# Patient Record
Sex: FEMALE | Race: WHITE | NOT HISPANIC OR LATINO | Employment: OTHER | URBAN - METROPOLITAN AREA
[De-identification: names, ages, dates, MRNs, and addresses within clinical notes are randomized per-mention and may not be internally consistent; named-entity substitution may affect disease eponyms.]

---

## 2017-01-09 ENCOUNTER — OFFICE VISIT (OUTPATIENT)
Dept: CARDIOLOGY | Facility: CLINIC | Age: 72
End: 2017-01-09
Payer: MEDICARE

## 2017-01-09 ENCOUNTER — OFFICE VISIT (OUTPATIENT)
Dept: SURGERY | Facility: CLINIC | Age: 72
End: 2017-01-09
Payer: MEDICARE

## 2017-01-09 VITALS
SYSTOLIC BLOOD PRESSURE: 138 MMHG | HEIGHT: 62 IN | DIASTOLIC BLOOD PRESSURE: 74 MMHG | WEIGHT: 190.25 LBS | HEART RATE: 76 BPM | BODY MASS INDEX: 35.01 KG/M2

## 2017-01-09 VITALS
TEMPERATURE: 98 F | HEART RATE: 80 BPM | WEIGHT: 189.63 LBS | DIASTOLIC BLOOD PRESSURE: 69 MMHG | BODY MASS INDEX: 35.8 KG/M2 | SYSTOLIC BLOOD PRESSURE: 155 MMHG | HEIGHT: 61 IN

## 2017-01-09 DIAGNOSIS — Z95.1 S/P CABG (CORONARY ARTERY BYPASS GRAFT): ICD-10-CM

## 2017-01-09 DIAGNOSIS — Z87.09 HISTORY OF ARDS: ICD-10-CM

## 2017-01-09 DIAGNOSIS — Z87.891 FORMER SMOKER: ICD-10-CM

## 2017-01-09 DIAGNOSIS — N18.4 CKD (CHRONIC KIDNEY DISEASE) STAGE 4, GFR 15-29 ML/MIN: ICD-10-CM

## 2017-01-09 DIAGNOSIS — R10.11 RIGHT UPPER QUADRANT ABDOMINAL PAIN: Primary | ICD-10-CM

## 2017-01-09 DIAGNOSIS — I25.10 CORONARY ARTERY DISEASE INVOLVING NATIVE CORONARY ARTERY OF NATIVE HEART WITHOUT ANGINA PECTORIS: Primary | ICD-10-CM

## 2017-01-09 PROCEDURE — 99204 OFFICE O/P NEW MOD 45 MIN: CPT | Mod: S$GLB,,, | Performed by: SURGERY

## 2017-01-09 PROCEDURE — 1159F MED LIST DOCD IN RCRD: CPT | Mod: S$GLB,,, | Performed by: SURGERY

## 2017-01-09 PROCEDURE — 1160F RVW MEDS BY RX/DR IN RCRD: CPT | Mod: S$GLB,,, | Performed by: INTERNAL MEDICINE

## 2017-01-09 PROCEDURE — 99214 OFFICE O/P EST MOD 30 MIN: CPT | Mod: S$GLB,,, | Performed by: INTERNAL MEDICINE

## 2017-01-09 PROCEDURE — 3078F DIAST BP <80 MM HG: CPT | Mod: S$GLB,,, | Performed by: SURGERY

## 2017-01-09 PROCEDURE — 1159F MED LIST DOCD IN RCRD: CPT | Mod: S$GLB,,, | Performed by: INTERNAL MEDICINE

## 2017-01-09 PROCEDURE — 1157F ADVNC CARE PLAN IN RCRD: CPT | Mod: S$GLB,,, | Performed by: SURGERY

## 2017-01-09 PROCEDURE — 1157F ADVNC CARE PLAN IN RCRD: CPT | Mod: S$GLB,,, | Performed by: INTERNAL MEDICINE

## 2017-01-09 PROCEDURE — 3077F SYST BP >= 140 MM HG: CPT | Mod: S$GLB,,, | Performed by: SURGERY

## 2017-01-09 PROCEDURE — 99999 PR PBB SHADOW E&M-EST. PATIENT-LVL III: CPT | Mod: PBBFAC,,, | Performed by: SURGERY

## 2017-01-09 PROCEDURE — 1160F RVW MEDS BY RX/DR IN RCRD: CPT | Mod: S$GLB,,, | Performed by: SURGERY

## 2017-01-09 PROCEDURE — 3078F DIAST BP <80 MM HG: CPT | Mod: S$GLB,,, | Performed by: INTERNAL MEDICINE

## 2017-01-09 PROCEDURE — 3075F SYST BP GE 130 - 139MM HG: CPT | Mod: S$GLB,,, | Performed by: INTERNAL MEDICINE

## 2017-01-09 PROCEDURE — 1126F AMNT PAIN NOTED NONE PRSNT: CPT | Mod: S$GLB,,, | Performed by: INTERNAL MEDICINE

## 2017-01-09 PROCEDURE — 99499 UNLISTED E&M SERVICE: CPT | Mod: S$PBB,,, | Performed by: INTERNAL MEDICINE

## 2017-01-09 PROCEDURE — 99999 PR PBB SHADOW E&M-EST. PATIENT-LVL III: CPT | Mod: PBBFAC,,, | Performed by: INTERNAL MEDICINE

## 2017-01-09 PROCEDURE — 1126F AMNT PAIN NOTED NONE PRSNT: CPT | Mod: S$GLB,,, | Performed by: SURGERY

## 2017-01-09 NOTE — LETTER
January 9, 2017      Va Meneses MD  [address]  [facility] [address]           [facility] [facility] General [facility]  [address]  [place] [address]  Phone: [phone]          Patient: Adele Hall   MR Number: [id number]   YOB: 1945   Date of Visit: 1/9/2017       Dear Dr. Va Meneses:    Thank you for referring Adele Hall to me for evaluation. Attached you will find relevant portions of my assessment and plan of care.    If you have questions, please do not hesitate to call me. I look forward to following Adele Hall along with you.    Sincerely,    Alex Mcdonough MD    Enclosure  CC:  No Recipients    If you would like to receive this communication electronically, please contact [email or web address] or [phone] to request more information on [facility] [employer] access.    For providers and/or their staff who would like to refer a patient to [facility], please contact us through our one-stop-shop provider referral line, [facility], at [phone].    If you feel you have received this communication in error or would no longer like to receive these types of communications, please e-mail [email or web address]

## 2017-01-09 NOTE — PROGRESS NOTES
Subjective:       Patient ID: Adele Hall is a 71 y.o. female.    Chief Complaint: Consult (sludge in gallbladder, burping a lot, worse at night, occasion stomach pains)    HPI Comments: Referred by Dr. Cazares with Gallbladder sludge.    Patient presents for evaluation of possible gallbladder problems. Problems were first noted 3 months ago. Current symptoms include bloating, belching and burping.  Pancreatic symptoms include none. Patient denies pruritis, RUQ Pain, nausea, vomiting, jaundice, fever, chills, diarrhea, epigastric pain.  Symptoms have been intermittent, but now are almost daily.  She states many years ago she was told her stomach did not empty well (she is diabetic).  Also told she had a hiatal hernia (although none shows on CT of chest) and is on Protonix.  Had an US recently which showed increased amount of sludge in gall bladder.  Liver functions normal.       Review of Systems   Constitutional: Negative for appetite change, chills, diaphoresis, fatigue, fever and unexpected weight change.   HENT: Negative for hearing loss, sore throat, trouble swallowing and voice change.    Eyes: Negative for visual disturbance.   Respiratory: Negative for cough, shortness of breath and wheezing.    Cardiovascular: Negative for chest pain, palpitations and leg swelling.   Gastrointestinal: Negative for abdominal distention, abdominal pain, anal bleeding, blood in stool, constipation, diarrhea, nausea, rectal pain and vomiting.   Genitourinary: Negative for difficulty urinating, dysuria, flank pain, frequency, hematuria, menstrual problem and urgency.   Musculoskeletal: Negative for arthralgias, back pain, joint swelling, myalgias and neck pain.   Skin: Negative for pallor and rash.   Neurological: Positive for numbness (in feet). Negative for dizziness, syncope, weakness and headaches.   Hematological: Negative for adenopathy. Does not bruise/bleed easily.   Psychiatric/Behavioral: Negative for  suicidal ideas. The patient is not nervous/anxious.        Objective:      Physical Exam   Constitutional: She is oriented to person, place, and time. She appears well-developed and well-nourished. No distress.   HENT:   Head: Normocephalic.   Right Ear: Hearing and external ear normal. No decreased hearing is noted.   Left Ear: Hearing and external ear normal. No decreased hearing is noted.   Nose: Nose normal.   Mouth/Throat: Mucous membranes are not dry. Normal dentition.   Eyes: Conjunctivae are normal. Right eye exhibits no discharge. Left eye exhibits no discharge. Right conjunctiva is not injected. Right conjunctiva has no hemorrhage. Left conjunctiva is not injected. Left conjunctiva has no hemorrhage. Right eye exhibits normal extraocular motion and no nystagmus. Left eye exhibits normal extraocular motion and no nystagmus. Right pupil is round and reactive. Left pupil is round and reactive. Pupils are equal.   Neck: No JVD present. Normal range of motion present. No thyroid mass and no thyromegaly present.   Cardiovascular: Normal rate, regular rhythm, normal heart sounds and intact distal pulses.  Exam reveals no gallop and no friction rub.    No murmur heard.  Pulmonary/Chest: Effort normal and breath sounds normal. No respiratory distress. She has no wheezes. She has no rales.   Abdominal: Soft. Bowel sounds are normal. She exhibits no distension and no mass. There is no hepatosplenomegaly. There is no tenderness. There is no rebound, no guarding and no CVA tenderness. No hernia.   Musculoskeletal: Normal range of motion. She exhibits no edema.   Lymphadenopathy:     She has no cervical adenopathy.     She has no axillary adenopathy.   Neurological: She is alert and oriented to person, place, and time. She has normal strength. No cranial nerve deficit or sensory deficit.   Skin: Skin is warm and dry. No rash noted. No cyanosis. Nails show no clubbing.   Psychiatric: She has a normal mood and affect. Her  mood appears not anxious. She does not exhibit a depressed mood.       Assessment:       1. Gall bladder sludge        Plan:       Patient  Is currently on Plavix after bypass surgery (over one year ago).  She is overdue with appt to see Dr. Burks.  Will get hida scan and have her see cardiology to see if she can stop Plavix.  If hida abnormal will recommend lap GB.  If normal, will leave it to patient's discretion as to whether she would like to proceed with surgery.

## 2017-01-09 NOTE — MR AVS SNAPSHOT
[facility]  [address] [facility] [address]  [facility] [place] [address]  Phone: [phone]                  Adele Hall   2017 2:40 PM   Office Visit    Description:  Female : 1945   Provider:  Cristopher Burks MD   Department:  [place] - Cardiology           Reason for Visit     Coronary Artery Disease     Hypertension     Chronic Kidney Disease     Hyperlipidemia           Diagnoses this Visit        Comments    Coronary artery disease involving native coronary artery of native heart without angina pectoris    -  Primary     History of ARDS         S/P CABG (coronary artery bypass graft)         Former smoker         CKD (chronic kidney disease) stage 4, GFR 15-29 ml/min                To Do List           Future Appointments        Provider Department Dept Phone    2017 12:00 PM [facility] [phone]    2017 10:30 AM LAB, [facility] - Lab [phone]    2017 10:45 AM LAB, [facility] - Lab [phone]    3/3/2017 11:00 AM Henrique Montero MD [facility] [phone]    3/14/2017 10:15 AM LAB, [facility] - Lab [phone]      Goals (5 Years of Data)     None      Follow-Up and Disposition     Return in about 6 months (around 2017).      [facility]     [facility] Nurse Care Line -  Assistance  Registered nurses in the [facility] provide clinical advisement, health education, appointment booking, and other advisory services.  Call for this free service at [phone].             Medications           Message regarding Medications     Verify the changes and/or additions to your medication regime listed below are the same as discussed with your clinician today.  If any of these changes or additions are incorrect, please notify your healthcare provider.             Verify that the below list of medications is an accurate representation of the  medications you are currently taking.  If none reported, the list may be blank. If incorrect, please contact your healthcare provider. Carry this list with you in case of emergency.           Current Medications     amlodipine (NORVASC) 2.5 MG tablet Take 1 tablet (2.5 mg total) by mouth once daily.    aspirin (ECOTRIN) 81 MG EC tablet Take 81 mg by mouth once daily.    clopidogrel (PLAVIX) 75 mg tablet Take 75 mg by mouth once daily.    fenofibrate micronized (LOFIBRA) 134 MG Cap TAKE ONE CAPSULE BY MOUTH EVERYDAY WITH BREAKFAST    gabapentin (NEURONTIN) 300 MG capsule Take 1 at bedtime for 1 week, then 2 a day for 1 week, then 3 a day    hydrOXYzine HCl (ATARAX) 25 MG tablet Take 1 tablet (25 mg total) by mouth 3 (three) times daily as needed for Anxiety.    insulin aspart (NOVOLOG) 100 unit/mL InPn pen Inject 32 Units into the skin 3 (three) times daily with meals.    insulin detemir (LEVEMIR) 100 unit/mL injection Inject 42 Units into the skin 2 (two) times daily.    levothyroxine (SYNTHROID) 112 MCG tablet Take 1 tablet (112 mcg total) by mouth once daily.    liraglutide 0.6 mg/0.1 mL, 18 mg/3 mL, subq PNIJ (VICTOZA 3-MICH) 0.6 mg/0.1 mL (18 mg/3 mL) PnIj Inject 1.8 mg into the skin once daily.    lisinopril 10 MG tablet Take 1 tablet (10 mg total) by mouth once daily.    metoprolol tartrate (LOPRESSOR) 25 MG tablet Take 0.5 tablets (12.5 mg total) by mouth 2 (two) times daily.    pantoprazole (PROTONIX) 40 MG tablet Take 40 mg by mouth once daily.    pramipexole (MIRAPEX) 0.125 MG tablet Take 0.25 mg by mouth nightly as needed (MAY TAKE UP TO 0.25 MG).     rosuvastatin (CRESTOR) 40 MG Tab Take 1 tablet (40 mg total) by mouth every evening.    VITAMIN D2 50,000 unit capsule TAKE ONE CAPSULE BY MOUTH EVERY SEVEN DAYS    BD INSULIN SYRINGE ULTRA-FINE 1 mL 31 gauge x 5/16 Syrg Uses 5 syringes per day with Levemir and Novolog    blood sugar diagnostic Strp Check blood glucose 4 times a day - before meals and at  "bedtime. Please dispense testing supplies for true metrix. Dx code e11.65    blood-glucose meter kit Use as instructed. Please dispense testing supplies for true metrix. Dx code e11.65    lancets Misc Check blood glucose 4x/day. Please dispense testing supplies for true metrix. Dx code e11.65    pen needle, diabetic (NOVOFINE PLUS) 32 gauge x 1/6" Ndle 1 Device by Misc.(Non-Drug; Combo Route) route 6 (six) times daily.           Clinical Reference Information           Vital Signs - Last Recorded  Most recent update: 1/9/2017  2:54 PM by Taylor Gutierrez LPN    BP Pulse Ht Wt BMI    138/74 76 5' 1.5" (1.562 m) 86.3 kg (190 lb 4.1 oz) 35.37 kg/m2      Blood Pressure          Most Recent Value    BP  138/74      Allergies as of 1/9/2017     Reglan [Metoclopramide Hcl]      Immunizations Administered on Date of Encounter - 1/9/2017     None      "

## 2017-01-09 NOTE — PROGRESS NOTES
Subjective:    Patient ID:  Adele Hall is a 71 y.o. female who presents for follow-up of Coronary Artery Disease (follow up); Hypertension; Chronic Kidney Disease; and Hyperlipidemia      HPI Comments: Pt a bit overdue for a visit is here for f/u. She has been doing well and has no cardiac complaints. She is exercising regularly with a  w/o problem. She may need cholecystectomy and needs clearance.       Review of Systems   Constitution: Negative for weight gain and weight loss.   HENT: Negative.    Eyes: Negative.    Cardiovascular: Negative for chest pain, claudication, cyanosis, dyspnea on exertion, irregular heartbeat, leg swelling, near-syncope, orthopnea (no PND), palpitations and syncope.   Respiratory: Negative for cough, hemoptysis, shortness of breath and snoring.    Endocrine: Negative.    Skin: Negative.    Musculoskeletal: Negative for joint pain, muscle cramps, muscle weakness and myalgias.   Gastrointestinal: Negative for diarrhea, hematemesis, nausea and vomiting.   Genitourinary: Negative.    Neurological: Negative for dizziness, focal weakness, light-headedness, loss of balance, numbness, paresthesias and seizures.   Psychiatric/Behavioral: Negative.         Objective:    Physical Exam   Constitutional: She is oriented to person, place, and time. She appears well-developed and well-nourished.   Eyes: Pupils are equal, round, and reactive to light.   Neck: Normal range of motion. No thyromegaly present.   Cardiovascular: Normal rate, regular rhythm, S1 normal, S2 normal, normal heart sounds, intact distal pulses and normal pulses.   No extrasystoles are present. PMI is not displaced.  Exam reveals no friction rub.    No murmur heard.  Pulmonary/Chest: Effort normal and breath sounds normal. She has no wheezes. She has no rales. She exhibits no tenderness.   Abdominal: Soft. Bowel sounds are normal. She exhibits no distension and no mass. There is no tenderness.   Musculoskeletal:  Normal range of motion. She exhibits no edema.   Neurological: She is alert and oriented to person, place, and time.   Skin: Skin is warm and dry.   Vitals reviewed.      Test(s) Reviewed  I have reviewed the following in detail:  [] Stress test   [] Angiography   [] Echocardiogram   [x] Labs   [] Other:         Assessment:       1. Coronary artery disease involving native coronary artery of native heart without angina pectoris    2. History of ARDS, post CABG, 1/2016    3. S/P CABG (coronary artery bypass graft) - x4 01/19/2016; LIMA to LAD; SVG's to diag, [facility], PDA    4. Former smoker    5. CKD (chronic kidney disease) stage 4, GFR 15-29 ml/min         Plan:       Pt doing well  Cardiac status stable  May stop the plavix  No contraindication to planned cholecystectomy.   F/u 6 months

## 2017-01-12 ENCOUNTER — HOSPITAL ENCOUNTER (OUTPATIENT)
Dept: RADIOLOGY | Facility: HOSPITAL | Age: 72
Discharge: HOME | End: 2017-01-12
Attending: SURGERY
Payer: MEDICARE

## 2017-01-12 DIAGNOSIS — R10.11 RIGHT UPPER QUADRANT ABDOMINAL PAIN: ICD-10-CM

## 2017-01-12 DIAGNOSIS — K21.9 GASTROESOPHAGEAL REFLUX DISEASE, ESOPHAGITIS PRESENCE NOT SPECIFIED: Primary | ICD-10-CM

## 2017-01-12 PROCEDURE — A9537 TC99M MEBROFENIN: HCPCS

## 2017-01-12 PROCEDURE — 78227 HEPATOBIL SYST IMAGE W/DRUG: CPT | Mod: TC

## 2017-01-12 PROCEDURE — 78227 HEPATOBIL SYST IMAGE W/DRUG: CPT | Mod: 26,,, | Performed by: RADIOLOGY

## 2017-01-12 PROCEDURE — 63600175 PHARM REV CODE 636 W HCPCS

## 2017-01-12 RX ORDER — SINCALIDE 5 UG/5ML
INJECTION, POWDER, LYOPHILIZED, FOR SOLUTION INTRAVENOUS
Status: COMPLETED
Start: 2017-01-12 | End: 2017-01-12

## 2017-01-12 RX ADMIN — SINCALIDE 1.73 MCG: 5 INJECTION, POWDER, LYOPHILIZED, FOR SOLUTION INTRAVENOUS at 08:01

## 2017-01-17 RX ORDER — SODIUM CHLORIDE 9 MG/ML
INJECTION, SOLUTION INTRAVENOUS CONTINUOUS
Status: CANCELLED | OUTPATIENT
Start: 2017-01-26

## 2017-01-19 ENCOUNTER — HOSPITAL ENCOUNTER (OUTPATIENT)
Dept: PREADMISSION TESTING | Facility: HOSPITAL | Age: 72
Discharge: HOME | End: 2017-01-19
Attending: SURGERY
Payer: MEDICARE

## 2017-01-19 DIAGNOSIS — R10.11 RIGHT UPPER QUADRANT ABDOMINAL PAIN: ICD-10-CM

## 2017-01-19 LAB
ALBUMIN SERPL BCP-MCNC: 3.8 G/DL
ALP SERPL-CCNC: 85 U/L
ALT SERPL W/O P-5'-P-CCNC: 24 U/L
ANION GAP SERPL CALC-SCNC: 10 MMOL/L
AST SERPL-CCNC: 31 U/L
BASOPHILS # BLD AUTO: 0.1 K/UL
BASOPHILS NFR BLD: 0.9 %
BILIRUB SERPL-MCNC: 0.2 MG/DL
BUN SERPL-MCNC: 25 MG/DL
CALCIUM SERPL-MCNC: 9.7 MG/DL
CHLORIDE SERPL-SCNC: 102 MMOL/L
CO2 SERPL-SCNC: 26 MMOL/L
CREAT SERPL-MCNC: 1.5 MG/DL
DIFFERENTIAL METHOD: ABNORMAL
EOSINOPHIL # BLD AUTO: 0.2 K/UL
EOSINOPHIL NFR BLD: 3 %
ERYTHROCYTE [DISTWIDTH] IN BLOOD BY AUTOMATED COUNT: 14.2 %
EST. GFR  (AFRICAN AMERICAN): 41 ML/MIN/1.73 M^2
EST. GFR  (NON AFRICAN AMERICAN): 35 ML/MIN/1.73 M^2
GLUCOSE SERPL-MCNC: 184 MG/DL
HCT VFR BLD AUTO: 34.2 %
HGB BLD-MCNC: 11.3 G/DL
LYMPHOCYTES # BLD AUTO: 2.8 K/UL
LYMPHOCYTES NFR BLD: 42.1 %
MCH RBC QN AUTO: 29 PG
MCHC RBC AUTO-ENTMCNC: 33 %
MCV RBC AUTO: 88 FL
MONOCYTES # BLD AUTO: 0.5 K/UL
MONOCYTES NFR BLD: 7.5 %
NEUTROPHILS # BLD AUTO: 3.1 K/UL
NEUTROPHILS NFR BLD: 46.5 %
PLATELET # BLD AUTO: 240 K/UL
PMV BLD AUTO: 9.5 FL
POTASSIUM SERPL-SCNC: 4.5 MMOL/L
PROT SERPL-MCNC: 7.2 G/DL
RBC # BLD AUTO: 3.89 M/UL
SODIUM SERPL-SCNC: 138 MMOL/L
WBC # BLD AUTO: 6.6 K/UL

## 2017-01-19 PROCEDURE — 80053 COMPREHEN METABOLIC PANEL: CPT

## 2017-01-19 PROCEDURE — 85025 COMPLETE CBC W/AUTO DIFF WBC: CPT

## 2017-01-19 PROCEDURE — 36415 COLL VENOUS BLD VENIPUNCTURE: CPT

## 2017-01-25 ENCOUNTER — ANESTHESIA EVENT (OUTPATIENT)
Dept: SURGERY | Facility: HOSPITAL | Age: 72
End: 2017-01-25
Payer: MEDICARE

## 2017-01-25 ENCOUNTER — TELEPHONE (OUTPATIENT)
Dept: FAMILY MEDICINE | Facility: CLINIC | Age: 72
End: 2017-01-25

## 2017-01-25 NOTE — TELEPHONE ENCOUNTER
Contacted pt regarding if she has recently had an eye exam done. Pt stated that she has not but she needs to make an apt. She would like to stay within [facility], so she will be returning call to clinic to get one scheduled after she puts groceries away. Pt understanding verified.

## 2017-01-26 ENCOUNTER — ANESTHESIA (OUTPATIENT)
Dept: SURGERY | Facility: HOSPITAL | Age: 72
End: 2017-01-26
Payer: MEDICARE

## 2017-01-26 ENCOUNTER — SURGERY (OUTPATIENT)
Age: 72
End: 2017-01-26

## 2017-01-26 ENCOUNTER — HOSPITAL ENCOUNTER (OUTPATIENT)
Facility: HOSPITAL | Age: 72
Discharge: HOME | End: 2017-01-26
Attending: SURGERY | Admitting: SURGERY
Payer: MEDICARE

## 2017-01-26 DIAGNOSIS — R10.11 RIGHT UPPER QUADRANT ABDOMINAL PAIN: ICD-10-CM

## 2017-01-26 LAB
POCT GLUCOSE: 154 MG/DL (ref 70–110)
POCT GLUCOSE: 189 MG/DL (ref 70–110)

## 2017-01-26 PROCEDURE — 71000016 HC POSTOP RECOV ADDL HR: Performed by: SURGERY

## 2017-01-26 PROCEDURE — 71000033 HC RECOVERY, INTIAL HOUR: Performed by: SURGERY

## 2017-01-26 PROCEDURE — 36000708 HC OR TIME LEV III 1ST 15 MIN: Performed by: SURGERY

## 2017-01-26 PROCEDURE — 25000003 PHARM REV CODE 250: Performed by: ANESTHESIOLOGY

## 2017-01-26 PROCEDURE — 25000003 PHARM REV CODE 250: Performed by: SURGERY

## 2017-01-26 PROCEDURE — 99900104 DSU ONLY-NO CHARGE-EA ADD'L HR (STAT): Performed by: SURGERY

## 2017-01-26 PROCEDURE — D9220A PRA ANESTHESIA: Mod: CRNA,,, | Performed by: NURSE ANESTHETIST, CERTIFIED REGISTERED

## 2017-01-26 PROCEDURE — 88304 TISSUE EXAM BY PATHOLOGIST: CPT | Performed by: PATHOLOGY

## 2017-01-26 PROCEDURE — 99900103 DSU ONLY-NO CHARGE-INITIAL HR (STAT): Performed by: SURGERY

## 2017-01-26 PROCEDURE — 71000039 HC RECOVERY, EACH ADD'L HOUR: Performed by: SURGERY

## 2017-01-26 PROCEDURE — 36000709 HC OR TIME LEV III EA ADD 15 MIN: Performed by: SURGERY

## 2017-01-26 PROCEDURE — 63600175 PHARM REV CODE 636 W HCPCS: Performed by: ANESTHESIOLOGY

## 2017-01-26 PROCEDURE — 25000003 PHARM REV CODE 250: Performed by: NURSE ANESTHETIST, CERTIFIED REGISTERED

## 2017-01-26 PROCEDURE — 37000009 HC ANESTHESIA EA ADD 15 MINS: Performed by: SURGERY

## 2017-01-26 PROCEDURE — 27201423 OPTIME MED/SURG SUP & DEVICES STERILE SUPPLY: Performed by: SURGERY

## 2017-01-26 PROCEDURE — 47562 LAPAROSCOPIC CHOLECYSTECTOMY: CPT | Mod: ,,, | Performed by: SURGERY

## 2017-01-26 PROCEDURE — 71000015 HC POSTOP RECOV 1ST HR: Performed by: SURGERY

## 2017-01-26 PROCEDURE — 88304 TISSUE EXAM BY PATHOLOGIST: CPT | Mod: 26,,, | Performed by: PATHOLOGY

## 2017-01-26 PROCEDURE — 37000008 HC ANESTHESIA 1ST 15 MINUTES: Performed by: SURGERY

## 2017-01-26 PROCEDURE — D9220A PRA ANESTHESIA: Mod: ANES,,, | Performed by: ANESTHESIOLOGY

## 2017-01-26 PROCEDURE — 63600175 PHARM REV CODE 636 W HCPCS: Performed by: NURSE ANESTHETIST, CERTIFIED REGISTERED

## 2017-01-26 RX ORDER — HYDROCODONE BITARTRATE AND ACETAMINOPHEN 5; 325 MG/1; MG/1
1 TABLET ORAL EVERY 4 HOURS PRN
Qty: 30 TABLET | Refills: 0 | Status: SHIPPED | OUTPATIENT
Start: 2017-01-26 | End: 2017-03-03

## 2017-01-26 RX ORDER — MIDAZOLAM HYDROCHLORIDE 1 MG/ML
INJECTION INTRAMUSCULAR; INTRAVENOUS
Status: DISCONTINUED | OUTPATIENT
Start: 2017-01-26 | End: 2017-01-26

## 2017-01-26 RX ORDER — SODIUM CHLORIDE, SODIUM LACTATE, POTASSIUM CHLORIDE, CALCIUM CHLORIDE 600; 310; 30; 20 MG/100ML; MG/100ML; MG/100ML; MG/100ML
INJECTION, SOLUTION INTRAVENOUS CONTINUOUS
Status: DISCONTINUED | OUTPATIENT
Start: 2017-01-26 | End: 2017-01-26

## 2017-01-26 RX ORDER — LIDOCAINE HYDROCHLORIDE 10 MG/ML
1 INJECTION, SOLUTION EPIDURAL; INFILTRATION; INTRACAUDAL; PERINEURAL ONCE
Status: COMPLETED | OUTPATIENT
Start: 2017-01-26 | End: 2017-01-26

## 2017-01-26 RX ORDER — ONDANSETRON HYDROCHLORIDE 2 MG/ML
INJECTION, SOLUTION INTRAMUSCULAR; INTRAVENOUS
Status: DISCONTINUED | OUTPATIENT
Start: 2017-01-26 | End: 2017-01-26

## 2017-01-26 RX ORDER — SUCCINYLCHOLINE CHLORIDE 20 MG/ML
INJECTION INTRAMUSCULAR; INTRAVENOUS
Status: DISCONTINUED | OUTPATIENT
Start: 2017-01-26 | End: 2017-01-26

## 2017-01-26 RX ORDER — SODIUM CHLORIDE, SODIUM LACTATE, POTASSIUM CHLORIDE, CALCIUM CHLORIDE 600; 310; 30; 20 MG/100ML; MG/100ML; MG/100ML; MG/100ML
INJECTION, SOLUTION INTRAVENOUS CONTINUOUS
Status: DISCONTINUED | OUTPATIENT
Start: 2017-01-26 | End: 2017-01-26 | Stop reason: HOSPADM

## 2017-01-26 RX ORDER — LIDOCAINE HCL/PF 100 MG/5ML
SYRINGE (ML) INTRAVENOUS
Status: DISCONTINUED | OUTPATIENT
Start: 2017-01-26 | End: 2017-01-26

## 2017-01-26 RX ORDER — ACETAMINOPHEN 10 MG/ML
1000 INJECTION, SOLUTION INTRAVENOUS ONCE
Status: COMPLETED | OUTPATIENT
Start: 2017-01-26 | End: 2017-01-26

## 2017-01-26 RX ORDER — FENTANYL CITRATE 50 UG/ML
25 INJECTION, SOLUTION INTRAMUSCULAR; INTRAVENOUS EVERY 5 MIN PRN
Status: DISCONTINUED | OUTPATIENT
Start: 2017-01-26 | End: 2017-01-26 | Stop reason: HOSPADM

## 2017-01-26 RX ORDER — NEOSTIGMINE METHYLSULFATE 1 MG/ML
INJECTION, SOLUTION INTRAVENOUS
Status: DISCONTINUED | OUTPATIENT
Start: 2017-01-26 | End: 2017-01-26

## 2017-01-26 RX ORDER — GLYCOPYRROLATE 0.2 MG/ML
INJECTION INTRAMUSCULAR; INTRAVENOUS
Status: DISCONTINUED | OUTPATIENT
Start: 2017-01-26 | End: 2017-01-26

## 2017-01-26 RX ORDER — HYDROMORPHONE HYDROCHLORIDE 2 MG/ML
1 INJECTION, SOLUTION INTRAMUSCULAR; INTRAVENOUS; SUBCUTANEOUS EVERY 4 HOURS PRN
Status: DISCONTINUED | OUTPATIENT
Start: 2017-01-26 | End: 2017-01-26 | Stop reason: HOSPADM

## 2017-01-26 RX ORDER — SODIUM CHLORIDE 9 MG/ML
INJECTION, SOLUTION INTRAVENOUS CONTINUOUS
Status: DISCONTINUED | OUTPATIENT
Start: 2017-01-26 | End: 2017-01-26

## 2017-01-26 RX ORDER — ROCURONIUM BROMIDE 10 MG/ML
INJECTION, SOLUTION INTRAVENOUS
Status: DISCONTINUED | OUTPATIENT
Start: 2017-01-26 | End: 2017-01-26

## 2017-01-26 RX ORDER — FENTANYL CITRATE 50 UG/ML
INJECTION, SOLUTION INTRAMUSCULAR; INTRAVENOUS
Status: DISCONTINUED | OUTPATIENT
Start: 2017-01-26 | End: 2017-01-26

## 2017-01-26 RX ORDER — OXYCODONE HYDROCHLORIDE 5 MG/1
5 TABLET ORAL EVERY 30 MIN PRN
Status: DISCONTINUED | OUTPATIENT
Start: 2017-01-26 | End: 2017-01-26 | Stop reason: HOSPADM

## 2017-01-26 RX ORDER — ONDANSETRON 2 MG/ML
4 INJECTION INTRAMUSCULAR; INTRAVENOUS DAILY PRN
Status: DISCONTINUED | OUTPATIENT
Start: 2017-01-26 | End: 2017-01-26 | Stop reason: HOSPADM

## 2017-01-26 RX ORDER — PROPOFOL 10 MG/ML
VIAL (ML) INTRAVENOUS
Status: DISCONTINUED | OUTPATIENT
Start: 2017-01-26 | End: 2017-01-26

## 2017-01-26 RX ORDER — DEXAMETHASONE SODIUM PHOSPHATE 4 MG/ML
INJECTION, SOLUTION INTRA-ARTICULAR; INTRALESIONAL; INTRAMUSCULAR; INTRAVENOUS; SOFT TISSUE
Status: DISCONTINUED | OUTPATIENT
Start: 2017-01-26 | End: 2017-01-26

## 2017-01-26 RX ORDER — BUPIVACAINE HYDROCHLORIDE AND EPINEPHRINE 5; 5 MG/ML; UG/ML
INJECTION, SOLUTION EPIDURAL; INTRACAUDAL; PERINEURAL
Status: DISCONTINUED | OUTPATIENT
Start: 2017-01-26 | End: 2017-01-26 | Stop reason: HOSPADM

## 2017-01-26 RX ADMIN — FENTANYL CITRATE 25 MCG: 50 INJECTION, SOLUTION INTRAMUSCULAR; INTRAVENOUS at 11:01

## 2017-01-26 RX ADMIN — OXYCODONE HYDROCHLORIDE 5 MG: 5 TABLET ORAL at 11:01

## 2017-01-26 RX ADMIN — Medication 3 G: at 10:01

## 2017-01-26 RX ADMIN — DEXAMETHASONE SODIUM PHOSPHATE 4 MG: 4 INJECTION, SOLUTION INTRAMUSCULAR; INTRAVENOUS at 10:01

## 2017-01-26 RX ADMIN — ESMOLOL HYDROCHLORIDE 10 MG: 20 INJECTION INTRAVENOUS at 10:01

## 2017-01-26 RX ADMIN — FENTANYL CITRATE 100 MCG: 50 INJECTION, SOLUTION INTRAMUSCULAR; INTRAVENOUS at 10:01

## 2017-01-26 RX ADMIN — ROCURONIUM BROMIDE 25 MG: 10 INJECTION, SOLUTION INTRAVENOUS at 10:01

## 2017-01-26 RX ADMIN — LIDOCAINE HYDROCHLORIDE 10 MG: 10 INJECTION, SOLUTION EPIDURAL; INFILTRATION; INTRACAUDAL; PERINEURAL at 08:01

## 2017-01-26 RX ADMIN — ROCURONIUM BROMIDE 5 MG: 10 INJECTION, SOLUTION INTRAVENOUS at 10:01

## 2017-01-26 RX ADMIN — ONDANSETRON 4 MG: 2 INJECTION, SOLUTION INTRAMUSCULAR; INTRAVENOUS at 09:01

## 2017-01-26 RX ADMIN — PROPOFOL 150 MG: 10 INJECTION, EMULSION INTRAVENOUS at 10:01

## 2017-01-26 RX ADMIN — SUCCINYLCHOLINE CHLORIDE 100 MG: 20 INJECTION, SOLUTION INTRAMUSCULAR; INTRAVENOUS at 10:01

## 2017-01-26 RX ADMIN — ACETAMINOPHEN 1000 MG: 10 INJECTION, SOLUTION INTRAVENOUS at 11:01

## 2017-01-26 RX ADMIN — LIDOCAINE HYDROCHLORIDE 100 MG: 20 INJECTION, SOLUTION INTRAVENOUS at 10:01

## 2017-01-26 RX ADMIN — GLYCOPYRROLATE 0.4 MG: 0.2 INJECTION, SOLUTION INTRAMUSCULAR; INTRAVENOUS at 10:01

## 2017-01-26 RX ADMIN — SODIUM CHLORIDE, SODIUM LACTATE, POTASSIUM CHLORIDE, AND CALCIUM CHLORIDE: .6; .31; .03; .02 INJECTION, SOLUTION INTRAVENOUS at 08:01

## 2017-01-26 RX ADMIN — MIDAZOLAM HYDROCHLORIDE 1 MG: 1 INJECTION, SOLUTION INTRAMUSCULAR; INTRAVENOUS at 09:01

## 2017-01-26 RX ADMIN — NEOSTIGMINE METHYLSULFATE 3 MG: 1 INJECTION INTRAVENOUS at 10:01

## 2017-01-26 RX ADMIN — BUPIVACAINE HYDROCHLORIDE AND EPINEPHRINE BITARTRATE 30 ML: 5; .0091 INJECTION, SOLUTION EPIDURAL; INTRACAUDAL; PERINEURAL at 07:01

## 2017-01-26 RX ADMIN — EPHEDRINE SULFATE 10 MG: 50 INJECTION, SOLUTION INTRAMUSCULAR; INTRAVENOUS; SUBCUTANEOUS at 10:01

## 2017-01-26 NOTE — TRANSFER OF CARE
"Anesthesia Transfer of Care Note    Patient: Adele Hall    Procedure(s) Performed: Procedure(s) (LRB):  CHOLECYSTECTOMY-LAPAROSCOPIC (N/A)    Patient location: PACU    Anesthesia Type: general    Transport from OR: Transported from OR on 2-3 L/min O2 by NC with adequate spontaneous ventilation    Post pain: adequate analgesia    Post assessment: no apparent anesthetic complications and tolerated procedure well    Post vital signs: stable    Level of consciousness: awake    Nausea/Vomiting: no nausea/vomiting    Complications: none          Last vitals:   Visit Vitals    BP (!) 152/68 (BP Location: Left arm, Patient Position: Lying, BP Method: Automatic)    Pulse 86    Temp 37 °C (98.6 °F) (Oral)    Resp 18    Ht 5' 1.5" (1.562 m)    Wt 86.2 kg (190 lb)    SpO2 97%    Breastfeeding No    BMI 35.32 kg/m2     "

## 2017-01-26 NOTE — DISCHARGE SUMMARY
Discharge Summary  General Surgery      Admit Date: 1/26/2017    Discharge Date :1/26/2017    Attending Physician: Alex Mcdonough     Discharge Physician: Alex Mcdonough    Discharged Condition: good    Discharge Diagnosis: Right upper quadrant abdominal pain [R10.11]    Treatments/Procedures: Procedure(s) (LRB):  CHOLECYSTECTOMY-LAPAROSCOPIC (N/A)    Hospital Course: Uneventful; Discharged home from [facility]    Significant Diagnostic Studies: none    Disposition: Home or Self Care    Diet: Regular    Follow up: Office 10-14 days    Activity: No heavy lifting till seen in office.    Patient Instructions:   Current Discharge Medication List      START taking these medications    Details   hydrocodone-acetaminophen 5-325mg (NORCO) 5-325 mg per tablet Take 1 tablet by mouth every 4 (four) hours as needed for Pain.  Qty: 30 tablet, Refills: 0         CONTINUE these medications which have NOT CHANGED    Details   amlodipine (NORVASC) 2.5 MG tablet Take 1 tablet (2.5 mg total) by mouth once daily.  Qty: 30 tablet, Refills: 11    Associated Diagnoses: Good hypertension control      BD INSULIN SYRINGE ULTRA-FINE 1 mL 31 gauge x 5/16 Syrg Uses 5 syringes per day with Levemir and Novolog  Qty: 200 each, Refills: 11      blood sugar diagnostic Strp Check blood glucose 4 times a day - before meals and at bedtime. Please dispense testing supplies for true metrix. Dx code e11.65  Qty: 400 strip, Refills: 3      blood-glucose meter kit Use as instructed. Please dispense testing supplies for true metrix. Dx code e11.65  Qty: 1 each, Refills: 0      fenofibrate micronized (LOFIBRA) 134 MG Cap TAKE ONE CAPSULE BY MOUTH EVERYDAY WITH BREAKFAST  Qty: 30 capsule, Refills: 2      gabapentin (NEURONTIN) 300 MG capsule Take 1 at bedtime for 1 week, then 2 a day for 1 week, then 3 a day  Qty: 90 capsule, Refills: 11    Associated Diagnoses: Neuropathy      hydrOXYzine HCl (ATARAX) 25 MG tablet Take 1 tablet (25 mg total) by mouth 3 (three)  "times daily as needed for Anxiety.  Qty: 90 tablet, Refills: 11    Associated Diagnoses: Psychophysiological insomnia      insulin aspart (NOVOLOG) 100 unit/mL InPn pen Inject 32 Units into the skin 3 (three) times daily with meals.  Qty: 4 Box, Refills: 3      insulin detemir (LEVEMIR) 100 unit/mL injection Inject 42 Units into the skin 2 (two) times daily.      lancets Misc Check blood glucose 4x/day. Please dispense testing supplies for true metrix. Dx code e11.65  Qty: 400 each, Refills: 3      levothyroxine (SYNTHROID) 112 MCG tablet Take 1 tablet (112 mcg total) by mouth once daily.  Qty: 30 tablet, Refills: 2    Associated Diagnoses: Thyroid disease      liraglutide 0.6 mg/0.1 mL, 18 mg/3 mL, subq PNIJ (VICTOZA 3-MICH) 0.6 mg/0.1 mL (18 mg/3 mL) PnIj Inject 1.8 mg into the skin once daily.  Qty: 9 mL, Refills: 11      lisinopril 10 MG tablet Take 1 tablet (10 mg total) by mouth once daily.  Qty: 30 tablet, Refills: 11    Associated Diagnoses: Good hypertension control      metoprolol tartrate (LOPRESSOR) 25 MG tablet Take 0.5 tablets (12.5 mg total) by mouth 2 (two) times daily.  Qty: 60 tablet, Refills: 11      pantoprazole (PROTONIX) 40 MG tablet Take 40 mg by mouth once daily.      pen needle, diabetic (NOVOFINE PLUS) 32 gauge x 1/6" Ndle 1 Device by Misc.(Non-Drug; Combo Route) route 6 (six) times daily.  Qty: 600 each, Refills: 3      pramipexole (MIRAPEX) 0.125 MG tablet Take 0.25 mg by mouth nightly as needed (MAY TAKE UP TO 0.25 MG).       rosuvastatin (CRESTOR) 40 MG Tab Take 1 tablet (40 mg total) by mouth every evening.  Qty: 30 tablet, Refills: 11    Associated Diagnoses: Mixed hyperlipidemia      aspirin (ECOTRIN) 81 MG EC tablet Take 81 mg by mouth once daily.      VITAMIN D2 50,000 unit capsule TAKE ONE CAPSULE BY MOUTH EVERY SEVEN DAYS  Qty: 4 capsule, Refills: 11    Associated Diagnoses: Vitamin D deficiency               Discharge Procedure Orders  Diet general     Activity as tolerated "     Shower on day dressing removed (No bath)     Lifting restrictions     Call MD for:  temperature >100.4     Call MD for:  persistent nausea and vomiting     Call MD for:  severe uncontrolled pain     Call MD for:  difficulty breathing, headache or visual disturbances     Call MD for:  redness, tenderness, or signs of infection (pain, swelling, redness, odor or green/yellow discharge around incision site)     Call MD for:  hives     Call MD for:  persistent dizziness or light-headedness     Call MD for:  extreme fatigue

## 2017-01-26 NOTE — INTERVAL H&P NOTE
The patient has been examined and the H&P has been reviewed:    I concur with the findings and no changes have occurred since H&P was written.    Anesthesia/Surgery risks, benefits and alternative options discussed and understood by patient/family.          Active Hospital Problems    Diagnosis  POA    Right upper quadrant abdominal pain [R10.11]  Yes      Resolved Hospital Problems    Diagnosis Date Resolved POA   No resolved problems to display.

## 2017-01-26 NOTE — ANESTHESIA POSTPROCEDURE EVALUATION
"Anesthesia Post Evaluation    Patient: Adlee Hall    Procedure(s) Performed: Procedure(s) (LRB):  CHOLECYSTECTOMY-LAPAROSCOPIC (N/A)    Final Anesthesia Type: general  Patient location during evaluation: PACU  Patient participation: Yes- Able to Participate  Level of consciousness: awake and alert and oriented  Post-procedure vital signs: reviewed and stable  Pain management: adequate  Airway patency: patent  PONV status at discharge: No PONV  Anesthetic complications: no      Cardiovascular status: blood pressure returned to baseline  Respiratory status: unassisted, spontaneous ventilation and room air  Hydration status: euvolemic  Follow-up not needed.        Visit Vitals    BP (!) 140/65    Pulse 76    Temp 36.8 °C (98.2 °F) (Oral)    Resp (!) 24    Ht 5' 1.5" (1.562 m)    Wt 86.2 kg (190 lb)    SpO2 97%    Breastfeeding No    BMI 35.32 kg/m2       Pain/Antonio Score: Pain Assessment Performed: Yes (1/26/2017 11:10 AM)  Presence of Pain: complains of pain/discomfort (1/26/2017 11:45 AM)  Pain Rating Prior to Med Admin: 7 (1/26/2017 11:52 AM)  Antonio Score: 8 (1/26/2017 11:45 AM)      "

## 2017-01-26 NOTE — ANESTHESIA PREPROCEDURE EVALUATION
01/26/2017  Adele Hall is a 71 y.o., female.    [facility] Anesthesia Evaluation    I have reviewed the Patient Summary Reports.    I have reviewed the Nursing Notes.   I have reviewed the Medications.     Review of Systems  Anesthesia Hx:  No problems with previous Anesthesia Denies Hx of Anesthetic complications    Social:  Former Smoker    Cardiovascular:   Hypertension Past MI CAD  CABG/stent (CABG x4 vessels 1/19/2016)   Denies Angina.     hyperlipidemia    Pulmonary:   COPD, mild Denies Asthma.  Denies Shortness of breath.  Denies Recent URI.    Renal/:   Chronic Renal Disease, CRI    Hepatic/GI:   GERD, well controlled Denies Liver Disease.    Neurological:   Denies TIA. Denies CVA. Neuromuscular Disease, (Diabetic peripheral neuropathy)  Denies Seizures.    Endocrine:   Diabetes, type 2, using insulin Denies Hypothyroidism.        Physical Exam  General:  Well nourished, Obesity    Airway/Jaw/Neck:  Airway Findings: Mouth Opening: Normal Tongue: Normal  General Airway Assessment: Adult, Good  Mallampati: II  Improves to II with phonation.  TM Distance: 4-6 cm      Dental:  Dental Findings: In tact   Chest/Lungs:  Chest/Lungs Findings: Clear to auscultation, Normal Respiratory Rate     Heart/Vascular:  Heart Findings: Rate: Normal  Rhythm: Regular Rhythm  Sounds: Normal  Heart murmur: negative       Mental Status:  Mental Status Findings:  Cooperative, Alert and Oriented         Anesthesia Plan  Type of Anesthesia, risks & benefits discussed:  Anesthesia Type:  general  Patient's Preference:   Intra-op Monitoring Plan:   Intra-op Monitoring Plan Comments:   Post Op Pain Control Plan:   Post Op Pain Control Plan Comments:   Induction:   IV  Beta Blocker:  Patient is not currently on a Beta-Blocker (No further documentation required).       Informed Consent: Patient understands risks and agrees  with Anesthesia plan.  Questions answered. Anesthesia consent signed with patient.  ASA Score: 3     Day of Surgery Review of History & Physical:    H&P update referred to the surgeon.         Ready For Surgery From Anesthesia Perspective.

## 2017-01-26 NOTE — OP NOTE
Laparoscopic Cholecystectomy  Procedure Note    Indications: This patient presents with symptomatic gallbladder disease and will undergo laparoscopic cholecystectomy.    Date: 1/26/2017    Pre-operative Diagnosis: Acute cholecystitis    Post-operative Diagnosis: Same    Surgeon: VICKY LOPEZ     Assistants: none    Anesthesia: General endotracheal anesthesia        Procedure Details   The patient was seen again in the Holding Room.  The patient and/or family concurred with the proposed plan, giving informed consent. The patient was taken to Operating Room 1, identified as Adele Hall and the procedure verified as Laparoscopic Cholecystectomy. A Time Out was held and the above information confirmed.    Prior to the induction of general anesthesia, antibiotic prophylaxis was administered. General endotracheal anesthesia was then administered and tolerated well. After the induction, the abdomen was prepped in the usual sterile fashion.     Local anesthetic agent was injected into the skin below the umbilicus and a transverse incision made. The Veress needle was inserted into the abdomen in the standard manner and  pneumoperitoneum was then created with CO2 to 12 mmHg pressure and tolerated well without any adverse changes in the patient's vital signs. A 10mm trocar was then placed in the umbilical incision.  The laparoscope was introduced and additional trocars were introduced under direct vision in the subxiphoid and right subcostal positions. All skin incisions were infiltrated with a local anesthetic agent before making the incision and placing the trocars.     The gallbladder was identified, and was not distended and was edematous.  Prior to dissection, the gallbladder was not aspirated with an aspiration needle in order to decompress it.  The fundus was grasped and retracted cephalad. Adhesions were lysed bluntly and with the electrocautery and hydrodissection where necessary, taking care not to injure  any adjacent organs or viscus. The infundibulum was grasped and retracted laterally, exposing the peritoneum overlying the triangle of Calot. This was then divided and exposed in a blunt fashion. The cystic duct was clearly identified and bluntly dissected circumferentially. The junctions of the gallbladder, cystic duct and common bile duct were identified prior to the division of any structure.     The cystic duct was then doubly ligated with surgical clips on the patient side and singly clipped on the gallbladder side and divided. The cystic artery was identified, dissected free, ligated with clips and divided as well.     The gallbladder was dissected from the liver bed in retrograde fashion with the electrocautery. . The liver bed was irrigated and inspected. Hemostasis was achieved with the electrocautery. The gallbladder was placed in a pouch and removed through the umbilical port which did not require enlargement.  Copious irrigation was utilized and was repeatedly aspirated until clear.    Pneumoperitoneum was completely reduced after viewing removal of the trocars and grasping the fascia under direct vision. The wound was thoroughly irrigated and the fascia was then closed with a figure of eight 0-vicryl suture; the skin was then closed with 4-0 monocryl subcuticular sutures and steri strips. Sterile dressings were applied.    Instrument, sponge, and needle counts were correct at closure and at the conclusion of the case.     Findings:  Cholecystitis with Cholelithiasis    Estimated Blood Loss: 20 cc           Drains: none           Specimens: Gallbladder             Complications: None; patient tolerated the procedure well.           Disposition: PACU - hemodynamically stable.           Condition: stable

## 2017-01-26 NOTE — PLAN OF CARE
Pt tolerated procedure well. Pt states no complaints. Pt and spouse given discharge instructions with verbalized understanding. Pt escorted via w/c to car.

## 2017-01-26 NOTE — DISCHARGE INSTRUCTIONS
General Information:    1.  Do not drink alcoholic beverages including beer for 24 hours or as long as you are on pain medication..  2.  Do not drive a motor vehicle, operate machinery or power tools, or signs legal papers for 24 hours or as long as you are on pain medication.   3.  You may experience light-headedness, dizziness, and sleepiness following surgery. Please do not stay alone. A responsible adult should be with you for this 24 hour period.  4.  Go home and rest.    5. Progress slowly to a normal diet unless instructed.  Otherwise, begin with liquids such as soft drinks, then soup and crackers working up to solid foods. Drink plenty of nonalcoholic fluids.  6.  Certain anesthetics and pain medications produce nausea and vomiting in certain       individuals. If nausea becomes a problem at home, call you doctor.    7. A nurse will be calling you sometime after surgery. Do not be alarmed. This is our way of finding out how you are doing.    8. Several times every hour while you are awake, take 2-3 deep breaths and cough. If you had stomach surgery hold a pillow or rolled towel firmly against your stomach before you cough. This will help with any pain the cough might cause.  9. Several times every hour while you are awake, pump and flex your feet 5-6 times and do foot circles. This will help prevent blood clots.    10.Call your doctor for severe pain, bleeding, fever, or signs or symptoms of infection (pain, swelling, redness, foul odor, drainage).    Discharge Instructions: After Your Surgery/Procedure  Youve just had surgery. During surgery you were given medicine called anesthesia to keep you relaxed and free of pain. After surgery you may have some pain or nausea. This is common. Here are some tips for feeling better and getting well after surgery.     Stay on schedule with your medication.   Going home  Your doctor or nurse will show you how to take care of yourself when you go home. He or she will  "also answer your questions. Have an adult family member or friend drive you home.      For your safety we recommend these precaution for the first 24 hours after your procedure:  · Do not drive or use heavy equipment.  · Do not make important decisions or sign legal papers.  · Do not drink alcohol.  · Have someone stay with you, if needed. He or she can watch for problems and help keep you safe.  · Your concentration, balance, coordination, and judgement may be impaired for many hours after anesthesia.  Use caution when ambulating or standing up.     · You may feel weak and "washed out" after anesthesia and surgery.      Subtle residual effects of general anesthesia or sedation with regional / local anesthesia can last more than 24 hours.  Rest for the remainder of the day or longer if your Doctor/Surgeon has advised you to do so.  Although you may feel normal within the first 24 hours, your reflexes and mental ability may be impaired without you realizing it.  You may feel dizzy, lightheaded or sleepy for 24 hours or longer.      Be sure to go to all follow-up visits with your doctor. And rest after your surgery for as long as your doctor tells you to.  Coping with pain  If you have pain after surgery, pain medicine will help you feel better. Take it as told, before pain becomes severe. Also, ask your doctor or pharmacist about other ways to control pain. This might be with heat, ice, or relaxation. And follow any other instructions your surgeon or nurse gives you.  Tips for taking pain medicine  To get the best relief possible, remember these points:  · Pain medicines can upset your stomach. Taking them with a little food may help.  · Most pain relievers taken by mouth need at least 20 to 30 minutes to start to work.  · Taking medicine on a schedule can help you remember to take it. Try to time your medicine so that you can take it before starting an activity. This might be before you get dressed, go for a walk, " or sit down for dinner.  · Constipation is a common side effect of pain medicines. Call your doctor before taking any medicines such as laxatives or stool softeners to help ease constipation. Also ask if you should skip any foods. Drinking lots of fluids and eating foods such as fruits and vegetables that are high in fiber can also help. Remember, do not take laxatives unless your surgeon has prescribed them.  · Drinking alcohol and taking pain medicine can cause dizziness and slow your breathing. It can even be deadly. Do not drink alcohol while taking pain medicine.  · Pain medicine can make you react more slowly to things. Do not drive or run machinery while taking pain medicine.  Your health care provider may tell you to take acetaminophen to help ease your pain. Ask him or her how much you are supposed to take each day. Acetaminophen or other pain relievers may interact with your prescription medicines or other over-the-counter (OTC) drugs. Some prescription medicines have acetaminophen and other ingredients. Using both prescription and OTC acetaminophen for pain can cause you to overdose. Read the labels on your OTC medicines with care. This will help you to clearly know the list of ingredients, how much to take, and any warnings. It may also help you not take too much acetaminophen. If you have questions or do not understand the information, ask your pharmacist or health care provider to explain it to you before you take the OTC medicine.  Managing nausea  Some people have an upset stomach after surgery. This is often because of anesthesia, pain, or pain medicine, or the stress of surgery. These tips will help you handle nausea and eat healthy foods as you get better. If you were on a special food plan before surgery, ask your doctor if you should follow it while you get better. These tips may help:  · Do not push yourself to eat. Your body will tell you when to eat and how much.  · Start off with clear  liquids and soup. They are easier to digest.  · Next try semi-solid foods, such as mashed potatoes, applesauce, and gelatin, as you feel ready.  · Slowly move to solid foods. Dont eat fatty, rich, or spicy foods at first.  · Do not force yourself to have 3 large meals a day. Instead eat smaller amounts more often.  · Take pain medicines with a small amount of solid food, such as crackers or toast, to avoid nausea.     Call your surgeon if  · You still have pain an hour after taking medicine. The medicine may not be strong enough.  · You feel too sleepy, dizzy, or groggy. The medicine may be too strong.  · You have side effects like nausea, vomiting, or skin changes, such as rash, itching, or hives.       If you have obstructive sleep apnea  You were given anesthesia medicine during surgery to keep you comfortable and free of pain. After surgery, you may have more apnea spells because of this medicine and other medicines you were given. The spells may last longer than usual.   At home:  · Keep using the continuous positive airway pressure (CPAP) device when you sleep. Unless your health care provider tells you not to, use it when you sleep, day or night. CPAP is a common device used to treat obstructive sleep apnea.  · Talk with your provider before taking any pain medicine, muscle relaxants, or sedatives. Your provider will tell you about the possible dangers of taking these medicines.  © 2509-0163 [employer]. [address] [place] [address], [place] [address]. All rights reserved. This information is not intended as a substitute for professional medical care. Always follow your healthcare professional's instructions.    Discharge Instructions for Laparoscopic Cholecystectomy  You have had a procedure known as a laparoscopic cholecystectomy. A laparoscopic cholecystectomy is a procedure to remove your gallbladder. People who have this procedure usually recover more quickly and have less pain than with open  gallbladder surgery (called open cholecystectomy). Many surgeons recommend a low-fat diet, avoiding fried food in particular, for the first month after surgery.   You can live a full and healthy life without your gallbladder. This includes eating the foods and doing the things you enjoyed before your gallbladder problems started.  Home Care  · Ask someone to drive you to your appointments for the next 3 days. Dont drive until you are no longer taking pain medication and are able to step on the brake pedal without hesitation.   · Wash the skin around your incision daily with mild soap and water. It's OK to shower the day after your surgery.  · Eat your regular diet. It is wise to stay away from rich, greasy, or spicy food for a few days.  · Remember, it takes at least 1 week for you to get most of your strength and energy back.  · Make an office visit to talk to your doctor if the following symptoms dont go away within a week after your surgery:  ¨ Fatigue  ¨ Pain around the incision  ¨ Diarrhea or constipation  ¨ Loss of appetite     When to Call Your Doctor  Call your doctor immediately if you have any of the following:  · Yellowing of your eyes or skin (jaundice)  · Chills  · Fever above 101.5°F or 38.5°C    · Redness, swelling, increasing pain, pus, or a foul smell at the incision site  · Dark or rust-colored urine  · Stool that is kimmie-colored or light in color instead of brown  · Increasing abdominal pain   © 4444-0816 [employer]. [address] [place] [address], [place] [address]. All rights reserved. This information is not intended as a substitute for professional medical care. Always follow your healthcare professional's instructions.      Discharge Instructions for Laparoscopic Cholecystectomy  You have had a procedure known as a laparoscopic cholecystectomy. A laparoscopic cholecystectomy is a procedure to remove your gallbladder. People who have this procedure usually recover more quickly and  have less pain than with open gallbladder surgery (called open cholecystectomy). Many surgeons recommend a low-fat diet, avoiding fried food in particular, for the first month after surgery.   You can live a full and healthy life without your gallbladder. This includes eating the foods and doing the things you enjoyed before your gallbladder problems started.  Home Care  · Ask someone to drive you to your appointments for the next 3 days. Dont drive until you are no longer taking pain medication and are able to step on the brake pedal without hesitation.   · Wash the skin around your incision daily with mild soap and water. It's OK to shower the day after your surgery.  · Eat your regular diet. It is wise to stay away from rich, greasy, or spicy food for a few days.  · Remember, it takes at least 1 week for you to get most of your strength and energy back.  · Make an office visit to talk to your doctor if the following symptoms dont go away within a week after your surgery:  ¨ Fatigue  ¨ Pain around the incision  ¨ Diarrhea or constipation  ¨ Loss of appetite     When to Call Your Doctor  Call your doctor immediately if you have any of the following:  · Yellowing of your eyes or skin (jaundice)  · Chills  · Fever above 101.5°F or 38.5°C    · Redness, swelling, increasing pain, pus, or a foul smell at the incision site  · Dark or rust-colored urine  · Stool that is kimmie-colored or light in color instead of brown  · Increasing abdominal pain   © 3381-6953 [employer]. [address] [place] [address], [place] [address]. All rights reserved. This information is not intended as a substitute for professional medical care. Always follow your healthcare professional's instructions.

## 2017-01-30 VITALS
RESPIRATION RATE: 18 BRPM | WEIGHT: 190 LBS | OXYGEN SATURATION: 95 % | TEMPERATURE: 98 F | HEIGHT: 62 IN | HEART RATE: 75 BPM | BODY MASS INDEX: 34.96 KG/M2 | SYSTOLIC BLOOD PRESSURE: 132 MMHG | DIASTOLIC BLOOD PRESSURE: 65 MMHG

## 2017-02-10 RX ORDER — METOPROLOL TARTRATE 25 MG/1
12.5 TABLET, FILM COATED ORAL 2 TIMES DAILY
Qty: 30 TABLET | Refills: 11 | Status: SHIPPED | OUTPATIENT
Start: 2017-02-10 | End: 2018-02-12 | Stop reason: SDUPTHER

## 2017-02-13 ENCOUNTER — OFFICE VISIT (OUTPATIENT)
Dept: SURGERY | Facility: CLINIC | Age: 72
End: 2017-02-13
Payer: MEDICARE

## 2017-02-13 VITALS — TEMPERATURE: 98 F

## 2017-02-13 DIAGNOSIS — Z98.890 POST-OPERATIVE STATE: ICD-10-CM

## 2017-02-13 DIAGNOSIS — K21.9 GASTROESOPHAGEAL REFLUX DISEASE, ESOPHAGITIS PRESENCE NOT SPECIFIED: Primary | ICD-10-CM

## 2017-02-13 PROCEDURE — 99999 PR PBB SHADOW E&M-EST. PATIENT-LVL II: CPT | Mod: PBBFAC,,, | Performed by: SURGERY

## 2017-02-13 PROCEDURE — 99024 POSTOP FOLLOW-UP VISIT: CPT | Mod: S$GLB,,, | Performed by: SURGERY

## 2017-02-13 NOTE — PROGRESS NOTES
S/P lap GB.  Doing well surgically, but still complaining of some reflux at night (long history - seen in past by Dr. Burt).  Incisions clean.    Will get UGI to evaluate anatomy, and probable GI referral if necessary.

## 2017-02-15 RX ORDER — PANTOPRAZOLE SODIUM 40 MG/1
40 TABLET, DELAYED RELEASE ORAL DAILY
Qty: 30 TABLET | Refills: 11 | Status: SHIPPED | OUTPATIENT
Start: 2017-02-15 | End: 2018-02-28 | Stop reason: SDUPTHER

## 2017-02-16 ENCOUNTER — HOSPITAL ENCOUNTER (OUTPATIENT)
Dept: RADIOLOGY | Facility: HOSPITAL | Age: 72
Discharge: HOME | End: 2017-02-16
Attending: SURGERY
Payer: MEDICARE

## 2017-02-16 DIAGNOSIS — K21.9 GASTROESOPHAGEAL REFLUX DISEASE, ESOPHAGITIS PRESENCE NOT SPECIFIED: ICD-10-CM

## 2017-02-16 PROCEDURE — 74241 FL UPPER GI W KUB: CPT | Mod: 26,,, | Performed by: RADIOLOGY

## 2017-02-16 PROCEDURE — 74241 FL UPPER GI W KUB: CPT | Mod: TC

## 2017-02-20 ENCOUNTER — TELEPHONE (OUTPATIENT)
Dept: SURGERY | Facility: CLINIC | Age: 72
End: 2017-02-20

## 2017-02-20 RX ORDER — FLUCONAZOLE 150 MG/1
150 TABLET ORAL DAILY
Qty: 1 TABLET | Refills: 0 | Status: SHIPPED | OUTPATIENT
Start: 2017-02-20 | End: 2017-02-21

## 2017-02-20 NOTE — TELEPHONE ENCOUNTER
Advised patient that her results are in but Dr Mcdonough is out of the office until Wednesday.  He did say that he wanted her to see GI so I advised to go ahead and schedule her appointment and I will discuss the result with Dr Mcdonough when he returns.

## 2017-02-20 NOTE — TELEPHONE ENCOUNTER
----- Message from Guerline Ramos sent at 2/20/2017 10:57 AM CST -----  Patient requesting medication for yeast infection be ordered/uses [facility] on [address]/please order and call patient back at [phone] to confirm.

## 2017-02-20 NOTE — TELEPHONE ENCOUNTER
----- Message from Guerline Ramos sent at 2/20/2017 10:57 AM CST -----  Patient is calling for Upper GI results.Please call patient back at [phone] to advise.  Thanks!

## 2017-02-23 ENCOUNTER — TELEPHONE (OUTPATIENT)
Dept: SURGERY | Facility: CLINIC | Age: 72
End: 2017-02-23

## 2017-02-23 NOTE — TELEPHONE ENCOUNTER
----- Message from Alida Schultz sent at 2/23/2017  3:02 PM CST -----  Contact: self  Patient called regarding missed call from this morning. Please contact [phone] (xqpm)

## 2017-02-23 NOTE — TELEPHONE ENCOUNTER
Advised that it is recommended she see a GI doctor and needs an EGD.  She can see Dr Freitas or Javed that she has seen in the past or Dr Yoder.  I did schedule her to see Dr Yoder on March 15 at 230 PM

## 2017-02-24 ENCOUNTER — LAB VISIT (OUTPATIENT)
Dept: LAB | Facility: HOSPITAL | Age: 72
End: 2017-02-24
Attending: INTERNAL MEDICINE
Payer: MEDICARE

## 2017-02-24 DIAGNOSIS — N18.4 CKD (CHRONIC KIDNEY DISEASE) STAGE 4, GFR 15-29 ML/MIN: ICD-10-CM

## 2017-02-24 LAB
ALBUMIN SERPL BCP-MCNC: 3.7 G/DL
ANION GAP SERPL CALC-SCNC: 9 MMOL/L
BUN SERPL-MCNC: 22 MG/DL
CALCIUM SERPL-MCNC: 9.5 MG/DL
CHLORIDE SERPL-SCNC: 105 MMOL/L
CO2 SERPL-SCNC: 27 MMOL/L
CREAT SERPL-MCNC: 1.6 MG/DL
EST. GFR  (AFRICAN AMERICAN): 37.6 ML/MIN/1.73 M^2
EST. GFR  (NON AFRICAN AMERICAN): 32.6 ML/MIN/1.73 M^2
GLUCOSE SERPL-MCNC: 181 MG/DL
PHOSPHATE SERPL-MCNC: 3.7 MG/DL
POTASSIUM SERPL-SCNC: 4.2 MMOL/L
PTH-INTACT SERPL-MCNC: 73 PG/ML
SODIUM SERPL-SCNC: 141 MMOL/L

## 2017-02-24 PROCEDURE — 80069 RENAL FUNCTION PANEL: CPT

## 2017-02-24 PROCEDURE — 83970 ASSAY OF PARATHORMONE: CPT

## 2017-02-24 PROCEDURE — 36415 COLL VENOUS BLD VENIPUNCTURE: CPT | Mod: PO

## 2017-03-03 ENCOUNTER — OFFICE VISIT (OUTPATIENT)
Dept: NEPHROLOGY | Facility: CLINIC | Age: 72
End: 2017-03-03
Payer: MEDICARE

## 2017-03-03 VITALS
DIASTOLIC BLOOD PRESSURE: 70 MMHG | TEMPERATURE: 98 F | BODY MASS INDEX: 35.16 KG/M2 | SYSTOLIC BLOOD PRESSURE: 148 MMHG | WEIGHT: 189.13 LBS

## 2017-03-03 DIAGNOSIS — I10 ESSENTIAL HYPERTENSION: ICD-10-CM

## 2017-03-03 DIAGNOSIS — E11.21 TYPE 2 DIABETES MELLITUS WITH DIABETIC NEPHROPATHY, WITH LONG-TERM CURRENT USE OF INSULIN: ICD-10-CM

## 2017-03-03 DIAGNOSIS — Z87.891 FORMER SMOKER: ICD-10-CM

## 2017-03-03 DIAGNOSIS — E66.9 NON MORBID OBESITY, UNSPECIFIED OBESITY TYPE: ICD-10-CM

## 2017-03-03 DIAGNOSIS — N18.4 CKD (CHRONIC KIDNEY DISEASE) STAGE 4, GFR 15-29 ML/MIN: Primary | ICD-10-CM

## 2017-03-03 DIAGNOSIS — Z79.4 TYPE 2 DIABETES MELLITUS WITH DIABETIC NEPHROPATHY, WITH LONG-TERM CURRENT USE OF INSULIN: ICD-10-CM

## 2017-03-03 DIAGNOSIS — R80.1 PERSISTENT PROTEINURIA: ICD-10-CM

## 2017-03-03 DIAGNOSIS — Z95.1 S/P CABG (CORONARY ARTERY BYPASS GRAFT): ICD-10-CM

## 2017-03-03 PROCEDURE — 3066F NEPHROPATHY DOC TX: CPT | Mod: S$GLB,,, | Performed by: INTERNAL MEDICINE

## 2017-03-03 PROCEDURE — 99214 OFFICE O/P EST MOD 30 MIN: CPT | Mod: S$GLB,,, | Performed by: INTERNAL MEDICINE

## 2017-03-03 PROCEDURE — 1159F MED LIST DOCD IN RCRD: CPT | Mod: S$GLB,,, | Performed by: INTERNAL MEDICINE

## 2017-03-03 PROCEDURE — 99999 PR PBB SHADOW E&M-EST. PATIENT-LVL II: CPT | Mod: PBBFAC,,, | Performed by: INTERNAL MEDICINE

## 2017-03-03 PROCEDURE — 3044F HG A1C LEVEL LT 7.0%: CPT | Mod: S$GLB,,, | Performed by: INTERNAL MEDICINE

## 2017-03-03 PROCEDURE — 1157F ADVNC CARE PLAN IN RCRD: CPT | Mod: S$GLB,,, | Performed by: INTERNAL MEDICINE

## 2017-03-03 PROCEDURE — 2022F DILAT RTA XM EVC RTNOPTHY: CPT | Mod: S$GLB,,, | Performed by: INTERNAL MEDICINE

## 2017-03-03 PROCEDURE — 1126F AMNT PAIN NOTED NONE PRSNT: CPT | Mod: S$GLB,,, | Performed by: INTERNAL MEDICINE

## 2017-03-03 PROCEDURE — 3078F DIAST BP <80 MM HG: CPT | Mod: S$GLB,,, | Performed by: INTERNAL MEDICINE

## 2017-03-03 PROCEDURE — 99499 UNLISTED E&M SERVICE: CPT | Mod: S$PBB,,, | Performed by: INTERNAL MEDICINE

## 2017-03-03 PROCEDURE — 3077F SYST BP >= 140 MM HG: CPT | Mod: S$GLB,,, | Performed by: INTERNAL MEDICINE

## 2017-03-03 PROCEDURE — 1160F RVW MEDS BY RX/DR IN RCRD: CPT | Mod: S$GLB,,, | Performed by: INTERNAL MEDICINE

## 2017-03-13 ENCOUNTER — TELEPHONE (OUTPATIENT)
Dept: ENDOCRINOLOGY | Facility: CLINIC | Age: 72
End: 2017-03-13

## 2017-03-13 NOTE — PROGRESS NOTES
Subjective:       Patient ID: Adele Hall is a 71 y.o. White female who presents for follow-up evaluation of Chronic Kidney Disease    HPI     She reports she is doing well. Last Hba1c was 6.7. No edema nor SOB. Appetite is fine and weight is stable. She fournier snot routinely check her BP at home.     Review of Systems   Constitutional: Negative for activity change, appetite change, fatigue and unexpected weight change.   HENT: Negative for facial swelling.    Respiratory: Negative for shortness of breath.    Cardiovascular: Negative for chest pain and leg swelling.   Genitourinary: Negative for difficulty urinating and dysuria.   Musculoskeletal: Negative for arthralgias.   Neurological: Negative for weakness.       Objective:      Physical Exam   Constitutional: She is oriented to person, place, and time. She appears well-nourished.   HENT:   Mouth/Throat: Oropharynx is clear and moist.   Neck: No JVD present.   Cardiovascular: S1 normal and S2 normal.  Exam reveals no friction rub.    Pulmonary/Chest: Breath sounds normal. She has no wheezes. She has no rales.   Abdominal: Soft.   Musculoskeletal: She exhibits no edema.   Neurological: She is alert and oriented to person, place, and time.   Skin: Skin is warm and dry.   Psychiatric: She has a normal mood and affect.   Vitals reviewed.      Assessment:       1. CKD (chronic kidney disease) stage 4, GFR 15-29 ml/min    2. Essential hypertension    3. Type 2 diabetes mellitus with diabetic nephropathy, with long-term current use of insulin    4. Persistent proteinuria    5. Non morbid obesity, unspecified obesity type    6. S/P CABG (coronary artery bypass graft) - x4 01/19/2016; LIMA to LAD; SVG's to diag, OMB, PDA    7. Former smoker        Plan:             CKD with stable kidney function. Continue RAAS blockade for renal preservation    HTN--BP log in one week    MBD--no change in therapy    DM is well controlled    CAD--continue cardiology follow  up      RTC 3 months with labs prior

## 2017-03-13 NOTE — TELEPHONE ENCOUNTER
----- Message from Alida Ramos sent at 3/13/2017 11:04 AM CDT -----  Patient states that she is having medications mailed to office & to please hold it for her, [phone] (home)

## 2017-03-14 ENCOUNTER — LAB VISIT (OUTPATIENT)
Dept: LAB | Facility: HOSPITAL | Age: 72
End: 2017-03-14
Attending: NURSE PRACTITIONER
Payer: MEDICARE

## 2017-03-14 DIAGNOSIS — Z79.4 CONTROLLED TYPE 2 DIABETES MELLITUS WITH COMPLICATION, WITH LONG-TERM CURRENT USE OF INSULIN: ICD-10-CM

## 2017-03-14 DIAGNOSIS — E11.8 CONTROLLED TYPE 2 DIABETES MELLITUS WITH COMPLICATION, WITH LONG-TERM CURRENT USE OF INSULIN: ICD-10-CM

## 2017-03-14 DIAGNOSIS — E07.9 THYROID DISEASE: ICD-10-CM

## 2017-03-14 LAB
T4 FREE SERPL-MCNC: 1.31 NG/DL
TSH SERPL DL<=0.005 MIU/L-ACNC: 0.29 UIU/ML

## 2017-03-14 PROCEDURE — 36415 COLL VENOUS BLD VENIPUNCTURE: CPT | Mod: PO

## 2017-03-14 PROCEDURE — 84443 ASSAY THYROID STIM HORMONE: CPT

## 2017-03-14 PROCEDURE — 83036 HEMOGLOBIN GLYCOSYLATED A1C: CPT

## 2017-03-14 PROCEDURE — 84439 ASSAY OF FREE THYROXINE: CPT

## 2017-03-15 ENCOUNTER — OFFICE VISIT (OUTPATIENT)
Dept: GASTROENTEROLOGY | Facility: CLINIC | Age: 72
End: 2017-03-15
Payer: MEDICARE

## 2017-03-15 VITALS
HEIGHT: 62 IN | BODY MASS INDEX: 34.8 KG/M2 | HEART RATE: 76 BPM | DIASTOLIC BLOOD PRESSURE: 72 MMHG | WEIGHT: 189.13 LBS | SYSTOLIC BLOOD PRESSURE: 128 MMHG

## 2017-03-15 DIAGNOSIS — K21.9 GASTROESOPHAGEAL REFLUX DISEASE, ESOPHAGITIS PRESENCE NOT SPECIFIED: Primary | ICD-10-CM

## 2017-03-15 DIAGNOSIS — R12 HEARTBURN: ICD-10-CM

## 2017-03-15 DIAGNOSIS — Z95.1 S/P CABG (CORONARY ARTERY BYPASS GRAFT): ICD-10-CM

## 2017-03-15 DIAGNOSIS — G24.01 TARDIVE DYSKINESIA: ICD-10-CM

## 2017-03-15 PROCEDURE — 3074F SYST BP LT 130 MM HG: CPT | Mod: S$GLB,,, | Performed by: INTERNAL MEDICINE

## 2017-03-15 PROCEDURE — 1160F RVW MEDS BY RX/DR IN RCRD: CPT | Mod: S$GLB,,, | Performed by: INTERNAL MEDICINE

## 2017-03-15 PROCEDURE — 1159F MED LIST DOCD IN RCRD: CPT | Mod: S$GLB,,, | Performed by: INTERNAL MEDICINE

## 2017-03-15 PROCEDURE — 99999 PR PBB SHADOW E&M-EST. PATIENT-LVL II: CPT | Mod: PBBFAC,,, | Performed by: INTERNAL MEDICINE

## 2017-03-15 PROCEDURE — 1157F ADVNC CARE PLAN IN RCRD: CPT | Mod: S$GLB,,, | Performed by: INTERNAL MEDICINE

## 2017-03-15 PROCEDURE — 3078F DIAST BP <80 MM HG: CPT | Mod: S$GLB,,, | Performed by: INTERNAL MEDICINE

## 2017-03-15 PROCEDURE — 99499 UNLISTED E&M SERVICE: CPT | Mod: S$PBB,,, | Performed by: INTERNAL MEDICINE

## 2017-03-15 PROCEDURE — 99204 OFFICE O/P NEW MOD 45 MIN: CPT | Mod: S$GLB,,, | Performed by: INTERNAL MEDICINE

## 2017-03-15 NOTE — PROGRESS NOTES
Subjective:       Patient ID: Adele Hall is a 71 y.o. female.    This patient is new to me.  Referring MD:  Dr. Cazares for GERD.      Chief Complaint: Gastroesophageal Reflux    Gastroesophageal Reflux   She complains of belching, heartburn and water brash. She reports no abdominal pain, no chest pain, no coughing, no dysphagia, no nausea, no sore throat or no wheezing. This is a new problem. The current episode started more than 1 month ago (3-4 months ago). The problem occurs frequently. The problem has been gradually worsening. The heartburn duration is several minutes. The heartburn is of moderate intensity. The heartburn wakes her from sleep. The heartburn does not limit her activity. The heartburn changes with position. The symptoms are aggravated by certain foods and lying down. Pertinent negatives include no fatigue. Risk factors include hiatal hernia. She has tried a PPI for the symptoms. The treatment provided mild relief. Past procedures include an EGD (last one was many years ago at which time she had bleeding ulcers).   Old records from Dr. Mcdonough independently reviewed and significant for recent management for cholelithiasis.  She is status post cholecystectomy and does note loose stool since that time.    Review of Systems   Constitutional: Negative for chills, fatigue and fever.   HENT: Negative for sore throat and trouble swallowing.    Respiratory: Negative for cough, shortness of breath and wheezing.    Cardiovascular: Negative for chest pain and palpitations.   Gastrointestinal: Positive for diarrhea (since cholecystectomy) and heartburn. Negative for abdominal pain, blood in stool, dysphagia, nausea and vomiting.   Genitourinary: Negative for dysuria and hematuria.   Musculoskeletal: Negative for arthralgias and myalgias.   Skin: Negative for color change and rash.   Neurological: Negative for dizziness and headaches.   Hematological: Negative for adenopathy.   Psychiatric/Behavioral:  "Negative for confusion. The patient is not nervous/anxious.    All other systems reviewed and are negative.      Objective:         Vitals:    03/15/17 1422   BP: 128/72   Pulse: 76   Weight: 85.8 kg (189 lb 2.5 oz)   Height: 5' 1.5" (1.562 m)       Physical Exam   Constitutional: She is oriented to person, place, and time. She appears well-developed and well-nourished.   HENT:   Head: Normocephalic and atraumatic.   Eyes: Pupils are equal, round, and reactive to light. No scleral icterus.   Neck: Normal range of motion.   Cardiovascular: Normal rate and regular rhythm.    No murmur heard.  Pulmonary/Chest: Effort normal and breath sounds normal. She has no wheezes.   Abdominal: Soft. Bowel sounds are normal. She exhibits no distension. There is no tenderness. There is no rebound and no guarding.   Obese   Musculoskeletal: She exhibits no edema or tenderness.   Lymphadenopathy:     She has no cervical adenopathy.   Neurological: She is alert and oriented to person, place, and time.   Skin: Skin is warm and dry. No rash noted.   Vitals reviewed.        Lab Results   Component Value Date    WBC 6.60 01/19/2017    HGB 11.3 (L) 01/19/2017    HCT 34.2 (L) 01/19/2017    MCV 88 01/19/2017     01/19/2017         Chemistry        Component Value Date/Time     02/24/2017 1004    K 4.2 02/24/2017 1004     02/24/2017 1004    CO2 27 02/24/2017 1004    BUN 22 02/24/2017 1004    CREATININE 1.6 (H) 02/24/2017 1004     (H) 02/24/2017 1004        Component Value Date/Time    CALCIUM 9.5 02/24/2017 1004    ALKPHOS 85 01/19/2017 1009    AST 31 01/19/2017 1009    AST 45 (H) 01/30/2016 0431    ALT 24 01/19/2017 1009    BILITOT 0.2 01/19/2017 1009          UGI series was independently visualized and reviewed by me and showed mild dysmotility and hiatal hernia.      Old records from Dr. Mcdonough reviewed and are as summarized above in the HPI.    Assessment:       1. Gastroesophageal reflux disease, esophagitis " presence not specified    2. S/P CABG (coronary artery bypass graft) - x4 01/19/2016; LIMA to LAD; SVG's to diag, OMB, PDA    3. Tardive dyskinesia    4. Heartburn        Plan:       1.  Schedule EGD for GERD/dyspepsia  2.  Continue PPI for now  3.  Antireflux precautions including avoidance of late night eating and lying down soon after eating.    4.  Obtain and review old scope records from Dr. Burt  5.  Avoid NSAIDs  6.  Further recommendations to follow after above.  7.  Communication will be sent to the referring MD, Dr. Cazares regarding my assessment and plan on this patient via EPIC.

## 2017-03-15 NOTE — LETTER
March 15, 2017      Alex Mcdonough MD  [address]  [address]  [facility] [address]           [facility] - Gastroenterology  [address]  [facility] [address]  Phone: [phone]          Patient: Adele Hall   MR Number: [id number]   YOB: 1945   Date of Visit: 3/15/2017       Dear Dr. Alex Mcdonough:    Thank you for referring Adele Hall to me for evaluation. Attached you will find relevant portions of my assessment and plan of care.    If you have questions, please do not hesitate to call me. I look forward to following Adele Hall along with you.    Sincerely,    Seth Yoder MD    Enclosure  CC:  No Recipients    If you would like to receive this communication electronically, please contact [email or web address] or [phone] to request more information on [employer] access.    For providers and/or their staff who would like to refer a patient to [facility], please contact us through our one-stop-shop provider referral line, [facility], at [phone].    If you feel you have received this communication in error or would no longer like to receive these types of communications, please e-mail [email or web address]

## 2017-03-16 LAB
ESTIMATED AVG GLUCOSE: 148 MG/DL
HBA1C MFR BLD HPLC: 6.8 %

## 2017-03-20 RX ORDER — FENOFIBRATE 134 MG/1
CAPSULE ORAL
Qty: 30 CAPSULE | Refills: 10 | Status: SHIPPED | OUTPATIENT
Start: 2017-03-20 | End: 2018-04-03 | Stop reason: SDUPTHER

## 2017-03-21 ENCOUNTER — OFFICE VISIT (OUTPATIENT)
Dept: ENDOCRINOLOGY | Facility: CLINIC | Age: 72
End: 2017-03-21
Payer: MEDICARE

## 2017-03-21 VITALS
HEIGHT: 61 IN | WEIGHT: 191 LBS | BODY MASS INDEX: 36.06 KG/M2 | DIASTOLIC BLOOD PRESSURE: 62 MMHG | SYSTOLIC BLOOD PRESSURE: 130 MMHG | HEART RATE: 82 BPM

## 2017-03-21 DIAGNOSIS — E07.9 THYROID DISEASE: ICD-10-CM

## 2017-03-21 DIAGNOSIS — N18.30 TYPE 2 DIABETES MELLITUS WITH STAGE 3 CHRONIC KIDNEY DISEASE, WITH LONG-TERM CURRENT USE OF INSULIN: Primary | ICD-10-CM

## 2017-03-21 DIAGNOSIS — I10 ESSENTIAL HYPERTENSION: ICD-10-CM

## 2017-03-21 DIAGNOSIS — Z79.4 TYPE 2 DIABETES MELLITUS WITH DIABETIC POLYNEUROPATHY, WITH LONG-TERM CURRENT USE OF INSULIN: ICD-10-CM

## 2017-03-21 DIAGNOSIS — E78.5 HYPERLIPIDEMIA WITH TARGET LDL LESS THAN 70: ICD-10-CM

## 2017-03-21 DIAGNOSIS — Z79.4 TYPE 2 DIABETES MELLITUS WITH STAGE 3 CHRONIC KIDNEY DISEASE, WITH LONG-TERM CURRENT USE OF INSULIN: Primary | ICD-10-CM

## 2017-03-21 DIAGNOSIS — I25.10 CORONARY ARTERY DISEASE INVOLVING NATIVE CORONARY ARTERY OF NATIVE HEART WITHOUT ANGINA PECTORIS: ICD-10-CM

## 2017-03-21 DIAGNOSIS — E11.42 TYPE 2 DIABETES MELLITUS WITH DIABETIC POLYNEUROPATHY, WITH LONG-TERM CURRENT USE OF INSULIN: ICD-10-CM

## 2017-03-21 DIAGNOSIS — E11.22 TYPE 2 DIABETES MELLITUS WITH STAGE 3 CHRONIC KIDNEY DISEASE, WITH LONG-TERM CURRENT USE OF INSULIN: Primary | ICD-10-CM

## 2017-03-21 PROCEDURE — 1160F RVW MEDS BY RX/DR IN RCRD: CPT | Mod: S$GLB,,, | Performed by: NURSE PRACTITIONER

## 2017-03-21 PROCEDURE — 1157F ADVNC CARE PLAN IN RCRD: CPT | Mod: S$GLB,,, | Performed by: NURSE PRACTITIONER

## 2017-03-21 PROCEDURE — 1126F AMNT PAIN NOTED NONE PRSNT: CPT | Mod: S$GLB,,, | Performed by: NURSE PRACTITIONER

## 2017-03-21 PROCEDURE — 99999 PR PBB SHADOW E&M-EST. PATIENT-LVL IV: CPT | Mod: PBBFAC,,, | Performed by: NURSE PRACTITIONER

## 2017-03-21 PROCEDURE — 3078F DIAST BP <80 MM HG: CPT | Mod: S$GLB,,, | Performed by: NURSE PRACTITIONER

## 2017-03-21 PROCEDURE — 99499 UNLISTED E&M SERVICE: CPT | Mod: S$PBB,,, | Performed by: NURSE PRACTITIONER

## 2017-03-21 PROCEDURE — 3044F HG A1C LEVEL LT 7.0%: CPT | Mod: S$GLB,,, | Performed by: NURSE PRACTITIONER

## 2017-03-21 PROCEDURE — 1159F MED LIST DOCD IN RCRD: CPT | Mod: S$GLB,,, | Performed by: NURSE PRACTITIONER

## 2017-03-21 PROCEDURE — 99214 OFFICE O/P EST MOD 30 MIN: CPT | Mod: S$GLB,,, | Performed by: NURSE PRACTITIONER

## 2017-03-21 PROCEDURE — 3075F SYST BP GE 130 - 139MM HG: CPT | Mod: S$GLB,,, | Performed by: NURSE PRACTITIONER

## 2017-03-21 PROCEDURE — 3066F NEPHROPATHY DOC TX: CPT | Mod: S$GLB,,, | Performed by: NURSE PRACTITIONER

## 2017-03-21 PROCEDURE — 2022F DILAT RTA XM EVC RTNOPTHY: CPT | Mod: S$GLB,,, | Performed by: NURSE PRACTITIONER

## 2017-03-21 RX ORDER — LEVOTHYROXINE SODIUM 100 UG/1
100 TABLET ORAL DAILY
Qty: 30 TABLET | Refills: 6 | Status: SHIPPED | OUTPATIENT
Start: 2017-03-21 | End: 2017-11-27 | Stop reason: SDUPTHER

## 2017-03-21 NOTE — PATIENT INSTRUCTIONS
Take Novolog 6 units with bedtime snack if this contains carbs.     Call Tiffany with [facility] Patient Assistance if you are in donut hole.       Hypoglycemia (Low Blood Sugar)     Fast-acting sugar includes a cup of nonfat milk.     Too little sugar (glucose) in your blood is called hypoglycemia or low blood sugar. Low blood sugar usually means anything lower than 70 mg/dL. Talk with your healthcare provider about your target range and what level is too low for you. Diabetes itself doesnt cause low blood sugar. But some of the treatments for diabetes, such as pills or insulin, may raise your risk for it. Low blood sugar may cause you to pass out or have a seizure. So always treat low blood sugar right away, but don't overeat.  Special note: Always carry a source of fast-acting sugar and a snack in case of hypoglycemia.   What you may notice  If you have low blood sugar, you may have one or more of these symptoms:  · Shakiness or dizziness  · Cold, clammy skin or sweating  · Feelings of hunger  · Headache  · Nervousness  · A hard, fast heartbeat  · Weakness  · Confusion or irritability  · Blurred vision  · Having nightmares or waking up confused or sweating  · Numbness or tingling in the lips or tongue  What you should do  Here are tips to follow if you have hypoglycemia:   · First check your blood sugar. If it is too low (out of your target range), eat or drink 15 to 20 grams of fast-acting sugar. This may be 3 to 4 glucose tablets, 4 ounces (half a cup) of fruit juice or regular (nondiet) soda, 8 ounces (1 cup) of fat-free milk, or 1 tablespoon of honey. Dont take more than this, or your blood sugar may go too high.  · Wait 15 minutes. Then recheck your blood sugar if you can.  · If your blood sugar is still too low, repeat the steps above and check your blood sugar again. If your blood sugar still has not returned to your target range, contact your healthcare provider or seek emergency care.  · Once your blood  sugar returns to target range, eat a snack or meal.  Preventing low blood sugar  Things you can do include the following:   · If your condition needs a strict treatment plan, eat your meals and snacks at the same times each day. Dont skip meals!  · If your treatment plan lets you change when you eat and what you eat, learn how to change the time and dose of your rapid-acting insulin to match this.   · Ask your healthcare provider if it is safe for you to drink alcohol. Never drink on an empty stomach.  · Take your medicine at the prescribed times.  · Always carry a source of fast-acting sugar and a snack when youre away from home.  Other things to do  Additional tips include the following:  · Carry a medical ID card, a compact USB drive, or wear a medical alert bracelet or necklace. It should say that you have diabetes. It should also say what to do if you pass out or have a seizure.  · Make sure your family, friends, and coworkers know the signs of low blood sugar. Tell them what to do if your blood sugar falls very low and you cant treat yourself.  · Keep a glucagon emergency kit handy. Be sure your family, friends, and coworkers know how and when to use it. Check it regularly and replace the glucagon before it expires.  · Talk with your health care team about other things you can do to prevent low blood sugar.     If you have unexplained hypoglycemia or hypoglycemia several times, call your healthcare provider.   Date Last Reviewed: 5/1/2016 © 2000-2016 [employer]. [address] [place] [address], [place] [address]. All rights reserved. This information is not intended as a substitute for professional medical care. Always follow your healthcare professional's instructions.

## 2017-03-21 NOTE — MR AVS SNAPSHOT
[place] - Endocrinology  [address]  [place] [address]  Phone: [phone]  Fax: [phone]                  Adele Hall   3/21/2017 1:30 PM   Office Visit    Description:  Female : 1945   Provider:  ISABEL Guerrero,HENRIQUEP-C   Department:  [place] - Endocrinology           Reason for Visit     Diabetes           Diagnoses this Visit        Comments    Type 2 diabetes mellitus with stage 3 chronic kidney disease, with long-term current use of insulin    -  Primary     Type 2 diabetes mellitus with diabetic polyneuropathy, with long-term current use of insulin         Coronary artery disease involving native coronary artery of native heart without angina pectoris         Essential hypertension         Hyperlipidemia with target LDL less than 70         Thyroid disease                To Do List           Future Appointments        Provider Department Dept Phone    2017 9:00 AM Cintia Rodriguez MD [facility] - Ophthalmology [phone]    2017 10:00 [facility] [phone]    2017 10[facility]15 [facility] [phone]    2017 10:20 AM Henriqeu Montero MD [place] - Nephrology [phone]    2017 10:30 AM LAB, [facility] - Lab [phone]      Your Future Surgeries/Procedures     2017   Surgery with Seth Yoder MD   [facility] [facility] [address] [place] [id number] [phone]              Goals (5 Years of Data)     None      Follow-Up and Disposition     Return in about 3 months (around 2017).       These Medications        Disp Refills Start End    levothyroxine (SYNTHROID) 100 MCG tablet 30 tablet 6 3/21/2017 3/21/2018    Take 1 tablet (100 mcg total) by mouth once daily. - Oral    Pharmacy: [id number] - [place] - [address] [facility] Ph #: [phone]          [facility] On Call     [facility] On Call Nurse Care Line - 24/7 Assistance  Registered nurses in the [facility] Center provide clinical advisement, health education, appointment booking, and other advisory services.  Call for this free service at [phone].             Medications           Message regarding Medications     Verify the changes and/or additions to your medication regime listed below are the same as discussed with your clinician today.  If any of these changes or additions are incorrect, please notify your healthcare provider.        CHANGE how you are taking these medications     Start Taking Instead of    levothyroxine (SYNTHROID) 100 MCG tablet levothyroxine (SYNTHROID) 112 MCG tablet    Dosage:  Take 1 tablet (100 mcg total) by mouth once daily. Dosage:  Take 1 tablet (112 mcg total) by mouth once daily.    Reason for Change:  Reorder            Verify that the below list of medications is an accurate representation of the medications you are currently taking.  If none reported, the list may be blank. If incorrect, please contact your healthcare provider. Carry this list with you in case of emergency.           Current Medications     amlodipine (NORVASC) 2.5 MG tablet Take 1 tablet (2.5 mg total) by mouth once daily.    aspirin (ECOTRIN) 81 MG EC tablet Take 81 mg by mouth once daily.    BD INSULIN SYRINGE ULTRA-FINE 1 mL 31 gauge x 5/16 Syrg Uses 5 syringes per day with Levemir and Novolog    blood sugar diagnostic Strp Check blood glucose 4 times a day - before meals and at bedtime. Please dispense testing supplies for true metrix. Dx code e11.65    blood-glucose meter kit Use as instructed. Please dispense testing supplies for true metrix. Dx code e11.65    fenofibrate micronized (LOFIBRA) 134 MG Cap TAKE ONE CAPSULE BY MOUTH EVERYDAY WITH BREAKFAST    gabapentin (NEURONTIN) 300 MG capsule Take 1 at bedtime for 1 week, then 2 a day for 1 week, then 3 a day    insulin aspart (NOVOLOG) 100  "unit/mL InPn pen Inject 32 Units into the skin 3 (three) times daily with meals.    insulin detemir (LEVEMIR) 100 unit/mL injection Inject 42 Units into the skin 2 (two) times daily.    lancets Misc Check blood glucose 4x/day. Please dispense testing supplies for true metrix. Dx code e11.65    levothyroxine (SYNTHROID) 100 MCG tablet Take 1 tablet (100 mcg total) by mouth once daily.    liraglutide 0.6 mg/0.1 mL, 18 mg/3 mL, subq PNIJ (VICTOZA 3-MICH) 0.6 mg/0.1 mL (18 mg/3 mL) PnIj Inject 1.8 mg into the skin once daily.    lisinopril 10 MG tablet Take 1 tablet (10 mg total) by mouth once daily.    metoprolol tartrate (LOPRESSOR) 25 MG tablet Take 0.5 tablets (12.5 mg total) by mouth 2 (two) times daily.    pantoprazole (PROTONIX) 40 MG tablet Take 1 tablet (40 mg total) by mouth once daily.    pen needle, diabetic (NOVOFINE PLUS) 32 gauge x 1/6" Ndle 1 Device by Misc.(Non-Drug; Combo Route) route 6 (six) times daily.    pramipexole (MIRAPEX) 0.125 MG tablet Take 0.125 mg by mouth nightly as needed (MAY TAKE UP TO 0.25 MG--patient taking 2 tablets nightly).     rosuvastatin (CRESTOR) 40 MG Tab Take 1 tablet (40 mg total) by mouth every evening.    VITAMIN D2 50,000 unit capsule TAKE ONE CAPSULE BY MOUTH EVERY SEVEN DAYS           Clinical Reference Information           Your Vitals Were     BP Pulse Height Weight BMI    130/62 (BP Location: Right arm, Patient Position: Sitting, BP Method: Manual) 82 5' 1" (1.549 m) 86.7 kg (191 lb 0.5 oz) 36.09 kg/m2      Blood Pressure          Most Recent Value    BP  130/62      Allergies as of 3/21/2017     Reglan [Metoclopramide Hcl]      Immunizations Administered on Date of Encounter - 3/21/2017     None      Orders Placed During Today's Visit      Normal Orders This Visit    Ambulatory referral to Ophthalmology     Future Labs/Procedures Expected by Expires    Comprehensive metabolic panel  3/21/2017 5/20/2018    Hemoglobin A1c  3/21/2017 5/20/2018    TSH  3/21/2017 " 5/20/2018      Instructions    Take Novolog 6 units with bedtime snack if this contains carbs.     Call Tiffany with [facility] Patient Assistance if you are in donut hole.       Hypoglycemia (Low Blood Sugar)     Fast-acting sugar includes a cup of nonfat milk.     Too little sugar (glucose) in your blood is called hypoglycemia or low blood sugar. Low blood sugar usually means anything lower than 70 mg/dL. Talk with your healthcare provider about your target range and what level is too low for you. Diabetes itself doesnt cause low blood sugar. But some of the treatments for diabetes, such as pills or insulin, may raise your risk for it. Low blood sugar may cause you to pass out or have a seizure. So always treat low blood sugar right away, but don't overeat.  Special note: Always carry a source of fast-acting sugar and a snack in case of hypoglycemia.   What you may notice  If you have low blood sugar, you may have one or more of these symptoms:  · Shakiness or dizziness  · Cold, clammy skin or sweating  · Feelings of hunger  · Headache  · Nervousness  · A hard, fast heartbeat  · Weakness  · Confusion or irritability  · Blurred vision  · Having nightmares or waking up confused or sweating  · Numbness or tingling in the lips or tongue  What you should do  Here are tips to follow if you have hypoglycemia:   · First check your blood sugar. If it is too low (out of your target range), eat or drink 15 to 20 grams of fast-acting sugar. This may be 3 to 4 glucose tablets, 4 ounces (half a cup) of fruit juice or regular (nondiet) soda, 8 ounces (1 cup) of fat-free milk, or 1 tablespoon of honey. Dont take more than this, or your blood sugar may go too high.  · Wait 15 minutes. Then recheck your blood sugar if you can.  · If your blood sugar is still too low, repeat the steps above and check your blood sugar again. If your blood sugar still has not returned to your target range, contact your healthcare provider or seek  emergency care.  · Once your blood sugar returns to target range, eat a snack or meal.  Preventing low blood sugar  Things you can do include the following:   · If your condition needs a strict treatment plan, eat your meals and snacks at the same times each day. Dont skip meals!  · If your treatment plan lets you change when you eat and what you eat, learn how to change the time and dose of your rapid-acting insulin to match this.   · Ask your healthcare provider if it is safe for you to drink alcohol. Never drink on an empty stomach.  · Take your medicine at the prescribed times.  · Always carry a source of fast-acting sugar and a snack when youre away from home.  Other things to do  Additional tips include the following:  · Carry a medical ID card, a compact USB drive, or wear a medical alert bracelet or necklace. It should say that you have diabetes. It should also say what to do if you pass out or have a seizure.  · Make sure your family, friends, and coworkers know the signs of low blood sugar. Tell them what to do if your blood sugar falls very low and you cant treat yourself.  · Keep a glucagon emergency kit handy. Be sure your family, friends, and coworkers know how and when to use it. Check it regularly and replace the glucagon before it expires.  · Talk with your health care team about other things you can do to prevent low blood sugar.     If you have unexplained hypoglycemia or hypoglycemia several times, call your healthcare provider.   Date Last Reviewed: 5/1/2016  © 2470-0518 [employer]. [address] [place] [address], [place] [address]. All rights reserved. This information is not intended as a substitute for professional medical care. Always follow your healthcare professional's instructions.             Language Assistance Services     ATTENTION: Language assistance services are available, free of charge. Please call [phone].      ATENCIÓN: Si noemy blank sánchez disposición  servicios gratuitos de asistencia lingüística. Jade jose [phone].     [facility] Ý: N?u b?n nói Ti?ng Vi?t, có các d?ch v? h? tr? ngôn ng? mi?n phí dành cho b?n. G?i s? [phone].         [facility] complies with applicable Federal civil rights laws and does not discriminate on the basis of race, color, national origin, age, disability, or sex.

## 2017-03-21 NOTE — PROGRESS NOTES
CC: Ms. Adele Hall arrives today for management of Type 2 DM and review of chronic medical conditions, as listed in the visit diagnosis section of this encounter.     HPI: Ms. Adele Hall was diagnosed with Type 2 DM in ~ 2002. Metformin started at the time of diagnosis but was stopped d/t renal impairment, per PCP in [place]. Insulin was started ~ 2012.  Denies FH of DM. She did have 1 ER admission after having BG of >700 in 2012, prior to starting insulin.     Last seen in endocrine by YENI Black NP in 12/2016.   She is new to me today. She is accompanied by her .    BG readings are checked 3x/day. Brings logs. BG range 107-170 with some fasting hyperglycemia.     Hypoglycemia: No  Hypoglycemic Symptoms: nervous  Hypoglycemia Treatment: coke or PB crackers    Missing Insulin/PO medication doses: No  Timing prandial insulin 5-15 minutes before meals: yes    Dietary Habits: Eats 2 meals/day because she sleeps in. Snacks on popcorn or peanuts. Drinks sweet tea 3 days/week. .    She has CAD and had CABG in 1/2016. Follows with Dr. Burks    She ran out of her Victoza over 1 week ago. She reports having diarrhea with this but is interested in resuming.     Currently using Patient Assistance for insulin and Victoza. They have concerns regarding cost when in the donut hole.     Levothyroxine dose was decreased to 112 mcg at last visit.     She sees Dr. Montero for CKD.    CURRENT DIABETIC MEDS: Levemir 42 units BID, Novolog 26 units AC, Victoza 1.8 mg daily.   Vial or pen: vials  Glucometer type: True Metrix    Previous DM treatments:  metformin    Last Eye Exam: 2014  Last Podiatry Exam: n/a    REVIEW OF SYSTEMS  Constitutional: no c/o fatigue, weakness, or weight loss.   Eyes: denies visual disturbances.  Cardiac: no palpitations or chest pain.  Respiratory: no cough or dyspnea.  GI: no c/o abdominal pain or nausea  Skin: no lesions or rashes.  Neuro: no numbness, tingling, or  "parasthesias.  Endocrine: denies polyphagia, polydipsia, polyuria      Personally reviewed Past Medical, Surgical, Social History.    Vital Signs  /62 (BP Location: Right arm, Patient Position: Sitting, BP Method: Manual)  Pulse 82  Ht 5' 1" (1.549 m)  Wt 86.7 kg (191 lb 0.5 oz)  BMI 36.09 kg/m2    Personally reviewed the below labs:    Hemoglobin A1C   Date Value Ref Range Status   03/14/2017 6.8 (H) 4.5 - 6.2 % Final     Comment:     According to ADA guidelines, hemoglobin A1C <7.0% represents  optimal control in non-pregnant diabetic patients.  Different  metrics may apply to specific populations.   Standards of Medical Care in Diabetes - 2016.  For the purpose of screening for the presence of diabetes:  <5.7%     Consistent with the absence of diabetes  5.7-6.4%  Consistent with increasing risk for diabetes   (prediabetes)  >or=6.5%  Consistent with diabetes  Currently no consensus exists for use of hemoglobin A1C  for diagnosis of diabetes for children.     12/14/2016 6.7 (H) 4.5 - 6.2 % Final     Comment:     According to ADA guidelines, hemoglobin A1C <7.0% represents  optimal control in non-pregnant diabetic patients.  Different  metrics may apply to specific populations.   Standards of Medical Care in Diabetes - 2016.  For the purpose of screening for the presence of diabetes:  <5.7%     Consistent with the absence of diabetes  5.7-6.4%  Consistent with increasing risk for diabetes   (prediabetes)  >or=6.5%  Consistent with diabetes  Currently no consensus exists for use of hemoglobin A1C  for diagnosis of diabetes for children.     11/28/2016 7.0 (H) 4.5 - 6.2 % Final     Comment:     According to ADA guidelines, hemoglobin A1C <7.0% represents  optimal control in non-pregnant diabetic patients.  Different  metrics may apply to specific populations.   Standards of Medical Care in Diabetes - 2016.  For the purpose of screening for the presence of diabetes:  <5.7%     Consistent with the absence of " diabetes  5.7-6.4%  Consistent with increasing risk for diabetes   (prediabetes)  >or=6.5%  Consistent with diabetes  Currently no consensus exists for use of hemoglobin A1C  for diagnosis of diabetes for children.         Chemistry        Component Value Date/Time     02/24/2017 1004    K 4.2 02/24/2017 1004     02/24/2017 1004    CO2 27 02/24/2017 1004    BUN 22 02/24/2017 1004    CREATININE 1.6 (H) 02/24/2017 1004     (H) 02/24/2017 1004        Component Value Date/Time    CALCIUM 9.5 02/24/2017 1004    ALKPHOS 85 01/19/2017 1009    AST 31 01/19/2017 1009    AST 45 (H) 01/30/2016 0431    ALT 24 01/19/2017 1009    BILITOT 0.2 01/19/2017 1009          Lab Results   Component Value Date    CHOL 155 11/28/2016    CHOL 136 01/16/2016    CHOL 178 12/28/2015     Lab Results   Component Value Date    HDL 23 (L) 11/28/2016    HDL 23 (L) 01/16/2016    HDL 33 (L) 12/28/2015     Lab Results   Component Value Date    LDLCALC Invalid, Trig>400.0 11/28/2016    LDLCALC 51.0 (L) 01/16/2016    LDLCALC 78.4 12/28/2015     Lab Results   Component Value Date    TRIG 423 (H) 11/28/2016    TRIG 310 (H) 01/16/2016    TRIG 333 (H) 12/28/2015     Lab Results   Component Value Date    CHOLHDL 14.8 (L) 11/28/2016    CHOLHDL 16.9 (L) 01/16/2016    CHOLHDL 18.5 (L) 12/28/2015       Lab Results   Component Value Date    MICALBCREAT 1111.1 (H) 04/04/2016     Lab Results   Component Value Date    TSH 0.295 (L) 03/14/2017       CrCl cannot be calculated (Patient has no serum creatinine result on file.).    No results found for: [id number]      PHYSICAL EXAMINAAppears well, no distressON  Constitutional: Appears well, no distress  Neck: Supple, trachea midline; no thyromegaly or nodules.   Respiratory: CTA, even and unlabored.  Cardiovascular: RRR, no murmurs, no carotid bruits. DP pulses  2+ bilaterally; no edema.    Lymph: no cervical or supraclavicular lymphadenopathy  Skin: warm and dry; no lipohypertrophy, or acanthosis  nigracans observed.  Neuro: DTR 2+ BUE/2+BLE.  Feet: appropriate footwear.        A1c goal 7.5% or less    Assessment/Plan  1. Type 2 diabetes mellitus with stage 3 chronic kidney disease, with long-term current use of insulin  -- A1c at goal. BG elevated since stopping Victoza while awaiting it from Patient Assistance  -- continue current doses.   -- add Novolog 6 units with HS snack  -- when in [facility], I advised her to call Tiffany with Patient Assistance. We also discussed Relion insulins. Her sleeping/meal pattern would have to change.   Ambulatory referral to Ophthalmology  -- check BG 4x/day    -- Discussed diagnosis of DM, A1c goals, progression of disease, long term complications and tx options.  Advised patient to check BG before activities, such as driving or exercise.  -- Reviewed hypoglycemia management: treat with 1/2 glass of juice, 1/2 can regular coke, or 4 glucose tablets. Monitor and repeat treatment every 15 minutes until BG is >70 Then have a snack, which includes a complex carbohydrate and protein.   2. Type 2 diabetes mellitus with diabetic polyneuropathy, with long-term current use of insulin  -- discussed the importance of daily inspection of bottoms of both feet, interspace areas. Advised patient against walking barefoot.     3. Coronary artery disease involving native coronary artery of native heart without angina pectoris  -- avoid hypoglycemia   4. Essential hypertension  -- continue lisinopril   5. Hyperlipidemia with target LDL less than 70  -- continue statin  -- elevated triglycerides. Continue fenofibrate   6. Thyroid disease  -- needs decrease in LT4 dose.   Lab Results   Component Value Date    TSH 0.295 (L) 03/14/2017     levothyroxine (SYNTHROID) 100 MCG tablet  -- will check level with return       FOLLOW UP  Return in about 3 months (around 6/21/2017).   Patient instructed to bring BG logs to each follow up   Patient encouraged to call for any BG/medication issues, concerns,  or questions.    Orders Placed This Encounter   Procedures    Hemoglobin A1c    Comprehensive metabolic panel    TSH    Ambulatory referral to Ophthalmology

## 2017-04-03 ENCOUNTER — TELEPHONE (OUTPATIENT)
Dept: ENDOCRINOLOGY | Facility: CLINIC | Age: 72
End: 2017-04-03

## 2017-04-04 ENCOUNTER — ANESTHESIA (OUTPATIENT)
Dept: ENDOSCOPY | Facility: HOSPITAL | Age: 72
End: 2017-04-04
Payer: MEDICARE

## 2017-04-04 ENCOUNTER — HOSPITAL ENCOUNTER (OUTPATIENT)
Facility: HOSPITAL | Age: 72
Discharge: HOME | End: 2017-04-04
Attending: INTERNAL MEDICINE | Admitting: INTERNAL MEDICINE
Payer: MEDICARE

## 2017-04-04 ENCOUNTER — SURGERY (OUTPATIENT)
Age: 72
End: 2017-04-04

## 2017-04-04 ENCOUNTER — ANESTHESIA EVENT (OUTPATIENT)
Dept: ENDOSCOPY | Facility: HOSPITAL | Age: 72
End: 2017-04-04
Payer: MEDICARE

## 2017-04-04 VITALS — RESPIRATION RATE: 16 BRPM

## 2017-04-04 DIAGNOSIS — K21.9 GASTROESOPHAGEAL REFLUX DISEASE, ESOPHAGITIS PRESENCE NOT SPECIFIED: Primary | ICD-10-CM

## 2017-04-04 DIAGNOSIS — R12 HEARTBURN: ICD-10-CM

## 2017-04-04 DIAGNOSIS — K44.9 HIATAL HERNIA: Primary | ICD-10-CM

## 2017-04-04 LAB — POCT GLUCOSE: 210 MG/DL (ref 70–110)

## 2017-04-04 PROCEDURE — 82962 GLUCOSE BLOOD TEST: CPT | Performed by: INTERNAL MEDICINE

## 2017-04-04 PROCEDURE — 37000008 HC ANESTHESIA 1ST 15 MINUTES: Performed by: INTERNAL MEDICINE

## 2017-04-04 PROCEDURE — 25000003 PHARM REV CODE 250

## 2017-04-04 PROCEDURE — 43235 EGD DIAGNOSTIC BRUSH WASH: CPT | Mod: ,,, | Performed by: INTERNAL MEDICINE

## 2017-04-04 PROCEDURE — D9220A PRA ANESTHESIA: Mod: CRNA,,, | Performed by: NURSE ANESTHETIST, CERTIFIED REGISTERED

## 2017-04-04 PROCEDURE — 43235 EGD DIAGNOSTIC BRUSH WASH: CPT | Performed by: INTERNAL MEDICINE

## 2017-04-04 PROCEDURE — 63600175 PHARM REV CODE 636 W HCPCS

## 2017-04-04 PROCEDURE — 25000003 PHARM REV CODE 250: Performed by: INTERNAL MEDICINE

## 2017-04-04 PROCEDURE — D9220A PRA ANESTHESIA: Mod: ANES,,, | Performed by: ANESTHESIOLOGY

## 2017-04-04 RX ORDER — LIDOCAINE HYDROCHLORIDE 20 MG/ML
INJECTION, SOLUTION EPIDURAL; INFILTRATION; INTRACAUDAL; PERINEURAL
Status: COMPLETED
Start: 2017-04-04 | End: 2017-04-04

## 2017-04-04 RX ORDER — SODIUM CHLORIDE 9 MG/ML
INJECTION, SOLUTION INTRAVENOUS CONTINUOUS
Status: DISCONTINUED | OUTPATIENT
Start: 2017-04-04 | End: 2017-04-04 | Stop reason: HOSPADM

## 2017-04-04 RX ORDER — PROPOFOL 10 MG/ML
INJECTION, EMULSION INTRAVENOUS
Status: COMPLETED
Start: 2017-04-04 | End: 2017-04-04

## 2017-04-04 RX ADMIN — PROPOFOL 100 MG: 10 INJECTION, EMULSION INTRAVENOUS at 10:04

## 2017-04-04 RX ADMIN — SODIUM CHLORIDE 1000 ML: 0.9 INJECTION, SOLUTION INTRAVENOUS at 08:04

## 2017-04-04 RX ADMIN — LIDOCAINE HYDROCHLORIDE 100 MG: 20 INJECTION, SOLUTION EPIDURAL; INFILTRATION; INTRACAUDAL; PERINEURAL at 10:04

## 2017-04-04 NOTE — IP AVS SNAPSHOT
[address] [place] [address]  Phone: [phone]           Patient Discharge Instructions   Our goal is to set you up for success. This packet includes information on your condition, medications, and your home care.  It will help you care for yourself to prevent having to return to the hospital.     Please ask your nurse if you have any questions.      There are many details to remember when preparing to leave the hospital. Here is what you will need to do:    1. Take your medicine. If you are prescribed medications, review your Medication List on the following pages. You may have new medications to  at the pharmacy and others that you'll need to stop taking. Review the instructions for how and when to take your medications. Talk with your doctor or nurses if you are unsure of what to do.     2. Go to your follow-up appointments. Specific follow-up information is listed in the following pages. Your may be contacted by a nurse or clinical provider about future appointments. Be sure we have all of the phone numbers to reach you. Please contact your provider's office if you are unable to make an appointment.     3. Watch for warning signs. Your doctor or nurse will give you detailed warning signs to watch for and when to call for assistance. These instructions may also include educational information about your condition. If you experience any of warning signs to your health, call your doctor.           [facility]  Unless otherwise directed by your provider, please   contact [facility], our nurse care line   that is available for 24/7 assistance.     [phone] (toll-free)     Registered nurses in the [facility]   provide: appointment scheduling, clinical advisement, health education, and other advisory services.                  ** Verify the list of medication(s) below is accurate and up to date. Carry this with you in case of  emergency. If your medications have changed, please notify your healthcare provider.             Medication List      CHANGE how you take these medications        Additional Info                      gabapentin 300 MG capsule   Commonly known as:  NEURONTIN   Quantity:  90 capsule   Refills:  11   What changed:    - how much to take  - when to take this  - additional instructions    Instructions:  Take 1 at bedtime for 1 week, then 2 a day for 1 week, then 3 a day     Begin Date    AM    Noon    PM    Bedtime       insulin aspart 100 unit/mL Inpn pen   Commonly known as:  NovoLOG   Quantity:  4 Box   Refills:  3   Dose:  32 Units   What changed:  how much to take    Instructions:  Inject 32 Units into the skin 3 (three) times daily with meals.     Begin Date    AM    Noon    PM    Bedtime         CONTINUE taking these medications        Additional Info                      amlodipine 2.5 MG tablet   Commonly known as:  NORVASC   Quantity:  30 tablet   Refills:  11   Dose:  2.5 mg    Instructions:  Take 1 tablet (2.5 mg total) by mouth once daily.     Begin Date    AM    Noon    PM    Bedtime       aspirin 81 MG EC tablet   Commonly known as:  ECOTRIN   Refills:  0   Dose:  81 mg    Instructions:  Take 81 mg by mouth once daily.     Begin Date    AM    Noon    PM    Bedtime       BD INSULIN SYRINGE ULTRA-FINE 1 mL 31 gauge x 5/16 Syrg   Quantity:  200 each   Refills:  11   Generic drug:  insulin syringe-needle U-100    Instructions:  Uses 5 syringes per day with Levemir and Novolog     Begin Date    AM    Noon    PM    Bedtime       blood sugar diagnostic Strp   Quantity:  400 strip   Refills:  3    Instructions:  Check blood glucose 4 times a day - before meals and at bedtime. Please dispense testing supplies for true metrix. Dx code e11.65     Begin Date    AM    Noon    PM    Bedtime       blood-glucose meter kit   Quantity:  1 each   Refills:  0    Instructions:  Use as instructed. Please dispense testing  supplies for true metrix. Dx code e11.65     Begin Date    AM    Noon    PM    Bedtime       fenofibrate micronized 134 MG Cap   Commonly known as:  LOFIBRA   Quantity:  30 capsule   Refills:  10    Instructions:  TAKE ONE CAPSULE BY MOUTH EVERYDAY WITH BREAKFAST     Begin Date    AM    Noon    PM    Bedtime       insulin detemir 100 unit/mL injection   Commonly known as:  LEVEMIR   Refills:  0   Dose:  42 Units    Instructions:  Inject 42 Units into the skin 2 (two) times daily.     Begin Date    AM    Noon    PM    Bedtime       lancets Misc   Quantity:  400 each   Refills:  3    Instructions:  Check blood glucose 4x/day. Please dispense testing supplies for true metrix. Dx code e11.65     Begin Date    AM    Noon    PM    Bedtime       levothyroxine 100 MCG tablet   Commonly known as:  SYNTHROID   Quantity:  30 tablet   Refills:  6   Dose:  100 mcg    Instructions:  Take 1 tablet (100 mcg total) by mouth once daily.     Begin Date    AM    Noon    PM    Bedtime       liraglutide 0.6 mg/0.1 mL (18 mg/3 mL) subq PNIJ 0.6 mg/0.1 mL (18 mg/3 mL) Pnij   Commonly known as:  VICTOZA 3-MICH   Quantity:  9 mL   Refills:  11   Dose:  1.8 mg    Instructions:  Inject 1.8 mg into the skin once daily.     Begin Date    AM    Noon    PM    Bedtime       lisinopril 10 MG tablet   Quantity:  30 tablet   Refills:  11   Dose:  10 mg    Instructions:  Take 1 tablet (10 mg total) by mouth once daily.     Begin Date    AM    Noon    PM    Bedtime       metoprolol tartrate 25 MG tablet   Commonly known as:  LOPRESSOR   Quantity:  30 tablet   Refills:  11   Dose:  12.5 mg    Instructions:  Take 0.5 tablets (12.5 mg total) by mouth 2 (two) times daily.     Begin Date    AM    Noon    PM    Bedtime       pantoprazole 40 MG tablet   Commonly known as:  PROTONIX   Quantity:  30 tablet   Refills:  11   Dose:  40 mg    Instructions:  Take 1 tablet (40 mg total) by mouth once daily.     Begin Date    AM    Noon    PM    Bedtime       pen  "needle, diabetic 32 gauge x 1/6" Ndle   Commonly known as:  NOVOFINE PLUS   Quantity:  600 each   Refills:  3   Dose:  1 Device    Instructions:  1 Device by Misc.(Non-Drug; Combo Route) route 6 (six) times daily.     Begin Date    AM    Noon    PM    Bedtime       pramipexole 0.125 MG tablet   Commonly known as:  MIRAPEX   Refills:  0   Dose:  0.125 mg    Instructions:  Take 0.125 mg by mouth nightly as needed (MAY TAKE UP TO 0.25 MG--patient taking 2 tablets nightly).     Begin Date    AM    Noon    PM    Bedtime       rosuvastatin 40 MG Tab   Commonly known as:  CRESTOR   Quantity:  30 tablet   Refills:  11   Dose:  40 mg    Instructions:  Take 1 tablet (40 mg total) by mouth every evening.     Begin Date    AM    Noon    PM    Bedtime       VITAMIN D2 50,000 unit Cap   Quantity:  4 capsule   Refills:  11   Generic drug:  ergocalciferol    Instructions:  TAKE ONE CAPSULE BY MOUTH EVERY SEVEN DAYS     Begin Date    AM    Noon    PM    Bedtime                  Please bring to all follow up appointments:    1. A copy of your discharge instructions.  2. All medicines you are currently taking in their original bottles.  3. Identification and insurance card.    Please arrive 15 minutes ahead of scheduled appointment time.    Please call 24 hours in advance if you must reschedule your appointment and/or time.        Your Scheduled Appointments     May 01, 2017  9:00 AM CDT   New Patient with MD [place] Rosario [facility] Ophthalmology ([facility])    [address] [place] [address] [address]  [place] [address]   [phone]            Jun 02, 2017 10:00 AM CDT   Non-Fasting Lab [facility] ([facility] [place])    [address]  [place] [address]   [phone]            Jun 02, 2017 10:15 AM CDT   Urine with [facility] ([facility])    [address]  [place] [address]   [phone]            Jun 09, " 2017 10:20 AM CDT   Established Patient Visit with Henrique Montero MD   [facility] - Nephrology ([facility])    [address] [facility]  [place] [address]   [phone]            Jun 21, 2017 10:30 AM CDT   Non-Fasting [facility]    [address]  [place] [address] [phone]              Follow-up Information     Follow up with Seth Yoder MD.    Specialty:  Gastroenterology    Why:  Reschedule EGD    Contact information:    [address] [facility]  [address]  [place] [address]  [phone]          Discharge Instructions     Future Orders    Activity as tolerated     Diet general     Questions:    Total calories:      Fat restriction, if any:      Protein restriction, if any:      Na restriction, if any:      Fluid restriction:      Additional restrictions:          Discharge Instructions         What Is a Hiatal Hernia?    Hiatal hernia is when the area where the stomach and esophagus meet bulges up through the diaphragm into the chest cavity. In some cases, part of the stomach may bulge above the diaphragm. Stomach acid may move up into the esophagus and cause symptoms. The symptoms are often blamed on gastroesophageal reflux disease (GERD). You may only know about the hernia when it shows up on an X-ray taken for other reasons.   What you may feel  The hiatus is a normal hole in the diaphragm. The esophagus passes through this hole and leads to the stomach. In some cases, part of the stomach may bulge above the diaphragm. This bulge is called a hernia. Stomach acid may move up into the esophagus and cause symptoms.  When you eat, the muscle at the hiatus relaxes to allow food to pass into the stomach. It tightens again to keep food and digestive acids in the stomach.  Many people with hiatal hernias have mild symptoms. You may notice the following GERD symptoms:  · Heartburn or other chest discomfort  · A feeling of chest fullness  after a meal  · Frequent burping  · Acid taste in the mouth  · Trouble swallowing  Treating symptoms  If you have been diagnosed with hiatal hernia, these suggestions may help improve symptoms:  · Lose excess weight. Extra weight puts pressure on the stomach and esophagus.  · Dont lie down after eating. Sit up for at least an hour after eating. Lying down after eating can increase symptoms.  · Avoid certain foods and drinks. These include fatty foods, chocolate, coffee, mint, and other foods that cause symptoms for you.  · Dont smoke or drink alcohol. These can worsen symptoms.  · Look at your medicines. Discuss your medicines with your healthcare provider. Many medicines can cause symptoms.  · Consider an antacid medicine. Ask your healthcare provider about over-the-counter and prescription medicines that may help.  · Ask about surgery, if needed. Surgery is a treatment choice for some people. Your healthcare provider can determine if surgery is an option for you.    Date Last Reviewed: 10/1/2016  © 7071-7706 [employer]. [address] [place] [address]. All rights reserved. This information is not intended as a substitute for professional medical care. Always follow your healthcare professional's instructions.        Upper GI Endoscopy     During endoscopy, a long, flexible tube is used to view the inside of your upper GI tract.      Upper GI endoscopy allows your healthcare provider to look directly into the beginning of your gastrointestinal (GI) tract. The esophagus, stomach, and duodenum (the first part of the small intestine) make up the upper GI tract.   Before the exam  Follow these and any other instructions you are given before your endoscopy. If you dont follow the healthcare providers instructions carefully, the test may need to be canceled or done over:  · Don't eat or drink anything after midnight the night before your exam. If your exam is in the afternoon, drink only  clear liquids in the morning. Don't eat or drink anything for 8 hours before the exam. In some cases, you may be able to take medicines with sips of water until 2 hours before the procedure. Speak with your healthcare provider about this.   · Bring your X-rays and any other test results you have.  · Because you will be sedated, arrange for an adult to drive you home after the exam.  · Tell your healthcare provider before the exam if you are taking any medicines or have any medical problems.  The procedure  Here is what to expect:  · You will lie on the endoscopy table. Usually patients lie on the left side.  · You will be monitored and given oxygen.  · Your throat may be numbed with a spray or gargle. You are given medicine through an intravenous (IV) line that will help you relax and remain comfortable. You may be awake or asleep during the procedure.  · The healthcare provider will put the endoscope in your mouth and down your esophagus. It is thinner than most pieces of food that you swallow. It will not affect your breathing. The medicine helps keep you from gagging.  · Air is put into your GI tract to expand it. It can make you burp.  · During the procedure, the healthcare provider can take biopsies (tissue samples), remove abnormalities, such as polyps, or treat abnormalities through a variety of devices placed through the endoscope. You will not feel this.   · The endoscope carries images of your upper GI tract to a video screen. If you are awake, you may be able to look at the images.  · After the procedure is done, you will rest for a time. An adult must drive you home.  When to call your healthcare provider  Contact your healthcare provider if you have:  · Black or tarry stools, or blood in your stool  · Fever  · Pain in your belly that does not go away  · Nausea and vomiting, or vomiting blood   Date Last Reviewed: 7/1/2016  © 9306-8818 [employer]. [address] [place] [address], [place] [address].  "All rights reserved. This information is not intended as a substitute for professional medical care. Always follow your healthcare professional's instructions.            Admission Information     Date & Time Provider Department CSN    4/4/2017  8:04 AM Seth Yoder MD [facility] [id number]      Care Providers     Provider Role Specialty Primary office phone    Seth Yoder MD Attending Provider Gastroenterology [phone]    Seth Yoder MD Surgeon  Gastroenterology [phone]      Your Vitals Were     BP Pulse Temp Resp Height Weight    140/67 (BP Location: Left arm, Patient Position: Lying, BP Method: Automatic) 77 97.6 °F (36.4 °C) 18 5' 1.5" (1.562 m) 85.7 kg (189 lb)    SpO2 BMI             97% 35.13 kg/m2         Recent Lab Values        12/28/2015 4/4/2016 6/24/2016 7/30/2016 8/2/2016 11/28/2016 12/14/2016 3/14/2017      8:29 AM  8:18 AM 12:00 PM  5:55 PM  5:50 AM 10:22 AM  9:40 AM 11:17 AM    A1C 7.2 (H) 6.3 (H) 8.5 (H) 6.7 (H) 6.9 (H) 7.0 (H) 6.7 (H) 6.8 (H)    Comment for A1C at  5:55 PM on 7/30/2016:  Reference Interval:  5.0 - 5.6 Normal   5.7 - 6.4 High Risk   > 6.5 Diabetic     Hgb A1c results are standardized based on the (NGSP) National   Glycohemoglobin Standardization Program.    Hemoglobin A1C levels are related to mean serum/plasma glucose   during the preceding 2-3 months.          Comment for A1C at  5:50 AM on 8/2/2016:  Reference Interval:  5.0 - 5.6 Normal   5.7 - 6.4 High Risk   > 6.5 Diabetic     Hgb A1c results are standardized based on the (NGSP) National   Glycohemoglobin Standardization Program.    Hemoglobin A1C levels are related to mean serum/plasma glucose   during the preceding 2-3 months.          Comment for A1C at 10:22 AM on 11/28/2016:  According to ADA guidelines, hemoglobin A1C <7.0% represents  optimal control in non-pregnant diabetic patients.  Different  metrics may apply to specific populations.   Standards of Medical Care in " Diabetes - 2016.  For the purpose of screening for the presence of diabetes:  <5.7%     Consistent with the absence of diabetes  5.7-6.4%  Consistent with increasing risk for diabetes   (prediabetes)  >or=6.5%  Consistent with diabetes  Currently no consensus exists for use of hemoglobin A1C  for diagnosis of diabetes for children.      Comment for A1C at  9:40 AM on 12/14/2016:  According to ADA guidelines, hemoglobin A1C <7.0% represents  optimal control in non-pregnant diabetic patients.  Different  metrics may apply to specific populations.   Standards of Medical Care in Diabetes - 2016.  For the purpose of screening for the presence of diabetes:  <5.7%     Consistent with the absence of diabetes  5.7-6.4%  Consistent with increasing risk for diabetes   (prediabetes)  >or=6.5%  Consistent with diabetes  Currently no consensus exists for use of hemoglobin A1C  for diagnosis of diabetes for children.      Comment for A1C at 11:17 AM on 3/14/2017:  According to ADA guidelines, hemoglobin A1C <7.0% represents  optimal control in non-pregnant diabetic patients.  Different  metrics may apply to specific populations.   Standards of Medical Care in Diabetes - 2016.  For the purpose of screening for the presence of diabetes:  <5.7%     Consistent with the absence of diabetes  5.7-6.4%  Consistent with increasing risk for diabetes   (prediabetes)  >or=6.5%  Consistent with diabetes  Currently no consensus exists for use of hemoglobin A1C  for diagnosis of diabetes for children.        Allergies as of 4/4/2017        Reactions    Reglan [Metoclopramide Hcl]     Depression and weight loss.       Advance Directives     An advance directive is a document which, in the event you are no longer able to make decisions for yourself, tells your healthcare team what kind of treatment you do or do not want to receive, or who you would like to make those decisions for you.  If you do not currently have an advance directive, [facility]  encourages you to create one.  For more information call:  [phone]), [phone] ([phone]),  or log on to [email or web address].        Smoking Cessation     If you would like to quit smoking:   You may be eligible for free services if you are a [place] resident and started smoking cigarettes before September 1, 1988.  Call the Smoking Cessation Trust (SCT) toll free at (794) 397-9330 or (829) 168-7617.   Call 6-800-QUIT-NOW if you do not meet the above criteria.   Contact us via email: [email or web address]   View our website for more information: [email or web address]        Language Assistance Services     ATTENTION: Language assistance services are available, free of charge. Please call [phone].      ATENCIÓN: Si habla español, tiene a sánchez disposición servicios gratuitos de asistencia lingüística. Llame al [phone].     CHÚ Ý: N?u b?n nói Ti?ng Vi?t, có các d?ch v? h? tr? ngôn ng? mi?n phí dành cho b?n. G?i s? [phone].        Chronic Kindey Disease Education             Diabetes Discharge Instructions                                    [facility] complies with applicable Federal civil rights laws and does not discriminate on the basis of race, color, national origin, age, disability, or sex.

## 2017-04-04 NOTE — DISCHARGE INSTRUCTIONS
What Is a Hiatal Hernia?    Hiatal hernia is when the area where the stomach and esophagus meet bulges up through the diaphragm into the chest cavity. In some cases, part of the stomach may bulge above the diaphragm. Stomach acid may move up into the esophagus and cause symptoms. The symptoms are often blamed on gastroesophageal reflux disease (GERD). You may only know about the hernia when it shows up on an X-ray taken for other reasons.   What you may feel  The hiatus is a normal hole in the diaphragm. The esophagus passes through this hole and leads to the stomach. In some cases, part of the stomach may bulge above the diaphragm. This bulge is called a hernia. Stomach acid may move up into the esophagus and cause symptoms.  When you eat, the muscle at the hiatus relaxes to allow food to pass into the stomach. It tightens again to keep food and digestive acids in the stomach.  Many people with hiatal hernias have mild symptoms. You may notice the following GERD symptoms:  · Heartburn or other chest discomfort  · A feeling of chest fullness after a meal  · Frequent burping  · Acid taste in the mouth  · Trouble swallowing  Treating symptoms  If you have been diagnosed with hiatal hernia, these suggestions may help improve symptoms:  · Lose excess weight. Extra weight puts pressure on the stomach and esophagus.  · Dont lie down after eating. Sit up for at least an hour after eating. Lying down after eating can increase symptoms.  · Avoid certain foods and drinks. These include fatty foods, chocolate, coffee, mint, and other foods that cause symptoms for you.  · Dont smoke or drink alcohol. These can worsen symptoms.  · Look at your medicines. Discuss your medicines with your healthcare provider. Many medicines can cause symptoms.  · Consider an antacid medicine. Ask your healthcare provider about over-the-counter and prescription medicines that may help.  · Ask about surgery, if needed. Surgery is a treatment  choice for some people. Your healthcare provider can determine if surgery is an option for you.    Date Last Reviewed: 10/1/2016  © 0990-9984 [employer]. [address] [place] [address], [place] [address]. All rights reserved. This information is not intended as a substitute for professional medical care. Always follow your healthcare professional's instructions.        Upper GI Endoscopy     During endoscopy, a long, flexible tube is used to view the inside of your upper GI tract.      Upper GI endoscopy allows your healthcare provider to look directly into the beginning of your gastrointestinal (GI) tract. The esophagus, stomach, and duodenum (the first part of the small intestine) make up the upper GI tract.   Before the exam  Follow these and any other instructions you are given before your endoscopy. If you dont follow the healthcare providers instructions carefully, the test may need to be canceled or done over:  · Don't eat or drink anything after midnight the night before your exam. If your exam is in the afternoon, drink only clear liquids in the morning. Don't eat or drink anything for 8 hours before the exam. In some cases, you may be able to take medicines with sips of water until 2 hours before the procedure. Speak with your healthcare provider about this.   · Bring your X-rays and any other test results you have.  · Because you will be sedated, arrange for an adult to drive you home after the exam.  · Tell your healthcare provider before the exam if you are taking any medicines or have any medical problems.  The procedure  Here is what to expect:  · You will lie on the endoscopy table. Usually patients lie on the left side.  · You will be monitored and given oxygen.  · Your throat may be numbed with a spray or gargle. You are given medicine through an intravenous (IV) line that will help you relax and remain comfortable. You may be awake or asleep during the procedure.  · The healthcare  provider will put the endoscope in your mouth and down your esophagus. It is thinner than most pieces of food that you swallow. It will not affect your breathing. The medicine helps keep you from gagging.  · Air is put into your GI tract to expand it. It can make you burp.  · During the procedure, the healthcare provider can take biopsies (tissue samples), remove abnormalities, such as polyps, or treat abnormalities through a variety of devices placed through the endoscope. You will not feel this.   · The endoscope carries images of your upper GI tract to a video screen. If you are awake, you may be able to look at the images.  · After the procedure is done, you will rest for a time. An adult must drive you home.  When to call your healthcare provider  Contact your healthcare provider if you have:  · Black or tarry stools, or blood in your stool  · Fever  · Pain in your belly that does not go away  · Nausea and vomiting, or vomiting blood   Date Last Reviewed: 7/1/2016  © 6559-2420 [employer], "[employer] [address] [place] [address], [place] [address]. All rights reserved. This information is not intended as a substitute for professional medical care. Always follow your healthcare professional's instructions.

## 2017-04-04 NOTE — TRANSFER OF CARE
"Anesthesia Transfer of Care Note    Patient: Adele Hall    Procedure(s) Performed: Procedure(s) (LRB):  ESOPHAGOGASTRODUODENOSCOPY (EGD) (N/A)    Patient location: GI    Anesthesia Type: general    Transport from OR: Transported from OR on room air with adequate spontaneous ventilation    Post pain: adequate analgesia    Post assessment: no apparent anesthetic complications    Post vital signs: stable    Level of consciousness: awake    Nausea/Vomiting: no nausea/vomiting    Complications: none          Last vitals:   Visit Vitals    BP (!) 113/53    Pulse 74    Temp 36.7 °C (98 °F) (Oral)    Resp 16    Ht 5' 1.5" (1.562 m)    Wt 85.7 kg (189 lb)    SpO2 98%    Breastfeeding No    BMI 35.13 kg/m2     "

## 2017-04-04 NOTE — ANESTHESIA PREPROCEDURE EVALUATION
04/04/2017  Adele Hall is a 71 y.o., female.    [facility] Anesthesia Evaluation    I have reviewed the Patient Summary Reports.    I have reviewed the Nursing Notes.   I have reviewed the Medications.     Review of Systems  Anesthesia Hx:  No problems with previous Anesthesia    Social:  Former Smoker    Cardiovascular:   Hypertension, well controlled CAD  CABG/stent  CHF    Pulmonary:   COPD, mild Shortness of breath    Hepatic/GI:   GERD, poorly controlled    Endocrine:   Diabetes, poorly controlled, type 2        Physical Exam  General:  Morbid Obesity    Airway/Jaw/Neck:  Airway Findings: Mouth Opening: Normal Tongue: Normal  General Airway Assessment: Adult, Good  Mallampati: II  TM Distance: 4 - 6 cm      Dental:  Dental Findings: In tact   Chest/Lungs:  Chest/Lungs Findings: Clear to auscultation, Normal Respiratory Rate     Heart/Vascular:  Heart Findings: Rate: Normal  Rhythm: Regular Rhythm  Sounds: Normal        Mental Status:  Mental Status Findings:  Alert and Oriented, Cooperative         Anesthesia Plan  Type of Anesthesia, risks & benefits discussed:  Anesthesia Type:  general  Patient's Preference:   Intra-op Monitoring Plan:   Intra-op Monitoring Plan Comments:   Post Op Pain Control Plan:   Post Op Pain Control Plan Comments:   Induction:   IV  Beta Blocker:  Patient is on a Beta-Blocker and has received one dose within the past 24 hours (No further documentation required).       Informed Consent: Patient understands risks and agrees with Anesthesia plan.  Questions answered. Anesthesia consent signed with patient.  ASA Score: 3     Day of Surgery Review of History & Physical: I have interviewed and examined the patient. I have reviewed the patient's H&P dated:  There are no significant changes.          Ready For Surgery From Anesthesia Perspective.

## 2017-04-04 NOTE — INTERVAL H&P NOTE
The patient has been examined and the H&P has been reviewed:    I concur with the findings and no changes have occurred since H&P was written.    Anesthesia/Surgery risks, benefits and alternative options discussed and understood by patient/family.          Active Hospital Problems    Diagnosis  POA    Heartburn [R12]  Yes      Resolved Hospital Problems    Diagnosis Date Resolved POA   No resolved problems to display.

## 2017-04-04 NOTE — ANESTHESIA POSTPROCEDURE EVALUATION
"Anesthesia Post Evaluation    Patient: Adele Hall    Procedure(s) Performed: Procedure(s) (LRB):  ESOPHAGOGASTRODUODENOSCOPY (EGD) (N/A)    Final Anesthesia Type: general  Patient location during evaluation: PACU  Patient participation: Yes- Able to Participate  Level of consciousness: awake and alert and oriented  Post-procedure vital signs: reviewed and stable  Pain management: adequate  Airway patency: patent  PONV status at discharge: No PONV  Anesthetic complications: no      Cardiovascular status: blood pressure returned to baseline  Respiratory status: unassisted, spontaneous ventilation and room air  Hydration status: euvolemic  Follow-up not needed.        Visit Vitals    BP (!) 140/67 (BP Location: Left arm, Patient Position: Lying, BP Method: Automatic)    Pulse 77    Temp 36.4 °C (97.6 °F)    Resp 18    Ht 5' 1.5" (1.562 m)    Wt 85.7 kg (189 lb)    SpO2 97%    Breastfeeding No    BMI 35.13 kg/m2       Pain/Antonio Score: Pain Assessment Performed: Yes (4/4/2017 10:19 AM)  Presence of Pain: denies (4/4/2017 10:19 AM)  Pain Rating Prior to Med Admin: 1 (4/4/2017 10:19 AM)  Pain Rating Post Med Admin: 1 (4/4/2017 10:19 AM)  Antonio Score: 10 (4/4/2017 10:18 AM)      "

## 2017-04-05 ENCOUNTER — TELEPHONE (OUTPATIENT)
Dept: ENDOCRINOLOGY | Facility: CLINIC | Age: 72
End: 2017-04-05

## 2017-04-05 VITALS
RESPIRATION RATE: 18 BRPM | TEMPERATURE: 98 F | OXYGEN SATURATION: 97 % | DIASTOLIC BLOOD PRESSURE: 67 MMHG | BODY MASS INDEX: 34.78 KG/M2 | SYSTOLIC BLOOD PRESSURE: 140 MMHG | WEIGHT: 189 LBS | HEART RATE: 77 BPM | HEIGHT: 62 IN

## 2017-04-05 NOTE — TELEPHONE ENCOUNTER
Spoke with pt, states she has a Upper GI scheduled for 4/11 at 0830, was instructed to do clear liquids after breakfast Monday, please advise on dose of insulin prior to procedure.

## 2017-04-05 NOTE — TELEPHONE ENCOUNTER
----- Message from Silvia Handy sent at 4/5/2017 10:13 AM CDT -----  Contact: self  Patient wants to speak with a nurse regarding having a procedure next weeks and she needs to stop diabetic medication. States she has a few questions. Please call back at [phone]

## 2017-04-05 NOTE — TELEPHONE ENCOUNTER
LVM to decrease Levemir to 30 units twice daily the day before the procedure. Since her procedure is early on Tuesday, hold Levemir until afterward. May resume usual dose once she starts eating.     Do not give Novolog 26 units on Monday with the clear liquids. Instead, she may use sliding scale with her Novolog. May use this every 4-6 hours as needed. May have some liquids that contain carbs but also have some sugar free liquids.     Please provide sliding scale to patient:  150-200  + 2 units  201-250  +4 units  251-300  +6 units  301-350  +8 units  >351       +10 units    Should watch for signs of hypoglycemia on Monday and Tuesday AM.

## 2017-04-06 ENCOUNTER — TELEPHONE (OUTPATIENT)
Dept: PHARMACY | Facility: CLINIC | Age: 72
End: 2017-04-06

## 2017-04-06 ENCOUNTER — TELEPHONE (OUTPATIENT)
Dept: ENDOCRINOLOGY | Facility: CLINIC | Age: 72
End: 2017-04-06

## 2017-04-06 NOTE — TELEPHONE ENCOUNTER
----- Message from Barbie Cazares sent at 4/5/2017  4:41 PM CDT -----  Contact: Adele Johnson is returning call. Please call [phone]. Thanks!

## 2017-04-06 NOTE — TELEPHONE ENCOUNTER
----- Message from Angelina Cordova sent at 4/6/2017  8:13 AM CDT -----  Patient is returning office call. Please call back with details at [phone].

## 2017-04-06 NOTE — TELEPHONE ENCOUNTER
----- Message from Marci Wayne sent at 4/6/2017 11:37 AM CDT -----  Contact: pt   Patient called for the Victozia she states she is waiting for a call back to see if you have received the medication . Please call back at [phone]

## 2017-04-06 NOTE — TELEPHONE ENCOUNTER
Spoke with pt, educated on insulin dose prior an post procedure, voiced understanding, will call the office if any further instructions needed.

## 2017-04-07 ENCOUNTER — TELEPHONE (OUTPATIENT)
Dept: ENDOCRINOLOGY | Facility: CLINIC | Age: 72
End: 2017-04-07

## 2017-04-10 ENCOUNTER — TELEPHONE (OUTPATIENT)
Dept: ENDOCRINOLOGY | Facility: CLINIC | Age: 72
End: 2017-04-10

## 2017-04-10 NOTE — TELEPHONE ENCOUNTER
Spoke with pt, instructed on Novolin n dosage for procedure, voiced understanding  Will cont with Levemir once order comes in from the mail.

## 2017-04-10 NOTE — TELEPHONE ENCOUNTER
Left VM. Please try again to reach patient. If she is going to take Novolin N rather than Levemir, she will need to decrease this dose. Advise her to take 22 units twice daily. Advise her to keep clear liquid with her in case she has hypoglycemia - such as Sprite. May hold AM dose until after procedure when she is eating again.    If she will remain on Novolin N going forward, her doses should be rearranged to 38 units twice daily

## 2017-04-10 NOTE — TELEPHONE ENCOUNTER
Pt ran out of Levemir, picked up OTC Novolin to replace it until the mail order comes in. Pt wants to know what dose to take of the Novolin. Please advise. Pt also taking Novolog based off correction scale.

## 2017-04-11 ENCOUNTER — ANESTHESIA EVENT (OUTPATIENT)
Dept: ENDOSCOPY | Facility: HOSPITAL | Age: 72
End: 2017-04-11
Payer: MEDICARE

## 2017-04-11 ENCOUNTER — SURGERY (OUTPATIENT)
Age: 72
End: 2017-04-11

## 2017-04-11 ENCOUNTER — HOSPITAL ENCOUNTER (OUTPATIENT)
Facility: HOSPITAL | Age: 72
Discharge: HOME | End: 2017-04-11
Attending: INTERNAL MEDICINE | Admitting: INTERNAL MEDICINE
Payer: MEDICARE

## 2017-04-11 ENCOUNTER — ANESTHESIA (OUTPATIENT)
Dept: ENDOSCOPY | Facility: HOSPITAL | Age: 72
End: 2017-04-11
Payer: MEDICARE

## 2017-04-11 VITALS — RESPIRATION RATE: 27 BRPM

## 2017-04-11 DIAGNOSIS — K21.9 GERD (GASTROESOPHAGEAL REFLUX DISEASE): ICD-10-CM

## 2017-04-11 DIAGNOSIS — K29.70 GASTRITIS, PRESENCE OF BLEEDING UNSPECIFIED, UNSPECIFIED CHRONICITY, UNSPECIFIED GASTRITIS TYPE: ICD-10-CM

## 2017-04-11 DIAGNOSIS — K44.9 HIATAL HERNIA: Primary | ICD-10-CM

## 2017-04-11 LAB
POCT GLUCOSE: 162 MG/DL (ref 70–110)
POCT GLUCOSE: 184 MG/DL (ref 70–110)

## 2017-04-11 PROCEDURE — 63600175 PHARM REV CODE 636 W HCPCS: Performed by: NURSE ANESTHETIST, CERTIFIED REGISTERED

## 2017-04-11 PROCEDURE — 43239 EGD BIOPSY SINGLE/MULTIPLE: CPT | Mod: ,,, | Performed by: INTERNAL MEDICINE

## 2017-04-11 PROCEDURE — 37000008 HC ANESTHESIA 1ST 15 MINUTES: Performed by: INTERNAL MEDICINE

## 2017-04-11 PROCEDURE — 25000003 PHARM REV CODE 250: Performed by: NURSE ANESTHETIST, CERTIFIED REGISTERED

## 2017-04-11 PROCEDURE — 27201012 HC FORCEPS, HOT/COLD, DISP: Performed by: INTERNAL MEDICINE

## 2017-04-11 PROCEDURE — D9220A PRA ANESTHESIA: Mod: CRNA,,, | Performed by: NURSE ANESTHETIST, CERTIFIED REGISTERED

## 2017-04-11 PROCEDURE — D9220A PRA ANESTHESIA: Mod: ANES,,, | Performed by: ANESTHESIOLOGY

## 2017-04-11 PROCEDURE — 37000009 HC ANESTHESIA EA ADD 15 MINS: Performed by: INTERNAL MEDICINE

## 2017-04-11 PROCEDURE — 82962 GLUCOSE BLOOD TEST: CPT | Performed by: INTERNAL MEDICINE

## 2017-04-11 PROCEDURE — 88305 TISSUE EXAM BY PATHOLOGIST: CPT | Performed by: PATHOLOGY

## 2017-04-11 PROCEDURE — 25000003 PHARM REV CODE 250: Performed by: INTERNAL MEDICINE

## 2017-04-11 PROCEDURE — 43239 EGD BIOPSY SINGLE/MULTIPLE: CPT | Performed by: INTERNAL MEDICINE

## 2017-04-11 RX ORDER — LIDOCAINE HYDROCHLORIDE 20 MG/ML
INJECTION, SOLUTION EPIDURAL; INFILTRATION; INTRACAUDAL; PERINEURAL
Status: DISCONTINUED
Start: 2017-04-11 | End: 2017-04-11 | Stop reason: HOSPADM

## 2017-04-11 RX ORDER — SODIUM CHLORIDE 9 MG/ML
INJECTION, SOLUTION INTRAVENOUS CONTINUOUS
Status: DISCONTINUED | OUTPATIENT
Start: 2017-04-11 | End: 2017-04-11 | Stop reason: HOSPADM

## 2017-04-11 RX ORDER — PROPOFOL 10 MG/ML
INJECTION, EMULSION INTRAVENOUS
Status: DISCONTINUED
Start: 2017-04-11 | End: 2017-04-11 | Stop reason: HOSPADM

## 2017-04-11 RX ORDER — LIDOCAINE HCL/PF 100 MG/5ML
SYRINGE (ML) INTRAVENOUS
Status: DISCONTINUED | OUTPATIENT
Start: 2017-04-11 | End: 2017-04-11

## 2017-04-11 RX ORDER — PROPOFOL 10 MG/ML
VIAL (ML) INTRAVENOUS
Status: DISCONTINUED | OUTPATIENT
Start: 2017-04-11 | End: 2017-04-11

## 2017-04-11 RX ADMIN — SODIUM CHLORIDE: 0.9 INJECTION, SOLUTION INTRAVENOUS at 08:04

## 2017-04-11 RX ADMIN — PROPOFOL 30 MG: 10 INJECTION, EMULSION INTRAVENOUS at 08:04

## 2017-04-11 RX ADMIN — PROPOFOL 130 MG: 10 INJECTION, EMULSION INTRAVENOUS at 08:04

## 2017-04-11 RX ADMIN — LIDOCAINE HYDROCHLORIDE 100 MG: 20 INJECTION, SOLUTION INTRAVENOUS at 08:04

## 2017-04-11 NOTE — ANESTHESIA PREPROCEDURE EVALUATION
04/11/2017  Adele Hall is a 71 y.o., female.    [facility] Anesthesia Evaluation    I have reviewed the Patient Summary Reports.    I have reviewed the Nursing Notes.      Review of Systems  Anesthesia Hx:  No problems with previous Anesthesia    Social:  Former Smoker    Hematology/Oncology:  Hematology Normal   Oncology Normal     EENT/Dental:EENT/Dental Normal   Cardiovascular:   Hypertension, well controlled CAD  CABG/stent  Angina CHF    Pulmonary:   COPD Shortness of breath    Renal/:   Chronic Renal Disease    Hepatic/GI:   GERD, well controlled    Musculoskeletal:   Arthritis     Neurological:   Neuromuscular Disease,    Endocrine:   Diabetes Hypothyroidism    Dermatological:  Skin Normal    Psych:  Psychiatric Normal           Physical Exam  General:  Obesity    Airway/Jaw/Neck:  AIRWAY FINDINGS: Normal      Eyes/Ears/Nose:  EYES/EARS/NOSE FINDINGS: Normal   Dental:  DENTAL FINDINGS: Normal   Chest/Lungs:  Chest/Lungs Findings: Clear to auscultation, Normal Respiratory Rate     Heart/Vascular:  Heart Findings: Rate: Normal  Rhythm: Regular Rhythm  Sounds: Normal  Heart murmur: negative    Abdomen:  Abdomen Findings: Normal    Musculoskeletal:  Musculoskeletal Findings: Normal   Skin:  Skin Findings: Normal    Mental Status:  Mental Status Findings: Normal        Anesthesia Plan  Type of Anesthesia, risks & benefits discussed:  Anesthesia Type:  general  Patient's Preference:   Intra-op Monitoring Plan:   Intra-op Monitoring Plan Comments:   Post Op Pain Control Plan:   Post Op Pain Control Plan Comments:   Induction:   IV  Beta Blocker:  Patient is on a Beta-Blocker and has received one dose within the past 24 hours (No further documentation required).       Informed Consent: Patient understands risks and agrees with Anesthesia plan.  Questions answered. Anesthesia consent signed with  patient.  ASA Score: 3     Day of Surgery Review of History & Physical:    H&P update referred to the provider.         Ready For Surgery From Anesthesia Perspective.

## 2017-04-11 NOTE — ANESTHESIA POSTPROCEDURE EVALUATION
"Anesthesia Post Evaluation    Patient: Adele Hall    Procedure(s) Performed: Procedure(s) (LRB):  ESOPHAGOGASTRODUODENOSCOPY (EGD) (N/A)    Final Anesthesia Type: general  Patient location during evaluation: PACU  Patient participation: Yes- Able to Participate  Level of consciousness: awake and alert  Post-procedure vital signs: reviewed and stable  Pain management: adequate  Airway patency: patent  PONV status at discharge: No PONV  Anesthetic complications: no      Cardiovascular status: hemodynamically stable  Respiratory status: unassisted and room air  Hydration status: euvolemic  Follow-up not needed.        Visit Vitals    BP (!) 174/65    Pulse 81    Temp 36.9 °C (98.4 °F) (Oral)    Resp 18    Ht 5' 1.5" (1.562 m)    Wt 84.8 kg (187 lb)    SpO2 100%    Breastfeeding No    BMI 34.76 kg/m2       Pain/Antonio Score: Pain Assessment Performed: Yes (4/11/2017  9:25 AM)  Presence of Pain: complains of pain/discomfort (4/11/2017  9:25 AM)  Antonio Score: 10 (4/11/2017  9:10 AM)      "

## 2017-04-11 NOTE — TRANSFER OF CARE
"Anesthesia Transfer of Care Note    Patient: Adele Hall    Procedure(s) Performed: Procedure(s) (LRB):  ESOPHAGOGASTRODUODENOSCOPY (EGD) (N/A)    Patient location: PACU    Anesthesia Type: general    Transport from OR: Transported from OR on room air with adequate spontaneous ventilation    Post pain: adequate analgesia    Post assessment: no apparent anesthetic complications and tolerated procedure well    Post vital signs: stable    Level of consciousness: awake    Nausea/Vomiting: no nausea/vomiting    Complications: none          Last vitals:   Visit Vitals    BP (!) 170/79    Pulse 75    Temp 36.9 °C (98.4 °F) (Oral)    Resp (!) 22    Ht 5' 1.5" (1.562 m)    Wt 84.8 kg (187 lb)    SpO2 100%    Breastfeeding No    BMI 34.76 kg/m2     "

## 2017-04-11 NOTE — IP AVS SNAPSHOT
[address] [place] [address]  Phone: [phone]           Patient Discharge Instructions   Our goal is to set you up for success. This packet includes information on your condition, medications, and your home care.  It will help you care for yourself to prevent having to return to the hospital.     Please ask your nurse if you have any questions.      There are many details to remember when preparing to leave the hospital. Here is what you will need to do:    1. Take your medicine. If you are prescribed medications, review your Medication List on the following pages. You may have new medications to  at the pharmacy and others that you'll need to stop taking. Review the instructions for how and when to take your medications. Talk with your doctor or nurses if you are unsure of what to do.     2. Go to your follow-up appointments. Specific follow-up information is listed in the following pages. Your may be contacted by a nurse or clinical provider about future appointments. Be sure we have all of the phone numbers to reach you. Please contact your provider's office if you are unable to make an appointment.     3. Watch for warning signs. Your doctor or nurse will give you detailed warning signs to watch for and when to call for assistance. These instructions may also include educational information about your condition. If you experience any of warning signs to your health, call your doctor.           [facility]  Unless otherwise directed by your provider, please   contact [facility], our nurse care line   that is available for 24/7 assistance.     [phone] (toll-free)     Registered nurses in the [facility]   provide: appointment scheduling, clinical advisement, health education, and other advisory services.                  ** Verify the list of medication(s) below is accurate and up to date. Carry this with you in case of  emergency. If your medications have changed, please notify your healthcare provider.             Medication List      CHANGE how you take these medications        Additional Info                      insulin aspart 100 unit/mL Inpn pen   Commonly known as:  NovoLOG   Quantity:  4 Box   Refills:  3   Dose:  32 Units   What changed:  how much to take    Instructions:  Inject 32 Units into the skin 3 (three) times daily with meals.     Begin Date    AM    Noon    PM    Bedtime         CONTINUE taking these medications        Additional Info                      amlodipine 2.5 MG tablet   Commonly known as:  NORVASC   Quantity:  30 tablet   Refills:  11   Dose:  2.5 mg    Instructions:  Take 1 tablet (2.5 mg total) by mouth once daily.     Begin Date    AM    Noon    PM    Bedtime       aspirin 81 MG EC tablet   Commonly known as:  ECOTRIN   Refills:  0   Dose:  81 mg    Instructions:  Take 81 mg by mouth once daily.     Begin Date    AM    Noon    PM    Bedtime       BD INSULIN SYRINGE ULTRA-FINE 1 mL 31 gauge x 5/16 Syrg   Quantity:  200 each   Refills:  11   Generic drug:  insulin syringe-needle U-100    Instructions:  Uses 5 syringes per day with Levemir and Novolog     Begin Date    AM    Noon    PM    Bedtime       blood sugar diagnostic Strp   Quantity:  400 strip   Refills:  3    Instructions:  Check blood glucose 4 times a day - before meals and at bedtime. Please dispense testing supplies for true metrix. Dx code e11.65     Begin Date    AM    Noon    PM    Bedtime       blood-glucose meter kit   Quantity:  1 each   Refills:  0    Instructions:  Use as instructed. Please dispense testing supplies for true metrix. Dx code e11.65     Begin Date    AM    Noon    PM    Bedtime       fenofibrate micronized 134 MG Cap   Commonly known as:  LOFIBRA   Quantity:  30 capsule   Refills:  10    Instructions:  TAKE ONE CAPSULE BY MOUTH EVERYDAY WITH BREAKFAST     Begin Date    AM    Noon    PM    Bedtime       insulin  "detemir 100 unit/mL injection   Commonly known as:  LEVEMIR   Refills:  0   Dose:  42 Units    Instructions:  Inject 42 Units into the skin 2 (two) times daily.     Begin Date    AM    Noon    PM    Bedtime       lancets Misc   Quantity:  400 each   Refills:  3    Instructions:  Check blood glucose 4x/day. Please dispense testing supplies for true metrix. Dx code e11.65     Begin Date    AM    Noon    PM    Bedtime       levothyroxine 100 MCG tablet   Commonly known as:  SYNTHROID   Quantity:  30 tablet   Refills:  6   Dose:  100 mcg    Instructions:  Take 1 tablet (100 mcg total) by mouth once daily.     Begin Date    AM    Noon    PM    Bedtime       liraglutide 0.6 mg/0.1 mL (18 mg/3 mL) subq PNIJ 0.6 mg/0.1 mL (18 mg/3 mL) Pnij   Commonly known as:  VICTOZA 3-MICH   Quantity:  9 mL   Refills:  11   Dose:  1.8 mg    Instructions:  Inject 1.8 mg into the skin once daily.     Begin Date    AM    Noon    PM    Bedtime       lisinopril 10 MG tablet   Quantity:  30 tablet   Refills:  11   Dose:  10 mg    Instructions:  Take 1 tablet (10 mg total) by mouth once daily.     Begin Date    AM    Noon    PM    Bedtime       metoprolol tartrate 25 MG tablet   Commonly known as:  LOPRESSOR   Quantity:  30 tablet   Refills:  11   Dose:  12.5 mg    Instructions:  Take 0.5 tablets (12.5 mg total) by mouth 2 (two) times daily.     Begin Date    AM    Noon    PM    Bedtime       pantoprazole 40 MG tablet   Commonly known as:  PROTONIX   Quantity:  30 tablet   Refills:  11   Dose:  40 mg    Instructions:  Take 1 tablet (40 mg total) by mouth once daily.     Begin Date    AM    Noon    PM    Bedtime       pen needle, diabetic 32 gauge x 1/6" Ndle   Commonly known as:  NOVOFINE PLUS   Quantity:  600 each   Refills:  3   Dose:  1 Device    Instructions:  1 Device by Misc.(Non-Drug; Combo Route) route 6 (six) times daily.     Begin Date    AM    Noon    PM    Bedtime       pramipexole 0.125 MG tablet   Commonly known as:  MIRAPEX "   Refills:  0   Dose:  0.125 mg    Instructions:  Take 0.125 mg by mouth nightly as needed (MAY TAKE UP TO 0.25 MG--patient taking 2 tablets nightly).     Begin Date    AM    Noon    PM    Bedtime       rosuvastatin 40 MG Tab   Commonly known as:  CRESTOR   Quantity:  30 tablet   Refills:  11   Dose:  40 mg    Instructions:  Take 1 tablet (40 mg total) by mouth every evening.     Begin Date    AM    Noon    PM    Bedtime       VITAMIN D2 50,000 unit Cap   Quantity:  4 capsule   Refills:  11   Generic drug:  ergocalciferol    Instructions:  TAKE ONE CAPSULE BY MOUTH EVERY SEVEN DAYS     Begin Date    AM    Noon    PM    Bedtime         ASK your doctor about these medications        Additional Info                      gabapentin 300 MG capsule   Commonly known as:  NEURONTIN   Quantity:  90 capsule   Refills:  11    Instructions:  Take 1 at bedtime for 1 week, then 2 a day for 1 week, then 3 a day     Begin Date    AM    Noon    PM    Bedtime                  Please bring to all follow up appointments:    1. A copy of your discharge instructions.  2. All medicines you are currently taking in their original bottles.  3. Identification and insurance card.    Please arrive 15 minutes ahead of scheduled appointment time.    Please call 24 hours in advance if you must reschedule your appointment and/or time.        Your Scheduled Appointments     May 01, 2017  9:00 AM CDT   New Patient with MD Nghia Rosarioll [facility] - Ophthalmology ([facility] )    [address] [place] [address] Drive [address]  [place] [address]   [phone]            Jun 02, 2017 10:00 AM CDT   Non-Fasting Lab [facility] ([facility] [place])    [address]  [place] [address]   [phone]            Jun 02, 2017 10:15 AM CDT   Urine [facility] ([facility] [place])    [address] [place] [address]   [phone]            Jun 09,  "2017 10:20 AM CDT   Established Patient Visit with Henrique Montero MD   [place] - Nephrology ([facility] [place])    [address] [facility] [address] [place] [address] [phone]            Jun 21, 2017 10:30 AM CDT   Non-Fasting Lab with [facility] ([facility] [place])    [address]  [place] [address] [phone]              Follow-up Information     Follow up with Seth Yoder MD.    Specialty:  Gastroenterology    Why:  Follow up to be determined after biopsies resulted    Contact information:    [address] [facility]  [address]  [place] [address]  [phone]          Discharge Instructions     Future Orders    Activity as tolerated     Diet general     Questions:    Total calories:      Fat restriction, if any:      Protein restriction, if any:      Na restriction, if any:      Fluid restriction:      Additional restrictions:          Admission Information     Date & Time Provider Department CSN    4/11/2017  6:58 AM Seth Yoder MD [facility] [id number]      Care Providers     Provider Role Specialty Primary office phone    Seth Yoder MD Attending Provider Gastroenterology [phone]    Seth Yoder MD Surgeon  Gastroenterology [phone]      Your Vitals Were     BP Pulse Temp Resp Height Weight    148/77 81 98.4 °F (36.9 °C) (Oral) 18 5' 1.5" (1.562 m) 84.8 kg (187 lb)    SpO2 BMI             100% 34.76 kg/m2         Recent Lab Values        12/28/2015 4/4/2016 6/24/2016 7/30/2016 8/2/2016 11/28/2016 12/14/2016 3/14/2017      8:29 AM  8:18 AM 12:00 PM  5:55 PM  5:50 AM 10:22 AM  9:40 AM 11:17 AM    A1C 7.2 (H) 6.3 (H) 8.5 (H) 6.7 (H) 6.9 (H) 7.0 (H) 6.7 (H) 6.8 (H)    Comment for A1C at  5:55 PM on 7/30/2016:  Reference Interval:  5.0 - 5.6 Normal   5.7 - 6.4 High Risk   > 6.5 Diabetic     Hgb A1c results are standardized based on the (NGSP) National   Glycohemoglobin Standardization Program.    Hemoglobin A1C levels are related " to mean serum/plasma glucose   during the preceding 2-3 months.          Comment for A1C at  5:50 AM on 8/2/2016:  Reference Interval:  5.0 - 5.6 Normal   5.7 - 6.4 High Risk   > 6.5 Diabetic     Hgb A1c results are standardized based on the (NGSP) National   Glycohemoglobin Standardization Program.    Hemoglobin A1C levels are related to mean serum/plasma glucose   during the preceding 2-3 months.          Comment for A1C at 10:22 AM on 11/28/2016:  According to ADA guidelines, hemoglobin A1C <7.0% represents  optimal control in non-pregnant diabetic patients.  Different  metrics may apply to specific populations.   Standards of Medical Care in Diabetes - 2016.  For the purpose of screening for the presence of diabetes:  <5.7%     Consistent with the absence of diabetes  5.7-6.4%  Consistent with increasing risk for diabetes   (prediabetes)  >or=6.5%  Consistent with diabetes  Currently no consensus exists for use of hemoglobin A1C  for diagnosis of diabetes for children.      Comment for A1C at  9:40 AM on 12/14/2016:  According to ADA guidelines, hemoglobin A1C <7.0% represents  optimal control in non-pregnant diabetic patients.  Different  metrics may apply to specific populations.   Standards of Medical Care in Diabetes - 2016.  For the purpose of screening for the presence of diabetes:  <5.7%     Consistent with the absence of diabetes  5.7-6.4%  Consistent with increasing risk for diabetes   (prediabetes)  >or=6.5%  Consistent with diabetes  Currently no consensus exists for use of hemoglobin A1C  for diagnosis of diabetes for children.      Comment for A1C at 11:17 AM on 3/14/2017:  According to ADA guidelines, hemoglobin A1C <7.0% represents  optimal control in non-pregnant diabetic patients.  Different  metrics may apply to specific populations.   Standards of Medical Care in Diabetes - 2016.  For the purpose of screening for the presence of diabetes:  <5.7%     Consistent with the absence of  diabetes  5.7-6.4%  Consistent with increasing risk for diabetes   (prediabetes)  >or=6.5%  Consistent with diabetes  Currently no consensus exists for use of hemoglobin A1C  for diagnosis of diabetes for children.        Allergies as of 4/11/2017        Reactions    Reglan [Metoclopramide Hcl]     Depression and weight loss.       Advance Directives     An advance directive is a document which, in the event you are no longer able to make decisions for yourself, tells your healthcare team what kind of treatment you do or do not want to receive, or who you would like to make those decisions for you.  If you do not currently have an advance directive, [facility] encourages you to create one.  For more information call:  [phone]), [phone] ([phone]),  or log on to [email or web address].        Smoking Cessation     If you would like to quit smoking:   You may be eligible for free services if you are a [place] resident and started smoking cigarettes before September 1, 1988.  Call the Smoking Cessation Trust (SCT) toll free at (111) 232-7393 or (227) 536-6869.   Call 9-127-QUIT-NOW if you do not meet the above criteria.   Contact us via email: [email or web address]   View our website for more information: [email or web address]        Language Assistance Services     ATTENTION: Language assistance services are available, free of charge. Please call [phone].      ATENCIÓN: Si habla español, tiene a sánchez disposición servicios gratuitos de asistencia lingüística. Llame al [phone].     CHÚ Ý: N?u b?n nói Ti?ng Vi?t, có các d?ch v? h? tr? ngôn ng? mi?n phí dành cho b?n. G?i s? [phone].        Chronic Kindey Disease Education             Diabetes Discharge Instructions                                    [facility] complies with applicable Federal civil rights laws and does not discriminate on the basis of race, color, national origin, age,  disability, or sex.

## 2017-04-12 ENCOUNTER — TELEPHONE (OUTPATIENT)
Dept: ENDOCRINOLOGY | Facility: CLINIC | Age: 72
End: 2017-04-12

## 2017-04-12 VITALS
TEMPERATURE: 98 F | OXYGEN SATURATION: 100 % | HEIGHT: 62 IN | SYSTOLIC BLOOD PRESSURE: 174 MMHG | RESPIRATION RATE: 18 BRPM | HEART RATE: 81 BPM | WEIGHT: 187 LBS | DIASTOLIC BLOOD PRESSURE: 65 MMHG | BODY MASS INDEX: 34.41 KG/M2

## 2017-04-18 ENCOUNTER — TELEPHONE (OUTPATIENT)
Dept: GASTROENTEROLOGY | Facility: CLINIC | Age: 72
End: 2017-04-18

## 2017-04-18 NOTE — TELEPHONE ENCOUNTER
Patient notified and understands will call with results when resulted and reviewed by Dr. Yoder. Patient states she is still having stomach problems instructed Dr. Yoder out of the office this week offered NP in [facility] states she willl check with pcp.

## 2017-04-18 NOTE — TELEPHONE ENCOUNTER
----- Message from Guerline Mckenzie sent at 4/18/2017 11:28 AM CDT -----  Patient is calling for results of her procedure . Please call patient back at  [phone].                        Thank you.

## 2017-04-24 ENCOUNTER — TELEPHONE (OUTPATIENT)
Dept: GASTROENTEROLOGY | Facility: CLINIC | Age: 72
End: 2017-04-24

## 2017-05-01 ENCOUNTER — OFFICE VISIT (OUTPATIENT)
Dept: OPHTHALMOLOGY | Facility: CLINIC | Age: 72
End: 2017-05-01
Payer: MEDICARE

## 2017-05-01 DIAGNOSIS — E11.9 DIABETES MELLITUS TYPE 2 WITHOUT RETINOPATHY: Primary | ICD-10-CM

## 2017-05-01 DIAGNOSIS — H35.363 RETINAL DRUSEN, BILATERAL: ICD-10-CM

## 2017-05-01 DIAGNOSIS — Q14.1 CONGENITAL HYPERTROPHY OF RETINAL PIGMENT EPITHELIUM: ICD-10-CM

## 2017-05-01 DIAGNOSIS — H52.7 REFRACTIVE ERROR: ICD-10-CM

## 2017-05-01 DIAGNOSIS — H25.13 NUCLEAR SCLEROSIS, BILATERAL: ICD-10-CM

## 2017-05-01 DIAGNOSIS — H10.13 ALLERGIC CONJUNCTIVITIS, BILATERAL: ICD-10-CM

## 2017-05-01 PROCEDURE — 92015 DETERMINE REFRACTIVE STATE: CPT | Mod: S$GLB,,, | Performed by: OPHTHALMOLOGY

## 2017-05-01 PROCEDURE — 99999 PR PBB SHADOW E&M-EST. PATIENT-LVL I: CPT | Mod: PBBFAC,,, | Performed by: OPHTHALMOLOGY

## 2017-05-01 PROCEDURE — 92004 COMPRE OPH EXAM NEW PT 1/>: CPT | Mod: S$GLB,,, | Performed by: OPHTHALMOLOGY

## 2017-05-01 NOTE — PROGRESS NOTES
HPI     Here for Diabetic exam, DM 12 years IDDM 4 years, BG controlled per pt.   c/o ou itch and murtaza across them 4-5 months all the time, no discharge eyes   tears alot. Denies eye pain or previous eye surgery does not use any eye   gtts currently.  No known family h/o eye disease.    Hemoglobin A1C       Date                     Value               Ref Range             Status                03/14/2017               6.8 (H)             4.5 - 6.2 %           Final                 12/14/2016               6.7 (H)             4.5 - 6.2 %           Final                 11/28/2016               7.0 (H)             4.5 - 6.2 %           Final                  Agree with above. Not aware of having allergies. Film over eyes she's   always trying to get off.        Last edited by Cintia Rodriguez MD on 5/1/2017 11:14 AM.     ROS     Positive for: Neurological (neuropathy), Genitourinary (nephropathy),   Endocrine (DM diagnosed around 2005, insulin since 2014; hypothyroid),   Cardiovascular (HTN - controlled with meds per pt; A-fib), Heme/Lymph   (ASA)    Negative for: Eyes (denies surgery/laser/trauma), Respiratory (denies   asthma/SOB)    Last edited by Cintia Rodriguez MD on 5/1/2017 11:22 AM.   (History)        Assessment /Plan     For exam results, see Encounter Report.    Diabetes mellitus type 2 without retinopathy    Allergic conjunctivitis, bilateral    Nuclear sclerosis, bilateral    Retinal drusen, bilateral    Congenital hypertrophy of retinal pigment epithelium    Refractive error            Diabetes mellitus type 2 without retinopathy - recommend annual DFE    Allergic conjunctivitis, bilateral - Allergies - Zaditor is an over-the-counter allergy drop that used to be prescription only. Use 2x per day, if allergy symptoms do not improve, call or return to clinic for trial of prescription allergy drops. Also AT's prn    Nuclear sclerosis, bilateral - Not visually significant.   Observe.    Retinal drusen, bilateral - observe    CHRPE OS - about 1 DD in size. Observe.    Refractive error - MRx given

## 2017-05-01 NOTE — LETTER
May 1, 2017      ISABEL Guerrero,FNP-C  [address]  [facility] [id number] [place] [address] Drive [address]  [facility] [address]  Phone: [phone]          Patient: Adele Hall   MR Number: [id number]   YOB: 1945   Date of Visit: 5/1/2017       Dear Babs Araya:    Thank you for referring Adele Hall to me for evaluation. Attached you will find relevant portions of my assessment and plan of care.    If you have questions, please do not hesitate to call me. I look forward to following Adele Hall along with you.    Sincerely,    Cintia Rodriguez MD    Enclosure  CC:  No Recipients    If you would like to receive this communication electronically, please contact [email or web address] or [phone] to request more information on [facility] access.    For providers and/or their staff who would like to refer a patient to [facility], please contact us through our one-stop-shop provider referral line, [facility], at [phone].    If you feel you have received this communication in error or would no longer like to receive these types of communications, please e-mail [email or web address]

## 2017-05-01 NOTE — MR AVS SNAPSHOT
[place] [facility] - Ophthalmology  [address] [place] [address] Drive [address]  [place] [address]  Phone: [phone]                  A[place] Hall   2017 9:00 AM   Office Visit    Description:  Female : 1945   Provider:  Cintia Rodriguez MD   Department:  [place]  - Ophthalmology           Reason for Visit     Diabetic Eye Exam           Diagnoses this Visit        Comments    Diabetes mellitus type 2 without retinopathy    -  Primary     Allergic conjunctivitis, bilateral         Nuclear sclerosis, bilateral         Retinal drusen, bilateral         Congenital hypertrophy of retinal pigment epithelium         Refractive error                To Do List           Future Appointments        Provider Department Dept Phone    2017 10:00 AM LAB, [facility] [phone]    2017 10:15 AM LAB, [facility] [phone]    2017 10:20 AM Henrique Montero MD [place] - Nephrology [phone]    2017 [facility] [phone]    2017 9:30 AM ISABEL Guerrero,FNP-C [place] - Endocrinology [phone]      Goals (5 Years of Data)     None      Follow-Up and Disposition     Return in about 1 year (around 2018) for diabetic eye exam - optometry ok.      [facility]     [facility] - 24/7 Assistance  Unless otherwise directed by your provider, please contact [facility], our nurse care line that is available for 24/7 assistance.     Registered nurses in the [facility] provide: appointment scheduling, clinical advisement, health education, and other advisory services.  Call: [phone] (toll free)               Medications           Message regarding Medications     Verify the changes and/or additions to your medication regime listed below are the same as discussed with your clinician today.  If any of these changes or additions are  incorrect, please notify your healthcare provider.             Verify that the below list of medications is an accurate representation of the medications you are currently taking.  If none reported, the list may be blank. If incorrect, please contact your healthcare provider. Carry this list with you in case of emergency.           Current Medications     amlodipine (NORVASC) 2.5 MG tablet Take 1 tablet (2.5 mg total) by mouth once daily.    aspirin (ECOTRIN) 81 MG EC tablet Take 81 mg by mouth once daily.    BD INSULIN SYRINGE ULTRA-FINE 1 mL 31 gauge x 5/16 Syrg Uses 5 syringes per day with Levemir and Novolog    blood sugar diagnostic Strp Check blood glucose 4 times a day - before meals and at bedtime. Please dispense testing supplies for true metrix. Dx code e11.65    blood-glucose meter kit Use as instructed. Please dispense testing supplies for true metrix. Dx code e11.65    fenofibrate micronized (LOFIBRA) 134 MG Cap TAKE ONE CAPSULE BY MOUTH EVERYDAY WITH BREAKFAST    gabapentin (NEURONTIN) 300 MG capsule Take 1 at bedtime for 1 week, then 2 a day for 1 week, then 3 a day    insulin aspart (NOVOLOG) 100 unit/mL InPn pen Inject 32 Units into the skin 3 (three) times daily with meals.    insulin detemir (LEVEMIR) 100 unit/mL injection Inject 42 Units into the skin 2 (two) times daily.    lancets Misc Check blood glucose 4x/day. Please dispense testing supplies for true metrix. Dx code e11.65    levothyroxine (SYNTHROID) 100 MCG tablet Take 1 tablet (100 mcg total) by mouth once daily.    liraglutide 0.6 mg/0.1 mL, 18 mg/3 mL, subq PNIJ (VICTOZA 3-MICH) 0.6 mg/0.1 mL (18 mg/3 mL) PnIj Inject 1.8 mg into the skin once daily.    lisinopril 10 MG tablet Take 1 tablet (10 mg total) by mouth once daily.    metoprolol tartrate (LOPRESSOR) 25 MG tablet Take 0.5 tablets (12.5 mg total) by mouth 2 (two) times daily.    pantoprazole (PROTONIX) 40 MG tablet Take 1 tablet (40 mg total) by mouth once daily.    pen needle,  "diabetic (NOVOFINE PLUS) 32 gauge x 1/6" Ndle 1 Device by Misc.(Non-Drug; Combo Route) route 6 (six) times daily.    pramipexole (MIRAPEX) 0.125 MG tablet Take 0.125 mg by mouth nightly as needed (MAY TAKE UP TO 0.25 MG--patient taking 2 tablets nightly).     rosuvastatin (CRESTOR) 40 MG Tab Take 1 tablet (40 mg total) by mouth every evening.    VITAMIN D2 50,000 unit capsule TAKE ONE CAPSULE BY MOUTH EVERY SEVEN DAYS           Clinical Reference Information           Allergies as of 5/1/2017     Reglan [Metoclopramide Hcl]      Immunizations Administered on Date of Encounter - 5/1/2017     None      Language Assistance Services     ATTENTION: Language assistance services are available, free of charge. Please call [phone].      ATENCIÓN: Si milan lynn, tiene a sánchez disposición servicios gratuitos de asistencia lingüística. Llame al [phone].     ZAN Ý: N?u b?n nói Ti?ng Vi?t, có các d?ch v? h? tr? ngôn ng? mi?n phí dành cho b?n. G?i s? [phone].         [place] [facility] - Ophthalmology complies with applicable Federal civil rights laws and does not discriminate on the basis of race, color, national origin, age, disability, or sex.        "

## 2017-05-10 ENCOUNTER — TELEPHONE (OUTPATIENT)
Dept: OPHTHALMOLOGY | Facility: CLINIC | Age: 72
End: 2017-05-10

## 2017-05-10 NOTE — TELEPHONE ENCOUNTER
----- Message from Vivian Carter sent at 5/9/2017  4:03 PM CDT -----  Contact: self  Misplaced her lens prescription,would like to know what it is.  Please call back at [phone] (home)

## 2017-05-15 ENCOUNTER — TELEPHONE (OUTPATIENT)
Dept: FAMILY MEDICINE | Facility: CLINIC | Age: 72
End: 2017-05-15

## 2017-05-15 RX ORDER — MECLIZINE HYDROCHLORIDE 25 MG/1
25 TABLET ORAL 3 TIMES DAILY PRN
Qty: 20 TABLET | Refills: 0 | Status: SHIPPED | OUTPATIENT
Start: 2017-05-15

## 2017-05-15 NOTE — TELEPHONE ENCOUNTER
Patient says she has history of vertigo- has not been seen for it here- was at other physician in [place]-asking for Meclizine 25mg- has nausea without vomiting and dizziness when she moves head- cannot lay flat on her bed looking straight up-has to turn head to side.

## 2017-05-15 NOTE — TELEPHONE ENCOUNTER
----- Message from Alida Ramos sent at 5/15/2017 10:02 AM CDT -----  Patient states that she is experiencing vertigo & is requesting a Rx please call [phone]

## 2017-05-22 ENCOUNTER — OFFICE VISIT (OUTPATIENT)
Dept: FAMILY MEDICINE | Facility: CLINIC | Age: 72
End: 2017-05-22
Payer: MEDICARE

## 2017-05-22 VITALS
DIASTOLIC BLOOD PRESSURE: 61 MMHG | WEIGHT: 187.63 LBS | BODY MASS INDEX: 34.53 KG/M2 | HEIGHT: 62 IN | RESPIRATION RATE: 28 BRPM | HEART RATE: 65 BPM | OXYGEN SATURATION: 97 % | TEMPERATURE: 99 F | SYSTOLIC BLOOD PRESSURE: 128 MMHG

## 2017-05-22 DIAGNOSIS — I20.9 ANGINA, CLASS III: ICD-10-CM

## 2017-05-22 DIAGNOSIS — J20.9 ACUTE BRONCHITIS, UNSPECIFIED ORGANISM: Primary | ICD-10-CM

## 2017-05-22 DIAGNOSIS — I48.0 PAF (PAROXYSMAL ATRIAL FIBRILLATION): ICD-10-CM

## 2017-05-22 PROCEDURE — 1160F RVW MEDS BY RX/DR IN RCRD: CPT | Mod: S$GLB,,, | Performed by: FAMILY MEDICINE

## 2017-05-22 PROCEDURE — 3078F DIAST BP <80 MM HG: CPT | Mod: S$GLB,,, | Performed by: FAMILY MEDICINE

## 2017-05-22 PROCEDURE — 99499 UNLISTED E&M SERVICE: CPT | Mod: S$PBB,,, | Performed by: FAMILY MEDICINE

## 2017-05-22 PROCEDURE — 1157F ADVNC CARE PLAN IN RCRD: CPT | Mod: S$GLB,,, | Performed by: FAMILY MEDICINE

## 2017-05-22 PROCEDURE — 1125F AMNT PAIN NOTED PAIN PRSNT: CPT | Mod: S$GLB,,, | Performed by: FAMILY MEDICINE

## 2017-05-22 PROCEDURE — 99214 OFFICE O/P EST MOD 30 MIN: CPT | Mod: S$GLB,,, | Performed by: FAMILY MEDICINE

## 2017-05-22 PROCEDURE — 99999 PR PBB SHADOW E&M-EST. PATIENT-LVL III: CPT | Mod: PBBFAC,,, | Performed by: FAMILY MEDICINE

## 2017-05-22 PROCEDURE — 1159F MED LIST DOCD IN RCRD: CPT | Mod: S$GLB,,, | Performed by: FAMILY MEDICINE

## 2017-05-22 PROCEDURE — 3074F SYST BP LT 130 MM HG: CPT | Mod: S$GLB,,, | Performed by: FAMILY MEDICINE

## 2017-05-22 RX ORDER — PROMETHAZINE HYDROCHLORIDE AND CODEINE PHOSPHATE 6.25; 1 MG/5ML; MG/5ML
5 SOLUTION ORAL EVERY 4 HOURS PRN
Qty: 180 ML | Refills: 0 | Status: SHIPPED | OUTPATIENT
Start: 2017-05-22 | End: 2017-05-30

## 2017-05-22 RX ORDER — CETIRIZINE HYDROCHLORIDE 5 MG/1
5 TABLET ORAL DAILY
Qty: 30 TABLET | Refills: 3 | Status: SHIPPED | OUTPATIENT
Start: 2017-05-22 | End: 2017-05-30

## 2017-05-22 RX ORDER — ONDANSETRON HYDROCHLORIDE 8 MG/1
8 TABLET, FILM COATED ORAL EVERY 8 HOURS PRN
COMMUNITY
End: 2017-10-17 | Stop reason: SDUPTHER

## 2017-05-22 NOTE — PROGRESS NOTES
Subjective:       Patient ID: Adele Hall is a 71 y.o. female.    Chief Complaint: Cough (cough and congestion for 6 days, ribs hurt when coughs-started yesterday)    HPI     Cough  Pt has had symptoms for approx 6 days.   Productive of clear sputum.  She does not have chronic lung disease.   Associated pain to right back.   No fever/chills.   Associated sneezing, and runny nose.  Initially pt had sore throat which has improved.   She has not taken any medications for this.   Son has similar symptoms.     Review of Systems   Constitutional: Negative for chills and fever.   HENT: Positive for congestion and sore throat.    Eyes: Negative for visual disturbance.   Respiratory: Positive for cough.    Cardiovascular: Negative for chest pain and leg swelling.   Gastrointestinal: Positive for nausea. Negative for abdominal pain, blood in stool, constipation and diarrhea.   Genitourinary: Negative for difficulty urinating, dysuria and hematuria.   Musculoskeletal: Positive for arthralgias and back pain.   Neurological: Negative for dizziness and weakness.       Objective:      Physical Exam   Constitutional: She appears well-developed and well-nourished. No distress.   HENT:   Head: Normocephalic and atraumatic.   Mouth/Throat: Oropharynx is clear and moist. No oropharyngeal exudate.   Eyes: EOM are normal. Pupils are equal, round, and reactive to light.   Neck: Normal range of motion. Neck supple. No thyromegaly present.   Cardiovascular: Normal rate, regular rhythm, normal heart sounds and intact distal pulses.    Pulmonary/Chest: Effort normal and breath sounds normal. No respiratory distress. She has no wheezes.   Abdominal: Soft. Bowel sounds are normal. She exhibits no distension and no mass. There is no tenderness.   Musculoskeletal: She exhibits no edema.        Right foot: There is normal range of motion and no deformity.        Left foot: There is normal range of motion and no deformity.   Feet:    Right Foot:   Protective Sensation: 6 sites tested. 6 sites sensed.   Skin Integrity: Positive for warmth. Negative for ulcer or skin breakdown.   Left Foot:   Protective Sensation: 6 sites tested. 6 sites sensed.   Skin Integrity: Positive for warmth. Negative for ulcer or skin breakdown.   Lymphadenopathy:     She has no cervical adenopathy.   Neurological: She is alert.   Skin: Skin is warm. No rash noted. No erythema.   Psychiatric: She has a normal mood and affect. Her behavior is normal.   Vitals reviewed.      Assessment:       1. Acute bronchitis, unspecified organism    2. PAF (paroxysmal atrial fibrillation), post CABG    3. Angina, class III        Plan:       1. Acute bronchitis, unspecified organism  - promethazine-codeine 6.25-10 mg/5 ml (PHENERGAN WITH CODEINE) 6.25-10 mg/5 mL syrup; Take 5 mLs by mouth every 4 (four) hours as needed for Cough.  Dispense: 180 mL; Refill: 0  - cetirizine (ZYRTEC) 5 MG tablet; Take 1 tablet (5 mg total) by mouth once daily.  Dispense: 30 tablet; Refill: 3    2. PAF (paroxysmal atrial fibrillation), post CABG  Continue to f/u with Cardiology.     3. Angina, class III  Continue to f/u with Cardiology.     Portions of this note were created using Dragon voice recognition software. There may be voice recognition errors found in the text, and attempts were made to correct these errors prior to signature    Konrad Mckeon MD    Family Medicine  5/22/2017

## 2017-05-25 ENCOUNTER — TELEPHONE (OUTPATIENT)
Dept: FAMILY MEDICINE | Facility: CLINIC | Age: 72
End: 2017-05-25

## 2017-05-25 NOTE — TELEPHONE ENCOUNTER
----- Message from Chantelle East sent at 5/25/2017 10:18 AM CDT -----  Contact: self  Patient was seen on 5/22/17  She was told if not any better to come and an antibiotic will be sent out to her pharmacy   If any questions please call     Thanks

## 2017-05-26 NOTE — TELEPHONE ENCOUNTER
Patient was seen 5/22/17. Was told if she did not improve she was  to call and an antibiotic would be sent in for her. Patient is requesting antibiotic now.  Uses [facility] in [place].

## 2017-05-26 NOTE — TELEPHONE ENCOUNTER
According to Dr. Mckeon's notes she had no fever, clear sputum and a normal chest exam.  Viral bronchitis does not need antibiotics.

## 2017-05-29 ENCOUNTER — DOCUMENTATION ONLY (OUTPATIENT)
Dept: FAMILY MEDICINE | Facility: CLINIC | Age: 72
End: 2017-05-29

## 2017-05-29 DIAGNOSIS — J20.9 ACUTE BRONCHITIS, UNSPECIFIED ORGANISM: Primary | ICD-10-CM

## 2017-05-29 DIAGNOSIS — I10 GOOD HYPERTENSION CONTROL: ICD-10-CM

## 2017-05-29 NOTE — TELEPHONE ENCOUNTER
Coughing frequently, congested and short winded, drip is bad in her throat, mucus is clear, afebrile- patient says Dr. Mckeon told her if she was not any better to call for antibiotic.

## 2017-05-29 NOTE — TELEPHONE ENCOUNTER
Message   Received: Today   Message Contents   MD Kathya Rivers LPN   Caller: Unspecified (Today, 11:49 AM)             I agree with Dr. Cazares's assessment/plan with this patient. Maybe she didn't understand that I said to call back if symptoms worsen or if fever present. If she has had changes in symptoms, then she will require additional eval in order for abx to be prescribed.  I will place an order for CXR to evaluate for PNA.     Portions of this note were created using Dragon voice recognition software. There may be voice recognition errors found in the text, and attempts were made to correct these errors prior to signature     Konrad Mckeon MD     Family Medicine   5/29/2017

## 2017-05-29 NOTE — TELEPHONE ENCOUNTER
If she thinks she needs an antibiotic she will need to be rechecked.  (did she see reports linking precancerous colon polyps to excessive use of antibiotics?)

## 2017-05-29 NOTE — TELEPHONE ENCOUNTER
----- Message from Marzena Cazares sent at 5/29/2017 11:55 AM CDT -----  Patient returning a missed call to Kathya.     Call placed to pod. No answer     IM to Kathya, she will call patient back at [phone].     Thank you.

## 2017-05-30 ENCOUNTER — HOSPITAL ENCOUNTER (OUTPATIENT)
Dept: RADIOLOGY | Facility: CLINIC | Age: 72
Discharge: HOME | End: 2017-05-30
Attending: FAMILY MEDICINE
Payer: MEDICARE

## 2017-05-30 ENCOUNTER — OFFICE VISIT (OUTPATIENT)
Dept: FAMILY MEDICINE | Facility: CLINIC | Age: 72
End: 2017-05-30
Payer: MEDICARE

## 2017-05-30 VITALS
HEART RATE: 77 BPM | DIASTOLIC BLOOD PRESSURE: 72 MMHG | TEMPERATURE: 98 F | OXYGEN SATURATION: 96 % | RESPIRATION RATE: 24 BRPM | HEIGHT: 62 IN | BODY MASS INDEX: 34.16 KG/M2 | WEIGHT: 185.63 LBS | SYSTOLIC BLOOD PRESSURE: 121 MMHG

## 2017-05-30 DIAGNOSIS — R06.02 SHORTNESS OF BREATH: ICD-10-CM

## 2017-05-30 DIAGNOSIS — Z79.4 TYPE 2 DIABETES MELLITUS WITH DIABETIC POLYNEUROPATHY, WITH LONG-TERM CURRENT USE OF INSULIN: ICD-10-CM

## 2017-05-30 DIAGNOSIS — J06.9 UPPER RESPIRATORY TRACT INFECTION, UNSPECIFIED TYPE: ICD-10-CM

## 2017-05-30 DIAGNOSIS — I25.10 CORONARY ARTERY DISEASE INVOLVING NATIVE CORONARY ARTERY OF NATIVE HEART WITHOUT ANGINA PECTORIS: ICD-10-CM

## 2017-05-30 DIAGNOSIS — Z87.09 HISTORY OF ARDS: ICD-10-CM

## 2017-05-30 DIAGNOSIS — E03.9 HYPOTHYROIDISM, UNSPECIFIED TYPE: ICD-10-CM

## 2017-05-30 DIAGNOSIS — R06.02 SHORTNESS OF BREATH: Primary | ICD-10-CM

## 2017-05-30 DIAGNOSIS — N18.4 CKD (CHRONIC KIDNEY DISEASE) STAGE 4, GFR 15-29 ML/MIN: ICD-10-CM

## 2017-05-30 DIAGNOSIS — E11.42 TYPE 2 DIABETES MELLITUS WITH DIABETIC POLYNEUROPATHY, WITH LONG-TERM CURRENT USE OF INSULIN: ICD-10-CM

## 2017-05-30 PROCEDURE — 1159F MED LIST DOCD IN RCRD: CPT | Mod: S$GLB,,, | Performed by: FAMILY MEDICINE

## 2017-05-30 PROCEDURE — 1126F AMNT PAIN NOTED NONE PRSNT: CPT | Mod: S$GLB,,, | Performed by: FAMILY MEDICINE

## 2017-05-30 PROCEDURE — 71020 XR CHEST PA AND LATERAL: CPT | Mod: TC,PO

## 2017-05-30 PROCEDURE — 3044F HG A1C LEVEL LT 7.0%: CPT | Mod: S$GLB,,, | Performed by: FAMILY MEDICINE

## 2017-05-30 PROCEDURE — 3066F NEPHROPATHY DOC TX: CPT | Mod: S$GLB,,, | Performed by: FAMILY MEDICINE

## 2017-05-30 PROCEDURE — 71020 XR CHEST PA AND LATERAL: CPT | Mod: 26,,, | Performed by: RADIOLOGY

## 2017-05-30 PROCEDURE — 99499 UNLISTED E&M SERVICE: CPT | Mod: S$PBB,,, | Performed by: FAMILY MEDICINE

## 2017-05-30 PROCEDURE — 99999 PR PBB SHADOW E&M-EST. PATIENT-LVL III: CPT | Mod: PBBFAC,,, | Performed by: FAMILY MEDICINE

## 2017-05-30 PROCEDURE — 99214 OFFICE O/P EST MOD 30 MIN: CPT | Mod: S$GLB,,, | Performed by: FAMILY MEDICINE

## 2017-05-30 RX ORDER — CETIRIZINE HYDROCHLORIDE 5 MG/1
5 TABLET ORAL DAILY
COMMUNITY
End: 2017-06-28

## 2017-05-30 RX ORDER — METHYLPREDNISOLONE 4 MG/1
TABLET ORAL
Qty: 1 PACKAGE | Refills: 0 | Status: SHIPPED | OUTPATIENT
Start: 2017-05-30 | End: 2017-06-20

## 2017-05-30 NOTE — PATIENT INSTRUCTIONS

## 2017-05-30 NOTE — PROGRESS NOTES
"Subjective:       Patient ID: Adele Hall is a 71 y.o. female.    Chief Complaint: Bronchitis    71 year old female with 11 day history of head congestion "a head cold" now with dry, nonproductive cough.  She has a history of coronary artery disease with ARDS after bypass surgery and is chronically dyspneic.  She denies fever or chills and has no orthopnea.  She was noted to be hyperthyroid in march and had a reduction of her levothroid dose from 112 to 100mcg.  She has lab on Friday planned with her next diabetic lab June 21.    Past Medical History:  No date: Anesthesia complication      Comment: took patient 6 days to come out of anethesia                after CABG  No date: Anticoagulant long-term use  No date: Arthritis  No date: Chronic kidney disease  2/7/2016: COPD (chronic obstructive pulmonary disease)  No date: COPD (chronic obstructive pulmonary disease)  No date: Coronary artery disease  No date: Diabetes mellitus  No date: Diabetes mellitus, type 2  No date: GERD (gastroesophageal reflux disease)  No date: Hyperlipidemia  No date: Hypertension  7/30/2016: possible new onset CHF  No date: Tardive dyskinesia  No date: Thyroid disease  No date: Ulcer  No date: Vertigo    Past Surgical History:  No date: CARDIAC SURGERY  01/26/2017: CHOLECYSTECTOMY  No date: COLONOSCOPY  01/19/2016: CORONARY ARTERY BYPASS GRAFT      Comment: 4 vessel  No date: HYSTERECTOMY  No date: TONSILLECTOMY  No date: TOTAL THYROIDECTOMY  No date: TUBAL LIGATION      Current Outpatient Prescriptions:     amlodipine (NORVASC) 2.5 MG tablet, Take 1 tablet (2.5 mg total) by mouth once daily., Disp: 30 tablet, Rfl: 11    aspirin (ECOTRIN) 81 MG EC tablet, Take 81 mg by mouth once daily., Disp: , Rfl:     BD INSULIN SYRINGE ULTRA-FINE 1 mL 31 gauge x 5/16 Syrg, Uses 5 syringes per day with Levemir and Novolog, Disp: 200 each, Rfl: 11    blood sugar diagnostic Strp, Check blood glucose 4 times a day - before meals and at " "bedtime. Please dispense testing supplies for true metrix. Dx code e11.65, Disp: 400 strip, Rfl: 3    blood-glucose meter kit, Use as instructed. Please dispense testing supplies for true metrix. Dx code e11.65, Disp: 1 each, Rfl: 0    cetirizine (ZYRTEC) 5 MG tablet, Take 5 mg by mouth once daily., Disp: , Rfl:     fenofibrate micronized (LOFIBRA) 134 MG Cap, TAKE ONE CAPSULE BY MOUTH EVERYDAY WITH BREAKFAST, Disp: 30 capsule, Rfl: 10    insulin aspart (NOVOLOG) 100 unit/mL InPn pen, Inject 32 Units into the skin 3 (three) times daily with meals. (Patient taking differently: Inject 26 Units into the skin 3 (three) times daily with meals. ), Disp: 4 Box, Rfl: 3    insulin detemir (LEVEMIR) 100 unit/mL injection, Inject 42 Units into the skin 2 (two) times daily., Disp: , Rfl:     lancets Misc, Check blood glucose 4x/day. Please dispense testing supplies for true metrix. Dx code e11.65, Disp: 400 each, Rfl: 3    levothyroxine (SYNTHROID) 100 MCG tablet, Take 1 tablet (100 mcg total) by mouth once daily., Disp: 30 tablet, Rfl: 6    liraglutide 0.6 mg/0.1 mL, 18 mg/3 mL, subq PNIJ (VICTOZA 3-MICH) 0.6 mg/0.1 mL (18 mg/3 mL) PnIj, Inject 1.8 mg into the skin once daily., Disp: 9 mL, Rfl: 11    lisinopril 10 MG tablet, Take 1 tablet (10 mg total) by mouth once daily., Disp: 30 tablet, Rfl: 11    meclizine (ANTIVERT) 25 mg tablet, Take 1 tablet (25 mg total) by mouth 3 (three) times daily as needed., Disp: 20 tablet, Rfl: 0    metoprolol tartrate (LOPRESSOR) 25 MG tablet, Take 0.5 tablets (12.5 mg total) by mouth 2 (two) times daily., Disp: 30 tablet, Rfl: 11    ondansetron (ZOFRAN) 8 MG tablet, Take 8 mg by mouth every 8 (eight) hours as needed for Nausea., Disp: , Rfl:     pantoprazole (PROTONIX) 40 MG tablet, Take 1 tablet (40 mg total) by mouth once daily., Disp: 30 tablet, Rfl: 11    pen needle, diabetic (NOVOFINE PLUS) 32 gauge x 1/6" Ndle, 1 Device by Misc.(Non-Drug; Combo Route) route 6 (six) times " daily., Disp: 600 each, Rfl: 3    pramipexole (MIRAPEX) 0.125 MG tablet, Take 0.125 mg by mouth nightly as needed (MAY TAKE UP TO 0.25 MG--patient taking 2 tablets nightly). , Disp: , Rfl:     VITAMIN D2 50,000 unit capsule, TAKE ONE CAPSULE BY MOUTH EVERY SEVEN DAYS, Disp: 4 capsule, Rfl: 11    rosuvastatin (CRESTOR) 40 MG Tab, Take 1 tablet (40 mg total) by mouth every evening., Disp: 30 tablet, Rfl: 11        Review of Systems   Constitutional: Negative for activity change, appetite change, chills and fever.   HENT: Positive for congestion, postnasal drip, rhinorrhea and sore throat.    Respiratory: Positive for shortness of breath. Negative for chest tightness.    Cardiovascular: Negative for chest pain and palpitations.   Gastrointestinal: Positive for constipation (has ibs with alternating diarrhea and constipation), diarrhea and nausea. Negative for vomiting.       Objective:      Physical Exam   Constitutional: She appears well-developed. No distress.   HENT:   Head: Normocephalic.   Right Ear: External ear normal. Tympanic membrane is not bulging. Tympanic membrane mobility is normal.   Left Ear: External ear normal. Tympanic membrane is not bulging. Tympanic membrane mobility is normal.   Nose: Mucosal edema and rhinorrhea present.   Mouth/Throat: Oropharynx is clear and moist.   Eyes: EOM are normal. Pupils are equal, round, and reactive to light. No scleral icterus.   Neck: Normal range of motion. Neck supple. No tracheal deviation present. No thyromegaly present.   Cardiovascular: Normal rate, regular rhythm and normal heart sounds.  Exam reveals no gallop and no friction rub.    No murmur heard.  Normal sinus rhythm, no atrial fib   Pulmonary/Chest: Breath sounds normal. Accessory muscle usage present. Tachypnea noted. She is in respiratory distress. She has no decreased breath sounds. She has no wheezes. She has no rhonchi. She has no rales. Chest wall is not dull to percussion. She exhibits no  tenderness, no crepitus and no retraction.   Lymphadenopathy:     She has no cervical adenopathy.   Skin: She is not diaphoretic.       Assessment:       1. Shortness of breath    2. Hypothyroidism, unspecified type    3. History of ARDS, post CABG, 1/2016    4. Coronary artery disease involving native coronary artery of native heart without angina pectoris    5. CKD (chronic kidney disease) stage 4, GFR 15-29 ml/min    6. Type 2 diabetes mellitus with diabetic polyneuropathy, with long-term current use of insulin    7. Upper respiratory tract infection, unspecified type        Plan:       1. Shortness of breath  cxr report pending, appears unchanged back to February 2016 to my view.    - X-Ray Chest PA And Lateral; Future  - Brain natriuretic peptide; Future  - methylPREDNISolone (MEDROL DOSEPACK) 4 mg tablet; use as directed  Dispense: 1 Package; Refill: 0    2. Hypothyroidism, unspecified type  Will add to Friday labs 6/2/17  - TSH; Future    3. History of ARDS, post CABG, 1/2016    4. Coronary artery disease involving native coronary artery of native heart without angina pectoris    5. CKD (chronic kidney disease) stage 4, GFR 15-29 ml/min    6. Type 2 diabetes mellitus with diabetic polyneuropathy, with long-term current use of insulin    7. Upper respiratory tract infection, unspecified type  - methylPREDNISolone (MEDROL DOSEPACK) 4 mg tablet; use as directed  Dispense: 1 Package; Refill: 0    warned to push fluids, stay on diet and expect bump in glucose levels

## 2017-06-02 ENCOUNTER — LAB VISIT (OUTPATIENT)
Dept: LAB | Facility: HOSPITAL | Age: 72
End: 2017-06-02
Attending: INTERNAL MEDICINE
Payer: MEDICARE

## 2017-06-02 DIAGNOSIS — E03.9 HYPOTHYROIDISM, UNSPECIFIED TYPE: ICD-10-CM

## 2017-06-02 DIAGNOSIS — N18.4 CKD (CHRONIC KIDNEY DISEASE) STAGE 4, GFR 15-29 ML/MIN: ICD-10-CM

## 2017-06-02 DIAGNOSIS — R06.02 SHORTNESS OF BREATH: ICD-10-CM

## 2017-06-02 LAB
ALBUMIN SERPL BCP-MCNC: 3.7 G/DL
ANION GAP SERPL CALC-SCNC: 9 MMOL/L
BNP SERPL-MCNC: 93 PG/ML
BUN SERPL-MCNC: 33 MG/DL
CALCIUM SERPL-MCNC: 9.4 MG/DL
CHLORIDE SERPL-SCNC: 106 MMOL/L
CO2 SERPL-SCNC: 25 MMOL/L
CREAT SERPL-MCNC: 1.4 MG/DL
EST. GFR  (AFRICAN AMERICAN): 43.9 ML/MIN/1.73 M^2
EST. GFR  (NON AFRICAN AMERICAN): 38.1 ML/MIN/1.73 M^2
GLUCOSE SERPL-MCNC: 175 MG/DL
PHOSPHATE SERPL-MCNC: 4.2 MG/DL
POTASSIUM SERPL-SCNC: 4.4 MMOL/L
PTH-INTACT SERPL-MCNC: 54 PG/ML
SODIUM SERPL-SCNC: 140 MMOL/L
TSH SERPL DL<=0.005 MIU/L-ACNC: 1.05 UIU/ML

## 2017-06-02 PROCEDURE — 84443 ASSAY THYROID STIM HORMONE: CPT

## 2017-06-02 PROCEDURE — 36415 COLL VENOUS BLD VENIPUNCTURE: CPT | Mod: PO

## 2017-06-02 PROCEDURE — 80069 RENAL FUNCTION PANEL: CPT

## 2017-06-02 PROCEDURE — 83880 ASSAY OF NATRIURETIC PEPTIDE: CPT

## 2017-06-02 PROCEDURE — 83970 ASSAY OF PARATHORMONE: CPT

## 2017-06-12 ENCOUNTER — OFFICE VISIT (OUTPATIENT)
Dept: NEPHROLOGY | Facility: CLINIC | Age: 72
End: 2017-06-12
Payer: MEDICARE

## 2017-06-12 VITALS — HEIGHT: 62 IN | BODY MASS INDEX: 34.37 KG/M2 | WEIGHT: 186.75 LBS

## 2017-06-12 DIAGNOSIS — I15.2 HYPERTENSION ASSOCIATED WITH DIABETES: ICD-10-CM

## 2017-06-12 DIAGNOSIS — I10 ESSENTIAL HYPERTENSION: ICD-10-CM

## 2017-06-12 DIAGNOSIS — E11.59 HYPERTENSION ASSOCIATED WITH DIABETES: ICD-10-CM

## 2017-06-12 DIAGNOSIS — N18.4 CKD (CHRONIC KIDNEY DISEASE) STAGE 4, GFR 15-29 ML/MIN: Primary | ICD-10-CM

## 2017-06-12 DIAGNOSIS — E66.9 NON MORBID OBESITY, UNSPECIFIED OBESITY TYPE: ICD-10-CM

## 2017-06-12 DIAGNOSIS — Z79.4 TYPE 2 DIABETES MELLITUS WITH STAGE 3 CHRONIC KIDNEY DISEASE, WITH LONG-TERM CURRENT USE OF INSULIN: ICD-10-CM

## 2017-06-12 DIAGNOSIS — E11.22 TYPE 2 DIABETES MELLITUS WITH STAGE 3 CHRONIC KIDNEY DISEASE, WITH LONG-TERM CURRENT USE OF INSULIN: ICD-10-CM

## 2017-06-12 DIAGNOSIS — Z87.09 HISTORY OF ARDS: ICD-10-CM

## 2017-06-12 DIAGNOSIS — N18.30 TYPE 2 DIABETES MELLITUS WITH STAGE 3 CHRONIC KIDNEY DISEASE, WITH LONG-TERM CURRENT USE OF INSULIN: ICD-10-CM

## 2017-06-12 DIAGNOSIS — R80.1 PERSISTENT PROTEINURIA: ICD-10-CM

## 2017-06-12 DIAGNOSIS — D64.9 ANEMIA, UNSPECIFIED TYPE: ICD-10-CM

## 2017-06-12 PROCEDURE — 99999 PR PBB SHADOW E&M-EST. PATIENT-LVL II: CPT | Mod: PBBFAC,,, | Performed by: INTERNAL MEDICINE

## 2017-06-12 PROCEDURE — 1159F MED LIST DOCD IN RCRD: CPT | Mod: S$GLB,,, | Performed by: INTERNAL MEDICINE

## 2017-06-12 PROCEDURE — 99499 UNLISTED E&M SERVICE: CPT | Mod: S$PBB,,, | Performed by: INTERNAL MEDICINE

## 2017-06-12 PROCEDURE — 99214 OFFICE O/P EST MOD 30 MIN: CPT | Mod: S$GLB,,, | Performed by: INTERNAL MEDICINE

## 2017-06-12 PROCEDURE — 1126F AMNT PAIN NOTED NONE PRSNT: CPT | Mod: S$GLB,,, | Performed by: INTERNAL MEDICINE

## 2017-06-12 PROCEDURE — 3044F HG A1C LEVEL LT 7.0%: CPT | Mod: S$GLB,,, | Performed by: INTERNAL MEDICINE

## 2017-06-12 PROCEDURE — 3066F NEPHROPATHY DOC TX: CPT | Mod: S$GLB,,, | Performed by: INTERNAL MEDICINE

## 2017-06-21 ENCOUNTER — LAB VISIT (OUTPATIENT)
Dept: LAB | Facility: HOSPITAL | Age: 72
End: 2017-06-21
Attending: NURSE PRACTITIONER
Payer: MEDICARE

## 2017-06-21 DIAGNOSIS — N18.30 TYPE 2 DIABETES MELLITUS WITH STAGE 3 CHRONIC KIDNEY DISEASE, WITH LONG-TERM CURRENT USE OF INSULIN: ICD-10-CM

## 2017-06-21 DIAGNOSIS — Z79.4 TYPE 2 DIABETES MELLITUS WITH STAGE 3 CHRONIC KIDNEY DISEASE, WITH LONG-TERM CURRENT USE OF INSULIN: ICD-10-CM

## 2017-06-21 DIAGNOSIS — E11.22 TYPE 2 DIABETES MELLITUS WITH STAGE 3 CHRONIC KIDNEY DISEASE, WITH LONG-TERM CURRENT USE OF INSULIN: ICD-10-CM

## 2017-06-21 LAB
ALBUMIN SERPL BCP-MCNC: 3.5 G/DL
ALP SERPL-CCNC: 82 U/L
ALT SERPL W/O P-5'-P-CCNC: 27 U/L
ANION GAP SERPL CALC-SCNC: 9 MMOL/L
AST SERPL-CCNC: 34 U/L
BILIRUB SERPL-MCNC: 0.3 MG/DL
BUN SERPL-MCNC: 20 MG/DL
CALCIUM SERPL-MCNC: 8.8 MG/DL
CHLORIDE SERPL-SCNC: 105 MMOL/L
CO2 SERPL-SCNC: 26 MMOL/L
CREAT SERPL-MCNC: 1.3 MG/DL
EST. GFR  (AFRICAN AMERICAN): 48 ML/MIN/1.73 M^2
EST. GFR  (NON AFRICAN AMERICAN): 41.7 ML/MIN/1.73 M^2
ESTIMATED AVG GLUCOSE: 151 MG/DL
GLUCOSE SERPL-MCNC: 170 MG/DL
HBA1C MFR BLD HPLC: 6.9 %
POTASSIUM SERPL-SCNC: 4.1 MMOL/L
PROT SERPL-MCNC: 6.9 G/DL
SODIUM SERPL-SCNC: 140 MMOL/L
TSH SERPL DL<=0.005 MIU/L-ACNC: 0.67 UIU/ML

## 2017-06-21 PROCEDURE — 36415 COLL VENOUS BLD VENIPUNCTURE: CPT | Mod: PO

## 2017-06-21 PROCEDURE — 80053 COMPREHEN METABOLIC PANEL: CPT

## 2017-06-21 PROCEDURE — 84443 ASSAY THYROID STIM HORMONE: CPT

## 2017-06-21 PROCEDURE — 83036 HEMOGLOBIN GLYCOSYLATED A1C: CPT

## 2017-06-22 NOTE — PROGRESS NOTES
Subjective:       Patient ID: Adele Hall is a 72 y.o. White female who presents for follow-up evaluation of Chronic Kidney Disease    HPI     She reports she is doing well. Her DM has improved with last Hba1c of 6.9. She feels well. No edema but has chronic JONES> No new medications since last visit. She avoids NSAIDs and no herbal medications    Review of Systems   Constitutional: Negative for activity change, appetite change, fatigue and unexpected weight change.   HENT: Negative for facial swelling.    Respiratory: Positive for shortness of breath. Negative for cough and wheezing.    Cardiovascular: Negative for chest pain and leg swelling.   Gastrointestinal: Negative for abdominal distention.   Genitourinary: Negative for difficulty urinating and dysuria.   Musculoskeletal: Negative for arthralgias.   Neurological: Negative for weakness.       Objective:      Physical Exam   Constitutional: She is oriented to person, place, and time. She appears well-nourished.   HENT:   Mouth/Throat: Oropharynx is clear and moist.   Neck: No JVD present.   Cardiovascular: S1 normal and S2 normal.  Exam reveals no friction rub.    Pulmonary/Chest: Breath sounds normal. She has no wheezes. She has no rales.   Abdominal: Soft.   Musculoskeletal: She exhibits no edema.   Neurological: She is alert and oriented to person, place, and time.   Skin: Skin is warm and dry.   Psychiatric: She has a normal mood and affect.   Vitals reviewed.      Assessment:       1. CKD (chronic kidney disease) stage 4, GFR 15-29 ml/min    2. Persistent proteinuria    3. Type 2 diabetes mellitus with stage 3 chronic kidney disease, with long-term current use of insulin    4. Non morbid obesity, unspecified obesity type    5. Anemia, unspecified type    6. History of ARDS, post CABG, 1/2016    7. Essential hypertension    8. Hypertension associated with diabetes        Plan:             CKD with stable kidney function and proteinuria. Continue  RAAS blockade for renal preservation    HTN is controlled at home with -130's    Mineral and Bone Disease--PTH at goal. Continue D2    DM is controlled    Anemia with improvement in Hb from previous      RTC 5 months with labs prior

## 2017-06-23 RX ORDER — PRAMIPEXOLE DIHYDROCHLORIDE 0.12 MG/1
TABLET ORAL
Qty: 60 TABLET | Refills: 10 | Status: SHIPPED | OUTPATIENT
Start: 2017-06-23 | End: 2019-01-02

## 2017-06-28 ENCOUNTER — OFFICE VISIT (OUTPATIENT)
Dept: ENDOCRINOLOGY | Facility: CLINIC | Age: 72
End: 2017-06-28
Payer: MEDICARE

## 2017-06-28 VITALS
DIASTOLIC BLOOD PRESSURE: 68 MMHG | BODY MASS INDEX: 34.46 KG/M2 | SYSTOLIC BLOOD PRESSURE: 160 MMHG | WEIGHT: 187.25 LBS | HEIGHT: 62 IN | HEART RATE: 65 BPM

## 2017-06-28 DIAGNOSIS — I10 ESSENTIAL HYPERTENSION: ICD-10-CM

## 2017-06-28 DIAGNOSIS — I25.10 CORONARY ARTERY DISEASE INVOLVING NATIVE CORONARY ARTERY OF NATIVE HEART WITHOUT ANGINA PECTORIS: ICD-10-CM

## 2017-06-28 DIAGNOSIS — E11.42 TYPE 2 DIABETES MELLITUS WITH DIABETIC POLYNEUROPATHY, WITH LONG-TERM CURRENT USE OF INSULIN: ICD-10-CM

## 2017-06-28 DIAGNOSIS — E07.9 THYROID DISEASE: ICD-10-CM

## 2017-06-28 DIAGNOSIS — E78.5 HYPERLIPIDEMIA WITH TARGET LDL LESS THAN 70: ICD-10-CM

## 2017-06-28 DIAGNOSIS — E11.22 TYPE 2 DIABETES MELLITUS WITH STAGE 3 CHRONIC KIDNEY DISEASE, WITH LONG-TERM CURRENT USE OF INSULIN: Primary | ICD-10-CM

## 2017-06-28 DIAGNOSIS — Z79.4 TYPE 2 DIABETES MELLITUS WITH STAGE 3 CHRONIC KIDNEY DISEASE, WITH LONG-TERM CURRENT USE OF INSULIN: Primary | ICD-10-CM

## 2017-06-28 DIAGNOSIS — Z79.4 TYPE 2 DIABETES MELLITUS WITH DIABETIC POLYNEUROPATHY, WITH LONG-TERM CURRENT USE OF INSULIN: ICD-10-CM

## 2017-06-28 DIAGNOSIS — N18.30 TYPE 2 DIABETES MELLITUS WITH STAGE 3 CHRONIC KIDNEY DISEASE, WITH LONG-TERM CURRENT USE OF INSULIN: Primary | ICD-10-CM

## 2017-06-28 PROCEDURE — 3044F HG A1C LEVEL LT 7.0%: CPT | Mod: S$GLB,,, | Performed by: NURSE PRACTITIONER

## 2017-06-28 PROCEDURE — 99499 UNLISTED E&M SERVICE: CPT | Mod: S$PBB,,, | Performed by: NURSE PRACTITIONER

## 2017-06-28 PROCEDURE — 99999 PR PBB SHADOW E&M-EST. PATIENT-LVL IV: CPT | Mod: PBBFAC,,, | Performed by: NURSE PRACTITIONER

## 2017-06-28 PROCEDURE — 1159F MED LIST DOCD IN RCRD: CPT | Mod: S$GLB,,, | Performed by: NURSE PRACTITIONER

## 2017-06-28 PROCEDURE — 3066F NEPHROPATHY DOC TX: CPT | Mod: S$GLB,,, | Performed by: NURSE PRACTITIONER

## 2017-06-28 PROCEDURE — 99214 OFFICE O/P EST MOD 30 MIN: CPT | Mod: S$GLB,,, | Performed by: NURSE PRACTITIONER

## 2017-06-28 RX ORDER — PEN NEEDLE, DIABETIC 29 G X1/2"
NEEDLE, DISPOSABLE MISCELLANEOUS
Qty: 200 EACH | Refills: 11 | Status: SHIPPED | OUTPATIENT
Start: 2017-06-28 | End: 2017-11-24 | Stop reason: SDUPTHER

## 2017-06-28 NOTE — PROGRESS NOTES
CC: Ms. Adele Hall arrives today for management of Type 2 DM and review of chronic medical conditions, as listed in the visit diagnosis section of this encounter.     HPI: Ms. Adele Hall was diagnosed with Type 2 DM in ~ 2002. Metformin started at the time of diagnosis but was stopped d/t renal impairment, per PCP in [place]. Insulin was started ~ 2012.  Denies FH of DM. She did have 1 ER admission after having BG of >700 in 2012, prior to starting insulin.     At last visit, Novolog dose was added with HS snack.     BG readings are checked 2-3x/day. Brings logs.                Hypoglycemia: No    Missing Insulin/PO medication doses: No  Timing prandial insulin 5-15 minutes before meals: yes    Dietary Habits: Eats 2 meals/day because she sleeps in and skips breakfast. Snacks after dinner on leftover dinner or popcorn or popsicle.  Drinks occasional sweet tea.    She has CAD and had CABG in 1/2016. Follows with Dr. Burks    Currently receiving insulin and Victoza through [facility].     She sees Dr. Montero for CKD.    She reports that she has not taken her BP meds today.     CURRENT DIABETIC MEDS: Levemir 42 units BID, Novolog 26 units AC, Victoza 1.8 mg daily.   Vial or pen: vials  Glucometer type: True Metrix    Previous DM treatments:  metformin    Last Eye Exam: 5/2017, no DR  Last Podiatry Exam: n/a    REVIEW OF SYSTEMS  Constitutional: no c/o fatigue, weakness, or weight loss.   Eyes: denies visual disturbances.  Cardiac: no palpitations. + occasional chest pain that occurs ~ 2x/month and is self limited. Not occurring now.   Respiratory: no cough or dyspnea.  GI: no c/o abdominal pain or nausea  Skin: no lesions or rashes.  Neuro: + sharp, aching, burning pain to B feet. Relieved by compounded topical medication.  Endocrine: denies polyphagia, polydipsia, polyuria      Personally reviewed Past Medical, Surgical, Social History.    Vital Signs  BP (!) 160/68 (BP Location:  "Left arm, Patient Position: Sitting, BP Method: Manual) Comment: Large cuff  Pulse 65   Ht 5' 1.5" (1.562 m)   Wt 84.9 kg (187 lb 4.5 oz)   BMI 34.81 kg/m²     Personally reviewed the below labs:    Hemoglobin A1C   Date Value Ref Range Status   06/21/2017 6.9 (H) 4.0 - 5.6 % Final     Comment:     According to ADA guidelines, hemoglobin A1c <7.0% represents  optimal control in non-pregnant diabetic patients. Different  metrics may apply to specific patient populations.   Standards of Medical Care in Diabetes-2016.  For the purpose of screening for the presence of diabetes:  <5.7%     Consistent with the absence of diabetes  5.7-6.4%  Consistent with increasing risk for diabetes   (prediabetes)  >or=6.5%  Consistent with diabetes  Currently, no consensus exists for use of hemoglobin A1c  for diagnosis of diabetes for children.  This Hemoglobin A1c assay has significant interference with fetal   hemoglobin   (HbF). The results are invalid for patients with abnormal amounts of   HbF,   including those with known Hereditary Persistence   of Fetal Hemoglobin. Heterozygous hemoglobin variants (HbAS, HbAC,   HbAD, HbAE, HbA2) do not significantly interfere with this assay;   however, presence of multiple variants in a sample may impact the %   interference.     03/14/2017 6.8 (H) 4.5 - 6.2 % Final     Comment:     According to ADA guidelines, hemoglobin A1C <7.0% represents  optimal control in non-pregnant diabetic patients.  Different  metrics may apply to specific populations.   Standards of Medical Care in Diabetes - 2016.  For the purpose of screening for the presence of diabetes:  <5.7%     Consistent with the absence of diabetes  5.7-6.4%  Consistent with increasing risk for diabetes   (prediabetes)  >or=6.5%  Consistent with diabetes  Currently no consensus exists for use of hemoglobin A1C  for diagnosis of diabetes for children.     12/14/2016 6.7 (H) 4.5 - 6.2 % Final     Comment:     According to ADA " guidelines, hemoglobin A1C <7.0% represents  optimal control in non-pregnant diabetic patients.  Different  metrics may apply to specific populations.   Standards of Medical Care in Diabetes - 2016.  For the purpose of screening for the presence of diabetes:  <5.7%     Consistent with the absence of diabetes  5.7-6.4%  Consistent with increasing risk for diabetes   (prediabetes)  >or=6.5%  Consistent with diabetes  Currently no consensus exists for use of hemoglobin A1C  for diagnosis of diabetes for children.         Chemistry        Component Value Date/Time     06/21/2017 1041    K 4.1 06/21/2017 1041     06/21/2017 1041    CO2 26 06/21/2017 1041    BUN 20 06/21/2017 1041    CREATININE 1.3 06/21/2017 1041     (H) 06/21/2017 1041        Component Value Date/Time    CALCIUM 8.8 06/21/2017 1041    ALKPHOS 82 06/21/2017 1041    AST 34 06/21/2017 1041    AST 45 (H) 01/30/2016 0431    ALT 27 06/21/2017 1041    BILITOT 0.3 06/21/2017 1041          Lab Results   Component Value Date    CHOL 155 11/28/2016    CHOL 136 01/16/2016    CHOL 178 12/28/2015     Lab Results   Component Value Date    HDL 23 (L) 11/28/2016    HDL 23 (L) 01/16/2016    HDL 33 (L) 12/28/2015     Lab Results   Component Value Date    LDLCALC Invalid, Trig>400.0 11/28/2016    LDLCALC 51.0 (L) 01/16/2016    LDLCALC 78.4 12/28/2015     Lab Results   Component Value Date    TRIG 423 (H) 11/28/2016    TRIG 310 (H) 01/16/2016    TRIG 333 (H) 12/28/2015     Lab Results   Component Value Date    CHOLHDL 14.8 (L) 11/28/2016    CHOLHDL 16.9 (L) 01/16/2016    CHOLHDL 18.5 (L) 12/28/2015       Lab Results   Component Value Date    MICALBCREAT 1111.1 (H) 04/04/2016     Lab Results   Component Value Date    TSH 0.673 06/21/2017       Estimated Creatinine Clearance: 40.3 mL/min (based on Cr of 1.3).    No results found for: [id number]      PHYSICAL EXAMINAAppears well, no distressON  Constitutional: Appears well, no distress  Neck: Supple,  trachea midline; no thyromegaly or nodules.   Respiratory: CTA, even and unlabored.  Cardiovascular: RRR, no murmurs, no carotid bruits. DP pulses  1+ bilaterally; no edema.    Lymph: no cervical or supraclavicular lymphadenopathy  Skin: warm and dry; no lipohypertrophy, or acanthosis nigracans observed.  Neuro: DTR 2+ BUE/2+BLE.  Feet: appropriate footwear.        A1c goal 7.5% or less      Assessment/Plan  1. Type 2 diabetes mellitus with stage 3 chronic kidney disease, with long-term current use of insulin  -- A1c within goal without hypoglycemia.   -- continue current insulin doses. If eating HS snack with higher carb content, take Novolog 6 units with HS snack. Would be best to decrease snacking.   -- continue Victoza. Adding cardioprotective benefit, as well.   -- check BG 4x/day    -- Discussed diagnosis of DM, A1c goals, progression of disease, long term complications and tx options.  Advised patient to check BG before activities, such as driving or exercise.  -- Reviewed hypoglycemia management: treat with 1/2 glass of juice, 1/2 can regular coke, or 4 glucose tablets. Monitor and repeat treatment every 15 minutes until BG is >70 Then have a snack, which includes a complex carbohydrate and protein.  -- takes aspirin, statin, ace-i   2. Type 2 diabetes mellitus with diabetic polyneuropathy, with long-term current use of insulin  -- discussed the importance of daily inspection of bottoms of both feet, interspace areas. Advised patient against walking barefoot.     3. Coronary artery disease involving native coronary artery of native heart without angina pectoris  -- avoid hypoglycemia   4. Essential hypertension  -- elevated today. Advised her to take medication when she gets home.   -- continue lisinopril   5. Hyperlipidemia with target LDL less than 70  -- elevated triglycerides.   -- Continue statin, fenofibrate   6. Thyroid disease  -- TSH improved since decrease in dose.   -- Continue current LT4 100 mcg  daily  Lab Results   Component Value Date    TSH 0.673 06/21/2017          FOLLOW UP  Return in about 4 months (around 10/28/2017).   Patient instructed to bring BG logs to each follow up   Patient encouraged to call for any BG/medication issues, concerns, or questions.    Orders Placed This Encounter   Procedures    Hemoglobin A1c

## 2017-07-14 DIAGNOSIS — E55.9 VITAMIN D DEFICIENCY: ICD-10-CM

## 2017-07-14 RX ORDER — ERGOCALCIFEROL 1.25 MG/1
CAPSULE ORAL
Qty: 4 CAPSULE | Refills: 11 | OUTPATIENT
Start: 2017-07-14

## 2017-07-15 NOTE — TELEPHONE ENCOUNTER
Unable to find any vitamin D levels in record.  Why is she taking this?  No record of vitamin D deficiency in problem list or history.

## 2017-07-17 NOTE — TELEPHONE ENCOUNTER
Patient says Dr. Bela Rubio put her on vit d before [facility]

## 2017-07-19 ENCOUNTER — LAB VISIT (OUTPATIENT)
Dept: LAB | Facility: HOSPITAL | Age: 72
End: 2017-07-19
Attending: FAMILY MEDICINE
Payer: MEDICARE

## 2017-07-19 DIAGNOSIS — E55.9 VITAMIN D DEFICIENCY: ICD-10-CM

## 2017-07-19 LAB — 25(OH)D3+25(OH)D2 SERPL-MCNC: 20 NG/ML

## 2017-07-19 PROCEDURE — 82306 VITAMIN D 25 HYDROXY: CPT

## 2017-07-19 PROCEDURE — 36415 COLL VENOUS BLD VENIPUNCTURE: CPT | Mod: PO

## 2017-07-20 RX ORDER — ERGOCALCIFEROL 1.25 MG/1
CAPSULE ORAL
Qty: 4 CAPSULE | Refills: 11 | Status: SHIPPED | OUTPATIENT
Start: 2017-07-20 | End: 2018-07-25 | Stop reason: SDUPTHER

## 2017-07-27 ENCOUNTER — TELEPHONE (OUTPATIENT)
Dept: FAMILY MEDICINE | Facility: CLINIC | Age: 72
End: 2017-07-27

## 2017-07-27 NOTE — TELEPHONE ENCOUNTER
Contacted pt and notified her that we have received her Victoza this morning in the clinic.  Pt voiced understanding and states she will pick it up this afternoon.

## 2017-08-10 RX ORDER — INSULIN ASPART 100 [IU]/ML
26 INJECTION, SOLUTION INTRAVENOUS; SUBCUTANEOUS
Qty: 4 BOX | Refills: 3
Start: 2017-08-10 | End: 2017-11-15 | Stop reason: CLARIF

## 2017-08-22 ENCOUNTER — TELEPHONE (OUTPATIENT)
Dept: ENDOCRINOLOGY | Facility: CLINIC | Age: 72
End: 2017-08-22

## 2017-08-22 DIAGNOSIS — E11.42 TYPE 2 DIABETES MELLITUS WITH DIABETIC POLYNEUROPATHY, WITH LONG-TERM CURRENT USE OF INSULIN: Primary | ICD-10-CM

## 2017-08-22 DIAGNOSIS — Z79.4 TYPE 2 DIABETES MELLITUS WITH DIABETIC POLYNEUROPATHY, WITH LONG-TERM CURRENT USE OF INSULIN: Primary | ICD-10-CM

## 2017-08-22 NOTE — TELEPHONE ENCOUNTER
Spoke with patient regarding Levemir. She is requesting Relion NPH. Sleeps in and eats 2 meals only. Wakes at 10:00 AM and eats first meal. Then eats dinner at 5:00 and goes to bed at 11:00 PM.     I advised her to take Relion NPH 38 units in the AM and at bedtime. Have small HS snack. Discussed onset, peak, duration of NPH. Continue Novolog at current doses.     Notify me is having hypoglycemia.

## 2017-08-22 NOTE — TELEPHONE ENCOUNTER
Spoke with pt, states she was receiving the Levemir directly from the company, pt does not qualify got pt assistance due to out of pocket requirements have not been met. Pt requested th [facility] relion replacement   Please advise

## 2017-08-22 NOTE — TELEPHONE ENCOUNTER
----- Message from Sherice Alexander sent at 8/22/2017  8:17 AM CDT -----  Contact: spouse-Sami  Pt spouse Sami states can't afford the insulin -Levemir so they need a cheaper one called in ,please to [facility] in [place] on [address] ....[phone]

## 2017-08-22 NOTE — TELEPHONE ENCOUNTER
----- Message from Abel Suresh sent at 8/22/2017  4:24 PM CDT -----  Contact: pt  Pt is calling about a prescription that was suppose to be called into [facility] for her  Call Back#[phone]  Thanks

## 2017-10-17 ENCOUNTER — TELEPHONE (OUTPATIENT)
Dept: FAMILY MEDICINE | Facility: CLINIC | Age: 72
End: 2017-10-17

## 2017-10-17 RX ORDER — ONDANSETRON HYDROCHLORIDE 8 MG/1
8 TABLET, FILM COATED ORAL EVERY 8 HOURS PRN
Qty: 30 TABLET | Refills: 1 | Status: SHIPPED | OUTPATIENT
Start: 2017-10-17 | End: 2019-01-03 | Stop reason: CLARIF

## 2017-10-17 NOTE — TELEPHONE ENCOUNTER
----- Message from Austin Perla sent at 10/17/2017 12:46 PM CDT -----  Contact:    Calling to get a Rx Zofran    [phone] (home)     [facility] [address] [facility] [id number] [facility] [address]  [place] [address]  Phone: [phone] Fax: [phone]

## 2017-11-01 DIAGNOSIS — Z12.39 SCREENING FOR BREAST CANCER: Primary | ICD-10-CM

## 2017-11-13 ENCOUNTER — LAB VISIT (OUTPATIENT)
Dept: LAB | Facility: HOSPITAL | Age: 72
End: 2017-11-13
Attending: INTERNAL MEDICINE
Payer: MEDICARE

## 2017-11-13 ENCOUNTER — TELEPHONE (OUTPATIENT)
Dept: FAMILY MEDICINE | Facility: CLINIC | Age: 72
End: 2017-11-13

## 2017-11-13 DIAGNOSIS — Z79.4 TYPE 2 DIABETES MELLITUS WITH STAGE 3 CHRONIC KIDNEY DISEASE, WITH LONG-TERM CURRENT USE OF INSULIN: ICD-10-CM

## 2017-11-13 DIAGNOSIS — E11.22 TYPE 2 DIABETES MELLITUS WITH STAGE 3 CHRONIC KIDNEY DISEASE, WITH LONG-TERM CURRENT USE OF INSULIN: ICD-10-CM

## 2017-11-13 DIAGNOSIS — N18.4 CKD (CHRONIC KIDNEY DISEASE) STAGE 4, GFR 15-29 ML/MIN: ICD-10-CM

## 2017-11-13 DIAGNOSIS — N18.30 TYPE 2 DIABETES MELLITUS WITH STAGE 3 CHRONIC KIDNEY DISEASE, WITH LONG-TERM CURRENT USE OF INSULIN: ICD-10-CM

## 2017-11-13 DIAGNOSIS — R80.1 PERSISTENT PROTEINURIA: ICD-10-CM

## 2017-11-13 LAB
ALBUMIN SERPL BCP-MCNC: 3.5 G/DL
ANION GAP SERPL CALC-SCNC: 8 MMOL/L
BUN SERPL-MCNC: 17 MG/DL
CALCIUM SERPL-MCNC: 9.5 MG/DL
CHLORIDE SERPL-SCNC: 105 MMOL/L
CO2 SERPL-SCNC: 28 MMOL/L
CREAT SERPL-MCNC: 1.5 MG/DL
EST. GFR  (AFRICAN AMERICAN): 40.4 ML/MIN/1.73 M^2
EST. GFR  (NON AFRICAN AMERICAN): 35 ML/MIN/1.73 M^2
ESTIMATED AVG GLUCOSE: 131 MG/DL
GLUCOSE SERPL-MCNC: 156 MG/DL
HBA1C MFR BLD HPLC: 6.2 %
PHOSPHATE SERPL-MCNC: 3.6 MG/DL
POTASSIUM SERPL-SCNC: 4.3 MMOL/L
PTH-INTACT SERPL-MCNC: 87 PG/ML
SODIUM SERPL-SCNC: 141 MMOL/L

## 2017-11-13 PROCEDURE — 83036 HEMOGLOBIN GLYCOSYLATED A1C: CPT

## 2017-11-13 PROCEDURE — 36415 COLL VENOUS BLD VENIPUNCTURE: CPT | Mod: PO

## 2017-11-13 PROCEDURE — 83970 ASSAY OF PARATHORMONE: CPT

## 2017-11-13 PROCEDURE — 80069 RENAL FUNCTION PANEL: CPT

## 2017-11-13 NOTE — TELEPHONE ENCOUNTER
----- Message from Nataliya Bains sent at 11/13/2017 10:37 AM CST -----  Please call patient to schedule a mammo [phone]

## 2017-11-14 ENCOUNTER — DOCUMENTATION ONLY (OUTPATIENT)
Dept: FAMILY MEDICINE | Facility: CLINIC | Age: 72
End: 2017-11-14

## 2017-11-14 ENCOUNTER — HOSPITAL ENCOUNTER (OUTPATIENT)
Dept: RADIOLOGY | Facility: CLINIC | Age: 72
Discharge: HOME | End: 2017-11-14
Attending: NURSE PRACTITIONER
Payer: MEDICARE

## 2017-11-14 ENCOUNTER — OFFICE VISIT (OUTPATIENT)
Dept: FAMILY MEDICINE | Facility: CLINIC | Age: 72
End: 2017-11-14
Payer: MEDICARE

## 2017-11-14 VITALS
HEIGHT: 62 IN | DIASTOLIC BLOOD PRESSURE: 64 MMHG | OXYGEN SATURATION: 97 % | HEART RATE: 83 BPM | SYSTOLIC BLOOD PRESSURE: 134 MMHG | BODY MASS INDEX: 34.89 KG/M2 | TEMPERATURE: 99 F | WEIGHT: 189.63 LBS

## 2017-11-14 DIAGNOSIS — J44.89 CHRONIC BRONCHITIS WITH COPD (CHRONIC OBSTRUCTIVE PULMONARY DISEASE): Primary | ICD-10-CM

## 2017-11-14 DIAGNOSIS — R05.9 COUGH: ICD-10-CM

## 2017-11-14 DIAGNOSIS — M79.10 MYALGIA: ICD-10-CM

## 2017-11-14 DIAGNOSIS — J44.1 COPD WITH EXACERBATION: ICD-10-CM

## 2017-11-14 PROCEDURE — 96372 THER/PROPH/DIAG INJ SC/IM: CPT | Mod: S$GLB,,, | Performed by: INTERNAL MEDICINE

## 2017-11-14 PROCEDURE — 99213 OFFICE O/P EST LOW 20 MIN: CPT | Mod: 25,S$GLB,, | Performed by: NURSE PRACTITIONER

## 2017-11-14 PROCEDURE — 71020 XR CHEST PA AND LATERAL: CPT | Mod: TC,PO

## 2017-11-14 PROCEDURE — 71020 XR CHEST PA AND LATERAL: CPT | Mod: 26,,, | Performed by: RADIOLOGY

## 2017-11-14 RX ORDER — ALBUTEROL SULFATE 90 UG/1
2 AEROSOL, METERED RESPIRATORY (INHALATION) EVERY 6 HOURS PRN
Qty: 1 EACH | Refills: 11 | Status: SHIPPED | OUTPATIENT
Start: 2017-11-14 | End: 2019-01-02

## 2017-11-14 RX ORDER — AMOXICILLIN AND CLAVULANATE POTASSIUM 875; 125 MG/1; MG/1
1 TABLET, FILM COATED ORAL 2 TIMES DAILY
Qty: 20 TABLET | Refills: 0 | Status: SHIPPED | OUTPATIENT
Start: 2017-11-14 | End: 2017-11-24

## 2017-11-14 RX ORDER — DEXAMETHASONE SODIUM PHOSPHATE 4 MG/ML
4 INJECTION, SOLUTION INTRA-ARTICULAR; INTRALESIONAL; INTRAMUSCULAR; INTRAVENOUS; SOFT TISSUE
Status: COMPLETED | OUTPATIENT
Start: 2017-11-14 | End: 2017-11-14

## 2017-11-14 RX ORDER — BENZONATATE 200 MG/1
200 CAPSULE ORAL 3 TIMES DAILY PRN
Qty: 30 CAPSULE | Refills: 0 | Status: SHIPPED | OUTPATIENT
Start: 2017-11-14 | End: 2017-11-24

## 2017-11-14 RX ADMIN — DEXAMETHASONE SODIUM PHOSPHATE 4 MG: 4 INJECTION, SOLUTION INTRA-ARTICULAR; INTRALESIONAL; INTRAMUSCULAR; INTRAVENOUS; SOFT TISSUE at 12:11

## 2017-11-14 NOTE — PROGRESS NOTES
Patient was identified by name and  injection per Doctors orders to administer Dexamethasone 4 mg/ml IM was given with 25g 1 1/2  inch needle to Left upper outer quad tolerated well aseptic tech used no blood noted at the site aspiration completed.

## 2017-11-14 NOTE — PATIENT INSTRUCTIONS
Get tussin over the counter and take as directed on the bottle, use with the tessalon perles for cough. NO white carbs for 3 days.

## 2017-11-14 NOTE — PROGRESS NOTES
Health Maintenance Due   Topic Date Due    TETANUS VACCINE  06/02/1965    Colonoscopy  06/02/1997    Zoster Vaccine  06/02/2007    Influenza Vaccine  08/01/2017    Mammogram  11/06/2017

## 2017-11-14 NOTE — PROGRESS NOTES
Subjective:       Patient ID: Adele Hall is a 72 y.o. female.    Chief Complaint: Cough and Chest Congestion    Cough   This is a new problem. The current episode started in the past 7 days. The problem has been rapidly worsening. The cough is productive of sputum. Associated symptoms include myalgias and shortness of breath. Pertinent negatives include no fever. Associated symptoms comments: Hoarse voice, Has rib pain worse with cough, but not deep breath. Her past medical history is significant for COPD. patient states she does not have copd, but it is in the problem list, was long time smoker, quit last year     Review of Systems   Constitutional: Negative for fever.   Respiratory: Positive for cough and shortness of breath.    Musculoskeletal: Positive for myalgias.       Objective:      Physical Exam   Constitutional: She is oriented to person, place, and time. She appears well-developed and well-nourished. No distress.   HENT:   Head: Normocephalic and atraumatic.   Eyes: Conjunctivae are normal. Right eye exhibits no discharge. Left eye exhibits no discharge. No scleral icterus.   Cardiovascular: Normal rate, regular rhythm and normal heart sounds.  Exam reveals no gallop and no friction rub.    No murmur heard.  Pulmonary/Chest: Effort normal. No respiratory distress. She has wheezes. She has no rales.   Neurological: She is alert and oriented to person, place, and time.   Skin: Skin is warm and dry. She is not diaphoretic.   Psychiatric: She has a normal mood and affect. Her behavior is normal.   Nursing note and vitals reviewed.      Assessment:       1. Chronic bronchitis with COPD (chronic obstructive pulmonary disease)    2. COPD with exacerbation    3. Cough    4. Myalgia        Plan:     Chronic bronchitis with COPD (chronic obstructive pulmonary disease)    COPD with exacerbation  -     amoxicillin-clavulanate 875-125mg (AUGMENTIN) 875-125 mg per tablet; Take 1 tablet by mouth 2 (two)  times daily.  Dispense: 20 tablet; Refill: 0  -     albuterol 90 mcg/actuation inhaler; Inhale 2 puffs into the lungs every 6 (six) hours as needed for Wheezing.  Dispense: 1 each; Refill: 11  -     benzonatate (TESSALON) 200 MG capsule; Take 1 capsule (200 mg total) by mouth 3 (three) times daily as needed for Cough.  Dispense: 30 capsule; Refill: 0    Cough  -     X-Ray Chest PA And Lateral; Future; Expected date: 11/14/2017    Myalgia  -     dexamethasone injection 4 mg; Inject 1 mL (4 mg total) into the muscle one time.        Get tussin over the counter and take as directed on the bottle, use with the tessalon perles for cough. NO white carbs for 3 days.   1 week if not better

## 2017-11-15 ENCOUNTER — OFFICE VISIT (OUTPATIENT)
Dept: ENDOCRINOLOGY | Facility: CLINIC | Age: 72
End: 2017-11-15
Payer: MEDICARE

## 2017-11-15 VITALS
WEIGHT: 189.94 LBS | DIASTOLIC BLOOD PRESSURE: 60 MMHG | BODY MASS INDEX: 34.95 KG/M2 | HEIGHT: 62 IN | HEART RATE: 84 BPM | SYSTOLIC BLOOD PRESSURE: 120 MMHG

## 2017-11-15 DIAGNOSIS — E11.22 TYPE 2 DIABETES MELLITUS WITH STAGE 3 CHRONIC KIDNEY DISEASE, WITH LONG-TERM CURRENT USE OF INSULIN: Primary | ICD-10-CM

## 2017-11-15 DIAGNOSIS — Z79.4 TYPE 2 DIABETES MELLITUS WITH STAGE 3 CHRONIC KIDNEY DISEASE, WITH LONG-TERM CURRENT USE OF INSULIN: Primary | ICD-10-CM

## 2017-11-15 DIAGNOSIS — Z79.4 TYPE 2 DIABETES MELLITUS WITH DIABETIC POLYNEUROPATHY, WITH LONG-TERM CURRENT USE OF INSULIN: ICD-10-CM

## 2017-11-15 DIAGNOSIS — E11.42 TYPE 2 DIABETES MELLITUS WITH DIABETIC POLYNEUROPATHY, WITH LONG-TERM CURRENT USE OF INSULIN: ICD-10-CM

## 2017-11-15 DIAGNOSIS — N18.30 TYPE 2 DIABETES MELLITUS WITH STAGE 3 CHRONIC KIDNEY DISEASE, WITH LONG-TERM CURRENT USE OF INSULIN: Primary | ICD-10-CM

## 2017-11-15 DIAGNOSIS — I25.10 CORONARY ARTERY DISEASE INVOLVING NATIVE CORONARY ARTERY OF NATIVE HEART WITHOUT ANGINA PECTORIS: ICD-10-CM

## 2017-11-15 DIAGNOSIS — E78.5 HYPERLIPIDEMIA WITH TARGET LDL LESS THAN 70: ICD-10-CM

## 2017-11-15 DIAGNOSIS — E07.9 THYROID DISEASE: ICD-10-CM

## 2017-11-15 DIAGNOSIS — I10 ESSENTIAL HYPERTENSION: ICD-10-CM

## 2017-11-15 PROCEDURE — 99214 OFFICE O/P EST MOD 30 MIN: CPT | Mod: S$GLB,,, | Performed by: NURSE PRACTITIONER

## 2017-11-15 PROCEDURE — 99499 UNLISTED E&M SERVICE: CPT | Mod: S$PBB,,, | Performed by: NURSE PRACTITIONER

## 2017-11-15 PROCEDURE — 99999 PR PBB SHADOW E&M-EST. PATIENT-LVL V: CPT | Mod: PBBFAC,,, | Performed by: NURSE PRACTITIONER

## 2017-11-15 RX ORDER — INSULIN ASPART 100 [IU]/ML
26 INJECTION, SOLUTION INTRAVENOUS; SUBCUTANEOUS
COMMUNITY
End: 2017-11-15

## 2017-11-15 NOTE — PROGRESS NOTES
CC: Ms. Adele Hall arrives today for management of Type 2 DM and review of chronic medical conditions, as listed in the visit diagnosis section of this encounter.     HPI: Ms. Adele Hall was diagnosed with Type 2 DM in ~ 2002. Metformin started at the time of diagnosis but was stopped d/t renal impairment, per PCP in [place]. Insulin was started ~ 2012.  Denies FH of DM. She did have 1 ER admission after having BG of >700 in 2012, prior to starting insulin.     Since last visit, Levemir became expensive so she changed to Relion NPH twice daily.    She is about out of Novolog and it is expensive to refill. Requesting Novolin R.    [facility]'t [facility] pocket cost so doesn't qualify for Pt. Assistance. Has communicated with Tiffany Landeros.     She has questions today regarding use of cannabis oil to treat diabetes. She has been using dropper full under tongue daily    BG readings are checked 1-2x/day. Brings logs.                  Hypoglycemia: No, but had 74 today because she took NPH and didn't eat breakfast.      Missing Insulin/PO medication doses: No  Timing prandial insulin 5-15 minutes before meals: yes    Dietary Habits: Eats 2 meals/day on average. Occasional snack.     She has CAD and is s/p CABG in 1/2016. Follows with Dr. Burks    She sees Dr. Montero for CKD.    She is currently being treated with Augmentin.       CURRENT DIABETIC MEDS: Relion NPH 38 units QAM and QHS, Novolog 26 units AC, Victoza 1.8 mg daily.   Vial or pen: vials  Glucometer type: True Metrix    Previous DM treatments:  metformin    Last Eye Exam: 5/2017, no DR  Last Podiatry Exam: never    REVIEW OF SYSTEMS  Constitutional: no c/o fatigue, weakness, or weight loss.   Eyes: denies visual disturbances.  Cardiac: no palpitations, chest pain  Respiratory: no dyspnea. + productive cough   GI: no c/o abdominal pain. + nausea. She reports today that this has been  "ongoing. She believes she has delayed gastric emptying and has had workup.   Skin: no lesions or rashes.  Neuro: + numbness and tingling in bilateral feet. Applies topical compound that has provided relief  Endocrine: denies polyphagia, polydipsia, polyuria      Personally reviewed Past Medical, Surgical, Social History.    Vital Signs  /60   Pulse 84   Ht 5' 1.5" (1.562 m)   Wt 86.1 kg (189 lb 14.8 oz)   BMI 35.31 kg/m²     Personally reviewed the below labs:    Hemoglobin A1C   Date Value Ref Range Status   11/13/2017 6.2 (H) 4.0 - 5.6 % Final     Comment:     According to ADA guidelines, hemoglobin A1c <7.0% represents  optimal control in non-pregnant diabetic patients. Different  metrics may apply to specific patient populations.   Standards of Medical Care in Diabetes-2016.  For the purpose of screening for the presence of diabetes:  <5.7%     Consistent with the absence of diabetes  5.7-6.4%  Consistent with increasing risk for diabetes   (prediabetes)  >or=6.5%  Consistent with diabetes  Currently, no consensus exists for use of hemoglobin A1c  for diagnosis of diabetes for children.  This Hemoglobin A1c assay has significant interference with fetal   hemoglobin   (HbF). The results are invalid for patients with abnormal amounts of   HbF,   including those with known Hereditary Persistence   of Fetal Hemoglobin. Heterozygous hemoglobin variants (HbAS, HbAC,   HbAD, HbAE, HbA2) do not significantly interfere with this assay;   however, presence of multiple variants in a sample may impact the %   interference.     06/21/2017 6.9 (H) 4.0 - 5.6 % Final     Comment:     According to ADA guidelines, hemoglobin A1c <7.0% represents  optimal control in non-pregnant diabetic patients. Different  metrics may apply to specific patient populations.   Standards of Medical Care in Diabetes-2016.  For the purpose of screening for the presence of diabetes:  <5.7%     Consistent with the absence of " diabetes  5.7-6.4%  Consistent with increasing risk for diabetes   (prediabetes)  >or=6.5%  Consistent with diabetes  Currently, no consensus exists for use of hemoglobin A1c  for diagnosis of diabetes for children.  This Hemoglobin A1c assay has significant interference with fetal   hemoglobin   (HbF). The results are invalid for patients with abnormal amounts of   HbF,   including those with known Hereditary Persistence   of Fetal Hemoglobin. Heterozygous hemoglobin variants (HbAS, HbAC,   HbAD, HbAE, HbA2) do not significantly interfere with this assay;   however, presence of multiple variants in a sample may impact the %   interference.     03/14/2017 6.8 (H) 4.5 - 6.2 % Final     Comment:     According to ADA guidelines, hemoglobin A1C <7.0% represents  optimal control in non-pregnant diabetic patients.  Different  metrics may apply to specific populations.   Standards of Medical Care in Diabetes - 2016.  For the purpose of screening for the presence of diabetes:  <5.7%     Consistent with the absence of diabetes  5.7-6.4%  Consistent with increasing risk for diabetes   (prediabetes)  >or=6.5%  Consistent with diabetes  Currently no consensus exists for use of hemoglobin A1C  for diagnosis of diabetes for children.         Chemistry        Component Value Date/Time     11/13/2017 1036    K 4.3 11/13/2017 1036     11/13/2017 1036    CO2 28 11/13/2017 1036    BUN 17 11/13/2017 1036    CREATININE 1.5 (H) 11/13/2017 1036     (H) 11/13/2017 1036        Component Value Date/Time    CALCIUM 9.5 11/13/2017 1036    ALKPHOS 82 06/21/2017 1041    AST 34 06/21/2017 1041    AST 45 (H) 01/30/2016 0431    ALT 27 06/21/2017 1041    BILITOT 0.3 06/21/2017 1041          Lab Results   Component Value Date    CHOL 155 11/28/2016    CHOL 136 01/16/2016    CHOL 178 12/28/2015     Lab Results   Component Value Date    HDL 23 (L) 11/28/2016    HDL 23 (L) 01/16/2016    HDL 33 (L) 12/28/2015     Lab Results    Component Value Date    LDLCALC Invalid, Trig>400.0 11/28/2016    LDLCALC 51.0 (L) 01/16/2016    LDLCALC 78.4 12/28/2015     Lab Results   Component Value Date    TRIG 423 (H) 11/28/2016    TRIG 310 (H) 01/16/2016    TRIG 333 (H) 12/28/2015     Lab Results   Component Value Date    CHOLHDL 14.8 (L) 11/28/2016    CHOLHDL 16.9 (L) 01/16/2016    CHOLHDL 18.5 (L) 12/28/2015       Lab Results   Component Value Date    MICALBCREAT 1111.1 (H) 04/04/2016     Lab Results   Component Value Date    TSH 0.673 06/21/2017       Estimated Creatinine Clearance: 35.2 mL/min (based on SCr of 1.5 mg/dL (H)).    Vit D, 25-Hydroxy   Date Value Ref Range Status   07/19/2017 20 (L) 30 - 96 ng/mL Final     Comment:     Vitamin D deficiency.........<10 ng/mL                              Vitamin D insufficiency......10-29 ng/mL       Vitamin D sufficiency........> or equal to 30 ng/mL  Vitamin D toxicity............>100 ng/mL           PHYSICAL EXAMINAAppears well, no distressON  Constitutional: Appears well, no distress  Neck: Supple, trachea midline; no thyromegaly or nodules.   Respiratory: CTA, even and unlabored.  Cardiovascular: RRR, no murmurs, no carotid bruits. DP pulses  1+ bilaterally; no edema.    Lymph: no cervical or supraclavicular lymphadenopathy  Skin: warm and dry; no lipohypertrophy, or acanthosis nigracans observed.  Neuro: DTR 2+ BUE/2+BLE.  Feet: appropriate footwear.  Protective Sensation (w/ 10 gram monofilament):  Right: Intact  Left: Intact, decreased vibratory sensation     Visual Inspection:  Normal -  Bilateral    Pedal Pulses:   Right: Present  Left: Present    Posterior tibialis:   Right:Present  Left: Present        A1c goal 7.5% or less      Assessment/Plan  1. Type 2 diabetes mellitus with stage 3 chronic kidney disease, with long-term current use of insulin  -- A1c within goal without hypoglycemia.   -- change Novolog to Relion R 26 units AC.  -- continue Relion NPH 38 units BID  -- Explained the  mechanism of action and duration of these insulins, as well as the importance of eating 3 meals/day and having bedtime snack to prevent hypoglycemia.   -- discontinue Victoza.   -- check BG 4x/day    -- advised against use of cannabis oil, as this as not been proven effective or safe and may interact with her medications.     -- Discussed diagnosis of DM, A1c goals, progression of disease, long term complications and tx options.  Advised patient to check BG before activities, such as driving or exercise.  -- Reviewed hypoglycemia management: treat with 1/2 glass of juice, 1/2 can regular coke, or 4 glucose tablets. Monitor and repeat treatment every 15 minutes until BG is >70 Then have a snack, which includes a complex carbohydrate and protein.  -- takes aspirin, statin, ace-i   2. Type 2 diabetes mellitus with diabetic polyneuropathy, with long-term current use of insulin  -- discussed the importance of daily inspection of bottoms of both feet, interspace areas. Advised patient against walking barefoot.     3. Coronary artery disease involving native coronary artery of native heart without angina pectoris  -- avoid hypoglycemia   4. Essential hypertension  -- controlled  -- continue lisinopril   5. Hyperlipidemia with target LDL less than 70  -- elevated triglycerides.   -- Continue statin, fenofibrate  -- check level with RTC   6. Thyroid disease  -- Continue current LT4 100 mcg daily  Lab Results   Component Value Date    TSH 0.673 06/21/2017          FOLLOW UP  Return in about 4 months (around 3/15/2018).   Patient instructed to bring BG logs to each follow up   Patient encouraged to call for any BG/medication issues, concerns, or questions.    Orders Placed This Encounter   Procedures    Hemoglobin A1c    Comprehensive metabolic panel    Lipid panel

## 2017-11-15 NOTE — PATIENT INSTRUCTIONS
Take Novolin R   26 units with each meal. Time 20-30 minutes before meal.   Take Novolin NPH  38 units in the morning and at bedtime.  Must eat 3 meals/day and small bedtime snack with this insulin regimen.

## 2017-11-16 ENCOUNTER — TELEPHONE (OUTPATIENT)
Dept: ENDOCRINOLOGY | Facility: CLINIC | Age: 72
End: 2017-11-16

## 2017-11-16 NOTE — TELEPHONE ENCOUNTER
Spoke with [facility], instructed pt does not qualify for a VGO, pt was not ordered to have a VGO, voiced understanding

## 2017-11-16 NOTE — TELEPHONE ENCOUNTER
----- Message from Guerline Mckenzie sent at 11/16/2017 11:16 AM CST -----  Contact: Janna junior/[facility]  Janna is requesting and order, letter of medical necessity and clinic notes fax to her for a V-go insulin pump fax # [phone], contact # [phone].    Thank you

## 2017-11-20 ENCOUNTER — OFFICE VISIT (OUTPATIENT)
Dept: NEPHROLOGY | Facility: CLINIC | Age: 72
End: 2017-11-20
Payer: MEDICARE

## 2017-11-20 VITALS
DIASTOLIC BLOOD PRESSURE: 56 MMHG | HEIGHT: 62 IN | SYSTOLIC BLOOD PRESSURE: 120 MMHG | OXYGEN SATURATION: 97 % | WEIGHT: 187.19 LBS | BODY MASS INDEX: 34.45 KG/M2 | HEART RATE: 67 BPM

## 2017-11-20 DIAGNOSIS — R80.1 PERSISTENT PROTEINURIA: ICD-10-CM

## 2017-11-20 DIAGNOSIS — E11.22 TYPE 2 DIABETES MELLITUS WITH STAGE 3 CHRONIC KIDNEY DISEASE, WITH LONG-TERM CURRENT USE OF INSULIN: ICD-10-CM

## 2017-11-20 DIAGNOSIS — E11.42 TYPE 2 DIABETES MELLITUS WITH DIABETIC POLYNEUROPATHY, WITH LONG-TERM CURRENT USE OF INSULIN: ICD-10-CM

## 2017-11-20 DIAGNOSIS — Z79.4 TYPE 2 DIABETES MELLITUS WITH DIABETIC POLYNEUROPATHY, WITH LONG-TERM CURRENT USE OF INSULIN: ICD-10-CM

## 2017-11-20 DIAGNOSIS — Z79.4 TYPE 2 DIABETES MELLITUS WITH STAGE 3 CHRONIC KIDNEY DISEASE, WITH LONG-TERM CURRENT USE OF INSULIN: ICD-10-CM

## 2017-11-20 DIAGNOSIS — N18.4 CKD (CHRONIC KIDNEY DISEASE) STAGE 4, GFR 15-29 ML/MIN: ICD-10-CM

## 2017-11-20 DIAGNOSIS — E11.21 DIABETIC NEPHROPATHY ASSOCIATED WITH TYPE 2 DIABETES MELLITUS: ICD-10-CM

## 2017-11-20 DIAGNOSIS — Z87.891 FORMER SMOKER: ICD-10-CM

## 2017-11-20 DIAGNOSIS — N18.30 CKD (CHRONIC KIDNEY DISEASE) STAGE 3, GFR 30-59 ML/MIN: Primary | ICD-10-CM

## 2017-11-20 DIAGNOSIS — N18.30 TYPE 2 DIABETES MELLITUS WITH STAGE 3 CHRONIC KIDNEY DISEASE, WITH LONG-TERM CURRENT USE OF INSULIN: ICD-10-CM

## 2017-11-20 PROCEDURE — 99499 UNLISTED E&M SERVICE: CPT | Mod: S$PBB,,, | Performed by: INTERNAL MEDICINE

## 2017-11-20 PROCEDURE — 99214 OFFICE O/P EST MOD 30 MIN: CPT | Mod: S$GLB,,, | Performed by: INTERNAL MEDICINE

## 2017-11-20 PROCEDURE — 99999 PR PBB SHADOW E&M-EST. PATIENT-LVL V: CPT | Mod: PBBFAC,,, | Performed by: INTERNAL MEDICINE

## 2017-11-24 ENCOUNTER — OFFICE VISIT (OUTPATIENT)
Dept: FAMILY MEDICINE | Facility: CLINIC | Age: 72
End: 2017-11-24
Payer: MEDICARE

## 2017-11-24 VITALS
SYSTOLIC BLOOD PRESSURE: 144 MMHG | WEIGHT: 190.69 LBS | DIASTOLIC BLOOD PRESSURE: 69 MMHG | BODY MASS INDEX: 35.09 KG/M2 | HEIGHT: 62 IN | OXYGEN SATURATION: 95 % | TEMPERATURE: 98 F | RESPIRATION RATE: 20 BRPM | HEART RATE: 67 BPM

## 2017-11-24 DIAGNOSIS — E78.2 MIXED HYPERLIPIDEMIA: ICD-10-CM

## 2017-11-24 DIAGNOSIS — N18.30 TYPE 2 DIABETES MELLITUS WITH STAGE 3 CHRONIC KIDNEY DISEASE, WITH LONG-TERM CURRENT USE OF INSULIN: ICD-10-CM

## 2017-11-24 DIAGNOSIS — J42 CHRONIC BRONCHITIS, UNSPECIFIED CHRONIC BRONCHITIS TYPE: Primary | ICD-10-CM

## 2017-11-24 DIAGNOSIS — E11.22 TYPE 2 DIABETES MELLITUS WITH STAGE 3 CHRONIC KIDNEY DISEASE, WITH LONG-TERM CURRENT USE OF INSULIN: ICD-10-CM

## 2017-11-24 DIAGNOSIS — Z79.4 TYPE 2 DIABETES MELLITUS WITH STAGE 3 CHRONIC KIDNEY DISEASE, WITH LONG-TERM CURRENT USE OF INSULIN: ICD-10-CM

## 2017-11-24 DIAGNOSIS — E78.00 PURE HYPERCHOLESTEROLEMIA: Primary | ICD-10-CM

## 2017-11-24 DIAGNOSIS — R05.9 COUGH IN ADULT: ICD-10-CM

## 2017-11-24 PROCEDURE — 99999 PR PBB SHADOW E&M-EST. PATIENT-LVL IV: CPT | Mod: PBBFAC,,, | Performed by: NURSE PRACTITIONER

## 2017-11-24 PROCEDURE — 99213 OFFICE O/P EST LOW 20 MIN: CPT | Mod: S$GLB,,, | Performed by: NURSE PRACTITIONER

## 2017-11-24 RX ORDER — PEN NEEDLE, DIABETIC 29 G X1/2"
NEEDLE, DISPOSABLE MISCELLANEOUS
Qty: 200 EACH | Refills: 10 | Status: SHIPPED | OUTPATIENT
Start: 2017-11-24 | End: 2019-01-03 | Stop reason: CLARIF

## 2017-11-24 RX ORDER — CODEINE PHOSPHATE AND GUAIFENESIN 10; 100 MG/5ML; MG/5ML
5 SOLUTION ORAL EVERY 8 HOURS PRN
Qty: 120 ML | Refills: 0 | Status: SHIPPED | OUTPATIENT
Start: 2017-11-24 | End: 2017-12-04

## 2017-11-24 RX ORDER — ROSUVASTATIN CALCIUM 40 MG/1
40 TABLET, COATED ORAL NIGHTLY
Qty: 30 TABLET | Refills: 3 | Status: CANCELLED | OUTPATIENT
Start: 2017-11-24

## 2017-11-24 RX ORDER — ROSUVASTATIN CALCIUM 40 MG/1
TABLET, COATED ORAL
Qty: 30 TABLET | Refills: 0 | Status: SHIPPED | OUTPATIENT
Start: 2017-11-24 | End: 2017-12-28 | Stop reason: SDUPTHER

## 2017-11-24 NOTE — PATIENT INSTRUCTIONS
What Is Acute Bronchitis?  Acute bronchitis is when the airways in your lungs (bronchial tubes) become red and swollen (inflamed). It is usually caused by a viral infection. But it can also occur because of a bacteria or allergen. Symptoms include a cough that produces yellow or greenish mucus and can last for days or sometimes weeks.  Inside healthy lungs    Air travels in and out of the lungs through the airways. The linings of these airways produce sticky mucus. This mucus traps particles that enter the lungs. Tiny structures called cilia then sweep the particles out of the airways.     Healthy airway: Airways are normally open. Air moves in and out easily.      Healthy cilia: Tiny, hairlike cilia sweep mucus and particles up and out of the airways.   Lungs with bronchitis  Bronchitis often occurs with a cold or the flu virus. The airways become inflamed (red and swollen). There is a deep hacking cough from the extra mucus. Other symptoms may include:  · Wheezing  · Chest discomfort  · Shortness of breath  · Mild fever  A second infection, this time due to bacteria, may then occur. And airways irritated by allergens or smoke are more likely to get infected.        Inflamed airway: Inflammation and extra mucus narrow the airway, causing shortness of breath.      Impaired cilia: Extra mucus impairs cilia, causing congestion and wheezing. Smoking makes the problem worse.   Making a diagnosis  A physical exam, health history, and certain tests help your healthcare provider make the diagnosis.  Health history  Your healthcare provider will ask you about your symptoms.  The exam  Your provider listens to your chest for signs of congestion. He or she may also check your ears, nose, and throat.  Possible tests  · A sputum test for bacteria. This requires a sample of mucus from your lungs.  · A nasal or throat swab. This tests to see if you have a bacterial infection.  · A chest X-ray. This is done if your healthcare  provider thinks you have pneumonia.  · Tests to check for an underlying condition. Other tests may be done to check for things such as allergies, asthma, or COPD (chronic obstructive pulmonary disease). You may need to see a specialist for more lung function testing.  Treating a cough  The main treatment for bronchitis is easing symptoms. Avoiding smoke, allergens, and other things that trigger coughing can often help. If the infection is bacterial, you may be given antibiotics. During the illness, it's important to get plenty of sleep. To ease symptoms:  · Dont smoke. Also avoid secondhand smoke.  · Use a humidifier. Or try breathing in steam from a hot shower. This may help loosen mucus.  · Drink a lot of water and juice. They can soothe the throat and may help thin mucus.  · Sit up or use extra pillows when in bed. This helps to lessen coughing and congestion.  · Ask your provider about using medicine. Ask about using cough medicine, pain and fever medicine, or a decongestant.  Antibiotics  Most cases of bronchitis are caused by cold or flu viruses. They dont need antibiotics to treat them, even if your mucus is thick and green or yellow. Antibiotics dont treat viral illness and antibiotics have not been shown to have any benefit in cases of acute bronchitis. Taking antibiotics when they are not needed increases your risk of getting an infection later that is antibiotic-resistant. Antibiotics can also cause severe cases of diarrhea that require other antibiotics to treat.  It is important that you accept your healthcare provider's opinion to not use antibiotics. Your provider will prescribe antibiotics if the infection is caused by bacteria. If they are prescribed:  · Take all of the medicine. Take the medicine until it is used up, even if symptoms have improved. If you dont, the bronchitis may come back.  · Take the medicines as directed. For instance, some medicines should be taken with food.  · Ask about  side effects. Ask your provider or pharmacist what side effects are common, and what to do about them.  Follow-up care  You should see your provider again in 2 to 3 weeks. By this time, symptoms should have improved. An infection that lasts longer may mean you have a more serious problem.  Prevention  · Avoid tobacco smoke. If you smoke, quit. Stay away from smoky places. Ask friends and family not to smoke around you, or in your home or car.  · Get checked for allergies.  · Ask your provider about getting a yearly flu shot. Also ask about pneumococcal or pneumonia shots.  · Wash your hands often. This helps reduce the chance of picking up viruses that cause colds and flu.  Call your healthcare provider if:  · Symptoms worsen, or you have new symptoms  · Breathing problems worsen or  become severe  · Symptoms dont get better within a week, or within 3 days of taking antibiotics   Date Last Reviewed: 2/1/2017  © 3451-1034 [employer]. [address] [place] [address], [place] [address]. All rights reserved. This information is not intended as a substitute for professional medical care. Always follow your healthcare professional's instructions.

## 2017-11-24 NOTE — PROGRESS NOTES
Subjective:       Patient ID: Adele Hall is a 72 y.o. female.    Chief Complaint: Cough    Patient presents with on going complaints of cough.  She was seen on 11/14/17 by SANDI Bello and was diagnosed with bronchitis. She was given a steroid injection and a RX for albuterol MDI, augmentin and benzonate. She was also sent for chest xray which revealed no significant acute findings. Today, she reports she is still experiencing non-productive cough that is keeping her up at night.       Cough   This is a chronic problem. The current episode started 1 to 4 weeks ago. The problem has been waxing and waning. The cough is non-productive. Pertinent negatives include no chest pain, chills, ear congestion, ear pain, fever, headaches, heartburn, myalgias, nasal congestion, postnasal drip, rash, rhinorrhea, sore throat, shortness of breath, sweats, weight loss or wheezing. Nothing aggravates the symptoms. She has tried a beta-agonist inhaler, body position changes, OTC cough suppressant and rest for the symptoms. The treatment provided mild relief. Her past medical history is significant for bronchitis and COPD.     Review of Systems   Constitutional: Positive for fatigue. Negative for appetite change, chills, fever and weight loss.   HENT: Negative for ear pain, postnasal drip, rhinorrhea, sinus pain, sinus pressure and sore throat.    Respiratory: Positive for cough. Negative for chest tightness, shortness of breath and wheezing.    Cardiovascular: Negative for chest pain, palpitations and leg swelling.   Gastrointestinal: Negative for diarrhea, heartburn, nausea and vomiting.   Musculoskeletal: Negative for myalgias.   Skin: Negative for rash.   Neurological: Negative for headaches.   Hematological: Negative for adenopathy.   All other systems reviewed and are negative.      Objective:      Physical Exam   Constitutional: She is oriented to person, place, and time. She appears well-developed and well-nourished. No  distress.   HENT:   Head: Normocephalic and atraumatic.   Right Ear: External ear normal.   Left Ear: External ear normal.   Mouth/Throat: Oropharynx is clear and moist.   Neck: Normal range of motion. Neck supple. No JVD present. No tracheal deviation present.   Cardiovascular: Normal rate, regular rhythm, normal heart sounds and intact distal pulses.    Pulmonary/Chest: Effort normal and breath sounds normal. No respiratory distress. She has no wheezes. She has no rales.   Abdominal: Soft. Bowel sounds are normal. She exhibits no distension.   Musculoskeletal: Normal range of motion. She exhibits no edema.   Lymphadenopathy:     She has no cervical adenopathy.   Neurological: She is alert and oriented to person, place, and time.   Skin: Skin is warm. Capillary refill takes less than 2 seconds. She is not diaphoretic.   Nursing note and vitals reviewed.      Assessment:       1. Chronic bronchitis, unspecified chronic bronchitis type    2. Cough in adult        Plan:       Chronic bronchitis, unspecified chronic bronchitis type  -     guaifenesin-codeine 100-10 mg/5 ml (CHERATUSSIN AC)  mg/5 mL syrup; Take 5 mLs by mouth every 8 (eight) hours as needed for Cough.  Dispense: 120 mL; Refill: 0    Cough in adult  -     guaifenesin-codeine 100-10 mg/5 ml (CHERATUSSIN AC)  mg/5 mL syrup; Take 5 mLs by mouth every 8 (eight) hours as needed for Cough.  Dispense: 120 mL; Refill: 0    Other orders  -     Cancel: rosuvastatin (CRESTOR) 40 MG Tab; Take 1 tablet (40 mg total) by mouth every evening.  Dispense: 30 tablet; Refill: 3  -     Cancel: Lipid panel; Future; Expected date: 11/24/2017  Orders for Crestor and lipid panel were not ordered by this provider.  They were counseled and not addressed during this visit.     Medication List with Changes/Refills   New Medications    GUAIFENESIN-CODEINE 100-10 MG/5 ML (CHERATUSSIN AC)  MG/5 ML SYRUP    Take 5 mLs by mouth every 8 (eight) hours as needed for Cough.  "  Current Medications    ALBUTEROL 90 MCG/ACTUATION INHALER    Inhale 2 puffs into the lungs every 6 (six) hours as needed for Wheezing.    AMLODIPINE (NORVASC) 2.5 MG TABLET    Take 1 tablet (2.5 mg total) by mouth once daily.    ASPIRIN (ECOTRIN) 81 MG EC TABLET    Take 81 mg by mouth once daily.    BENZONATATE (TESSALON) 200 MG CAPSULE    Take 1 capsule (200 mg total) by mouth 3 (three) times daily as needed for Cough.    BLOOD SUGAR DIAGNOSTIC STRP    Check blood glucose 4 times a day - before meals and at bedtime. Please dispense testing supplies for true metrix. Dx code e11.65    BLOOD-GLUCOSE METER KIT    Use as instructed. Please dispense testing supplies for true metrix. Dx code e11.65    ERGOCALCIFEROL (VITAMIN D2) 50,000 UNIT CAP    TAKE ONE CAPSULE BY MOUTH EVERY SEVEN DAYS    FENOFIBRATE MICRONIZED (LOFIBRA) 134 MG CAP    TAKE ONE CAPSULE BY MOUTH EVERYDAY WITH BREAKFAST    INSULIN NPH (NOVOLIN N) 100 UNIT/ML INJECTION    Inject 38 Units into the skin 2 (two) times daily. Please dispense Relion N    INSULIN REGULAR 100 UNIT/ML INJ INJECTION    Inject 26 Units into the skin 3 (three) times daily before meals. Please dispense Relion R    LANCETS MISC    Check blood glucose 4x/day. Please dispense testing supplies for true metrix. Dx code e11.65    LEVOTHYROXINE (SYNTHROID) 100 MCG TABLET    Take 1 tablet (100 mcg total) by mouth once daily.    LISINOPRIL 10 MG TABLET    Take 1 tablet (10 mg total) by mouth once daily.    MECLIZINE (ANTIVERT) 25 MG TABLET    Take 1 tablet (25 mg total) by mouth 3 (three) times daily as needed.    METOPROLOL TARTRATE (LOPRESSOR) 25 MG TABLET    Take 0.5 tablets (12.5 mg total) by mouth 2 (two) times daily.    ONDANSETRON (ZOFRAN) 8 MG TABLET    Take 1 tablet (8 mg total) by mouth every 8 (eight) hours as needed for Nausea.    PANTOPRAZOLE (PROTONIX) 40 MG TABLET    Take 1 tablet (40 mg total) by mouth once daily.    PEN NEEDLE, DIABETIC (NOVOFINE PLUS) 32 GAUGE X 1/6" NDLE  "   1 Device by Misc.(Non-Drug; Combo Route) route 6 (six) times daily.    PRAMIPEXOLE (MIRAPEX) 0.125 MG TABLET    TAKE TWO TABLETS BY MOUTH AT BEDTIME AS NEEDED   Changed and/or Refilled Medications    Modified Medication Previous Medication    BD INSULIN SYRINGE ULTRA-FINE 1 ML 31 GAUGE X 5/16 SYRG BD INSULIN SYRINGE ULTRA-FINE 1 mL 31 gauge x 5/16 Syrg       USE AS DIRECTED 5 TIMES A DAY WITH LEVEMIR & NOVOLOG    Uses 5 syringes per day with Levemir and Novolog    ROSUVASTATIN (CRESTOR) 40 MG TAB rosuvastatin (CRESTOR) 40 MG Tab       TAKE ONE TABLET BY MOUTH EVERY EVENING    Take 1 tablet (40 mg total) by mouth every evening.   Discontinued Medications    AMOXICILLIN-CLAVULANATE 875-125MG (AUGMENTIN) 875-125 MG PER TABLET    Take 1 tablet by mouth 2 (two) times daily.           I have reviewed the patient's past medical/surgical and social histories and updated as appropriate. Medications were reviewed and discussed as appropriate including side effects and risks versus benefit. Sedation precautions reinforced to the patient.     Plan of care was reviewed and agreed upon with the patient.  An opportunity to ask questions was provided and explanation given. Patient verbalized understanding on all information reviewed and discussed.  The patient will follow up at her routinely scheduled appointment with PCP or sooner if needed. If symptoms worsen patient may call for ASAP appointment or report to the emergency department for further evaluation.

## 2017-11-27 DIAGNOSIS — E07.9 THYROID DISEASE: ICD-10-CM

## 2017-11-28 ENCOUNTER — CLINICAL SUPPORT (OUTPATIENT)
Dept: SMOKING CESSATION | Facility: CLINIC | Age: 72
End: 2017-11-28
Payer: COMMERCIAL

## 2017-11-28 DIAGNOSIS — F17.200 NICOTINE DEPENDENCE: Primary | ICD-10-CM

## 2017-11-28 PROCEDURE — 99407 BEHAV CHNG SMOKING > 10 MIN: CPT | Mod: S$GLB,,,

## 2017-11-28 RX ORDER — LEVOTHYROXINE SODIUM 100 UG/1
TABLET ORAL
Qty: 30 TABLET | Refills: 6 | Status: SHIPPED | OUTPATIENT
Start: 2017-11-28 | End: 2018-07-02 | Stop reason: SDUPTHER

## 2017-11-28 NOTE — PROGRESS NOTES
Subjective:       Patient ID: Adele Hall is a 72 y.o. White female who presents for follow-up evaluation of Chronic Kidney Disease    HPI       She reports she is doing well. Her DM has improved with last Hba1c of 6.2. She feels well. No edema but has chronic JONES.  No new medications since last visit. She avoids NSAIDs and no herbal medications    Review of Systems   Constitutional: Negative for activity change, appetite change, fatigue and unexpected weight change.   HENT: Negative for facial swelling.    Respiratory: Positive for shortness of breath. Negative for cough and wheezing.    Cardiovascular: Negative for chest pain and leg swelling.   Gastrointestinal: Negative for abdominal distention.   Genitourinary: Negative for difficulty urinating and dysuria.   Musculoskeletal: Negative for arthralgias.   Neurological: Negative for weakness.       Objective:      Physical Exam   Constitutional: She is oriented to person, place, and time. She appears well-nourished.   HENT:   Mouth/Throat: Oropharynx is clear and moist.   Neck: No JVD present.   Cardiovascular: S1 normal and S2 normal.  Exam reveals no friction rub.    Pulmonary/Chest: Breath sounds normal. She has no wheezes. She has no rales.   Abdominal: Soft.   Musculoskeletal: She exhibits no edema.   Neurological: She is alert and oriented to person, place, and time.   Skin: Skin is warm and dry.   Psychiatric: She has a normal mood and affect.   Vitals reviewed.      Assessment:       1. CKD (chronic kidney disease) stage 3, GFR 30-59 ml/min    2. CKD (chronic kidney disease) stage 4, GFR 15-29 ml/min    3. Persistent proteinuria    4. Type 2 diabetes mellitus with diabetic polyneuropathy, with long-term current use of insulin    5. Type 2 diabetes mellitus with stage 3 chronic kidney disease, with long-term current use of insulin    6. Class 2 obesity with serious comorbidity in adult, unspecified BMI, unspecified obesity type    7. Diabetic  nephropathy associated with type 2 diabetes mellitus    8. Former smoker        Plan:             CKD with stable kidney function and proteinuria. Continue RAAS blockade (lisinopril) for renal preservation    HTN is controlled     Mineral and Bone Disease--PTH at goal. Continue D2    DM is controlled    Anemia with improvement in Hb from previous      RTC 5 months with labs prior

## 2017-12-01 ENCOUNTER — HOSPITAL ENCOUNTER (OUTPATIENT)
Dept: RADIOLOGY | Facility: CLINIC | Age: 72
Discharge: HOME | End: 2017-12-01
Attending: FAMILY MEDICINE
Payer: MEDICARE

## 2017-12-01 DIAGNOSIS — Z12.39 SCREENING FOR BREAST CANCER: ICD-10-CM

## 2017-12-01 PROCEDURE — 77063 BREAST TOMOSYNTHESIS BI: CPT | Mod: 26,,, | Performed by: RADIOLOGY

## 2017-12-01 PROCEDURE — 77067 SCR MAMMO BI INCL CAD: CPT | Mod: TC,PO

## 2017-12-01 PROCEDURE — 77067 SCR MAMMO BI INCL CAD: CPT | Mod: 26,,, | Performed by: RADIOLOGY

## 2017-12-07 ENCOUNTER — TELEPHONE (OUTPATIENT)
Dept: ENDOCRINOLOGY | Facility: CLINIC | Age: 72
End: 2017-12-07

## 2017-12-07 DIAGNOSIS — N18.30 TYPE 2 DIABETES MELLITUS WITH STAGE 3 CHRONIC KIDNEY DISEASE, WITH LONG-TERM CURRENT USE OF INSULIN: ICD-10-CM

## 2017-12-07 DIAGNOSIS — Z79.4 TYPE 2 DIABETES MELLITUS WITH STAGE 3 CHRONIC KIDNEY DISEASE, WITH LONG-TERM CURRENT USE OF INSULIN: ICD-10-CM

## 2017-12-07 DIAGNOSIS — E11.22 TYPE 2 DIABETES MELLITUS WITH STAGE 3 CHRONIC KIDNEY DISEASE, WITH LONG-TERM CURRENT USE OF INSULIN: ICD-10-CM

## 2017-12-07 NOTE — TELEPHONE ENCOUNTER
Had lunch at 11:30. Took 26 units and glucose 244. Fasting glucoses are lower. Increase Regular insulin to 29 units AC. Discussed proper timing. Check BG AC/HS and call with readings in 1-2 weeks

## 2017-12-07 NOTE — TELEPHONE ENCOUNTER
----- Message from Barbie Loving sent at 12/6/2017  2:32 PM CST -----  Contact: self  Patient [phone] is calling to speak with Nurse concerning the different Insulin she was placed on/she states she does not feel it is working well/please call

## 2017-12-07 NOTE — TELEPHONE ENCOUNTER
Pt calling w/ BG numbers over the past week:  12/1  222  12/2  148  12/3  156  12/4  181  12/6  172  12/7  161  Pt states she doesn't feel the regular insulin she started on 11/15 is working as well for her.  Please advise.

## 2017-12-07 NOTE — TELEPHONE ENCOUNTER
Left VM to call back. Unclear if these reading are fasting or not. May need increase of NPH rather than Regular insulin.

## 2017-12-11 ENCOUNTER — NURSE TRIAGE (OUTPATIENT)
Dept: ADMINISTRATIVE | Facility: CLINIC | Age: 72
End: 2017-12-11

## 2017-12-11 ENCOUNTER — TELEPHONE (OUTPATIENT)
Dept: ENDOCRINOLOGY | Facility: CLINIC | Age: 72
End: 2017-12-11

## 2017-12-11 DIAGNOSIS — Z79.4 TYPE 2 DIABETES MELLITUS WITH STAGE 3 CHRONIC KIDNEY DISEASE, WITH LONG-TERM CURRENT USE OF INSULIN: Primary | ICD-10-CM

## 2017-12-11 DIAGNOSIS — N18.30 TYPE 2 DIABETES MELLITUS WITH STAGE 3 CHRONIC KIDNEY DISEASE, WITH LONG-TERM CURRENT USE OF INSULIN: ICD-10-CM

## 2017-12-11 DIAGNOSIS — E11.42 TYPE 2 DIABETES MELLITUS WITH DIABETIC POLYNEUROPATHY, WITH LONG-TERM CURRENT USE OF INSULIN: ICD-10-CM

## 2017-12-11 DIAGNOSIS — Z79.4 TYPE 2 DIABETES MELLITUS WITH STAGE 3 CHRONIC KIDNEY DISEASE, WITH LONG-TERM CURRENT USE OF INSULIN: ICD-10-CM

## 2017-12-11 DIAGNOSIS — Z79.4 TYPE 2 DIABETES MELLITUS WITH DIABETIC POLYNEUROPATHY, WITH LONG-TERM CURRENT USE OF INSULIN: ICD-10-CM

## 2017-12-11 DIAGNOSIS — E11.22 TYPE 2 DIABETES MELLITUS WITH STAGE 3 CHRONIC KIDNEY DISEASE, WITH LONG-TERM CURRENT USE OF INSULIN: Primary | ICD-10-CM

## 2017-12-11 DIAGNOSIS — E11.22 TYPE 2 DIABETES MELLITUS WITH STAGE 3 CHRONIC KIDNEY DISEASE, WITH LONG-TERM CURRENT USE OF INSULIN: ICD-10-CM

## 2017-12-11 DIAGNOSIS — N18.30 TYPE 2 DIABETES MELLITUS WITH STAGE 3 CHRONIC KIDNEY DISEASE, WITH LONG-TERM CURRENT USE OF INSULIN: Primary | ICD-10-CM

## 2017-12-11 RX ORDER — INSULIN ASPART 100 [IU]/ML
26 INJECTION, SOLUTION INTRAVENOUS; SUBCUTANEOUS
Qty: 30 ML | Refills: 11 | Status: SHIPPED | OUTPATIENT
Start: 2017-12-11 | End: 2017-12-12 | Stop reason: SDUPTHER

## 2017-12-11 NOTE — TELEPHONE ENCOUNTER
Ms. Cope,BREN Telephone call/documentation noted.ty  ISABEL Mcdowell,FNP-C  documentation noted.. ty

## 2017-12-11 NOTE — TELEPHONE ENCOUNTER
BS is 314    Reason for Disposition   Blood glucose > 300 mg/dl (16.5 mmol/l) AND two or more times in a row    Protocols used:  DIABETES - HIGH BLOOD SUGAR-A-OH    Call placed to ERIC Fenton via . No Answer and she is sending an urgent message  I have told Mr. Hall, I will call back in an hour. He has voiced understanding.

## 2017-12-11 NOTE — TELEPHONE ENCOUNTER
Spoke with pts   States since she went to Relion R, her numbers have been on an increase states she has been, 174, 202, 304 and today she is at 314. Pt's  wants to know if they need to switch back to Novolog or change the dose of Relion R  Please advise

## 2017-12-11 NOTE — TELEPHONE ENCOUNTER
Spoke with  regarding high blood sugar. Had hot chocolate for breakfast and glucose now 314. Took NPH this AM and about to eat lunch.  spoke with insurance/pharmacy and he would like to have rx for Levemir and Novolog vials sent to [facility].     Levemir 40 units BID, Novolog 26 units AC -- sent to pharmacy    If these are too expensive, can remain on NPH/Regular but I'll need logs to review before additional dose adjustments can be made. He verbalized understanding and will fax log over

## 2017-12-12 RX ORDER — INSULIN ASPART 100 [IU]/ML
26 INJECTION, SOLUTION INTRAVENOUS; SUBCUTANEOUS
Qty: 30 ML | Refills: 11
Start: 2017-12-12 | End: 2018-01-12 | Stop reason: SDUPTHER

## 2017-12-12 NOTE — TELEPHONE ENCOUNTER
Pt's  reports pt's glucoses have been running high.  Faxed log this am to [facility], [place].  Reports  this am and 262 before lunch.  States she has not missed any insulin doses.  Please advise.

## 2017-12-12 NOTE — TELEPHONE ENCOUNTER
Spoke with pt and . They feel BG have been higher since changing from Novolog to Novolin R, despite dose adjustments.  this AM; ate bagel. Glucose then 262 before lunch. Now, glucose is 115. Had broccoli soup and squash for lunch. They plan to  Levemir and Novolog from [facility].  For now, NPH 42 units BID, Regular insulin 32 units AC.   If changing back to Levemir/Novolog, Levemir 42 units BID, Novolog 26 units AC.    Call if BG remain >200.     Pt also states she is short winded. Chest pain occurs intermittently. She is currently in hospital after 's knee replacement. I advised her to go to ER if this worsens. She plans to call cardiologist's office.

## 2017-12-12 NOTE — TELEPHONE ENCOUNTER
----- Message from Queenie Bingham sent at 12/12/2017  3:08 PM CST -----  Contact:     needs to talk to nurse to get advise on patients high sugar  Please call back [phone]    Patient sugar is extremely high

## 2017-12-15 DIAGNOSIS — I10 GOOD HYPERTENSION CONTROL: ICD-10-CM

## 2017-12-15 RX ORDER — AMLODIPINE BESYLATE 2.5 MG/1
TABLET ORAL
Qty: 30 TABLET | Refills: 5 | Status: SHIPPED | OUTPATIENT
Start: 2017-12-15 | End: 2018-08-13 | Stop reason: SDUPTHER

## 2017-12-28 DIAGNOSIS — E78.2 MIXED HYPERLIPIDEMIA: ICD-10-CM

## 2017-12-28 RX ORDER — ROSUVASTATIN CALCIUM 40 MG/1
TABLET, COATED ORAL
Qty: 30 TABLET | Refills: 0 | Status: SHIPPED | OUTPATIENT
Start: 2017-12-28 | End: 2018-01-30 | Stop reason: SDUPTHER

## 2017-12-30 ENCOUNTER — LAB VISIT (OUTPATIENT)
Dept: LAB | Facility: HOSPITAL | Age: 72
End: 2017-12-30
Attending: FAMILY MEDICINE
Payer: MEDICARE

## 2017-12-30 DIAGNOSIS — E78.2 MIXED HYPERLIPIDEMIA: ICD-10-CM

## 2017-12-30 LAB
CHOLEST SERPL-MCNC: 158 MG/DL
CHOLEST/HDLC SERPL: 4.8 {RATIO}
HDLC SERPL-MCNC: 33 MG/DL
HDLC SERPL: 20.9 %
LDLC SERPL CALC-MCNC: 68.8 MG/DL
NONHDLC SERPL-MCNC: 125 MG/DL
TRIGL SERPL-MCNC: 281 MG/DL

## 2017-12-30 PROCEDURE — 80061 LIPID PANEL: CPT

## 2017-12-30 PROCEDURE — 36415 COLL VENOUS BLD VENIPUNCTURE: CPT | Mod: PO

## 2018-01-02 ENCOUNTER — TELEPHONE (OUTPATIENT)
Dept: ENDOCRINOLOGY | Facility: CLINIC | Age: 73
End: 2018-01-02

## 2018-01-02 DIAGNOSIS — N18.30 TYPE 2 DIABETES MELLITUS WITH STAGE 3 CHRONIC KIDNEY DISEASE, WITH LONG-TERM CURRENT USE OF INSULIN: ICD-10-CM

## 2018-01-02 DIAGNOSIS — Z79.4 TYPE 2 DIABETES MELLITUS WITH STAGE 3 CHRONIC KIDNEY DISEASE, WITH LONG-TERM CURRENT USE OF INSULIN: ICD-10-CM

## 2018-01-02 DIAGNOSIS — E11.22 TYPE 2 DIABETES MELLITUS WITH STAGE 3 CHRONIC KIDNEY DISEASE, WITH LONG-TERM CURRENT USE OF INSULIN: ICD-10-CM

## 2018-01-02 NOTE — TELEPHONE ENCOUNTER
Now back on Levemir 42 units twice daily. Still taking Novolin R 30 units w each meal. Was supposed to be taking 32 units.     Increase Novolin R to 36 AC. Increase levemir to 46 units BID. She may want to change back to Novolog. I advised her to let me know if she needs rx sent in and to send readings if she wants to make a change.     Discussed when to go to ER for hyperglycemia.

## 2018-01-02 NOTE — TELEPHONE ENCOUNTER
Spoke with pt who states her BS has not gone below 200 for the last two weeks - after starting Levemir  42 units BID and also Novolin R 30 units with meals (cheaper version from [employer] per pt).   Pt will be going to the library later today to fax her BS logs for Chaparro's review and guidance.  Pt informed that if BS goes >350 we recommend going to the ER for Insulin dosing and fluids.

## 2018-01-03 ENCOUNTER — OFFICE VISIT (OUTPATIENT)
Dept: CARDIOLOGY | Facility: CLINIC | Age: 73
End: 2018-01-03
Payer: MEDICARE

## 2018-01-03 VITALS
HEIGHT: 62 IN | BODY MASS INDEX: 36.44 KG/M2 | SYSTOLIC BLOOD PRESSURE: 132 MMHG | WEIGHT: 198 LBS | OXYGEN SATURATION: 96 % | HEART RATE: 82 BPM | DIASTOLIC BLOOD PRESSURE: 62 MMHG

## 2018-01-03 DIAGNOSIS — Z87.891 FORMER SMOKER: ICD-10-CM

## 2018-01-03 DIAGNOSIS — I10 ESSENTIAL HYPERTENSION: ICD-10-CM

## 2018-01-03 DIAGNOSIS — I25.119 CORONARY ARTERY DISEASE INVOLVING NATIVE CORONARY ARTERY OF NATIVE HEART WITH ANGINA PECTORIS: ICD-10-CM

## 2018-01-03 DIAGNOSIS — Z95.1 S/P CABG (CORONARY ARTERY BYPASS GRAFT): ICD-10-CM

## 2018-01-03 DIAGNOSIS — I11.9 HYPERTENSIVE LEFT VENTRICULAR HYPERTROPHY, WITHOUT HEART FAILURE: ICD-10-CM

## 2018-01-03 DIAGNOSIS — R06.00 DYSPNEA, UNSPECIFIED TYPE: ICD-10-CM

## 2018-01-03 PROCEDURE — 99214 OFFICE O/P EST MOD 30 MIN: CPT | Mod: S$GLB,,, | Performed by: INTERNAL MEDICINE

## 2018-01-03 PROCEDURE — 99999 PR PBB SHADOW E&M-EST. PATIENT-LVL III: CPT | Mod: PBBFAC,,, | Performed by: INTERNAL MEDICINE

## 2018-01-03 NOTE — PROGRESS NOTES
Subjective:    Patient ID:  Adele Hall is a 72 y.o. female who presents for follow-up of Coronary Artery Disease (increased JONES ); Hypertension; Hyperlipidemia; Shortness of Breath; and Palpitations      Pt here for f/u. Last seen a year ago. She has stopped exercising and has c/o worsening SOB and JONES. Also chest tightness and palpitations. She is not sure if symptoms are like her pre CABG symptoms. She had stopped smoking just prior to her CABG 2 years ago.         Review of Systems   Constitution: Positive for weakness, malaise/fatigue and weight gain. Negative for weight loss.   HENT: Negative.    Eyes: Negative.    Cardiovascular: Positive for chest pain, dyspnea on exertion and palpitations. Negative for claudication, cyanosis, irregular heartbeat, leg swelling, near-syncope, orthopnea (no PND) and syncope.   Respiratory: Positive for shortness of breath. Negative for cough, hemoptysis and snoring.    Endocrine: Negative.    Skin: Negative.    Musculoskeletal: Positive for muscle cramps, muscle weakness and myalgias. Negative for joint pain.   Gastrointestinal: Negative for diarrhea, hematemesis, nausea and vomiting.   Genitourinary: Negative.    Neurological: Negative for dizziness, focal weakness, light-headedness, loss of balance, numbness, paresthesias and seizures.   Psychiatric/Behavioral: Negative.         Objective:    Physical Exam   Constitutional: She appears well-developed and well-nourished.   Eyes: Pupils are equal, round, and reactive to light.   Neck: Normal range of motion. No thyromegaly present.   Cardiovascular: Normal rate, regular rhythm, S1 normal, S2 normal, normal heart sounds, intact distal pulses and normal pulses.   No extrasystoles are present. PMI is not displaced.  Exam reveals no friction rub.    No murmur heard.  Pulmonary/Chest: Effort normal and breath sounds normal. She has no wheezes. She has no rales. She exhibits no tenderness.   Abdominal: Soft. Bowel sounds  are normal. She exhibits no distension and no mass. There is no tenderness.   Musculoskeletal: Normal range of motion. She exhibits no edema.   Neurological: She is alert.   Skin: Skin is warm and dry.   Vitals reviewed.        Assessment:       1. Coronary artery disease involving native coronary artery of native heart with angina pectoris    2. S/P CABG (coronary artery bypass graft) - x4 01/19/2016; LIMA to LAD; SVG's to diag, OMB, PDA    3. Hypertensive left ventricular hypertrophy, without heart failure    4. Essential hypertension    5. Former smoker    6. Dyspnea, unspecified type         Plan:       We discussed her symptoms and proposed w/u.  Echo  SPECT stress   If abnormal she will need repeat angiogram  If above normal may suggest reevaluation with pulmonary

## 2018-01-08 ENCOUNTER — CLINICAL SUPPORT (OUTPATIENT)
Dept: CARDIOLOGY | Facility: CLINIC | Age: 73
End: 2018-01-08
Attending: INTERNAL MEDICINE
Payer: MEDICARE

## 2018-01-08 ENCOUNTER — HOSPITAL ENCOUNTER (OUTPATIENT)
Dept: RADIOLOGY | Facility: HOSPITAL | Age: 73
Discharge: HOME | End: 2018-01-08
Attending: INTERNAL MEDICINE
Payer: MEDICARE

## 2018-01-08 DIAGNOSIS — Z87.891 FORMER SMOKER: ICD-10-CM

## 2018-01-08 DIAGNOSIS — R06.00 DYSPNEA, UNSPECIFIED TYPE: ICD-10-CM

## 2018-01-08 DIAGNOSIS — I25.119 CORONARY ARTERY DISEASE INVOLVING NATIVE CORONARY ARTERY OF NATIVE HEART WITH ANGINA PECTORIS: ICD-10-CM

## 2018-01-08 DIAGNOSIS — Z95.1 S/P CABG (CORONARY ARTERY BYPASS GRAFT): ICD-10-CM

## 2018-01-08 DIAGNOSIS — I10 ESSENTIAL HYPERTENSION: ICD-10-CM

## 2018-01-08 DIAGNOSIS — I11.9 HYPERTENSIVE LEFT VENTRICULAR HYPERTROPHY, WITHOUT HEART FAILURE: ICD-10-CM

## 2018-01-08 PROCEDURE — 93306 TTE W/DOPPLER COMPLETE: CPT | Mod: S$GLB,,, | Performed by: INTERNAL MEDICINE

## 2018-01-08 PROCEDURE — 93015 CV STRESS TEST SUPVJ I&R: CPT | Mod: S$GLB,,, | Performed by: INTERNAL MEDICINE

## 2018-01-08 PROCEDURE — 78452 HT MUSCLE IMAGE SPECT MULT: CPT | Mod: 26,,, | Performed by: INTERNAL MEDICINE

## 2018-01-09 ENCOUNTER — TELEPHONE (OUTPATIENT)
Dept: CARDIOLOGY | Facility: CLINIC | Age: 73
End: 2018-01-09

## 2018-01-09 LAB
DIASTOLIC DYSFUNCTION: NO
DIASTOLIC DYSFUNCTION: YES
ESTIMATED PA SYSTOLIC PRESSURE: 26.43
RETIRED EF AND QEF - SEE NOTES: 65 (ref 55–65)
TRICUSPID VALVE REGURGITATION: ABNORMAL

## 2018-01-09 NOTE — TELEPHONE ENCOUNTER
Test(s) Reviewed  I have reviewed the following in detail:  [x] Stress test   [] Angiography   [x] Echocardiogram   [] Labs   [] Other:       Call Pt and tell her echo is ok.  The stress test is a bit abnormal and with her exertional SOB and chest tightness, she probably should have repeat angiogram.   Will be happy to see her in clinic to discuss or if she wishes we can set it up with Dr. Joy who did her pervious angiogram

## 2018-01-11 ENCOUNTER — TELEPHONE (OUTPATIENT)
Dept: CARDIOLOGY | Facility: CLINIC | Age: 73
End: 2018-01-11

## 2018-01-11 DIAGNOSIS — I25.10 CORONARY ARTERY DISEASE, ANGINA PRESENCE UNSPECIFIED, UNSPECIFIED VESSEL OR LESION TYPE, UNSPECIFIED WHETHER NATIVE OR TRANSPLANTED HEART: Primary | ICD-10-CM

## 2018-01-11 DIAGNOSIS — R94.39 ABNORMAL NUCLEAR STRESS TEST: Primary | ICD-10-CM

## 2018-01-11 DIAGNOSIS — R06.02 SOB (SHORTNESS OF BREATH) ON EXERTION: ICD-10-CM

## 2018-01-11 DIAGNOSIS — R07.9 CHEST PAIN, UNSPECIFIED TYPE: ICD-10-CM

## 2018-01-11 DIAGNOSIS — I25.10 CORONARY ARTERY DISEASE, ANGINA PRESENCE UNSPECIFIED, UNSPECIFIED VESSEL OR LESION TYPE, UNSPECIFIED WHETHER NATIVE OR TRANSPLANTED HEART: ICD-10-CM

## 2018-01-11 NOTE — TELEPHONE ENCOUNTER
Results given to patient - angiogram scheduled for mon jan 22 at 7am arrive for 5:30am. Instructions given to patient and spouse. verblalized knowledge.

## 2018-01-11 NOTE — TELEPHONE ENCOUNTER
----- Message from Geraldine Casanova sent at 1/11/2018 12:04 PM CST -----  Contact: Patient  Adele, patient [phone] calling to speak with office regarding test results from 1/8/18. Please advise. Thanks.

## 2018-01-12 DIAGNOSIS — Z79.4 TYPE 2 DIABETES MELLITUS WITH STAGE 3 CHRONIC KIDNEY DISEASE, WITH LONG-TERM CURRENT USE OF INSULIN: ICD-10-CM

## 2018-01-12 DIAGNOSIS — N18.30 TYPE 2 DIABETES MELLITUS WITH STAGE 3 CHRONIC KIDNEY DISEASE, WITH LONG-TERM CURRENT USE OF INSULIN: ICD-10-CM

## 2018-01-12 DIAGNOSIS — E11.22 TYPE 2 DIABETES MELLITUS WITH STAGE 3 CHRONIC KIDNEY DISEASE, WITH LONG-TERM CURRENT USE OF INSULIN: ICD-10-CM

## 2018-01-12 RX ORDER — INSULIN ASPART 100 [IU]/ML
26 INJECTION, SOLUTION INTRAVENOUS; SUBCUTANEOUS
Qty: 30 ML | Refills: 11 | Status: SHIPPED | OUTPATIENT
Start: 2018-01-12 | End: 2018-03-02 | Stop reason: SDUPTHER

## 2018-01-12 NOTE — TELEPHONE ENCOUNTER
----- Message from Beronica Posadas sent at 1/12/2018 12:36 PM CST -----  Please send Rx Novalog into [facility].  Please call patient when called in at [phone].

## 2018-01-13 ENCOUNTER — NURSE TRIAGE (OUTPATIENT)
Dept: ADMINISTRATIVE | Facility: CLINIC | Age: 73
End: 2018-01-13

## 2018-01-13 ENCOUNTER — HOSPITAL ENCOUNTER (EMERGENCY)
Facility: HOSPITAL | Age: 73
Discharge: ANOTHER HOSPITAL | End: 2018-01-13
Attending: EMERGENCY MEDICINE
Payer: MEDICARE

## 2018-01-13 VITALS
SYSTOLIC BLOOD PRESSURE: 178 MMHG | HEIGHT: 62 IN | TEMPERATURE: 98 F | HEART RATE: 72 BPM | RESPIRATION RATE: 18 BRPM | BODY MASS INDEX: 34.96 KG/M2 | WEIGHT: 190 LBS | DIASTOLIC BLOOD PRESSURE: 77 MMHG | OXYGEN SATURATION: 100 %

## 2018-01-13 DIAGNOSIS — R07.9 CHEST PAIN: ICD-10-CM

## 2018-01-13 DIAGNOSIS — I20.0 UNSTABLE ANGINA: Primary | ICD-10-CM

## 2018-01-13 LAB
ALBUMIN SERPL BCP-MCNC: 3.3 G/DL
ALP SERPL-CCNC: 95 U/L
ALT SERPL W/O P-5'-P-CCNC: 38 U/L
ANION GAP SERPL CALC-SCNC: 11 MMOL/L
AST SERPL-CCNC: 56 U/L
BASOPHILS # BLD AUTO: 0.1 K/UL
BASOPHILS NFR BLD: 1.9 %
BILIRUB SERPL-MCNC: 0.3 MG/DL
BNP SERPL-MCNC: 135 PG/ML
BUN SERPL-MCNC: 22 MG/DL
CALCIUM SERPL-MCNC: 9 MG/DL
CHLORIDE SERPL-SCNC: 103 MMOL/L
CK MB SERPL-MCNC: 3.4 NG/ML
CK MB SERPL-RTO: 1.2 %
CK SERPL-CCNC: 288 U/L
CO2 SERPL-SCNC: 25 MMOL/L
CREAT SERPL-MCNC: 1.3 MG/DL
DIFFERENTIAL METHOD: ABNORMAL
EOSINOPHIL # BLD AUTO: 0.2 K/UL
EOSINOPHIL NFR BLD: 2.5 %
ERYTHROCYTE [DISTWIDTH] IN BLOOD BY AUTOMATED COUNT: 14.3 %
EST. GFR  (AFRICAN AMERICAN): 48 ML/MIN/1.73 M^2
EST. GFR  (NON AFRICAN AMERICAN): 42 ML/MIN/1.73 M^2
GLUCOSE SERPL-MCNC: 287 MG/DL
HCT VFR BLD AUTO: 32 %
HGB BLD-MCNC: 10.6 G/DL
LYMPHOCYTES # BLD AUTO: 2.7 K/UL
LYMPHOCYTES NFR BLD: 36.4 %
MCH RBC QN AUTO: 28.7 PG
MCHC RBC AUTO-ENTMCNC: 33 G/DL
MCV RBC AUTO: 87 FL
MONOCYTES # BLD AUTO: 0.5 K/UL
MONOCYTES NFR BLD: 7.1 %
NEUTROPHILS # BLD AUTO: 3.8 K/UL
NEUTROPHILS NFR BLD: 52.1 %
PLATELET # BLD AUTO: 228 K/UL
PMV BLD AUTO: 8.9 FL
POTASSIUM SERPL-SCNC: 4.2 MMOL/L
PROT SERPL-MCNC: 7.1 G/DL
RBC # BLD AUTO: 3.68 M/UL
SODIUM SERPL-SCNC: 139 MMOL/L
TROPONIN I SERPL DL<=0.01 NG/ML-MCNC: 0.01 NG/ML
WBC # BLD AUTO: 7.3 K/UL

## 2018-01-13 PROCEDURE — 93005 ELECTROCARDIOGRAM TRACING: CPT

## 2018-01-13 PROCEDURE — 82553 CREATINE MB FRACTION: CPT

## 2018-01-13 PROCEDURE — 84484 ASSAY OF TROPONIN QUANT: CPT

## 2018-01-13 PROCEDURE — 36415 COLL VENOUS BLD VENIPUNCTURE: CPT

## 2018-01-13 PROCEDURE — 25000003 PHARM REV CODE 250: Performed by: EMERGENCY MEDICINE

## 2018-01-13 PROCEDURE — 85025 COMPLETE CBC W/AUTO DIFF WBC: CPT

## 2018-01-13 PROCEDURE — 99285 EMERGENCY DEPT VISIT HI MDM: CPT

## 2018-01-13 PROCEDURE — 80053 COMPREHEN METABOLIC PANEL: CPT

## 2018-01-13 PROCEDURE — 82550 ASSAY OF CK (CPK): CPT

## 2018-01-13 PROCEDURE — 83880 ASSAY OF NATRIURETIC PEPTIDE: CPT

## 2018-01-13 RX ORDER — ASPIRIN 325 MG
325 TABLET ORAL
Status: COMPLETED | OUTPATIENT
Start: 2018-01-13 | End: 2018-01-13

## 2018-01-13 RX ORDER — DIAZEPAM 2 MG/1
TABLET ORAL
Status: DISCONTINUED
Start: 2018-01-13 | End: 2018-01-14 | Stop reason: HOSPADM

## 2018-01-13 RX ORDER — DIAZEPAM 2 MG/1
2 TABLET ORAL
Status: COMPLETED | OUTPATIENT
Start: 2018-01-13 | End: 2018-01-13

## 2018-01-13 RX ADMIN — ASPIRIN 325 MG ORAL TABLET 325 MG: 325 PILL ORAL at 08:01

## 2018-01-13 RX ADMIN — NITROGLYCERIN 2 INCH: 20 OINTMENT TOPICAL at 09:01

## 2018-01-13 RX ADMIN — SODIUM CHLORIDE 500 ML: 900 INJECTION, SOLUTION INTRAVENOUS at 09:01

## 2018-01-13 RX ADMIN — DIAZEPAM 2 MG: 2 TABLET ORAL at 08:01

## 2018-01-14 PROBLEM — I24.9 ACS (ACUTE CORONARY SYNDROME): Status: ACTIVE | Noted: 2018-01-14

## 2018-01-14 NOTE — ED NOTES
Pt presents with c/o intermittent chest pressure and increased SOB on exertion x 1 week; elevated blood sugar today of 300. Placed on continuous cardiac monitor, pulse ox and NIBP.

## 2018-01-14 NOTE — TELEPHONE ENCOUNTER
"Sob, diabetic-sugar running high  Blood pressure 209/97. No home O2 , Sob with exertion. SOB started not long after CABG x 2 years ago. Spouse wants to know if he should take her to [facility] ED. rec ED due to SOB, chest tightness. call EMS if sx worsen.   Reason for Disposition   [1] MODERATE difficulty breathing (e.g., speaks in phrases, SOB even at rest, pulse 100-120) AND [2] NEW-onset or WORSE than normal    Answer Assessment - Initial Assessment Questions  1. RESPIRATORY STATUS: "Describe your breathing?" (e.g., wheezing, shortness of breath, unable to speak, severe coughing)      SOB- cant walk 20 feet without chest tightening up. Stress test irregular. Angiogram.    Protocols used: ST BREATHING DIFFICULTY-A-AH      "

## 2018-01-14 NOTE — ED PROVIDER NOTES
Encounter Date: 1/13/2018    SCRIBE #1 NOTE: I, Martin Bland, am scribing for, and in the presence of, Dr. Flores.       History     Chief Complaint   Patient presents with    Shortness of Breath    Chest Pain     Intermittent CP x months with Abnormal stress test last week.  Scheduled for angio 1/22    Anxiety       01/13/2018 8:53 PM     Chief complaint: Chest pain      Adele Hall is a 72 y.o. female with a PMHx of DM, GERD, HTN, HLD,CAD, COPD, CKD, and tardive dyskinesia who presents to the ED c/o chest pain. She has abnormal stress test last week. She states her pain has been waxing and waning for 2 years but has acutely worsened over the last week. Her pain is described as a tight feeling that is exacerbated with exertion and at night. She has also been experiencing SOB without wheezing or productive cough. She states she came in today because her blood pressure was really high. She is allergic to metoclopramide. She has previously had quadruple bypass surgery January 19 2 years ago.  She scheduled to receive an angiogram in 7 days.      The history is provided by the patient.     Review of patient's allergies indicates:   Allergen Reactions    Reglan [metoclopramide hcl]      Depression and weight loss.      Past Medical History:   Diagnosis Date    Anesthesia complication     took patient 6 days to come out of anethesia after CABG    Anticoagulant long-term use     Arthritis     Chronic kidney disease     COPD (chronic obstructive pulmonary disease) 2/7/2016    COPD (chronic obstructive pulmonary disease)     Coronary artery disease     Diabetes mellitus     Diabetes mellitus, type 2     GERD (gastroesophageal reflux disease)     Hyperlipidemia     Hypertension     possible new onset CHF 7/30/2016    Tardive dyskinesia     Thyroid disease     Ulcer     Vertigo      Past Surgical History:   Procedure Laterality Date    CARDIAC SURGERY      CHOLECYSTECTOMY  01/26/2017     COLONOSCOPY      CORONARY ARTERY BYPASS GRAFT  01/19/2016    4 vessel    HYSTERECTOMY      OOPHORECTOMY      TONSILLECTOMY      TOTAL THYROIDECTOMY      TUBAL LIGATION       Family History   Problem Relation Age of Onset    Cancer Mother      LYMPHOMA    Cancer Father     Early death Father     Heart disease Paternal Uncle     Alcohol abuse Sister      x2    Heart disease Brother     No Known Problems Son     No Known Problems Maternal Aunt     No Known Problems Maternal Uncle     No Known Problems Maternal Grandmother     No Known Problems Maternal Grandfather     No Known Problems Paternal Grandmother     No Known Problems Paternal Grandfather     No Known Problems Brother      Social History   Substance Use Topics    Smoking status: Former Smoker     Packs/day: 1.00     Years: 27.00     Types: Cigarettes     Quit date: 1/19/2016    Smokeless tobacco: Never Used    Alcohol use No     Review of Systems   Constitutional: Negative for fever.   HENT: Negative for congestion.    Eyes: Negative for visual disturbance.   Respiratory: Positive for shortness of breath. Negative for wheezing.    Cardiovascular: Positive for chest pain.   Gastrointestinal: Negative for abdominal pain.   Genitourinary: Negative for dysuria.   Musculoskeletal: Negative for joint swelling.   Skin: Negative for rash.   Neurological: Negative for syncope.   Hematological: Does not bruise/bleed easily.   Psychiatric/Behavioral: Negative for confusion.       Physical Exam     Initial Vitals [01/13/18 2026]   BP Pulse Resp Temp SpO2   (!) 196/79 72 19 97.7 °F (36.5 °C) 97 %      MAP       118         Physical Exam    Constitutional: She appears well-nourished.   HENT:   Head: Normocephalic and atraumatic.   Eyes: Conjunctivae and EOM are normal.   Neck: Normal range of motion. Neck supple. No thyroid mass present.   Cardiovascular: Normal rate and regular rhythm.   Chest tightness   Abdominal: Soft. Normal appearance and bowel  sounds are normal. There is no tenderness.   Neurological: She is alert and oriented to person, place, and time. She has normal strength. No cranial nerve deficit or sensory deficit.   Skin: Skin is warm and dry. No rash noted. No erythema.   Psychiatric: She has a normal mood and affect. Her speech is normal. Cognition and memory are normal.         ED Course   Procedures  Labs Reviewed   CBC W/ AUTO DIFFERENTIAL   COMPREHENSIVE METABOLIC PANEL   TROPONIN I   B-TYPE NATRIURETIC PEPTIDE   CK-MB     EKG Readings: (Independently Interpreted)   Normal sinus rhythm, 73 bpm, normal axis, normal intervals, left ventricular hypertrophy and repolarization abnormality, interval worsening of sagging ST segments in anterior and lateral precordial leads when compared to EKG 5 days ago, repeat EKG with posterior leads did not indicate ST elevation in V4 through V6          Medical Decision Making:   History:   Old Medical Records: I decided to obtain old medical records.  Clinical Tests:   Lab Tests: Ordered and Reviewed  Radiological Study: Ordered and Reviewed  Medical Tests: Ordered and Reviewed  ED Management:  This patient was interviewed and examined emergently.  EKGs initially concerning for potential posterior STEMI and they were reviewed with the on-call interventional list, Dr. Alanis.  In light of no posterior lead elevation, he did not think this represents a posterior STEMI. She reports xperiencing waxing and waning chest tightness and her reports of acutely worsening shortness of breath, predominantly with exertion is concerning for worsening anginal equivalent, unstable at times. Patient received improvement in symptoms with aspirin, nitroglycerin paste, and oral Valium.  Lab analyses, including troponin and BNP are unremarkable.  Chest x-ray reveals no focal lung disease. Low suspicion for symptoms attributable to PE.  Considering the patient is scheduled to receive an angiogram and is currently having worsening  symptoms, I do think she warrants transfer to the facility with her cardiologists.  Patient is additionally requesting this.  Case was discussed with the accepting emergency medicine charge nurse at [facility] who accepted the patient.  Patient updated on the plan of care and she was transferred per EMS in stable condition.            Scribe Attestation:   Scribe #1: I performed the above scribed service and the documentation accurately describes the services I performed. I attest to the accuracy of the note.    Attending Attestation:         Attending Critical Care:   Critical Care Times:   Direct Patient Care (initial evaluation, reassessments, and time considering the case)................................................................15 minutes.   Additional History from reviewing old medical records or taking additional history from the family, EMS, PCP, etc.......................5 minutes.   Ordering, Reviewing, and Interpreting Diagnostic Studies...............................................................................................................5 minutes.   Documentation..................................................................................................................................................................................5 minutes.   Consultation with other Physicians. .................................................................................................................................................5 minutes.   Consultation with the patient's family directly relating to the patient's condition, care, and DNR status (when patient unable)......5 minutes.   Other..................................................................................................................................................................................................10 minutes.   ==============================================================  · Total Critical Care Time  - exclusive of procedural time: 50 minutes.  ==============================================================  Critical care was necessary to treat or prevent imminent or life-threatening deterioration of the following conditions: myocardial infarction.   The following critical care procedures were done by me (see procedure notes): pulse oximetry.   Critical care was time spent personally by me on the following activities: obtaining history from patient or relative, examination of patient, review of old charts, ordering lab, x-rays, and/or EKG, development of treatment plan with patient or relative, ordering and performing treatments and interventions, evaluation of patient's response to treatment, interpretation of cardiac measurements and re-evaluation of patient's conition.   Critical Care Condition: life-threatening       I, Dr. Mike Flores, personally performed the services described in this documentation. All medical record entries made by the scribe were at my direction and in my presence.  I have reviewed the chart and agree that the record reflects my personal performance and is accurate and complete. Mike Flores MD.  11:48 PM 01/13/2018          ED Course      Clinical Impression:   The primary encounter diagnosis was Unstable angina. A diagnosis of Chest pain was also pertinent to this visit.    Disposition:   Disposition: Transferred  Condition: Serious                        Mike Flores MD  01/13/18 2104

## 2018-01-15 PROBLEM — I25.118 CORONARY ARTERY DISEASE OF NATIVE ARTERY OF NATIVE HEART WITH STABLE ANGINA PECTORIS: Status: ACTIVE | Noted: 2018-01-14

## 2018-01-15 PROBLEM — R07.89 OTHER CHEST PAIN: Status: ACTIVE | Noted: 2018-01-14

## 2018-01-24 ENCOUNTER — DOCUMENTATION ONLY (OUTPATIENT)
Dept: FAMILY MEDICINE | Facility: CLINIC | Age: 73
End: 2018-01-24

## 2018-01-24 NOTE — PROGRESS NOTES
Pre-Visit Chart Review  For Appointment Scheduled on 1-30-18    Health Maintenance Due   Topic Date Due    TETANUS VACCINE  06/02/1965    Colonoscopy  06/02/1997    Zoster Vaccine  06/02/2007    Influenza Vaccine  08/01/2017

## 2018-01-30 ENCOUNTER — OFFICE VISIT (OUTPATIENT)
Dept: FAMILY MEDICINE | Facility: CLINIC | Age: 73
End: 2018-01-30
Attending: FAMILY MEDICINE
Payer: MEDICARE

## 2018-01-30 VITALS
RESPIRATION RATE: 24 BRPM | SYSTOLIC BLOOD PRESSURE: 136 MMHG | HEIGHT: 61 IN | OXYGEN SATURATION: 96 % | DIASTOLIC BLOOD PRESSURE: 60 MMHG | WEIGHT: 196.44 LBS | TEMPERATURE: 98 F | HEART RATE: 68 BPM | BODY MASS INDEX: 37.09 KG/M2

## 2018-01-30 DIAGNOSIS — I27.20 PULMONARY HYPERTENSION: ICD-10-CM

## 2018-01-30 DIAGNOSIS — E78.2 MIXED HYPERLIPIDEMIA: ICD-10-CM

## 2018-01-30 DIAGNOSIS — I10 GOOD HYPERTENSION CONTROL: ICD-10-CM

## 2018-01-30 DIAGNOSIS — I10 HYPERTENSION, POOR CONTROL: Primary | ICD-10-CM

## 2018-01-30 DIAGNOSIS — J98.4 RESTRICTIVE LUNG DISEASE: ICD-10-CM

## 2018-01-30 DIAGNOSIS — I50.32 CHRONIC DIASTOLIC CONGESTIVE HEART FAILURE: ICD-10-CM

## 2018-01-30 DIAGNOSIS — Z95.1 HX OF CABG: ICD-10-CM

## 2018-01-30 DIAGNOSIS — I25.118 CORONARY ARTERY DISEASE OF NATIVE ARTERY OF NATIVE HEART WITH STABLE ANGINA PECTORIS: ICD-10-CM

## 2018-01-30 DIAGNOSIS — M75.42 ROTATOR CUFF IMPINGEMENT SYNDROME OF LEFT SHOULDER: ICD-10-CM

## 2018-01-30 PROCEDURE — 99499 UNLISTED E&M SERVICE: CPT | Mod: S$GLB,,, | Performed by: FAMILY MEDICINE

## 2018-01-30 PROCEDURE — 99999 PR PBB SHADOW E&M-EST. PATIENT-LVL IV: CPT | Mod: PBBFAC,,, | Performed by: FAMILY MEDICINE

## 2018-01-30 PROCEDURE — 3008F BODY MASS INDEX DOCD: CPT | Mod: S$GLB,,, | Performed by: FAMILY MEDICINE

## 2018-01-30 PROCEDURE — 99214 OFFICE O/P EST MOD 30 MIN: CPT | Mod: S$GLB,,, | Performed by: FAMILY MEDICINE

## 2018-01-30 PROCEDURE — 1125F AMNT PAIN NOTED PAIN PRSNT: CPT | Mod: S$GLB,,, | Performed by: FAMILY MEDICINE

## 2018-01-30 PROCEDURE — 1159F MED LIST DOCD IN RCRD: CPT | Mod: S$GLB,,, | Performed by: FAMILY MEDICINE

## 2018-01-30 RX ORDER — LISINOPRIL 20 MG/1
20 TABLET ORAL DAILY
Qty: 30 TABLET | Refills: 11 | Status: SHIPPED | OUTPATIENT
Start: 2018-01-30 | End: 2019-02-21 | Stop reason: SDUPTHER

## 2018-01-30 RX ORDER — ROSUVASTATIN CALCIUM 40 MG/1
TABLET, COATED ORAL
Qty: 30 TABLET | Refills: 11 | Status: SHIPPED | OUTPATIENT
Start: 2018-01-30 | End: 2019-02-04 | Stop reason: SDUPTHER

## 2018-01-30 NOTE — PROGRESS NOTES
Subjective:       Patient ID: Adele Hall is a 72 y.o. female.    Chief Complaint: Hospital Follow Up    72-year-old female coming in for a follow-up from a brief hospitalization at [facility] January 14 and 15.  She presented to the emergency room with dyspnea on exertion and light chest pain with previous plans to do a left heart catheter as an outpatient following failure on a nuclear stress test.  The patient was admitted and serial enzymes were done and she proceeded to have the left heart catheter on January 15 before discharge finding patent stents and patent grafts and the decision made for medical treatment.  The patient did have an echocardiogram with ejection fraction of 60-65% she had mild pulmonary hypertension with pulmonary artery pressure about 26 and she had grade 2 diastolic dysfunction.  The patient continues to complain of shortness of breath with minimal exertion.  Getting up out of a chair and walking across the room believes her panting and hyperventilating.  She has difficulty getting up onto the exam table and even has difficulty getting up to a sitting position after lying supine.  She has started going to the gym where she spending 15 minutes on a bike but she doesn't tolerate any other exercises.  History includes COPD with recent pulmonary function testing by Dr. Torres showing restrictive disease but no obstruction and this is felt likely due to her weight.  She does have diabetes hypertension hyperlipidemia along with coronary artery disease bypass surgery diastolic dysfunction and chronic kidney disease.    Past Medical History:  No date: Anesthesia complication      Comment: took patient 6 days to come out of anethesia                after CABG  No date: Anticoagulant long-term use  No date: Arthritis  No date: Chronic kidney disease  2/7/2016: COPD (chronic obstructive pulmonary disease)  No date: COPD (chronic obstructive pulmonary disease)  No date:  Coronary artery disease  No date: Diabetes mellitus  No date: Diabetes mellitus, type 2  No date: GERD (gastroesophageal reflux disease)  No date: Hyperlipidemia  No date: Hypertension  7/30/2016: possible new onset CHF  No date: Tardive dyskinesia  No date: Thyroid disease  No date: Ulcer  No date: Vertigo    Past Surgical History:  No date: CARDIAC SURGERY  01/26/2017: CHOLECYSTECTOMY  No date: COLONOSCOPY  01/19/2016: CORONARY ARTERY BYPASS GRAFT      Comment: 4 vessel  No date: HYSTERECTOMY  No date: OOPHORECTOMY  No date: TONSILLECTOMY  No date: TOTAL THYROIDECTOMY  No date: TUBAL LIGATION      Current Outpatient Prescriptions:     albuterol 90 mcg/actuation inhaler, Inhale 2 puffs into the lungs every 6 (six) hours as needed for Wheezing., Disp: 1 each, Rfl: 11    amLODIPine (NORVASC) 2.5 MG tablet, TAKE ONE TABLET BY MOUTH EVERY DAY, Disp: 30 tablet, Rfl: 5    aspirin (ECOTRIN) 81 MG EC tablet, Take 81 mg by mouth once daily., Disp: , Rfl:     BD INSULIN SYRINGE ULTRA-FINE 1 mL 31 gauge x 5/16 Syrg, USE AS DIRECTED 5 TIMES A DAY WITH LEVEMIR & NOVOLOG, Disp: 200 each, Rfl: 10    blood sugar diagnostic Strp, Check blood glucose 4 times a day - before meals and at bedtime. Please dispense testing supplies for true metrix. Dx code e11.65, Disp: 400 strip, Rfl: 3    ergocalciferol (VITAMIN D2) 50,000 unit Cap, TAKE ONE CAPSULE BY MOUTH EVERY SEVEN DAYS, Disp: 4 capsule, Rfl: 11    fenofibrate micronized (LOFIBRA) 134 MG Cap, TAKE ONE CAPSULE BY MOUTH EVERYDAY WITH BREAKFAST, Disp: 30 capsule, Rfl: 10    insulin aspart (NOVOLOG) 100 unit/mL injection, Inject 26 Units into the skin 3 (three) times daily before meals. (Patient taking differently: Inject 30 Units into the skin 3 (three) times daily before meals. ), Disp: 30 mL, Rfl: 11    insulin detemir (LEVEMIR) 100 unit/mL injection, Inject 46 Units into the skin 2 (two) times daily., Disp: 30 mL, Rfl: 11    lancets Misc, Check blood glucose 4x/day.  "Please dispense testing supplies for true metrix. Dx code e11.65, Disp: 400 each, Rfl: 3    levothyroxine (SYNTHROID) 100 MCG tablet, TAKE ONE TABLET BY MOUTH EVERY DAY, Disp: 30 tablet, Rfl: 6    meclizine (ANTIVERT) 25 mg tablet, Take 1 tablet (25 mg total) by mouth 3 (three) times daily as needed., Disp: 20 tablet, Rfl: 0    metoprolol tartrate (LOPRESSOR) 25 MG tablet, Take 0.5 tablets (12.5 mg total) by mouth 2 (two) times daily., Disp: 30 tablet, Rfl: 11    ondansetron (ZOFRAN) 8 MG tablet, Take 1 tablet (8 mg total) by mouth every 8 (eight) hours as needed for Nausea., Disp: 30 tablet, Rfl: 1    pantoprazole (PROTONIX) 40 MG tablet, Take 1 tablet (40 mg total) by mouth once daily., Disp: 30 tablet, Rfl: 11    pen needle, diabetic (NOVOFINE PLUS) 32 gauge x 1/6" Ndle, 1 Device by Misc.(Non-Drug; Combo Route) route 6 (six) times daily., Disp: 600 each, Rfl: 3    pramipexole (MIRAPEX) 0.125 MG tablet, TAKE TWO TABLETS BY MOUTH AT BEDTIME AS NEEDED, Disp: 60 tablet, Rfl: 10    rosuvastatin (CRESTOR) 40 MG Tab, TAKE ONE TABLET BY MOUTH EVERY EVENING, Disp: 30 tablet, Rfl: 0    blood-glucose meter kit, Use as instructed. Please dispense testing supplies for true metrix. Dx code e11.65, Disp: 1 each, Rfl: 0    lisinopril (PRINIVIL,ZESTRIL) 10 MG tablet, Take 1 tablet (10 mg total) by mouth once daily., Disp: 30 tablet, Rfl: 11          Review of Systems   Constitutional: Negative for chills, diaphoresis and fever.   Respiratory: Positive for chest tightness and shortness of breath. Negative for cough and wheezing.    Cardiovascular: Negative for chest pain and palpitations.   Musculoskeletal: Positive for arthralgias (Severe left shoulder dysfunction limiting  her exercise capacity and interrupting sleep) and back pain. Negative for joint swelling.       Objective:      Physical Exam   Constitutional: She appears well-developed. No distress.   Borderline blood pressure  Obese with BMI 37.1 she is up 10.7 " pounds from her last visit with me May 30, 2017   HENT:   Head: Normocephalic and atraumatic.   Neck: Normal range of motion. Neck supple. No JVD present. No tracheal deviation present. No thyromegaly present.   Cardiovascular: Normal rate, regular rhythm and normal heart sounds.  Exam reveals no gallop and no friction rub.    No murmur heard.  Pulmonary/Chest: Effort normal and breath sounds normal. Tachypnea (Very prone to hyperventilation) noted. No respiratory distress. She has no decreased breath sounds. She has no wheezes. She has no rhonchi. She has no rales. Chest wall is not dull to percussion. She exhibits no tenderness, no crepitus and no retraction.   Abdominal: Soft. Bowel sounds are normal. She exhibits no distension and no mass. There is no tenderness. There is no rebound and no guarding. No hernia.   Musculoskeletal:        Right shoulder: Normal.        Left shoulder: She exhibits decreased range of motion (She gets impingement at about 145° abduction and flexion), tenderness (Tenderness over the AC joint) and spasm. She exhibits no swelling, no effusion, no crepitus and no deformity.   Lymphadenopathy:     She has no cervical adenopathy.   Skin: She is not diaphoretic.   Nursing note and vitals reviewed.      Assessment:       1. Hypertension, poor control    2. Pulmonary hypertension    3. Restrictive lung disease    4. Chronic diastolic congestive heart failure    5. Rotator cuff impingement syndrome of left shoulder    6. Good hypertension control    7. Coronary artery disease of native artery of native heart with stable angina pectoris    8. Hx of CABG-  1/19/16 x 4    9. BMI 37.0-37.9, adult        Plan:       1. Hypertension, poor control  Increase lisinopril to 20 mg daily    2. Pulmonary hypertension    3. Restrictive lung disease  Followed by Dr. Torres, encourage weight loss which should improve the restriction    4. Chronic diastolic congestive heart failure  Increase lisinopril to 20 mg  daily    5. Rotator cuff impingement syndrome of left shoulder  - Ambulatory referral to Physical Medicine Rehab  - X-Ray Shoulder 2 or More Views Left; Future    6. Good hypertension control  - lisinopril (PRINIVIL,ZESTRIL) 20 MG tablet; Take 1 tablet (20 mg total) by mouth once daily.  Dispense: 30 tablet; Refill: 11    7. Coronary artery disease of native artery of native heart with stable angina pectoris    8. Hx of CABG-  1/19/16 x 4    9. BMI 37.0-37.9, adult         Patient readiness: acceptance and barriers:none    During the course of the visit the patient was educated and counseled about the following:     Hypertension:   Medication: Increase lisinopril to 20 mg daily.  Dietary sodium restriction.  Regular aerobic exercise.  Obesity:   General weight loss/lifestyle modification strategies discussed (elicit support from others; identify saboteurs; non-food rewards, etc).  Informal exercise measures discussed, e.g. taking stairs instead of elevator.  Regular aerobic exercise program discussed.    Goals: Hypertension: Reduce Blood Pressure and Obesity: Reduce calorie intake and BMI    Did patient meet goals/outcomes: No    The following self management tools provided: blood pressure log  excercise log    Patient Instructions (the written plan) was given to the patient/family.     Time spent with patient: 45 minutes

## 2018-01-30 NOTE — PATIENT INSTRUCTIONS
Weight Management: Getting Started  Healthy bodies come in all shapes and sizes. Not all bodies are made to be thin. For some people, a healthy weight is higher than the average weight listed on weight charts. Your healthcare provider can help you decide on a healthy weight for you.    Reasons to lose weight  Losing weight can help with some health problems, such as high blood pressure, heart disease, diabetes, sleep apnea, and arthritis. You may also feel more energy.  Set your long-term goal  Your goal doesn't even have to be a specific weight. You may decide on a fitness goal (such as being able to walk 10 miles a week), or a health goal (such as lowering your blood pressure). Choose a goal that is measurable and reasonable, so you know when you've reached it. A goal of reaching a BMI of less than 25 is not always reasonable (or possible).   Make an action plan  Habits dont change overnight. Setting your goals too high can leave you feeling discouraged if you cant reach them. Be realistic. Choose one or two small changes you can make now. Set an action plan for how you are going to make these changes. When you can stick to this plan, keep making a few more small changes. Taking small steps will help you stay on the path to success.  Track your progress  Write down your goals. Then, keep a daily record of your progress. Write down what you eat and how active you are. This record lets you look back on how much youve done. It may also help when youre feeling frustrated. Reward yourself for success. Even if you dont reach every goal, give yourself credit for what you do get done.  Get support  Encouragement from others can help make losing weight easier. Ask your family members and friends for support. They may even want to join you. Also look to your healthcare provider, registered dietitian, and  for help. Your local hospital can give you more information about nutrition, exercise, and  weight loss.  Date Last Reviewed: 1/31/2016 © 2000-2017 [employer]. [address] [place] [address], [place] [address]. All rights reserved. This information is not intended as a substitute for professional medical care. Always follow your healthcare professional's instructions.        Diabetes (General Information)  Diabetes is a long-term health problem. It means your body does not make enough insulin. Or it may mean that your body cannot use the insulin it makes. Insulin is a hormone in your body. It lets blood sugar (glucose) reach the cells in your body. All of your cells need glucose for fuel.  When you have diabetes, the glucose in your blood builds up because it cannot get into the cells. This buildup is called high blood sugar (hyperglycemia).  Your blood sugar level depends on several things. It depends on what kind of food you eat and how much of it you eat. It also depends on how much exercise you get, and how much insulin you have in your body. Eating too much of the wrong kinds of food or not taking diabetes medicine on time can cause high blood sugar. Infections can cause high blood sugar even if you are taking medicines correctly.  These things can also cause low blood sugar:  · Missing meals  · Not eating enough food  · Taking too much diabetes medicine  Diabetes can cause serious problems over time if you do not get treated. These problems include heart disease, stroke, kidney failure, and blindness. They also include nerve pain or loss of feeling in your legs and feet, and gangrene of the feet. By keeping your blood sugar under control you can prevent or delay these problems.  Normal blood sugar levels are 80 to 100 before a meal and less than 180 in the 1 to 2 hours after a meal.  Home care  Follow these guidelines when caring for yourself at home:  · Follow the diet your healthcare provider gives you. Take insulin or other diabetes medicine exactly as told to.  · Watch your blood sugar as  you are told to. Keep a log of your results. This will help your provider change your medicines to keep your blood sugar under control.  · Try to reach your ideal weight. You may be able to cut back on or not have to take diabetes medicine if you eat the right foods and get exercise.  · Do not smoke. Smoking worsens the effects of diabetes on your circulation. You are much more likely to have a heart attack if you have diabetes and you smoke.  · Take good care of your feet. If you have lost feeling in your feet, you may not see an injury or infection. Check your feet and between your toes at least once a week.  · Wear a medical alert bracelet or necklace, or carry a card in your wallet that says you have diabetes. This will help healthcare providers give you the right care if you get very ill and cannot tell them that you have diabetes.  Sick day plan  If you get a cold, the flu, or a bacterial or viral infection, take these steps:  · Look at your diabetes sick plan and call your healthcare provider as you were told to. You may need to call your provider right away if:  ¨ Your blood sugar is above 240 while taking your diabetes medicine  ¨ Your urine ketone levels are above normal or high  ¨ You have been vomiting more than 6 hours  ¨ You have trouble breathing or your breath ha s a fruity smell  ¨ You have a high fever  ¨ You have a fever for several days and you are not getting better  ¨ You get light-headed and are sleepier than usual  · Keep taking your diabetes pills (oral medicine) even if you have been vomiting and are feeling sick. Call your provider right away because you may need insulin to lower your blood sugar until you recover from your illness.  · Keep taking your insulin even if you have been vomiting and are feeling sick. Call your provider right away to ask if you need to change your insulin dose. This will depend on your blood sugar results.  · Check your blood sugar every 2 to 4 hours, or at  least 4 times a day.  · Check your ketones often. If you are vomiting and having diarrhea, watch them more often.  · Do not skip meals. Try to eat small meals on a regular schedule. Do this even if you do not feel like eating.  · Drink water or other liquids that do not have caffeine or calories. This will keep you from getting dehydrated. If you are nauseated or vomiting, takes small sips every 5 minutes. To prevent dehydration try to drink a cup (8 ounces) of fluids every hour while you are awake.  General care  Always bring a source of fast-acting sugar with you in case you have symptoms of low blood sugar (below 70). At the first sign of low blood sugar, eat or drink 15 to 20 grams of fast-acting sugar to raise your blood sugar. Examples are:  · 3 to 4 glucose tablets. You can buy these at most [employer].  · 4 ounces (1/2 cup) of regular (not diet) soft drinks  · 4 ounces (1/2 cup) of any fruit juice  · 8 ounces (1 cup) of milk  · 5 to 6 pieces of hard candy  · 1 tablespoon of honey  Check your blood sugar 15 minutes after treating yourself. If it is still below 70, take 15 to 20 more grams of fast-acting sugar. Test again in 15 minutes. If it returns to normal (70 or above), eat a snack or meal to keep your blood sugar in a safe range. If it stays low, call your doctor or go to an emergency room.  Follow-up care  Follow-up with your healthcare provider, or as advised. For more information about diabetes, visit the American Diabetes Association website at www.diabetes.org or call 807-982-9090.  When to seek medical advice  Call your healthcare provider right away if you have any of these symptoms of high blood sugar:  · Frequent urination  · Dizziness  · Drowsiness  · Thirst  · Headache  · Nausea or vomiting  · Abdominal pain  · Eyesight changes  · Fast breathing  · Confusion or loss of consciousness  Also call your provider right away if you have any of these signs of low blood  sugar:  · Fatigue  · Headache  · Shakes  · Excess sweating  · Hunger  · Feeling anxious or restless  · Eyesight changes  · Drowsiness  · Weakness  · Confusion or loss of consciousness  Call 911  Call for emergency help right away if any of these occur:  · Chest pain or shortness of breath  · Dizziness or fainting  · Weakness of an arm or leg or one side of the face  · Trouble speaking or seeing   Date Last Reviewed: 6/1/2016  © 6027-7176 [employer]. [address] [place] [address]. All rights reserved. This information is not intended as a substitute for professional medical care. Always follow your healthcare professional's instructions.

## 2018-01-31 ENCOUNTER — HOSPITAL ENCOUNTER (OUTPATIENT)
Dept: RADIOLOGY | Facility: CLINIC | Age: 73
Discharge: HOME | End: 2018-01-31
Attending: FAMILY MEDICINE
Payer: MEDICARE

## 2018-01-31 DIAGNOSIS — M75.42 ROTATOR CUFF IMPINGEMENT SYNDROME OF LEFT SHOULDER: ICD-10-CM

## 2018-01-31 PROCEDURE — 73030 X-RAY EXAM OF SHOULDER: CPT | Mod: 26,LT,S$GLB, | Performed by: RADIOLOGY

## 2018-01-31 PROCEDURE — 73030 X-RAY EXAM OF SHOULDER: CPT | Mod: TC,FY,PO,LT

## 2018-01-31 NOTE — PROGRESS NOTES
Patient, Adele Hall (MRN #[id number]), presented with a recorded BMI of 37.12 kg/m^2 and a documented comorbidity(s):  - Hypertension  - Atrial Fibrillation  to which the severe obesity is a contributing factor. This is consistent with the definition of severe obesity (BMI 35.0-35.9) with comorbidity (ICD-10 E66.01, Z68.35). The patient's severe obesity was monitored, evaluated, addressed and/or treated. This addendum to the medical record is made on 01/30/2018.

## 2018-02-05 ENCOUNTER — INITIAL CONSULT (OUTPATIENT)
Dept: PHYSICAL MEDICINE AND REHAB | Facility: CLINIC | Age: 73
End: 2018-02-05
Attending: FAMILY MEDICINE
Payer: MEDICARE

## 2018-02-05 ENCOUNTER — TELEPHONE (OUTPATIENT)
Dept: ENDOCRINOLOGY | Facility: CLINIC | Age: 73
End: 2018-02-05

## 2018-02-05 ENCOUNTER — TELEPHONE (OUTPATIENT)
Dept: FAMILY MEDICINE | Facility: CLINIC | Age: 73
End: 2018-02-05

## 2018-02-05 VITALS
DIASTOLIC BLOOD PRESSURE: 81 MMHG | SYSTOLIC BLOOD PRESSURE: 173 MMHG | BODY MASS INDEX: 37 KG/M2 | HEIGHT: 61 IN | HEART RATE: 64 BPM | WEIGHT: 196 LBS

## 2018-02-05 DIAGNOSIS — M75.42 ROTATOR CUFF IMPINGEMENT SYNDROME OF LEFT SHOULDER: ICD-10-CM

## 2018-02-05 DIAGNOSIS — G89.29 CHRONIC LEFT SHOULDER PAIN: Primary | ICD-10-CM

## 2018-02-05 DIAGNOSIS — M25.512 CHRONIC LEFT SHOULDER PAIN: Primary | ICD-10-CM

## 2018-02-05 DIAGNOSIS — G47.00 INSOMNIA, UNSPECIFIED TYPE: Primary | ICD-10-CM

## 2018-02-05 DIAGNOSIS — M67.912 TENDINOPATHY OF LEFT ROTATOR CUFF: ICD-10-CM

## 2018-02-05 PROCEDURE — 20611 DRAIN/INJ JOINT/BURSA W/US: CPT | Mod: LT,S$GLB,, | Performed by: PHYSICAL MEDICINE & REHABILITATION

## 2018-02-05 PROCEDURE — 1125F AMNT PAIN NOTED PAIN PRSNT: CPT | Mod: S$GLB,,, | Performed by: PHYSICAL MEDICINE & REHABILITATION

## 2018-02-05 PROCEDURE — 76881 US COMPL JOINT R-T W/IMG: CPT | Mod: 26,59,S$GLB, | Performed by: PHYSICAL MEDICINE & REHABILITATION

## 2018-02-05 PROCEDURE — 99204 OFFICE O/P NEW MOD 45 MIN: CPT | Mod: 25,S$GLB,, | Performed by: PHYSICAL MEDICINE & REHABILITATION

## 2018-02-05 PROCEDURE — 3008F BODY MASS INDEX DOCD: CPT | Mod: S$GLB,,, | Performed by: PHYSICAL MEDICINE & REHABILITATION

## 2018-02-05 PROCEDURE — 1159F MED LIST DOCD IN RCRD: CPT | Mod: S$GLB,,, | Performed by: PHYSICAL MEDICINE & REHABILITATION

## 2018-02-05 PROCEDURE — 99999 PR PBB SHADOW E&M-EST. PATIENT-LVL III: CPT | Mod: PBBFAC,,, | Performed by: PHYSICAL MEDICINE & REHABILITATION

## 2018-02-05 RX ORDER — BETAMETHASONE SODIUM PHOSPHATE AND BETAMETHASONE ACETATE 3; 3 MG/ML; MG/ML
6 INJECTION, SUSPENSION INTRA-ARTICULAR; INTRALESIONAL; INTRAMUSCULAR; SOFT TISSUE
Status: DISCONTINUED | OUTPATIENT
Start: 2018-02-05 | End: 2018-02-05 | Stop reason: HOSPADM

## 2018-02-05 RX ORDER — TRAZODONE HYDROCHLORIDE 50 MG/1
50 TABLET ORAL NIGHTLY PRN
Qty: 30 TABLET | Refills: 5 | Status: SHIPPED | OUTPATIENT
Start: 2018-02-05 | End: 2018-07-31 | Stop reason: SDUPTHER

## 2018-02-05 RX ADMIN — BETAMETHASONE SODIUM PHOSPHATE AND BETAMETHASONE ACETATE 6 MG: 3; 3 INJECTION, SUSPENSION INTRA-ARTICULAR; INTRALESIONAL; INTRAMUSCULAR; SOFT TISSUE at 09:02

## 2018-02-05 NOTE — TELEPHONE ENCOUNTER
Patient says she mentioned at last visit that she is having problems sleeping. Asking for sleeping med.

## 2018-02-05 NOTE — PROCEDURES
Large Joint Aspiration/Injection  Date/Time: 2/5/2018 9:07 AM  Performed by: TILA MENDEZ  Authorized by: TILA MENDEZ     Consent Done?:  Yes (Verbal)  Indications:  Pain  Procedure site marked: Yes    Timeout: Prior to procedure the correct patient, procedure, and site was verified      Location:  Shoulder  Site:  L subacromial bursa  Prep: Patient was prepped and draped in usual sterile fashion    Ultrasonic Guidance for needle placement: Yes  Images are saved and documented.  Needle size:  25 G  Approach: Needle in plane, lateral to medial.  Medications:  6 mg betamethasone acetate-betamethasone sodium phosphate 6 mg/mL  Patient tolerance:  Patient tolerated the procedure well with no immediate complications    Additional Comments: Ultrasound guidance was used for correct needle placement, the images were saved will be uploaded to EMR.

## 2018-02-05 NOTE — TELEPHONE ENCOUNTER
Spoke with pt, advise given, pt voiced understanding, states she is taking Novolog 30 units with each meal, will the office back if blood sugars remain over 250.

## 2018-02-05 NOTE — TELEPHONE ENCOUNTER
----- Message from Paresh Campbell sent at 2/5/2018 10:20 AM CST -----  Contact: patient  Patient called to advised that she is taking steroids and was advise by the doctor to ask that her insulin be increase while on this medication blood sugar will increase. If any questions call back at [phone]. Thanks,

## 2018-02-05 NOTE — TELEPHONE ENCOUNTER
Spoke with pt, states she had a steroid injection today for shoulder pain, Please advise on insulin adjustment

## 2018-02-05 NOTE — PROGRESS NOTES
[facility]    Consulting Provider: Wally Cazares MD    CHIEF COMPLAINT:   Chief Complaint   Patient presents with    Shoulder Pain     left shoulder pain     HISTORY OF PRESENT ILLNESS: Adele Hall is a 72 y.o. female who presents to me for the first time for evaluation and treatment of left shoulder pain.  The pain started about one year ago insidiously.  There was no specific trauma or injury event.  She rates her pain as a 7 on a scale of 1-10 and is achy and sharp in nature.  She has not had any prior treatment of this issue.  She notes reduced range of motion secondary to pain.  She feels the symptoms are worsening over recent weeks.  She notes difficulty with sleeping secondary to the shoulder pain.  She denies any mechanical symptoms such as popping, snapping, or grinding.    Review of Systems   Constitutional: Negative for fever.   HENT: Negative for drooling.    Eyes: Negative for discharge.   Respiratory: Negative for choking.    Cardiovascular: Negative for chest pain.   Genitourinary: Negative for flank pain.   Skin: Negative for wound.   Allergic/Immunologic: Negative for immunocompromised state.   Neurological: Negative for tremors and syncope.   Psychiatric/Behavioral: Negative for behavioral problems.     Past Medical History:   Past Medical History:   Diagnosis Date    Anesthesia complication     took patient 6 days to come out of anethesia after CABG    Anticoagulant long-term use     Arthritis     Chronic kidney disease     COPD (chronic obstructive pulmonary disease) 2/7/2016    COPD (chronic obstructive pulmonary disease)     Coronary artery disease     Diabetes mellitus     Diabetes mellitus, type 2     GERD (gastroesophageal reflux disease)     Hyperlipidemia     Hypertension     possible new onset CHF 7/30/2016    Tardive dyskinesia     Thyroid disease     Ulcer     Vertigo        Past Surgical History:   Past Surgical History:    Procedure Laterality Date    CARDIAC SURGERY      CHOLECYSTECTOMY  01/26/2017    COLONOSCOPY      CORONARY ARTERY BYPASS GRAFT  01/19/2016    4 vessel    HYSTERECTOMY      OOPHORECTOMY      TONSILLECTOMY      TOTAL THYROIDECTOMY      TUBAL LIGATION         Family History:   Family History   Problem Relation Age of Onset    Cancer Mother      LYMPHOMA    Cancer Father     Early death Father     Heart disease Paternal Uncle     Alcohol abuse Sister      x2    Heart disease Brother     No Known Problems Son        Medications:   Current Outpatient Prescriptions on File Prior to Visit   Medication Sig Dispense Refill    albuterol 90 mcg/actuation inhaler Inhale 2 puffs into the lungs every 6 (six) hours as needed for Wheezing. 1 each 11    amLODIPine (NORVASC) 2.5 MG tablet TAKE ONE TABLET BY MOUTH EVERY DAY 30 tablet 5    aspirin (ECOTRIN) 81 MG EC tablet Take 81 mg by mouth once daily.      BD INSULIN SYRINGE ULTRA-FINE 1 mL 31 gauge x 5/16 Syrg USE AS DIRECTED 5 TIMES A DAY WITH LEVEMIR & NOVOLOG 200 each 10    blood sugar diagnostic Strp Check blood glucose 4 times a day - before meals and at bedtime. Please dispense testing supplies for true metrix. Dx code e11.65 400 strip 3    blood-glucose meter kit Use as instructed. Please dispense testing supplies for true metrix. Dx code e11.65 1 each 0    ergocalciferol (VITAMIN D2) 50,000 unit Cap TAKE ONE CAPSULE BY MOUTH EVERY SEVEN DAYS 4 capsule 11    fenofibrate micronized (LOFIBRA) 134 MG Cap TAKE ONE CAPSULE BY MOUTH EVERYDAY WITH BREAKFAST 30 capsule 10    insulin aspart (NOVOLOG) 100 unit/mL injection Inject 26 Units into the skin 3 (three) times daily before meals. (Patient taking differently: Inject 30 Units into the skin 3 (three) times daily before meals. ) 30 mL 11    insulin detemir (LEVEMIR) 100 unit/mL injection Inject 46 Units into the skin 2 (two) times daily. 30 mL 11    lancets Misc Check blood glucose 4x/day. Please  "dispense testing supplies for true metrix. Dx code e11.65 400 each 3    levothyroxine (SYNTHROID) 100 MCG tablet TAKE ONE TABLET BY MOUTH EVERY DAY 30 tablet 6    lisinopril (PRINIVIL,ZESTRIL) 20 MG tablet Take 1 tablet (20 mg total) by mouth once daily. 30 tablet 11    meclizine (ANTIVERT) 25 mg tablet Take 1 tablet (25 mg total) by mouth 3 (three) times daily as needed. 20 tablet 0    metoprolol tartrate (LOPRESSOR) 25 MG tablet Take 0.5 tablets (12.5 mg total) by mouth 2 (two) times daily. 30 tablet 11    ondansetron (ZOFRAN) 8 MG tablet Take 1 tablet (8 mg total) by mouth every 8 (eight) hours as needed for Nausea. 30 tablet 1    pantoprazole (PROTONIX) 40 MG tablet Take 1 tablet (40 mg total) by mouth once daily. 30 tablet 11    pen needle, diabetic (NOVOFINE PLUS) 32 gauge x 1/6" Ndle 1 Device by Misc.(Non-Drug; Combo Route) route 6 (six) times daily. 600 each 3    pramipexole (MIRAPEX) 0.125 MG tablet TAKE TWO TABLETS BY MOUTH AT BEDTIME AS NEEDED 60 tablet 10    rosuvastatin (CRESTOR) 40 MG Tab TAKE ONE TABLET BY MOUTH EVERY EVENING 30 tablet 11     No current facility-administered medications on file prior to visit.      Allergies:   Review of patient's allergies indicates:   Allergen Reactions    Reglan [metoclopramide hcl]      Depression and weight loss.        Social History:   Social History     Social History    Marital status:      Spouse name: N/A    Number of children: N/A    Years of education: N/A     Social History Main Topics    Smoking status: Former Smoker     Packs/day: 1.00     Years: 27.00     Types: Cigarettes     Quit date: 1/19/2016    Smokeless tobacco: Never Used    Alcohol use No    Drug use: No    Sexual activity: Yes     Partners: Male     Other Topics Concern    None     Social History Narrative    None     PHYSICAL EXAMINATION:   General    Vitals:    02/05/18 0822   BP: (!) 173/81   Pulse: 64   Weight: 88.9 kg (196 lb)   Height: 5' 1" (1.549 m) "     Constitutional: Oriented to person, place, and time. No apparent distress. Appears well-developed and well-nourished. Pleasant.  HENT:   Head: Normocephalic and atraumatic.   Eyes: Right eye exhibits no discharge. Left eye exhibits no discharge. No scleral icterus.   Pulmonary/Chest: Effort normal. No respiratory distress.   Abdominal: There is no guarding.   Neurological: Alert and oriented to person, place, and time.   Psychiatric: Behavior is normal.   Right Shoulder Exam     Tenderness   The patient is experiencing no tenderness.        Range of Motion   Active Abduction: normal   Passive Abduction: normal   Forward Flexion: normal   External Rotation: normal   Internal Rotation 90 degrees: normal     Muscle Strength   Abduction: 5/5   Internal Rotation: 5/5   External Rotation: 5/5   Biceps: 5/5     Other   Erythema: absent  Scars: absent  Sensation: normal  Pulse: present      Left Shoulder Exam     Tenderness   Left shoulder tenderness location: Greater tuberosity.    Range of Motion   Active Abduction: 100   Passive Abduction: 140   Forward Flexion: 110   External Rotation: 70   Internal Rotation 90 degrees: 40     Muscle Strength   Abduction: 4/5   Internal Rotation: 5/5   External Rotation: 4/5   Biceps: 4/5     Tests   Apprehension: negative  Cross Arm: negative  Drop Arm: negative  Hawkin's test: positive  Impingement: positive  Sulcus: absent    Other   Erythema: absent  Scars: absent  Sensation: normal  Pulse: present         INSPECTION: There is no swelling, ecchymoses, erythema or gross deformity about the left shoulder.    Imaging  X-ray of the left shoulder from 1/31/2018: Mild left a.c. joint osteoarthritis. No acute radiographic findings.     Diagnostic Ultrasound Exam:  FINDINGS: Real time examination was performed. Selected static and dynamic images were obtained, and correlated with prior x-ray and other available imaging.     The left shoulder was examined and findings were as follows:  the long head of the biceps tendon was observed to have a normal fibular appearance and was positioned appropriately in the bicipital groove. The biceps tendon did not reveal subluxation on dynamic imaging. There is no visible evidence for coracohumeral impingement. The acromioclavicular joint has some cortical irregularity consistent with degenerative changes. There is no evidence for abnormal translation of the acromioclavicular joint on cross-body adduction.     The rotator cuff was examined in detail. The subscapularis is heterogenous in echotexture along with cortical regularity at the humeral attachment site consistent with chronic tendinopathy/tearing.  There was also an area of hyperechoic texture with posterior shadowing at the very distal portion of this tendon consistent with calcific tendinopathy.  The supraspinatus was abnormal with a moderate degree of heterogenous echotexture at the anterior distal attachment site, along with some cortical irregularity consistent with chronic tendinopathy/tearing. The infraspinatus was heterogenous in internal echotexture consistent with tendonopathy. The teres minor was not assessed. The subacromial/subdeltoid bursa was somewhat thickened.     There does not appear to be any effusion within the posterior glenohumeral recess. The posterosuperior glenoid labrum is normal. The spinoglenoid notch is normal, without a suprascapular ganglion cyst.    Data Reviewed: X-ray, ultrasound    Supportive Actions: Independent visualization of images or test specimens    ASSESSMENT:   1. Chronic left shoulder pain    2. Tendinopathy of left rotator cuff      PLAN:     1. Time was spent reviewing the above diagnosis in depth with Adele today, including acute management and rehabilitation.     2.  She has signs, symptoms, and diagnostic ultrasound exam findings all consistent with a diagnosis of left rotator cuff tendinopathy.  There is also some calcific tendinopathy of the  "subscapularis tendon seen on ultrasound.  I did not however see any large tears of the rotator cuff.  With these findings in conjunction with her concurrent medical comorbidities, I recommended again surgical intervention at this point.  We elected to proceed with injection of corticosteroid and physical therapy.  See separate procedure note for ultrasound-guided injection of corticosteroid to the left subacromial bursa.    3.  We will set her up here in [place] with [facility] physical therapy and then transitioning to a home exercise program for long-term maintenance.    4.  We discussed the likelihood of hyperglycemia with today's injection of corticosteroid with her concurrent diabetes.  Discussed that she'll left increase her insulin.  She will contact her diabetic or near for further instruction in regards to insulin management post injection.    5. RTC in 4-6 weeks if not improved.    This is a consult from Dr. Wally Cazares. Please see the "Communications" section of Epic to see how the consulting physician received the report of today's findings and recommendations. If it's an [facility] physician, it will be forwarded to his/her "in basket".    The above note was completed, in part, with the aid of Dragon dictation software/hardware. Translation errors may be present.    "

## 2018-02-05 NOTE — TELEPHONE ENCOUNTER
----- Message from Paresh Campbell sent at 2/5/2018 10:19 AM CST -----  Contact: patient  Patient called requesting script for sleeping medication.send to [facility] [place]. Call back at [phone] when script has been sent. Thanks,

## 2018-02-06 ENCOUNTER — TELEPHONE (OUTPATIENT)
Dept: ENDOCRINOLOGY | Facility: CLINIC | Age: 73
End: 2018-02-06

## 2018-02-06 ENCOUNTER — TELEPHONE (OUTPATIENT)
Dept: FAMILY MEDICINE | Facility: CLINIC | Age: 73
End: 2018-02-06

## 2018-02-06 NOTE — TELEPHONE ENCOUNTER
Pt called and report blood sugar of 379 @ 2pm, states morning blood sugar was 374 and before lunch was 395 pt took 30 units plus correction scale. Advise from A Jarrott pt to take 5 units now, increase Novolog to 40 plus correction scale  with dinner tonight  and for the next day or so, needs to keep a close monitor on readings, may decrease back to normal dose of 30 units plus correction once readings are less than 200, pt voiced understanding

## 2018-02-12 RX ORDER — METOPROLOL TARTRATE 25 MG/1
TABLET, FILM COATED ORAL
Qty: 30 TABLET | Refills: 10 | Status: SHIPPED | OUTPATIENT
Start: 2018-02-12 | End: 2019-03-18 | Stop reason: SDUPTHER

## 2018-02-14 ENCOUNTER — TELEPHONE (OUTPATIENT)
Dept: FAMILY MEDICINE | Facility: CLINIC | Age: 73
End: 2018-02-14

## 2018-02-14 NOTE — TELEPHONE ENCOUNTER
----- Message from Joyce Roldan sent at 2/12/2018 12:33 PM CST -----  Contact: patient   Patient calling to request a referral for Dr. Koehler ([facility] 3rd Floor). Please advise. Her  can take the call if she's unavailable.   Call back   Thank you!

## 2018-02-14 NOTE — TELEPHONE ENCOUNTER
Patient has sob- has had this checked out with pulmonology and cardiology at [facility]- patient advised Dr. Escobar does not take [facility]-she will consider course of action and get back with me.

## 2018-02-15 ENCOUNTER — TELEPHONE (OUTPATIENT)
Dept: FAMILY MEDICINE | Facility: CLINIC | Age: 73
End: 2018-02-15

## 2018-02-15 ENCOUNTER — LAB VISIT (OUTPATIENT)
Dept: LAB | Facility: HOSPITAL | Age: 73
End: 2018-02-15
Attending: NURSE PRACTITIONER
Payer: MEDICARE

## 2018-02-15 DIAGNOSIS — N18.30 TYPE 2 DIABETES MELLITUS WITH STAGE 3 CHRONIC KIDNEY DISEASE, WITH LONG-TERM CURRENT USE OF INSULIN: ICD-10-CM

## 2018-02-15 DIAGNOSIS — Z79.4 TYPE 2 DIABETES MELLITUS WITH STAGE 3 CHRONIC KIDNEY DISEASE, WITH LONG-TERM CURRENT USE OF INSULIN: ICD-10-CM

## 2018-02-15 DIAGNOSIS — E11.22 TYPE 2 DIABETES MELLITUS WITH STAGE 3 CHRONIC KIDNEY DISEASE, WITH LONG-TERM CURRENT USE OF INSULIN: ICD-10-CM

## 2018-02-15 LAB
ALBUMIN SERPL BCP-MCNC: 3.3 G/DL
ALP SERPL-CCNC: 72 U/L
ALT SERPL W/O P-5'-P-CCNC: 32 U/L
ANION GAP SERPL CALC-SCNC: 8 MMOL/L
AST SERPL-CCNC: 59 U/L
BILIRUB SERPL-MCNC: 0.3 MG/DL
BUN SERPL-MCNC: 23 MG/DL
CALCIUM SERPL-MCNC: 9.2 MG/DL
CHLORIDE SERPL-SCNC: 103 MMOL/L
CO2 SERPL-SCNC: 28 MMOL/L
CREAT SERPL-MCNC: 1.4 MG/DL
EST. GFR  (AFRICAN AMERICAN): 43.9 ML/MIN/1.73 M^2
EST. GFR  (NON AFRICAN AMERICAN): 38.1 ML/MIN/1.73 M^2
ESTIMATED AVG GLUCOSE: 194 MG/DL
GLUCOSE SERPL-MCNC: 188 MG/DL
HBA1C MFR BLD HPLC: 8.4 %
POTASSIUM SERPL-SCNC: 5.2 MMOL/L
PROT SERPL-MCNC: 6.9 G/DL
SODIUM SERPL-SCNC: 139 MMOL/L

## 2018-02-15 PROCEDURE — 80053 COMPREHEN METABOLIC PANEL: CPT

## 2018-02-15 PROCEDURE — 36415 COLL VENOUS BLD VENIPUNCTURE: CPT | Mod: PO

## 2018-02-15 PROCEDURE — 83036 HEMOGLOBIN GLYCOSYLATED A1C: CPT

## 2018-02-15 NOTE — TELEPHONE ENCOUNTER
----- Message from Jose Aguero sent at 2/14/2018  4:42 PM CST -----  Contact: self   Patient want to speak with a nurse regarding taking about her condition please call back at [phone]

## 2018-02-21 ENCOUNTER — CLINICAL SUPPORT (OUTPATIENT)
Dept: REHABILITATION | Facility: HOSPITAL | Age: 73
End: 2018-02-21
Attending: PHYSICAL MEDICINE & REHABILITATION
Payer: MEDICARE

## 2018-02-21 DIAGNOSIS — M67.912 TENDINOPATHY OF LEFT ROTATOR CUFF: ICD-10-CM

## 2018-02-21 PROCEDURE — 97161 PT EVAL LOW COMPLEX 20 MIN: CPT | Mod: PN | Performed by: PHYSICAL THERAPIST

## 2018-02-21 PROCEDURE — G8979 MOBILITY GOAL STATUS: HCPCS | Mod: CJ,PN | Performed by: PHYSICAL THERAPIST

## 2018-02-21 PROCEDURE — G8978 MOBILITY CURRENT STATUS: HCPCS | Mod: CK,PN | Performed by: PHYSICAL THERAPIST

## 2018-02-21 PROCEDURE — 97110 THERAPEUTIC EXERCISES: CPT | Mod: PN | Performed by: PHYSICAL THERAPIST

## 2018-02-21 NOTE — PATIENT INSTRUCTIONS
ROM: Pendulum (Side-to-Side)        Let right arm swing freely from side to side by rocking body weight from side to side.  Repeat __30__ times per set. Do __1_ sets per session. Do _2___ sessions per day.     [email or web address]://[email or web address]     Copyright © [employer]. All rights reserved.   ROM: Pendulum (Circular)        Let right arm move in [place] clockwise, then counterclockwise, by rocking body weight in circular pattern.  [place] __30__ times each direction per set. Do _1___ sets per session. Do __2__ sessions per day.     [email or web address]://[email or web address]     Copyright © [employer]. All rights reserved.   Strengthening: Isometric Flexion        Using wall for resistance, press right fist into ball using light pressure. Hold _3___ seconds.  Repeat __10__ times per set. Do __2__ sets per session. Do __1__ sessions per day.     [email or web address]     Copyright © [employer]. All rights reserved.   Strengthening: Isometric Extension        Using wall for resistance, press back of left arm into ball using light pressure. Hold _3___ seconds.  Repeat __10__ times per set. Do __2__ sets per session. Do _1___ sessions per day.     [email or web address]://[email or web address]     Copyright © [employer]. All rights reserved.   Strengthening: Isometric External Rotation        Using wall to provide resistance, and keeping right arm at side, press back of hand into ball using light pressure. Hold __3__ seconds.  Repeat _10__ times per set. Do __2__ sets per session. Do _1___ sessions per day.     [email or web address]     Copyright © [employer]. All rights reserved.   Strengthening: Isometric Abduction        Using wall for resistance, press left arm into ball using light pressure. Hold _3__ seconds.  Repeat __1__ times per set. Do __2__ sets per session. Do _1___ sessions per day.     [email or web address]://[email or web address]     Copyright © [employer]. All rights reserved.

## 2018-02-21 NOTE — PLAN OF CARE
TIME RECORD    Date: 02/21/2018    Start Time:  1500  Stop Time:  1600    PROCEDURES:    TIMED  Procedure Time Min.    Start:  Stop:     Start:  Stop:     Start:  Stop:     Start:  Stop:          UNTIMED  Procedure Time Min.    Start:  Stop:     Start:  Stop:      Total Timed Minutes:  30  Total Timed Units:  2  Total Untimed Units:  1  Charges Billed/# of units:  3    OUTPATIENT PHYSICAL THERAPY   PATIENT EVALUATION  Onset Date: 4 months  Primary Diagnosis:   1. Tendinopathy of left rotator cuff       Treatment Diagnosis: Debility of the left shoulder  Past Medical History:   Diagnosis Date    Anesthesia complication     took patient 6 days to come out of anethesia after CABG    Anticoagulant long-term use     Arthritis     Chronic kidney disease     COPD (chronic obstructive pulmonary disease) 2/7/2016    COPD (chronic obstructive pulmonary disease)     Coronary artery disease     Diabetes mellitus     Diabetes mellitus, type 2     GERD (gastroesophageal reflux disease)     Hyperlipidemia     Hypertension     possible new onset CHF 7/30/2016    Tardive dyskinesia     Thyroid disease     Ulcer     Vertigo      Precautions: Standard  Prior Therapy: None  Medications: Adele Hall has a current medication list which includes the following prescription(s): albuterol, amlodipine, aspirin, bd insulin syringe ultra-fine, blood sugar diagnostic, blood-glucose meter, ergocalciferol, fenofibrate micronized, insulin aspart u-100, insulin detemir u-100, lancets, levothyroxine, lisinopril, meclizine, metoprolol tartrate, ondansetron, pantoprazole, pen needle, diabetic, pramipexole, rosuvastatin, and trazodone.  Nutrition:  Overweight  History of Present Illness: The patient reports a 4 month h/o left shoulder pain of unknown origin. She reports left upper trap pain and difficulty carrying objects with the left UE She c/o limited shoulder ROM and strength   Prior Level of Function:  Independent  Social History: The patient lives with her  and adult son  Place of Residence (Steps/Adaptations): Trailer with 4 steps to enter with railing.  Functional Deficits Leading to Referral/Nature of Injury: Decreased ROM, strength and functional status  Patient Therapy Goals: Return to PLOF    Subjective     Adele Hall states she has left shoulder pain when she attempts to perform overhead activity with the left UE ..    Pain:  Location: shoulder   Description: Aching and Dull  Activities Which Increase Pain: Extension and Reaching  Activities Which Decrease Pain: rest  Pain Scale: 2/10 at best 7/10 now  10/10 at worst    Objective     Posture: Forward head, protracted scapulae  Palpation: Moderate soft tissue tenderness noted with palpation of the left supraspinatus and upper trap  Sensation: Intact    Range of Motion/Strength:     Shoulder Left  RIght  Pain/Dysfunction with Movement    AROM MMT AROM MMT    Flexion 64* 2+/5 154* 4+/5    Abduction 62* 2+/5 154* 4+/5    Ext rotation 68* 3/5 84* 4+/5        Other:  DASH: 57.5% impairment ( G code CK )    Treatment:   Pendulum circumduction x 30 cw/ccw  Pendulum H abd/add x 30  Isometric flexion, extension, abduction and ER x 30    Assessment       Initial Assessment (Pertinent finding, problem list and factors affecting outcome):   1. Left Shoulder Pain  2. Decreased AROM  3. Decreased strength  4. DASH impairment    Rehab Potiential: fair    Short Term Goals (3 Weeks):   1. The patient will be independent with a HEP for maintenace  2. Decrease soft tissue tenderness to mild  3. Decrease DASH to 50% impairment  4. Increase UE strength to 3+/5    Long Term Goals (6 Weeks):   1, The patient will be independent with a written HEP for maintenance  2. Eliminate soft tissue tenderness  3. Increase strength to 4/5  4. Decrease DASH impairment to <40% ( G code CJ )    Plan     Certification Period: 2/21/2018 to 4/18/2018  Recommended Treatment  Plan: 2 times per week for 6 weeks: Group Therapy, Manual Therapy, Moist Heat/ Ice, Patient Education, Therapeutic Activites, Therapeutic Exercise and Other Dry needling  Other Recommendations: UE ROM exercises, UE strengthening exercises, cardiovascular conditioning, soft tissue mobilization     Thank you for this referral!      Therapist: Chilango Rogers PT    I CERTIFY THE NEED FOR THESE SERVICES FURNISHED UNDER THIS PLAN OF TREATMENT AND WHILE UNDER MY CARE    Physician's comments: ________________________________________________________________________________________________________________________________________________      Physician's Name: ___________________________________

## 2018-02-26 ENCOUNTER — TELEPHONE (OUTPATIENT)
Dept: FAMILY MEDICINE | Facility: CLINIC | Age: 73
End: 2018-02-26

## 2018-02-26 DIAGNOSIS — M75.102 TEAR OF LEFT ROTATOR CUFF, UNSPECIFIED TEAR EXTENT: Primary | ICD-10-CM

## 2018-02-26 RX ORDER — TRAMADOL HYDROCHLORIDE 50 MG/1
50 TABLET ORAL EVERY 6 HOURS PRN
Qty: 40 TABLET | Refills: 0 | Status: SHIPPED | OUTPATIENT
Start: 2018-02-26 | End: 2018-03-19 | Stop reason: SDUPTHER

## 2018-02-26 NOTE — TELEPHONE ENCOUNTER
----- Message from Janelle Chris sent at 2/26/2018  1:09 PM CST -----    Pt  Is  Calling  For  Pain  meds  ,l  Shoulder  Pain,// please call  In  Pain  Med // please call   For  Details [phone]  [facility] - [place], [place] - [address] [phone]  [place] [address]  Phone: [phone] Fax: [phone]

## 2018-02-26 NOTE — TELEPHONE ENCOUNTER
Patient saw Dr. Campbell for left shoulder pain- physical therapist told her she probably needs mri-thinks its the rotator cuff - is going through physical therapy- she says she can hardly do anything with the arm- can't dress herself- pain 9/10 with movement- asking for pain medication

## 2018-02-28 ENCOUNTER — CLINICAL SUPPORT (OUTPATIENT)
Dept: REHABILITATION | Facility: HOSPITAL | Age: 73
End: 2018-02-28
Attending: PHYSICAL MEDICINE & REHABILITATION
Payer: MEDICARE

## 2018-02-28 DIAGNOSIS — M67.912 TENDINOPATHY OF LEFT ROTATOR CUFF: ICD-10-CM

## 2018-02-28 PROCEDURE — 97110 THERAPEUTIC EXERCISES: CPT | Mod: PN

## 2018-02-28 RX ORDER — PANTOPRAZOLE SODIUM 40 MG/1
TABLET, DELAYED RELEASE ORAL
Qty: 30 TABLET | Refills: 10 | Status: SHIPPED | OUTPATIENT
Start: 2018-02-28 | End: 2019-02-12 | Stop reason: SDUPTHER

## 2018-02-28 NOTE — PROGRESS NOTES
"Name: Adele Lafleur [facility] Number: [id number]  Date of Treatment: 02/28/2018   Diagnosis:   Encounter Diagnosis   Name Primary?    Tendinopathy of left rotator cuff        Time in: 1403  Time Out: 1450  Total Treatment Time: 45  (2 min BR break)    Subjective:    Adele reports had to take pain pill at 1:15 today as her L shoulder pain was 9/10. It has now decreased to 7/10 but relief "is wearing off". Has rescue inhaler at home.     Objective:    Patient received individual therapy to increase strength, endurance, ROM, flexibility and posture with activities as follows:     Adele received therapeutic exercises to develop strength, endurance, ROM, flexibility and posture for 45 minutes including:     OH pulley flexion 5'  Standing isometrics L shoulder 10/3: flexion, aBd, extn, ER  Seated scap retraction 10/3 B  Pendulums L shoulder 10/3: flex, horiz aBd/aDD, cw/ccw  Seated shrugs 10/3 B  Supine L shoulder AAROM ER with cane 10/3 in scapular plane  Supine L shoulder flexion AAROM with cane 10/3  Supine PROM L shoulder all planes in tolerable ranges and rhythmic stabilization IR/ER-unable to tolerate stab in flexion    Continue HEP daily. Instructed pt to bring rescue inhaler to each session in case of need.     Pt demo good understanding of the education provided. Patient demonstrated good return demonstration of activities.     Assessment:     SOBOE, stating she gets SOB even from isometrics. Limited ROM 2* PAIN.     Pt will continue to benefit from skilled PT intervention. Medical Necessity is demonstrated by:  Requires skilled supervision to complete and progress HEP and Weakness.    Patient is making good progress towards established goals.    Plan:    Continue with established Plan of Care towards PT goals.     "

## 2018-03-02 ENCOUNTER — OFFICE VISIT (OUTPATIENT)
Dept: ENDOCRINOLOGY | Facility: CLINIC | Age: 73
End: 2018-03-02
Payer: MEDICARE

## 2018-03-02 ENCOUNTER — CLINICAL SUPPORT (OUTPATIENT)
Dept: REHABILITATION | Facility: HOSPITAL | Age: 73
End: 2018-03-02
Attending: PHYSICAL MEDICINE & REHABILITATION
Payer: MEDICARE

## 2018-03-02 VITALS
HEART RATE: 65 BPM | BODY MASS INDEX: 36.77 KG/M2 | DIASTOLIC BLOOD PRESSURE: 62 MMHG | SYSTOLIC BLOOD PRESSURE: 138 MMHG | HEIGHT: 61 IN | WEIGHT: 194.75 LBS

## 2018-03-02 DIAGNOSIS — E11.42 TYPE 2 DIABETES MELLITUS WITH DIABETIC POLYNEUROPATHY, WITH LONG-TERM CURRENT USE OF INSULIN: ICD-10-CM

## 2018-03-02 DIAGNOSIS — E07.9 THYROID DISEASE: ICD-10-CM

## 2018-03-02 DIAGNOSIS — Z79.4 TYPE 2 DIABETES MELLITUS WITH DIABETIC POLYNEUROPATHY, WITH LONG-TERM CURRENT USE OF INSULIN: ICD-10-CM

## 2018-03-02 DIAGNOSIS — N18.30 TYPE 2 DIABETES MELLITUS WITH STAGE 3 CHRONIC KIDNEY DISEASE, WITH LONG-TERM CURRENT USE OF INSULIN: Primary | ICD-10-CM

## 2018-03-02 DIAGNOSIS — M67.912 TENDINOPATHY OF LEFT ROTATOR CUFF: ICD-10-CM

## 2018-03-02 DIAGNOSIS — E11.69 DYSLIPIDEMIA ASSOCIATED WITH TYPE 2 DIABETES MELLITUS: ICD-10-CM

## 2018-03-02 DIAGNOSIS — E78.5 DYSLIPIDEMIA ASSOCIATED WITH TYPE 2 DIABETES MELLITUS: ICD-10-CM

## 2018-03-02 DIAGNOSIS — E11.22 TYPE 2 DIABETES MELLITUS WITH STAGE 3 CHRONIC KIDNEY DISEASE, WITH LONG-TERM CURRENT USE OF INSULIN: Primary | ICD-10-CM

## 2018-03-02 DIAGNOSIS — I10 ESSENTIAL HYPERTENSION: ICD-10-CM

## 2018-03-02 DIAGNOSIS — Z79.4 TYPE 2 DIABETES MELLITUS WITH STAGE 3 CHRONIC KIDNEY DISEASE, WITH LONG-TERM CURRENT USE OF INSULIN: Primary | ICD-10-CM

## 2018-03-02 DIAGNOSIS — I25.10 CORONARY ARTERY DISEASE INVOLVING NATIVE CORONARY ARTERY OF NATIVE HEART WITHOUT ANGINA PECTORIS: ICD-10-CM

## 2018-03-02 PROCEDURE — 3075F SYST BP GE 130 - 139MM HG: CPT | Mod: S$GLB,,, | Performed by: NURSE PRACTITIONER

## 2018-03-02 PROCEDURE — 3078F DIAST BP <80 MM HG: CPT | Mod: S$GLB,,, | Performed by: NURSE PRACTITIONER

## 2018-03-02 PROCEDURE — 99999 PR PBB SHADOW E&M-EST. PATIENT-LVL V: CPT | Mod: PBBFAC,,, | Performed by: NURSE PRACTITIONER

## 2018-03-02 PROCEDURE — 99214 OFFICE O/P EST MOD 30 MIN: CPT | Mod: S$GLB,,, | Performed by: NURSE PRACTITIONER

## 2018-03-02 PROCEDURE — 97110 THERAPEUTIC EXERCISES: CPT | Mod: PN

## 2018-03-02 RX ORDER — INSULIN ASPART 100 [IU]/ML
30 INJECTION, SOLUTION INTRAVENOUS; SUBCUTANEOUS
Start: 2018-03-02 | End: 2018-06-11 | Stop reason: CLARIF

## 2018-03-02 NOTE — PROGRESS NOTES
CC: Ms. Adele Hall arrives today for management of Type 2 DM and review of chronic medical conditions, as listed in the visit diagnosis section of this encounter.     HPI: Ms. Adele Hall was diagnosed with Type 2 DM in ~ 2002. Metformin started at the time of diagnosis but was stopped d/t renal impairment, per PCP in [place]. Insulin was started ~ 2012.  Denies FH of DM. She did have 1 ER admission after having BG of >700 in 2012, prior to starting insulin.     At time of last visit, she used Relion insulins due to cost (hadn't met out of pocket cost so didn't qualify for Pt. Assistance) but had higher readings during this time. Since then, she resumed Novolog and Levemir in January.     BG readings are checked 1-2x/day. Brings logs.                   Hypoglycemia: No    Missing Insulin/PO medication doses: No  Timing prandial insulin 5-15 minutes before meals: yes    Dietary Habits: Eats 2 meals/day. Usually skips breakfast. Rare snacking.     Follows with Dr. Burks for CAD - s/p CABG in 1/2016.     She follows with Dr. Montero for CKD.    She reports that she is taking her thyroid medication in the morning with other meds.    CURRENT DIABETIC MEDS: Levemir 46 units BID, Novolog 30 units AC  Vial or pen: vials  Glucometer type: True Metrix    Previous DM treatments:  metformin    Last Eye Exam: 5/2017, no DR  Last Podiatry Exam: never    REVIEW OF SYSTEMS  Constitutional: no c/o fatigue, weakness, or weight loss. + weight gain  Eyes: denies visual disturbances.  Cardiac: no palpitations, chest pain  Respiratory: no cough, + dyspnea when active. Cardiologist aware. Has also had pulmonologist w/up.  GI: no c/o abdominal pain. + intermittent nausea.  Skin: no lesions or rashes.  Musculoskeletal: reports L shoulder pain for which she is seeing PT. Had steroid injection in February.   Neuro: denies numbness, tingling, paresthesias.   Endocrine: denies polyphagia, polydipsia,  "polyuria      Personally reviewed Past Medical, Surgical, Social History.    Vital Signs  /62   Pulse 65   Ht 5' 1" (1.549 m)   Wt 88.3 kg (194 lb 12.4 oz)   BMI 36.80 kg/m²     Personally reviewed the below labs:    Hemoglobin A1C   Date Value Ref Range Status   02/15/2018 8.4 (H) 4.0 - 5.6 % Final     Comment:     According to ADA guidelines, hemoglobin A1c <7.0% represents  optimal control in non-pregnant diabetic patients. Different  metrics may apply to specific patient populations.   Standards of Medical Care in Diabetes-2016.  For the purpose of screening for the presence of diabetes:  <5.7%     Consistent with the absence of diabetes  5.7-6.4%  Consistent with increasing risk for diabetes   (prediabetes)  >or=6.5%  Consistent with diabetes  Currently, no consensus exists for use of hemoglobin A1c  for diagnosis of diabetes for children.  This Hemoglobin A1c assay has significant interference with fetal   hemoglobin   (HbF). The results are invalid for patients with abnormal amounts of   HbF,   including those with known Hereditary Persistence   of Fetal Hemoglobin. Heterozygous hemoglobin variants (HbAS, HbAC,   HbAD, HbAE, HbA2) do not significantly interfere with this assay;   however, presence of multiple variants in a sample may impact the %   interference.     01/14/2018 8.1 (H) 0.0 - 5.6 % Final     Comment:     Reference Interval:  5.0 - 5.6 Normal   5.7 - 6.4 High Risk   > 6.5 Diabetic    Hgb A1c results are standardized based on the (NGSP) National   Glycohemoglobin Standardization Program.    Hemoglobin A1C levels are related to mean serum/plasma glucose   during the preceding 2-3 months.        11/13/2017 6.2 (H) 4.0 - 5.6 % Final     Comment:     According to ADA guidelines, hemoglobin A1c <7.0% represents  optimal control in non-pregnant diabetic patients. Different  metrics may apply to specific patient populations.   Standards of Medical Care in Diabetes-2016.  For the purpose of " screening for the presence of diabetes:  <5.7%     Consistent with the absence of diabetes  5.7-6.4%  Consistent with increasing risk for diabetes   (prediabetes)  >or=6.5%  Consistent with diabetes  Currently, no consensus exists for use of hemoglobin A1c  for diagnosis of diabetes for children.  This Hemoglobin A1c assay has significant interference with fetal   hemoglobin   (HbF). The results are invalid for patients with abnormal amounts of   HbF,   including those with known Hereditary Persistence   of Fetal Hemoglobin. Heterozygous hemoglobin variants (HbAS, HbAC,   HbAD, HbAE, HbA2) do not significantly interfere with this assay;   however, presence of multiple variants in a sample may impact the %   interference.         Chemistry        Component Value Date/Time     02/15/2018 0927    K 5.2 (H) 02/15/2018 0927     02/15/2018 0927    CO2 28 02/15/2018 0927    BUN 23 02/15/2018 0927    CREATININE 1.4 02/15/2018 0927     (H) 02/15/2018 0927        Component Value Date/Time    CALCIUM 9.2 02/15/2018 0927    ALKPHOS 72 02/15/2018 0927    AST 59 (H) 02/15/2018 0927    AST 45 (H) 01/30/2016 0431    ALT 32 02/15/2018 0927    BILITOT 0.3 02/15/2018 0927          Lab Results   Component Value Date    CHOL 136 01/14/2018    CHOL 158 12/30/2017    CHOL 155 11/28/2016     Lab Results   Component Value Date    HDL 32 (L) 01/14/2018    HDL 33 (L) 12/30/2017    HDL 23 (L) 11/28/2016     Lab Results   Component Value Date    LDLCALC 52.6 (L) 01/14/2018    LDLCALC 68.8 12/30/2017    LDLCALC Invalid, Trig>400.0 11/28/2016     Lab Results   Component Value Date    TRIG 257 (H) 01/14/2018    TRIG 281 (H) 12/30/2017    TRIG 423 (H) 11/28/2016     Lab Results   Component Value Date    CHOLHDL 23.5 01/14/2018    CHOLHDL 20.9 12/30/2017    CHOLHDL 14.8 (L) 11/28/2016       Lab Results   Component Value Date    MICALBCREAT 1111.1 (H) 04/04/2016     Lab Results   Component Value Date    TSH 8.780 (H) 01/14/2018        CrCl cannot be calculated (Patient's most recent lab result is older than the maximum 7 days allowed.).    Vit D, 25-Hydroxy   Date Value Ref Range Status   07/19/2017 20 (L) 30 - 96 ng/mL Final     Comment:     Vitamin D deficiency.........<10 ng/mL                              Vitamin D insufficiency......10-29 ng/mL       Vitamin D sufficiency........> or equal to 30 ng/mL  Vitamin D toxicity............>100 ng/mL           PHYSICAL EXAMINAAppears well, no distressON  Constitutional: Appears well, no distress  Neck: Supple, trachea midline; no thyromegaly or nodules.   Respiratory: CTA, even and unlabored.  Cardiovascular: RRR, no murmurs, no carotid bruits. DP pulses  1+ bilaterally; no edema.    Lymph: no cervical or supraclavicular lymphadenopathy  Skin: warm and dry; no lipohypertrophy, or acanthosis nigracans observed.  Neuro: DTR 2+ BUE/2+BLE.  Feet: appropriate footwear.      A1c goal 7.5% or less      Assessment/Plan  1. Type 2 diabetes mellitus with stage 3 chronic kidney disease, with long-term current use of insulin  -- A1c elevated. Had poor control when using Relion insulins, which is reflected in this level.   -- continue Novolog 30 units AC. Add correction scale, target 150, ISF 25  -- continue Levemir 46 units BID  -- check BG 4x/day  -- if in donut hole later this year, will reconsider Pt Assistance, as pt may qualify if she meets out of pocket cost.     -- Discussed diagnosis of DM, A1c goals, progression of disease, long term complications and tx options.  Advised patient to check BG before activities, such as driving or exercise.  -- Reviewed hypoglycemia management: treat with 1/2 glass of juice, 1/2 can regular coke, or 4 glucose tablets. Monitor and repeat treatment every 15 minutes until BG is >70 Then have a snack, which includes a complex carbohydrate and protein.  -- takes aspirin, statin, ace-i   2. Type 2 diabetes mellitus with diabetic polyneuropathy, with long-term current use  of insulin  -- discussed the importance of daily inspection of bottoms of both feet, interspace areas. Advised patient against walking barefoot.     3. Coronary artery disease involving native coronary artery of native heart without angina pectoris  -- avoid hypoglycemia   4. Essential hypertension  -- controlled  -- continue lisinopril   5. Hyperlipidemia with target LDL less than 70  -- Uncontrolled. triglycerides remain elevated but have decreased. LDL at goal.  -- optimize DM control.  -- Continue statin, fenofibrate   6. Thyroid disease  -- Uncontrolled. Taking levothyroxine 100 mcg daily.   -- Educated pt regarding proper timing and to take pill by itself with water only.   -- TSH in 6 weeks  Lab Results   Component Value Date    TSH 8.780 (H) 01/14/2018          FOLLOW UP  Follow-up in about 3 months (around 6/2/2018).   Patient instructed to bring BG logs to each follow up   Patient encouraged to call for any BG/medication issues, concerns, or questions.    Orders Placed This Encounter   Procedures    Hemoglobin A1c    TSH    Basic metabolic panel

## 2018-03-02 NOTE — PROGRESS NOTES
Name: Adele Lafleur [facility] Number: [id number]  Date of Treatment: 03/02/2018   Diagnosis:   Encounter Diagnosis   Name Primary?    Tendinopathy of left rotator cuff        Time in: 1500  Time Out: 1555  Total Treatment Time: 55      Subjective:    Adele reports worsening of symptoms and increased pain. Patient stated she had to take another pain pill just to get here. She reports she had one at noon and it just didn't work.  She is at therapy at 3:00pm after taking another pain pill she reports. Patient reports their pain to be 9/10 on a 0-10 scale with 0 being no pain and 10 being the worst pain imaginable.    Objective    Adele received therapeutic exercises to develop strength, endurance, ROM and flexibility for 55 minutes including:     Vitals: 145/65  HR 66    Pendulum 3 min - flexion/extension, ab/add, circles fwd/back  OH pulley - flexion x 5 min  Scapular retraction and shoulder shrugs in standing against wall 3 x 10  Isometric flexion, Abd, extension, ER 3 x 10 in supine (towel under L arm for ER  AROM flexion and ER with cane in supine 3 x 10 ea  PROM/rhythmic stabilization with YTB in loop over R hand and Left hand in supine x 5 min (stretch both arms, R arm, Left arm)  Prone scapular retraction with 1# x 30  Prone scapular retraction with shoulder extention x 30  Prone T with scapular retraction x 15          Written Home Exercises Provided: continue with current  Pt demo fair understanding of the education provided. Adele demonstrated fair return demonstration of activities.     Assessment:       Pt will continue to benefit from skilled PT intervention. Medical Necessity is demonstrated by:  Fall Risk, Pain limits function of effected part for some activities, Unable to participate fully in daily activities, Requires skilled supervision to complete and progress HEP and Weakness.    Patient is making fair progress towards established goals.    Plan:  Continue with established Plan of  Care towards PT goals.

## 2018-03-07 ENCOUNTER — CLINICAL SUPPORT (OUTPATIENT)
Dept: REHABILITATION | Facility: HOSPITAL | Age: 73
End: 2018-03-07
Attending: PHYSICAL MEDICINE & REHABILITATION
Payer: MEDICARE

## 2018-03-07 DIAGNOSIS — M67.912 TENDINOPATHY OF LEFT ROTATOR CUFF: ICD-10-CM

## 2018-03-07 PROCEDURE — 97010 HOT OR COLD PACKS THERAPY: CPT | Mod: PN

## 2018-03-07 PROCEDURE — 97140 MANUAL THERAPY 1/> REGIONS: CPT | Mod: PN

## 2018-03-07 PROCEDURE — 97110 THERAPEUTIC EXERCISES: CPT | Mod: PN

## 2018-03-07 NOTE — PROGRESS NOTES
Name: Adele Lafleur [facility] Number: [id number]  Date of Treatment: 03/07/2018   Diagnosis:   Encounter Diagnosis   Name Primary?    Tendinopathy of left rotator cuff        Time in: 1304  Time Out: 1357  Total Treatment Time: 53        Subjective:    Adele reports she only has pain with certain movements.  Patient reports their pain to be 0/10 on a 0-10 scale with 0 being no pain and 10 being the worst pain imaginable.    Objective    Patient received individual therapy to increase strength, endurance, ROM, flexibility and posture with 0 patients with activities as follows:     Adele received therapeutic exercises to develop strength, endurance, ROM, flexibility and posture for 28 minutes including:     OH Pulleys flexion 5'  Pendulums circles (CW/CCWE), forward/back, side/side x 30 reps  scap retraction x 30 reps  Shoulder shrugs x 30 reps  Shoulder rolls x 30 reps  Isometrics [place] flex, ext, abd, add x 30 reps  Shoulder flexion AAROM x 30 reps with wand  Shoulder ER with wand AAROM x 30 reps      Adele received the following manual therapy techniques: Soft tissue Mobilization and PROM/rhythmic stabilization were applied to the: L shoulder for 15 minutes.     CP x 10 min to L shoulder to decrease pain and inflammation    Pt demo good understanding of the education provided. Adele demonstrated good return demonstration of activities.     Assessment:     Increased tenderness along biceps tendon with STM.      Pt will continue to benefit from skilled PT intervention. Medical Necessity is demonstrated by:  Pain limits function of effected part for some activities, Unable to participate fully in daily activities, Requires skilled supervision to complete and progress HEP and Weakness.    Patient is making good progress towards established goals.          Plan:  Continue with established Plan of Care towards PT goals.

## 2018-03-09 ENCOUNTER — CLINICAL SUPPORT (OUTPATIENT)
Dept: REHABILITATION | Facility: HOSPITAL | Age: 73
End: 2018-03-09
Attending: PHYSICAL MEDICINE & REHABILITATION
Payer: MEDICARE

## 2018-03-09 DIAGNOSIS — M67.912 TENDINOPATHY OF LEFT ROTATOR CUFF: ICD-10-CM

## 2018-03-09 PROCEDURE — 97110 THERAPEUTIC EXERCISES: CPT | Mod: PN

## 2018-03-09 NOTE — PROGRESS NOTES
Name: Adele Lafleur [facility] Number: [id number]  Date of Treatment: 03/09/2018   Diagnosis:   Encounter Diagnosis   Name Primary?    Tendinopathy of left rotator cuff         Time in: 1405  Time Out: 1450  Total Treatment Time: 45    Subjective:    Adele reports improvement of symptoms.  Patient reports their pain to be n/a/10 on a 0-10 scale with 0 being no pain and 10 being the worst pain imaginable.Reports she is able to clip her hair without difficulty now. Reports continues HEP daily without problems.    Objective:    Patient received individual therapy to increase strength, endurance, ROM, flexibility, posture and core stabilization with activities as follows:     Adele received therapeutic exercises to develop strength, endurance, ROM, flexibility, posture and core stabilization for 45 minutes including:     OH pulley flexion 5'  Seated retro shoulder rolls B 10/3  Standing L shoulder flexion with washrag on wall 10/3  Seated scap retraction 10/3 B  Pendulums L shoulder 10/3: flex, horiz aBd/aDD, cw/ccw  Seated shrugs 10/3 B  UBE 2/2 L1  Supine L shoulder AAROM ER with cane 10/3 in scapular plane  Supine L shoulder flexion AAROM with cane 10/3  Seated AAROM shoulder flexion with wand 10  Supine PROM L shoulder all planes in tolerable ranges and rhythmic stabilization IR/ER-unable to tolerate stab in flexion    Continue HEP daily.    Pt demo good understanding of the education provided. Patient demonstrated good return demonstration of activities.     Assessment:     Progressing with increased ROM, improving pain. Reported discomfort at shoulder only with retro UBE.     Pt will continue to benefit from skilled PT intervention. Medical Necessity is demonstrated by:  Requires skilled supervision to complete and progress HEP and Weakness.    Patient is making good progress towards established goals.    Plan:    Continue with established Plan of Care towards PT goals.

## 2018-03-14 ENCOUNTER — CLINICAL SUPPORT (OUTPATIENT)
Dept: REHABILITATION | Facility: HOSPITAL | Age: 73
End: 2018-03-14
Attending: PHYSICAL MEDICINE & REHABILITATION
Payer: MEDICARE

## 2018-03-14 DIAGNOSIS — M67.912 TENDINOPATHY OF LEFT ROTATOR CUFF: ICD-10-CM

## 2018-03-14 PROCEDURE — 97110 THERAPEUTIC EXERCISES: CPT | Mod: PN

## 2018-03-14 NOTE — PROGRESS NOTES
Name: Adele Lafleur [facility] Number: [id number]  Date of Treatment: 03/14/2018   Diagnosis:   Encounter Diagnosis   Name Primary?    Tendinopathy of left rotator cuff        Time in: 1352  Time Out: 1440  Total Treatment Time: 47        Subjective:    Adele reports no pain today.  Patient reports their pain to be 0/10 on a 0-10 scale with 0 being no pain and 10 being the worst pain imaginable.    Objective    Patient received individual therapy to increase strength, endurance, ROM, flexibility and posture with 0 patients with activities as follows:     Adele received therapeutic exercises to develop strength, endurance, ROM, flexibility and posture for 47 minutes including:       OH pulleys flex 5'  Shoulder rolls x 30 reps  scap retraction x 30 reps  Shoulder shrugs x 30 rep  Isometric flex, ext, add, ER x 30 reps with pillow  seated flex with dowel x 30 reps  Seated ER with dowel x 30 reps  Supine AAROM shoulder flexion x 30 reps  Supine AAROM ER x 30 reps  UBE 3'/3' L-1    Vitals following UBE: 125/61, 66 bpm and 98%    Pt demo good understanding of the education provided. Adlee demonstrated good return demonstration of activities.     Assessment:   Pt with increased SOB following UBE.  Vitals taken and were WNL.  Pain at end range of AROM abd.  ROM still limited for ER, IR, and abd.    Pt will continue to benefit from skilled PT intervention. Medical Necessity is demonstrated by:  Pain limits function of effected part for some activities, Unable to participate fully in daily activities, Requires skilled supervision to complete and progress HEP and Weakness.    Patient is making good progress towards established goals.        Plan:  Continue with established Plan of Care towards PT goals.

## 2018-03-16 ENCOUNTER — CLINICAL SUPPORT (OUTPATIENT)
Dept: REHABILITATION | Facility: HOSPITAL | Age: 73
End: 2018-03-16
Attending: PHYSICAL MEDICINE & REHABILITATION
Payer: MEDICARE

## 2018-03-16 DIAGNOSIS — M67.912 TENDINOPATHY OF LEFT ROTATOR CUFF: ICD-10-CM

## 2018-03-16 PROCEDURE — 97140 MANUAL THERAPY 1/> REGIONS: CPT | Mod: PN

## 2018-03-16 PROCEDURE — 97110 THERAPEUTIC EXERCISES: CPT | Mod: PN

## 2018-03-19 DIAGNOSIS — M75.102 TEAR OF LEFT ROTATOR CUFF, UNSPECIFIED TEAR EXTENT: ICD-10-CM

## 2018-03-19 RX ORDER — TRAMADOL HYDROCHLORIDE 50 MG/1
TABLET ORAL
Qty: 40 TABLET | Refills: 0 | Status: SHIPPED | OUTPATIENT
Start: 2018-03-19 | End: 2019-01-02

## 2018-03-19 NOTE — TELEPHONE ENCOUNTER
queried, no inappropriate activity.  This is prescribed for acute shoulder pain.  Patient is going to physical therapy.  Will you refill for patient?  Thanks.  Destiney

## 2018-03-21 ENCOUNTER — TELEPHONE (OUTPATIENT)
Dept: ENDOCRINOLOGY | Facility: CLINIC | Age: 73
End: 2018-03-21

## 2018-03-21 ENCOUNTER — CLINICAL SUPPORT (OUTPATIENT)
Dept: REHABILITATION | Facility: HOSPITAL | Age: 73
End: 2018-03-21
Attending: PHYSICAL MEDICINE & REHABILITATION
Payer: MEDICARE

## 2018-03-21 DIAGNOSIS — M67.912 TENDINOPATHY OF LEFT ROTATOR CUFF: ICD-10-CM

## 2018-03-21 PROCEDURE — 97010 HOT OR COLD PACKS THERAPY: CPT | Mod: PN

## 2018-03-21 PROCEDURE — 97110 THERAPEUTIC EXERCISES: CPT | Mod: PN

## 2018-03-21 NOTE — PROGRESS NOTES
"Name: Adele Lafleur [facility] Number: [id number]  Date of Treatment: 03/21/2018   Diagnosis:   Encounter Diagnosis   Name Primary?    Tendinopathy of left rotator cuff        Time in: 1400  Time Out: 1500  Total Treatment Time: 60  Group Time: 0      Subjective:    Adele reports continued shoulder pain, hasn't had pain meds in a week and getting them today.  Patient reports their pain to be 5/10, before treatment, on a 0-10 scale with 0 being no pain and 10 being the worst pain imaginable and "unable to say"/10 post treatment.    Objective    Adele received therapeutic exercises to develop strength, endurance, flexibility and posture for 50 minutes including:     OHP 5'  UBE Lv 1.5 5'/5'  Shoulder rolls x30  Shoulder shrugs x30  Scapular retraction x30  Supine with Tbar x30: flexion and ext rotation L  IR with towel behind back x30 L  Isometrics x30: add,abd, flex, ext    Cold pack to L shoulder to reduce soreness    Pt demo good understanding of the education provided. Adele demonstrated good return demonstration of activities.     Assessment:     Pt will continue to benefit from skilled PT intervention. Medical Necessity is demonstrated by:  Unable to participate fully in daily activities, Requires skilled supervision to complete and progress HEP and Weakness.    Patient is making good progress towards established goals.    Plan:    Continue with established Plan of Care towards PT goals.   "

## 2018-03-21 NOTE — TELEPHONE ENCOUNTER
Based on pt's sleep/meal schedule, she can give NPH as she has been, which is 12 hours apart at 11A and 11P but will have to give separate injection for Relion R 30 minutes before she eats meals. Ok to use Novolog but when she runs out will have to change to Relion R

## 2018-03-21 NOTE — TELEPHONE ENCOUNTER
----- Message from Jeanie Hammer sent at 3/21/2018  8:47 AM CDT -----  Contact: patient  Patient calling to find out what dosage should she be taking for her insulin. Please advise.  Call back   Thanks!

## 2018-03-21 NOTE — PROGRESS NOTES
Name: Adele Lafleur [facility] Number: [id number]  Date of Treatment: 03/16/2018  Diagnosis:   Encounter Diagnosis   Name Primary?    Tendinopathy of left rotator cuff        Time in: 1408  Time Out: 1507  Total Treatment Time: 54  Group Time: 0      Subjective:    Adele reports increased pain.  Patient reports their pain to be 8/10 on a 0-10 scale with 0 being no pain and 10 being the worst pain imaginable.    Objective    Patient received individual therapy to increase strength, ROM, flexibility and posture with 0 patients with activities as follows:     Vital signs prior to initiating session /80, HR 74bpm  Adele received therapeutic exercises to develop strength, ROM, flexibility and posture for 44 minutes including:    UBE L1.5 5'/5' L-1   OH pulleys scaption 5'   OH pulleys IR b/h back 2'   Shoulder rolls x 30 reps   Scap retraction x 30 reps   Shoulder shrugs x 30 rep   Isometric flex, ext, add, ER, abd (muscle activation only), IR x 30 reps with ball    Supine flexion with T-bar x 30   Supine ER with T-bar x 30    Adele received the following manual therapy techniques: PROM/gentle stretching were applied to the: L shoulder for 10 minutes in supine.     Written Home Exercises Provided: Reviewed  Pt demo good understanding of the education provided. Adele demonstrated fair return demonstration of activities.     Assessment:   Significant discomfort with activities reported today.  Has not taken any pain medication in a couple of days.  Significant restriction of shoulder IR.      Pt will continue to benefit from skilled PT intervention. Medical Necessity is demonstrated by:  Pain limits function of effected part for some activities, Unable to participate fully in daily activities, Requires skilled supervision to complete and progress HEP and Weakness.    Patient is making fair progress towards established goals.    New/Revised goals: N/A this date.       Plan:  Continue with established  Plan of Care towards PT goals.       3/21/2018 @ 1310 **6th visit PT-PTA face to face conference with Marzena Roman, PTA re: pt status, POC, and plan for progression completed.  PTA verbalized understanding.    Libby Ram, PT

## 2018-03-21 NOTE — TELEPHONE ENCOUNTER
S/W pt: states she can't affor her Levemir or Novolog anymore, in the donut hole & each will cost $150.00; still has 2 bottles of Novolog on hand, but as of 3/19, switched over to [employer] brand of Relion N & taking 42 U BID; when runs out of Novolog, will have to switch that to Relion R; states prior to switch, AM glucoses as follows:    3/9: 133  3/10: 147  3/11: 157  3/12: 142    After Relion N 42 U BID    3/19: 150  3/20: 179  3/21: 172    According to pt's chart & pt, she was on Levemir 46 U BID; does she need to increase Relion N dosage? Please advise.

## 2018-03-21 NOTE — TELEPHONE ENCOUNTER
Reviewed glucoses. Please advise her to increase Relion N to 45 units twice daily (before breakfast and dinner). Please ask patient when she changes to Relion R, does she have the syringes that will allow for her to drawn up the NPH and Regular insulins in same syringe for breakfast and evening meals (1 ml syringes - go up to 100 units)? If not, I can prescribe these for her. Regular insulin should be drawn up before NPH.    When she changes to Relion R, please advise her to give 32 units with each meal. Remind pt to administer this insulin 30 minutes before her meals. She should eat 3 meals/day and have small bedtime snack with this regimen.

## 2018-03-21 NOTE — TELEPHONE ENCOUNTER
Babs: call pt to give her these directions & find out further because of pt's routine, that she is doing the following:    She does not wake up until b/w 9:30am-11:00am, this is when she checks her blood glucose & b/w this time gets a dose of Relion N.    Her 1st meal of the day is around 11:30am, next meal is around 3:00pm, & supper around 7:00pm.    Takes 2nd dose of Relion N around 9:00pm.    Don't see how pt can work Relion R into this type of schedule for this AM dose, when she doesn't get up until 9:30-11:00am & takes NPH 1st b/c she doesn't eat until 11:30ish?    Please advise.

## 2018-03-22 NOTE — TELEPHONE ENCOUNTER
Pt was given the below instructions per ISABEL Garcia.  Pt did add that she is usually up for 9am and would like her NPH schedule to be 9am and 9pm    Please advise    (Pt verbalized understanding of all insulin doses and times given per ISABEL Manuel)

## 2018-03-23 ENCOUNTER — CLINICAL SUPPORT (OUTPATIENT)
Dept: REHABILITATION | Facility: HOSPITAL | Age: 73
End: 2018-03-23
Attending: PHYSICAL MEDICINE & REHABILITATION
Payer: MEDICARE

## 2018-03-23 DIAGNOSIS — M67.912 TENDINOPATHY OF LEFT ROTATOR CUFF: ICD-10-CM

## 2018-03-23 PROCEDURE — 97110 THERAPEUTIC EXERCISES: CPT | Mod: PN

## 2018-03-23 NOTE — PROGRESS NOTES
Name: Adele Lafleur [facility] Number: [id number]  Date of Treatment: 03/23/2018   Diagnosis:   Encounter Diagnosis   Name Primary?    Tendinopathy of left rotator cuff        Time in: 1400  Time Out: 1450  Total Treatment Time: 50    Subjective:    Adele reports improvement of symptoms.  Patient reports no pain at beginning of session. Later states that she does always have some degree of pain but that initial pain was minimal, having taken meds prior to appt. After appt pt rates pain 7/10 anterior L shoulder. Able to fix her hair now but does experience discomfort L shoulder    Objective:    /69, HR 65    Patient received individual therapy to increase strength, endurance, ROM, flexibility, posture and core stabilization with activities as follows:     Adele received therapeutic exercises to develop strength, endurance, ROM, flexibility, posture and core stabilization for 50 minutes including:     Seated OH pulley flexion 5'  Seated scap retraction B 10/3  Seated shrugs 10/3 B  Seated retro rolls B 10/3  Seated shoulder flexion with dowel 10/2  Supine serratus punches B 10/2  PROM L shoulder and rhythmic stabilization   Standing rows RTB 10/3 B  Standing IR with OH pulleys 10/3 L shoulder  Seated UBE 5/5     Continue HEP daily.  Issued RTB  for HEP for rows with instruction to keep away from small children, animals, and anyone with latex allergies. Pt verbalizes understanding.     Pt demo good understanding of the education provided. Patient demonstrated good return demonstration of activities.     Assessment:     Rest breaks required 2* SOBOE, pt stating this is not unusual for her-PROM during rest periods. Improving AROM L shoulder and progressing with ex's. No pain reported until OH pulley IR-pt reported after ex. Improved after session.     Pt will continue to benefit from skilled PT intervention. Medical Necessity is demonstrated by:  Requires skilled supervision to complete and progress HEP  and Weakness.    Patient is making good progress towards established goals.    Plan:    Continue with established Plan of Care towards PT goals.

## 2018-03-23 NOTE — TELEPHONE ENCOUNTER
That would be fine but would have to wait to take either the Relion R or Novolog until closer to meal time.

## 2018-03-26 ENCOUNTER — CLINICAL SUPPORT (OUTPATIENT)
Dept: REHABILITATION | Facility: HOSPITAL | Age: 73
End: 2018-03-26
Attending: PHYSICAL MEDICINE & REHABILITATION
Payer: MEDICARE

## 2018-03-26 DIAGNOSIS — M67.912 TENDINOPATHY OF LEFT ROTATOR CUFF: ICD-10-CM

## 2018-03-26 PROCEDURE — 97110 THERAPEUTIC EXERCISES: CPT | Mod: PN | Performed by: PHYSICAL THERAPIST

## 2018-03-26 NOTE — PROGRESS NOTES
Name: Adele Lafleur [facility] Number: [id number]  Date of Treatment: 03/26/2018   Diagnosis:   Encounter Diagnosis   Name Primary?    Tendinopathy of left rotator cuff        Time in: 1400  Time Out: 1500  Total Treatment Time: 60  Group Time: 0      Subjective:    Adele reports pain after sleeping on stomach.  Patient reports their pain to be 4/10 on a 0-10 scale with 0 being no pain and 10 being the worst pain imaginable.    Objective    Patient received individual therapy to increase strength, ROM and posture with 0 patients with activities as follows:     Adele received therapeutic exercises to develop strength, ROM and flexibility for 60 minutes including:     Seated OH pulley flexion 5 min  Seated OH pulleys scaption 5 min  Seated scap retraction 3 x 10  Seated shrugs 3 x 10  Seated retro rolls 3 x 10  Seated shoulder flexion with dowel 3 x 10  Standing rows 3 x 10 GTB  Standing IR with OH pulleys 3 x 10 L shoulder  Seated UBE 5/5     Assessment:       Pt will continue to benefit from skilled PT intervention. Medical Necessity is demonstrated by:  Pain limits function of effected part for some activities.    Patient is making fair progress towards established goals.    New/Revised goals: na      Plan:  Continue with established Plan of Care towards PT goals.

## 2018-03-26 NOTE — TELEPHONE ENCOUNTER
It was noted by speaking with pt on 3/22/18 at 10:19 she verbalized understanding of needing to take Novolog or Relion R closer to meal times

## 2018-04-03 ENCOUNTER — TELEPHONE (OUTPATIENT)
Dept: FAMILY MEDICINE | Facility: CLINIC | Age: 73
End: 2018-04-03

## 2018-04-03 RX ORDER — FENOFIBRATE 134 MG/1
CAPSULE ORAL
Qty: 30 CAPSULE | Refills: 9 | Status: SHIPPED | OUTPATIENT
Start: 2018-04-03 | End: 2019-05-16

## 2018-04-03 NOTE — TELEPHONE ENCOUNTER
Patient notified she cannot take anti-inflammatories due to ckd. She may take Tylenol and her Tramadol.

## 2018-04-03 NOTE — TELEPHONE ENCOUNTER
----- Message from Macri Wayne sent at 4/3/2018 11:57 AM CDT -----  Contact: pt [phone]  Patient called and asked for a call back she is concerned about the medication she would like to know if she  Was given some medication for her Arthritis.  Patient states she was seen in the office a few months ago.

## 2018-04-04 ENCOUNTER — CLINICAL SUPPORT (OUTPATIENT)
Dept: REHABILITATION | Facility: HOSPITAL | Age: 73
End: 2018-04-04
Attending: PHYSICAL MEDICINE & REHABILITATION
Payer: MEDICARE

## 2018-04-04 DIAGNOSIS — R29.898 LEFT ARM WEAKNESS: ICD-10-CM

## 2018-04-04 DIAGNOSIS — M67.912 TENDINOPATHY OF LEFT ROTATOR CUFF: Primary | ICD-10-CM

## 2018-04-04 PROBLEM — M25.512 LEFT SHOULDER PAIN: Status: ACTIVE | Noted: 2018-04-04

## 2018-04-04 PROCEDURE — 97110 THERAPEUTIC EXERCISES: CPT | Mod: PN

## 2018-04-04 PROCEDURE — 97010 HOT OR COLD PACKS THERAPY: CPT | Mod: PN

## 2018-04-04 NOTE — PROGRESS NOTES
Name: Adele Hall  Clinic Number: [id number]  Date of Treatment: 04/04/2018   Diagnosis:   Encounter Diagnoses   Name Primary?    Tendinopathy of left rotator cuff Yes    Left arm weakness        Time in: 1200  Time Out: 1300  Total Treatment Time: 60  Group Time: NA      Subjective:    Adele reports improvement of symptoms.  Patient reports their pain to be 4/10 on a 0-10 scale with 0 being no pain and 10 being the worst pain imaginable.    Objective    Adele received therapeutic exercises to develop strength and flexibility for 60 minutes including:     X 5'/5' UBE (L1.5)  X 15 supine serratus punches (2#)  X 15 ea table top towel circles (cw/ccw)  X 15 seated cane assist ER  X 10 standing ladder crawls  X 15 seated t-bar flex/ABD (2#)  X 15 ea seated t-band IR/ER (RTB)  X 15 supine con/ecc sh flex (2#)  X 15 supine con/ecc sh ext (2#)  X 12 min HP    Assessment:     Mrs. Hall was able to tomasa. tx session w/out increase in symptoms.    Pt will continue to benefit from skilled PT intervention. Medical Necessity is demonstrated by:  Pain limits function of effected part for some activities, Unable to participate fully in daily activities and Weakness.    Patient is making fair progress towards established goals.    New/Revised goals: NA      Plan:  Continue with established Plan of Care towards PT goals.

## 2018-04-06 ENCOUNTER — CLINICAL SUPPORT (OUTPATIENT)
Dept: REHABILITATION | Facility: HOSPITAL | Age: 73
End: 2018-04-06
Attending: PHYSICAL MEDICINE & REHABILITATION
Payer: MEDICARE

## 2018-04-06 DIAGNOSIS — R29.898 LEFT ARM WEAKNESS: ICD-10-CM

## 2018-04-06 DIAGNOSIS — M67.912 TENDINOPATHY OF LEFT ROTATOR CUFF: ICD-10-CM

## 2018-04-06 PROCEDURE — G8980 MOBILITY D/C STATUS: HCPCS | Mod: CK,PN | Performed by: PHYSICAL THERAPIST

## 2018-04-06 PROCEDURE — 97110 THERAPEUTIC EXERCISES: CPT | Mod: PN | Performed by: PHYSICAL THERAPIST

## 2018-04-06 NOTE — PROGRESS NOTES
Name: Adele Lafleur [facility] Number: [id number]   Age: 72 y.o.   Diagnosis:   Encounter Diagnoses   Name Primary?    Left arm weakness     Tendinopathy of left rotator cuff       Physician: Jayro Campbell, *   Original Orders : PT eval and treat  Initial visit: 2/21/2018  Date of Last visit: 4/6/2018  Date of Discharge Note:  4/6/2018  Total Visits Received: 12  Missed Visits: 0    Subjective: The patient reports she doesn't feel she is getting ant better at this time.    Objective:  Treatment :    Included:Therapeutic exercise and Moist heat     ROM:    ROM:   Left   Right  Shoulder flexion 85*   154*   Shoulder abduction 80*   154*  Shoulder ER  78*   84*    UE Strength:   MMT:   Left   Right  Shoulder flexion 3/5   5/5   Shoulder abduction 3/5   5/5  Shoulder ER  3+/5   5/5    DASH: 50% impairment ( G code CK)       Treatment today:    Time In: 1500  Time Out: 1600        Seated OH pulley flexion 5 min  Seated OH pulleys scaption 5 min  Seated scap retraction 3 x 10  Seated shrugs 3 x 10  Seated retro rolls 3 x 10  Seated shoulder flexion with dowel 3 x 10  Standing rows 3 x 10 GTB  Standing IR with OH pulleys 3 x 10 L shoulder  Seated UBE 5/5     Assessment: The patient would benefit from referral to orthopedics    Goals Achieved:  1. The patient will be independent with a HEP for maintenace  2. Decrease soft tissue tenderness to mild  3. Decrease DASH to 50% impairment  4. Increase UE strength to 3+/5    Goals Not achieved  1, The patient will be independent with a written HEP for maintenance  2. Eliminate soft tissue tenderness  3. Increase strength to 4/5  4. Decrease DASH impairment to <40% ( G code CJ )    Discharge reason : Patient self discharge    Discharge plan :Continue HEP and patient recommended to follow up with orthopedics.    Plan:  This patient is discharged from Physical Therapy Services.

## 2018-04-10 ENCOUNTER — LAB VISIT (OUTPATIENT)
Dept: LAB | Facility: HOSPITAL | Age: 73
End: 2018-04-10
Attending: NURSE PRACTITIONER
Payer: MEDICARE

## 2018-04-10 DIAGNOSIS — E07.9 THYROID DISEASE: ICD-10-CM

## 2018-04-10 LAB — TSH SERPL DL<=0.005 MIU/L-ACNC: 3.83 UIU/ML

## 2018-04-10 PROCEDURE — 84443 ASSAY THYROID STIM HORMONE: CPT

## 2018-04-10 PROCEDURE — 36415 COLL VENOUS BLD VENIPUNCTURE: CPT | Mod: PO

## 2018-04-23 ENCOUNTER — LAB VISIT (OUTPATIENT)
Dept: LAB | Facility: HOSPITAL | Age: 73
End: 2018-04-23
Attending: INTERNAL MEDICINE
Payer: MEDICARE

## 2018-04-23 DIAGNOSIS — N18.30 CKD (CHRONIC KIDNEY DISEASE) STAGE 3, GFR 30-59 ML/MIN: ICD-10-CM

## 2018-04-23 LAB
ALBUMIN SERPL BCP-MCNC: 3.5 G/DL
ANION GAP SERPL CALC-SCNC: 10 MMOL/L
BUN SERPL-MCNC: 18 MG/DL
CALCIUM SERPL-MCNC: 9 MG/DL
CHLORIDE SERPL-SCNC: 103 MMOL/L
CO2 SERPL-SCNC: 26 MMOL/L
CREAT SERPL-MCNC: 1.3 MG/DL
EST. GFR  (AFRICAN AMERICAN): 48 ML/MIN/1.73 M^2
EST. GFR  (NON AFRICAN AMERICAN): 41.7 ML/MIN/1.73 M^2
GLUCOSE SERPL-MCNC: 186 MG/DL
PHOSPHATE SERPL-MCNC: 3.2 MG/DL
POTASSIUM SERPL-SCNC: 4.2 MMOL/L
PTH-INTACT SERPL-MCNC: 65 PG/ML
SODIUM SERPL-SCNC: 139 MMOL/L

## 2018-04-23 PROCEDURE — 36415 COLL VENOUS BLD VENIPUNCTURE: CPT | Mod: PO

## 2018-04-23 PROCEDURE — 83970 ASSAY OF PARATHORMONE: CPT

## 2018-04-23 PROCEDURE — 80069 RENAL FUNCTION PANEL: CPT

## 2018-04-30 ENCOUNTER — OFFICE VISIT (OUTPATIENT)
Dept: NEPHROLOGY | Facility: CLINIC | Age: 73
End: 2018-04-30
Payer: MEDICARE

## 2018-04-30 ENCOUNTER — TELEPHONE (OUTPATIENT)
Dept: NEPHROLOGY | Facility: CLINIC | Age: 73
End: 2018-04-30

## 2018-04-30 VITALS — OXYGEN SATURATION: 95 % | SYSTOLIC BLOOD PRESSURE: 120 MMHG | DIASTOLIC BLOOD PRESSURE: 52 MMHG | HEART RATE: 65 BPM

## 2018-04-30 DIAGNOSIS — I10 ESSENTIAL HYPERTENSION: ICD-10-CM

## 2018-04-30 DIAGNOSIS — R80.1 PERSISTENT PROTEINURIA: ICD-10-CM

## 2018-04-30 DIAGNOSIS — E11.21 DIABETIC NEPHROPATHY ASSOCIATED WITH TYPE 2 DIABETES MELLITUS: ICD-10-CM

## 2018-04-30 DIAGNOSIS — R21 RASH AND NONSPECIFIC SKIN ERUPTION: Primary | ICD-10-CM

## 2018-04-30 DIAGNOSIS — Z95.1 HX OF CABG: ICD-10-CM

## 2018-04-30 DIAGNOSIS — E11.69 DYSLIPIDEMIA ASSOCIATED WITH TYPE 2 DIABETES MELLITUS: ICD-10-CM

## 2018-04-30 DIAGNOSIS — N28.9 RENAL INSUFFICIENCY: ICD-10-CM

## 2018-04-30 DIAGNOSIS — I48.0 PAF (PAROXYSMAL ATRIAL FIBRILLATION): ICD-10-CM

## 2018-04-30 DIAGNOSIS — Z79.4 TYPE 2 DIABETES MELLITUS WITH STAGE 3 CHRONIC KIDNEY DISEASE, WITH LONG-TERM CURRENT USE OF INSULIN: ICD-10-CM

## 2018-04-30 DIAGNOSIS — N18.4 CKD (CHRONIC KIDNEY DISEASE) STAGE 4, GFR 15-29 ML/MIN: Primary | ICD-10-CM

## 2018-04-30 DIAGNOSIS — E11.22 TYPE 2 DIABETES MELLITUS WITH STAGE 3 CHRONIC KIDNEY DISEASE, WITH LONG-TERM CURRENT USE OF INSULIN: ICD-10-CM

## 2018-04-30 DIAGNOSIS — Z87.891 FORMER SMOKER: ICD-10-CM

## 2018-04-30 DIAGNOSIS — E78.5 DYSLIPIDEMIA ASSOCIATED WITH TYPE 2 DIABETES MELLITUS: ICD-10-CM

## 2018-04-30 DIAGNOSIS — I25.118 CORONARY ARTERY DISEASE OF NATIVE ARTERY OF NATIVE HEART WITH STABLE ANGINA PECTORIS: ICD-10-CM

## 2018-04-30 DIAGNOSIS — N18.30 TYPE 2 DIABETES MELLITUS WITH STAGE 3 CHRONIC KIDNEY DISEASE, WITH LONG-TERM CURRENT USE OF INSULIN: ICD-10-CM

## 2018-04-30 PROCEDURE — 99499 UNLISTED E&M SERVICE: CPT | Mod: S$GLB,,, | Performed by: INTERNAL MEDICINE

## 2018-04-30 PROCEDURE — 3074F SYST BP LT 130 MM HG: CPT | Mod: CPTII,S$GLB,, | Performed by: INTERNAL MEDICINE

## 2018-04-30 PROCEDURE — 3045F PR MOST RECENT HEMOGLOBIN A1C LEVEL 7.0-9.0%: CPT | Mod: CPTII,S$GLB,, | Performed by: INTERNAL MEDICINE

## 2018-04-30 PROCEDURE — 99999 PR PBB SHADOW E&M-EST. PATIENT-LVL II: CPT | Mod: PBBFAC,,, | Performed by: INTERNAL MEDICINE

## 2018-04-30 PROCEDURE — 3078F DIAST BP <80 MM HG: CPT | Mod: CPTII,S$GLB,, | Performed by: INTERNAL MEDICINE

## 2018-04-30 PROCEDURE — 99214 OFFICE O/P EST MOD 30 MIN: CPT | Mod: S$GLB,,, | Performed by: INTERNAL MEDICINE

## 2018-04-30 RX ORDER — FLUCONAZOLE 150 MG/1
150 TABLET ORAL DAILY
Qty: 1 TABLET | Refills: 0 | Status: SHIPPED | OUTPATIENT
Start: 2018-04-30 | End: 2018-05-01

## 2018-04-30 NOTE — PROGRESS NOTES
Subjective:       Patient ID: Adele Hall is a 72 y.o. White female who presents for follow-up evaluation of Follow-up and Chronic Kidney Disease    HPI     She reports she is doing well. Her DM has improved with last Hba1c of 6.9. She feels well. No edema but has chronic JONES> No new medications since last visit. She avoids NSAIDs and no herbal medications    Review of Systems   Constitutional: Negative for activity change, appetite change, fatigue and unexpected weight change.   HENT: Negative for facial swelling.    Respiratory: Positive for shortness of breath. Negative for cough and wheezing.    Cardiovascular: Negative for chest pain and leg swelling.   Gastrointestinal: Negative for abdominal distention.   Genitourinary: Negative for difficulty urinating and dysuria.   Musculoskeletal: Negative for arthralgias.   Neurological: Negative for weakness.       Objective:      Physical Exam   Constitutional: She is oriented to person, place, and time. She appears well-nourished.   HENT:   Mouth/Throat: Oropharynx is clear and moist.   Neck: No JVD present.   Cardiovascular: S1 normal and S2 normal.  Exam reveals no friction rub.    Pulmonary/Chest: Breath sounds normal. She has no wheezes. She has no rales.   Abdominal: Soft.   Musculoskeletal: She exhibits no edema.   Neurological: She is alert and oriented to person, place, and time.   Skin: Skin is warm and dry.   Psychiatric: She has a normal mood and affect.   Vitals reviewed.      Assessment:       1. CKD (chronic kidney disease) stage 4, GFR 15-29 ml/min    2. Persistent proteinuria    3. 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 Essential hypertension    5. Dyslipidemia associated with type 2 diabetes mellitus    6. PAF (paroxysmal atrial fibrillation), post CABG    7. Coronary artery disease of native artery of native heart with stable angina pectoris    8. Type 2 diabetes mellitus with stage 3 chronic kidney disease, with long-term current use of insulin     9. Class 2 obesity with serious comorbidity in adult, unspecified BMI, unspecified obesity type    10. Former smoker    11. Renal insufficiency    12. Diabetic nephropathy associated with type 2 diabetes mellitus        Plan:             CKD with stable kidney function and proteinuria. Continue RAAS blockade for renal preservation    HTN is controlled at home with -130's    Mineral and Bone Disease--PTH at goal. Continue D2    DM is controlled    Anemia with improvement in Hb from previous      RTC 5 months with labs priorSubjective:       Patient ID: Adele Hall is a 72 y.o. White female who presents for follow-up evaluation of Follow-up and Chronic Kidney Disease    HPI  Review of Systems    Objective:      Physical Exam    Assessment:       1. CKD (chronic kidney disease) stage 4, GFR 15-29 ml/min    2. Persistent proteinuria    3. 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 Essential hypertension    5. Dyslipidemia associated with type 2 diabetes mellitus    6. PAF (paroxysmal atrial fibrillation), post CABG    7. Coronary artery disease of native artery of native heart with stable angina pectoris    8. Type 2 diabetes mellitus with stage 3 chronic kidney disease, with long-term current use of insulin    9. Class 2 obesity with serious comorbidity in adult, unspecified BMI, unspecified obesity type    10. Former smoker        Plan:

## 2018-06-05 ENCOUNTER — LAB VISIT (OUTPATIENT)
Dept: LAB | Facility: HOSPITAL | Age: 73
End: 2018-06-05
Attending: NURSE PRACTITIONER
Payer: MEDICARE

## 2018-06-05 DIAGNOSIS — N18.30 TYPE 2 DIABETES MELLITUS WITH STAGE 3 CHRONIC KIDNEY DISEASE, WITH LONG-TERM CURRENT USE OF INSULIN: ICD-10-CM

## 2018-06-05 DIAGNOSIS — E11.22 TYPE 2 DIABETES MELLITUS WITH STAGE 3 CHRONIC KIDNEY DISEASE, WITH LONG-TERM CURRENT USE OF INSULIN: ICD-10-CM

## 2018-06-05 DIAGNOSIS — Z79.4 TYPE 2 DIABETES MELLITUS WITH STAGE 3 CHRONIC KIDNEY DISEASE, WITH LONG-TERM CURRENT USE OF INSULIN: ICD-10-CM

## 2018-06-05 LAB
ANION GAP SERPL CALC-SCNC: 8 MMOL/L
BUN SERPL-MCNC: 21 MG/DL
CALCIUM SERPL-MCNC: 9.2 MG/DL
CHLORIDE SERPL-SCNC: 102 MMOL/L
CO2 SERPL-SCNC: 29 MMOL/L
CREAT SERPL-MCNC: 1.3 MG/DL
EST. GFR  (AFRICAN AMERICAN): 47.7 ML/MIN/1.73 M^2
EST. GFR  (NON AFRICAN AMERICAN): 41.4 ML/MIN/1.73 M^2
ESTIMATED AVG GLUCOSE: 148 MG/DL
GLUCOSE SERPL-MCNC: 184 MG/DL
HBA1C MFR BLD HPLC: 6.8 %
POTASSIUM SERPL-SCNC: 4.6 MMOL/L
SODIUM SERPL-SCNC: 139 MMOL/L

## 2018-06-05 PROCEDURE — 36415 COLL VENOUS BLD VENIPUNCTURE: CPT | Mod: PO

## 2018-06-05 PROCEDURE — 83036 HEMOGLOBIN GLYCOSYLATED A1C: CPT

## 2018-06-05 PROCEDURE — 80048 BASIC METABOLIC PNL TOTAL CA: CPT

## 2018-06-11 ENCOUNTER — OFFICE VISIT (OUTPATIENT)
Dept: ENDOCRINOLOGY | Facility: CLINIC | Age: 73
End: 2018-06-11
Payer: MEDICARE

## 2018-06-11 ENCOUNTER — TELEPHONE (OUTPATIENT)
Dept: ENDOCRINOLOGY | Facility: CLINIC | Age: 73
End: 2018-06-11

## 2018-06-11 VITALS
DIASTOLIC BLOOD PRESSURE: 68 MMHG | BODY MASS INDEX: 38.1 KG/M2 | SYSTOLIC BLOOD PRESSURE: 130 MMHG | WEIGHT: 201.81 LBS | HEART RATE: 58 BPM | HEIGHT: 61 IN

## 2018-06-11 DIAGNOSIS — N18.30 TYPE 2 DIABETES MELLITUS WITH STAGE 3 CHRONIC KIDNEY DISEASE, WITH LONG-TERM CURRENT USE OF INSULIN: Primary | ICD-10-CM

## 2018-06-11 DIAGNOSIS — E11.42 TYPE 2 DIABETES MELLITUS WITH DIABETIC POLYNEUROPATHY, WITH LONG-TERM CURRENT USE OF INSULIN: ICD-10-CM

## 2018-06-11 DIAGNOSIS — E11.22 TYPE 2 DIABETES MELLITUS WITH STAGE 3 CHRONIC KIDNEY DISEASE, WITH LONG-TERM CURRENT USE OF INSULIN: Primary | ICD-10-CM

## 2018-06-11 DIAGNOSIS — E11.69 DYSLIPIDEMIA ASSOCIATED WITH TYPE 2 DIABETES MELLITUS: ICD-10-CM

## 2018-06-11 DIAGNOSIS — Z79.4 TYPE 2 DIABETES MELLITUS WITH STAGE 3 CHRONIC KIDNEY DISEASE, WITH LONG-TERM CURRENT USE OF INSULIN: Primary | ICD-10-CM

## 2018-06-11 DIAGNOSIS — I10 ESSENTIAL HYPERTENSION: ICD-10-CM

## 2018-06-11 DIAGNOSIS — Z79.4 TYPE 2 DIABETES MELLITUS WITH DIABETIC POLYNEUROPATHY, WITH LONG-TERM CURRENT USE OF INSULIN: ICD-10-CM

## 2018-06-11 DIAGNOSIS — E78.5 DYSLIPIDEMIA ASSOCIATED WITH TYPE 2 DIABETES MELLITUS: ICD-10-CM

## 2018-06-11 DIAGNOSIS — I25.10 CORONARY ARTERY DISEASE INVOLVING NATIVE CORONARY ARTERY OF NATIVE HEART WITHOUT ANGINA PECTORIS: ICD-10-CM

## 2018-06-11 DIAGNOSIS — E07.9 THYROID DISEASE: ICD-10-CM

## 2018-06-11 DIAGNOSIS — E11.649 HYPOGLYCEMIA ASSOCIATED WITH TYPE 2 DIABETES MELLITUS: ICD-10-CM

## 2018-06-11 PROCEDURE — 99214 OFFICE O/P EST MOD 30 MIN: CPT | Mod: S$GLB,,, | Performed by: NURSE PRACTITIONER

## 2018-06-11 PROCEDURE — 3078F DIAST BP <80 MM HG: CPT | Mod: CPTII,S$GLB,, | Performed by: NURSE PRACTITIONER

## 2018-06-11 PROCEDURE — 3075F SYST BP GE 130 - 139MM HG: CPT | Mod: CPTII,S$GLB,, | Performed by: NURSE PRACTITIONER

## 2018-06-11 PROCEDURE — 99999 PR PBB SHADOW E&M-EST. PATIENT-LVL V: CPT | Mod: PBBFAC,,, | Performed by: NURSE PRACTITIONER

## 2018-06-11 PROCEDURE — 3044F HG A1C LEVEL LT 7.0%: CPT | Mod: CPTII,S$GLB,, | Performed by: NURSE PRACTITIONER

## 2018-06-11 NOTE — TELEPHONE ENCOUNTER
Arleth, can you please notify the pt that Tiffany looked into possible patient assistance and per Tiffany:  Pt has not met the required $1,000 out of pocket spend for eligibility into the program.     To continue on [employer] brand insulins.

## 2018-06-11 NOTE — TELEPHONE ENCOUNTER
Tiffany, can you please look into this patient's qualification for Novolog and Levemir? She is currently using [employer] brand insulins because she is in the donut hole. Thank you.

## 2018-06-11 NOTE — PATIENT INSTRUCTIONS
Hypoglycemia (Low Blood Sugar)     Fast-acting sugar includes a cup of nonfat milk.     Too little sugar (glucose) in your blood is called hypoglycemia or low blood sugar. Low blood sugar usually means anything lower than 70 mg/dL. Talk with your healthcare provider about your target range and what level is too low for you. Diabetes itself doesnt cause low blood sugar. But some of the treatments for diabetes, such as pills or insulin, may raise your risk for it. Low blood sugar may cause you to pass out or have a seizure. So always treat low blood sugar right away, but don't overeat.  Special note: Always carry a source of fast-acting sugar and a snack in case of hypoglycemia.   What you may notice  If you have low blood sugar, you may have one or more of these symptoms:  · Shakiness or dizziness  · Cold, clammy skin or sweating  · Feelings of hunger  · Headache  · Nervousness  · A hard, fast heartbeat  · Weakness  · Confusion or irritability  · Blurred vision  · Having nightmares or waking up confused or sweating  · Numbness or tingling in the lips or tongue  What you should do  Here are tips to follow if you have hypoglycemia:   · First check your blood sugar. If it is too low (out of your target range), eat or drink 15 to 20 grams of fast-acting sugar. This may be 3 to 4 glucose tablets, 4 ounces (half a cup) of fruit juice or regular (nondiet) soda, 8 ounces (1 cup) of fat-free milk, or 1 tablespoon of honey. Dont take more than this, or your blood sugar may go too high.  · Wait 15 minutes. Then recheck your blood sugar if you can.  · If your blood sugar is still too low, repeat the steps above and check your blood sugar again. If your blood sugar still has not returned to your target range, contact your healthcare provider or seek emergency care.  · Once your blood sugar returns to target range, eat a snack or meal.  Preventing low blood sugar  Things you can do include the following:   · If your condition  needs a strict treatment plan, eat your meals and snacks at the same times each day. Dont skip meals!  · If your treatment plan lets you change when you eat and what you eat, learn how to change the time and dose of your rapid-acting insulin to match this.   · Ask your healthcare provider if it is safe for you to drink alcohol. Never drink on an empty stomach.  · Take your medicine at the prescribed times.  · Always carry a source of fast-acting sugar and a snack when youre away from home.  Other things to do  Additional tips include the following:  · Carry a medical ID card, a compact USB drive, or wear a medical alert bracelet or necklace. It should say that you have diabetes. It should also say what to do if you pass out or have a seizure.  · Make sure your family, friends, and coworkers know the signs of low blood sugar. Tell them what to do if your blood sugar falls very low and you cant treat yourself.  · Keep a glucagon emergency kit handy. Be sure your family, friends, and coworkers know how and when to use it. Check it regularly and replace the glucagon before it expires.  · Talk with your health care team about other things you can do to prevent low blood sugar.     If you have unexplained hypoglycemia or hypoglycemia several times, call your healthcare provider.   Date Last Reviewed: 5/1/2016  © 0314-8290 [employer]. [address] [place] [address], [place] [address]. All rights reserved. This information is not intended as a substitute for professional medical care. Always follow your healthcare professional's instructions.

## 2018-06-11 NOTE — PROGRESS NOTES
"CC: Ms. Adele Hall arrives today for management of Type 2 DM and review of chronic medical conditions, as listed in the visit diagnosis section of this encounter.     HPI: Ms. Adele Hall was diagnosed with Type 2 DM in ~ . Metformin started at the time of diagnosis but was stopped d/t renal impairment, per PCP in [place]. Insulin was started ~ .  Denies FH of DM. She did have 1 ER admission after having BG of >700 in , prior to starting insulin.   Follows with Dr. Burks for CAD - s/p CABG in 2016.   She follows with Dr. Montero for CKD.    Since last visit, she fell in [facility]. She had to stop Novolog and Levemir and change to [employer] brand insulins.     BG readings are checked 1x/day (fasting only). No logs to clinic.   Fastin-125     Hypoglycemia: Yes, has occurred twice recently around 3-4 AM. As low as 52 mg/dL - called paramedics  Symptoms: sweating, weakness  Treatment: ate sweets, followed by a meal.     Missing Insulin/PO medication doses: No  Timing prandial insulin 5-15 minutes before meals: yes    Goes to gym 3 days/week. Rides stationary bike and does weight machines.     Dietary Habits: Eats 3 meals/day but these are on a late schedule. Rare snacking. Drinks sweet tea when out to eat.     Her main concern today is weight gain. She is frustrated with this. She also brings CBD oil to her visit and is taking this daily. She has read that is cures diabetes and is hoping to get off of insulin.       CURRENT DIABETIC MEDS: Novolin NPH 40 units BID, Novolin R  30 units AC   Vial or pen: vials  Glucometer type: True Metrix    Previous DM treatments:  Metformin  Victoza  Novolog/Levemir    Last Eye Exam: 2017, no  Needs to reschedule  Last Podiatry Exam: never    REVIEW OF SYSTEMS  Constitutional: no c/o weakness. + fatigue, + 7 # weight gain  Eyes: + blurred vision. States eyes "film over"  Cardiac: no palpitations, chest pain.  Respiratory: no cough, + " "dyspnea when active. Cardiologist aware. Has also had pulmonologist w/up.  GI: no c/o abdominal pain. + intermittent nausea.   Skin: no rashes. Reports white lesions on legs, multiple moles. Has derm appointment coming up.   Neuro: denies numbness, tingling, paresthesias.   Endocrine: denies polyphagia, polydipsia, polyuria      Personally reviewed Past Medical, Surgical, Social History.    Vital Signs  /68   Pulse (!) 58   Ht 5' 1" (1.549 m)   Wt 91.5 kg (201 lb 12.8 oz)   BMI 38.13 kg/m²     Personally reviewed the below labs:    Hemoglobin A1C   Date Value Ref Range Status   06/05/2018 6.8 (H) 4.0 - 5.6 % Final     Comment:     ADA Screening Guidelines:  5.7-6.4%  Consistent with prediabetes  >or=6.5%  Consistent with diabetes  High levels of fetal hemoglobin interfere with the HbA1C  assay. Heterozygous hemoglobin variants (HbS, HgC, etc)do  not significantly interfere with this assay.   However, presence of multiple variants may affect accuracy.     02/15/2018 8.4 (H) 4.0 - 5.6 % Final     Comment:     According to ADA guidelines, hemoglobin A1c <7.0% represents  optimal control in non-pregnant diabetic patients. Different  metrics may apply to specific patient populations.   Standards of Medical Care in Diabetes-2016.  For the purpose of screening for the presence of diabetes:  <5.7%     Consistent with the absence of diabetes  5.7-6.4%  Consistent with increasing risk for diabetes   (prediabetes)  >or=6.5%  Consistent with diabetes  Currently, no consensus exists for use of hemoglobin A1c  for diagnosis of diabetes for children.  This Hemoglobin A1c assay has significant interference with fetal   hemoglobin   (HbF). The results are invalid for patients with abnormal amounts of   HbF,   including those with known Hereditary Persistence   of Fetal Hemoglobin. Heterozygous hemoglobin variants (HbAS, HbAC,   HbAD, HbAE, HbA2) do not significantly interfere with this assay;   however, presence of " multiple variants in a sample may impact the %   interference.     01/14/2018 8.1 (H) 0.0 - 5.6 % Final     Comment:     Reference Interval:  5.0 - 5.6 Normal   5.7 - 6.4 High Risk   > 6.5 Diabetic    Hgb A1c results are standardized based on the (NGSP) National   Glycohemoglobin Standardization Program.    Hemoglobin A1C levels are related to mean serum/plasma glucose   during the preceding 2-3 months.            Chemistry        Component Value Date/Time     06/05/2018 1016    K 4.6 06/05/2018 1016     06/05/2018 1016    CO2 29 06/05/2018 1016    BUN 21 06/05/2018 1016    CREATININE 1.3 06/05/2018 1016     (H) 06/05/2018 1016        Component Value Date/Time    CALCIUM 9.2 06/05/2018 1016    ALKPHOS 72 02/15/2018 0927    AST 59 (H) 02/15/2018 0927    AST 45 (H) 01/30/2016 0431    ALT 32 02/15/2018 0927    BILITOT 0.3 02/15/2018 0927          Lab Results   Component Value Date    CHOL 136 01/14/2018    CHOL 158 12/30/2017    CHOL 155 11/28/2016     Lab Results   Component Value Date    HDL 32 (L) 01/14/2018    HDL 33 (L) 12/30/2017    HDL 23 (L) 11/28/2016     Lab Results   Component Value Date    LDLCALC 52.6 (L) 01/14/2018    LDLCALC 68.8 12/30/2017    LDLCALC Invalid, Trig>400.0 11/28/2016     Lab Results   Component Value Date    TRIG 257 (H) 01/14/2018    TRIG 281 (H) 12/30/2017    TRIG 423 (H) 11/28/2016     Lab Results   Component Value Date    CHOLHDL 23.5 01/14/2018    CHOLHDL 20.9 12/30/2017    CHOLHDL 14.8 (L) 11/28/2016       Lab Results   Component Value Date    MICALBCREAT 1111.1 (H) 04/04/2016     Lab Results   Component Value Date    TSH 3.832 04/10/2018       CrCl cannot be calculated (Unknown ideal weight.).    Vit D, 25-Hydroxy   Date Value Ref Range Status   07/19/2017 20 (L) 30 - 96 ng/mL Final     Comment:     Vitamin D deficiency.........<10 ng/mL                              Vitamin D insufficiency......10-29 ng/mL       Vitamin D sufficiency........> or equal to 30  ng/mL  Vitamin D toxicity............>100 ng/mL           PHYSICAL EXAMINAAppears well, no distressON  Constitutional: Appears well, no distress  Neck: Supple, trachea midline; no thyromegaly or nodules.   Respiratory: CTA, even and unlabored.  Cardiovascular: RRR, no murmurs, no carotid bruits. DP pulses  1+ bilaterally; no edema.    Lymph: no cervical or supraclavicular lymphadenopathy  Skin: warm and dry; no lipohypertrophy, or acanthosis nigracans observed. Plaque-like hypopigmented lesions noted to BLE, feet.   Neuro: DTR 2+ BUE/2+BLE.  Feet: appropriate footwear.      A1c goal 7.5% or less, due to CKD, CAD      Assessment/Plan  1. Type 2 diabetes mellitus with stage 3 chronic kidney disease, with long-term current use of insulin  -- Improving. Has had recent hypoglycemia. Explained that I do not recommend the use of CBD oil, as this interferes with the ability to metabolize certain meds. I also discussed the progressive nature of T2DM and that she will most likely continue to require insulin, based on er current doses.    -- slightly decrease Novolin NPH to 36 units BID. Advised her to have small HS snack.   -- continue Novolion R 30 units AC. Correction scale, target 150, ISF 25  -- check BG 4x/day  -- schedule eye exam  -- will contact Pt Assistance to check on qualification for Novolog, Levemir.     -- Discussed diagnosis of DM, A1c goals, progression of disease, long term complications and tx options.   -- takes aspirin, statin, ace-i   2. Type 2 diabetes mellitus with diabetic polyneuropathy, with long-term current use of insulin  -- discussed the importance of daily inspection of bottoms of both feet, interspace areas. Advised patient against walking barefoot.     3. Coronary artery disease involving native coronary artery of native heart without angina pectoris  -- avoid hypoglycemia   4. Essential hypertension  -- controlled  -- continue lisinopril   5. Hyperlipidemia with target LDL less than 70  --  Uncontrolled. Triglycerides are elevated but have decreased.   -- optimize DM control.  -- Continue statin, fenofibrate   6. Thyroid disease  -- Improved.   -- continue levothyroxine 100 mcg daily.    7. Hypoglycemia associated with Type 2 diabetes mellitus Reviewed hypoglycemia management: treat with 1/2 glass of juice, 1/2 can regular coke, or 4 glucose tablets. Monitor and repeat treatment every 15 minutes until BG is >70 Then have a snack, which includes a complex carbohydrate and protein.   Advised patient to check BG before activities, such as driving or exercise.       FOLLOW UP  Follow-up in about 3 months (around 9/11/2018).   Patient instructed to bring BG logs to each follow up   Patient encouraged to call for any BG/medication issues, concerns, or questions.    Orders Placed This Encounter   Procedures    Hemoglobin A1c

## 2018-06-18 ENCOUNTER — TELEPHONE (OUTPATIENT)
Dept: DERMATOLOGY | Facility: CLINIC | Age: 73
End: 2018-06-18

## 2018-06-18 NOTE — TELEPHONE ENCOUNTER
Called pt but seeing provider at the [facility] location. Already scheduled for [place] and will keep that appointment.    ----- Message from Beronica Posadas sent at 6/18/2018  2:59 PM CDT -----  No schedule came up.  Patient states that the office canceled her appointment but need to see the doctor as soon as possible.  Please call [phone].

## 2018-06-18 NOTE — TELEPHONE ENCOUNTER
----- Message from Beronicapascual Posadas sent at 6/18/2018  2:59 PM CDT -----  No schedule came up.  Patient states that the office canceled her appointment but need to see the doctor as soon as possible.  Please call [phone].

## 2018-07-02 DIAGNOSIS — E07.9 THYROID DISEASE: ICD-10-CM

## 2018-07-02 RX ORDER — LEVOTHYROXINE SODIUM 100 UG/1
TABLET ORAL
Qty: 30 TABLET | Refills: 6 | Status: SHIPPED | OUTPATIENT
Start: 2018-07-02 | End: 2019-01-28 | Stop reason: SDUPTHER

## 2018-07-11 ENCOUNTER — OFFICE VISIT (OUTPATIENT)
Dept: DERMATOLOGY | Facility: CLINIC | Age: 73
End: 2018-07-11
Payer: MEDICARE

## 2018-07-11 VITALS — HEIGHT: 61 IN | WEIGHT: 201 LBS | BODY MASS INDEX: 37.95 KG/M2

## 2018-07-11 DIAGNOSIS — L82.1 STUCCO KERATOSES: ICD-10-CM

## 2018-07-11 DIAGNOSIS — L85.3 XEROSIS CUTIS: ICD-10-CM

## 2018-07-11 DIAGNOSIS — L91.8 CUTANEOUS SKIN TAGS: ICD-10-CM

## 2018-07-11 DIAGNOSIS — L82.1 SEBORRHEIC KERATOSES: Primary | ICD-10-CM

## 2018-07-11 PROCEDURE — 99202 OFFICE O/P NEW SF 15 MIN: CPT | Mod: S$GLB,,, | Performed by: DERMATOLOGY

## 2018-07-11 PROCEDURE — 99999 PR PBB SHADOW E&M-EST. PATIENT-LVL II: CPT | Mod: PBBFAC,,, | Performed by: DERMATOLOGY

## 2018-07-11 RX ORDER — AMMONIUM LACTATE 12 G/100G
LOTION TOPICAL
Qty: 225 G | Refills: 1 | Status: SHIPPED | OUTPATIENT
Start: 2018-07-11 | End: 2021-03-22

## 2018-07-11 NOTE — PROGRESS NOTES
Subjective:       Patient ID:  Adele Hall is a 73 y.o. female who presents for   Chief Complaint   Patient presents with    Lesion     72 yo F presents for initial visit c/o brown growths on left breast and left upper chest for 3 years, rough, not treated  She also noticed rough spots on bilateral lower legs and feet for 1 year, not treated    No phx of skin cancer. No fhx of skin cancer        Past Medical History:   Diagnosis Date    Anesthesia complication     took patient 6 days to come out of anethesia after CABG    Anticoagulant long-term use     Arthritis     Chronic kidney disease     COPD (chronic obstructive pulmonary disease) 2/7/2016    COPD (chronic obstructive pulmonary disease)     Coronary artery disease     Diabetes mellitus     Diabetes mellitus, type 2     GERD (gastroesophageal reflux disease)     Hyperlipidemia     Hypertension     possible new onset CHF 7/30/2016    Tardive dyskinesia     Thyroid disease     Ulcer     Vertigo      Review of Systems   Skin: Positive for activity-related sunscreen use. Negative for daily sunscreen use, tendency to form keloidal scars and recent sunburn.   Hematologic/Lymphatic: Does not bruise/bleed easily.        Objective:    Physical Exam   Constitutional: She appears well-developed and well-nourished.   Neurological: She is alert and oriented to person, place, and time.   Psychiatric: She has a normal mood and affect.   Skin:   Areas Examined (abnormalities noted in diagram):   Head / Face Inspection Performed  Neck Inspection Performed  Chest / Axilla Inspection Performed  RLE Inspected  LLE Inspection Performed              Diagram Legend     Erythematous scaling macule/papule c/w actinic keratosis       Vascular papule c/w angioma      Pigmented verrucoid papule/plaque c/w seborrheic keratosis      Yellow umbilicated papule c/w sebaceous hyperplasia      Irregularly shaped tan macule c/w lentigo     1-2 mm smooth white  papules consistent with Milia      Movable subcutaneous cyst with punctum c/w epidermal inclusion cyst      Subcutaneous movable cyst c/w pilar cyst      Firm pink to brown papule c/w dermatofibroma      Pedunculated fleshy papule(s) c/w skin tag(s)      Evenly pigmented macule c/w junctional nevus     Mildly variegated pigmented, slightly irregular-bordered macule c/w mildly atypical nevus      Flesh colored to evenly pigmented papule c/w intradermal nevus       Pink pearly papule/plaque c/w basal cell carcinoma      Erythematous hyperkeratotic cursted plaque c/w SCC      Surgical scar with no sign of skin cancer recurrence      Open and closed comedones      Inflammatory papules and pustules      Verrucoid papule consistent consistent with wart     Erythematous eczematous patches and plaques     Dystrophic onycholytic nail with subungual debris c/w onychomycosis     Umbilicated papule    Erythematous-base heme-crusted tan verrucoid plaque consistent with inflamed seborrheic keratosis     Erythematous Silvery Scaling Plaque c/w Psoriasis     See annotation      Assessment / Plan:        Seborrheic keratoses/Stucco keratoses  These are benign inherited growths without a malignant potential. Reassurance given to patient. No treatment is necessary.     Xerosis cutis  Encouraged liberal use of moisturizer as this can contribute to pruritis    -     ammonium lactate (LAC-HYDRIN) 12 % lotion; Apply topically as needed for Dry Skin.  Dispense: 225 g; Refill: 1  If too expensive, OTC AmLactin    Cutaneous skin tags  Reassurance  Discussed cosmetic fee assoc with removal $232           Follow-up if symptoms worsen or fail to improve.

## 2018-07-13 RX ORDER — DEXTROSE 4 G
TABLET,CHEWABLE ORAL
Qty: 1 EACH | Refills: 0 | Status: SHIPPED | OUTPATIENT
Start: 2018-07-13 | End: 2019-01-03 | Stop reason: CLARIF

## 2018-07-13 NOTE — TELEPHONE ENCOUNTER
----- Message from Wendy Patton sent at 7/13/2018  4:14 PM CDT -----  Contact: Alexandria with [employer]   Type: Needs Medical Advice    Who Called:  Alexandria   Symptoms (please be specific):  n/a  How long has patient had these symptoms:  n/a  Pharmacy name and phone #:  n/a  Best Call Back Number:  1   Additional Information:  Alexandria with [employer] regarding a decision request on this patient. The patients member ID# [id number]. expedited review denied, service is pending.    Reference# [id number]

## 2018-07-18 NOTE — PLAN OF CARE
Patient is waiting to speak with Dr Yoder. She has stomach pain that she reports as gas. She was able to drink apple juice and eat a cup of ice .   Mood episode/Agitation/severe anxiety

## 2018-07-25 DIAGNOSIS — E55.9 VITAMIN D DEFICIENCY: ICD-10-CM

## 2018-07-25 RX ORDER — ERGOCALCIFEROL 1.25 MG/1
CAPSULE ORAL
Qty: 4 CAPSULE | Refills: 0 | Status: SHIPPED | OUTPATIENT
Start: 2018-07-25 | End: 2018-08-24 | Stop reason: SDUPTHER

## 2018-07-31 DIAGNOSIS — G47.00 INSOMNIA, UNSPECIFIED TYPE: ICD-10-CM

## 2018-07-31 RX ORDER — TRAZODONE HYDROCHLORIDE 50 MG/1
TABLET ORAL
Qty: 30 TABLET | Refills: 5 | Status: SHIPPED | OUTPATIENT
Start: 2018-07-31 | End: 2019-01-28 | Stop reason: SDUPTHER

## 2018-08-13 DIAGNOSIS — I10 GOOD HYPERTENSION CONTROL: ICD-10-CM

## 2018-08-13 RX ORDER — AMLODIPINE BESYLATE 2.5 MG/1
TABLET ORAL
Qty: 30 TABLET | Refills: 5 | Status: SHIPPED | OUTPATIENT
Start: 2018-08-13 | End: 2019-05-03 | Stop reason: SDUPTHER

## 2018-08-22 DIAGNOSIS — E55.9 VITAMIN D DEFICIENCY: ICD-10-CM

## 2018-08-22 RX ORDER — ERGOCALCIFEROL 1.25 MG/1
CAPSULE ORAL
Qty: 4 CAPSULE | Refills: 0 | OUTPATIENT
Start: 2018-08-22

## 2018-08-24 ENCOUNTER — LAB VISIT (OUTPATIENT)
Dept: LAB | Facility: HOSPITAL | Age: 73
End: 2018-08-24
Attending: FAMILY MEDICINE
Payer: MEDICARE

## 2018-08-24 DIAGNOSIS — E55.9 VITAMIN D DEFICIENCY: ICD-10-CM

## 2018-08-24 LAB — 25(OH)D3+25(OH)D2 SERPL-MCNC: 24 NG/ML

## 2018-08-24 PROCEDURE — 36415 COLL VENOUS BLD VENIPUNCTURE: CPT | Mod: PO

## 2018-08-24 PROCEDURE — 82306 VITAMIN D 25 HYDROXY: CPT

## 2018-08-24 RX ORDER — ERGOCALCIFEROL 1.25 MG/1
50000 CAPSULE ORAL
Qty: 4 CAPSULE | Refills: 5 | Status: SHIPPED | OUTPATIENT
Start: 2018-08-24 | End: 2019-02-12 | Stop reason: SDUPTHER

## 2018-08-31 ENCOUNTER — OFFICE VISIT (OUTPATIENT)
Dept: CARDIOLOGY | Facility: CLINIC | Age: 73
End: 2018-08-31
Payer: MEDICARE

## 2018-08-31 VITALS
BODY MASS INDEX: 37.63 KG/M2 | DIASTOLIC BLOOD PRESSURE: 68 MMHG | HEIGHT: 61 IN | SYSTOLIC BLOOD PRESSURE: 140 MMHG | WEIGHT: 199.31 LBS | HEART RATE: 68 BPM

## 2018-08-31 DIAGNOSIS — E11.69 DYSLIPIDEMIA ASSOCIATED WITH TYPE 2 DIABETES MELLITUS: ICD-10-CM

## 2018-08-31 DIAGNOSIS — Z95.1 S/P CABG (CORONARY ARTERY BYPASS GRAFT): ICD-10-CM

## 2018-08-31 DIAGNOSIS — E78.5 DYSLIPIDEMIA ASSOCIATED WITH TYPE 2 DIABETES MELLITUS: ICD-10-CM

## 2018-08-31 DIAGNOSIS — I25.118 CORONARY ARTERY DISEASE OF NATIVE ARTERY OF NATIVE HEART WITH STABLE ANGINA PECTORIS: Primary | ICD-10-CM

## 2018-08-31 DIAGNOSIS — E11.59 HYPERTENSION ASSOCIATED WITH DIABETES: ICD-10-CM

## 2018-08-31 DIAGNOSIS — I15.2 HYPERTENSION ASSOCIATED WITH DIABETES: ICD-10-CM

## 2018-08-31 PROCEDURE — 3044F HG A1C LEVEL LT 7.0%: CPT | Mod: CPTII,S$GLB,, | Performed by: INTERNAL MEDICINE

## 2018-08-31 PROCEDURE — 3078F DIAST BP <80 MM HG: CPT | Mod: CPTII,S$GLB,, | Performed by: INTERNAL MEDICINE

## 2018-08-31 PROCEDURE — 3077F SYST BP >= 140 MM HG: CPT | Mod: CPTII,S$GLB,, | Performed by: INTERNAL MEDICINE

## 2018-08-31 PROCEDURE — 99499 UNLISTED E&M SERVICE: CPT | Mod: S$GLB,,, | Performed by: INTERNAL MEDICINE

## 2018-08-31 PROCEDURE — 99999 PR PBB SHADOW E&M-EST. PATIENT-LVL III: CPT | Mod: PBBFAC,,, | Performed by: INTERNAL MEDICINE

## 2018-08-31 PROCEDURE — 99214 OFFICE O/P EST MOD 30 MIN: CPT | Mod: S$GLB,,, | Performed by: INTERNAL MEDICINE

## 2018-08-31 RX ORDER — NITROGLYCERIN 0.4 MG/1
0.4 TABLET SUBLINGUAL EVERY 5 MIN PRN
Qty: 25 TABLET | Refills: 2 | Status: SHIPPED | OUTPATIENT
Start: 2018-08-31 | End: 2021-04-06 | Stop reason: SDUPTHER

## 2018-08-31 NOTE — PROGRESS NOTES
Subjective:    Patient ID:  Adele Hall is a 73 y.o. female who presents for follow-up of Hyperlipidemia (6 month follow up- angina); Hypertension; Shortness of Breath; and Chest Pain      Pt here for f/u. Last visit was in January and we did angiogram showing all bypass grafts patent. Echo showed normal LV function. She has episodes of sharp pains at rest which have been present for 25 years which she calls angina. They did not improve with the CABG. She reports symptoms not exacerbated by exertion and not relieved with rest.        Review of Systems   Constitution: Negative for weight gain and weight loss.   HENT: Negative.    Eyes: Negative.    Cardiovascular: Positive for chest pain and dyspnea on exertion. Negative for claudication, cyanosis, irregular heartbeat, leg swelling, near-syncope, orthopnea (no PND), palpitations and syncope.   Respiratory: Positive for shortness of breath. Negative for cough, hemoptysis and snoring.    Endocrine: Negative.    Skin: Negative.    Musculoskeletal: Positive for back pain. Negative for joint pain, muscle cramps, muscle weakness and myalgias.   Gastrointestinal: Negative for diarrhea, hematemesis, nausea and vomiting.   Genitourinary: Negative.    Neurological: Negative for dizziness, focal weakness, light-headedness, loss of balance, numbness, paresthesias and seizures.   Psychiatric/Behavioral: Negative.         Objective:    Physical Exam   Constitutional: She is oriented to person, place, and time. She appears well-developed and well-nourished.   Eyes: Pupils are equal, round, and reactive to light.   Neck: Normal range of motion. No thyromegaly present.   Cardiovascular: Normal rate, regular rhythm, S1 normal, S2 normal, normal heart sounds, intact distal pulses and normal pulses.  No extrasystoles are present. PMI is not displaced. Exam reveals no friction rub.   No murmur heard.  Pulmonary/Chest: Effort normal. She has decreased breath sounds. She has no  wheezes. She has no rhonchi. She has no rales. She exhibits no tenderness.   Abdominal: Soft. Bowel sounds are normal. She exhibits no distension and no mass. There is no tenderness.   Musculoskeletal: Normal range of motion. She exhibits no edema.   Neurological: She is alert and oriented to person, place, and time.   Skin: Skin is warm and dry.   Vitals reviewed.      Test(s) Reviewed  I have reviewed the following in detail:  [] Stress test   [x] Angiography   [x] Echocardiogram   [] Labs   [] Other:         Assessment:       1. Coronary artery disease of native artery of native heart with stable angina pectoris    2. Dyslipidemia associated with type 2 diabetes mellitus    3. Hypertension associated with diabetes    4. S/P CABG (coronary artery bypass graft) - x4 01/19/2016; LIMA to LAD; SVG's to diag, OMB, PDA - all grafts patent 01/2018         Plan:       Chest pains not typical and had patent grafts in January.  Will give her SL NTG to try  Suggested she use her inhaler for her JONES and COPD  Will continue present RX   F/u 6 months

## 2018-09-03 DIAGNOSIS — Z79.4 TYPE 2 DIABETES MELLITUS WITH DIABETIC POLYNEUROPATHY, WITH LONG-TERM CURRENT USE OF INSULIN: ICD-10-CM

## 2018-09-03 DIAGNOSIS — E11.42 TYPE 2 DIABETES MELLITUS WITH DIABETIC POLYNEUROPATHY, WITH LONG-TERM CURRENT USE OF INSULIN: ICD-10-CM

## 2018-09-04 RX ORDER — HUMAN INSULIN 100 [IU]/ML
INJECTION, SUSPENSION SUBCUTANEOUS
Qty: 10 ML | Refills: 3 | Status: SHIPPED | OUTPATIENT
Start: 2018-09-04 | End: 2018-11-26 | Stop reason: SDUPTHER

## 2018-09-25 ENCOUNTER — LAB VISIT (OUTPATIENT)
Dept: LAB | Facility: HOSPITAL | Age: 73
End: 2018-09-25
Attending: INTERNAL MEDICINE
Payer: MEDICARE

## 2018-09-25 DIAGNOSIS — N18.4 CKD (CHRONIC KIDNEY DISEASE) STAGE 4, GFR 15-29 ML/MIN: ICD-10-CM

## 2018-09-25 LAB
CREAT UR-MCNC: 133 MG/DL
PROT UR-MCNC: 93 MG/DL
PROT/CREAT UR: 0.7 MG/G{CREAT}

## 2018-09-25 PROCEDURE — 84156 ASSAY OF PROTEIN URINE: CPT

## 2018-09-28 ENCOUNTER — OFFICE VISIT (OUTPATIENT)
Dept: ENDOCRINOLOGY | Facility: CLINIC | Age: 73
End: 2018-09-28
Payer: MEDICARE

## 2018-09-28 VITALS
TEMPERATURE: 98 F | WEIGHT: 196.88 LBS | HEIGHT: 61 IN | DIASTOLIC BLOOD PRESSURE: 66 MMHG | BODY MASS INDEX: 37.17 KG/M2 | RESPIRATION RATE: 18 BRPM | SYSTOLIC BLOOD PRESSURE: 133 MMHG | HEART RATE: 63 BPM

## 2018-09-28 DIAGNOSIS — Z79.4 TYPE 2 DIABETES MELLITUS WITH DIABETIC POLYNEUROPATHY, WITH LONG-TERM CURRENT USE OF INSULIN: ICD-10-CM

## 2018-09-28 DIAGNOSIS — I25.10 CORONARY ARTERY DISEASE INVOLVING NATIVE CORONARY ARTERY OF NATIVE HEART WITHOUT ANGINA PECTORIS: ICD-10-CM

## 2018-09-28 DIAGNOSIS — E07.9 THYROID DISEASE: ICD-10-CM

## 2018-09-28 DIAGNOSIS — E11.42 TYPE 2 DIABETES MELLITUS WITH DIABETIC POLYNEUROPATHY, WITH LONG-TERM CURRENT USE OF INSULIN: ICD-10-CM

## 2018-09-28 DIAGNOSIS — E11.22 TYPE 2 DIABETES MELLITUS WITH STAGE 3 CHRONIC KIDNEY DISEASE, WITH LONG-TERM CURRENT USE OF INSULIN: Primary | ICD-10-CM

## 2018-09-28 DIAGNOSIS — Z79.4 TYPE 2 DIABETES MELLITUS WITH STAGE 3 CHRONIC KIDNEY DISEASE, WITH LONG-TERM CURRENT USE OF INSULIN: Primary | ICD-10-CM

## 2018-09-28 DIAGNOSIS — E78.5 DYSLIPIDEMIA ASSOCIATED WITH TYPE 2 DIABETES MELLITUS: ICD-10-CM

## 2018-09-28 DIAGNOSIS — E11.69 DYSLIPIDEMIA ASSOCIATED WITH TYPE 2 DIABETES MELLITUS: ICD-10-CM

## 2018-09-28 DIAGNOSIS — I10 ESSENTIAL HYPERTENSION: ICD-10-CM

## 2018-09-28 DIAGNOSIS — N18.30 TYPE 2 DIABETES MELLITUS WITH STAGE 3 CHRONIC KIDNEY DISEASE, WITH LONG-TERM CURRENT USE OF INSULIN: Primary | ICD-10-CM

## 2018-09-28 PROCEDURE — 1101F PT FALLS ASSESS-DOCD LE1/YR: CPT | Mod: CPTII,,, | Performed by: PHYSICIAN ASSISTANT

## 2018-09-28 PROCEDURE — 99999 PR PBB SHADOW E&M-EST. PATIENT-LVL IV: CPT | Mod: PBBFAC,,, | Performed by: PHYSICIAN ASSISTANT

## 2018-09-28 PROCEDURE — 99214 OFFICE O/P EST MOD 30 MIN: CPT | Mod: PBBFAC,PO | Performed by: PHYSICIAN ASSISTANT

## 2018-09-28 PROCEDURE — 3045F PR MOST RECENT HEMOGLOBIN A1C LEVEL 7.0-9.0%: CPT | Mod: CPTII,,, | Performed by: PHYSICIAN ASSISTANT

## 2018-09-28 PROCEDURE — 3078F DIAST BP <80 MM HG: CPT | Mod: CPTII,,, | Performed by: PHYSICIAN ASSISTANT

## 2018-09-28 PROCEDURE — 3075F SYST BP GE 130 - 139MM HG: CPT | Mod: CPTII,,, | Performed by: PHYSICIAN ASSISTANT

## 2018-09-28 PROCEDURE — 99214 OFFICE O/P EST MOD 30 MIN: CPT | Mod: S$PBB,,, | Performed by: PHYSICIAN ASSISTANT

## 2018-09-28 NOTE — PROGRESS NOTES
"CC: Ms. Adele Hall arrives today for management of Type 2 DM and review of chronic medical conditions, as listed in the visit diagnosis section of this encounter.     HPI: Ms. Adele Hall was diagnosed with Type 2 DM in ~ 2002. Metformin started at the time of diagnosis but was stopped d/t renal impairment, per PCP in [place]. Insulin was started ~ 2012.  Denies FH of DM. She did have 1 ER admission after having BG of >700 in 2012, prior to starting insulin. No hospitalizations for DM. She had her thyroid removed when she was 22.     Arrives w/  to clinic. Since last visit, NPH was decreased to prevent hypoglycemia.    Last visit, she brought Cbd oil in hopes of getting off of insulin, advised against this use due to medication interactions.    Follows with Dr. Burks for CAD - s/p CABG in 1/2016.   She follows with Dr. Montero for CKD.    BG readings are checked 1x/day. Notebook to clinic.   Fasting BGs:            Hypoglycemia: None  Symptoms: sweating, weakness  Treatment: ate sweets, followed by a meal.     Missing Insulin/PO medication doses: No  Timing prandial insulin 30 minutes before meals: yes    She does exercises at the [employer] center and goes to the gym occasionally.    Dietary Habits: She eats 3 meals daily. Skips breakfast. Rare snacking. Drinks sweet tea when she eats at a restaurant.    CURRENT DIABETIC MEDS: Novolin NPH 35 units BID, Novolin R  30 units AC   Vial or pen: vials  Glucometer type: True Metrix    Previous DM treatments:  Metformin  Victoza  Novolog/Levemir    Last Eye Exam: 5/2017, no  Needs to reschedule  Last Podiatry Exam: never    She had a ABBEY/BSO.    She smoked 1 ppd for 27 years until 2016.  REVIEW OF SYSTEMS  Constitutional: no c/o weakness. + fatigue, + wt loss.  Eyes: + blurred vision, watering. States eyes "film over"  Cardiac: no palpitations or chest pain.  Respiratory: no cough, + dyspnea when active. Cardiology and pulmonary wkup were " "negative.  GI: no c/o n/v or abdominal pain.   Skin: no rashes. Reports white lesions on legs, multiple moles. Has derm appointment coming up.   Neuro: denies numbness, tingling, paresthesias.   Endocrine: denies polyphagia, polydipsia, polyuria  Remainder ROS negative    Personally reviewed Past Medical, Surgical, Social History.    Vital Signs  /66 (BP Location: Left arm, Patient Position: Sitting, BP Method: Large (Automatic))   Pulse 63   Temp 98.1 °F (36.7 °C) (Oral)   Resp 18   Ht 5' 1" (1.549 m)   Wt 89.3 kg (196 lb 13.9 oz)   BMI 37.20 kg/m²     Personally reviewed the below labs:    Hemoglobin A1C   Date Value Ref Range Status   09/25/2018 7.0 (H) 4.0 - 5.6 % Final     Comment:     ADA Screening Guidelines:  5.7-6.4%  Consistent with prediabetes  >or=6.5%  Consistent with diabetes  High levels of fetal hemoglobin interfere with the HbA1C  assay. Heterozygous hemoglobin variants (HbS, HgC, etc)do  not significantly interfere with this assay.   However, presence of multiple variants may affect accuracy.     06/05/2018 6.8 (H) 4.0 - 5.6 % Final     Comment:     ADA Screening Guidelines:  5.7-6.4%  Consistent with prediabetes  >or=6.5%  Consistent with diabetes  High levels of fetal hemoglobin interfere with the HbA1C  assay. Heterozygous hemoglobin variants (HbS, HgC, etc)do  not significantly interfere with this assay.   However, presence of multiple variants may affect accuracy.     02/15/2018 8.4 (H) 4.0 - 5.6 % Final     Comment:     According to ADA guidelines, hemoglobin A1c <7.0% represents  optimal control in non-pregnant diabetic patients. Different  metrics may apply to specific patient populations.   Standards of Medical Care in Diabetes-2016.  For the purpose of screening for the presence of diabetes:  <5.7%     Consistent with the absence of diabetes  5.7-6.4%  Consistent with increasing risk for diabetes   (prediabetes)  >or=6.5%  Consistent with diabetes  Currently, no consensus " exists for use of hemoglobin A1c  for diagnosis of diabetes for children.  This Hemoglobin A1c assay has significant interference with fetal   hemoglobin   (HbF). The results are invalid for patients with abnormal amounts of   HbF,   including those with known Hereditary Persistence   of Fetal Hemoglobin. Heterozygous hemoglobin variants (HbAS, HbAC,   HbAD, HbAE, HbA2) do not significantly interfere with this assay;   however, presence of multiple variants in a sample may impact the %   interference.         Chemistry        Component Value Date/Time     09/25/2018 1058    K 4.2 09/25/2018 1058     09/25/2018 1058    CO2 30 (H) 09/25/2018 1058    BUN 19 09/25/2018 1058    CREATININE 1.3 09/25/2018 1058     09/25/2018 1058        Component Value Date/Time    CALCIUM 9.1 09/25/2018 1058    ALKPHOS 72 02/15/2018 0927    AST 59 (H) 02/15/2018 0927    AST 45 (H) 01/30/2016 0431    ALT 32 02/15/2018 0927    BILITOT 0.3 02/15/2018 0927        Lab Results   Component Value Date    CHOL 136 01/14/2018    CHOL 158 12/30/2017    CHOL 155 11/28/2016     Lab Results   Component Value Date    HDL 32 (L) 01/14/2018    HDL 33 (L) 12/30/2017    HDL 23 (L) 11/28/2016     Lab Results   Component Value Date    LDLCALC 52.6 (L) 01/14/2018    LDLCALC 68.8 12/30/2017    LDLCALC Invalid, Trig>400.0 11/28/2016     Lab Results   Component Value Date    TRIG 257 (H) 01/14/2018    TRIG 281 (H) 12/30/2017    TRIG 423 (H) 11/28/2016     Lab Results   Component Value Date    CHOLHDL 23.5 01/14/2018    CHOLHDL 20.9 12/30/2017    CHOLHDL 14.8 (L) 11/28/2016     Lab Results   Component Value Date    MICALBCREAT 1111.1 (H) 04/04/2016     Lab Results   Component Value Date    TSH 3.832 04/10/2018     CrCl cannot be calculated (Unknown ideal weight.).    Vit D, 25-Hydroxy   Date Value Ref Range Status   08/24/2018 24 (L) 30 - 96 ng/mL Final     Comment:     Vitamin D deficiency.........<10 ng/mL                              Vitamin  D insufficiency......10-29 ng/mL       Vitamin D sufficiency........> or equal to 30 ng/mL  Vitamin D toxicity............>100 ng/mL       PHYSICAL EXAM  Constitutional: middle aged female, appears well, no distress  Neck: Supple, trachea midline; no thyromegaly or nodules.   Respiratory: CTAB, even and unlabored.  Cardiovascular: RRR, no murmurs, no carotid bruits. DP pulses  1+ bilaterally; no edema.    Lymph: no cervical or supraclavicular lymphadenopathy  Skin: warm and dry; no lipohypertrophy, or acanthosis nigracans observed. Plaque-like hypopigmented lesions noted to BLE, feet (diagnosed as skin tags).   Neuro: DTR 2+ BUE/2+BLE.  Psych: normal mood, flat affect  Feet: appropriate footwear.    A1c goal 7.5% or less, due to CKD, CAD    Assessment/Plan  1. Type 2 diabetes mellitus with stage 3 chronic kidney disease, with long-term current use of insulin  -- Improving. No hypoglycemia.     -- Continue Novolin NPH to 35 units BID. Continue small HS snack.   -- increase Novolion R 35 units AC. Correction scale, target 150, ISF 25  -- check BG 4x/day  -- advised to schedule an eye exam    -- Discussed diagnosis of DM, A1c goals, progression of disease, long term complications and tx options.   -- takes aspirin, statin, ace-i   2. Type 2 diabetes mellitus with diabetic polyneuropathy, with long-term current use of insulin   -stable-monitor-check feet daily     3. Coronary artery disease involving native coronary artery of native heart without angina pectoris  -- avoid hypoglycemia. F/u w/ cardiology.   4. Essential hypertension  -- controlled  -- continue ACEi   5. Hyperlipidemia with target LDL less than 70  -- Uncontrolled. Triglycerides are elevated but have decreased.   -- optimize DM control.  -- Continue statin, fenofibrate   6. Thyroid disease  --last TSH wnl.  -- continue levothyroxine 100 mcg daily.      FOLLOW UP  Follow-up in about 3 months (around 12/28/2018).   Patient instructed to bring BG logs to  each follow up   Patient encouraged to call for any BG/medication issues, concerns, or questions.    Orders Placed This Encounter   Procedures    Hemoglobin A1c    TSH    T4, free    T3    Basic metabolic panel

## 2018-11-26 DIAGNOSIS — E11.42 TYPE 2 DIABETES MELLITUS WITH DIABETIC POLYNEUROPATHY, WITH LONG-TERM CURRENT USE OF INSULIN: ICD-10-CM

## 2018-11-26 DIAGNOSIS — Z79.4 TYPE 2 DIABETES MELLITUS WITH DIABETIC POLYNEUROPATHY, WITH LONG-TERM CURRENT USE OF INSULIN: ICD-10-CM

## 2018-11-30 ENCOUNTER — TELEPHONE (OUTPATIENT)
Dept: NEPHROLOGY | Facility: CLINIC | Age: 73
End: 2018-11-30

## 2018-11-30 DIAGNOSIS — N18.30 CKD (CHRONIC KIDNEY DISEASE) STAGE 3, GFR 30-59 ML/MIN: Primary | ICD-10-CM

## 2018-11-30 NOTE — TELEPHONE ENCOUNTER
Scheduled for Jan 2 to see Winston, as she is have labs for endocrine on 12.28.   Will message Dr. Montero for lab orders.

## 2018-11-30 NOTE — TELEPHONE ENCOUNTER
----- Message from Harshad Horan sent at 11/30/2018 12:03 PM CST -----  Contact: Patient  Type: Needs Medical Advice    Who Called:  Patient  Best Call Back Number: [phone]  Additional Information: Patient recently had to cancel an appointment and would like to know if they need to have blood work done before rescheduling. Thanks!

## 2018-12-28 ENCOUNTER — LAB VISIT (OUTPATIENT)
Dept: LAB | Facility: HOSPITAL | Age: 73
End: 2018-12-28
Attending: PHYSICIAN ASSISTANT
Payer: MEDICARE

## 2018-12-28 DIAGNOSIS — E11.22 TYPE 2 DIABETES MELLITUS WITH STAGE 3 CHRONIC KIDNEY DISEASE, WITH LONG-TERM CURRENT USE OF INSULIN: ICD-10-CM

## 2018-12-28 DIAGNOSIS — N18.30 CKD (CHRONIC KIDNEY DISEASE) STAGE 3, GFR 30-59 ML/MIN: ICD-10-CM

## 2018-12-28 DIAGNOSIS — N18.30 TYPE 2 DIABETES MELLITUS WITH STAGE 3 CHRONIC KIDNEY DISEASE, WITH LONG-TERM CURRENT USE OF INSULIN: ICD-10-CM

## 2018-12-28 DIAGNOSIS — Z79.4 TYPE 2 DIABETES MELLITUS WITH STAGE 3 CHRONIC KIDNEY DISEASE, WITH LONG-TERM CURRENT USE OF INSULIN: ICD-10-CM

## 2018-12-28 LAB
ALBUMIN SERPL BCP-MCNC: 3.7 G/DL
ANION GAP SERPL CALC-SCNC: 10 MMOL/L
BUN SERPL-MCNC: 17 MG/DL
CALCIUM SERPL-MCNC: 9.8 MG/DL
CHLORIDE SERPL-SCNC: 100 MMOL/L
CO2 SERPL-SCNC: 29 MMOL/L
CREAT SERPL-MCNC: 1.3 MG/DL
EST. GFR  (AFRICAN AMERICAN): 47.7 ML/MIN/1.73 M^2
EST. GFR  (NON AFRICAN AMERICAN): 41.4 ML/MIN/1.73 M^2
ESTIMATED AVG GLUCOSE: 148 MG/DL
GLUCOSE SERPL-MCNC: 160 MG/DL
HBA1C MFR BLD HPLC: 6.8 %
PHOSPHATE SERPL-MCNC: 3.7 MG/DL
POTASSIUM SERPL-SCNC: 3.6 MMOL/L
PTH-INTACT SERPL-MCNC: 74 PG/ML
SODIUM SERPL-SCNC: 139 MMOL/L
T3 SERPL-MCNC: 73 NG/DL
T4 FREE SERPL-MCNC: 1.37 NG/DL
TSH SERPL DL<=0.005 MIU/L-ACNC: 0.73 UIU/ML

## 2018-12-28 PROCEDURE — 84480 ASSAY TRIIODOTHYRONINE (T3): CPT

## 2018-12-28 PROCEDURE — 80048 BASIC METABOLIC PNL TOTAL CA: CPT

## 2018-12-28 PROCEDURE — 84100 ASSAY OF PHOSPHORUS: CPT

## 2018-12-28 PROCEDURE — 84443 ASSAY THYROID STIM HORMONE: CPT

## 2018-12-28 PROCEDURE — 36415 COLL VENOUS BLD VENIPUNCTURE: CPT | Mod: PO

## 2018-12-28 PROCEDURE — 83036 HEMOGLOBIN GLYCOSYLATED A1C: CPT

## 2018-12-28 PROCEDURE — 82040 ASSAY OF SERUM ALBUMIN: CPT

## 2018-12-28 PROCEDURE — 84439 ASSAY OF FREE THYROXINE: CPT

## 2018-12-28 PROCEDURE — 83970 ASSAY OF PARATHORMONE: CPT

## 2019-01-02 ENCOUNTER — OFFICE VISIT (OUTPATIENT)
Dept: NEPHROLOGY | Facility: CLINIC | Age: 74
End: 2019-01-02
Payer: MEDICARE

## 2019-01-02 VITALS
BODY MASS INDEX: 37.38 KG/M2 | OXYGEN SATURATION: 95 % | DIASTOLIC BLOOD PRESSURE: 72 MMHG | HEIGHT: 61 IN | HEART RATE: 59 BPM | SYSTOLIC BLOOD PRESSURE: 150 MMHG | WEIGHT: 198 LBS

## 2019-01-02 DIAGNOSIS — I48.0 PAF (PAROXYSMAL ATRIAL FIBRILLATION): ICD-10-CM

## 2019-01-02 DIAGNOSIS — I11.9 HYPERTENSIVE LEFT VENTRICULAR HYPERTROPHY, WITHOUT HEART FAILURE: ICD-10-CM

## 2019-01-02 DIAGNOSIS — Z87.09 HISTORY OF ARDS: ICD-10-CM

## 2019-01-02 DIAGNOSIS — E78.5 DYSLIPIDEMIA ASSOCIATED WITH TYPE 2 DIABETES MELLITUS: ICD-10-CM

## 2019-01-02 DIAGNOSIS — E11.21 DIABETIC NEPHROPATHY ASSOCIATED WITH TYPE 2 DIABETES MELLITUS: ICD-10-CM

## 2019-01-02 DIAGNOSIS — R80.1 PERSISTENT PROTEINURIA: ICD-10-CM

## 2019-01-02 DIAGNOSIS — Z95.1 S/P CABG (CORONARY ARTERY BYPASS GRAFT): ICD-10-CM

## 2019-01-02 DIAGNOSIS — Z79.4 TYPE 2 DIABETES MELLITUS WITH STAGE 3 CHRONIC KIDNEY DISEASE, WITH LONG-TERM CURRENT USE OF INSULIN: ICD-10-CM

## 2019-01-02 DIAGNOSIS — E11.59 HYPERTENSION ASSOCIATED WITH DIABETES: ICD-10-CM

## 2019-01-02 DIAGNOSIS — E11.22 TYPE 2 DIABETES MELLITUS WITH STAGE 3 CHRONIC KIDNEY DISEASE, WITH LONG-TERM CURRENT USE OF INSULIN: ICD-10-CM

## 2019-01-02 DIAGNOSIS — E11.69 DYSLIPIDEMIA ASSOCIATED WITH TYPE 2 DIABETES MELLITUS: ICD-10-CM

## 2019-01-02 DIAGNOSIS — Z87.891 FORMER SMOKER: ICD-10-CM

## 2019-01-02 DIAGNOSIS — N18.30 TYPE 2 DIABETES MELLITUS WITH STAGE 3 CHRONIC KIDNEY DISEASE, WITH LONG-TERM CURRENT USE OF INSULIN: ICD-10-CM

## 2019-01-02 DIAGNOSIS — N18.30 CKD (CHRONIC KIDNEY DISEASE) STAGE 3, GFR 30-59 ML/MIN: Primary | ICD-10-CM

## 2019-01-02 DIAGNOSIS — I24.9 ACS (ACUTE CORONARY SYNDROME): ICD-10-CM

## 2019-01-02 DIAGNOSIS — E66.01 CLASS 2 SEVERE OBESITY WITH SERIOUS COMORBIDITY IN ADULT, UNSPECIFIED BMI, UNSPECIFIED OBESITY TYPE: ICD-10-CM

## 2019-01-02 DIAGNOSIS — I15.2 HYPERTENSION ASSOCIATED WITH DIABETES: ICD-10-CM

## 2019-01-02 PROCEDURE — 1101F PR PT FALLS ASSESS DOC 0-1 FALLS W/OUT INJ PAST YR: ICD-10-PCS | Mod: CPTII,S$GLB,, | Performed by: INTERNAL MEDICINE

## 2019-01-02 PROCEDURE — 3077F SYST BP >= 140 MM HG: CPT | Mod: CPTII,S$GLB,, | Performed by: INTERNAL MEDICINE

## 2019-01-02 PROCEDURE — 99214 OFFICE O/P EST MOD 30 MIN: CPT | Mod: S$GLB,,, | Performed by: INTERNAL MEDICINE

## 2019-01-02 PROCEDURE — 3044F PR MOST RECENT HEMOGLOBIN A1C LEVEL <7.0%: ICD-10-PCS | Mod: CPTII,S$GLB,, | Performed by: INTERNAL MEDICINE

## 2019-01-02 PROCEDURE — 3077F PR MOST RECENT SYSTOLIC BLOOD PRESSURE >= 140 MM HG: ICD-10-PCS | Mod: CPTII,S$GLB,, | Performed by: INTERNAL MEDICINE

## 2019-01-02 PROCEDURE — 99999 PR PBB SHADOW E&M-EST. PATIENT-LVL III: CPT | Mod: PBBFAC,,, | Performed by: INTERNAL MEDICINE

## 2019-01-02 PROCEDURE — 3078F PR MOST RECENT DIASTOLIC BLOOD PRESSURE < 80 MM HG: ICD-10-PCS | Mod: CPTII,S$GLB,, | Performed by: INTERNAL MEDICINE

## 2019-01-02 PROCEDURE — 99499 UNLISTED E&M SERVICE: CPT | Mod: S$GLB,,, | Performed by: INTERNAL MEDICINE

## 2019-01-02 PROCEDURE — 1101F PT FALLS ASSESS-DOCD LE1/YR: CPT | Mod: CPTII,S$GLB,, | Performed by: INTERNAL MEDICINE

## 2019-01-02 PROCEDURE — 99999 PR PBB SHADOW E&M-EST. PATIENT-LVL III: ICD-10-PCS | Mod: PBBFAC,,, | Performed by: INTERNAL MEDICINE

## 2019-01-02 PROCEDURE — 99499 RISK ADDL DX/OHS AUDIT: ICD-10-PCS | Mod: S$GLB,,, | Performed by: INTERNAL MEDICINE

## 2019-01-02 PROCEDURE — 3078F DIAST BP <80 MM HG: CPT | Mod: CPTII,S$GLB,, | Performed by: INTERNAL MEDICINE

## 2019-01-02 PROCEDURE — 99214 PR OFFICE/OUTPT VISIT, EST, LEVL IV, 30-39 MIN: ICD-10-PCS | Mod: S$GLB,,, | Performed by: INTERNAL MEDICINE

## 2019-01-02 PROCEDURE — 3044F HG A1C LEVEL LT 7.0%: CPT | Mod: CPTII,S$GLB,, | Performed by: INTERNAL MEDICINE

## 2019-01-03 ENCOUNTER — HOSPITAL ENCOUNTER (EMERGENCY)
Facility: HOSPITAL | Age: 74
Discharge: HOME | End: 2019-01-03
Attending: EMERGENCY MEDICINE
Payer: MEDICARE

## 2019-01-03 VITALS
WEIGHT: 198 LBS | RESPIRATION RATE: 16 BRPM | BODY MASS INDEX: 37.38 KG/M2 | DIASTOLIC BLOOD PRESSURE: 72 MMHG | HEIGHT: 61 IN | TEMPERATURE: 98 F | OXYGEN SATURATION: 97 % | HEART RATE: 66 BPM | SYSTOLIC BLOOD PRESSURE: 180 MMHG

## 2019-01-03 DIAGNOSIS — K64.8 INTERNAL HEMORRHOIDS: Primary | ICD-10-CM

## 2019-01-03 LAB
ALBUMIN SERPL BCP-MCNC: 3.5 G/DL
ALP SERPL-CCNC: 54 U/L
ALT SERPL W/O P-5'-P-CCNC: 23 U/L
ANION GAP SERPL CALC-SCNC: 10 MMOL/L
APTT BLDCRRT: 24.5 SEC
AST SERPL-CCNC: 37 U/L
BASOPHILS # BLD AUTO: 0 K/UL
BASOPHILS NFR BLD: 0.3 %
BILIRUB SERPL-MCNC: 0.3 MG/DL
BUN SERPL-MCNC: 15 MG/DL
CALCIUM SERPL-MCNC: 9.2 MG/DL
CHLORIDE SERPL-SCNC: 103 MMOL/L
CO2 SERPL-SCNC: 26 MMOL/L
CREAT SERPL-MCNC: 1.4 MG/DL
DIFFERENTIAL METHOD: ABNORMAL
EOSINOPHIL # BLD AUTO: 0.1 K/UL
EOSINOPHIL NFR BLD: 2.4 %
ERYTHROCYTE [DISTWIDTH] IN BLOOD BY AUTOMATED COUNT: 14.1 %
EST. GFR  (AFRICAN AMERICAN): 44 ML/MIN/1.73 M^2
EST. GFR  (NON AFRICAN AMERICAN): 38 ML/MIN/1.73 M^2
GLUCOSE SERPL-MCNC: 213 MG/DL
HCT VFR BLD AUTO: 32.8 %
HGB BLD-MCNC: 10.7 G/DL
INR PPP: 1.1
LYMPHOCYTES # BLD AUTO: 2.3 K/UL
LYMPHOCYTES NFR BLD: 42.2 %
MCH RBC QN AUTO: 28.4 PG
MCHC RBC AUTO-ENTMCNC: 32.5 G/DL
MCV RBC AUTO: 87 FL
MONOCYTES # BLD AUTO: 0.4 K/UL
MONOCYTES NFR BLD: 7.1 %
NEUTROPHILS # BLD AUTO: 2.6 K/UL
NEUTROPHILS NFR BLD: 48 %
PLATELET # BLD AUTO: 196 K/UL
PMV BLD AUTO: 9.1 FL
POTASSIUM SERPL-SCNC: 3.5 MMOL/L
PROT SERPL-MCNC: 6.9 G/DL
PROTHROMBIN TIME: 11.4 SEC
RBC # BLD AUTO: 3.76 M/UL
SODIUM SERPL-SCNC: 139 MMOL/L
WBC # BLD AUTO: 5.3 K/UL

## 2019-01-03 PROCEDURE — 36415 COLL VENOUS BLD VENIPUNCTURE: CPT

## 2019-01-03 PROCEDURE — 85730 THROMBOPLASTIN TIME PARTIAL: CPT

## 2019-01-03 PROCEDURE — 80053 COMPREHEN METABOLIC PANEL: CPT

## 2019-01-03 PROCEDURE — 99284 EMERGENCY DEPT VISIT MOD MDM: CPT

## 2019-01-03 PROCEDURE — 85025 COMPLETE CBC W/AUTO DIFF WBC: CPT

## 2019-01-03 PROCEDURE — 85610 PROTHROMBIN TIME: CPT

## 2019-01-03 NOTE — ED NOTES
Pt in room 6 for evaluation of rectal bleeding. Pt is awake, alert and oriented. Resp even and unlabored. Abd soft and non-tender.  Pt denies chest pain or sob. Pt reports hemorrhoids and bright red blood.

## 2019-01-03 NOTE — ED PROVIDER NOTES
Encounter Date: 1/3/2019    SCRIBE #1 NOTE: I, Laura Parnell, am scribing for, and in the presence of, Dr. Tanner Henry.       History     Chief Complaint   Patient presents with    Rectal Bleeding     x 3 days       Time seen by provider: 2:49 PM on 01/03/2019    Adele Hall is a 73 y.o. female with DM type II, chronic kidney disease, HLD, GERD, and HTN, who presents to the ED with an onset of abdominal cramps and rectal bleeding that began 3 days ago.  5-6 years ago, she had a colonoscopy done, which revealed internal hemorrhoids. Pt explains that she will pass a pile of blood and then have brown stool. Pt endorses diarrhea with 3 episodes per day. She is also short of breath at baseline. Pt takes a baby Aspirin daily. The patient denies passing out, or any other symptoms at this time. She denies a PMHx of colitis and diverticulitis. She used to taken stool softeners in the past. Pt's PSHx includes colonoscopy, hysterectomy, tubal ligation, cholecystectomy, and oophorectomy. Pt is a former smoker. Pt is allergic to Reglan.      The history is provided by the patient.     Review of patient's allergies indicates:   Allergen Reactions    Reglan [metoclopramide hcl]      Depression and weight loss.      Past Medical History:   Diagnosis Date    Anesthesia complication     took patient 6 days to come out of anethesia after CABG    Anticoagulant long-term use     Arthritis     Chronic kidney disease     COPD (chronic obstructive pulmonary disease) 2/7/2016    COPD (chronic obstructive pulmonary disease)     Coronary artery disease     Diabetes mellitus     Diabetes mellitus, type 2     GERD (gastroesophageal reflux disease)     Hyperlipidemia     Hypertension     possible new onset CHF 7/30/2016    Tardive dyskinesia     Thyroid disease     Ulcer     Vertigo      Past Surgical History:   Procedure Laterality Date    AORTOCORONARY BYPASS-CABG X 4. w/EVH N/A 1/19/2016    Per[facility]  [facility]    CARDIAC SURGERY      CHOLECYSTECTOMY  2017    CHOLECYSTECTOMY-LAPAROSCOPIC N/A 2017    [facility]    COLONOSCOPY      CORONARY ARTERY BYPASS GRAFT  2016    4 vessel    ESOPHAGOGASTRODUODENOSCOPY (EGD) N/A 2017    Performed by [facility]oder [facility] at [facility] ENDO    ESOPHAGOGASTRODUODENOSCOPY (EGD) N/A 2017    [facility] ENDO    HEART CATH-LEFT Left 1/15/2016    Performed by [facility]udh Joy [facility] at [facility] CATH    HYSTERECTOMY      Left heart cath Right 1/15/2018    [facility]wal [facility] at [facility] CATH    OOPHORECTOMY      TONSILLECTOMY      TOTAL THYROIDECTOMY      TUBAL LIGATION       Family History   Problem Relation Age of Onset    Cancer Mother         LYMPHOMA    Cancer Father     Early death Father     Heart disease Paternal Uncle     Alcohol abuse Sister         x2    Heart disease Brother     No Known Problems Son      Social History     Tobacco Use    Smoking status: Former Smoker     Packs/day: 1.00     Years: 27.00     Pack years: 27.00     Types: Cigarettes     Last attempt to quit: 2016     Years since quittin.9    Smokeless tobacco: Never Used   Substance Use Topics    Alcohol use: No     Alcohol/week: 0.0 oz    Drug use: No     Review of Systems   Constitutional: Negative for fever.   HENT: Negative for sore throat.    Respiratory: Positive for shortness of breath (Baseline).    Cardiovascular: Negative for chest pain.   Gastrointestinal: Positive for abdominal pain, anal bleeding and diarrhea. Negative for nausea.   Genitourinary: Negative for dysuria.   Musculoskeletal: Negative for back pain.   Skin: Negative for rash.   Neurological: Negative for syncope and weakness.   Hematological: Does not bruise/bleed easily.       Physical Exam     Initial Vitals [19 1340]   BP Pulse Resp Temp SpO2   (!) 176/76 69 16 98.2 °F (36.8 °C) 95 %      MAP        --         Physical Exam    Nursing note and vitals reviewed.  Constitutional: She appears well-developed.   HENT:   Head: Normocephalic and atraumatic.   Eyes: EOM are normal. Pupils are equal, round, and reactive to light.   Neck: Neck supple.   Cardiovascular: Normal rate, regular rhythm and intact distal pulses. Exam reveals no gallop and no friction rub.    Murmur heard.   Systolic murmur is present with a grade of 2/6.  Systolic murmur heard at left sternal border.    Pulmonary/Chest: Effort normal and breath sounds normal. No respiratory distress. She has no decreased breath sounds. She has no wheezes. She has no rhonchi. She has no rales.   CABG scar.   Abdominal: Soft. Bowel sounds are normal. She exhibits no distension. There is tenderness in the right lower quadrant. There is no rebound and no guarding.   Mild tenderness of RLQ.   Genitourinary: Rectal exam shows external hemorrhoid. Rectal exam shows no internal hemorrhoid.   Genitourinary Comments: Chaperone present. Small maroon blood clot. External hemorrhoid that were not thrombosed.   Musculoskeletal: Normal range of motion.   Neurological: She is alert and oriented to person, place, and time.   Skin: Skin is warm and dry.   Psychiatric: She has a normal mood and affect.         ED Course   Procedures  Labs Reviewed   CBC W/ AUTO DIFFERENTIAL - Abnormal; Notable for the following components:       Result Value    RBC 3.76 (*)     Hemoglobin 10.7 (*)     Hematocrit 32.8 (*)     MPV 9.1 (*)     All other components within normal limits   COMPREHENSIVE METABOLIC PANEL - Abnormal; Notable for the following components:    Glucose 213 (*)     Alkaline Phosphatase 54 (*)     eGFR if  44 (*)     eGFR if non  38 (*)     All other components within normal limits   PROTIME-INR   APTT          Imaging Results          CT Abdomen Pelvis  Without Contrast (Final result)  Result time 01/03/19 15:45:55    Final result by Naman  JESSICA Silverman MD (01/03/19 15:45:55)                 Impression:      No acute abdominopelvic process.    Punctate right nephrolithiasis.    Sternotomy, cardiomegaly, and atherosclerosis.    Diverticulosis.    Cholecystectomy.      Electronically signed by: Naman Silverman MD  Date:    01/03/2019  Time:    15:45             Narrative:    EXAMINATION:  CT ABDOMEN PELVIS WITHOUT CONTRAST    CLINICAL HISTORY:  eval blood stool w/ b/l cramping lower abd pain;    TECHNIQUE:  Low dose axial images, sagittal and coronal reformations were obtained from the lung bases to the pubic symphysis.  Oral contrast was not administered.    COMPARISON:  None    FINDINGS:  The liver, spleen, pancreas, and adrenal glands are unremarkable.  There has been a cholecystectomy.  There is a punctate stone of the upper pole of the right kidney.  There is no hydronephrosis.    There has been a prior sternotomy.  The heart is mildly enlarged.  There is calcification of the coronary arteries and abdominal aorta.    The small bowel is normal caliber.  A normal appendix is present.  Moderate diverticulosis coli is present, without evidence for diverticulitis.    There has been hysterectomy.  No free air or free fluid.  The bones are intact.                                 Medical Decision Making:   History:   Old Medical Records: I decided to obtain old medical records.  Clinical Tests:   Lab Tests: Ordered and Reviewed  Radiological Study: Reviewed and Ordered  Patient's hemoglobin and hematocrit are stable at baseline.  I suspect she has internal hemorrhoids given CT did not show any evidence of diverticulitis colitis.  Lower abdominal discomfort may actually be from having some diarrhea.  I do not think she needs admission for IV antibiotics or IV fluids at this time.  I gave her very specific return precautions.  She can use Proctofoam for internal steroids and follow up with primary care or GI.            Scribe Attestation:   Scribe #1: I  performed the above scribed service and the documentation accurately describes the services I performed. I attest to the accuracy of the note.    I, Dr. Tanner Henry personally performed the services described in this documentation. All medical record entries made by the scribe were at my direction and in my presence.  I have reviewed the chart and agree that the record reflects my personal performance and is accurate and complete. Tanner Henry MD.  4:40 PM 01/03/2019    DISCLAIMER: This note was prepared with Dragon NaturallySpeaking voice recognition transcription software. Garbled syntax, mangled pronouns, and other bizarre constructions may be attributed to that software system         ED Course as of Jan 03 1622   Thu Jan 03, 2019   1418 BP: (!) 176/76 [EF]   1418 Temp: 98.2 °F (36.8 °C) [EF]   1418 Temp src: Oral [EF]   1418 Pulse: 69 [EF]   1418 Resp: 16 [EF]   1418 SpO2: 95 % [EF]      ED Course User Index  [EF] Sandro Preston MD     Clinical Impression:   The encounter diagnosis was Internal hemorrhoids.      Disposition:   Disposition: Discharged  Condition: Stable                        Tanner Henry MD  01/03/19 1640

## 2019-01-05 NOTE — PROGRESS NOTES
Subjective:       Patient ID: Adele Hall is a 73 y.o. White female who presents for follow-up evaluation of Chronic Kidney Disease    HPI     She reports she is doing well. Her DM has improved with last Hba1c of 6.9. She feels well. No edema but has chronic JONES. No new medications since last visit. She avoids NSAIDs and no herbal medications. She received the flu vaccine    Review of Systems   Constitutional: Negative for activity change, appetite change, fatigue and unexpected weight change.   HENT: Negative for facial swelling.    Respiratory: Positive for shortness of breath. Negative for cough and wheezing.    Cardiovascular: Negative for chest pain and leg swelling.   Gastrointestinal: Negative for abdominal distention.   Genitourinary: Negative for difficulty urinating and dysuria.   Musculoskeletal: Negative for arthralgias.   Neurological: Negative for weakness.       Objective:      Physical Exam   Constitutional: She is oriented to person, place, and time. She appears well-nourished.   HENT:   Mouth/Throat: Oropharynx is clear and moist.   Neck: No JVD present.   Cardiovascular: S1 normal and S2 normal.  Exam reveals no friction rub.    Pulmonary/Chest: Breath sounds normal. She has no wheezes. She has no rales.   Abdominal: Soft.   Musculoskeletal: She exhibits no edema.   Neurological: She is alert and oriented to person, place, and time.   Skin: Skin is warm and dry.   Psychiatric: She has a normal mood and affect.   Vitals reviewed.      Assessment:       1. CKD (chronic kidney disease) stage 3, GFR 30-59 ml/min    2. Diabetic nephropathy associated with type 2 diabetes mellitus    3. Persistent proteinuria    4. S/P CABG (coronary artery bypass graft) - x4 01/19/2016; LIMA to LAD; SVG's to diag, OMB, PDA - all grafts patent 01/2018    5. Hypertension associated with diabetes    6. PAF (paroxysmal atrial fibrillation), post CABG    7. Hypertensive left ventricular hypertrophy, without  heart failure    8. Dyslipidemia associated with type 2 diabetes mellitus    9. History of ARDS, post CABG, 1/2016    10. ACS (acute coronary syndrome)    11. Former smoker    12. Class 2 severe obesity with serious comorbidity in adult, unspecified BMI, unspecified obesity type    13. Type 2 diabetes mellitus with stage 3 chronic kidney disease, with long-term current use of insulin        Plan:             CKD with stable kidney function and proteinuria. Continue RAAS blockade for renal preservation    HTN is controlled at home with -130's    Mineral and Bone Disease--PTH at goal. Continue D2    DM is controlled    Anemia--stable    RTC 6 months with labs prior

## 2019-01-14 ENCOUNTER — OFFICE VISIT (OUTPATIENT)
Dept: ENDOCRINOLOGY | Facility: CLINIC | Age: 74
End: 2019-01-14
Payer: MEDICARE

## 2019-01-14 ENCOUNTER — DOCUMENTATION ONLY (OUTPATIENT)
Dept: FAMILY MEDICINE | Facility: CLINIC | Age: 74
End: 2019-01-14

## 2019-01-14 VITALS
HEART RATE: 60 BPM | DIASTOLIC BLOOD PRESSURE: 70 MMHG | HEIGHT: 61 IN | BODY MASS INDEX: 37.23 KG/M2 | WEIGHT: 197.19 LBS | SYSTOLIC BLOOD PRESSURE: 118 MMHG

## 2019-01-14 DIAGNOSIS — I10 ESSENTIAL HYPERTENSION: ICD-10-CM

## 2019-01-14 DIAGNOSIS — N18.30 TYPE 2 DIABETES MELLITUS WITH STAGE 3 CHRONIC KIDNEY DISEASE, WITH LONG-TERM CURRENT USE OF INSULIN: Primary | ICD-10-CM

## 2019-01-14 DIAGNOSIS — I25.118 CORONARY ARTERY DISEASE OF NATIVE ARTERY OF NATIVE HEART WITH STABLE ANGINA PECTORIS: ICD-10-CM

## 2019-01-14 DIAGNOSIS — Z79.4 TYPE 2 DIABETES MELLITUS WITH STAGE 3 CHRONIC KIDNEY DISEASE, WITH LONG-TERM CURRENT USE OF INSULIN: Primary | ICD-10-CM

## 2019-01-14 DIAGNOSIS — E07.9 THYROID DISEASE: ICD-10-CM

## 2019-01-14 DIAGNOSIS — E78.5 HYPERLIPIDEMIA WITH TARGET LDL LESS THAN 70: ICD-10-CM

## 2019-01-14 DIAGNOSIS — Z79.4 TYPE 2 DIABETES MELLITUS WITH DIABETIC POLYNEUROPATHY, WITH LONG-TERM CURRENT USE OF INSULIN: ICD-10-CM

## 2019-01-14 DIAGNOSIS — E11.42 TYPE 2 DIABETES MELLITUS WITH DIABETIC POLYNEUROPATHY, WITH LONG-TERM CURRENT USE OF INSULIN: ICD-10-CM

## 2019-01-14 DIAGNOSIS — E11.22 TYPE 2 DIABETES MELLITUS WITH STAGE 3 CHRONIC KIDNEY DISEASE, WITH LONG-TERM CURRENT USE OF INSULIN: Primary | ICD-10-CM

## 2019-01-14 PROCEDURE — 99999 PR PBB SHADOW E&M-EST. PATIENT-LVL V: CPT | Mod: PBBFAC,,, | Performed by: NURSE PRACTITIONER

## 2019-01-14 PROCEDURE — 3044F HG A1C LEVEL LT 7.0%: CPT | Mod: CPTII,S$GLB,, | Performed by: NURSE PRACTITIONER

## 2019-01-14 PROCEDURE — 3044F PR MOST RECENT HEMOGLOBIN A1C LEVEL <7.0%: ICD-10-PCS | Mod: CPTII,S$GLB,, | Performed by: NURSE PRACTITIONER

## 2019-01-14 PROCEDURE — 3074F SYST BP LT 130 MM HG: CPT | Mod: CPTII,S$GLB,, | Performed by: NURSE PRACTITIONER

## 2019-01-14 PROCEDURE — 3074F PR MOST RECENT SYSTOLIC BLOOD PRESSURE < 130 MM HG: ICD-10-PCS | Mod: CPTII,S$GLB,, | Performed by: NURSE PRACTITIONER

## 2019-01-14 PROCEDURE — 99499 UNLISTED E&M SERVICE: CPT | Mod: S$GLB,,, | Performed by: NURSE PRACTITIONER

## 2019-01-14 PROCEDURE — 99214 PR OFFICE/OUTPT VISIT, EST, LEVL IV, 30-39 MIN: ICD-10-PCS | Mod: S$GLB,,, | Performed by: NURSE PRACTITIONER

## 2019-01-14 PROCEDURE — 3078F PR MOST RECENT DIASTOLIC BLOOD PRESSURE < 80 MM HG: ICD-10-PCS | Mod: CPTII,S$GLB,, | Performed by: NURSE PRACTITIONER

## 2019-01-14 PROCEDURE — 3078F DIAST BP <80 MM HG: CPT | Mod: CPTII,S$GLB,, | Performed by: NURSE PRACTITIONER

## 2019-01-14 PROCEDURE — 99999 PR PBB SHADOW E&M-EST. PATIENT-LVL V: ICD-10-PCS | Mod: PBBFAC,,, | Performed by: NURSE PRACTITIONER

## 2019-01-14 PROCEDURE — 99499 RISK ADDL DX/OHS AUDIT: ICD-10-PCS | Mod: S$GLB,,, | Performed by: NURSE PRACTITIONER

## 2019-01-14 PROCEDURE — 1101F PT FALLS ASSESS-DOCD LE1/YR: CPT | Mod: CPTII,S$GLB,, | Performed by: NURSE PRACTITIONER

## 2019-01-14 PROCEDURE — 99214 OFFICE O/P EST MOD 30 MIN: CPT | Mod: S$GLB,,, | Performed by: NURSE PRACTITIONER

## 2019-01-14 PROCEDURE — 1101F PR PT FALLS ASSESS DOC 0-1 FALLS W/OUT INJ PAST YR: ICD-10-PCS | Mod: CPTII,S$GLB,, | Performed by: NURSE PRACTITIONER

## 2019-01-14 RX ORDER — LANCETS 33 GAUGE
1 EACH MISCELLANEOUS 4 TIMES DAILY
COMMUNITY

## 2019-01-14 NOTE — PROGRESS NOTES
"CC: Ms. Adele Hall arrives today for management of Type 2 DM and review of chronic medical conditions, as listed in the visit diagnosis section of this encounter.     HPI: Ms. Adele Hall was diagnosed with Type 2 DM in ~ 2002. Metformin started at the time of diagnosis but was stopped d/t renal impairment, per PCP in [place]. Insulin was started ~ 2012. In 2018, Novolog and Levemir were changed to [employer] brand insulins, due to cost.  Denies FH of DM. She did have 1 ER admission after having BG of >700 in 2012, prior to starting insulin.   Follows with Dr. Burks for CAD - s/p CABG in 1/2016.   She follows with Dr. Montero for CKD.    Patient was last seen by me in June and more recently seen in September by JASON Garrett, during my absence.      BG readings are checked 1x/day (fasting only).                   Hypoglycemia: No    Missing Insulin/PO medication doses: No  Timing prandial insulin 5-15 minutes before meals: yes    Not currently exercising.     Dietary Habits: Eats 2 meals/day but the times vary. Usually only lunch and dinner since she sleeps in. Recently started snacking on pretzels and candy.       CURRENT DIABETIC MEDS: Novolin NPH 30 units BID (9 PM and 9 AM), Novolin R  30 units AC (taking twice daily)  Vial or pen: vials  Glucometer type: True Metrix    Previous DM treatments:  Metformin  Victoza  Novolog/Levemir    Last Eye Exam: 5/2017, no DRPeter Plans to schedule  Last Podiatry Exam: never    REVIEW OF SYSTEMS  Constitutional: no c/o weakness. + slight weight loss. + fatigue  Eyes: + blurred vision.   Cardiac: no palpitations, chest pain.  Respiratory: no cough, + dyspnea when active. Cardiologist and pulmonologist aware.  GI: no c/o nausea. Reports generalized abdominal pain that comes and goes. C/o hemorrhoids   Skin: no lesions, rashes.   Neuro: reports "stabbing pain" in B feet.  Endocrine: denies polyphagia, polydipsia, polyuria      Personally reviewed Past " "Medical, Surgical, Social History.    Vital Signs  /70   Pulse 60   Ht 5' 1" (1.549 m)   Wt 89.4 kg (197 lb 3.2 oz)   BMI 37.26 kg/m²     Personally reviewed the below labs:    Hemoglobin A1C   Date Value Ref Range Status   12/28/2018 6.8 (H) 4.0 - 5.6 % Final     Comment:     ADA Screening Guidelines:  5.7-6.4%  Consistent with prediabetes  >or=6.5%  Consistent with diabetes  High levels of fetal hemoglobin interfere with the HbA1C  assay. Heterozygous hemoglobin variants (HbS, HgC, etc)do  not significantly interfere with this assay.   However, presence of multiple variants may affect accuracy.     09/25/2018 7.0 (H) 4.0 - 5.6 % Final     Comment:     ADA Screening Guidelines:  5.7-6.4%  Consistent with prediabetes  >or=6.5%  Consistent with diabetes  High levels of fetal hemoglobin interfere with the HbA1C  assay. Heterozygous hemoglobin variants (HbS, HgC, etc)do  not significantly interfere with this assay.   However, presence of multiple variants may affect accuracy.     06/05/2018 6.8 (H) 4.0 - 5.6 % Final     Comment:     ADA Screening Guidelines:  5.7-6.4%  Consistent with prediabetes  >or=6.5%  Consistent with diabetes  High levels of fetal hemoglobin interfere with the HbA1C  assay. Heterozygous hemoglobin variants (HbS, HgC, etc)do  not significantly interfere with this assay.   However, presence of multiple variants may affect accuracy.         Chemistry        Component Value Date/Time     01/03/2019 1448    K 3.5 01/03/2019 1448     01/03/2019 1448    CO2 26 01/03/2019 1448    BUN 15 01/03/2019 1448    CREATININE 1.4 01/03/2019 1448     (H) 01/03/2019 1448        Component Value Date/Time    CALCIUM 9.2 01/03/2019 1448    ALKPHOS 54 (L) 01/03/2019 1448    AST 37 01/03/2019 1448    AST 45 (H) 01/30/2016 0431    ALT 23 01/03/2019 1448    BILITOT 0.3 01/03/2019 1448          Lab Results   Component Value Date    CHOL 136 01/14/2018    CHOL 158 12/30/2017    CHOL 155 " 11/28/2016     Lab Results   Component Value Date    HDL 32 (L) 01/14/2018    HDL 33 (L) 12/30/2017    HDL 23 (L) 11/28/2016     Lab Results   Component Value Date    LDLCALC 52.6 (L) 01/14/2018    LDLCALC 68.8 12/30/2017    LDLCALC Invalid, Trig>400.0 11/28/2016     Lab Results   Component Value Date    TRIG 257 (H) 01/14/2018    TRIG 281 (H) 12/30/2017    TRIG 423 (H) 11/28/2016     Lab Results   Component Value Date    CHOLHDL 23.5 01/14/2018    CHOLHDL 20.9 12/30/2017    CHOLHDL 14.8 (L) 11/28/2016       Lab Results   Component Value Date    MICALBCREAT 1111.1 (H) 04/04/2016     Lab Results   Component Value Date    TSH 0.731 12/28/2018       CrCl cannot be calculated (Patient's most recent lab result is older than the maximum 7 days allowed.).    Vit D, 25-Hydroxy   Date Value Ref Range Status   08/24/2018 24 (L) 30 - 96 ng/mL Final     Comment:     Vitamin D deficiency.........<10 ng/mL                              Vitamin D insufficiency......10-29 ng/mL       Vitamin D sufficiency........> or equal to 30 ng/mL  Vitamin D toxicity............>100 ng/mL           PHYSICAL EXAMINAAppears well, no distressON  Constitutional: Appears well, no distress  Neck: Supple, trachea midline; no thyromegaly or nodules.   Respiratory: CTA, even and unlabored.  Cardiovascular: RRR, no murmurs, no carotid bruits. DP pulses  1+ bilaterally; no edema.    Lymph: no cervical or supraclavicular lymphadenopathy  Skin: warm and dry; no lipohypertrophy, or acanthosis nigracans observed. Plaque-like hypopigmented lesions noted to BLE, feet.   Neuro: DTR 2+ BUE/2+BLE. No loss of protective sensation via 10 gm monofilament. Vibratory exam decreased, bilaterally.   Feet: appropriate footwear. No open wounds or calluses.       A1c goal 7.5% or less, due to CKD, CAD      Assessment/Plan  1. Type 2 diabetes mellitus with stage 3 chronic kidney disease, with long-term current use of insulin  -- Controlled. However, only monitoring fasting  glucoses.  -- Pt prefers to continue 2 meals per day despite my explanation of increased risk of hypoglycemia with use of current insulin regimen.   -- continue Novolin NPH 30 units BID.   -- continue Novolion R 30 units AC. Correction scale, target 150, ISF 25  -- check BG 4x/day  -- Ophthalmology referral  -- following with nephrology    -- Discussed diagnosis of DM, A1c goals, progression of disease, long term complications and tx options.   -- Reviewed hypoglycemia management: treat with 1/2 glass of juice, 1/2 can regular coke, or 4 glucose tablets. Monitor and repeat treatment every 15 minutes until BG is >70 Then have a snack, which includes a complex carbohydrate and protein.   Advised patient to check BG before activities, such as driving or exercise.    -- takes aspirin, statin, ace-i   2. Type 2 diabetes mellitus with diabetic polyneuropathy, with long-term current use of insulin  -- discussed the importance of daily inspection of bottoms of both feet, interspace areas. Advised patient against walking barefoot.     3. Coronary artery disease involving native coronary artery of native heart without angina pectoris  -- avoid hypoglycemia   4. Essential hypertension  -- controlled  -- continue lisinopril   5. Hyperlipidemia with target LDL less than 70  -- Uncontrolled with elevated triglycerides.  -- optimize DM control.  -- Continue Crestor, fenofibrate  -- check level with RTC   6. Thyroid disease  -- controlled  -- continue levothyroxine 100 mcg daily.        FOLLOW UP  Follow-up in about 4 months (around 5/14/2019).   Patient instructed to bring BG logs to each follow up   Patient encouraged to call for any BG/medication issues, concerns, or questions.    Orders Placed This Encounter   Procedures    Hemoglobin A1c    Comprehensive metabolic panel    Lipid panel    Ambulatory referral to Ophthalmology    [facility] DIABETES FOOT EXAM

## 2019-01-14 NOTE — PROGRESS NOTES
Pre-Visit Chart Review  For Appointment Scheduled on 1-15-19    Health Maintenance Due   Topic Date Due    TETANUS VACCINE  06/02/1963    Eye Exam  06/28/2018    Influenza Vaccine  08/01/2018    Foot Exam  11/15/2018    Lipid Panel  01/14/2019

## 2019-01-15 ENCOUNTER — OFFICE VISIT (OUTPATIENT)
Dept: FAMILY MEDICINE | Facility: CLINIC | Age: 74
End: 2019-01-15
Attending: FAMILY MEDICINE
Payer: MEDICARE

## 2019-01-15 ENCOUNTER — HOSPITAL ENCOUNTER (OUTPATIENT)
Dept: RADIOLOGY | Facility: CLINIC | Age: 74
Discharge: HOME | End: 2019-01-15
Attending: FAMILY MEDICINE
Payer: MEDICARE

## 2019-01-15 VITALS
SYSTOLIC BLOOD PRESSURE: 115 MMHG | TEMPERATURE: 98 F | RESPIRATION RATE: 28 BRPM | HEIGHT: 61 IN | OXYGEN SATURATION: 88 % | BODY MASS INDEX: 37.2 KG/M2 | WEIGHT: 197.06 LBS | DIASTOLIC BLOOD PRESSURE: 46 MMHG | HEART RATE: 64 BPM

## 2019-01-15 DIAGNOSIS — J98.4 RESTRICTIVE LUNG DISEASE: ICD-10-CM

## 2019-01-15 DIAGNOSIS — K92.2 GASTROINTESTINAL HEMORRHAGE, UNSPECIFIED GASTROINTESTINAL HEMORRHAGE TYPE: Primary | ICD-10-CM

## 2019-01-15 DIAGNOSIS — D50.0 ANEMIA, BLOOD LOSS: ICD-10-CM

## 2019-01-15 DIAGNOSIS — E66.01 SEVERE OBESITY (BMI 35.0-39.9) WITH COMORBIDITY: ICD-10-CM

## 2019-01-15 DIAGNOSIS — R06.02 SHORTNESS OF BREATH: ICD-10-CM

## 2019-01-15 PROCEDURE — 71046 X-RAY EXAM CHEST 2 VIEWS: CPT | Mod: 26,,, | Performed by: RADIOLOGY

## 2019-01-15 PROCEDURE — 3074F SYST BP LT 130 MM HG: CPT | Mod: CPTII,S$GLB,, | Performed by: FAMILY MEDICINE

## 2019-01-15 PROCEDURE — 1101F PT FALLS ASSESS-DOCD LE1/YR: CPT | Mod: CPTII,S$GLB,, | Performed by: FAMILY MEDICINE

## 2019-01-15 PROCEDURE — 99214 PR OFFICE/OUTPT VISIT, EST, LEVL IV, 30-39 MIN: ICD-10-PCS | Mod: S$GLB,,, | Performed by: FAMILY MEDICINE

## 2019-01-15 PROCEDURE — 99999 PR PBB SHADOW E&M-EST. PATIENT-LVL IV: CPT | Mod: PBBFAC,,, | Performed by: FAMILY MEDICINE

## 2019-01-15 PROCEDURE — 3078F DIAST BP <80 MM HG: CPT | Mod: CPTII,S$GLB,, | Performed by: FAMILY MEDICINE

## 2019-01-15 PROCEDURE — 3074F PR MOST RECENT SYSTOLIC BLOOD PRESSURE < 130 MM HG: ICD-10-PCS | Mod: CPTII,S$GLB,, | Performed by: FAMILY MEDICINE

## 2019-01-15 PROCEDURE — 71046 XR CHEST PA AND LATERAL: ICD-10-PCS | Mod: 26,,, | Performed by: RADIOLOGY

## 2019-01-15 PROCEDURE — 99214 OFFICE O/P EST MOD 30 MIN: CPT | Mod: S$GLB,,, | Performed by: FAMILY MEDICINE

## 2019-01-15 PROCEDURE — 71046 X-RAY EXAM CHEST 2 VIEWS: CPT | Mod: TC,FY,PO

## 2019-01-15 PROCEDURE — 99999 PR PBB SHADOW E&M-EST. PATIENT-LVL IV: ICD-10-PCS | Mod: PBBFAC,,, | Performed by: FAMILY MEDICINE

## 2019-01-15 PROCEDURE — 3078F PR MOST RECENT DIASTOLIC BLOOD PRESSURE < 80 MM HG: ICD-10-PCS | Mod: CPTII,S$GLB,, | Performed by: FAMILY MEDICINE

## 2019-01-15 PROCEDURE — 1101F PR PT FALLS ASSESS DOC 0-1 FALLS W/OUT INJ PAST YR: ICD-10-PCS | Mod: CPTII,S$GLB,, | Performed by: FAMILY MEDICINE

## 2019-01-15 NOTE — PATIENT INSTRUCTIONS
Weight Management: Getting Started  Healthy bodies come in all shapes and sizes. Not all bodies are made to be thin. For some people, a healthy weight is higher than the average weight listed on weight charts. Your healthcare provider can help you decide on a healthy weight for you.    Reasons to lose weight  Losing weight can help with some health problems, such as high blood pressure, heart disease, diabetes, sleep apnea, and arthritis. You may also feel more energy.  Set your long-term goal  Your goal doesn't even have to be a specific weight. You may decide on a fitness goal (such as being able to walk 10 miles a week), or a health goal (such as lowering your blood pressure). Choose a goal that is measurable and reasonable, so you know when you've reached it. A goal of reaching a BMI of less than 25 is not always reasonable (or possible).   Make an action plan  Habits dont change overnight. Setting your goals too high can leave you feeling discouraged if you cant reach them. Be realistic. Choose one or two small changes you can make now. Set an action plan for how you are going to make these changes. When you can stick to this plan, keep making a few more small changes. Taking small steps will help you stay on the path to success.  Track your progress  Write down your goals. Then, keep a daily record of your progress. Write down what you eat and how active you are. This record lets you look back on how much youve done. It may also help when youre feeling frustrated. Reward yourself for success. Even if you dont reach every goal, give yourself credit for what you do get done.  Get support  Encouragement from others can help make losing weight easier. Ask your family members and friends for support. They may even want to join you. Also look to your healthcare provider, registered dietitian, and  for help. Your local hospital can give you more information about nutrition, exercise, and  weight loss.  Date Last Reviewed: 1/31/2016  © 6661-9852 [employer], [employer] [address] [place] [address], [place] [address]. All rights reserved. This information is not intended as a substitute for professional medical care. Always follow your healthcare professional's instructions.

## 2019-01-15 NOTE — PROGRESS NOTES
Subjective:       Patient ID: Adele Hall is a 73 y.o. female.    Chief Complaint: Hospital Follow Up    73-year-old female coming in with complaints of shortness of breath abdominal cramping and several weeks of lower GI bleeding with fresh red blood.  She has a history of sigmoid diverticulosis but no diverticulitis and internal hemorrhoids which have produced bleeding problems in the past.  She started getting some red blood with her bowel movements over 2 weeks ago and they have persisted on a daily basis since that time.  She went to the emergency room on January 3rd where she was found to have bleeding hemorrhoids and hemoglobin of 10.  She continues to take a daily baby aspirin in spite of continued bleeding.  She lately has been developing some abdominal cramping which is not commonly found with her hemorrhoidal bleeding and to her knowledge she has never had an attack of diverticulitis.  Her last colonoscopy that we are aware of was done March 16, 2010 by Dr. Urbina at [facility].  The patient's  believes she had another 1 in 2014 or 2015 but we do not have any documentation of that.  That would have been consistent with Dr. urbina's recommended follow-up with the 1 we do have.  The patient has additional history of coronary artery disease with 4 vessel bypass surgery in January 2016, diabetes with nephropathy on insulin, hypertension, hyperlipidemia, stage 3 chronic kidney disease, restrictive airway disease followed by Dr. Torres although she has not seen him since August of 2016, tardive dyskinesia, osteopenia peptic ulcer disease and hypothyroidism status post total thyroidectomy.  She does not take any NSAIDs and her only known bleeding risk is the baby aspirin.    Past Medical History:  No date: Anesthesia complication      Comment:  took patient 6 days to come out of anethesia after CABG  No date: Anticoagulant long-term use  No date: Arthritis  No date: Chronic kidney  disease  No date: CKD (chronic kidney disease) stage 3, GFR 30-59 ml/min      Comment:  Dr. Montero  2/7/2016: COPD (chronic obstructive pulmonary disease)  No date: COPD (chronic obstructive pulmonary disease)  No date: Coronary artery disease  No date: Diabetes mellitus  No date: Diabetes mellitus, type 2  No date: GERD (gastroesophageal reflux disease)  No date: Hyperlipidemia  No date: Hypertension  7/30/2016: possible new onset CHF  No date: Tardive dyskinesia  No date: Thyroid disease  No date: Ulcer  No date: Vertigo    Past Surgical History:  1/19/2016: AORTOCORONARY BYPASS-CABG X 4. w/EVH; N/A      Comment:  Performed by Tutu Ortega MD at [facility] OR  No date: CARDIAC SURGERY  01/26/2017: CHOLECYSTECTOMY  1/26/2017: CHOLECYSTECTOMY-LAPAROSCOPIC; N/A      Comment:  Performed by Alex Mcdonough MD at [facility] OR  No date: COLONOSCOPY  01/19/2016: CORONARY ARTERY BYPASS GRAFT      Comment:  4 vessel  4/11/2017: ESOPHAGOGASTRODUODENOSCOPY (EGD); N/A      Comment:  Performed by Seth Yoder MD at [facility] ENDO  4/4/2017: ESOPHAGOGASTRODUODENOSCOPY (EGD); N/A      Comment:  Performed by Seth Yoder MD at [facility] ENDO  1/15/2016: HEART CATH-LEFT; Left      Comment:  Performed by Anirudh Joy MD at [facility] CATH  No date: HYSTERECTOMY  1/15/2018: Left heart cath; Right      Comment:  Performed by Junior Agarwal MD at [facility] CATH  No date: OOPHORECTOMY  No date: TONSILLECTOMY  No date: TOTAL THYROIDECTOMY  No date: TUBAL LIGATION    Current Outpatient Medications on File Prior to Visit:  amLODIPine (NORVASC) 2.5 MG tablet, TAKE ONE TABLET BY MOUTH EVERY DAY, Disp: 30 tablet, Rfl: 5  ammonium lactate (LAC-HYDRIN) 12 % lotion, Apply topically as needed for Dry Skin., Disp: 225 g, Rfl: 1  blood sugar diagnostic (BLOOD GLUCOSE TEST) Strp, 1 each by Misc.(Non-Drug; Combo Route) route 4 (four) times daily., Disp: , Rfl:   ergocalciferol (ERGOCALCIFEROL) 50,000 unit Cap, Take 1 capsule (50,000 Units total) by mouth every 7  days. (Patient taking differently: Take 50,000 Units by mouth every 7 days. Wednesday), Disp: 4 capsule, Rfl: 5  fenofibrate micronized (LOFIBRA) 134 MG Cap, TAKE ONE CAPSULE BY MOUTH EVERY DAY WITH BREAKFAST, Disp: 30 capsule, Rfl: 9  insulin NPH (NOVOLIN N) 100 unit/mL injection, Inject 30 Units into the skin 2 (two) times daily before meals., Disp: , Rfl:   insulin regular (NOVOLIN R REGULAR U-100 INSULN) 100 unit/mL Inj injection, Inject 35 Units into the skin 3 (three) times daily before meals. Plus correction scale (Patient taking differently: Inject 30 Units into the skin 3 (three) times daily before meals. Plus correction scale), Disp: 40 mL, Rfl: 11  lancets 33 gauge Misc, 1 lancet by Misc.(Non-Drug; Combo Route) route 4 (four) times daily., Disp: , Rfl:   levothyroxine (SYNTHROID) 100 MCG tablet, TAKE ONE TABLET BY MOUTH EVERY DAY, Disp: 30 tablet, Rfl: 6  lisinopril (PRINIVIL,ZESTRIL) 20 MG tablet, Take 1 tablet (20 mg total) by mouth once daily., Disp: 30 tablet, Rfl: 11  meclizine (ANTIVERT) 25 mg tablet, Take 1 tablet (25 mg total) by mouth 3 (three) times daily as needed. (Patient taking differently: Take 25 mg by mouth 3 (three) times daily as needed for Dizziness. ), Disp: 20 tablet, Rfl: 0  metoprolol tartrate (LOPRESSOR) 25 MG tablet, TAKE 1/2 TABLET BY MOUTH TWICE A DAY, Disp: 30 tablet, Rfl: 10  nitroGLYCERIN (NITROSTAT) 0.4 MG SL tablet, Place 1 tablet (0.4 mg total) under the tongue every 5 (five) minutes as needed for Chest pain. (Patient taking differently: Place 0.4 mg under the tongue every 5 (five) minutes as needed for Chest pain. Seek medical help is pain is not relieved by the third dose.), Disp: 25 tablet, Rfl: 2  pantoprazole (PROTONIX) 40 MG tablet, TAKE ONE TABLET BY MOUTH EVERY DAY, Disp: 30 tablet, Rfl: 10  rosuvastatin (CRESTOR) 40 MG Tab, TAKE ONE TABLET BY MOUTH EVERY EVENING, Disp: 30 tablet, Rfl: 11  traZODone (DESYREL) 50 MG tablet, TAKE ONE TABLET BY MOUTH AT BEDTIME AS  NEEDED FOR INSOMNIA, Disp: 30 tablet, Rfl: 5  (DISCONTINUED) aspirin (ECOTRIN) 81 MG EC tablet, Take 81 mg by mouth once daily., Disp: , Rfl:   (DISCONTINUED) hydrocortisone-pramoxine (PROCTOFOAM-HS) rectal foam, Place 1 applicator rectally 2 (two) times daily., Disp: 10 g, Rfl: 0    No current facility-administered medications on file prior to visit.     TETANUS VACCINE due on 06/02/1965  Eye Exam due on 06/28/2018  Lipid Panel due on 01/14/2019        Review of Systems   Constitutional: Negative for chills, diaphoresis and fever.   Respiratory: Positive for shortness of breath. Negative for cough, chest tightness and wheezing.    Cardiovascular: Negative for chest pain and palpitations.   Gastrointestinal: Positive for abdominal pain, anal bleeding and blood in stool. Negative for constipation, diarrhea, nausea and vomiting.   Genitourinary: Negative for dysuria, frequency, hematuria and urgency.       Objective:      Physical Exam   Constitutional: She is oriented to person, place, and time. She appears well-developed. No distress.   Good blood pressure control  Not tachycardic  Tachypnea with a respiratory rate of 28 an oxygen saturation of 88% on room air  Severe obesity with a BMI of 37.2 she is up 0.7 lb from her last visit with me January 30, 2018   HENT:   Head: Normocephalic and atraumatic.   Right Ear: External ear normal.   Left Ear: External ear normal.   Nose: Nose normal.   Mouth/Throat: Oropharynx is clear and moist. No oropharyngeal exudate.   Eyes: EOM are normal. Pupils are equal, round, and reactive to light. No scleral icterus.   Neck: Normal range of motion. Neck supple. No JVD present. No tracheal deviation present. No thyromegaly present.   Cardiovascular: Normal rate, regular rhythm and normal heart sounds. Exam reveals no gallop and no friction rub.   No murmur heard.  Pulmonary/Chest: Effort normal and breath sounds normal. No stridor. No respiratory distress. She has no wheezes. She has  no rales. She exhibits no tenderness.   Abdominal: Soft. Bowel sounds are normal. She exhibits no distension and no mass. There is tenderness in the left lower quadrant. There is no rebound and no guarding. No hernia.   Genitourinary: Rectal exam shows external hemorrhoid, internal hemorrhoid and guaiac positive stool. Rectal exam shows no fissure, no mass, no tenderness and anal tone normal.   Lymphadenopathy:     She has no cervical adenopathy.   Neurological: She is alert and oriented to person, place, and time.   Skin: Skin is warm and dry. She is not diaphoretic. No erythema. No pallor.   Psychiatric: She has a normal mood and affect. Her behavior is normal. Judgment and thought content normal.   Nursing note and vitals reviewed.      Assessment:       1. Gastrointestinal hemorrhage, unspecified gastrointestinal hemorrhage type    2. Anemia, blood loss    3. Restrictive lung disease    4. Shortness of breath    5. Severe obesity (BMI 35.0-39.9) with comorbidity        Plan:       1. Gastrointestinal hemorrhage, unspecified gastrointestinal hemorrhage type  Instructed to discontinue aspirin  May be a combination hemorrhage from diverticular bleeding and hemorrhoidal.  Tenderness suggests at least mild diverticulitis but CT scan done January 3rd in the emergency room did not show any evidence of such  - CBC auto differential; Future    2. Anemia, blood loss  - CBC auto differential; Future    3. Restrictive lung disease  - X-Ray Chest PA And Lateral; Future  - Ambulatory referral to Pulmonology    4. Shortness of breath  - X-Ray Chest PA And Lateral; Future  - Ambulatory referral to Pulmonology    5. Severe obesity (BMI 35.0-39.9) with comorbidity

## 2019-01-16 ENCOUNTER — TELEPHONE (OUTPATIENT)
Dept: FAMILY MEDICINE | Facility: CLINIC | Age: 74
End: 2019-01-16

## 2019-01-16 DIAGNOSIS — K92.2 GASTROINTESTINAL HEMORRHAGE, UNSPECIFIED GASTROINTESTINAL HEMORRHAGE TYPE: Primary | ICD-10-CM

## 2019-01-16 NOTE — TELEPHONE ENCOUNTER
Lab results discussed with patient. She had more bleeding this morning. Referred to external gi group. Records faxed to Dr. Jha office. Patient will make appt with one of the physicians.

## 2019-01-16 NOTE — TELEPHONE ENCOUNTER
----- Message from Wally Cazares MD sent at 1/16/2019  8:09 AM CST -----  Your blood count is mildly anemic but stable and unchanged from the last one 2 weeks ago.  It does not appear that she has lost any significant amount of blood.

## 2019-01-28 DIAGNOSIS — G47.00 INSOMNIA, UNSPECIFIED TYPE: ICD-10-CM

## 2019-01-28 DIAGNOSIS — E07.9 THYROID DISEASE: ICD-10-CM

## 2019-01-28 RX ORDER — TRAZODONE HYDROCHLORIDE 50 MG/1
TABLET ORAL
Qty: 30 TABLET | Refills: 5 | Status: SHIPPED | OUTPATIENT
Start: 2019-01-28 | End: 2019-08-14 | Stop reason: SDUPTHER

## 2019-01-28 RX ORDER — LEVOTHYROXINE SODIUM 100 UG/1
TABLET ORAL
Qty: 30 TABLET | Refills: 11 | Status: SHIPPED | OUTPATIENT
Start: 2019-01-28 | End: 2020-01-27

## 2019-01-31 ENCOUNTER — OFFICE VISIT (OUTPATIENT)
Dept: OPHTHALMOLOGY | Facility: CLINIC | Age: 74
End: 2019-01-31
Payer: MEDICARE

## 2019-01-31 DIAGNOSIS — H10.13 ALLERGIC CONJUNCTIVITIS, BILATERAL: ICD-10-CM

## 2019-01-31 DIAGNOSIS — E11.9 DIABETES MELLITUS TYPE 2 WITHOUT RETINOPATHY: Primary | ICD-10-CM

## 2019-01-31 DIAGNOSIS — Q14.1 CONGENITAL HYPERTROPHY OF RETINAL PIGMENT EPITHELIUM: ICD-10-CM

## 2019-01-31 DIAGNOSIS — H25.13 NUCLEAR SCLEROSIS, BILATERAL: ICD-10-CM

## 2019-01-31 DIAGNOSIS — H52.7 REFRACTIVE ERROR: ICD-10-CM

## 2019-01-31 DIAGNOSIS — H35.363 RETINAL DRUSEN, BILATERAL: ICD-10-CM

## 2019-01-31 PROCEDURE — 99999 PR PBB SHADOW E&M-EST. PATIENT-LVL III: CPT | Mod: PBBFAC,,, | Performed by: OPHTHALMOLOGY

## 2019-01-31 PROCEDURE — 99999 PR PBB SHADOW E&M-EST. PATIENT-LVL III: ICD-10-PCS | Mod: PBBFAC,,, | Performed by: OPHTHALMOLOGY

## 2019-01-31 PROCEDURE — 92014 COMPRE OPH EXAM EST PT 1/>: CPT | Mod: S$GLB,,, | Performed by: OPHTHALMOLOGY

## 2019-01-31 PROCEDURE — 92015 PR REFRACTION: ICD-10-PCS | Mod: S$GLB,,, | Performed by: OPHTHALMOLOGY

## 2019-01-31 PROCEDURE — 92014 PR EYE EXAM, EST PATIENT,COMPREHESV: ICD-10-PCS | Mod: S$GLB,,, | Performed by: OPHTHALMOLOGY

## 2019-01-31 PROCEDURE — 92015 DETERMINE REFRACTIVE STATE: CPT | Mod: S$GLB,,, | Performed by: OPHTHALMOLOGY

## 2019-01-31 NOTE — PROGRESS NOTES
HPI     Here for annual Diabetic eye exam. Dx: DM 12 to 15 years starting insulin   2013. BG doing well at this time.   Denies eye pain has watering and itching ou ongoing a while. Also was   noticing a white streak now a black spot unsure which eye its from started   1 day this past week when she goes to look at it it disappears. Does not   use any eye drops currently. No known family h/o eye disease.     Lab Results       Component                Value               Date                       HGBA1C                   6.8 (H)             12/28/2018                 HGBA1C                   7.0 (H)             09/25/2018                 HGBA1C                   6.8 (H)             06/05/2018            No results found for: LABA1C    Agree with above. Denies trouble with allergies. Not currently using any   drops. Visual disturbance is on the side, very fast. When she turns that   direction looks like black cloud that just disintegrates, lasts less than   1 minute. Has not noticed any correlation with serum glucose level or   blood pressure.     Last edited by Cintia Rodriguez MD on 1/31/2019 11:11 AM.   (History)        ROS     Positive for: Neurological (neuropathy), Genitourinary (nephropathy),   Endocrine (DM diagnosed around 2005, insulin since 2014; hypothyroid),   Cardiovascular (HTN - controlled with meds per pt; A-fib), Heme/Lymph   (ASA)    Negative for: Eyes (denies surgery/laser/trauma), Respiratory (denies   asthma/SOB)    Last edited by Cintia Rodriguez MD on 1/31/2019 10:44 AM.   (History)        Assessment /Plan     For exam results, see Encounter Report.    Diabetes mellitus type 2 without retinopathy    Nuclear sclerosis, bilateral    Retinal drusen, bilateral    Congenital hypertrophy of retinal pigment epithelium    Allergic conjunctivitis, bilateral    Refractive error            Diabetes mellitus type 2 without retinopathy - recommend annual DFE    Allergic conjunctivitis,  bilateral - Discussed Warm compresses, lid hygiene. Handout given. Discussed artificial tears again. Also again discussed Zaditor is an over-the-counter allergy drop that used to be prescription only. Use 2x per day, if allergy symptoms do not improve, call or return to clinic for trial of prescription allergy drops.     Nuclear sclerosis, bilateral - Not visually significant.  Observe.    Retinal drusen, bilateral - observe    CHRPE OS - about 1 DD in size. Observe.    Refractive error - MRx given, stable

## 2019-01-31 NOTE — PATIENT INSTRUCTIONS
What Are Flashes and Floaters?  Have you ever seen flashes of light, stars, or streaks that arent really there? A few of these flashes are seen by everyone from time to time. Usually you see them in one eye at a time. Flashes are often caused by the gel filling inside of your eye, called the vitreous, pulling on the retina. The retina is a membrane that lines the inside of your eye.  Floaters look like dark specks, clouds, threads, or spider webs moving through your eyesight. Most people see them once in a while. Floaters may be pieces of gel or other material floating inside your eye. They are usually harmless.      Who Gets Flashes?  As you age or if you are nearsighted, you are more likely to see flashes. Nearsightedness is when you have fuzzy distance vision. Sometimes, flashes are a sign of other eye problems that need care.  Who Gets Floaters?  The older you get, the more likely youll notice floaters. Floaters can also be caused by an eye injury or surgery. People who are very nearsighted may get more floaters. If floaters appear suddenly or greatly increase in number, see your healthcare provider. This may be a sign of an eye problem.  Date Last Reviewed: 5/31/2015 © 2000-2017 [employer]. [address] [place] [address], [place] [address]. All rights reserved. This information is not intended as a substitute for professional medical care. Always follow your healthcare professional's instructions.

## 2019-02-04 ENCOUNTER — TELEPHONE (OUTPATIENT)
Dept: NEPHROLOGY | Facility: CLINIC | Age: 74
End: 2019-02-04

## 2019-02-04 ENCOUNTER — TELEPHONE (OUTPATIENT)
Dept: ENDOCRINOLOGY | Facility: CLINIC | Age: 74
End: 2019-02-04

## 2019-02-04 DIAGNOSIS — E78.2 MIXED HYPERLIPIDEMIA: ICD-10-CM

## 2019-02-04 RX ORDER — ROSUVASTATIN CALCIUM 40 MG/1
TABLET, COATED ORAL
Qty: 30 TABLET | Refills: 2 | Status: SHIPPED | OUTPATIENT
Start: 2019-02-04 | End: 2019-04-30 | Stop reason: SDUPTHER

## 2019-02-04 NOTE — TELEPHONE ENCOUNTER
Patient is having a colonoscopy on Wednesday.      Prescribed for prep  Tuesday starts clear liquid diet in the am.    Dulcolax 2pm.   Miralax 7 doses in 32 ounces water at 3pm and again at midnight.     Please advise.

## 2019-02-04 NOTE — TELEPHONE ENCOUNTER
----- Message from Sylvain Cabrera sent at 2/4/2019 10:21 AM CST -----  Contact: Self/[phone]  The patient would like to speak to the staff regarding a procedure she's having. On 02/06.          Thank you

## 2019-02-04 NOTE — TELEPHONE ENCOUNTER
Called pt to discuss insulin changes for colonoscopy. Advised pt to take NPH 20 units tomorrow AM and PM. Check BG every 4-6 hours. If BG running < 100 during day tomorrow, decrease PM dose to 15 units. Hold all insulin the AM of procedure and resume once eating again. Advised pt to include some sugar in her fluids tomorrow but this shouldn't makeup the majority of her intake. If having hypoglycemia, treat w regular Sprite or 7Up. She verbalized understanding.

## 2019-02-04 NOTE — TELEPHONE ENCOUNTER
Pt will be on clear liquid diet tomorrow, 2/5/19 and then fasting 2/6/19 for colonoscopy  Asking for advice regarding insulin for these days  Please advise

## 2019-02-04 NOTE — TELEPHONE ENCOUNTER
----- Message from Sylvain Cabrera sent at 2/4/2019 10:20 AM CST -----  Contact: Self/[phone]  The patient would like to speak to the staff regarding a procedure she's having. On 02/06.          Thank you

## 2019-02-05 NOTE — TELEPHONE ENCOUNTER
----- Message from Bernie Khalil sent at 2/5/2019 10:21 AM CST -----  Contact: Patient  Patient needs to speak with someone regarding her prep for her procedure tomorrow.  Please call to discuss.  Call Back#[phone]  Thanks

## 2019-02-05 NOTE — TELEPHONE ENCOUNTER
Pt wanted to discuss if taking Miralax was ok for her colonoscopy procedure tomorrow. Dr. Montero verbalized pt is ok to take Miralax for procedure.

## 2019-02-07 ENCOUNTER — TELEPHONE (OUTPATIENT)
Dept: ADMINISTRATIVE | Facility: HOSPITAL | Age: 74
End: 2019-02-07

## 2019-02-11 DIAGNOSIS — N18.30 TYPE 2 DIABETES MELLITUS WITH STAGE 3 CHRONIC KIDNEY DISEASE, WITH LONG-TERM CURRENT USE OF INSULIN: ICD-10-CM

## 2019-02-11 DIAGNOSIS — Z79.4 TYPE 2 DIABETES MELLITUS WITH STAGE 3 CHRONIC KIDNEY DISEASE, WITH LONG-TERM CURRENT USE OF INSULIN: ICD-10-CM

## 2019-02-11 DIAGNOSIS — E11.22 TYPE 2 DIABETES MELLITUS WITH STAGE 3 CHRONIC KIDNEY DISEASE, WITH LONG-TERM CURRENT USE OF INSULIN: ICD-10-CM

## 2019-02-11 RX ORDER — PEN NEEDLE, DIABETIC 29 G X1/2"
NEEDLE, DISPOSABLE MISCELLANEOUS
Qty: 200 EACH | Refills: 10 | Status: SHIPPED | OUTPATIENT
Start: 2019-02-11

## 2019-02-12 DIAGNOSIS — E55.9 VITAMIN D DEFICIENCY: ICD-10-CM

## 2019-02-12 RX ORDER — ERGOCALCIFEROL 1.25 MG/1
50000 CAPSULE ORAL
Qty: 4 CAPSULE | Refills: 5 | Status: SHIPPED | OUTPATIENT
Start: 2019-02-12 | End: 2019-05-16

## 2019-02-12 RX ORDER — PANTOPRAZOLE SODIUM 40 MG/1
TABLET, DELAYED RELEASE ORAL
Qty: 30 TABLET | Refills: 10 | Status: SHIPPED | OUTPATIENT
Start: 2019-02-12 | End: 2019-12-23

## 2019-02-13 NOTE — TELEPHONE ENCOUNTER
Due for vitamin-D level.  Order is in.  It can be done with the next lab scheduled by endocrine for diabetes and cholesterol scheduled May 14.

## 2019-02-21 DIAGNOSIS — I10 GOOD HYPERTENSION CONTROL: ICD-10-CM

## 2019-02-21 RX ORDER — LISINOPRIL 20 MG/1
TABLET ORAL
Qty: 30 TABLET | Refills: 11 | Status: SHIPPED | OUTPATIENT
Start: 2019-02-21 | End: 2019-05-21

## 2019-03-18 RX ORDER — METOPROLOL TARTRATE 25 MG/1
TABLET, FILM COATED ORAL
Qty: 30 TABLET | Refills: 10 | Status: SHIPPED | OUTPATIENT
Start: 2019-03-18 | End: 2020-04-28 | Stop reason: SDUPTHER

## 2019-04-30 DIAGNOSIS — E78.2 MIXED HYPERLIPIDEMIA: ICD-10-CM

## 2019-04-30 RX ORDER — ROSUVASTATIN CALCIUM 40 MG/1
TABLET, COATED ORAL
Qty: 30 TABLET | Refills: 0 | Status: SHIPPED | OUTPATIENT
Start: 2019-04-30 | End: 2019-06-01 | Stop reason: SDUPTHER

## 2019-05-03 DIAGNOSIS — I10 GOOD HYPERTENSION CONTROL: ICD-10-CM

## 2019-05-03 RX ORDER — FENOFIBRATE 134 MG/1
CAPSULE ORAL
Qty: 30 CAPSULE | Refills: 0 | OUTPATIENT
Start: 2019-05-03

## 2019-05-05 RX ORDER — AMLODIPINE BESYLATE 2.5 MG/1
TABLET ORAL
Qty: 30 TABLET | Refills: 5 | Status: SHIPPED | OUTPATIENT
Start: 2019-05-05 | End: 2019-12-12 | Stop reason: SDUPTHER

## 2019-05-06 ENCOUNTER — TELEPHONE (OUTPATIENT)
Dept: FAMILY MEDICINE | Facility: CLINIC | Age: 74
End: 2019-05-06

## 2019-05-06 ENCOUNTER — TELEPHONE (OUTPATIENT)
Dept: NEPHROLOGY | Facility: CLINIC | Age: 74
End: 2019-05-06

## 2019-05-06 NOTE — TELEPHONE ENCOUNTER
1.  That is not on med list.  I am not aware of local compounding pharmacy, there is one in [place]    2.  I doubt medicaid will cover compounded medication    3. We don't accept new medicaid

## 2019-05-06 NOTE — TELEPHONE ENCOUNTER
She wanted to speak with Dr. Montero to be sure it's ok to stop lisinopril. States she was advised to to stop by Dr Cazares. Please advise.

## 2019-05-06 NOTE — TELEPHONE ENCOUNTER
----- Message from Barbie Cazares sent at 5/6/2019 11:45 AM CDT -----  Contact: Ciarra  Type: Needs Medical Advice    Who Called:  Patient   Best Call Back Number: [phone]  Additional Information:  Calling as just received a call from PCP stating that she should discontinue taking Rx Lisinopril due to kidney function. Please call to advise. Thanks!

## 2019-05-06 NOTE — TELEPHONE ENCOUNTER
Patient had -(old rx )  Keto/cyclo/heraclio filled at [facility] in  [place]   [phone] rx # [id number].  Patient wants refill local if there is a compounding pharmacy here that will fill it.   She stated she is now Medicaid.

## 2019-05-07 NOTE — TELEPHONE ENCOUNTER
Talked to Rehana about rx. She will escript or fax us the refill. Patient stated she is paying cash so she does not need a prior auth. For this compounded medication.

## 2019-05-08 ENCOUNTER — TELEPHONE (OUTPATIENT)
Dept: FAMILY MEDICINE | Facility: CLINIC | Age: 74
End: 2019-05-08

## 2019-05-08 NOTE — TELEPHONE ENCOUNTER
Patient request for compounded medication.      Keto 10%/Cyclo 2%/Lido 5%= 90 gms-uses three times a week.     Send to [facility] pharmacy in [place], [place].

## 2019-05-09 NOTE — TELEPHONE ENCOUNTER
Note was sent to Dr. Cyr on 5/6. Received today.     Yes OK to hold lisinopril as her PCP directed. And needs to monitor BP as lisinopril is for BP. If SBP> 140 consistently she needs to call.     Thank you

## 2019-05-10 RX ORDER — FENOFIBRATE 134 MG/1
CAPSULE ORAL
Qty: 30 CAPSULE | Refills: 9 | OUTPATIENT
Start: 2019-05-10

## 2019-05-14 ENCOUNTER — LAB VISIT (OUTPATIENT)
Dept: LAB | Facility: HOSPITAL | Age: 74
End: 2019-05-14
Attending: FAMILY MEDICINE
Payer: MEDICARE

## 2019-05-14 DIAGNOSIS — E78.5 HYPERLIPIDEMIA WITH TARGET LDL LESS THAN 70: ICD-10-CM

## 2019-05-14 DIAGNOSIS — E11.22 TYPE 2 DIABETES MELLITUS WITH STAGE 3 CHRONIC KIDNEY DISEASE, WITH LONG-TERM CURRENT USE OF INSULIN: ICD-10-CM

## 2019-05-14 DIAGNOSIS — Z79.4 TYPE 2 DIABETES MELLITUS WITH STAGE 3 CHRONIC KIDNEY DISEASE, WITH LONG-TERM CURRENT USE OF INSULIN: ICD-10-CM

## 2019-05-14 DIAGNOSIS — E55.9 VITAMIN D DEFICIENCY: ICD-10-CM

## 2019-05-14 DIAGNOSIS — N18.30 TYPE 2 DIABETES MELLITUS WITH STAGE 3 CHRONIC KIDNEY DISEASE, WITH LONG-TERM CURRENT USE OF INSULIN: ICD-10-CM

## 2019-05-14 LAB
25(OH)D3+25(OH)D2 SERPL-MCNC: 24 NG/ML (ref 30–96)
ALBUMIN SERPL BCP-MCNC: 3.6 G/DL (ref 3.5–5.2)
ALP SERPL-CCNC: 64 U/L (ref 55–135)
ALT SERPL W/O P-5'-P-CCNC: 20 U/L (ref 10–44)
ANION GAP SERPL CALC-SCNC: 9 MMOL/L (ref 8–16)
AST SERPL-CCNC: 32 U/L (ref 10–40)
BILIRUB SERPL-MCNC: 0.2 MG/DL (ref 0.1–1)
BUN SERPL-MCNC: 21 MG/DL (ref 8–23)
CALCIUM SERPL-MCNC: 9.2 MG/DL (ref 8.7–10.5)
CHLORIDE SERPL-SCNC: 105 MMOL/L (ref 95–110)
CHOLEST SERPL-MCNC: 147 MG/DL (ref 120–199)
CHOLEST/HDLC SERPL: 4.3 {RATIO} (ref 2–5)
CO2 SERPL-SCNC: 26 MMOL/L (ref 23–29)
CREAT SERPL-MCNC: 1.4 MG/DL (ref 0.5–1.4)
EST. GFR  (AFRICAN AMERICAN): 43.6 ML/MIN/1.73 M^2
EST. GFR  (NON AFRICAN AMERICAN): 37.8 ML/MIN/1.73 M^2
ESTIMATED AVG GLUCOSE: 157 MG/DL (ref 68–131)
GLUCOSE SERPL-MCNC: 144 MG/DL (ref 70–110)
HBA1C MFR BLD HPLC: 7.1 % (ref 4–5.6)
HDLC SERPL-MCNC: 34 MG/DL (ref 40–75)
HDLC SERPL: 23.1 % (ref 20–50)
LDLC SERPL CALC-MCNC: 61.8 MG/DL (ref 63–159)
NONHDLC SERPL-MCNC: 113 MG/DL
POTASSIUM SERPL-SCNC: 4.2 MMOL/L (ref 3.5–5.1)
PROT SERPL-MCNC: 6.9 G/DL (ref 6–8.4)
SODIUM SERPL-SCNC: 140 MMOL/L (ref 136–145)
TRIGL SERPL-MCNC: 256 MG/DL (ref 30–150)

## 2019-05-14 PROCEDURE — 80061 LIPID PANEL: CPT

## 2019-05-14 PROCEDURE — 82306 VITAMIN D 25 HYDROXY: CPT

## 2019-05-14 PROCEDURE — 36415 COLL VENOUS BLD VENIPUNCTURE: CPT | Mod: PO

## 2019-05-14 PROCEDURE — 83036 HEMOGLOBIN GLYCOSYLATED A1C: CPT

## 2019-05-14 PROCEDURE — 80053 COMPREHEN METABOLIC PANEL: CPT

## 2019-05-16 ENCOUNTER — TELEPHONE (OUTPATIENT)
Dept: FAMILY MEDICINE | Facility: CLINIC | Age: 74
End: 2019-05-16

## 2019-05-16 DIAGNOSIS — E55.9 VITAMIN D DEFICIENCY: ICD-10-CM

## 2019-05-16 RX ORDER — ERGOCALCIFEROL 1.25 MG/1
CAPSULE ORAL
Qty: 8 CAPSULE | Refills: 5
Start: 2019-05-16 | End: 2019-05-16 | Stop reason: SDUPTHER

## 2019-05-16 RX ORDER — ERGOCALCIFEROL 1.25 MG/1
CAPSULE ORAL
Qty: 8 CAPSULE | Refills: 5 | Status: SHIPPED | OUTPATIENT
Start: 2019-05-16 | End: 2020-01-31

## 2019-05-16 RX ORDER — FENOFIBRATE 134 MG/1
CAPSULE ORAL
Qty: 30 CAPSULE | Refills: 9 | OUTPATIENT
Start: 2019-05-16

## 2019-05-16 NOTE — TELEPHONE ENCOUNTER
----- Message from Wally Cazares MD sent at 5/14/2019  8:15 PM CDT -----  The vitamin-D level is still low and unchanged from the last one eight months ago.  Has she been taking the high dose vitamin-D supplement?

## 2019-05-16 NOTE — TELEPHONE ENCOUNTER
Increase it to twice a week.  I will need to send in a larger supply.  Will her insurance let her do 90 days?

## 2019-05-16 NOTE — TELEPHONE ENCOUNTER
----- Message from Harjeet Che sent at 5/16/2019 11:54 AM CDT -----  Type:  Patient Returning Call    Who Called:  Patient  Who Left Message for Patient:  Janelle  Does the patient know what this is regarding?:  Test results  Best Call Back Number:  [phone]  Additional Information:

## 2019-05-16 NOTE — TELEPHONE ENCOUNTER
----- Message from Jeane Ozuna sent at 5/16/2019 11:45 AM CDT -----  Contact: Cary junior/[facility] [phone]  Patient is confused with her medications, so she wants to go over what the patient should be taking.  Please call her.  Thank you!   LVM:  Good Morning Ms Ac, this is Enedelia from Dr Clark's office returning your call.  Please call our office back at [phone].        Was calling pt per Dr Clark to give test results :   Per Dr Clark:  Please let patient know that her vaginosis swab was negative for yeast, BV and trichomonas

## 2019-05-16 NOTE — TELEPHONE ENCOUNTER
Patient will increase vit D to twice a week, please send in rx to her [facility] she  Is not sure about coverage.     She had a previous message about holding lisinopril. Dr Arevalo stated hold per PCP.

## 2019-05-17 ENCOUNTER — TELEPHONE (OUTPATIENT)
Dept: FAMILY MEDICINE | Facility: CLINIC | Age: 74
End: 2019-05-17

## 2019-05-17 NOTE — TELEPHONE ENCOUNTER
Please see note from Dr. Montero on 5/6/19 about discontinuing Lisinopril. I am aware Fenofibrate was dc'd due to renal function. Cary with [facility] trying to confirm.

## 2019-05-17 NOTE — TELEPHONE ENCOUNTER
Please see refill message of May 3, 2019.  I told her to discontinue the fenofibrate.  I never said anything about lisinopril, she started that with Dr. Montero.  She is confused and asked about the wrong medication.

## 2019-05-20 ENCOUNTER — TELEPHONE (OUTPATIENT)
Dept: FAMILY MEDICINE | Facility: CLINIC | Age: 74
End: 2019-05-20

## 2019-05-20 NOTE — TELEPHONE ENCOUNTER
----- Message from Yusef Landry MA sent at 5/20/2019 11:10 AM CDT -----  Contact: Pt   Pt would like to be call back from Janelle regarding  She gave her a call.    Pt can be reached at [phone].      Thanks

## 2019-05-21 ENCOUNTER — TELEPHONE (OUTPATIENT)
Dept: ENDOCRINOLOGY | Facility: CLINIC | Age: 74
End: 2019-05-21

## 2019-05-21 ENCOUNTER — OFFICE VISIT (OUTPATIENT)
Dept: ENDOCRINOLOGY | Facility: CLINIC | Age: 74
End: 2019-05-21
Payer: MEDICARE

## 2019-05-21 VITALS
DIASTOLIC BLOOD PRESSURE: 70 MMHG | HEART RATE: 58 BPM | HEIGHT: 61 IN | BODY MASS INDEX: 36.35 KG/M2 | SYSTOLIC BLOOD PRESSURE: 140 MMHG | WEIGHT: 192.56 LBS

## 2019-05-21 DIAGNOSIS — I10 ESSENTIAL HYPERTENSION: ICD-10-CM

## 2019-05-21 DIAGNOSIS — E11.22 TYPE 2 DIABETES MELLITUS WITH STAGE 3 CHRONIC KIDNEY DISEASE, WITH LONG-TERM CURRENT USE OF INSULIN: Primary | ICD-10-CM

## 2019-05-21 DIAGNOSIS — E07.9 THYROID DISEASE: ICD-10-CM

## 2019-05-21 DIAGNOSIS — Z79.4 TYPE 2 DIABETES MELLITUS WITH DIABETIC POLYNEUROPATHY, WITH LONG-TERM CURRENT USE OF INSULIN: ICD-10-CM

## 2019-05-21 DIAGNOSIS — N18.30 TYPE 2 DIABETES MELLITUS WITH STAGE 3 CHRONIC KIDNEY DISEASE, WITH LONG-TERM CURRENT USE OF INSULIN: Primary | ICD-10-CM

## 2019-05-21 DIAGNOSIS — I25.10 CORONARY ARTERY DISEASE INVOLVING NATIVE CORONARY ARTERY OF NATIVE HEART WITHOUT ANGINA PECTORIS: ICD-10-CM

## 2019-05-21 DIAGNOSIS — Z79.4 TYPE 2 DIABETES MELLITUS WITH STAGE 3 CHRONIC KIDNEY DISEASE, WITH LONG-TERM CURRENT USE OF INSULIN: Primary | ICD-10-CM

## 2019-05-21 DIAGNOSIS — E78.5 HYPERLIPIDEMIA WITH TARGET LDL LESS THAN 70: ICD-10-CM

## 2019-05-21 DIAGNOSIS — E11.42 TYPE 2 DIABETES MELLITUS WITH DIABETIC POLYNEUROPATHY, WITH LONG-TERM CURRENT USE OF INSULIN: ICD-10-CM

## 2019-05-21 PROCEDURE — 3077F SYST BP >= 140 MM HG: CPT | Mod: CPTII,S$GLB,, | Performed by: NURSE PRACTITIONER

## 2019-05-21 PROCEDURE — 99999 PR PBB SHADOW E&M-EST. PATIENT-LVL V: CPT | Mod: PBBFAC,,, | Performed by: NURSE PRACTITIONER

## 2019-05-21 PROCEDURE — 1101F PT FALLS ASSESS-DOCD LE1/YR: CPT | Mod: CPTII,S$GLB,, | Performed by: NURSE PRACTITIONER

## 2019-05-21 PROCEDURE — 3077F PR MOST RECENT SYSTOLIC BLOOD PRESSURE >= 140 MM HG: ICD-10-PCS | Mod: CPTII,S$GLB,, | Performed by: NURSE PRACTITIONER

## 2019-05-21 PROCEDURE — 99999 PR PBB SHADOW E&M-EST. PATIENT-LVL V: ICD-10-PCS | Mod: PBBFAC,,, | Performed by: NURSE PRACTITIONER

## 2019-05-21 PROCEDURE — 99214 OFFICE O/P EST MOD 30 MIN: CPT | Mod: S$GLB,,, | Performed by: NURSE PRACTITIONER

## 2019-05-21 PROCEDURE — 3045F PR MOST RECENT HEMOGLOBIN A1C LEVEL 7.0-9.0%: CPT | Mod: CPTII,S$GLB,, | Performed by: NURSE PRACTITIONER

## 2019-05-21 PROCEDURE — 3078F DIAST BP <80 MM HG: CPT | Mod: CPTII,S$GLB,, | Performed by: NURSE PRACTITIONER

## 2019-05-21 PROCEDURE — 3045F PR MOST RECENT HEMOGLOBIN A1C LEVEL 7.0-9.0%: ICD-10-PCS | Mod: CPTII,S$GLB,, | Performed by: NURSE PRACTITIONER

## 2019-05-21 PROCEDURE — 99214 PR OFFICE/OUTPT VISIT, EST, LEVL IV, 30-39 MIN: ICD-10-PCS | Mod: S$GLB,,, | Performed by: NURSE PRACTITIONER

## 2019-05-21 PROCEDURE — 1101F PR PT FALLS ASSESS DOC 0-1 FALLS W/OUT INJ PAST YR: ICD-10-PCS | Mod: CPTII,S$GLB,, | Performed by: NURSE PRACTITIONER

## 2019-05-21 PROCEDURE — 3078F PR MOST RECENT DIASTOLIC BLOOD PRESSURE < 80 MM HG: ICD-10-PCS | Mod: CPTII,S$GLB,, | Performed by: NURSE PRACTITIONER

## 2019-05-21 RX ORDER — LISINOPRIL 20 MG/1
20 TABLET ORAL DAILY
COMMUNITY
End: 2020-02-06 | Stop reason: ALTCHOICE

## 2019-05-21 NOTE — PROGRESS NOTES
"CC: Ms. Adele Hall arrives today for management of Type 2 DM and review of chronic medical conditions, as listed in the visit diagnosis section of this encounter.     HPI: Ms. Adele Hall was diagnosed with Type 2 DM in ~ 2002. Metformin started at the time of diagnosis but was stopped d/t renal impairment, per PCP in [place]. Insulin was started ~ 2012. In 2018, Novolog and Levemir were changed to [facility] brand insulins, due to cost.  Denies FH of DM. She did have 1 ER admission after having BG of >700 in 2012, prior to starting insulin.   Follows with Dr. Burks for CAD - s/p CABG in 1/2016.   She follows with Dr. Montero for CKD.    Patient was last seen by me in January.    She reports that she was recently taken off of lisinopril due to her renal function.      BG readings are checked 1x/day (fasting only).                   Hypoglycemia: No    Missing Insulin/PO medication doses: No  Timing prandial insulin 5-15 minutes before meals: yes    Dietary Habits: Eats 2 meals/day but the times vary. Sometimes may only have Glucerna. Goes out to eat for lunch often. Drinks sweet tea when out to eat.     She states that in June, her insurance will change to [employer] and she believes she will have partial Medicaid coverage.       CURRENT DIABETIC MEDS: Novolin NPH 30 units BID (9 PM and 9 AM), Novolin R  30 units AC (taking twice daily)  Vial or pen: vials  Glucometer type: True Metrix    Previous DM treatments:  Metformin  Victoza - $  Novolog/Levemir - $    Last Eye Exam: 1/2019, no DR.   Last Podiatry Exam: never    REVIEW OF SYSTEMS  Constitutional: no c/o weakness, fatigue, weight loss.   Eyes: + blurred vision.   Cardiac: no palpitations, chest pain.  Respiratory: no cough, + dyspnea on exertion. Cardiologist and pulmonologist aware.  GI: no c/o nausea, abdominal pain.   Skin: no lesions, rashes.   Neuro: reports "stabbing pain" in B feet. Uses compounded topical prescribed by " "PCP  Endocrine: denies polyphagia, polydipsia, polyuria.      Personally reviewed Past Medical, Surgical, Social History.    Vital Signs  BP (!) 140/70   Pulse (!) 58   Ht 5' 1" (1.549 m)   Wt 87.4 kg (192 lb 9.2 oz)   BMI 36.39 kg/m²     Personally reviewed the below labs:    Hemoglobin A1C   Date Value Ref Range Status   05/14/2019 7.1 (H) 4.0 - 5.6 % Final     Comment:     ADA Screening Guidelines:  5.7-6.4%  Consistent with prediabetes  >or=6.5%  Consistent with diabetes  High levels of fetal hemoglobin interfere with the HbA1C  assay. Heterozygous hemoglobin variants (HbS, HgC, etc)do  not significantly interfere with this assay.   However, presence of multiple variants may affect accuracy.     12/28/2018 6.8 (H) 4.0 - 5.6 % Final     Comment:     ADA Screening Guidelines:  5.7-6.4%  Consistent with prediabetes  >or=6.5%  Consistent with diabetes  High levels of fetal hemoglobin interfere with the HbA1C  assay. Heterozygous hemoglobin variants (HbS, HgC, etc)do  not significantly interfere with this assay.   However, presence of multiple variants may affect accuracy.     09/25/2018 7.0 (H) 4.0 - 5.6 % Final     Comment:     ADA Screening Guidelines:  5.7-6.4%  Consistent with prediabetes  >or=6.5%  Consistent with diabetes  High levels of fetal hemoglobin interfere with the HbA1C  assay. Heterozygous hemoglobin variants (HbS, HgC, etc)do  not significantly interfere with this assay.   However, presence of multiple variants may affect accuracy.         Chemistry        Component Value Date/Time     05/14/2019 1021    K 4.2 05/14/2019 1021     05/14/2019 1021    CO2 26 05/14/2019 1021    BUN 21 05/14/2019 1021    CREATININE 1.4 05/14/2019 1021     (H) 05/14/2019 1021        Component Value Date/Time    CALCIUM 9.2 05/14/2019 1021    ALKPHOS 64 05/14/2019 1021    AST 32 05/14/2019 1021    AST 45 (H) 01/30/2016 0431    ALT 20 05/14/2019 1021    BILITOT 0.2 05/14/2019 1021          Lab Results "   Component Value Date    CHOL 147 05/14/2019    CHOL 136 01/14/2018    CHOL 158 12/30/2017     Lab Results   Component Value Date    HDL 34 (L) 05/14/2019    HDL 32 (L) 01/14/2018    HDL 33 (L) 12/30/2017     Lab Results   Component Value Date    LDLCALC 61.8 (L) 05/14/2019    LDLCALC 52.6 (L) 01/14/2018    LDLCALC 68.8 12/30/2017     Lab Results   Component Value Date    TRIG 256 (H) 05/14/2019    TRIG 257 (H) 01/14/2018    TRIG 281 (H) 12/30/2017     Lab Results   Component Value Date    CHOLHDL 23.1 05/14/2019    CHOLHDL 23.5 01/14/2018    CHOLHDL 20.9 12/30/2017       Lab Results   Component Value Date    MICALBCREAT 1111.1 (H) 04/04/2016     Lab Results   Component Value Date    TSH 0.731 12/28/2018       CrCl cannot be calculated (Unknown ideal weight.).    Vit D, 25-Hydroxy   Date Value Ref Range Status   05/14/2019 24 (L) 30 - 96 ng/mL Final     Comment:     Vitamin D deficiency.........<10 ng/mL                              Vitamin D insufficiency......10-29 ng/mL       Vitamin D sufficiency........> or equal to 30 ng/mL  Vitamin D toxicity............>100 ng/mL           PHYSICAL EXAMINATION  Constitutional: Appears well, no distress  Neck: Supple, trachea midline; no thyromegaly or nodules.   Respiratory: CTA, even and unlabored.  Cardiovascular: RRR, no murmurs, no carotid bruits. DP pulses  1+ bilaterally; no edema.    Lymph: no cervical or supraclavicular lymphadenopathy  Skin: warm and dry; no lipohypertrophy, or acanthosis nigracans observed. Plaque-like hypopigmented lesions noted to BLE, feet.   Neuro: DTR 2+ BUE/2+BLE. Previously, no loss of protective sensation via 10 gm monofilament. Vibratory exam decreased, bilaterally.   Feet: appropriate footwear.       A1c goal 7.5% or less, due to CKD, CAD      Assessment/Plan  1. Type 2 diabetes mellitus with stage 3 chronic kidney disease, with long-term current use of insulin  -- Controlled. Continues to only monitor fasting glucoses. Again, I  explained the importance of eating 3 meals/day with her current insulin regimen.   -- discussed the option of changing to analog insulins after 6/1, due to insurance change. She will notify us if she would like to do so.   -- continue Novolin NPH 30 units BID.   -- continue Novolion R 30 units AC. Correction scale, target 150, ISF 25. If only drinking Glucerna, take half of dose.   -- check BG 4x/day  -- following with nephrology    -- upon review of chart, patient is to continue lisinopril and stop fenofibrate, per Dr. Cazares. I reinforced these instructions with the patient.     -- Discussed diagnosis of DM, A1c goals, progression of disease, long term complications and tx options.   -- Reviewed hypoglycemia management: treat with 1/2 glass of juice, 1/2 can regular coke, or 4 glucose tablets. Monitor and repeat treatment every 15 minutes until BG is >70 Then have a snack, which includes a complex carbohydrate and protein.   Advised patient to check BG before activities, such as driving or exercise.    -- takes aspirin, statin, ace-I   2. Type 2 diabetes mellitus with diabetic polyneuropathy, with long-term current use of insulin  -- discussed the importance of daily inspection of bottoms of both feet, interspace areas. Advised patient against walking barefoot.     3. Coronary artery disease involving native coronary artery of native heart without angina pectoris  -- avoid hypoglycemia   4. Essential hypertension  -- uncontrolled  -- resume lisinopril, per PCP   5. Hyperlipidemia with target LDL less than 70  -- Uncontrolled with elevated triglycerides.  -- Continue Crestor  -- fenofibrate DC'd due to renal function  -- optimize DM control   6. Thyroid disease  -- controlled  -- continue levothyroxine 100 mcg daily.        FOLLOW UP  Follow up in about 4 months (around 9/21/2019).   Patient instructed to bring BG logs to each follow up   Patient encouraged to call for any BG/medication issues, concerns, or  questions.    Orders Placed This Encounter   Procedures    Hemoglobin A1c

## 2019-05-22 ENCOUNTER — TELEPHONE (OUTPATIENT)
Dept: FAMILY MEDICINE | Facility: CLINIC | Age: 74
End: 2019-05-22

## 2019-05-22 NOTE — TELEPHONE ENCOUNTER
----- Message from Antonette Aldana sent at 5/22/2019  3:03 PM CDT -----  Contact: Zoey with [facility]  Type:  Pharmacy Calling to Clarify an RX    Name of Caller:  Zoey  Pharmacy Name:    [facility] - [place], [place] - [id number]  [place] [address]  Phone: [phone] Fax: [phone]    Prescription Name:  FENOFIBRATE  What do they need to clarify?:  Patient is unsure if she needs to be taking medication  Best Call Back Number:  [phone]    Additional Information:  na

## 2019-06-01 DIAGNOSIS — E78.2 MIXED HYPERLIPIDEMIA: ICD-10-CM

## 2019-06-03 RX ORDER — ROSUVASTATIN CALCIUM 40 MG/1
TABLET, COATED ORAL
Qty: 30 TABLET | Refills: 10 | Status: SHIPPED | OUTPATIENT
Start: 2019-06-03 | End: 2020-05-18

## 2019-06-11 ENCOUNTER — TELEPHONE (OUTPATIENT)
Dept: ENDOCRINOLOGY | Facility: CLINIC | Age: 74
End: 2019-06-11

## 2019-06-11 DIAGNOSIS — Z79.4 TYPE 2 DIABETES MELLITUS WITH DIABETIC POLYNEUROPATHY, WITH LONG-TERM CURRENT USE OF INSULIN: Primary | ICD-10-CM

## 2019-06-11 DIAGNOSIS — E11.42 TYPE 2 DIABETES MELLITUS WITH DIABETIC POLYNEUROPATHY, WITH LONG-TERM CURRENT USE OF INSULIN: Primary | ICD-10-CM

## 2019-06-11 RX ORDER — INSULIN ASPART 100 [IU]/ML
30 INJECTION, SOLUTION INTRAVENOUS; SUBCUTANEOUS 2 TIMES DAILY WITH MEALS
Qty: 30 ML | Refills: 6 | Status: SHIPPED | OUTPATIENT
Start: 2019-06-11 | End: 2019-12-27

## 2019-06-11 RX ORDER — PEN NEEDLE, DIABETIC 30 GX3/16"
NEEDLE, DISPOSABLE MISCELLANEOUS
Qty: 100 EACH | Refills: 6 | Status: SHIPPED | OUTPATIENT
Start: 2019-06-11 | End: 2020-07-06

## 2019-06-11 RX ORDER — INSULIN DEGLUDEC 200 U/ML
60 INJECTION, SOLUTION SUBCUTANEOUS DAILY
Qty: 9 ML | Refills: 6 | Status: SHIPPED | OUTPATIENT
Start: 2019-06-11 | End: 2019-09-20

## 2019-06-11 NOTE — TELEPHONE ENCOUNTER
----- Message from Pricilla Guevara sent at 6/11/2019 11:10 AM CDT -----  Contact: self  Patient requesting to speak to nurse regarding patient states she changed insurance and was informed to contact office so that insulin prescription can be redone per patient       Please call to advice [phone] (home)       [facility] - [place], [place] - [address]  [address]  [place] [address]  Phone: [phone] Fax: [phone]

## 2019-06-11 NOTE — TELEPHONE ENCOUNTER
Spoke with pt, advised on brand name insulin sent to the pharmacy, advised on dosing instructions voiced understanding

## 2019-06-11 NOTE — TELEPHONE ENCOUNTER
Pt states she now has [employer] insurance.  States she discussed with you that when the insurance changed, you would send Rx's for Novolog and Levemir pens to her pharmacy to see if covered.

## 2019-06-11 NOTE — TELEPHONE ENCOUNTER
Please call pt and notify her that I have changed her insulins to the flexpens. Tresiba will replace Novolin NPH and Novolog will replace Novolin R. Can give Tresiba in the morning but will need to stop the NPH the night before.

## 2019-06-18 ENCOUNTER — LAB VISIT (OUTPATIENT)
Dept: LAB | Facility: HOSPITAL | Age: 74
End: 2019-06-18
Attending: INTERNAL MEDICINE
Payer: MEDICARE

## 2019-06-18 ENCOUNTER — TELEPHONE (OUTPATIENT)
Dept: NEPHROLOGY | Facility: CLINIC | Age: 74
End: 2019-06-18

## 2019-06-18 DIAGNOSIS — R10.9 FLANK PAIN: ICD-10-CM

## 2019-06-18 DIAGNOSIS — E11.21 DIABETIC NEPHROPATHY ASSOCIATED WITH TYPE 2 DIABETES MELLITUS: Primary | ICD-10-CM

## 2019-06-18 DIAGNOSIS — N18.30 CKD (CHRONIC KIDNEY DISEASE) STAGE 3, GFR 30-59 ML/MIN: ICD-10-CM

## 2019-06-18 PROCEDURE — 83970 ASSAY OF PARATHORMONE: CPT | Mod: HCNC

## 2019-06-18 PROCEDURE — 36415 COLL VENOUS BLD VENIPUNCTURE: CPT | Mod: HCNC,PO

## 2019-06-18 PROCEDURE — 80069 RENAL FUNCTION PANEL: CPT | Mod: HCNC

## 2019-06-18 NOTE — TELEPHONE ENCOUNTER
I was just calling because 3 days my lower back on both sides have been hurting really bad and my urine has turned a little yellow.     She denies fever, nausea, frequency, burning, urgency, problems urinating or any other symptoms.      Requests lab orders.  She is scheduled to get labs on July 2 so we moved them to today and added urine micro and culture.     She understands she should to go to ER with onset of further symptoms or worsening pain.

## 2019-06-19 LAB
ALBUMIN SERPL BCP-MCNC: 3.5 G/DL (ref 3.5–5.2)
ANION GAP SERPL CALC-SCNC: 11 MMOL/L (ref 8–16)
BUN SERPL-MCNC: 17 MG/DL (ref 8–23)
CALCIUM SERPL-MCNC: 9.6 MG/DL (ref 8.7–10.5)
CHLORIDE SERPL-SCNC: 99 MMOL/L (ref 95–110)
CO2 SERPL-SCNC: 29 MMOL/L (ref 23–29)
CREAT SERPL-MCNC: 1.3 MG/DL (ref 0.5–1.4)
EST. GFR  (AFRICAN AMERICAN): 47.4 ML/MIN/1.73 M^2
EST. GFR  (NON AFRICAN AMERICAN): 41.1 ML/MIN/1.73 M^2
GLUCOSE SERPL-MCNC: 208 MG/DL (ref 70–110)
PHOSPHATE SERPL-MCNC: 4.3 MG/DL (ref 2.7–4.5)
POTASSIUM SERPL-SCNC: 4.3 MMOL/L (ref 3.5–5.1)
PTH-INTACT SERPL-MCNC: 101 PG/ML (ref 9–77)
SODIUM SERPL-SCNC: 139 MMOL/L (ref 136–145)

## 2019-06-25 ENCOUNTER — TELEPHONE (OUTPATIENT)
Dept: ENDOCRINOLOGY | Facility: CLINIC | Age: 74
End: 2019-06-25

## 2019-06-25 NOTE — TELEPHONE ENCOUNTER
----- Message from Harjeet Che sent at 6/25/2019  4:13 PM CDT -----  Type: Needs Medical Advice    Who Called:  Patient    Best Call Back Number:  [phone]   Additional Information: Patient states that she would like a callback regarding her sliding scale

## 2019-07-15 NOTE — TELEPHONE ENCOUNTER
Spoke with [facility], they advised Victoza was shipped in 4/10/12 and is out for delivery should received 4/12/17 by end of day.. The Novolog and Levemir order was received on 4/7/17. There is currently no send out slip..Abdullahi advised it would take ship in 7 to 10 business day from time of order. Call patient and advised her of status of order.. Verbalized understanding   
Diminished/Clear

## 2019-07-26 ENCOUNTER — OFFICE VISIT (OUTPATIENT)
Dept: NEPHROLOGY | Facility: CLINIC | Age: 74
End: 2019-07-26
Payer: MEDICARE

## 2019-07-26 VITALS
DIASTOLIC BLOOD PRESSURE: 60 MMHG | HEART RATE: 74 BPM | SYSTOLIC BLOOD PRESSURE: 122 MMHG | WEIGHT: 192 LBS | BODY MASS INDEX: 36.25 KG/M2 | HEIGHT: 61 IN

## 2019-07-26 DIAGNOSIS — Z95.1 S/P CABG (CORONARY ARTERY BYPASS GRAFT): ICD-10-CM

## 2019-07-26 DIAGNOSIS — E11.21 DIABETIC NEPHROPATHY ASSOCIATED WITH TYPE 2 DIABETES MELLITUS: ICD-10-CM

## 2019-07-26 DIAGNOSIS — R80.1 PERSISTENT PROTEINURIA: ICD-10-CM

## 2019-07-26 DIAGNOSIS — N18.30 CKD (CHRONIC KIDNEY DISEASE) STAGE 3, GFR 30-59 ML/MIN: Primary | ICD-10-CM

## 2019-07-26 DIAGNOSIS — N18.30 TYPE 2 DIABETES MELLITUS WITH STAGE 3 CHRONIC KIDNEY DISEASE, WITH LONG-TERM CURRENT USE OF INSULIN: ICD-10-CM

## 2019-07-26 DIAGNOSIS — E78.5 DYSLIPIDEMIA ASSOCIATED WITH TYPE 2 DIABETES MELLITUS: ICD-10-CM

## 2019-07-26 DIAGNOSIS — N28.9 RENAL INSUFFICIENCY: ICD-10-CM

## 2019-07-26 DIAGNOSIS — E11.59 HYPERTENSION ASSOCIATED WITH DIABETES: ICD-10-CM

## 2019-07-26 DIAGNOSIS — I48.0 PAF (PAROXYSMAL ATRIAL FIBRILLATION): ICD-10-CM

## 2019-07-26 DIAGNOSIS — Z79.4 TYPE 2 DIABETES MELLITUS WITH STAGE 3 CHRONIC KIDNEY DISEASE, WITH LONG-TERM CURRENT USE OF INSULIN: ICD-10-CM

## 2019-07-26 DIAGNOSIS — I11.9 HYPERTENSIVE LEFT VENTRICULAR HYPERTROPHY, WITHOUT HEART FAILURE: ICD-10-CM

## 2019-07-26 DIAGNOSIS — E11.22 TYPE 2 DIABETES MELLITUS WITH STAGE 3 CHRONIC KIDNEY DISEASE, WITH LONG-TERM CURRENT USE OF INSULIN: ICD-10-CM

## 2019-07-26 DIAGNOSIS — Z87.09 HISTORY OF ARDS: ICD-10-CM

## 2019-07-26 DIAGNOSIS — I15.2 HYPERTENSION ASSOCIATED WITH DIABETES: ICD-10-CM

## 2019-07-26 DIAGNOSIS — E11.69 DYSLIPIDEMIA ASSOCIATED WITH TYPE 2 DIABETES MELLITUS: ICD-10-CM

## 2019-07-26 PROCEDURE — 3074F PR MOST RECENT SYSTOLIC BLOOD PRESSURE < 130 MM HG: ICD-10-PCS | Mod: HCNC,CPTII,S$GLB, | Performed by: INTERNAL MEDICINE

## 2019-07-26 PROCEDURE — 3045F PR MOST RECENT HEMOGLOBIN A1C LEVEL 7.0-9.0%: CPT | Mod: HCNC,CPTII,S$GLB, | Performed by: INTERNAL MEDICINE

## 2019-07-26 PROCEDURE — 1101F PR PT FALLS ASSESS DOC 0-1 FALLS W/OUT INJ PAST YR: ICD-10-PCS | Mod: HCNC,CPTII,S$GLB, | Performed by: INTERNAL MEDICINE

## 2019-07-26 PROCEDURE — 3045F PR MOST RECENT HEMOGLOBIN A1C LEVEL 7.0-9.0%: ICD-10-PCS | Mod: HCNC,CPTII,S$GLB, | Performed by: INTERNAL MEDICINE

## 2019-07-26 PROCEDURE — 3078F DIAST BP <80 MM HG: CPT | Mod: HCNC,CPTII,S$GLB, | Performed by: INTERNAL MEDICINE

## 2019-07-26 PROCEDURE — 1101F PT FALLS ASSESS-DOCD LE1/YR: CPT | Mod: HCNC,CPTII,S$GLB, | Performed by: INTERNAL MEDICINE

## 2019-07-26 PROCEDURE — 99214 PR OFFICE/OUTPT VISIT, EST, LEVL IV, 30-39 MIN: ICD-10-PCS | Mod: HCNC,S$GLB,, | Performed by: INTERNAL MEDICINE

## 2019-07-26 PROCEDURE — 99999 PR PBB SHADOW E&M-EST. PATIENT-LVL IV: CPT | Mod: PBBFAC,HCNC,, | Performed by: INTERNAL MEDICINE

## 2019-07-26 PROCEDURE — 99214 OFFICE O/P EST MOD 30 MIN: CPT | Mod: HCNC,S$GLB,, | Performed by: INTERNAL MEDICINE

## 2019-07-26 PROCEDURE — 3074F SYST BP LT 130 MM HG: CPT | Mod: HCNC,CPTII,S$GLB, | Performed by: INTERNAL MEDICINE

## 2019-07-26 PROCEDURE — 99999 PR PBB SHADOW E&M-EST. PATIENT-LVL IV: ICD-10-PCS | Mod: PBBFAC,HCNC,, | Performed by: INTERNAL MEDICINE

## 2019-07-26 PROCEDURE — 3078F PR MOST RECENT DIASTOLIC BLOOD PRESSURE < 80 MM HG: ICD-10-PCS | Mod: HCNC,CPTII,S$GLB, | Performed by: INTERNAL MEDICINE

## 2019-07-26 RX ORDER — DICYCLOMINE HYDROCHLORIDE 10 MG/1
CAPSULE ORAL
Refills: 3 | COMMUNITY
Start: 2019-06-01

## 2019-07-29 DIAGNOSIS — E11.21 DIABETIC NEPHROPATHY ASSOCIATED WITH TYPE 2 DIABETES MELLITUS: ICD-10-CM

## 2019-08-04 NOTE — PROGRESS NOTES
Subjective:       Patient ID: Adele Hall is a 74 y.o. White female who presents for follow-up evaluation of Chronic Kidney Disease    HPI     She reports she is doing well. Her DM has improved with last Hba1c of 6.9. She feels well. No edema but has chronic JONES. No new medications since last visit. She avoids NSAIDs and no herbal medications. She received the flu vaccine    Review of Systems   Constitutional: Negative for activity change, appetite change, fatigue and unexpected weight change.   HENT: Negative for facial swelling.    Respiratory: Positive for shortness of breath. Negative for cough and wheezing.    Cardiovascular: Negative for chest pain and leg swelling.   Gastrointestinal: Negative for abdominal distention.   Genitourinary: Negative for difficulty urinating and dysuria.   Musculoskeletal: Negative for arthralgias.   Neurological: Negative for weakness.       Objective:      Physical Exam   Constitutional: She is oriented to person, place, and time. She appears well-nourished.   HENT:   Mouth/Throat: Oropharynx is clear and moist.   Neck: No JVD present.   Cardiovascular: S1 normal and S2 normal.  Exam reveals no friction rub.    Pulmonary/Chest: Breath sounds normal. She has no wheezes. She has no rales.   Abdominal: Soft.   Musculoskeletal: She exhibits no edema.   Neurological: She is alert and oriented to person, place, and time.   Skin: Skin is warm and dry.   Psychiatric: She has a normal mood and affect.   Vitals reviewed.      Assessment:       1. CKD (chronic kidney disease) stage 3, GFR 30-59 ml/min    2. Persistent proteinuria    3. Diabetic nephropathy associated with type 2 diabetes mellitus    4. Hypertension associated with diabetes    5. S/P CABG (coronary artery bypass graft) - x4 01/19/2016; LIMA to LAD; SVG's to diag, OMB, PDA - all grafts patent 01/2018    6. Hypertensive left ventricular hypertrophy, without heart failure    7. PAF (paroxysmal atrial  fibrillation), post CABG    8. Type 2 diabetes mellitus with stage 3 chronic kidney disease, with long-term current use of insulin    9. History of ARDS, post CABG, 1/2016    10. Dyslipidemia associated with type 2 diabetes mellitus    11. Renal insufficiency        Plan:             CKD with stable kidney function and proteinuria. Continue RAAS blockade for renal preservation    HTN is controlled at home with -130's. She is fine with keeping ace-inhibitor     Mineral and Bone Disease--PTH at goal. Continue D2    DM is controlled    Anemia--stable    RTC 6 months with labs prior

## 2019-08-14 DIAGNOSIS — G47.00 INSOMNIA, UNSPECIFIED TYPE: ICD-10-CM

## 2019-08-14 RX ORDER — TRAZODONE HYDROCHLORIDE 50 MG/1
TABLET ORAL
Qty: 30 TABLET | Refills: 3 | Status: SHIPPED | OUTPATIENT
Start: 2019-08-14 | End: 2019-12-13 | Stop reason: SDUPTHER

## 2019-09-13 ENCOUNTER — LAB VISIT (OUTPATIENT)
Dept: LAB | Facility: HOSPITAL | Age: 74
End: 2019-09-13
Attending: NURSE PRACTITIONER
Payer: MEDICARE

## 2019-09-13 DIAGNOSIS — E11.22 TYPE 2 DIABETES MELLITUS WITH STAGE 3 CHRONIC KIDNEY DISEASE, WITH LONG-TERM CURRENT USE OF INSULIN: ICD-10-CM

## 2019-09-13 DIAGNOSIS — N18.30 TYPE 2 DIABETES MELLITUS WITH STAGE 3 CHRONIC KIDNEY DISEASE, WITH LONG-TERM CURRENT USE OF INSULIN: ICD-10-CM

## 2019-09-13 DIAGNOSIS — Z79.4 TYPE 2 DIABETES MELLITUS WITH STAGE 3 CHRONIC KIDNEY DISEASE, WITH LONG-TERM CURRENT USE OF INSULIN: ICD-10-CM

## 2019-09-13 LAB
ESTIMATED AVG GLUCOSE: 186 MG/DL (ref 68–131)
HBA1C MFR BLD HPLC: 8.1 % (ref 4–5.6)

## 2019-09-13 PROCEDURE — 83036 HEMOGLOBIN GLYCOSYLATED A1C: CPT | Mod: HCNC

## 2019-09-13 PROCEDURE — 36415 COLL VENOUS BLD VENIPUNCTURE: CPT | Mod: HCNC,PO

## 2019-09-20 ENCOUNTER — OFFICE VISIT (OUTPATIENT)
Dept: ENDOCRINOLOGY | Facility: CLINIC | Age: 74
End: 2019-09-20
Payer: MEDICARE

## 2019-09-20 VITALS — DIASTOLIC BLOOD PRESSURE: 56 MMHG | HEART RATE: 72 BPM | SYSTOLIC BLOOD PRESSURE: 96 MMHG | RESPIRATION RATE: 22 BRPM

## 2019-09-20 DIAGNOSIS — Z79.4 TYPE 2 DIABETES MELLITUS WITH STAGE 3 CHRONIC KIDNEY DISEASE, WITH LONG-TERM CURRENT USE OF INSULIN: Primary | ICD-10-CM

## 2019-09-20 DIAGNOSIS — E78.5 HYPERLIPIDEMIA WITH TARGET LDL LESS THAN 70: ICD-10-CM

## 2019-09-20 DIAGNOSIS — E11.59 HYPERTENSION ASSOCIATED WITH DIABETES: ICD-10-CM

## 2019-09-20 DIAGNOSIS — I25.118 CORONARY ARTERY DISEASE OF NATIVE ARTERY OF NATIVE HEART WITH STABLE ANGINA PECTORIS: ICD-10-CM

## 2019-09-20 DIAGNOSIS — N18.30 TYPE 2 DIABETES MELLITUS WITH STAGE 3 CHRONIC KIDNEY DISEASE, WITH LONG-TERM CURRENT USE OF INSULIN: Primary | ICD-10-CM

## 2019-09-20 DIAGNOSIS — E07.9 THYROID DISEASE: ICD-10-CM

## 2019-09-20 DIAGNOSIS — E11.22 TYPE 2 DIABETES MELLITUS WITH STAGE 3 CHRONIC KIDNEY DISEASE, WITH LONG-TERM CURRENT USE OF INSULIN: Primary | ICD-10-CM

## 2019-09-20 DIAGNOSIS — E11.42 TYPE 2 DIABETES MELLITUS WITH DIABETIC POLYNEUROPATHY, WITH LONG-TERM CURRENT USE OF INSULIN: ICD-10-CM

## 2019-09-20 DIAGNOSIS — Z79.4 TYPE 2 DIABETES MELLITUS WITH DIABETIC POLYNEUROPATHY, WITH LONG-TERM CURRENT USE OF INSULIN: ICD-10-CM

## 2019-09-20 DIAGNOSIS — I15.2 HYPERTENSION ASSOCIATED WITH DIABETES: ICD-10-CM

## 2019-09-20 PROCEDURE — 99499 RISK ADDL DX/OHS AUDIT: ICD-10-PCS | Mod: HCNC,S$GLB,, | Performed by: NURSE PRACTITIONER

## 2019-09-20 PROCEDURE — 99999 PR PBB SHADOW E&M-EST. PATIENT-LVL IV: CPT | Mod: PBBFAC,HCNC,, | Performed by: NURSE PRACTITIONER

## 2019-09-20 PROCEDURE — 3078F PR MOST RECENT DIASTOLIC BLOOD PRESSURE < 80 MM HG: ICD-10-PCS | Mod: HCNC,CPTII,S$GLB, | Performed by: NURSE PRACTITIONER

## 2019-09-20 PROCEDURE — 99214 OFFICE O/P EST MOD 30 MIN: CPT | Mod: HCNC,S$GLB,, | Performed by: NURSE PRACTITIONER

## 2019-09-20 PROCEDURE — 99214 PR OFFICE/OUTPT VISIT, EST, LEVL IV, 30-39 MIN: ICD-10-PCS | Mod: HCNC,S$GLB,, | Performed by: NURSE PRACTITIONER

## 2019-09-20 PROCEDURE — 3045F PR MOST RECENT HEMOGLOBIN A1C LEVEL 7.0-9.0%: CPT | Mod: HCNC,CPTII,S$GLB, | Performed by: NURSE PRACTITIONER

## 2019-09-20 PROCEDURE — 1101F PR PT FALLS ASSESS DOC 0-1 FALLS W/OUT INJ PAST YR: ICD-10-PCS | Mod: HCNC,CPTII,S$GLB, | Performed by: NURSE PRACTITIONER

## 2019-09-20 PROCEDURE — 99999 PR PBB SHADOW E&M-EST. PATIENT-LVL IV: ICD-10-PCS | Mod: PBBFAC,HCNC,, | Performed by: NURSE PRACTITIONER

## 2019-09-20 PROCEDURE — 3074F PR MOST RECENT SYSTOLIC BLOOD PRESSURE < 130 MM HG: ICD-10-PCS | Mod: HCNC,CPTII,S$GLB, | Performed by: NURSE PRACTITIONER

## 2019-09-20 PROCEDURE — 3078F DIAST BP <80 MM HG: CPT | Mod: HCNC,CPTII,S$GLB, | Performed by: NURSE PRACTITIONER

## 2019-09-20 PROCEDURE — 1101F PT FALLS ASSESS-DOCD LE1/YR: CPT | Mod: HCNC,CPTII,S$GLB, | Performed by: NURSE PRACTITIONER

## 2019-09-20 PROCEDURE — 3074F SYST BP LT 130 MM HG: CPT | Mod: HCNC,CPTII,S$GLB, | Performed by: NURSE PRACTITIONER

## 2019-09-20 PROCEDURE — 99499 UNLISTED E&M SERVICE: CPT | Mod: HCNC,S$GLB,, | Performed by: NURSE PRACTITIONER

## 2019-09-20 PROCEDURE — 3045F PR MOST RECENT HEMOGLOBIN A1C LEVEL 7.0-9.0%: ICD-10-PCS | Mod: HCNC,CPTII,S$GLB, | Performed by: NURSE PRACTITIONER

## 2019-09-20 RX ORDER — ASPIRIN 81 MG/1
81 TABLET ORAL DAILY
COMMUNITY

## 2019-09-20 RX ORDER — INSULIN DEGLUDEC 200 U/ML
66 INJECTION, SOLUTION SUBCUTANEOUS DAILY
Qty: 9 ML | Refills: 6
Start: 2019-09-20 | End: 2019-12-17

## 2019-09-20 NOTE — PROGRESS NOTES
"CC: Ms. Adele Hall arrives today for management of Type 2 DM and review of chronic medical conditions, as listed in the visit diagnosis section of this encounter.     HPI: Ms. Adele Hall was diagnosed with Type 2 DM in ~ 2002. Metformin started at the time of diagnosis but was stopped d/t renal impairment, per PCP in [place]. Insulin was started ~ 2012. She has rotated between analog insulins and generic NPH and Regular insulins, due to cost. [employer] brand insulins, due to cost.  Denies FH of DM. She did have 1 ER admission after having BG of >700 in 2012, prior to starting insulin.   Follows with Dr. Burks for CAD - s/p CABG in 1/2016.   She follows with Dr. Montero for CKD.    Patient was last seen by me in May. She has since resumed analog insulins since getting Medicaid.      BG readings are checked 1x/day (fasting only).               Hypoglycemia: No    Missing Insulin/PO medication doses: No  Timing prandial insulin 5-15 minutes before meals: yes    Dietary Habits: Eats 1-2 meals/day and prefers to snack. Drinks lemonade when out to eat.       CURRENT DIABETIC MEDS: Tresiba 60 units QAM, Novolog 30 units AC + SSI (150/25)  Vial or pen: vials  Glucometer type: True Metrix    Previous DM treatments:  Metformin  Victoza - $  NPH/Regular insulins    Last Eye Exam: 1/2019, no DRPeter   Last Podiatry Exam: never    REVIEW OF SYSTEMS  Constitutional: no c/o weakness, fatigue, weight loss. + fatigue  Eyes: denies visual distrubances  Cardiac: no palpitations. Reports 1 recent episode of chest pain last week that resolved when she took nitroglycerin.   Respiratory: no cough, + dyspnea on exertion. Cardiologist and pulmonologist aware.  GI: no c/o nausea, abdominal pain.   Skin: no lesions, rashes.   Neuro: reports "stabbing pain" in B feet that is worse at night. Uses compounded topical prescribed by PCP  Endocrine: denies polyphagia, polydipsia, polyuria.      Personally reviewed Past " Medical, Surgical, Social History.    Vital Signs  BP (!) 96/56   Pulse 72   Resp (!) 22     Personally reviewed the below labs:    Hemoglobin A1C   Date Value Ref Range Status   09/13/2019 8.1 (H) 4.0 - 5.6 % Final     Comment:     ADA Screening Guidelines:  5.7-6.4%  Consistent with prediabetes  >or=6.5%  Consistent with diabetes  High levels of fetal hemoglobin interfere with the HbA1C  assay. Heterozygous hemoglobin variants (HbS, HgC, etc)do  not significantly interfere with this assay.   However, presence of multiple variants may affect accuracy.     05/14/2019 7.1 (H) 4.0 - 5.6 % Final     Comment:     ADA Screening Guidelines:  5.7-6.4%  Consistent with prediabetes  >or=6.5%  Consistent with diabetes  High levels of fetal hemoglobin interfere with the HbA1C  assay. Heterozygous hemoglobin variants (HbS, HgC, etc)do  not significantly interfere with this assay.   However, presence of multiple variants may affect accuracy.     12/28/2018 6.8 (H) 4.0 - 5.6 % Final     Comment:     ADA Screening Guidelines:  5.7-6.4%  Consistent with prediabetes  >or=6.5%  Consistent with diabetes  High levels of fetal hemoglobin interfere with the HbA1C  assay. Heterozygous hemoglobin variants (HbS, HgC, etc)do  not significantly interfere with this assay.   However, presence of multiple variants may affect accuracy.         Chemistry        Component Value Date/Time     06/18/2019 1348    K 4.3 06/18/2019 1348    CL 99 06/18/2019 1348    CO2 29 06/18/2019 1348    BUN 17 06/18/2019 1348    CREATININE 1.3 06/18/2019 1348     (H) 06/18/2019 1348        Component Value Date/Time    CALCIUM 9.6 06/18/2019 1348    ALKPHOS 64 05/14/2019 1021    AST 32 05/14/2019 1021    AST 45 (H) 01/30/2016 0431    ALT 20 05/14/2019 1021    BILITOT 0.2 05/14/2019 1021          Lab Results   Component Value Date    CHOL 147 05/14/2019    CHOL 136 01/14/2018    CHOL 158 12/30/2017     Lab Results   Component Value Date    HDL 34 (L)  05/14/2019    HDL 32 (L) 01/14/2018    HDL 33 (L) 12/30/2017     Lab Results   Component Value Date    LDLCALC 61.8 (L) 05/14/2019    LDLCALC 52.6 (L) 01/14/2018    LDLCALC 68.8 12/30/2017     Lab Results   Component Value Date    TRIG 256 (H) 05/14/2019    TRIG 257 (H) 01/14/2018    TRIG 281 (H) 12/30/2017     Lab Results   Component Value Date    CHOLHDL 23.1 05/14/2019    CHOLHDL 23.5 01/14/2018    CHOLHDL 20.9 12/30/2017       Lab Results   Component Value Date    MICALBCREAT 1111.1 (H) 04/04/2016     Lab Results   Component Value Date    TSH 0.731 12/28/2018       CrCl cannot be calculated (Patient's most recent lab result is older than the maximum 7 days allowed.).    Vit D, 25-Hydroxy   Date Value Ref Range Status   05/14/2019 24 (L) 30 - 96 ng/mL Final     Comment:     Vitamin D deficiency.........<10 ng/mL                              Vitamin D insufficiency......10-29 ng/mL       Vitamin D sufficiency........> or equal to 30 ng/mL  Vitamin D toxicity............>100 ng/mL           PHYSICAL EXAMINATION  Constitutional: Appears well, no distress  Neck: Supple, trachea midline; no thyromegaly or nodules.   Respiratory: CTA, even and unlabored.  Cardiovascular: RRR, no murmurs, no carotid bruits. DP pulses  2+ bilaterally; no edema.    GI: normal bowel sounds, no hernia  Skin: warm and dry; no lipohypertrophy, or acanthosis nigracans observed. Plaque-like hypopigmented lesions noted to BLE, feet.   Neuro: DTR 2+ BUE/2+BLE. Previously, no loss of protective sensation via 10 gm monofilament. Vibratory exam decreased, bilaterally.   Feet: appropriate footwear.       A1c goal 7.5% or less, due to CKD, CAD      Assessment/Plan  1. Type 2 diabetes mellitus with stage 3 chronic kidney disease, with long-term current use of insulin  -- Worsening. She is only checking fasting glucoses, which are above goal. It is unclear how the Novolog doses are working for her. I explained to her that I need to see more frequent  glucose checks.   -- increase Tresiba to 66 units   -- continue Novolog 30 units AC + SSI. Reduce dose by half if eating a lower carb meal.  -- needs to eat 3 meals/day and stop snacking / drinking sugary beverages!  -- check BG 4x/day and notify me if persistently < 70 or > 150  -- following with nephrology      -- Discussed diagnosis of DM, A1c goals, progression of disease, long term complications and tx options.   -- Reviewed hypoglycemia management: treat with 1/2 glass of juice, 1/2 can regular coke, or 4 glucose tablets. Monitor and repeat treatment every 15 minutes until BG is >70 Then have a snack, which includes a complex carbohydrate and protein.   Advised patient to check BG before activities, such as driving or exercise.    -- takes aspirin, statin, ace-I   2. Type 2 diabetes mellitus with diabetic polyneuropathy, with long-term current use of insulin  -- discussed the importance of daily inspection of bottoms of both feet, interspace areas. Advised patient against walking barefoot.     3. Coronary artery disease involving native coronary artery of native heart without angina pectoris  -- avoid hypoglycemia   4. Essential hypertension  -- mildly hypotensive today. Advised pt to drink water, as she has had none today.   -- check BP at home before taking her BP meds this morning.    5. Hyperlipidemia with target LDL less than 70  -- Uncontrolled with elevated triglycerides.  -- optimize DM control  -- fenofibrate previously DC'd due to renal function  -- Continue Crestor   6. Thyroid disease  -- controlled  -- continue levothyroxine 100 mcg daily.        FOLLOW UP  Follow up in about 3 months (around 12/20/2019).   Patient instructed to bring BG logs to each follow up   Patient encouraged to call for any BG/medication issues, concerns, or questions.    Orders Placed This Encounter   Procedures    Hemoglobin A1c    Comprehensive metabolic panel    TSH

## 2019-10-24 NOTE — TELEPHONE ENCOUNTER
----- Message from Sissy Patton sent at 10/24/2019 10:15 AM CDT -----  Contact: patient    Who Called:  patient  Best Call Back Number: [phone]  Additional Information: patient needs refill on diabetic test strips to be sent to   [facility] - [place], [place] - [address] [facility]  [address] [facility] [facility] [address]  [place] [facility] [address]  Phone: [phone] Fax: [phone]

## 2019-12-10 ENCOUNTER — LAB VISIT (OUTPATIENT)
Dept: LAB | Facility: HOSPITAL | Age: 74
End: 2019-12-10
Attending: NURSE PRACTITIONER
Payer: MEDICARE

## 2019-12-10 DIAGNOSIS — E11.22 TYPE 2 DIABETES MELLITUS WITH STAGE 3 CHRONIC KIDNEY DISEASE, WITH LONG-TERM CURRENT USE OF INSULIN: ICD-10-CM

## 2019-12-10 DIAGNOSIS — E07.9 THYROID DISEASE: ICD-10-CM

## 2019-12-10 DIAGNOSIS — N18.30 TYPE 2 DIABETES MELLITUS WITH STAGE 3 CHRONIC KIDNEY DISEASE, WITH LONG-TERM CURRENT USE OF INSULIN: ICD-10-CM

## 2019-12-10 DIAGNOSIS — Z79.4 TYPE 2 DIABETES MELLITUS WITH STAGE 3 CHRONIC KIDNEY DISEASE, WITH LONG-TERM CURRENT USE OF INSULIN: ICD-10-CM

## 2019-12-10 LAB
ALBUMIN SERPL BCP-MCNC: 3.6 G/DL (ref 3.5–5.2)
ALP SERPL-CCNC: 83 U/L (ref 55–135)
ALT SERPL W/O P-5'-P-CCNC: 24 U/L (ref 10–44)
ANION GAP SERPL CALC-SCNC: 7 MMOL/L (ref 8–16)
AST SERPL-CCNC: 47 U/L (ref 10–40)
BILIRUB SERPL-MCNC: 0.4 MG/DL (ref 0.1–1)
BUN SERPL-MCNC: 25 MG/DL (ref 8–23)
CALCIUM SERPL-MCNC: 9.1 MG/DL (ref 8.7–10.5)
CHLORIDE SERPL-SCNC: 104 MMOL/L (ref 95–110)
CO2 SERPL-SCNC: 29 MMOL/L (ref 23–29)
CREAT SERPL-MCNC: 1.5 MG/DL (ref 0.5–1.4)
EST. GFR  (AFRICAN AMERICAN): 39.8 ML/MIN/1.73 M^2
EST. GFR  (NON AFRICAN AMERICAN): 34.6 ML/MIN/1.73 M^2
ESTIMATED AVG GLUCOSE: 166 MG/DL (ref 68–131)
GLUCOSE SERPL-MCNC: 159 MG/DL (ref 70–110)
HBA1C MFR BLD HPLC: 7.4 % (ref 4–5.6)
POTASSIUM SERPL-SCNC: 4.1 MMOL/L (ref 3.5–5.1)
PROT SERPL-MCNC: 7 G/DL (ref 6–8.4)
SODIUM SERPL-SCNC: 140 MMOL/L (ref 136–145)
TSH SERPL DL<=0.005 MIU/L-ACNC: 0.65 UIU/ML (ref 0.4–4)

## 2019-12-10 PROCEDURE — 36415 COLL VENOUS BLD VENIPUNCTURE: CPT | Mod: HCNC,PO

## 2019-12-10 PROCEDURE — 80053 COMPREHEN METABOLIC PANEL: CPT | Mod: HCNC

## 2019-12-10 PROCEDURE — 83036 HEMOGLOBIN GLYCOSYLATED A1C: CPT | Mod: HCNC

## 2019-12-10 PROCEDURE — 84443 ASSAY THYROID STIM HORMONE: CPT | Mod: HCNC

## 2019-12-12 DIAGNOSIS — I10 GOOD HYPERTENSION CONTROL: ICD-10-CM

## 2019-12-12 RX ORDER — AMLODIPINE BESYLATE 2.5 MG/1
TABLET ORAL
Qty: 30 TABLET | Refills: 1 | Status: SHIPPED | OUTPATIENT
Start: 2019-12-12 | End: 2020-01-28

## 2019-12-13 DIAGNOSIS — G47.00 INSOMNIA, UNSPECIFIED TYPE: ICD-10-CM

## 2019-12-13 RX ORDER — TRAZODONE HYDROCHLORIDE 50 MG/1
TABLET ORAL
Qty: 30 TABLET | Refills: 0 | Status: SHIPPED | OUTPATIENT
Start: 2019-12-13 | End: 2020-01-15

## 2019-12-17 ENCOUNTER — OFFICE VISIT (OUTPATIENT)
Dept: ENDOCRINOLOGY | Facility: CLINIC | Age: 74
End: 2019-12-17
Payer: MEDICARE

## 2019-12-17 VITALS
DIASTOLIC BLOOD PRESSURE: 70 MMHG | RESPIRATION RATE: 18 BRPM | HEART RATE: 92 BPM | HEIGHT: 62 IN | SYSTOLIC BLOOD PRESSURE: 112 MMHG | WEIGHT: 188.81 LBS | BODY MASS INDEX: 34.74 KG/M2

## 2019-12-17 DIAGNOSIS — E07.9 THYROID DISEASE: ICD-10-CM

## 2019-12-17 DIAGNOSIS — I10 ESSENTIAL HYPERTENSION: ICD-10-CM

## 2019-12-17 DIAGNOSIS — E11.42 TYPE 2 DIABETES MELLITUS WITH DIABETIC POLYNEUROPATHY, WITH LONG-TERM CURRENT USE OF INSULIN: ICD-10-CM

## 2019-12-17 DIAGNOSIS — Z79.4 TYPE 2 DIABETES MELLITUS WITH DIABETIC POLYNEUROPATHY, WITH LONG-TERM CURRENT USE OF INSULIN: ICD-10-CM

## 2019-12-17 DIAGNOSIS — Z79.4 TYPE 2 DIABETES MELLITUS WITH STAGE 3 CHRONIC KIDNEY DISEASE, WITH LONG-TERM CURRENT USE OF INSULIN: Primary | ICD-10-CM

## 2019-12-17 DIAGNOSIS — E11.22 TYPE 2 DIABETES MELLITUS WITH STAGE 3 CHRONIC KIDNEY DISEASE, WITH LONG-TERM CURRENT USE OF INSULIN: Primary | ICD-10-CM

## 2019-12-17 DIAGNOSIS — E78.5 HYPERLIPIDEMIA WITH TARGET LDL LESS THAN 70: ICD-10-CM

## 2019-12-17 DIAGNOSIS — N18.30 TYPE 2 DIABETES MELLITUS WITH STAGE 3 CHRONIC KIDNEY DISEASE, WITH LONG-TERM CURRENT USE OF INSULIN: Primary | ICD-10-CM

## 2019-12-17 DIAGNOSIS — I25.10 CORONARY ARTERY DISEASE INVOLVING NATIVE CORONARY ARTERY OF NATIVE HEART WITHOUT ANGINA PECTORIS: ICD-10-CM

## 2019-12-17 PROCEDURE — 99499 UNLISTED E&M SERVICE: CPT | Mod: HCNC,S$GLB,, | Performed by: NURSE PRACTITIONER

## 2019-12-17 PROCEDURE — 3078F PR MOST RECENT DIASTOLIC BLOOD PRESSURE < 80 MM HG: ICD-10-PCS | Mod: HCNC,CPTII,S$GLB, | Performed by: NURSE PRACTITIONER

## 2019-12-17 PROCEDURE — 1126F PR PAIN SEVERITY QUANTIFIED, NO PAIN PRESENT: ICD-10-PCS | Mod: HCNC,S$GLB,, | Performed by: NURSE PRACTITIONER

## 2019-12-17 PROCEDURE — 1100F PR PT FALLS ASSESS DOC 2+ FALLS/FALL W/INJURY/YR: ICD-10-PCS | Mod: HCNC,CPTII,S$GLB, | Performed by: NURSE PRACTITIONER

## 2019-12-17 PROCEDURE — 3074F PR MOST RECENT SYSTOLIC BLOOD PRESSURE < 130 MM HG: ICD-10-PCS | Mod: HCNC,CPTII,S$GLB, | Performed by: NURSE PRACTITIONER

## 2019-12-17 PROCEDURE — 3288F FALL RISK ASSESSMENT DOCD: CPT | Mod: HCNC,CPTII,S$GLB, | Performed by: NURSE PRACTITIONER

## 2019-12-17 PROCEDURE — 1159F PR MEDICATION LIST DOCUMENTED IN MEDICAL RECORD: ICD-10-PCS | Mod: HCNC,S$GLB,, | Performed by: NURSE PRACTITIONER

## 2019-12-17 PROCEDURE — 99214 OFFICE O/P EST MOD 30 MIN: CPT | Mod: HCNC,S$GLB,, | Performed by: NURSE PRACTITIONER

## 2019-12-17 PROCEDURE — 3074F SYST BP LT 130 MM HG: CPT | Mod: HCNC,CPTII,S$GLB, | Performed by: NURSE PRACTITIONER

## 2019-12-17 PROCEDURE — 3288F PR FALLS RISK ASSESSMENT DOCUMENTED: ICD-10-PCS | Mod: HCNC,CPTII,S$GLB, | Performed by: NURSE PRACTITIONER

## 2019-12-17 PROCEDURE — 3078F DIAST BP <80 MM HG: CPT | Mod: HCNC,CPTII,S$GLB, | Performed by: NURSE PRACTITIONER

## 2019-12-17 PROCEDURE — 99499 RISK ADDL DX/OHS AUDIT: ICD-10-PCS | Mod: HCNC,S$GLB,, | Performed by: NURSE PRACTITIONER

## 2019-12-17 PROCEDURE — 1159F MED LIST DOCD IN RCRD: CPT | Mod: HCNC,S$GLB,, | Performed by: NURSE PRACTITIONER

## 2019-12-17 PROCEDURE — 99999 PR PBB SHADOW E&M-EST. PATIENT-LVL IV: CPT | Mod: PBBFAC,HCNC,, | Performed by: NURSE PRACTITIONER

## 2019-12-17 PROCEDURE — 99214 PR OFFICE/OUTPT VISIT, EST, LEVL IV, 30-39 MIN: ICD-10-PCS | Mod: HCNC,S$GLB,, | Performed by: NURSE PRACTITIONER

## 2019-12-17 PROCEDURE — 1100F PTFALLS ASSESS-DOCD GE2>/YR: CPT | Mod: HCNC,CPTII,S$GLB, | Performed by: NURSE PRACTITIONER

## 2019-12-17 PROCEDURE — 99999 PR PBB SHADOW E&M-EST. PATIENT-LVL IV: ICD-10-PCS | Mod: PBBFAC,HCNC,, | Performed by: NURSE PRACTITIONER

## 2019-12-17 PROCEDURE — 1126F AMNT PAIN NOTED NONE PRSNT: CPT | Mod: HCNC,S$GLB,, | Performed by: NURSE PRACTITIONER

## 2019-12-17 RX ORDER — INSULIN DEGLUDEC 200 U/ML
70 INJECTION, SOLUTION SUBCUTANEOUS DAILY
Qty: 9 ML | Refills: 6
Start: 2019-12-17 | End: 2019-12-30

## 2019-12-17 NOTE — PROGRESS NOTES
CC: Ms. Adele Hall arrives today for management of Type 2 DM and review of chronic medical conditions, as listed in the visit diagnosis section of this encounter.     HPI: Ms. Adele Hall was diagnosed with Type 2 DM in ~ 2002. Metformin started at the time of diagnosis but was stopped d/t renal impairment, per PCP in [place]. Insulin was started ~ 2012. She has rotated between analog insulins and generic NPH and Regular insulins, due to cost. [employer] brand insulins, due to cost.  Denies FH of DM. She did have 1 ER admission after having BG of >700 in 2012, prior to starting insulin.   Follows with Dr. Burks for CAD - s/p CABG in 1/2016.   She follows with Dr. Montero for CKD.    Patient was last seen by me in August. Tresiba dose was increased at this time.     BG readings are checked 1x/day (fasting only).                 Hypoglycemia: No    Missing Insulin/PO medication doses: No  Timing prandial insulin 5-15 minutes before meals: yes    Dietary Habits: Eats 1-2 meals/day. Denies frequent snacking. Drinks 1 glass of sweet tea daily.        CURRENT DIABETIC MEDS: Tresiba 66 units QAM, Novolog 30 units AC + SSI (150/25)  Vial or pen: vials  Glucometer type: True Metrix    Previous DM treatments:  Metformin  Victoza - $   NPH/Regular insulins    Last Eye Exam: 1/2019, no DRPeter   Last Podiatry Exam: never    REVIEW OF SYSTEMS  Constitutional: no c/o weakness, fatigue, weight loss.   Eyes: denies visual disturbances  Cardiac: no palpitations. Reports intermittent chest pain (approx 2x/week) that lasts only a few seconds and resolves on its own. Not occurring now.   Respiratory: no cough, dyspnea.  GI: no c/o nausea, abdominal pain.   Skin: no lesions, rashes.   Neuro: denies recent numbness, tingling, paresthesias.   Endocrine: denies polyphagia, polydipsia, polyuria.      Personally reviewed Past Medical, Surgical, Social History.    Vital Signs  /70   Pulse 92   Resp 18   Ht 5'  "1.5" (1.562 m)   Wt 85.6 kg (188 lb 13.2 oz)   BMI 35.10 kg/m²     Personally reviewed the below labs:    Hemoglobin A1C   Date Value Ref Range Status   12/10/2019 7.4 (H) 4.0 - 5.6 % Final     Comment:     ADA Screening Guidelines:  5.7-6.4%  Consistent with prediabetes  >or=6.5%  Consistent with diabetes  High levels of fetal hemoglobin interfere with the HbA1C  assay. Heterozygous hemoglobin variants (HbS, HgC, etc)do  not significantly interfere with this assay.   However, presence of multiple variants may affect accuracy.     09/13/2019 8.1 (H) 4.0 - 5.6 % Final     Comment:     ADA Screening Guidelines:  5.7-6.4%  Consistent with prediabetes  >or=6.5%  Consistent with diabetes  High levels of fetal hemoglobin interfere with the HbA1C  assay. Heterozygous hemoglobin variants (HbS, HgC, etc)do  not significantly interfere with this assay.   However, presence of multiple variants may affect accuracy.     05/14/2019 7.1 (H) 4.0 - 5.6 % Final     Comment:     ADA Screening Guidelines:  5.7-6.4%  Consistent with prediabetes  >or=6.5%  Consistent with diabetes  High levels of fetal hemoglobin interfere with the HbA1C  assay. Heterozygous hemoglobin variants (HbS, HgC, etc)do  not significantly interfere with this assay.   However, presence of multiple variants may affect accuracy.         Chemistry        Component Value Date/Time     12/10/2019 1130    K 4.1 12/10/2019 1130     12/10/2019 1130    CO2 29 12/10/2019 1130    BUN 25 (H) 12/10/2019 1130    CREATININE 1.5 (H) 12/10/2019 1130     (H) 12/10/2019 1130        Component Value Date/Time    CALCIUM 9.1 12/10/2019 1130    ALKPHOS 83 12/10/2019 1130    AST 47 (H) 12/10/2019 1130    AST 45 (H) 01/30/2016 0431    ALT 24 12/10/2019 1130    BILITOT 0.4 12/10/2019 1130          Lab Results   Component Value Date    CHOL 147 05/14/2019    CHOL 136 01/14/2018    CHOL 158 12/30/2017     Lab Results   Component Value Date    HDL 34 (L) 05/14/2019    " HDL 32 (L) 01/14/2018    HDL 33 (L) 12/30/2017     Lab Results   Component Value Date    LDLCALC 61.8 (L) 05/14/2019    LDLCALC 52.6 (L) 01/14/2018    LDLCALC 68.8 12/30/2017     Lab Results   Component Value Date    TRIG 256 (H) 05/14/2019    TRIG 257 (H) 01/14/2018    TRIG 281 (H) 12/30/2017     Lab Results   Component Value Date    CHOLHDL 23.1 05/14/2019    CHOLHDL 23.5 01/14/2018    CHOLHDL 20.9 12/30/2017       Lab Results   Component Value Date    MICALBCREAT 1111.1 (H) 04/04/2016     Lab Results   Component Value Date    TSH 0.647 12/10/2019       Estimated Creatinine Clearance: 34.1 mL/min (A) (based on SCr of 1.5 mg/dL (H)).    Vit D, 25-Hydroxy   Date Value Ref Range Status   05/14/2019 24 (L) 30 - 96 ng/mL Final     Comment:     Vitamin D deficiency.........<10 ng/mL                              Vitamin D insufficiency......10-29 ng/mL       Vitamin D sufficiency........> or equal to 30 ng/mL  Vitamin D toxicity............>100 ng/mL           PHYSICAL EXAMINATION  Constitutional: Appears well, no distress  Neck: Supple, trachea midline; no thyromegaly or nodules.   Respiratory: CTA, even and unlabored.  Cardiovascular: RRR, no murmurs, no carotid bruits. DP pulses  1+ bilaterally; no edema.    GI: normal bowel sounds, no hernia  Skin: warm and dry; no lipohypertrophy, or acanthosis nigracans observed. Plaque-like hypopigmented lesions noted to BLE, feet.   Neuro: DTR 1+ BUE/1+BLE. Previously, no loss of protective sensation via 10 gm monofilament. Vibratory exam decreased, bilaterally.   Feet: appropriate footwear.       A1c goal 7.5% or less, due to CKD, CAD      Assessment/Plan  1. Type 2 diabetes mellitus with stage 3 chronic kidney disease, with long-term current use of insulin  -- Improving. Unsure how Novolog dose is working since she is only checking fasting glucoses. I explained to her that I need to see more frequent glucose checks.   -- strongly encouraged pt to eat 3 balanced meals/day.   --  increase Tresiba to 70 units   -- continue Novolog 30 units AC + SSI. Reduce dose by half if eating a lower carb meal.  -- check BG 4x/day   -- following with nephrology      -- Discussed diagnosis of DM, A1c goals, progression of disease, long term complications and tx options.   -- Reviewed hypoglycemia management: treat with 1/2 glass of juice, 1/2 can regular coke, or 4 glucose tablets. Monitor and repeat treatment every 15 minutes until BG is >70 Then have a snack, which includes a complex carbohydrate and protein.   Advised patient to check BG before activities, such as driving or exercise.    -- takes aspirin, statin, ace-I   2. Type 2 diabetes mellitus with diabetic polyneuropathy, with long-term current use of insulin  -- discussed the importance of daily inspection of bottoms of both feet, interspace areas. Advised patient against walking barefoot.     3. Coronary artery disease involving native coronary artery of native heart without angina pectoris  -- avoid hypoglycemia   4. Essential hypertension  -- controlled  -- continue current meds   5. Hyperlipidemia with target LDL less than 70  -- Uncontrolled with elevated triglycerides.  -- Continue Crestor  -- fenofibrate previously DC'd due to renal function  -- optimize DM control  -- lipid panel with RTC   6. Thyroid disease  -- controlled  -- continue levothyroxine 100 mcg daily.        FOLLOW UP  Follow up in about 3 months (around 3/17/2020).   Patient instructed to bring BG logs to each follow up   Patient encouraged to call for any BG/medication issues, concerns, or questions.    Orders Placed This Encounter   Procedures    Hemoglobin A1c    Basic metabolic panel    Lipid panel

## 2019-12-23 RX ORDER — PANTOPRAZOLE SODIUM 40 MG/1
TABLET, DELAYED RELEASE ORAL
Qty: 30 TABLET | Refills: 0 | Status: SHIPPED | OUTPATIENT
Start: 2019-12-23 | End: 2020-01-23 | Stop reason: SDUPTHER

## 2019-12-24 NOTE — TELEPHONE ENCOUNTER
----- Message from Sissy Patton sent at 12/23/2019 12:00 PM CST -----  Contact: patient  Type:  Patient Returning Call    Who Called:  patient  Who Left Message for Patient:  Peggy  Does the patient know what this is regarding?:  scheduling  Best Call Back Number:  [phone]  Additional Information:

## 2019-12-27 ENCOUNTER — TELEPHONE (OUTPATIENT)
Dept: ENDOCRINOLOGY | Facility: CLINIC | Age: 74
End: 2019-12-27

## 2019-12-27 DIAGNOSIS — Z79.4 TYPE 2 DIABETES MELLITUS WITH DIABETIC POLYNEUROPATHY, WITH LONG-TERM CURRENT USE OF INSULIN: ICD-10-CM

## 2019-12-27 DIAGNOSIS — E11.42 TYPE 2 DIABETES MELLITUS WITH DIABETIC POLYNEUROPATHY, WITH LONG-TERM CURRENT USE OF INSULIN: ICD-10-CM

## 2019-12-27 RX ORDER — INSULIN ASPART 100 [IU]/ML
34 INJECTION, SOLUTION INTRAVENOUS; SUBCUTANEOUS 2 TIMES DAILY WITH MEALS
Qty: 30 ML | Refills: 6
Start: 2019-12-27 | End: 2020-06-16

## 2019-12-27 NOTE — TELEPHONE ENCOUNTER
----- Message from Larisa Gimenez sent at 12/27/2019 12:06 PM CST -----  Contact: self [phone]  Patient is requesting a call back from the nurse stated its concerning her insulin can't seem to get the numbers, may have to adjust.    Please call the patient upon request at phone number [phone].

## 2019-12-27 NOTE — TELEPHONE ENCOUNTER
Spoke to the pt and she stated her bg are higher than before she increased tresiba to 70 units on 12/18/19. Wrote log and faxed to Babs in [facility] for review and adv pt we will get back with her, voiced understanding.

## 2019-12-27 NOTE — TELEPHONE ENCOUNTER
Please call pt and advise her to increase Novolog to 34 units with her meals. Be sure to include both carb and protein with every meal.

## 2019-12-28 DIAGNOSIS — E11.42 TYPE 2 DIABETES MELLITUS WITH DIABETIC POLYNEUROPATHY, WITH LONG-TERM CURRENT USE OF INSULIN: ICD-10-CM

## 2019-12-28 DIAGNOSIS — Z79.4 TYPE 2 DIABETES MELLITUS WITH DIABETIC POLYNEUROPATHY, WITH LONG-TERM CURRENT USE OF INSULIN: ICD-10-CM

## 2019-12-30 RX ORDER — INSULIN DEGLUDEC 200 U/ML
70 INJECTION, SOLUTION SUBCUTANEOUS DAILY
Qty: 18 ML | Refills: 6 | Status: ON HOLD | OUTPATIENT
Start: 2019-12-30 | End: 2020-06-11 | Stop reason: SDUPTHER

## 2020-01-06 ENCOUNTER — DOCUMENTATION ONLY (OUTPATIENT)
Dept: FAMILY MEDICINE | Facility: CLINIC | Age: 75
End: 2020-01-06

## 2020-01-06 NOTE — PROGRESS NOTES
Pre-Visit Chart Review  For Appointment Scheduled on 1-13-20    Health Maintenance Due   Topic Date Due    TETANUS VACCINE  [birth year]02/1963    DEXA SCAN  11/29/2019    Mammogram  12/01/2019    Foot Exam  01/14/2020    Eye Exam  01/31/2020

## 2020-01-13 ENCOUNTER — HOSPITAL ENCOUNTER (OUTPATIENT)
Dept: RADIOLOGY | Facility: CLINIC | Age: 75
Discharge: HOME | End: 2020-01-13
Attending: FAMILY MEDICINE
Payer: MEDICARE

## 2020-01-13 ENCOUNTER — OFFICE VISIT (OUTPATIENT)
Dept: FAMILY MEDICINE | Facility: CLINIC | Age: 75
End: 2020-01-13
Attending: FAMILY MEDICINE
Payer: MEDICARE

## 2020-01-13 VITALS
HEART RATE: 57 BPM | OXYGEN SATURATION: 94 % | TEMPERATURE: 98 F | SYSTOLIC BLOOD PRESSURE: 130 MMHG | DIASTOLIC BLOOD PRESSURE: 60 MMHG | BODY MASS INDEX: 34.98 KG/M2 | WEIGHT: 190.06 LBS | HEIGHT: 62 IN | RESPIRATION RATE: 18 BRPM

## 2020-01-13 DIAGNOSIS — Z95.1 HX OF CABG: ICD-10-CM

## 2020-01-13 DIAGNOSIS — Z12.31 ENCOUNTER FOR SCREENING MAMMOGRAM FOR BREAST CANCER: ICD-10-CM

## 2020-01-13 DIAGNOSIS — E11.42 TYPE 2 DIABETES MELLITUS WITH DIABETIC POLYNEUROPATHY, WITH LONG-TERM CURRENT USE OF INSULIN: ICD-10-CM

## 2020-01-13 DIAGNOSIS — E66.01 SEVERE OBESITY (BMI 35.0-35.9 WITH COMORBIDITY): ICD-10-CM

## 2020-01-13 DIAGNOSIS — I48.0 PAF (PAROXYSMAL ATRIAL FIBRILLATION): ICD-10-CM

## 2020-01-13 DIAGNOSIS — E78.5 HYPERLIPIDEMIA WITH TARGET LDL LESS THAN 70: ICD-10-CM

## 2020-01-13 DIAGNOSIS — Z01.00 DIABETIC EYE EXAM: ICD-10-CM

## 2020-01-13 DIAGNOSIS — G89.29 CHRONIC PAIN OF BOTH SHOULDERS: ICD-10-CM

## 2020-01-13 DIAGNOSIS — Z79.01 ANTICOAGULANT LONG-TERM USE: ICD-10-CM

## 2020-01-13 DIAGNOSIS — E11.9 DIABETIC EYE EXAM: ICD-10-CM

## 2020-01-13 DIAGNOSIS — Z79.4 TYPE 2 DIABETES MELLITUS WITH DIABETIC POLYNEUROPATHY, WITH LONG-TERM CURRENT USE OF INSULIN: ICD-10-CM

## 2020-01-13 DIAGNOSIS — Z23 INFLUENZA VACCINE NEEDED: ICD-10-CM

## 2020-01-13 DIAGNOSIS — I25.10 CORONARY ARTERY DISEASE INVOLVING NATIVE CORONARY ARTERY OF NATIVE HEART WITHOUT ANGINA PECTORIS: ICD-10-CM

## 2020-01-13 DIAGNOSIS — M25.511 CHRONIC PAIN OF BOTH SHOULDERS: ICD-10-CM

## 2020-01-13 DIAGNOSIS — M25.512 CHRONIC PAIN OF BOTH SHOULDERS: ICD-10-CM

## 2020-01-13 DIAGNOSIS — Z00.00 ENCOUNTER FOR PREVENTIVE HEALTH EXAMINATION: Primary | ICD-10-CM

## 2020-01-13 DIAGNOSIS — I15.2 HYPERTENSION ASSOCIATED WITH DIABETES: ICD-10-CM

## 2020-01-13 DIAGNOSIS — Z78.0 MENOPAUSE: ICD-10-CM

## 2020-01-13 DIAGNOSIS — N18.30 CKD (CHRONIC KIDNEY DISEASE) STAGE 3, GFR 30-59 ML/MIN: ICD-10-CM

## 2020-01-13 DIAGNOSIS — E11.59 HYPERTENSION ASSOCIATED WITH DIABETES: ICD-10-CM

## 2020-01-13 PROBLEM — I25.118 CORONARY ARTERY DISEASE OF NATIVE ARTERY OF NATIVE HEART WITH STABLE ANGINA PECTORIS: Status: RESOLVED | Noted: 2018-01-14 | Resolved: 2020-01-13

## 2020-01-13 PROBLEM — I24.9 ACS (ACUTE CORONARY SYNDROME): Status: RESOLVED | Noted: 2018-01-14 | Resolved: 2020-01-13

## 2020-01-13 PROCEDURE — 3078F PR MOST RECENT DIASTOLIC BLOOD PRESSURE < 80 MM HG: ICD-10-PCS | Mod: HCNC,CPTII,S$GLB, | Performed by: FAMILY MEDICINE

## 2020-01-13 PROCEDURE — 99999 PR PBB SHADOW E&M-EST. PATIENT-LVL V: ICD-10-PCS | Mod: PBBFAC,HCNC,, | Performed by: FAMILY MEDICINE

## 2020-01-13 PROCEDURE — G0008 FLU VACCINE - HIGH DOSE (65+) PRESERVATIVE FREE IM: ICD-10-PCS | Mod: HCNC,S$GLB,, | Performed by: FAMILY MEDICINE

## 2020-01-13 PROCEDURE — 73030 X-RAY EXAM OF SHOULDER: CPT | Mod: TC,50,HCNC,FY,PO

## 2020-01-13 PROCEDURE — 99999 PR PBB SHADOW E&M-EST. PATIENT-LVL V: CPT | Mod: PBBFAC,HCNC,, | Performed by: FAMILY MEDICINE

## 2020-01-13 PROCEDURE — 90662 IIV NO PRSV INCREASED AG IM: CPT | Mod: HCNC,S$GLB,, | Performed by: FAMILY MEDICINE

## 2020-01-13 PROCEDURE — G0008 ADMIN INFLUENZA VIRUS VAC: HCPCS | Mod: HCNC,S$GLB,, | Performed by: FAMILY MEDICINE

## 2020-01-13 PROCEDURE — 3075F PR MOST RECENT SYSTOLIC BLOOD PRESS GE 130-139MM HG: ICD-10-PCS | Mod: HCNC,CPTII,S$GLB, | Performed by: FAMILY MEDICINE

## 2020-01-13 PROCEDURE — 73030 X-RAY EXAM OF SHOULDER: CPT | Mod: 26,50,HCNC,S$GLB | Performed by: RADIOLOGY

## 2020-01-13 PROCEDURE — 3075F SYST BP GE 130 - 139MM HG: CPT | Mod: HCNC,CPTII,S$GLB, | Performed by: FAMILY MEDICINE

## 2020-01-13 PROCEDURE — 90662 FLU VACCINE - HIGH DOSE (65+) PRESERVATIVE FREE IM: ICD-10-PCS | Mod: HCNC,S$GLB,, | Performed by: FAMILY MEDICINE

## 2020-01-13 PROCEDURE — 99397 PER PM REEVAL EST PAT 65+ YR: CPT | Mod: HCNC,25,S$GLB, | Performed by: FAMILY MEDICINE

## 2020-01-13 PROCEDURE — 73030 XR SHOULDER COMPLETE 2 OR MORE VIEWS BILATERAL: ICD-10-PCS | Mod: 26,50,HCNC,S$GLB | Performed by: RADIOLOGY

## 2020-01-13 PROCEDURE — 99397 PR PREVENTIVE VISIT,EST,65 & OVER: ICD-10-PCS | Mod: HCNC,25,S$GLB, | Performed by: FAMILY MEDICINE

## 2020-01-13 PROCEDURE — 3078F DIAST BP <80 MM HG: CPT | Mod: HCNC,CPTII,S$GLB, | Performed by: FAMILY MEDICINE

## 2020-01-13 NOTE — PROGRESS NOTES
Subjective:       Patient ID: Adele Hall is a 74 y.o. female.    Chief Complaint: Annual Exam    74-year-old female coming in for an annual exam with multiple problems.  She has been experiencing pain in both shoulders worse on the left for several years.  She saw Dr. Campbell about two years ago and was diagnosed with a left rotator cuff tear and was sent to physical therapy but never followed up afterwards.  She would like to see Dr. Reyes.  She has a history of coronary artery disease with a brief episode of atrial fibrillation after bypass surgery in 2016.  She is without complaints of angina at this time.  She has a history of type 2 diabetes followed by Endocrinology, hypertension, hyperlipidemia, stage 3 chronic kidney disease, COPD, reflux, hypothyroidism, anemia and osteopenia.  She had a colonoscopy in February of 2019 by Dr. Eagle at which time a large polyp was taken out piecemeal and he recommended a six month follow-up to make sure he had gotten at all.  The patient refused to go back and will not have a colonoscopy again.  She is due for a flu vaccine, a DEXA, mammogram, foot exam and is coming due for her eye exam.  She last saw Dr. Burden who recommended routine annual diabetic exams with no sign of retinopathy at the time.    Past Medical History:  No date: Anesthesia complication      Comment:  took patient 6 days to come out of anethesia after CABG  No date: Anticoagulant long-term use  No date: Arthritis  No date: Chronic kidney disease  No date: CKD (chronic kidney disease) stage 3, GFR 30-59 ml/min      Comment:  Dr. Montero  2/7/2016: COPD (chronic obstructive pulmonary disease)  No date: COPD (chronic obstructive pulmonary disease)  No date: Coronary artery disease  No date: Diabetes mellitus  No date: Diabetes mellitus, type 2  No date: GERD (gastroesophageal reflux disease)  No date: Hyperlipidemia  No date: Hypertension  7/30/2016: possible new onset CHF  No date:  Tardive dyskinesia  No date: Thyroid disease  No date: Ulcer  No date: Vertigo    Past Surgical History:  No date: CARDIAC SURGERY  01/26/2017: CHOLECYSTECTOMY  No date: COLONOSCOPY  01/19/2016: CORONARY ARTERY BYPASS GRAFT      Comment:  4 vessel  No date: HYSTERECTOMY  No date: OOPHORECTOMY  No date: TONSILLECTOMY  No date: TOTAL THYROIDECTOMY  No date: TUBAL LIGATION    Review of patient's family history indicates:  Problem: Cancer      Relation: Mother          Age of Onset: (Not Specified)          Comment: LYMPHOMA  Problem: Cancer      Relation: Father          Age of Onset: (Not Specified)  Problem: Early death      Relation: Father          Age of Onset: (Not Specified)  Problem: Heart disease      Relation: Paternal Uncle          Age of Onset: (Not Specified)  Problem: Alcohol abuse      Relation: Sister          Age of Onset: (Not Specified)          Comment: x2  Problem: Heart disease      Relation: Brother          Age of Onset: (Not Specified)  Problem: No Known Problems      Relation: Son          Age of Onset: (Not Specified)    Social History    Tobacco Use      Smoking status: Former Smoker        Packs/day: 1.00        Years: 27.00        Pack years: 27        Types: Cigarettes        Quit date: 1/19/2016        Years since quitting: 3.9      Smokeless tobacco: Never Used    Alcohol use: No      Alcohol/week: 0.0 standard drinks    Drug use: No    Current Outpatient Medications on File Prior to Visit:  amLODIPine (NORVASC) 2.5 MG tablet, TAKE ONE TABLET BY MOUTH EVERY DAY, Disp: 30 tablet, Rfl: 1  ammonium lactate (LAC-HYDRIN) 12 % lotion, Apply topically as needed for Dry Skin., Disp: 225 g, Rfl: 1  aspirin (ECOTRIN) 81 MG EC tablet, Take 81 mg by mouth once daily., Disp: , Rfl:   BD INSULIN SYRINGE ULTRA-FINE 1 mL 31 gauge x 5/16 Syrg, USE AS DIRECTED 5 TIMES A DAY WITH LEVEMIR & NOVOLOG, Disp: 200 each, Rfl: 10  blood sugar diagnostic (BLOOD GLUCOSE TEST) Strp, 1 each by Misc.(Non-Drug; Combo  "Route) route 4 (four) times daily. DX E11.22 Pt is on insulin., Disp: 150 strip, Rfl: 11  dicyclomine (BENTYL) 10 MG capsule, , Disp: , Rfl: 3  ergocalciferol (ERGOCALCIFEROL) 50,000 unit Cap, Take one twice a week (Patient taking differently: Take one twice a week), Disp: 8 capsule, Rfl: 5  insulin aspart U-100 (NOVOLOG FLEXPEN U-100 INSULIN) 100 unit/mL (3 mL) InPn pen, Inject 34 Units into the skin 2 (two) times daily with meals. Plus correction scale, max  units, Disp: 30 mL, Rfl: 6  insulin degludec (TRESIBA FLEXTOUCH U-200) 200 unit/mL (3 mL) InPn, Inject 70 Units into the skin once daily., Disp: 18 mL, Rfl: 6  KETOPROFEN TOP, APPLY 1.6 GRAMS (1 PUMP) TO PAINFUL AREAS UP TO 5 TIMES DAILY. RUB IN WELL, Disp: , Rfl: 5  lancets 33 gauge Misc, 1 lancet by Misc.(Non-Drug; Combo Route) route 4 (four) times daily., Disp: , Rfl:   levothyroxine (SYNTHROID) 100 MCG tablet, TAKE ONE TABLET BY MOUTH EVERY DAY, Disp: 30 tablet, Rfl: 11  lisinopril (PRINIVIL,ZESTRIL) 20 MG tablet, Take 20 mg by mouth once daily., Disp: , Rfl:   meclizine (ANTIVERT) 25 mg tablet, Take 1 tablet (25 mg total) by mouth 3 (three) times daily as needed., Disp: 20 tablet, Rfl: 0  metoprolol tartrate (LOPRESSOR) 25 MG tablet, TAKE 1/2 TABLET BY MOUTH TWICE A DAY, Disp: 30 tablet, Rfl: 10  pantoprazole (PROTONIX) 40 MG tablet, TAKE ONE TABLET BY MOUTH EVERY DAY, Disp: 30 tablet, Rfl: 0  pen needle, diabetic (BD ULTRA-FINE YUMIKO PEN NEEDLE) 32 gauge x 5/32" Ndle, Uses 3 daily, on multiple daily insulin injections, Disp: 100 each, Rfl: 6  rosuvastatin (CRESTOR) 40 MG Tab, TAKE ONE TABLET BY MOUTH EVERY EVENING, Disp: 30 tablet, Rfl: 10  traZODone (DESYREL) 50 MG tablet, TAKE ONE TABLET BY MOUTH AT BEDTIME AS NEEDED FOR INSOMNIA, Disp: 30 tablet, Rfl: 0  nitroGLYCERIN (NITROSTAT) 0.4 MG SL tablet, Place 1 tablet (0.4 mg total) under the tongue every 5 (five) minutes as needed for Chest pain. (Patient taking differently: Place 0.4 mg under the " tongue every 5 (five) minutes as needed for Chest pain. Seek medical help is pain is not relieved by the third dose.), Disp: 25 tablet, Rfl: 2    No current facility-administered medications on file prior to visit.     TETANUS VACCINE due on 06/02/1965  Shingles Vaccine(2 of 3) due on 03/26/2016  Influenza Vaccine(1) due on 09/01/2019  DEXA SCAN due on 11/29/2019  Mammogram due on 12/01/2019  Foot Exam due on 01/14/2020  Eye Exam due on 01/31/2020      Review of Systems   Constitutional: Negative for chills, diaphoresis, fatigue, fever and unexpected weight change.   HENT: Negative for congestion, ear pain, hearing loss, postnasal drip, sinus pressure, sneezing, sore throat, tinnitus and trouble swallowing.    Eyes: Negative for itching and visual disturbance.   Respiratory: Negative for cough, chest tightness, shortness of breath and wheezing.    Cardiovascular: Negative for chest pain, palpitations and leg swelling.   Gastrointestinal: Negative for abdominal pain, blood in stool, constipation, diarrhea, nausea and vomiting.   Genitourinary: Negative for dysuria, frequency, hematuria, menstrual problem, pelvic pain, vaginal bleeding and vaginal discharge.   Musculoskeletal: Positive for arthralgias. Negative for back pain, joint swelling and myalgias.   Skin: Negative for color change and rash.   Neurological: Negative for dizziness and headaches.   Hematological: Negative for adenopathy.   Psychiatric/Behavioral: Negative for sleep disturbance. The patient is not nervous/anxious.        Objective:      Physical Exam   Constitutional: She is oriented to person, place, and time. She appears well-developed. No distress.   Good blood pressure control  Severe obesity with a BMI of 35.3 she is down 7.1 lb from her last checkup with me January 15, 2019   HENT:   Head: Normocephalic and atraumatic.   Right Ear: External ear normal.   Left Ear: External ear normal.   Nose: Nose normal.   Mouth/Throat: Oropharynx is clear  and moist. No oropharyngeal exudate.   Eyes: Pupils are equal, round, and reactive to light. Conjunctivae and EOM are normal. Right eye exhibits no discharge. Left eye exhibits no discharge. No scleral icterus.   Neck: Normal range of motion. Neck supple. No JVD present. No tracheal deviation present. No thyromegaly present.   Cardiovascular: Normal rate, regular rhythm, normal heart sounds and intact distal pulses. Exam reveals no gallop and no friction rub.   No murmur heard.  Pulses:       Dorsalis pedis pulses are 1+ on the right side, and 1+ on the left side.        Posterior tibial pulses are 1+ on the right side, and 1+ on the left side.   Pulmonary/Chest: Effort normal and breath sounds normal. No stridor. No respiratory distress. She has no wheezes. She has no rales. She exhibits no tenderness.   Abdominal: Soft. Bowel sounds are normal. She exhibits no distension and no mass. There is no tenderness. There is no rebound and no guarding. No hernia.   Musculoskeletal: She exhibits no edema.        Right shoulder: She exhibits decreased range of motion, tenderness and bony tenderness. She exhibits no swelling, no effusion, no crepitus and no deformity.        Left shoulder: She exhibits decreased range of motion, tenderness and bony tenderness. She exhibits no swelling, no effusion, no crepitus and no deformity.        Right foot: There is normal range of motion and no deformity.        Left foot: There is normal range of motion and no deformity.   Feet:   Right Foot:   Protective Sensation: 8 sites tested. 8 sites sensed.   Skin Integrity: Negative for ulcer, blister, skin breakdown, erythema, warmth, callus or dry skin.   Left Foot:   Protective Sensation: 8 sites tested. 8 sites sensed.   Skin Integrity: Negative for ulcer, blister, skin breakdown, erythema, warmth, callus or dry skin.   Lymphadenopathy:     She has no cervical adenopathy.   Neurological: She is alert and oriented to person, place, and  time. She has normal reflexes. She displays normal reflexes. No cranial nerve deficit or sensory deficit. She exhibits normal muscle tone.   Skin: Skin is warm and dry. Capillary refill takes less than 2 seconds. No rash noted. She is not diaphoretic. No erythema.   Psychiatric: She has a normal mood and affect. Her behavior is normal. Judgment and thought content normal.   Nursing note and vitals reviewed.      Assessment:       1. Encounter for preventive health examination    2. Type 2 diabetes mellitus with diabetic polyneuropathy, with long-term current use of insulin    3. Hypertension associated with diabetes    4. Hyperlipidemia with target LDL less than 70    5. Coronary artery disease involving native coronary artery of native heart without angina pectoris    6. Hx of CABG-  1/19/16 x 4    7. CKD (chronic kidney disease) stage 3, GFR 30-59 ml/min    8. Anticoagulant long-term use    9. Diabetic eye exam    10. Encounter for screening mammogram for breast cancer    11. Menopause    12. Influenza vaccine needed    13. Chronic pain of both shoulders    14. PAF (paroxysmal atrial fibrillation), post CABG    15. Severe obesity (BMI 35.0-35.9 with comorbidity)        Plan:       1. Encounter for preventive health examination    2. Type 2 diabetes mellitus with diabetic polyneuropathy, with long-term current use of insulin  Lab Results   Component Value Date    HGBA1C 7.4 (H) 12/10/2019     Follow-up in March with Endocrinology    3. Hypertension associated with diabetes  Good control with no changes needed    4. Hyperlipidemia with target LDL less than 70  Lab Results   Component Value Date    CHOL 147 05/14/2019    CHOL 136 01/14/2018    CHOL 158 12/30/2017     Lab Results   Component Value Date    HDL 34 (L) 05/14/2019    HDL 32 (L) 01/14/2018    HDL 33 (L) 12/30/2017     Lab Results   Component Value Date    LDLCALC 61.8 (L) 05/14/2019    LDLCALC 52.6 (L) 01/14/2018    LDLCALC 68.8 12/30/2017     Lab Results    Component Value Date    TRIG 256 (H) 05/14/2019    TRIG 257 (H) 01/14/2018    TRIG 281 (H) 12/30/2017     Lab Results   Component Value Date    CHOLHDL 23.1 05/14/2019    CHOLHDL 23.5 01/14/2018    CHOLHDL 20.9 12/30/2017     Good control with no changes needed    5. Coronary artery disease involving native coronary artery of native heart without angina pectoris  Asymptomatic, no angina    6. Hx of CABG-  1/19/16 x 4  Stable    7. CKD (chronic kidney disease) stage 3, GFR 30-59 ml/min  BMP  Lab Results   Component Value Date     12/10/2019    K 4.1 12/10/2019     12/10/2019    CO2 29 12/10/2019    BUN 25 (H) 12/10/2019    CREATININE 1.5 (H) 12/10/2019    CALCIUM 9.1 12/10/2019    ANIONGAP 7 (L) 12/10/2019    ESTGFRAFRICA 39.8 (A) 12/10/2019    EGFRNONAA 34.6 (A) 12/10/2019     Still stage 3 chronic kidney disease reinforced avoidance of anti inflammatories    8. Anticoagulant long-term use  Stable    9. Diabetic eye exam  - Ambulatory referral to Optometry    10. Encounter for screening mammogram for breast cancer  - Mammo Digital Screening Bilat; Future    11. Menopause  - DXA Bone Density Spine And Hip; Future    12. Influenza vaccine needed  Given  - Influenza - High Dose (65+) (PF) (IM)    13. Chronic pain of both shoulders  - Ambulatory referral/consult to Orthopedics; Future  - X-Ray Shoulder Complete Bilateral; Future    14. PAF (paroxysmal atrial fibrillation), post CABG  Brief.  Post bypass surgery and has not recurred    15. Severe obesity (BMI 35.0-35.9 with comorbidity)

## 2020-01-14 ENCOUNTER — OFFICE VISIT (OUTPATIENT)
Dept: ORTHOPEDICS | Facility: CLINIC | Age: 75
End: 2020-01-14
Attending: FAMILY MEDICINE
Payer: MEDICARE

## 2020-01-14 ENCOUNTER — TELEPHONE (OUTPATIENT)
Dept: FAMILY MEDICINE | Facility: CLINIC | Age: 75
End: 2020-01-14

## 2020-01-14 VITALS
HEART RATE: 58 BPM | WEIGHT: 190 LBS | HEIGHT: 61 IN | DIASTOLIC BLOOD PRESSURE: 64 MMHG | BODY MASS INDEX: 35.87 KG/M2 | SYSTOLIC BLOOD PRESSURE: 146 MMHG

## 2020-01-14 DIAGNOSIS — G89.29 CHRONIC PAIN OF BOTH SHOULDERS: ICD-10-CM

## 2020-01-14 DIAGNOSIS — M25.511 CHRONIC PAIN OF BOTH SHOULDERS: ICD-10-CM

## 2020-01-14 DIAGNOSIS — M25.512 CHRONIC PAIN OF BOTH SHOULDERS: ICD-10-CM

## 2020-01-14 DIAGNOSIS — M25.512 LEFT SHOULDER PAIN, UNSPECIFIED CHRONICITY: Primary | ICD-10-CM

## 2020-01-14 PROCEDURE — 1125F PR PAIN SEVERITY QUANTIFIED, PAIN PRESENT: ICD-10-PCS | Mod: HCNC,S$GLB,, | Performed by: ORTHOPAEDIC SURGERY

## 2020-01-14 PROCEDURE — 3077F SYST BP >= 140 MM HG: CPT | Mod: HCNC,CPTII,S$GLB, | Performed by: ORTHOPAEDIC SURGERY

## 2020-01-14 PROCEDURE — 1101F PR PT FALLS ASSESS DOC 0-1 FALLS W/OUT INJ PAST YR: ICD-10-PCS | Mod: HCNC,CPTII,S$GLB, | Performed by: ORTHOPAEDIC SURGERY

## 2020-01-14 PROCEDURE — 20610 DRAIN/INJ JOINT/BURSA W/O US: CPT | Mod: HCNC,LT,S$GLB, | Performed by: ORTHOPAEDIC SURGERY

## 2020-01-14 PROCEDURE — 1101F PT FALLS ASSESS-DOCD LE1/YR: CPT | Mod: HCNC,CPTII,S$GLB, | Performed by: ORTHOPAEDIC SURGERY

## 2020-01-14 PROCEDURE — 1159F PR MEDICATION LIST DOCUMENTED IN MEDICAL RECORD: ICD-10-PCS | Mod: HCNC,S$GLB,, | Performed by: ORTHOPAEDIC SURGERY

## 2020-01-14 PROCEDURE — 3078F DIAST BP <80 MM HG: CPT | Mod: HCNC,CPTII,S$GLB, | Performed by: ORTHOPAEDIC SURGERY

## 2020-01-14 PROCEDURE — 99999 PR PBB SHADOW E&M-EST. PATIENT-LVL III: CPT | Mod: PBBFAC,HCNC,, | Performed by: ORTHOPAEDIC SURGERY

## 2020-01-14 PROCEDURE — 99203 PR OFFICE/OUTPT VISIT, NEW, LEVL III, 30-44 MIN: ICD-10-PCS | Mod: HCNC,25,S$GLB, | Performed by: ORTHOPAEDIC SURGERY

## 2020-01-14 PROCEDURE — 1159F MED LIST DOCD IN RCRD: CPT | Mod: HCNC,S$GLB,, | Performed by: ORTHOPAEDIC SURGERY

## 2020-01-14 PROCEDURE — 1125F AMNT PAIN NOTED PAIN PRSNT: CPT | Mod: HCNC,S$GLB,, | Performed by: ORTHOPAEDIC SURGERY

## 2020-01-14 PROCEDURE — 20610 LARGE JOINT ASPIRATION/INJECTION: L SUBACROMIAL BURSA: ICD-10-PCS | Mod: HCNC,LT,S$GLB, | Performed by: ORTHOPAEDIC SURGERY

## 2020-01-14 PROCEDURE — 3078F PR MOST RECENT DIASTOLIC BLOOD PRESSURE < 80 MM HG: ICD-10-PCS | Mod: HCNC,CPTII,S$GLB, | Performed by: ORTHOPAEDIC SURGERY

## 2020-01-14 PROCEDURE — 99203 OFFICE O/P NEW LOW 30 MIN: CPT | Mod: HCNC,25,S$GLB, | Performed by: ORTHOPAEDIC SURGERY

## 2020-01-14 PROCEDURE — 3077F PR MOST RECENT SYSTOLIC BLOOD PRESSURE >= 140 MM HG: ICD-10-PCS | Mod: HCNC,CPTII,S$GLB, | Performed by: ORTHOPAEDIC SURGERY

## 2020-01-14 PROCEDURE — 99999 PR PBB SHADOW E&M-EST. PATIENT-LVL III: ICD-10-PCS | Mod: PBBFAC,HCNC,, | Performed by: ORTHOPAEDIC SURGERY

## 2020-01-14 RX ADMIN — TRIAMCINOLONE ACETONIDE 40 MG: 40 INJECTION, SUSPENSION INTRA-ARTICULAR; INTRAMUSCULAR at 01:01

## 2020-01-14 NOTE — LETTER
January 16, 2020      Wally Cazares MD  [address]  [facility] [address]           [facility]  [address] [place] [address] [address]   [facility] [address]  Phone: [phone]          Patient: Adele Hall   MR Number: [id number]   YOB: 1945   Date of Visit: 1/14/2020       Dear Dr. Wally Cazares:    Thank you for referring Adele Hall to me for evaluation. Attached you will find relevant portions of my assessment and plan of care.    If you have questions, please do not hesitate to call me. I look forward to following Adele Hall along with you.    Sincerely,    Michael Fair MD    Enclosure  CC:  No Recipients    If you would like to receive this communication electronically, please contact [email or web address] or [phone] to request more information on [employer] Link access.    For providers and/or their staff who would like to refer a patient to [facility], please contact us through our one-stop-shop provider referral line, [facility], at [phone].    If you feel you have received this communication in error or would no longer like to receive these types of communications, please e-mail [email or web address]

## 2020-01-14 NOTE — TELEPHONE ENCOUNTER
----- Message from Carola Zhou sent at 1/14/2020 11:16 AM CST -----  Contact: Pt  Type:  Patient Returning Call    Who Called:  Pt  Who Left Message for Patient:  nurse  Does the patient know what this is regarding?:  Test results  Best Call Back Number:  [phone]  Additional Information:  Please Advise ---Thank you

## 2020-01-15 DIAGNOSIS — G47.00 INSOMNIA, UNSPECIFIED TYPE: ICD-10-CM

## 2020-01-15 RX ORDER — TRAZODONE HYDROCHLORIDE 50 MG/1
TABLET ORAL
Qty: 30 TABLET | Refills: 5 | Status: SHIPPED | OUTPATIENT
Start: 2020-01-15 | End: 2020-07-20

## 2020-01-16 ENCOUNTER — HOSPITAL ENCOUNTER (OUTPATIENT)
Dept: RADIOLOGY | Facility: HOSPITAL | Age: 75
Discharge: HOME | End: 2020-01-16
Attending: ORTHOPAEDIC SURGERY
Payer: MEDICARE

## 2020-01-16 ENCOUNTER — HOSPITAL ENCOUNTER (OUTPATIENT)
Dept: RADIOLOGY | Facility: CLINIC | Age: 75
Discharge: HOME | End: 2020-01-16
Attending: FAMILY MEDICINE
Payer: MEDICARE

## 2020-01-16 DIAGNOSIS — Z12.31 ENCOUNTER FOR SCREENING MAMMOGRAM FOR BREAST CANCER: ICD-10-CM

## 2020-01-16 DIAGNOSIS — M25.512 LEFT SHOULDER PAIN, UNSPECIFIED CHRONICITY: ICD-10-CM

## 2020-01-16 DIAGNOSIS — Z78.0 MENOPAUSE: ICD-10-CM

## 2020-01-16 PROCEDURE — 77067 MAMMO DIGITAL SCREENING BILAT WITH TOMOSYNTHESIS_CAD: ICD-10-PCS | Mod: 26,HCNC,, | Performed by: RADIOLOGY

## 2020-01-16 PROCEDURE — 77067 SCR MAMMO BI INCL CAD: CPT | Mod: TC,HCNC,PO

## 2020-01-16 PROCEDURE — 77063 MAMMO DIGITAL SCREENING BILAT WITH TOMOSYNTHESIS_CAD: ICD-10-PCS | Mod: 26,HCNC,, | Performed by: RADIOLOGY

## 2020-01-16 PROCEDURE — 77080 DXA BONE DENSITY AXIAL: CPT | Mod: 26,HCNC,, | Performed by: RADIOLOGY

## 2020-01-16 PROCEDURE — 77063 BREAST TOMOSYNTHESIS BI: CPT | Mod: 26,HCNC,, | Performed by: RADIOLOGY

## 2020-01-16 PROCEDURE — 73221 MRI JOINT UPR EXTREM W/O DYE: CPT | Mod: TC,HCNC,LT

## 2020-01-16 PROCEDURE — 77067 SCR MAMMO BI INCL CAD: CPT | Mod: 26,HCNC,, | Performed by: RADIOLOGY

## 2020-01-16 PROCEDURE — 77080 DXA BONE DENSITY AXIAL: CPT | Mod: TC,HCNC,PO

## 2020-01-16 PROCEDURE — 73221 MRI SHOULDER WITHOUT CONTRAST LEFT: ICD-10-PCS | Mod: 26,HCNC,LT, | Performed by: RADIOLOGY

## 2020-01-16 PROCEDURE — 77080 DEXA BONE DENSITY SPINE HIP: ICD-10-PCS | Mod: 26,HCNC,, | Performed by: RADIOLOGY

## 2020-01-16 PROCEDURE — 73221 MRI JOINT UPR EXTREM W/O DYE: CPT | Mod: 26,HCNC,LT, | Performed by: RADIOLOGY

## 2020-01-16 RX ORDER — TRIAMCINOLONE ACETONIDE 40 MG/ML
40 INJECTION, SUSPENSION INTRA-ARTICULAR; INTRAMUSCULAR
Status: DISCONTINUED | OUTPATIENT
Start: 2020-01-14 | End: 2020-01-16 | Stop reason: HOSPADM

## 2020-01-16 NOTE — PROCEDURES
Large Joint Aspiration/Injection: L subacromial bursa  Date/Time: 1/14/2020 1:00 PM  Performed by: Michael Fair MD  Authorized by: Michael Fair MD     Consent Done?:  Yes (Verbal)  Indications:  Pain  Timeout: Prior to procedure the correct patient, procedure, and site was verified      Location:  Shoulder  Site:  L subacromial bursa  Prep: Patient was prepped and draped in usual sterile fashion    Approach:  Lateral  Medications:  40 mg triamcinolone acetonide 40 mg/mL

## 2020-01-16 NOTE — PROGRESS NOTES
Past Medical History:   Diagnosis Date    Anesthesia complication     took patient 6 days to come out of anethesia after CABG    Anticoagulant long-term use     Arthritis     Chronic kidney disease     CKD (chronic kidney disease) stage 3, GFR 30-59 ml/min     Dr. Montero    COPD (chronic obstructive pulmonary disease) 2/7/2016    COPD (chronic obstructive pulmonary disease)     Coronary artery disease     Diabetes mellitus     Diabetes mellitus, type 2     GERD (gastroesophageal reflux disease)     Hyperlipidemia     Hypertension     possible new onset CHF 7/30/2016    Tardive dyskinesia     Thyroid disease     Ulcer     Vertigo        Past Surgical History:   Procedure Laterality Date    CARDIAC SURGERY      CHOLECYSTECTOMY  01/26/2017    COLONOSCOPY      CORONARY ARTERY BYPASS GRAFT  01/19/2016    4 vessel    HYSTERECTOMY      OOPHORECTOMY      TONSILLECTOMY      TOTAL THYROIDECTOMY      TUBAL LIGATION         Current Outpatient Medications   Medication Sig    amLODIPine (NORVASC) 2.5 MG tablet TAKE ONE TABLET BY MOUTH EVERY DAY    ammonium lactate (LAC-HYDRIN) 12 % lotion Apply topically as needed for Dry Skin.    aspirin (ECOTRIN) 81 MG EC tablet Take 81 mg by mouth once daily.    BD INSULIN SYRINGE ULTRA-FINE 1 mL 31 gauge x 5/16 Syrg USE AS DIRECTED 5 TIMES A DAY WITH LEVEMIR & NOVOLOG    blood sugar diagnostic (BLOOD GLUCOSE TEST) Strp 1 each by Misc.(Non-Drug; Combo Route) route 4 (four) times daily. DX E11.22 Pt is on insulin.    dicyclomine (BENTYL) 10 MG capsule     ergocalciferol (ERGOCALCIFEROL) 50,000 unit Cap Take one twice a week (Patient taking differently: Take one twice a week)    insulin aspart U-100 (NOVOLOG FLEXPEN U-100 INSULIN) 100 unit/mL (3 mL) InPn pen Inject 34 Units into the skin 2 (two) times daily with meals. Plus correction scale, max  units    insulin degludec (TRESIBA FLEXTOUCH U-200) 200 unit/mL (3 mL) InPn Inject 70 Units into the skin  "once daily.    KETOPROFEN TOP APPLY 1.6 GRAMS (1 PUMP) TO PAINFUL AREAS UP TO 5 TIMES DAILY. RUB IN WELL    lancets 33 gauge Misc 1 lancet by Misc.(Non-Drug; Combo Route) route 4 (four) times daily.    levothyroxine (SYNTHROID) 100 MCG tablet TAKE ONE TABLET BY MOUTH EVERY DAY    lisinopril (PRINIVIL,ZESTRIL) 20 MG tablet Take 20 mg by mouth once daily.    meclizine (ANTIVERT) 25 mg tablet Take 1 tablet (25 mg total) by mouth 3 (three) times daily as needed.    metoprolol tartrate (LOPRESSOR) 25 MG tablet TAKE 1/2 TABLET BY MOUTH TWICE A DAY    pantoprazole (PROTONIX) 40 MG tablet TAKE ONE TABLET BY MOUTH EVERY DAY    pen needle, diabetic (BD ULTRA-FINE YUMIKO PEN NEEDLE) 32 gauge x 5/32" Ndle Uses 3 daily, on multiple daily insulin injections    rosuvastatin (CRESTOR) 40 MG Tab TAKE ONE TABLET BY MOUTH EVERY EVENING    nitroGLYCERIN (NITROSTAT) 0.4 MG SL tablet Place 1 tablet (0.4 mg total) under the tongue every 5 (five) minutes as needed for Chest pain. (Patient taking differently: Place 0.4 mg under the tongue every 5 (five) minutes as needed for Chest pain. Seek medical help is pain is not relieved by the third dose.)    traZODone (DESYREL) 50 MG tablet TAKE ONE TABLET BY MOUTH AT BEDTIME AS NEEDED FOR INSOMNIA     No current facility-administered medications for this visit.        Review of patient's allergies indicates:   Allergen Reactions    Reglan [metoclopramide hcl]      Depression and weight loss.        Family History   Problem Relation Age of Onset    Cancer Mother         LYMPHOMA    Breast cancer Mother     Cancer Father     Early death Father     Heart disease Paternal Uncle     Alcohol abuse Sister         x2    Heart disease Brother     No Known Problems Son        Social History     Socioeconomic History    Marital status:      Spouse name: Not on file    Number of children: Not on file    Years of education: Not on file    Highest education level: Not on file "   Occupational History    Not on file   Social Needs    Financial resource strain: Not on file    Food insecurity:     Worry: Not on file     Inability: Not on file    Transportation needs:     Medical: Not on file     Non-medical: Not on file   Tobacco Use    Smoking status: Former Smoker     Packs/day: 1.00     Years: 27.00     Pack years: 27.00     Types: Cigarettes     Last attempt to quit: 1/19/2016     Years since quitting: 3.9    Smokeless tobacco: Never Used   Substance and Sexual Activity    Alcohol use: No     Alcohol/week: 0.0 standard drinks    Drug use: No    Sexual activity: Yes     Partners: Male   Lifestyle    Physical activity:     Days per week: Not on file     Minutes per session: Not on file    Stress: Not on file   Relationships    Social connections:     Talks on phone: Not on file     Gets together: Not on file     Attends Cheondoism service: Not on file     Active member of club or organization: Not on file     Attends meetings of clubs or organizations: Not on file     Relationship status: Not on file   Other Topics Concern    Not on file   Social History Narrative    Not on file       Chief Complaint:   Chief Complaint   Patient presents with    Right Shoulder - Pain    Left Shoulder - Pain       Consulting Physician: Wally Cazares MD    History of present illness:    This is a 74 y.o. year old female who complains of bilateral shoulder pain with left worse than right.  She states he has had this on and off for years.  She denies any injury.  She puts her pain is much as 8/10 and worse with activities and at night.  She has done physical therapy which has not helped.    Review of Systems:    Constitution:   Denies chills, fever, and sweats.  HENT:   Denies headaches or blurry vision.  Cardiovascular:  Denies chest pain or irregular heart beat.  Respiratory:   Denies cough or shortness of breath.  Gastrointestinal:  Denies abdominal pain, nausea, or  "vomiting.  Musculoskeletal:   Denies muscle cramps.  Neurological:   Denies dizziness or focal weakness.  Psychiatric/Behavior: Normal mental status.  Hematology/Lymph:  Denies bleeding problem or easy bruising/bleeding.  Skin:    Denies rash or suspicious lesions.    Examination:    Vital Signs:    Vitals:    01/14/20 1304   BP: (!) 146/64   Pulse: (!) 58   Weight: 86.2 kg (190 lb)   Height: 5' 1" (1.549 m)   PainSc:   8       Body mass index is 35.9 kg/m².    Constitution:   Well-developed, well nourished patient in no acute distress.  Neurological:   Alert and oriented x 3 and cooperative to examination.     Psychiatric/Behavior: Normal mental status.  Respiratory:   No shortness of breath.  Eyes:    Extraoccular muscles intact  Skin:    No scars, rash or suspicious lesions.    Physical Exam: Right Shoulder Exam     Skin  Rash:   None  Scars:   None    Inspection/Observation   Swelling:   none  Erythema:   none  Bruising:   none  Wounds:   none  Scapular Winging:  none  Scapular Dyskinesia:  none  Atrophy:   none  Masses:   None  Lymphadenopathy: None    Tenderness   AC Joint:   mild    Range of Motion   Active Forward Flexion:  150  Active External Rotation:  20  Active Internal Rotation:  Low back    Muscle Strength   Forward Flexion:  4+/5  External Rotation:  4+/5  Internal Rotation:  4+/5    Tests & Signs   Cross Arm:   positive  Impingement:   positive    Other   Sensation:   normal  Pulse:    2+ radial      Left Shoulder Exam     Skin  Rash:   None  Scars:   None    Inspection/Observation   Swelling:   none  Erythema:   none  Bruising:   none  Wounds:   none  Scapular Winging:  none  Scapular Dyskinesia:  none  Atrophy:   none  Masses:   None  Lymphadenopathy: None    Tenderness   AC Joint:   mild    Range of Motion   Active Forward Flexion:  150  Active External Rotation:  20  Active Internal Rotation:  Hip pocket    Muscle Strength   Forward Flexion:  4/5  External Rotation:  4/5  Internal Rotation: "  4/5    Tests & Signs   Cross Arm:   positive  Impingement:   positive    Other   Sensation:   normal  Pulse:    2+ radial          Imaging: X-rays ordered and images interpreted today personally by me of both shoulder show mild-to-moderate degenerative changes.        Assessment: Chronic pain of both shoulders  -     Ambulatory referral/consult to Orthopedics  -     Large Joint Aspiration/Injection: L subacromial bursa        Plan:  She states that the left is significantly worse than the right.  We will go ahead and MRI the left and give her an injection today.      DISCLAIMER: This note may have been dictated using voice recognition software and may contain grammatical errors.     NOTE: Consult report sent to referring provider via [employer] EMR.

## 2020-01-20 ENCOUNTER — OFFICE VISIT (OUTPATIENT)
Dept: ORTHOPEDICS | Facility: CLINIC | Age: 75
End: 2020-01-20
Payer: MEDICARE

## 2020-01-20 VITALS — HEIGHT: 61 IN | BODY MASS INDEX: 35.88 KG/M2 | WEIGHT: 190.06 LBS | RESPIRATION RATE: 18 BRPM

## 2020-01-20 DIAGNOSIS — M25.512 LEFT SHOULDER PAIN, UNSPECIFIED CHRONICITY: Primary | ICD-10-CM

## 2020-01-20 PROCEDURE — 1125F AMNT PAIN NOTED PAIN PRSNT: CPT | Mod: HCNC,S$GLB,, | Performed by: ORTHOPAEDIC SURGERY

## 2020-01-20 PROCEDURE — 1159F MED LIST DOCD IN RCRD: CPT | Mod: HCNC,S$GLB,, | Performed by: ORTHOPAEDIC SURGERY

## 2020-01-20 PROCEDURE — 99213 PR OFFICE/OUTPT VISIT, EST, LEVL III, 20-29 MIN: ICD-10-PCS | Mod: HCNC,S$GLB,, | Performed by: ORTHOPAEDIC SURGERY

## 2020-01-20 PROCEDURE — 1101F PT FALLS ASSESS-DOCD LE1/YR: CPT | Mod: HCNC,CPTII,S$GLB, | Performed by: ORTHOPAEDIC SURGERY

## 2020-01-20 PROCEDURE — 1125F PR PAIN SEVERITY QUANTIFIED, PAIN PRESENT: ICD-10-PCS | Mod: HCNC,S$GLB,, | Performed by: ORTHOPAEDIC SURGERY

## 2020-01-20 PROCEDURE — 1101F PR PT FALLS ASSESS DOC 0-1 FALLS W/OUT INJ PAST YR: ICD-10-PCS | Mod: HCNC,CPTII,S$GLB, | Performed by: ORTHOPAEDIC SURGERY

## 2020-01-20 PROCEDURE — 1159F PR MEDICATION LIST DOCUMENTED IN MEDICAL RECORD: ICD-10-PCS | Mod: HCNC,S$GLB,, | Performed by: ORTHOPAEDIC SURGERY

## 2020-01-20 PROCEDURE — 99213 OFFICE O/P EST LOW 20 MIN: CPT | Mod: HCNC,S$GLB,, | Performed by: ORTHOPAEDIC SURGERY

## 2020-01-20 PROCEDURE — 99999 PR PBB SHADOW E&M-EST. PATIENT-LVL III: ICD-10-PCS | Mod: PBBFAC,HCNC,, | Performed by: ORTHOPAEDIC SURGERY

## 2020-01-20 PROCEDURE — 99999 PR PBB SHADOW E&M-EST. PATIENT-LVL III: CPT | Mod: PBBFAC,HCNC,, | Performed by: ORTHOPAEDIC SURGERY

## 2020-01-20 RX ORDER — TRAMADOL HYDROCHLORIDE 50 MG/1
50 TABLET ORAL EVERY 6 HOURS PRN
Qty: 28 TABLET | Refills: 0 | Status: SHIPPED | OUTPATIENT
Start: 2020-01-20 | End: 2020-03-02 | Stop reason: SDUPTHER

## 2020-01-20 NOTE — PROGRESS NOTES
Past Medical History:   Diagnosis Date    Anesthesia complication     took patient 6 days to come out of anethesia after CABG    Anticoagulant long-term use     Arthritis     Chronic kidney disease     CKD (chronic kidney disease) stage 3, GFR 30-59 ml/min     Dr. Montero    COPD (chronic obstructive pulmonary disease) 2/7/2016    COPD (chronic obstructive pulmonary disease)     Coronary artery disease     Diabetes mellitus     Diabetes mellitus, type 2     GERD (gastroesophageal reflux disease)     Hyperlipidemia     Hypertension     possible new onset CHF 7/30/2016    Tardive dyskinesia     Thyroid disease     Ulcer     Vertigo        Past Surgical History:   Procedure Laterality Date    CARDIAC SURGERY      CHOLECYSTECTOMY  01/26/2017    COLONOSCOPY      CORONARY ARTERY BYPASS GRAFT  01/19/2016    4 vessel    HYSTERECTOMY      OOPHORECTOMY      TONSILLECTOMY      TOTAL THYROIDECTOMY      TUBAL LIGATION         Current Outpatient Medications   Medication Sig    amLODIPine (NORVASC) 2.5 MG tablet TAKE ONE TABLET BY MOUTH EVERY DAY    ammonium lactate (LAC-HYDRIN) 12 % lotion Apply topically as needed for Dry Skin.    aspirin (ECOTRIN) 81 MG EC tablet Take 81 mg by mouth once daily.    BD INSULIN SYRINGE ULTRA-FINE 1 mL 31 gauge x 5/16 Syrg USE AS DIRECTED 5 TIMES A DAY WITH LEVEMIR & NOVOLOG    blood sugar diagnostic (BLOOD GLUCOSE TEST) Strp 1 each by Misc.(Non-Drug; Combo Route) route 4 (four) times daily. DX E11.22 Pt is on insulin.    dicyclomine (BENTYL) 10 MG capsule     ergocalciferol (ERGOCALCIFEROL) 50,000 unit Cap Take one twice a week (Patient taking differently: Take one twice a week)    insulin aspart U-100 (NOVOLOG FLEXPEN U-100 INSULIN) 100 unit/mL (3 mL) InPn pen Inject 34 Units into the skin 2 (two) times daily with meals. Plus correction scale, max  units    insulin degludec (TRESIBA FLEXTOUCH U-200) 200 unit/mL (3 mL) InPn Inject 70 Units into the skin  "once daily.    KETOPROFEN TOP APPLY 1.6 GRAMS (1 PUMP) TO PAINFUL AREAS UP TO 5 TIMES DAILY. RUB IN WELL    lancets 33 gauge Misc 1 lancet by Misc.(Non-Drug; Combo Route) route 4 (four) times daily.    levothyroxine (SYNTHROID) 100 MCG tablet TAKE ONE TABLET BY MOUTH EVERY DAY    lisinopril (PRINIVIL,ZESTRIL) 20 MG tablet Take 20 mg by mouth once daily.    meclizine (ANTIVERT) 25 mg tablet Take 1 tablet (25 mg total) by mouth 3 (three) times daily as needed.    metoprolol tartrate (LOPRESSOR) 25 MG tablet TAKE 1/2 TABLET BY MOUTH TWICE A DAY    pantoprazole (PROTONIX) 40 MG tablet TAKE ONE TABLET BY MOUTH EVERY DAY    pen needle, diabetic (BD ULTRA-FINE YUMIKO PEN NEEDLE) 32 gauge x 5/32" Ndle Uses 3 daily, on multiple daily insulin injections    rosuvastatin (CRESTOR) 40 MG Tab TAKE ONE TABLET BY MOUTH EVERY EVENING    traZODone (DESYREL) 50 MG tablet TAKE ONE TABLET BY MOUTH AT BEDTIME AS NEEDED FOR INSOMNIA    nitroGLYCERIN (NITROSTAT) 0.4 MG SL tablet Place 1 tablet (0.4 mg total) under the tongue every 5 (five) minutes as needed for Chest pain. (Patient taking differently: Place 0.4 mg under the tongue every 5 (five) minutes as needed for Chest pain. Seek medical help is pain is not relieved by the third dose.)     No current facility-administered medications for this visit.        Review of patient's allergies indicates:   Allergen Reactions    Reglan [metoclopramide hcl]      Depression and weight loss.        Family History   Problem Relation Age of Onset    Cancer Mother         [place]    Breast cancer Mother     Cancer Father     Early death Father     Heart disease Paternal Uncle     Alcohol abuse Sister         x2    Heart disease Brother     No Known Problems Son        Social History     Socioeconomic History    Marital status:      Spouse name: Not on file    Number of children: Not on file    Years of education: Not on file    Highest education level: Not on file "   Occupational History    Not on file   Social Needs    Financial resource strain: Not on file    Food insecurity:     Worry: Not on file     Inability: Not on file    Transportation needs:     Medical: Not on file     Non-medical: Not on file   Tobacco Use    Smoking status: Former Smoker     Packs/day: 1.00     Years: 27.00     Pack years: 27.00     Types: Cigarettes     Last attempt to quit: 2016     Years since quittin.0    Smokeless tobacco: Never Used   Substance and Sexual Activity    Alcohol use: No     Alcohol/week: 0.0 standard drinks    Drug use: No    Sexual activity: Yes     Partners: Male   Lifestyle    Physical activity:     Days per week: Not on file     Minutes per session: Not on file    Stress: Not on file   Relationships    Social connections:     Talks on phone: Not on file     Gets together: Not on file     Attends Congregational service: Not on file     Active member of club or organization: Not on file     Attends meetings of clubs or organizations: Not on file     Relationship status: Not on file   Other Topics Concern    Not on file   Social History Narrative    Not on file       Chief Complaint:   Chief Complaint   Patient presents with    Left Shoulder - Pain       Consulting Physician: No ref. provider found    History of present illness:    This is a 74 y.o. year old female who complains of bilateral shoulder pain with left worse than right.  She states he has had this on and off for years.  She denies any injury.  She puts her pain is much as /10 and worse with activities and at night.  She has done physical therapy which has not helped.  Our injection helped completely for 3 days and then wore off.    Review of Systems:    Constitution:   Denies chills, fever, and sweats.  HENT:   Denies headaches or blurry vision.  Cardiovascular:  Denies chest pain or irregular heart beat.  Respiratory:   Denies cough or shortness of breath.  Gastrointestinal:  Denies abdominal  "pain, nausea, or vomiting.  Musculoskeletal:   Denies muscle cramps.  Neurological:   Denies dizziness or focal weakness.  Psychiatric/Behavior: Normal mental status.  Hematology/Lymph:  Denies bleeding problem or easy bruising/bleeding.  Skin:    Denies rash or suspicious lesions.    Examination:    Vital Signs:    Vitals:    01/20/20 1303   Resp: 18   Weight: 86.2 kg (190 lb 0.6 oz)   Height: 5' 1" (1.549 m)   PainSc:   6   PainLoc: Shoulder       Body mass index is 35.91 kg/m².    Constitution:   Well-developed, well nourished patient in no acute distress.  Neurological:   Alert and oriented x 3 and cooperative to examination.     Psychiatric/Behavior: Normal mental status.  Respiratory:   No shortness of breath.  Eyes:    Extraoccular muscles intact  Skin:    No scars, rash or suspicious lesions.    Physical Exam: Right Shoulder Exam     Skin  Rash:   None  Scars:   None    Inspection/Observation   Swelling:   none  Erythema:   none  Bruising:   none  Wounds:   none  Scapular Winging:  none  Scapular Dyskinesia:  none  Atrophy:   none  Masses:   None  Lymphadenopathy: None    Tenderness   AC Joint:   mild    Range of Motion   Active Forward Flexion:  150  Active External Rotation:  20  Active Internal Rotation:  Low back    Muscle Strength   Forward Flexion:  4+/5  External Rotation:  4+/5  Internal Rotation:  4+/5    Tests & Signs   Cross Arm:   positive  Impingement:   positive    Other   Sensation:   normal  Pulse:    2+ radial      Left Shoulder Exam     Skin  Rash:   None  Scars:   None    Inspection/Observation   Swelling:   none  Erythema:   none  Bruising:   none  Wounds:   none  Scapular Winging:  none  Scapular Dyskinesia:  none  Atrophy:   none  Masses:   None  Lymphadenopathy: None    Tenderness   AC Joint:   mild    Range of Motion   Active Forward Flexion:  150  Active External Rotation:  20  Active Internal Rotation:  Hip pocket    Muscle Strength   Forward Flexion:  4/5  External Rotation: "  4+/5  Internal Rotation:  4+/5    Tests & Signs   Cross Arm:   positive  Impingement:   positive    Other   Sensation:   normal  Pulse:    2+ radial          Imaging: X-raysof both shoulder show mild-to-moderate degenerative changes. The MRI study images that were interpreted personally by me today and reviewed with the patient demonstrate left shoulder partial subscapularis tear along with a partial supraspinatus footprint tear and what appears to be a slap lesion.         Assessment: Left shoulder pain, unspecified chronicity        Plan:  At this point she is considering surgical alternatives.  We told we can give her another injection in a month or so if she needs it.  Will give her prescription for Ultram.  She declined physical therapy.    DISCLAIMER: This note may have been dictated using voice recognition software and may contain grammatical errors.     NOTE: Consult report sent to referring provider via [employer] EMR.

## 2020-01-23 RX ORDER — PANTOPRAZOLE SODIUM 40 MG/1
40 TABLET, DELAYED RELEASE ORAL DAILY
Qty: 90 TABLET | Refills: 3 | Status: SHIPPED | OUTPATIENT
Start: 2020-01-23 | End: 2021-01-23

## 2020-01-24 ENCOUNTER — LAB VISIT (OUTPATIENT)
Dept: LAB | Facility: HOSPITAL | Age: 75
End: 2020-01-24
Attending: INTERNAL MEDICINE
Payer: MEDICARE

## 2020-01-24 DIAGNOSIS — N18.30 CKD (CHRONIC KIDNEY DISEASE) STAGE 3, GFR 30-59 ML/MIN: ICD-10-CM

## 2020-01-24 LAB
ALBUMIN SERPL BCP-MCNC: 3.7 G/DL (ref 3.5–5.2)
ANION GAP SERPL CALC-SCNC: 7 MMOL/L (ref 8–16)
BUN SERPL-MCNC: 38 MG/DL (ref 8–23)
CALCIUM SERPL-MCNC: 9.5 MG/DL (ref 8.7–10.5)
CHLORIDE SERPL-SCNC: 101 MMOL/L (ref 95–110)
CO2 SERPL-SCNC: 29 MMOL/L (ref 23–29)
CREAT SERPL-MCNC: 1.7 MG/DL (ref 0.5–1.4)
EST. GFR  (AFRICAN AMERICAN): 34.2 ML/MIN/1.73 M^2
EST. GFR  (NON AFRICAN AMERICAN): 29.7 ML/MIN/1.73 M^2
GLUCOSE SERPL-MCNC: 134 MG/DL (ref 70–110)
PHOSPHATE SERPL-MCNC: 4.7 MG/DL (ref 2.7–4.5)
POTASSIUM SERPL-SCNC: 4.6 MMOL/L (ref 3.5–5.1)
PTH-INTACT SERPL-MCNC: 76 PG/ML (ref 9–77)
SODIUM SERPL-SCNC: 137 MMOL/L (ref 136–145)

## 2020-01-24 PROCEDURE — 83970 ASSAY OF PARATHORMONE: CPT | Mod: HCNC

## 2020-01-24 PROCEDURE — 36415 COLL VENOUS BLD VENIPUNCTURE: CPT | Mod: HCNC,PO

## 2020-01-24 PROCEDURE — 80069 RENAL FUNCTION PANEL: CPT | Mod: HCNC

## 2020-01-25 DIAGNOSIS — E07.9 THYROID DISEASE: ICD-10-CM

## 2020-01-27 ENCOUNTER — PATIENT OUTREACH (OUTPATIENT)
Dept: ADMINISTRATIVE | Facility: OTHER | Age: 75
End: 2020-01-27

## 2020-01-27 RX ORDER — LEVOTHYROXINE SODIUM 100 UG/1
TABLET ORAL
Qty: 30 TABLET | Refills: 11 | Status: SHIPPED | OUTPATIENT
Start: 2020-01-27 | End: 2021-01-21

## 2020-01-28 ENCOUNTER — OFFICE VISIT (OUTPATIENT)
Dept: NEPHROLOGY | Facility: CLINIC | Age: 75
End: 2020-01-28
Payer: MEDICARE

## 2020-01-28 VITALS
DIASTOLIC BLOOD PRESSURE: 48 MMHG | SYSTOLIC BLOOD PRESSURE: 96 MMHG | HEIGHT: 61 IN | OXYGEN SATURATION: 97 % | WEIGHT: 188.94 LBS | HEART RATE: 61 BPM | BODY MASS INDEX: 35.67 KG/M2

## 2020-01-28 DIAGNOSIS — N18.4 CKD (CHRONIC KIDNEY DISEASE) STAGE 4, GFR 15-29 ML/MIN: ICD-10-CM

## 2020-01-28 DIAGNOSIS — Z79.4 TYPE 2 DIABETES MELLITUS WITH STAGE 3 CHRONIC KIDNEY DISEASE, WITH LONG-TERM CURRENT USE OF INSULIN: ICD-10-CM

## 2020-01-28 DIAGNOSIS — E11.22 TYPE 2 DIABETES MELLITUS WITH STAGE 3 CHRONIC KIDNEY DISEASE, WITH LONG-TERM CURRENT USE OF INSULIN: ICD-10-CM

## 2020-01-28 DIAGNOSIS — I15.2 HYPERTENSION ASSOCIATED WITH DIABETES: ICD-10-CM

## 2020-01-28 DIAGNOSIS — Z87.891 FORMER SMOKER: ICD-10-CM

## 2020-01-28 DIAGNOSIS — E11.59 HYPERTENSION ASSOCIATED WITH DIABETES: ICD-10-CM

## 2020-01-28 DIAGNOSIS — E66.01 CLASS 2 SEVERE OBESITY WITH SERIOUS COMORBIDITY IN ADULT, UNSPECIFIED BMI, UNSPECIFIED OBESITY TYPE: ICD-10-CM

## 2020-01-28 DIAGNOSIS — N18.30 TYPE 2 DIABETES MELLITUS WITH STAGE 3 CHRONIC KIDNEY DISEASE, WITH LONG-TERM CURRENT USE OF INSULIN: ICD-10-CM

## 2020-01-28 DIAGNOSIS — R80.1 PERSISTENT PROTEINURIA: ICD-10-CM

## 2020-01-28 DIAGNOSIS — I25.118 CORONARY ARTERY DISEASE OF NATIVE ARTERY OF NATIVE HEART WITH STABLE ANGINA PECTORIS: ICD-10-CM

## 2020-01-28 DIAGNOSIS — Z95.1 S/P CABG (CORONARY ARTERY BYPASS GRAFT): ICD-10-CM

## 2020-01-28 DIAGNOSIS — E78.5 DYSLIPIDEMIA ASSOCIATED WITH TYPE 2 DIABETES MELLITUS: ICD-10-CM

## 2020-01-28 DIAGNOSIS — E11.21 DIABETIC NEPHROPATHY ASSOCIATED WITH TYPE 2 DIABETES MELLITUS: ICD-10-CM

## 2020-01-28 DIAGNOSIS — I48.0 PAF (PAROXYSMAL ATRIAL FIBRILLATION): ICD-10-CM

## 2020-01-28 DIAGNOSIS — I10 ESSENTIAL HYPERTENSION: ICD-10-CM

## 2020-01-28 DIAGNOSIS — Z87.09 HISTORY OF ARDS: ICD-10-CM

## 2020-01-28 DIAGNOSIS — E11.69 DYSLIPIDEMIA ASSOCIATED WITH TYPE 2 DIABETES MELLITUS: ICD-10-CM

## 2020-01-28 DIAGNOSIS — I11.9 HYPERTENSIVE LEFT VENTRICULAR HYPERTROPHY, WITHOUT HEART FAILURE: ICD-10-CM

## 2020-01-28 DIAGNOSIS — N18.30 CKD (CHRONIC KIDNEY DISEASE) STAGE 3, GFR 30-59 ML/MIN: Primary | ICD-10-CM

## 2020-01-28 PROCEDURE — 99999 PR PBB SHADOW E&M-EST. PATIENT-LVL III: ICD-10-PCS | Mod: PBBFAC,HCNC,, | Performed by: INTERNAL MEDICINE

## 2020-01-28 PROCEDURE — 1101F PR PT FALLS ASSESS DOC 0-1 FALLS W/OUT INJ PAST YR: ICD-10-PCS | Mod: HCNC,CPTII,S$GLB, | Performed by: INTERNAL MEDICINE

## 2020-01-28 PROCEDURE — 99499 RISK ADDL DX/OHS AUDIT: ICD-10-PCS | Mod: HCNC,S$GLB,, | Performed by: INTERNAL MEDICINE

## 2020-01-28 PROCEDURE — 1101F PT FALLS ASSESS-DOCD LE1/YR: CPT | Mod: HCNC,CPTII,S$GLB, | Performed by: INTERNAL MEDICINE

## 2020-01-28 PROCEDURE — 3078F DIAST BP <80 MM HG: CPT | Mod: HCNC,CPTII,S$GLB, | Performed by: INTERNAL MEDICINE

## 2020-01-28 PROCEDURE — 99214 OFFICE O/P EST MOD 30 MIN: CPT | Mod: HCNC,S$GLB,, | Performed by: INTERNAL MEDICINE

## 2020-01-28 PROCEDURE — 3078F PR MOST RECENT DIASTOLIC BLOOD PRESSURE < 80 MM HG: ICD-10-PCS | Mod: HCNC,CPTII,S$GLB, | Performed by: INTERNAL MEDICINE

## 2020-01-28 PROCEDURE — 99499 UNLISTED E&M SERVICE: CPT | Mod: HCNC,S$GLB,, | Performed by: INTERNAL MEDICINE

## 2020-01-28 PROCEDURE — 99999 PR PBB SHADOW E&M-EST. PATIENT-LVL III: CPT | Mod: PBBFAC,HCNC,, | Performed by: INTERNAL MEDICINE

## 2020-01-28 PROCEDURE — 1159F MED LIST DOCD IN RCRD: CPT | Mod: HCNC,S$GLB,, | Performed by: INTERNAL MEDICINE

## 2020-01-28 PROCEDURE — 1159F PR MEDICATION LIST DOCUMENTED IN MEDICAL RECORD: ICD-10-PCS | Mod: HCNC,S$GLB,, | Performed by: INTERNAL MEDICINE

## 2020-01-28 PROCEDURE — 3074F SYST BP LT 130 MM HG: CPT | Mod: HCNC,CPTII,S$GLB, | Performed by: INTERNAL MEDICINE

## 2020-01-28 PROCEDURE — 99214 PR OFFICE/OUTPT VISIT, EST, LEVL IV, 30-39 MIN: ICD-10-PCS | Mod: HCNC,S$GLB,, | Performed by: INTERNAL MEDICINE

## 2020-01-28 PROCEDURE — 3074F PR MOST RECENT SYSTOLIC BLOOD PRESSURE < 130 MM HG: ICD-10-PCS | Mod: HCNC,CPTII,S$GLB, | Performed by: INTERNAL MEDICINE

## 2020-01-28 NOTE — PROGRESS NOTES
Subjective:       Patient ID: Adele Hall is a 74 y.o. White female who presents for follow-up evaluation of No chief complaint on file.    HPI  Review of Systems    Objective:      Physical Exam    Assessment:       No diagnosis found.    Plan:            Subjective:       Patient ID: Adele Hall is a 74 y.o. White female who presents for follow-up evaluation of No chief complaint on file.    HPI     She reports she is doing well. Her DM has improved with last Hba1c of 6.9. She feels well. No edema but has chronic JONES. No new medications since last visit. She avoids NSAIDs and no herbal medications. She received the flu vaccine    Review of Systems   Constitutional: Negative for activity change, appetite change, fatigue and unexpected weight change.   HENT: Negative for facial swelling.    Respiratory: Positive for shortness of breath. Negative for cough and wheezing.    Cardiovascular: Negative for chest pain and leg swelling.   Gastrointestinal: Negative for abdominal distention.   Genitourinary: Negative for difficulty urinating and dysuria.   Musculoskeletal: Negative for arthralgias.   Neurological: Negative for weakness.       Objective:      Physical Exam   Constitutional: She is oriented to person, place, and time. She appears well-nourished.   HENT:   Mouth/Throat: Oropharynx is clear and moist.   Neck: No JVD present.   Cardiovascular: S1 normal and S2 normal.  Exam reveals no friction rub.    Pulmonary/Chest: Breath sounds normal. She has no wheezes. She has no rales.   Abdominal: Soft.   Musculoskeletal: She exhibits no edema.   Neurological: She is alert and oriented to person, place, and time.   Skin: Skin is warm and dry.   Psychiatric: She has a normal mood and affect.   Vitals reviewed.      Assessment:       No diagnosis found.    Plan:             CKD with stable kidney function and proteinuria. Continue RAAS blockade for renal preservation    HTN--stop Norvasc    Mineral  and Bone Disease--PTH at goal. Continue D2    DM is controlled    Anemia--stable    RTC 6 months with labs prior

## 2020-01-31 ENCOUNTER — NURSE TRIAGE (OUTPATIENT)
Dept: ADMINISTRATIVE | Facility: CLINIC | Age: 75
End: 2020-01-31

## 2020-01-31 DIAGNOSIS — E55.9 VITAMIN D DEFICIENCY: ICD-10-CM

## 2020-01-31 RX ORDER — ERGOCALCIFEROL 1.25 MG/1
CAPSULE ORAL
Qty: 8 CAPSULE | Refills: 1 | Status: SHIPPED | OUTPATIENT
Start: 2020-01-31 | End: 2020-02-06 | Stop reason: ALTCHOICE

## 2020-02-01 NOTE — TELEPHONE ENCOUNTER
"Pt states her blood pressure has been running low, she states it is 116/46, she denies dizziness, chest pain, trouble breathing, admits to being weak, advised her to stay hydrated and see a provider within 3 days and to call back for any worsening symptoms or any concerns, caller agreed    Reason for Disposition   Diastolic BP < 50 mm Hg    Additional Information   Negative: Started suddenly after an allergic medicine, an allergic food, or bee sting   Negative: Shock suspected (e.g., cold/pale/clammy skin, too weak to stand, low BP, rapid pulse)   Negative: Difficult to awaken or acting confused (e.g., disoriented, slurred speech)   Negative: Fainted   Negative: [1] Systolic BP < 90 AND [2] dizzy, lightheaded, or weak   Negative: Chest pain   Negative: Bleeding (e.g., vomiting blood, rectal bleeding or tarry stools, severe vaginal bleeding)(Exception: fainted from sight of small amount of blood; small cut or abrasion)   Negative: Extra heart beats or heart is beating fast  (i.e., "palpitations")   Negative: Sounds like a life-threatening emergency to the triager   Negative: [1] Systolic BP < 80 AND [2] NOT dizzy, lightheaded or weak   Negative: Abdominal pain   Negative: Fever > 100.5 F (38.1 C)   Negative: Major surgery in the past month   Negative: [1] Drinking very little AND [2] dehydration suspected (e.g., no urine > 12 hours, very dry mouth, very lightheaded)   Negative: [1] Fall in systolic BP > 20 mm Hg from normal AND [2] dizzy, lightheaded, or weak   Negative: Patient sounds very sick or weak to the triager   Negative: [1] Systolic BP < 90 AND [2] NOT dizzy, lightheaded or weak   Negative: [1] Systolic BP  AND [2] taking blood pressure medications AND [3] dizzy, lightheaded or weak   Negative: [1] Systolic BP  AND [2] taking blood pressure medications AND [3] NOT dizzy, lightheaded or weak    Protocols used: LOW BLOOD PRESSURE-A-AH      "

## 2020-02-01 NOTE — TELEPHONE ENCOUNTER
She has not had a vitamin-D level in quite some time and I do not know if she needs to stay on it twice a week.  Vitamin-D level order in, can add to lab scheduled for March 10th

## 2020-02-03 ENCOUNTER — PATIENT OUTREACH (OUTPATIENT)
Dept: ADMINISTRATIVE | Facility: OTHER | Age: 75
End: 2020-02-03

## 2020-02-04 ENCOUNTER — OFFICE VISIT (OUTPATIENT)
Dept: OPTOMETRY | Facility: CLINIC | Age: 75
End: 2020-02-04
Attending: FAMILY MEDICINE
Payer: MEDICARE

## 2020-02-04 ENCOUNTER — LAB VISIT (OUTPATIENT)
Dept: LAB | Facility: HOSPITAL | Age: 75
End: 2020-02-04
Attending: FAMILY MEDICINE
Payer: MEDICARE

## 2020-02-04 ENCOUNTER — TELEPHONE (OUTPATIENT)
Dept: NEPHROLOGY | Facility: CLINIC | Age: 75
End: 2020-02-04

## 2020-02-04 DIAGNOSIS — H35.363 RETINAL DRUSEN OF BOTH EYES: ICD-10-CM

## 2020-02-04 DIAGNOSIS — H25.13 NUCLEAR SCLEROSIS, BILATERAL: ICD-10-CM

## 2020-02-04 DIAGNOSIS — H52.7 REFRACTIVE ERROR: ICD-10-CM

## 2020-02-04 DIAGNOSIS — E11.9 DIABETES MELLITUS WITHOUT OPHTHALMIC MANIFESTATIONS: Primary | ICD-10-CM

## 2020-02-04 DIAGNOSIS — H35.54 RETINAL PIGMENT EPITHELIAL HYPERTROPHY: ICD-10-CM

## 2020-02-04 DIAGNOSIS — E55.9 VITAMIN D DEFICIENCY: ICD-10-CM

## 2020-02-04 PROCEDURE — 92014 PR EYE EXAM, EST PATIENT,COMPREHESV: ICD-10-PCS | Mod: HCNC,S$GLB,, | Performed by: OPTOMETRIST

## 2020-02-04 PROCEDURE — 99999 PR PBB SHADOW E&M-EST. PATIENT-LVL II: CPT | Mod: PBBFAC,HCNC,, | Performed by: OPTOMETRIST

## 2020-02-04 PROCEDURE — 92014 COMPRE OPH EXAM EST PT 1/>: CPT | Mod: HCNC,S$GLB,, | Performed by: OPTOMETRIST

## 2020-02-04 PROCEDURE — 82306 VITAMIN D 25 HYDROXY: CPT | Mod: HCNC

## 2020-02-04 PROCEDURE — 36415 COLL VENOUS BLD VENIPUNCTURE: CPT | Mod: HCNC,PO

## 2020-02-04 PROCEDURE — 99999 PR PBB SHADOW E&M-EST. PATIENT-LVL II: ICD-10-PCS | Mod: PBBFAC,HCNC,, | Performed by: OPTOMETRIST

## 2020-02-04 NOTE — PROGRESS NOTES
HPI     Diabetic Eye Exam      Additional comments: BSL fluctuates, uncontrolled              Itchy Eye      Additional comments: occasional seasonal allergies              Annual Exam      Additional comments: DLE 1-19 (donedena)   ocular health exam  // pt   states no visual complaints              Comments     Hemoglobin A1C       Date                     Value               Ref Range             Status                12/10/2019               7.4 (H)             4.0 - 5.6 %           Final                  09/13/2019               8.1 (H)             4.0 - 5.6 %           Final                 05/14/2019               7.1 (H)             4.0 - 5.6 %           Final                        Last edited by Doyle Mendez, OD on 2/4/2020  1:28 PM. (History)            Assessment /Plan     For exam results, see Encounter Report.    Diabetes mellitus without ophthalmic manifestations    Nuclear sclerosis, bilateral    Refractive error    Retinal drusen of both eyes      DM type 2 w/o ocular retinopathy OU. Discussed possible ocular affects of uncontrolled blood sugar with patient. Recommended continued strong blood sugar control and continued care with PCP. Monitor yearly.     Moderate NS OU, not yet significant. Discussed possible ocular affects of cataracts. Acceptable BCVA OU. Discussed treatment options. Surgery not recommended at this time. Monitor yearly.     Pt happy with current specs, denies refraction. Return as desired for updated spec Rx.    Retinal drusen OU. Start otc AREDS vitamins by mouth as directed.  Pt denies smoking. Wear sunglasses outdoors. Monitor yearly, sooner if any vision changes.     CHRPE inferior temporal OS, stable from previous description. Flat, distinct borders, no overlying pigment. Monitor yearly.      RTC in 1 year for comprehensive eye exam, or sooner prn.

## 2020-02-04 NOTE — PROGRESS NOTES
Subjective:       Patient ID: Adele Hall is a 74 y.o. White female who presents for follow-up evaluation of Chronic Kidney Disease    HPI     Interval history Jan 2020: she is feeling well minus bilateral shoulder pain. Last Hba1c was 7.4, down from 8.1. LE edema is about the same.     Review of Systems   Constitutional: Positive for fatigue. Negative for activity change and appetite change.   HENT: Negative for facial swelling.    Eyes: Negative for visual disturbance.   Respiratory: Negative for shortness of breath.    Cardiovascular: Positive for leg swelling. Negative for chest pain.   Gastrointestinal: Positive for constipation.   Endocrine: Positive for cold intolerance.   Genitourinary: Negative for difficulty urinating and dysuria.   Musculoskeletal: Positive for arthralgias and back pain.   Allergic/Immunologic: Negative for immunocompromised state.   Neurological: Negative for weakness.       Objective:      Physical Exam   Constitutional: She is oriented to person, place, and time. She appears well-nourished. No distress.   HENT:   Mouth/Throat: Oropharynx is clear and moist.   Eyes: No scleral icterus.   Neck: No JVD present.   Cardiovascular: S1 normal and S2 normal. Exam reveals no friction rub.   Pulmonary/Chest: Breath sounds normal. She has no wheezes. She has no rales.   Abdominal: Soft.   Musculoskeletal: She exhibits edema.   Neurological: She is alert and oriented to person, place, and time.   Skin: Skin is warm and dry.   Psychiatric: She has a normal mood and affect.   Nursing note and vitals reviewed.      Assessment:       1. CKD (chronic kidney disease) stage 3, GFR 30-59 ml/min    2. Persistent proteinuria    3. Diabetic nephropathy associated with type 2 diabetes mellitus    4. Hypertension associated with diabetes    5. S/P CABG (coronary artery bypass graft) - x4 01/19/2016; LIMA to LAD; SVG's to diag, OMB, PDA - all grafts patent 01/2018    6. Hypertensive left ventricular  hypertrophy, without heart failure    7. History of ARDS, post CABG, 1/2016    8. Type 2 diabetes mellitus with stage 3 chronic kidney disease, with long-term current use of insulin    9. PAF (paroxysmal atrial fibrillation), post CABG    10. Dyslipidemia associated with type 2 diabetes mellitus    11. Class 2 severe obesity with serious comorbidity in adult, unspecified BMI, unspecified obesity type    12. Former smoker    13. CKD (chronic kidney disease) stage 4, GFR 15-29 ml/min    14. Coronary artery disease of native artery of native heart with stable angina pectoris    15. Essential hypertension        Plan:           CKD with stable kidney function and proteinuria. Continue RAAS blockade for renal preservation     HTN is controlled at home with -130's. She is fine with keeping ace-inhibitor      Mineral and Bone Disease--PTH at goal. Continue D2     DM is not well controlled. Continue Endocrine follow up       Anemia--stable       RTC 6 months with labs prior

## 2020-02-04 NOTE — TELEPHONE ENCOUNTER
----- Message from Harjeet Che sent at 2/4/2020  4:12 PM CST -----  Type: Needs Medical Advice    Who Called:   Patient    Best Call Back Number:  [phone]    Additional Information: Patient states that her BP on   02/01/20:  7:00 am 187/88,  11:30 153/59, 3:00pm 146/66, 10:00pm 114/78  02/02/20   9:30 am  157/77,  4:30  147/66, 11:30, 175/75  02/03/20           Am  136/60, 5:00 pm136/55  02/04/20   11:00 147/52, 4:00 pm 126/51

## 2020-02-04 NOTE — LETTER
February 4, 2020      Wally Cazares MD  [address]  [place] [address]           [facility] - Optometry  [address] [place] [address] [address]IVE [address]  [facility] [address]  Phone: [phone]          Patient: Adele Hall   MR Number: [id number]   YOB: 1945   Date of Visit: 2/4/2020       Dear Dr. Wally Cazares:    Thank you for referring Adele Hall to me for evaluation. Attached you will find relevant portions of my assessment and plan of care.    If you have questions, please do not hesitate to call me. I look forward to following Adele Hall along with you.    Sincerely,    Doyle Mendez, OD    Enclosure  CC:  No Recipients    If you would like to receive this communication electronically, please contact [email or web address] or [phone] to request more information on [facility] access.    For providers and/or their staff who would like to refer a patient to [facility], please contact us through our one-stop-shop provider referral line, [facility], at [phone].    If you feel you have received this communication in error or would no longer like to receive these types of communications, please e-mail [email or web address]

## 2020-02-05 LAB — 25(OH)D3+25(OH)D2 SERPL-MCNC: 45 NG/ML (ref 30–96)

## 2020-02-06 ENCOUNTER — TELEPHONE (OUTPATIENT)
Dept: FAMILY MEDICINE | Facility: CLINIC | Age: 75
End: 2020-02-06

## 2020-02-06 NOTE — TELEPHONE ENCOUNTER
Vit d results given to patient- Ergocalciferol discontinued. She states her bp was low and Dr. Montero told her to stop the Lisinopril.    Dementia    DM (diabetes mellitus)    High cholesterol    HTN (hypertension)    Overactive bladder

## 2020-02-06 NOTE — TELEPHONE ENCOUNTER
----- Message from Angelina Cordova sent at 2/6/2020 11:52 AM CST -----  Type:  Patient Returning Call    Who Called:  Patient  Who Left Message for Patient:  n/a  Does the patient know what this is regarding?:  Test results  Best Call Back Number:   [phone]

## 2020-02-11 ENCOUNTER — TELEPHONE (OUTPATIENT)
Dept: FAMILY MEDICINE | Facility: CLINIC | Age: 75
End: 2020-02-11

## 2020-02-11 RX ORDER — LISINOPRIL 5 MG/1
TABLET ORAL
Qty: 270 TABLET | Refills: 0 | Status: SHIPPED | OUTPATIENT
Start: 2020-02-11 | End: 2020-08-17

## 2020-02-11 NOTE — TELEPHONE ENCOUNTER
I reviewed her home BPS. They are quite labile so best to take lisinorpil twice a day, but at a lower dose.     Please instruct them to start lisinopril 5mg BID--I will send the 5mg tablet to [facility].    BP log in a week or so     Thank you

## 2020-02-11 NOTE — TELEPHONE ENCOUNTER
----- Message from Harshad Horan sent at 2/11/2020  4:23 PM CST -----  Contact: Patient  Type: Needs Medical Advice    Who Called:  Patient  Best Call Back Number: [phone]  Additional Information: Patient would like to discuss directions for Vitamin D. Please call to advise. Thanks!

## 2020-02-13 ENCOUNTER — TELEPHONE (OUTPATIENT)
Dept: OPTOMETRY | Facility: CLINIC | Age: 75
End: 2020-02-13

## 2020-02-13 DIAGNOSIS — H35.363 RETINAL DRUSEN OF BOTH EYES: Primary | ICD-10-CM

## 2020-02-13 NOTE — TELEPHONE ENCOUNTER
----- Message from Nell Lees sent at 2/13/2020  2:02 PM CST -----  Contact: Adele pt  Pt wants rx for Areds vitamins due to OTC cost of $22.    ----- Message -----  From: Tala Landeros  Sent: 2/13/2020   1:46 PM CST  To: Andrea Kwon Staff    Type: Needs Medical Advice    Who Called:  Adele  Pharmacy name and phone #:    [facility] [address] [facility] [address] [address] [facility] [address]  [place] [address]  Phone: [phone] Fax: [phone]      Best Call Back Number: [phone]  Additional Information: Pls call pt regarding her vitamins.  told her to get OTC vitamins. They are $22 bottle. Pharm told her to get a script and insurance may cover them

## 2020-03-01 ENCOUNTER — PATIENT OUTREACH (OUTPATIENT)
Dept: ADMINISTRATIVE | Facility: OTHER | Age: 75
End: 2020-03-01

## 2020-03-02 ENCOUNTER — OFFICE VISIT (OUTPATIENT)
Dept: ORTHOPEDICS | Facility: CLINIC | Age: 75
End: 2020-03-02
Payer: MEDICARE

## 2020-03-02 VITALS — WEIGHT: 188 LBS | RESPIRATION RATE: 16 BRPM | HEIGHT: 61 IN | BODY MASS INDEX: 35.5 KG/M2

## 2020-03-02 DIAGNOSIS — G89.29 CHRONIC PAIN OF BOTH SHOULDERS: Primary | ICD-10-CM

## 2020-03-02 DIAGNOSIS — M25.512 CHRONIC PAIN OF BOTH SHOULDERS: Primary | ICD-10-CM

## 2020-03-02 DIAGNOSIS — M25.511 CHRONIC PAIN OF BOTH SHOULDERS: Primary | ICD-10-CM

## 2020-03-02 DIAGNOSIS — M25.512 LEFT SHOULDER PAIN, UNSPECIFIED CHRONICITY: ICD-10-CM

## 2020-03-02 PROCEDURE — 1125F PR PAIN SEVERITY QUANTIFIED, PAIN PRESENT: ICD-10-PCS | Mod: HCNC,S$GLB,, | Performed by: ORTHOPAEDIC SURGERY

## 2020-03-02 PROCEDURE — 1159F PR MEDICATION LIST DOCUMENTED IN MEDICAL RECORD: ICD-10-PCS | Mod: HCNC,S$GLB,, | Performed by: ORTHOPAEDIC SURGERY

## 2020-03-02 PROCEDURE — 1159F MED LIST DOCD IN RCRD: CPT | Mod: HCNC,S$GLB,, | Performed by: ORTHOPAEDIC SURGERY

## 2020-03-02 PROCEDURE — 99213 PR OFFICE/OUTPT VISIT, EST, LEVL III, 20-29 MIN: ICD-10-PCS | Mod: HCNC,25,S$GLB, | Performed by: ORTHOPAEDIC SURGERY

## 2020-03-02 PROCEDURE — 1101F PR PT FALLS ASSESS DOC 0-1 FALLS W/OUT INJ PAST YR: ICD-10-PCS | Mod: HCNC,CPTII,S$GLB, | Performed by: ORTHOPAEDIC SURGERY

## 2020-03-02 PROCEDURE — 99213 OFFICE O/P EST LOW 20 MIN: CPT | Mod: HCNC,25,S$GLB, | Performed by: ORTHOPAEDIC SURGERY

## 2020-03-02 PROCEDURE — 99999 PR PBB SHADOW E&M-EST. PATIENT-LVL III: CPT | Mod: PBBFAC,HCNC,, | Performed by: ORTHOPAEDIC SURGERY

## 2020-03-02 PROCEDURE — 20610 LARGE JOINT ASPIRATION/INJECTION: BILATERAL SUBACROMIAL BURSA: ICD-10-PCS | Mod: 50,HCNC,S$GLB, | Performed by: ORTHOPAEDIC SURGERY

## 2020-03-02 PROCEDURE — 1101F PT FALLS ASSESS-DOCD LE1/YR: CPT | Mod: HCNC,CPTII,S$GLB, | Performed by: ORTHOPAEDIC SURGERY

## 2020-03-02 PROCEDURE — 99999 PR PBB SHADOW E&M-EST. PATIENT-LVL III: ICD-10-PCS | Mod: PBBFAC,HCNC,, | Performed by: ORTHOPAEDIC SURGERY

## 2020-03-02 PROCEDURE — 20610 DRAIN/INJ JOINT/BURSA W/O US: CPT | Mod: 50,HCNC,S$GLB, | Performed by: ORTHOPAEDIC SURGERY

## 2020-03-02 PROCEDURE — 1125F AMNT PAIN NOTED PAIN PRSNT: CPT | Mod: HCNC,S$GLB,, | Performed by: ORTHOPAEDIC SURGERY

## 2020-03-02 RX ORDER — TRAMADOL HYDROCHLORIDE 50 MG/1
50 TABLET ORAL EVERY 6 HOURS PRN
Qty: 28 TABLET | Refills: 0 | Status: SHIPPED | OUTPATIENT
Start: 2020-03-02 | End: 2020-06-05 | Stop reason: SDUPTHER

## 2020-03-02 RX ADMIN — TRIAMCINOLONE ACETONIDE 40 MG: 40 INJECTION, SUSPENSION INTRA-ARTICULAR; INTRAMUSCULAR at 01:03

## 2020-03-02 NOTE — PROGRESS NOTES
Chart reviewed.   Requested updates from Care Everywhere.  Hx of polyps  Provider recommended pt f/u with a colonoscopy within 6 months post colonoscopy on 2/6/2019.

## 2020-03-06 RX ORDER — TRIAMCINOLONE ACETONIDE 40 MG/ML
40 INJECTION, SUSPENSION INTRA-ARTICULAR; INTRAMUSCULAR
Status: DISCONTINUED | OUTPATIENT
Start: 2020-03-02 | End: 2020-03-06 | Stop reason: HOSPADM

## 2020-03-06 NOTE — PROCEDURES
Large Joint Aspiration/Injection: bilateral subacromial bursa  Performed by: Michael Fair MD  Authorized by: Michael Fair MD  Date/Time: 3/2/2020 1:15 PM    Consent Done?:  Yes (Verbal)  Indications:  pain  Timeout: Immediately prior to procedure a time out was called to verify the correct patient, procedure, equipment, support staff and site/side marked as required.        Details:  Approach: lateral  Location:  Shoulder  Site:  Bilateral subacromial bursa    Medications (Right): 40 mg triamcinolone acetonide 40 mg/mL  Medications (Left): 40 mg triamcinolone acetonide 40 mg/mL

## 2020-03-10 ENCOUNTER — LAB VISIT (OUTPATIENT)
Dept: LAB | Facility: HOSPITAL | Age: 75
End: 2020-03-10
Attending: FAMILY MEDICINE
Payer: MEDICARE

## 2020-03-10 DIAGNOSIS — E11.22 TYPE 2 DIABETES MELLITUS WITH STAGE 3 CHRONIC KIDNEY DISEASE, WITH LONG-TERM CURRENT USE OF INSULIN: ICD-10-CM

## 2020-03-10 DIAGNOSIS — Z79.4 TYPE 2 DIABETES MELLITUS WITH STAGE 3 CHRONIC KIDNEY DISEASE, WITH LONG-TERM CURRENT USE OF INSULIN: ICD-10-CM

## 2020-03-10 DIAGNOSIS — N18.30 TYPE 2 DIABETES MELLITUS WITH STAGE 3 CHRONIC KIDNEY DISEASE, WITH LONG-TERM CURRENT USE OF INSULIN: ICD-10-CM

## 2020-03-10 LAB
ANION GAP SERPL CALC-SCNC: 9 MMOL/L (ref 8–16)
BUN SERPL-MCNC: 37 MG/DL (ref 8–23)
CALCIUM SERPL-MCNC: 9.2 MG/DL (ref 8.7–10.5)
CHLORIDE SERPL-SCNC: 105 MMOL/L (ref 95–110)
CHOLEST SERPL-MCNC: 161 MG/DL (ref 120–199)
CHOLEST/HDLC SERPL: 3.6 {RATIO} (ref 2–5)
CO2 SERPL-SCNC: 26 MMOL/L (ref 23–29)
CREAT SERPL-MCNC: 1.6 MG/DL (ref 0.5–1.4)
EST. GFR  (AFRICAN AMERICAN): 36.8 ML/MIN/1.73 M^2
EST. GFR  (NON AFRICAN AMERICAN): 32 ML/MIN/1.73 M^2
GLUCOSE SERPL-MCNC: 162 MG/DL (ref 70–110)
HDLC SERPL-MCNC: 45 MG/DL (ref 40–75)
HDLC SERPL: 28 % (ref 20–50)
LDLC SERPL CALC-MCNC: 83.4 MG/DL (ref 63–159)
NONHDLC SERPL-MCNC: 116 MG/DL
POTASSIUM SERPL-SCNC: 4.7 MMOL/L (ref 3.5–5.1)
SODIUM SERPL-SCNC: 140 MMOL/L (ref 136–145)
TRIGL SERPL-MCNC: 163 MG/DL (ref 30–150)

## 2020-03-10 PROCEDURE — 80048 BASIC METABOLIC PNL TOTAL CA: CPT | Mod: HCNC

## 2020-03-10 PROCEDURE — 36415 COLL VENOUS BLD VENIPUNCTURE: CPT | Mod: HCNC,PO

## 2020-03-10 PROCEDURE — 83036 HEMOGLOBIN GLYCOSYLATED A1C: CPT | Mod: HCNC

## 2020-03-10 PROCEDURE — 80061 LIPID PANEL: CPT | Mod: HCNC

## 2020-03-11 LAB
ESTIMATED AVG GLUCOSE: 163 MG/DL (ref 68–131)
HBA1C MFR BLD HPLC: 7.3 % (ref 4–5.6)

## 2020-04-16 ENCOUNTER — TELEPHONE (OUTPATIENT)
Dept: ENDOCRINOLOGY | Facility: CLINIC | Age: 75
End: 2020-04-16

## 2020-04-17 ENCOUNTER — TELEPHONE (OUTPATIENT)
Dept: ENDOCRINOLOGY | Facility: CLINIC | Age: 75
End: 2020-04-17

## 2020-04-28 RX ORDER — METOPROLOL TARTRATE 25 MG/1
12.5 TABLET, FILM COATED ORAL 2 TIMES DAILY
Qty: 90 TABLET | Refills: 1 | Status: ON HOLD | OUTPATIENT
Start: 2020-04-28 | End: 2020-06-11 | Stop reason: SDUPTHER

## 2020-05-18 ENCOUNTER — TELEPHONE (OUTPATIENT)
Dept: ENDOCRINOLOGY | Facility: CLINIC | Age: 75
End: 2020-05-18

## 2020-05-18 DIAGNOSIS — E11.42 TYPE 2 DIABETES MELLITUS WITH DIABETIC POLYNEUROPATHY, WITH LONG-TERM CURRENT USE OF INSULIN: Primary | ICD-10-CM

## 2020-05-18 DIAGNOSIS — E78.2 MIXED HYPERLIPIDEMIA: ICD-10-CM

## 2020-05-18 DIAGNOSIS — Z79.4 TYPE 2 DIABETES MELLITUS WITH DIABETIC POLYNEUROPATHY, WITH LONG-TERM CURRENT USE OF INSULIN: Primary | ICD-10-CM

## 2020-05-18 RX ORDER — ROSUVASTATIN CALCIUM 40 MG/1
TABLET, COATED ORAL
Qty: 30 TABLET | Refills: 10 | Status: SHIPPED | OUTPATIENT
Start: 2020-05-18 | End: 2021-05-12

## 2020-05-18 NOTE — TELEPHONE ENCOUNTER
Pt. Scheduled for labs on 5/28. Can you please order A1C, BMP and lipid for her on behalf of Babs Hutchinson

## 2020-05-26 ENCOUNTER — LAB VISIT (OUTPATIENT)
Dept: LAB | Facility: HOSPITAL | Age: 75
End: 2020-05-26
Attending: INTERNAL MEDICINE
Payer: MEDICARE

## 2020-05-26 DIAGNOSIS — E11.42 TYPE 2 DIABETES MELLITUS WITH DIABETIC POLYNEUROPATHY, WITH LONG-TERM CURRENT USE OF INSULIN: ICD-10-CM

## 2020-05-26 DIAGNOSIS — Z79.4 TYPE 2 DIABETES MELLITUS WITH DIABETIC POLYNEUROPATHY, WITH LONG-TERM CURRENT USE OF INSULIN: ICD-10-CM

## 2020-05-26 LAB
ANION GAP SERPL CALC-SCNC: 9 MMOL/L (ref 8–16)
BUN SERPL-MCNC: 22 MG/DL (ref 8–23)
CALCIUM SERPL-MCNC: 9.3 MG/DL (ref 8.7–10.5)
CHLORIDE SERPL-SCNC: 103 MMOL/L (ref 95–110)
CHOLEST SERPL-MCNC: 138 MG/DL (ref 120–199)
CHOLEST/HDLC SERPL: 4.5 {RATIO} (ref 2–5)
CO2 SERPL-SCNC: 27 MMOL/L (ref 23–29)
CREAT SERPL-MCNC: 1.4 MG/DL (ref 0.5–1.4)
EST. GFR  (AFRICAN AMERICAN): 43.3 ML/MIN/1.73 M^2
EST. GFR  (NON AFRICAN AMERICAN): 37.6 ML/MIN/1.73 M^2
ESTIMATED AVG GLUCOSE: 192 MG/DL (ref 68–131)
GLUCOSE SERPL-MCNC: 260 MG/DL (ref 70–110)
HBA1C MFR BLD HPLC: 8.3 % (ref 4–5.6)
HDLC SERPL-MCNC: 31 MG/DL (ref 40–75)
HDLC SERPL: 22.5 % (ref 20–50)
LDLC SERPL CALC-MCNC: 49.2 MG/DL (ref 63–159)
NONHDLC SERPL-MCNC: 107 MG/DL
POTASSIUM SERPL-SCNC: 4.6 MMOL/L (ref 3.5–5.1)
SODIUM SERPL-SCNC: 139 MMOL/L (ref 136–145)
TRIGL SERPL-MCNC: 289 MG/DL (ref 30–150)

## 2020-05-26 PROCEDURE — 36415 COLL VENOUS BLD VENIPUNCTURE: CPT | Mod: HCNC,PO

## 2020-05-26 PROCEDURE — 83036 HEMOGLOBIN GLYCOSYLATED A1C: CPT | Mod: HCNC

## 2020-05-26 PROCEDURE — 80061 LIPID PANEL: CPT | Mod: HCNC

## 2020-05-26 PROCEDURE — 80048 BASIC METABOLIC PNL TOTAL CA: CPT | Mod: HCNC

## 2020-05-27 ENCOUNTER — TELEPHONE (OUTPATIENT)
Dept: ENDOCRINOLOGY | Facility: CLINIC | Age: 75
End: 2020-05-27

## 2020-06-03 ENCOUNTER — PATIENT OUTREACH (OUTPATIENT)
Dept: ADMINISTRATIVE | Facility: OTHER | Age: 75
End: 2020-06-03

## 2020-06-05 ENCOUNTER — OFFICE VISIT (OUTPATIENT)
Dept: ORTHOPEDICS | Facility: CLINIC | Age: 75
End: 2020-06-05
Payer: MEDICARE

## 2020-06-05 VITALS — HEIGHT: 61 IN | BODY MASS INDEX: 35.5 KG/M2 | TEMPERATURE: 98 F | WEIGHT: 188 LBS

## 2020-06-05 DIAGNOSIS — M25.512 LEFT SHOULDER PAIN, UNSPECIFIED CHRONICITY: ICD-10-CM

## 2020-06-05 DIAGNOSIS — M25.512 CHRONIC PAIN OF BOTH SHOULDERS: Primary | ICD-10-CM

## 2020-06-05 DIAGNOSIS — M25.511 CHRONIC PAIN OF BOTH SHOULDERS: Primary | ICD-10-CM

## 2020-06-05 DIAGNOSIS — G89.29 CHRONIC PAIN OF BOTH SHOULDERS: Primary | ICD-10-CM

## 2020-06-05 PROCEDURE — 1101F PR PT FALLS ASSESS DOC 0-1 FALLS W/OUT INJ PAST YR: ICD-10-PCS | Mod: HCNC,CPTII,S$GLB, | Performed by: ORTHOPAEDIC SURGERY

## 2020-06-05 PROCEDURE — 20610 LARGE JOINT ASPIRATION/INJECTION: BILATERAL SUBACROMIAL BURSA: ICD-10-PCS | Mod: 50,HCNC,S$GLB, | Performed by: ORTHOPAEDIC SURGERY

## 2020-06-05 PROCEDURE — 1159F MED LIST DOCD IN RCRD: CPT | Mod: HCNC,S$GLB,, | Performed by: ORTHOPAEDIC SURGERY

## 2020-06-05 PROCEDURE — 1101F PT FALLS ASSESS-DOCD LE1/YR: CPT | Mod: HCNC,CPTII,S$GLB, | Performed by: ORTHOPAEDIC SURGERY

## 2020-06-05 PROCEDURE — 20610 DRAIN/INJ JOINT/BURSA W/O US: CPT | Mod: 50,HCNC,S$GLB, | Performed by: ORTHOPAEDIC SURGERY

## 2020-06-05 PROCEDURE — 99999 PR PBB SHADOW E&M-EST. PATIENT-LVL III: ICD-10-PCS | Mod: PBBFAC,HCNC,, | Performed by: ORTHOPAEDIC SURGERY

## 2020-06-05 PROCEDURE — 1125F AMNT PAIN NOTED PAIN PRSNT: CPT | Mod: HCNC,S$GLB,, | Performed by: ORTHOPAEDIC SURGERY

## 2020-06-05 PROCEDURE — 99999 PR PBB SHADOW E&M-EST. PATIENT-LVL III: CPT | Mod: PBBFAC,HCNC,, | Performed by: ORTHOPAEDIC SURGERY

## 2020-06-05 PROCEDURE — 99213 OFFICE O/P EST LOW 20 MIN: CPT | Mod: 25,HCNC,S$GLB, | Performed by: ORTHOPAEDIC SURGERY

## 2020-06-05 PROCEDURE — 1125F PR PAIN SEVERITY QUANTIFIED, PAIN PRESENT: ICD-10-PCS | Mod: HCNC,S$GLB,, | Performed by: ORTHOPAEDIC SURGERY

## 2020-06-05 PROCEDURE — 99213 PR OFFICE/OUTPT VISIT, EST, LEVL III, 20-29 MIN: ICD-10-PCS | Mod: 25,HCNC,S$GLB, | Performed by: ORTHOPAEDIC SURGERY

## 2020-06-05 PROCEDURE — 1159F PR MEDICATION LIST DOCUMENTED IN MEDICAL RECORD: ICD-10-PCS | Mod: HCNC,S$GLB,, | Performed by: ORTHOPAEDIC SURGERY

## 2020-06-05 RX ORDER — TRIAMCINOLONE ACETONIDE 40 MG/ML
40 INJECTION, SUSPENSION INTRA-ARTICULAR; INTRAMUSCULAR
Status: DISCONTINUED | OUTPATIENT
Start: 2020-06-05 | End: 2020-06-05 | Stop reason: HOSPADM

## 2020-06-05 RX ORDER — TRAMADOL HYDROCHLORIDE 50 MG/1
50 TABLET ORAL EVERY 6 HOURS PRN
Qty: 28 TABLET | Refills: 0 | Status: SHIPPED | OUTPATIENT
Start: 2020-06-05 | End: 2021-03-19 | Stop reason: SDUPTHER

## 2020-06-05 RX ADMIN — TRIAMCINOLONE ACETONIDE 40 MG: 40 INJECTION, SUSPENSION INTRA-ARTICULAR; INTRAMUSCULAR at 10:06

## 2020-06-05 NOTE — PROCEDURES
Large Joint Aspiration/Injection: bilateral subacromial bursa  Date/Time: 6/5/2020 10:15 AM  Performed by: Michael Fair MD  Authorized by: Michael Fair MD     Consent Done?:  Yes (Verbal)  Indications:  Pain  Timeout: prior to procedure the correct patient, procedure, and site was verified    Approach:  Lateral  Location:  Shoulder  Laterality:  Bilateral  Site:  Bilateral subacromial bursa  Medications (Right):  40 mg triamcinolone acetonide 40 mg/mL  Medications (Left):  40 mg triamcinolone acetonide 40 mg/mL

## 2020-06-05 NOTE — PROGRESS NOTES
Past Medical History:   Diagnosis Date    Anesthesia complication     took patient 6 days to come out of anethesia after CABG    Anticoagulant long-term use     Arthritis     Chronic kidney disease     CKD (chronic kidney disease) stage 3, GFR 30-59 ml/min     Dr. Montero    COPD (chronic obstructive pulmonary disease) 2/7/2016    COPD (chronic obstructive pulmonary disease)     Coronary artery disease     Diabetes mellitus     Diabetes mellitus, type 2     GERD (gastroesophageal reflux disease)     Hx of colonic polyps     Hyperlipidemia     Hypertension     possible new onset CHF 7/30/2016    Tardive dyskinesia     Thyroid disease     Ulcer     Vertigo        Past Surgical History:   Procedure Laterality Date    CARDIAC SURGERY      CHOLECYSTECTOMY  01/26/2017    COLONOSCOPY      CORONARY ARTERY BYPASS GRAFT  01/19/2016    4 vessel    HYSTERECTOMY      OOPHORECTOMY      TONSILLECTOMY      TOTAL THYROIDECTOMY      TUBAL LIGATION         Current Outpatient Medications   Medication Sig    ammonium lactate (LAC-HYDRIN) 12 % lotion Apply topically as needed for Dry Skin.    aspirin (ECOTRIN) 81 MG EC tablet Take 81 mg by mouth once daily.    BD INSULIN SYRINGE ULTRA-FINE 1 mL 31 gauge x 5/16 Syrg USE AS DIRECTED 5 TIMES A DAY WITH LEVEMIR & NOVOLOG    blood sugar diagnostic (BLOOD GLUCOSE TEST) Strp 1 each by Misc.(Non-Drug; Combo Route) route 4 (four) times daily. DX E11.22 Pt is on insulin.    dicyclomine (BENTYL) 10 MG capsule     insulin aspart U-100 (NOVOLOG FLEXPEN U-100 INSULIN) 100 unit/mL (3 mL) InPn pen Inject 34 Units into the skin 2 (two) times daily with meals. Plus correction scale, max  units    insulin degludec (TRESIBA FLEXTOUCH U-200) 200 unit/mL (3 mL) InPn Inject 70 Units into the skin once daily.    KETOPROFEN TOP APPLY 1.6 GRAMS (1 PUMP) TO PAINFUL AREAS UP TO 5 TIMES DAILY. RUB IN WELL    lancets 33 gauge Misc 1 lancet by Misc.(Non-Drug; Combo Route)  "route 4 (four) times daily.    levothyroxine (SYNTHROID) 100 MCG tablet TAKE ONE TABLET BY MOUTH EVERY DAY    lisinopril (PRINIVIL,ZESTRIL) 5 MG tablet One po BID    meclizine (ANTIVERT) 25 mg tablet Take 1 tablet (25 mg total) by mouth 3 (three) times daily as needed.    metoprolol tartrate (LOPRESSOR) 25 MG tablet Take 0.5 tablets (12.5 mg total) by mouth 2 (two) times daily.    pantoprazole (PROTONIX) 40 MG tablet Take 1 tablet (40 mg total) by mouth once daily.    pen needle, diabetic (BD ULTRA-FINE YUMIKO PEN NEEDLE) 32 gauge x 5/32" Ndle Uses 3 daily, on multiple daily insulin injections    rosuvastatin (CRESTOR) 40 MG Tab TAKE ONE TABLET BY MOUTH EVERY EVENING    traZODone (DESYREL) 50 MG tablet TAKE ONE TABLET BY MOUTH AT BEDTIME AS NEEDED FOR INSOMNIA    vit C,W-Gc-lfrmw-lutein-zeaxan (PRESERVISION AREDS-2) [id number] mg-unit-mg-mg Cap Take 1 tablet by mouth 2 (two) times daily.    nitroGLYCERIN (NITROSTAT) 0.4 MG SL tablet Place 1 tablet (0.4 mg total) under the tongue every 5 (five) minutes as needed for Chest pain. (Patient taking differently: Place 0.4 mg under the tongue every 5 (five) minutes as needed for Chest pain. Seek medical help is pain is not relieved by the third dose.)    traMADoL (ULTRAM) 50 mg tablet Take 1 tablet (50 mg total) by mouth every 6 (six) hours as needed for Pain.     No current facility-administered medications for this visit.        Review of patient's allergies indicates:   Allergen Reactions    Reglan [metoclopramide hcl]      Depression and weight loss.        Family History   Problem Relation Age of Onset    Cancer Mother         LYMPHOMA    Breast cancer Mother     Cancer Father     Early death Father     Heart disease Paternal Uncle     Alcohol abuse Sister         x2    Heart disease Brother     No Known Problems Son        Social History     Socioeconomic History    Marital status:      Spouse name: Not on file    Number of children: Not " on file    Years of education: Not on file    Highest education level: Not on file   Occupational History    Not on file   Social Needs    Financial resource strain: Not on file    Food insecurity:     Worry: Not on file     Inability: Not on file    Transportation needs:     Medical: Not on file     Non-medical: Not on file   Tobacco Use    Smoking status: Former Smoker     Packs/day: 1.00     Years: 27.00     Pack years: 27.00     Types: Cigarettes     Last attempt to quit: 2016     Years since quittin.3    Smokeless tobacco: Never Used   Substance and Sexual Activity    Alcohol use: No     Alcohol/week: 0.0 standard drinks    Drug use: No    Sexual activity: Yes     Partners: Male   Lifestyle    Physical activity:     Days per week: Not on file     Minutes per session: Not on file    Stress: Not on file   Relationships    Social connections:     Talks on phone: Not on file     Gets together: Not on file     Attends Baptism service: Not on file     Active member of club or organization: Not on file     Attends meetings of clubs or organizations: Not on file     Relationship status: Not on file   Other Topics Concern    Not on file   Social History Narrative    Not on file       Chief Complaint:   Chief Complaint   Patient presents with    Right Shoulder - Pain    Left Shoulder - Pain       Consulting Physician: No ref. provider found    History of present illness:    This is a 75 y.o. year old female who complains of bilateral shoulder pain.  She states he has had this on and off for years.  She denies any injury.  She puts her pain is much as 8/10 and worse with activities and at night.  She has done physical therapy which has not helped.  Our last injection helped completely for several weeks.    Review of Systems:    Constitution:   Denies chills, fever, and sweats.  HENT:   Denies headaches or blurry vision.  Cardiovascular:  Denies chest pain or irregular heart beat.  Respiratory:  "  Denies cough or shortness of breath.  Gastrointestinal:  Denies abdominal pain, nausea, or vomiting.  Musculoskeletal:   Denies muscle cramps.  Neurological:   Denies dizziness or focal weakness.  Psychiatric/Behavior: Normal mental status.  Hematology/Lymph:  Denies bleeding problem or easy bruising/bleeding.  Skin:    Denies rash or suspicious lesions.    Examination:    Vital Signs:    Vitals:    06/05/20 1004   Temp: 97.6 °F (36.4 °C)   Weight: 85.3 kg (188 lb)   Height: 5' 1" (1.549 m)   PainSc:   8       Body mass index is 35.52 kg/m².    Constitution:   Well-developed, well nourished patient in no acute distress.  Neurological:   Alert and oriented x 3 and cooperative to examination.     Psychiatric/Behavior: Normal mental status.  Respiratory:   No shortness of breath.  Eyes:    Extraoccular muscles intact  Skin:    No scars, rash or suspicious lesions.    Physical Exam: Right Shoulder Exam     Skin  Rash:   None  Scars:   None    Inspection/Observation   Swelling:   none  Erythema:   none  Bruising:   none  Wounds:   none  Scapular Winging:  none  Scapular Dyskinesia:  none  Atrophy:   none  Masses:   None  Lymphadenopathy: None    Tenderness   AC Joint:   mild    Range of Motion   Active Forward Flexion:  150  Active External Rotation:  20  Active Internal Rotation:  Low back    Muscle Strength   Forward Flexion:  4+/5  External Rotation:  4+/5  Internal Rotation:  4+/5    Tests & Signs   Cross Arm:   positive  Impingement:   positive    Other   Sensation:   normal  Pulse:    2+ radial      Left Shoulder Exam     Skin  Rash:   None  Scars:   None    Inspection/Observation   Swelling:   none  Erythema:   none  Bruising:   none  Wounds:   none  Scapular Winging:  none  Scapular Dyskinesia:  none  Atrophy:   none  Masses:   None  Lymphadenopathy: None    Tenderness   AC Joint:   mild    Range of Motion   Active Forward Flexion:  150  Active External Rotation:  20  Active Internal Rotation:  Hip " pocket    Muscle Strength   Forward Flexion:  4/5  External Rotation:  4+/5  Internal Rotation:  4+/5    Tests & Signs   Cross Arm:   positive  Impingement:   positive    Other   Sensation:   normal  Pulse:    2+ radial          Imaging: X-rays of both shoulder show mild-to-moderate degenerative changes. The MRI study images left shoulder partial subscapularis tear along with a partial supraspinatus footprint tear and what appears to be a slap lesion.         Assessment: Chronic pain of both shoulders  -     Large Joint Aspiration/Injection: bilateral subacromial bursa        Plan:  She still does not want surgery.  We gave her another injection today.  We will refer her to my partner for evaluation of potential nerve blocks.  We gave her prescription for Ultram.    DISCLAIMER: This note may have been dictated using voice recognition software and may contain grammatical errors.     NOTE: Consult report sent to referring provider via [facility].

## 2020-06-09 ENCOUNTER — OFFICE VISIT (OUTPATIENT)
Dept: PHYSICAL MEDICINE AND REHAB | Facility: CLINIC | Age: 75
End: 2020-06-09
Payer: MEDICARE

## 2020-06-09 ENCOUNTER — PATIENT OUTREACH (OUTPATIENT)
Dept: ADMINISTRATIVE | Facility: OTHER | Age: 75
End: 2020-06-09

## 2020-06-09 VITALS
HEIGHT: 61 IN | HEART RATE: 57 BPM | SYSTOLIC BLOOD PRESSURE: 158 MMHG | BODY MASS INDEX: 35.5 KG/M2 | DIASTOLIC BLOOD PRESSURE: 70 MMHG | WEIGHT: 188 LBS

## 2020-06-09 DIAGNOSIS — M25.512 CHRONIC PAIN OF BOTH SHOULDERS: Primary | ICD-10-CM

## 2020-06-09 DIAGNOSIS — E11.42 TYPE 2 DIABETES MELLITUS WITH DIABETIC POLYNEUROPATHY, WITH LONG-TERM CURRENT USE OF INSULIN: ICD-10-CM

## 2020-06-09 DIAGNOSIS — Z79.4 TYPE 2 DIABETES MELLITUS WITH DIABETIC POLYNEUROPATHY, WITH LONG-TERM CURRENT USE OF INSULIN: ICD-10-CM

## 2020-06-09 DIAGNOSIS — M19.011 PRIMARY OSTEOARTHRITIS OF BOTH SHOULDERS: ICD-10-CM

## 2020-06-09 DIAGNOSIS — M19.012 PRIMARY OSTEOARTHRITIS OF BOTH SHOULDERS: ICD-10-CM

## 2020-06-09 DIAGNOSIS — M75.82 TENDINITIS OF BOTH ROTATOR CUFFS: ICD-10-CM

## 2020-06-09 DIAGNOSIS — I48.0 PAF (PAROXYSMAL ATRIAL FIBRILLATION): ICD-10-CM

## 2020-06-09 DIAGNOSIS — M25.511 CHRONIC PAIN OF BOTH SHOULDERS: Primary | ICD-10-CM

## 2020-06-09 DIAGNOSIS — G89.29 CHRONIC PAIN OF BOTH SHOULDERS: Primary | ICD-10-CM

## 2020-06-09 DIAGNOSIS — N18.30 CKD (CHRONIC KIDNEY DISEASE) STAGE 3, GFR 30-59 ML/MIN: ICD-10-CM

## 2020-06-09 DIAGNOSIS — G56.80 SUPRASCAPULAR ENTRAPMENT NEUROPATHY, UNSPECIFIED LATERALITY: ICD-10-CM

## 2020-06-09 DIAGNOSIS — M75.81 TENDINITIS OF BOTH ROTATOR CUFFS: ICD-10-CM

## 2020-06-09 PROCEDURE — 1125F AMNT PAIN NOTED PAIN PRSNT: CPT | Mod: HCNC,S$GLB,, | Performed by: PHYSICAL MEDICINE & REHABILITATION

## 2020-06-09 PROCEDURE — 99213 PR OFFICE/OUTPT VISIT, EST, LEVL III, 20-29 MIN: ICD-10-PCS | Mod: 25,HCNC,S$GLB, | Performed by: PHYSICAL MEDICINE & REHABILITATION

## 2020-06-09 PROCEDURE — 76942 ECHO GUIDE FOR BIOPSY: CPT | Mod: HCNC,S$GLB,, | Performed by: PHYSICAL MEDICINE & REHABILITATION

## 2020-06-09 PROCEDURE — 3077F PR MOST RECENT SYSTOLIC BLOOD PRESSURE >= 140 MM HG: ICD-10-PCS | Mod: HCNC,CPTII,S$GLB, | Performed by: PHYSICAL MEDICINE & REHABILITATION

## 2020-06-09 PROCEDURE — 99999 PR PBB SHADOW E&M-EST. PATIENT-LVL IV: ICD-10-PCS | Mod: PBBFAC,HCNC,, | Performed by: PHYSICAL MEDICINE & REHABILITATION

## 2020-06-09 PROCEDURE — 64418 PR NERVE BLOCK INJ, ANES/STEROID, SUPRASCAPULAR: ICD-10-PCS | Mod: 50,HCNC,S$GLB, | Performed by: PHYSICAL MEDICINE & REHABILITATION

## 2020-06-09 PROCEDURE — 99999 PR PBB SHADOW E&M-EST. PATIENT-LVL IV: CPT | Mod: PBBFAC,HCNC,, | Performed by: PHYSICAL MEDICINE & REHABILITATION

## 2020-06-09 PROCEDURE — 1101F PR PT FALLS ASSESS DOC 0-1 FALLS W/OUT INJ PAST YR: ICD-10-PCS | Mod: HCNC,CPTII,S$GLB, | Performed by: PHYSICAL MEDICINE & REHABILITATION

## 2020-06-09 PROCEDURE — 64418 NJX AA&/STRD SPRSCAP NRV: CPT | Mod: 50,HCNC,S$GLB, | Performed by: PHYSICAL MEDICINE & REHABILITATION

## 2020-06-09 PROCEDURE — 1101F PT FALLS ASSESS-DOCD LE1/YR: CPT | Mod: HCNC,CPTII,S$GLB, | Performed by: PHYSICAL MEDICINE & REHABILITATION

## 2020-06-09 PROCEDURE — 3078F DIAST BP <80 MM HG: CPT | Mod: HCNC,CPTII,S$GLB, | Performed by: PHYSICAL MEDICINE & REHABILITATION

## 2020-06-09 PROCEDURE — 1159F MED LIST DOCD IN RCRD: CPT | Mod: HCNC,S$GLB,, | Performed by: PHYSICAL MEDICINE & REHABILITATION

## 2020-06-09 PROCEDURE — 1159F PR MEDICATION LIST DOCUMENTED IN MEDICAL RECORD: ICD-10-PCS | Mod: HCNC,S$GLB,, | Performed by: PHYSICAL MEDICINE & REHABILITATION

## 2020-06-09 PROCEDURE — 1125F PR PAIN SEVERITY QUANTIFIED, PAIN PRESENT: ICD-10-PCS | Mod: HCNC,S$GLB,, | Performed by: PHYSICAL MEDICINE & REHABILITATION

## 2020-06-09 PROCEDURE — 3077F SYST BP >= 140 MM HG: CPT | Mod: HCNC,CPTII,S$GLB, | Performed by: PHYSICAL MEDICINE & REHABILITATION

## 2020-06-09 PROCEDURE — 3052F HG A1C>EQUAL 8.0%<EQUAL 9.0%: CPT | Mod: HCNC,CPTII,S$GLB, | Performed by: PHYSICAL MEDICINE & REHABILITATION

## 2020-06-09 PROCEDURE — 76942 PR U/S GUIDANCE FOR NEEDLE GUIDANCE: ICD-10-PCS | Mod: HCNC,S$GLB,, | Performed by: PHYSICAL MEDICINE & REHABILITATION

## 2020-06-09 PROCEDURE — 3078F PR MOST RECENT DIASTOLIC BLOOD PRESSURE < 80 MM HG: ICD-10-PCS | Mod: HCNC,CPTII,S$GLB, | Performed by: PHYSICAL MEDICINE & REHABILITATION

## 2020-06-09 PROCEDURE — 99213 OFFICE O/P EST LOW 20 MIN: CPT | Mod: 25,HCNC,S$GLB, | Performed by: PHYSICAL MEDICINE & REHABILITATION

## 2020-06-09 PROCEDURE — 3052F PR MOST RECENT HEMOGLOBIN A1C LEVEL 8.0 - < 9.0%: ICD-10-PCS | Mod: HCNC,CPTII,S$GLB, | Performed by: PHYSICAL MEDICINE & REHABILITATION

## 2020-06-09 NOTE — PROGRESS NOTES
Today's Date: 06/09/2020     Referring Provider: Dr. Michael Fair     Chief Complaint:   Chief Complaint   Patient presents with    Neck Pain       HPI: Adele Hall is a 75 y.o.  Right handed female  who presents today for evaluation for bilateral shoulder pain as a referral from Dr. Fair. She complains of right and left shoulder pain.  They have been present for approximately 1 year and getting worse.  Pain is over the lateral shoulder and suprascapular region and can radiate down the lateral triceps stopping at about the elbow.  She describes the pain as a dull achy pain in the shoulder in a sharp shooting pain down the upper arm.  She denies any numbness or tingling in the arms or hands.  She denies any neck pain.  Pain is worse with reaching behind her, overhead activity, fixing her hair, and laying on her side at night.  She recently had a subacromial bursa injection on the right and left which helped with her shoulder pain approximately 50%.  Her pain continues to impair her activities of daily living.  She has tried physical therapy without any meaningful lasting relief.  Her pain on average is a 5/10, but when it is severe, it is greater than a 7/10.  Review of Systems   Constitutional: Negative for chills and fever.   HENT: Negative for congestion and tinnitus.    Eyes: Negative for blurred vision and photophobia.   Respiratory: Negative for shortness of breath and wheezing.    Cardiovascular: Negative for chest pain and palpitations.   Gastrointestinal: Negative for nausea and vomiting.   Genitourinary: Negative for dysuria and frequency.   Musculoskeletal: Positive for joint pain. Negative for myalgias.   Skin: Negative for itching and rash.   Neurological: Negative for speech change and focal weakness.   Endo/Heme/Allergies: Negative for environmental allergies. Does not bruise/bleed easily.   Psychiatric/Behavioral: Negative for depression. The patient is not nervous/anxious.          Social History     Socioeconomic History    Marital status:      Spouse name: Not on file    Number of children: Not on file    Years of education: Not on file    Highest education level: Not on file   Occupational History    Not on file   Social Needs    Financial resource strain: Not on file    Food insecurity:     Worry: Not on file     Inability: Not on file    Transportation needs:     Medical: Not on file     Non-medical: Not on file   Tobacco Use    Smoking status: Former Smoker     Packs/day: 1.00     Years: 27.00     Pack years: 27.00     Types: Cigarettes     Last attempt to quit: 2016     Years since quittin.3    Smokeless tobacco: Never Used   Substance and Sexual Activity    Alcohol use: No     Alcohol/week: 0.0 standard drinks    Drug use: No    Sexual activity: Yes     Partners: Male   Lifestyle    Physical activity:     Days per week: Not on file     Minutes per session: Not on file    Stress: Not on file   Relationships    Social connections:     Talks on phone: Not on file     Gets together: Not on file     Attends Worship service: Not on file     Active member of club or organization: Not on file     Attends meetings of clubs or organizations: Not on file     Relationship status: Not on file   Other Topics Concern    Not on file   Social History Narrative    Not on file       Family History   Problem Relation Age of Onset    Cancer Mother         LYMPHOMA    Breast cancer Mother     Cancer Father     Early death Father     Heart disease Paternal Uncle     Alcohol abuse Sister         x2    Heart disease Brother     No Known Problems Son        Current Outpatient Medications on File Prior to Visit   Medication Sig Dispense Refill    ammonium lactate (LAC-HYDRIN) 12 % lotion Apply topically as needed for Dry Skin. 225 g 1    aspirin (ECOTRIN) 81 MG EC tablet Take 81 mg by mouth once daily.      BD INSULIN SYRINGE ULTRA-FINE 1 mL 31 gauge x 5/16 Syrg  "USE AS DIRECTED 5 TIMES A DAY WITH LEVEMIR & NOVOLOG 200 each 10    blood sugar diagnostic (BLOOD GLUCOSE TEST) Strp 1 each by Misc.(Non-Drug; Combo Route) route 4 (four) times daily. DX E11.22 Pt is on insulin. 150 strip 11    dicyclomine (BENTYL) 10 MG capsule   3    insulin aspart U-100 (NOVOLOG FLEXPEN U-100 INSULIN) 100 unit/mL (3 mL) InPn pen Inject 34 Units into the skin 2 (two) times daily with meals. Plus correction scale, max  units 30 mL 6    insulin degludec (TRESIBA FLEXTOUCH U-200) 200 unit/mL (3 mL) InPn Inject 70 Units into the skin once daily. 18 mL 6    KETOPROFEN TOP APPLY 1.6 GRAMS (1 PUMP) TO PAINFUL AREAS UP TO 5 TIMES DAILY. RUB IN WELL  5    lancets 33 gauge Misc 1 lancet by Misc.(Non-Drug; Combo Route) route 4 (four) times daily.      levothyroxine (SYNTHROID) 100 MCG tablet TAKE ONE TABLET BY MOUTH EVERY DAY 30 tablet 11    lisinopril (PRINIVIL,ZESTRIL) 5 MG tablet One po  tablet 0    meclizine (ANTIVERT) 25 mg tablet Take 1 tablet (25 mg total) by mouth 3 (three) times daily as needed. 20 tablet 0    metoprolol tartrate (LOPRESSOR) 25 MG tablet Take 0.5 tablets (12.5 mg total) by mouth 2 (two) times daily. 90 tablet 1    pantoprazole (PROTONIX) 40 MG tablet Take 1 tablet (40 mg total) by mouth once daily. 90 tablet 3    pen needle, diabetic (BD ULTRA-FINE YUMIKO PEN NEEDLE) 32 gauge x 5/32" Ndle Uses 3 daily, on multiple daily insulin injections 100 each 6    rosuvastatin (CRESTOR) 40 MG Tab TAKE ONE TABLET BY MOUTH EVERY EVENING 30 tablet 10    traMADoL (ULTRAM) 50 mg tablet Take 1 tablet (50 mg total) by mouth every 6 (six) hours as needed for Pain. 28 tablet 0    traZODone (DESYREL) 50 MG tablet TAKE ONE TABLET BY MOUTH AT BEDTIME AS NEEDED FOR INSOMNIA 30 tablet 5    vit C,V-Sc-pesfa-lutein-zeaxan (PRESERVISION AREDS-2) 193-872-28-1 mg-unit-mg-mg Cap Take 1 tablet by mouth 2 (two) times daily.      nitroGLYCERIN (NITROSTAT) 0.4 MG SL tablet Place 1 tablet " (0.4 mg total) under the tongue every 5 (five) minutes as needed for Chest pain. (Patient taking differently: Place 0.4 mg under the tongue every 5 (five) minutes as needed for Chest pain. Seek medical help is pain is not relieved by the third dose.) 25 tablet 2     No current facility-administered medications on file prior to visit.         Review of patient's allergies indicates:   Allergen Reactions    Reglan [metoclopramide hcl]      Depression and weight loss.        Past Surgical History:   Procedure Laterality Date    CARDIAC SURGERY      CHOLECYSTECTOMY  01/26/2017    COLONOSCOPY      CORONARY ARTERY BYPASS GRAFT  01/19/2016    4 vessel    HYSTERECTOMY      OOPHORECTOMY      TONSILLECTOMY      TOTAL THYROIDECTOMY      TUBAL LIGATION         Past Medical History:   Diagnosis Date    Anesthesia complication     took patient 6 days to come out of anethesia after CABG    Anticoagulant long-term use     Arthritis     Chronic kidney disease     CKD (chronic kidney disease) stage 3, GFR 30-59 ml/min     Dr. Montero    COPD (chronic obstructive pulmonary disease) 2/7/2016    COPD (chronic obstructive pulmonary disease)     Coronary artery disease     Diabetes mellitus     Diabetes mellitus, type 2     GERD (gastroesophageal reflux disease)     Hx of colonic polyps     Hyperlipidemia     Hypertension     possible new onset CHF 7/30/2016    Tardive dyskinesia     Thyroid disease     Ulcer     Vertigo        Vitals:    06/09/20 1121   BP: (!) 158/70   Pulse: (!) 57       Physical Exam   Constitutional: She is oriented to person, place, and time. She appears well-developed and well-nourished. No distress.   HENT:   Head: Normocephalic and atraumatic.   Eyes: Pupils are equal, round, and reactive to light. Conjunctivae and EOM are normal.   Pulmonary/Chest: Effort normal. No respiratory distress.   Abdominal: Normal appearance. She exhibits no distension.   Neurological: She is alert and  oriented to person, place, and time.   Skin: Skin is warm and dry.   Psychiatric: She has a normal mood and affect. Her behavior is normal. Judgment and thought content normal.      Right Shoulder Exam     Tenderness   Right shoulder tenderness location:  supraspinatusmuscle belly, greater tuberosity.    Range of Motion   Active abduction: abnormal   Extension: abnormal   Forward flexion: abnormal     Muscle Strength   Abduction: 4/5   Internal rotation: 5/5   External rotation: 5/5   Biceps: 5/5     Tests   Apprehension: positive  Harrington test: positive  Impingement: positive  Drop arm: negative    Other   Scars: absent      Left Shoulder Exam     Tenderness   Left shoulder tenderness location: Greater tuberosity.  Suprascapular region.  Anterior glenohumeral joint.    Range of Motion   Active abduction: abnormal   Extension: abnormal   Forward flexion: abnormal     Muscle Strength   Abduction: 4/5   Internal rotation: 5/5   External rotation: 5/5   Biceps: 5/5     Tests   Apprehension: positive  Harrington test: positive  Impingement: positive  Drop arm: negative    Other   Scars: absent              EXAMINATION:  MRI SHOULDER WITHOUT CONTRAST LEFT    CLINICAL HISTORY:  left shoulder pain;  Pain in left shoulder    TECHNIQUE:  Multiplanar multisequence images were performed through the left shoulder.  Contrast was not administered    COMPARISON:  None    FINDINGS:  There are findings of rotator cuff tendinopathy with mild thickening of an slight increased signal within the rotator cuff tendon.  Also very thin linear area of bright signal consistent very small full-thickness tear.  No retraction.  The long head the biceps tendon appears intact and not dislocated.  No abnormal marrow signal suggesting acute fracture or osseous destruction.  Small cystic foci in the greater tuberosity of the humerus appearing degenerative.      Impression       Findings of rotator cuff tendinopathy and suggesting small full-thickness  tear rotator cuff tendon.    Degenerative changes         Chronic pain of both shoulders    Tendinitis of both rotator cuffs    Primary osteoarthritis of both shoulders    PAF (paroxysmal atrial fibrillation), post CABG    CKD (chronic kidney disease) stage 3, GFR 30-59 ml/min    Type 2 diabetes mellitus with diabetic polyneuropathy, with long-term current use of insulin    Suprascapular entrapment neuropathy, unspecified laterality  -     Nerve Block           PLAN:   Adele Hall is a 75 y.o. female  With chronic bilateral shoulder pain secondary to rotator cuff tendinopathy .  She is status post subacromial bursa injection  With about 50% reduction in pain.  She has undergone physical therapy without any meaningful lasting relief.  Her pain limits her activities of daily living and functional mobility.  Pain on average is a 5/10.  At this time, will proceed with a right and left suprascapular nerve blocks with ultrasound guidance.  Should this provide her with long-lasting meaningful relief, these can be repeated as needed.  Please see procedure note for more details.  Should this provide her with relief, however not long-lasting, we can discuss potential for radiofrequency ablation to the sensory articular branch of the suprascapular, axillary, and  Lateral pectoral branches on the right and left.      Chilango England D.O.  Physical Medicine and Rehabilitation          CC: Patients PCP: Wally Cazares MD  CC: Referring Provider: Dr. Michael Fair

## 2020-06-09 NOTE — LETTER
June 9, 2020      Michael Fair MD  [address]  [address]  [place] [address]           [place] [facility]  [address]   [place] [address]  Phone: [phone]  Fax: [phone]          Patient: Adele Hall   MR Number: [id number]   YOB: 1945   Date of Visit: 6/9/2020       Dear Dr. Michael Fair:    Thank you for referring Adele Hall to me for evaluation. Attached you will find relevant portions of my assessment and plan of care.    If you have questions, please do not hesitate to call me. I look forward to following Adele Hall along with you.    Sincerely,    Chilango England MD    Enclosure  CC:  No Recipients    If you would like to receive this communication electronically, please contact [email or web address] or [phone] to request more information on [facility] access.    For providers and/or their staff who would like to refer a patient to [facility], please contact us through our one-stop-shop provider referral line, [facility], at [phone].    If you feel you have received this communication in error or would no longer like to receive these types of communications, please e-mail [email or web address]

## 2020-06-09 NOTE — PROCEDURES
"Nerve Block  Date/Time: 6/9/2020 11:20 AM  Performed by: Chilango England MD  Authorized by: Chilango England MD   Consent Done: Yes  Time out: Immediately prior to procedure a "time out" was called to verify the correct patient, procedure, equipment, support staff and site/side marked as required.  Indications: pain relief  Body area: upper extremity  Nerve: suprascapular  Patient sedated: no  Patient position: sitting  Needle size: 22 G  Location technique: ultrasound guidance  Local Anesthetic: lidocaine 1% without epinephrine and bupivacaine 0.25% without epinephrine  Outcome: pain improved      Procedure bilateralsuprascapular nerve block with ultrasound guidance    Preprocedure diagnosis:  Chronic  bilateral shoulder pain, suprascapular neuropathy.    Postprocedure diagnosis:  Same    Pre-procedure pain score:   5 out of 10    Postprocedure pain score:   0 out of 10    Anesthetic local    Assistant:  None    Procedure in detail:  Risks and benefits were discussed and written informed consent was obtained.  The patient was placed in seated position next to the ultrasound machine.  Using a  curvilinear transducer on a Tilera HS60 ultrasound, the right suprascapular notch was evaluated.  This was confirmed with color flow Doppler which showed the pulsation of the suprascapular artery.  Medially skin was cleaned with ChloraPrep and allowed to dry.  Skin tract was anesthetized using a 1.5 in 27 gauge needle.  The needle was removed and replaced with a 22 gauge 3.5 in spinal needle which was advanced under direct ultrasound visualization at just sent to the suprascapular nerve carefully avoiding the suprascapular artery.  Hydrodissection was performed using a 4 cc solution of 1 cc of 0.50% bupivacaine, 3cc of 1% lidocaine.  Once this was completed, needle was removed in Band-Aid was applied. The procedure was then repeated on the left side in a similar fashion.  The patient tolerated the procedure well with no adverse " events.    She noticed almost complete resolution of pain and improved range of motion immediately following the procedure.

## 2020-06-10 ENCOUNTER — HOSPITAL ENCOUNTER (OUTPATIENT)
Facility: HOSPITAL | Age: 75
Discharge: HOME | End: 2020-06-11
Attending: EMERGENCY MEDICINE | Admitting: EMERGENCY MEDICINE
Payer: MEDICAID

## 2020-06-10 DIAGNOSIS — I49.9 IRREGULAR HEART RHYTHM: ICD-10-CM

## 2020-06-10 DIAGNOSIS — R07.9 CHEST PAIN: ICD-10-CM

## 2020-06-10 DIAGNOSIS — I49.3 PVC (PREMATURE VENTRICULAR CONTRACTION): ICD-10-CM

## 2020-06-10 DIAGNOSIS — E11.42 TYPE 2 DIABETES MELLITUS WITH DIABETIC POLYNEUROPATHY, WITH LONG-TERM CURRENT USE OF INSULIN: ICD-10-CM

## 2020-06-10 DIAGNOSIS — Z79.4 TYPE 2 DIABETES MELLITUS WITH DIABETIC POLYNEUROPATHY, WITH LONG-TERM CURRENT USE OF INSULIN: ICD-10-CM

## 2020-06-10 LAB
ALBUMIN SERPL BCP-MCNC: 3.5 G/DL (ref 3.5–5.2)
ALP SERPL-CCNC: 80 U/L (ref 55–135)
ALT SERPL W/O P-5'-P-CCNC: 27 U/L (ref 10–44)
ANION GAP SERPL CALC-SCNC: 11 MMOL/L (ref 8–16)
AST SERPL-CCNC: 34 U/L (ref 10–40)
BASOPHILS # BLD AUTO: 0.06 K/UL (ref 0–0.2)
BASOPHILS NFR BLD: 0.5 % (ref 0–1.9)
BILIRUB SERPL-MCNC: 0.3 MG/DL (ref 0.1–1)
BNP SERPL-MCNC: 143 PG/ML (ref 0–99)
BUN SERPL-MCNC: 30 MG/DL (ref 8–23)
CALCIUM SERPL-MCNC: 8.8 MG/DL (ref 8.7–10.5)
CHLORIDE SERPL-SCNC: 104 MMOL/L (ref 95–110)
CO2 SERPL-SCNC: 23 MMOL/L (ref 23–29)
CREAT SERPL-MCNC: 1.6 MG/DL (ref 0.5–1.4)
DIFFERENTIAL METHOD: ABNORMAL
EOSINOPHIL # BLD AUTO: 0.1 K/UL (ref 0–0.5)
EOSINOPHIL NFR BLD: 1.1 % (ref 0–8)
ERYTHROCYTE [DISTWIDTH] IN BLOOD BY AUTOMATED COUNT: 13.2 % (ref 11.5–14.5)
EST. GFR  (AFRICAN AMERICAN): 37 ML/MIN/1.73 M^2
EST. GFR  (NON AFRICAN AMERICAN): 32 ML/MIN/1.73 M^2
GLUCOSE SERPL-MCNC: 232 MG/DL (ref 70–110)
HCT VFR BLD AUTO: 40 % (ref 37–48.5)
HGB BLD-MCNC: 12.4 G/DL (ref 12–16)
IMM GRANULOCYTES # BLD AUTO: 0.05 K/UL (ref 0–0.04)
IMM GRANULOCYTES NFR BLD AUTO: 0.4 % (ref 0–0.5)
LYMPHOCYTES # BLD AUTO: 4 K/UL (ref 1–4.8)
LYMPHOCYTES NFR BLD: 31.2 % (ref 18–48)
MCH RBC QN AUTO: 28.9 PG (ref 27–31)
MCHC RBC AUTO-ENTMCNC: 31 G/DL (ref 32–36)
MCV RBC AUTO: 93 FL (ref 82–98)
MONOCYTES # BLD AUTO: 1.1 K/UL (ref 0.3–1)
MONOCYTES NFR BLD: 8.3 % (ref 4–15)
NEUTROPHILS # BLD AUTO: 7.6 K/UL (ref 1.8–7.7)
NEUTROPHILS NFR BLD: 58.5 % (ref 38–73)
NRBC BLD-RTO: 0 /100 WBC
PLATELET # BLD AUTO: 202 K/UL (ref 150–350)
PMV BLD AUTO: 11.1 FL (ref 9.2–12.9)
POTASSIUM SERPL-SCNC: 4.1 MMOL/L (ref 3.5–5.1)
PROT SERPL-MCNC: 7 G/DL (ref 6–8.4)
RBC # BLD AUTO: 4.29 M/UL (ref 4–5.4)
SARS-COV-2 RDRP RESP QL NAA+PROBE: NEGATIVE
SODIUM SERPL-SCNC: 138 MMOL/L (ref 136–145)
TROPONIN I SERPL DL<=0.01 NG/ML-MCNC: 0.01 NG/ML (ref 0–0.03)
WBC # BLD AUTO: 12.9 K/UL (ref 3.9–12.7)

## 2020-06-10 PROCEDURE — 36000 PLACE NEEDLE IN VEIN: CPT | Mod: HCNC

## 2020-06-10 PROCEDURE — 36415 COLL VENOUS BLD VENIPUNCTURE: CPT | Mod: HCNC

## 2020-06-10 PROCEDURE — 83880 ASSAY OF NATRIURETIC PEPTIDE: CPT | Mod: HCNC

## 2020-06-10 PROCEDURE — 99285 EMERGENCY DEPT VISIT HI MDM: CPT | Mod: 25,HCNC

## 2020-06-10 PROCEDURE — 80053 COMPREHEN METABOLIC PANEL: CPT | Mod: HCNC

## 2020-06-10 PROCEDURE — 84484 ASSAY OF TROPONIN QUANT: CPT | Mod: HCNC

## 2020-06-10 PROCEDURE — 85025 COMPLETE CBC W/AUTO DIFF WBC: CPT | Mod: HCNC

## 2020-06-10 PROCEDURE — U0002 COVID-19 LAB TEST NON-CDC: HCPCS | Mod: HCNC

## 2020-06-10 PROCEDURE — 25000003 PHARM REV CODE 250: Mod: HCNC | Performed by: EMERGENCY MEDICINE

## 2020-06-10 PROCEDURE — 93005 ELECTROCARDIOGRAM TRACING: CPT | Mod: HCNC

## 2020-06-10 RX ADMIN — SODIUM CHLORIDE 500 ML: 0.9 INJECTION, SOLUTION INTRAVENOUS at 10:06

## 2020-06-11 ENCOUNTER — PATIENT OUTREACH (OUTPATIENT)
Dept: ADMINISTRATIVE | Facility: HOSPITAL | Age: 75
End: 2020-06-11

## 2020-06-11 ENCOUNTER — TELEPHONE (OUTPATIENT)
Dept: ENDOCRINOLOGY | Facility: CLINIC | Age: 75
End: 2020-06-11

## 2020-06-11 VITALS
BODY MASS INDEX: 34.96 KG/M2 | OXYGEN SATURATION: 95 % | TEMPERATURE: 97 F | RESPIRATION RATE: 14 BRPM | HEART RATE: 63 BPM | WEIGHT: 190 LBS | HEIGHT: 62 IN | SYSTOLIC BLOOD PRESSURE: 150 MMHG | DIASTOLIC BLOOD PRESSURE: 65 MMHG

## 2020-06-11 PROBLEM — I49.9 IRREGULAR HEART RHYTHM: Status: ACTIVE | Noted: 2020-06-11

## 2020-06-11 PROBLEM — D72.829 LEUKOCYTOSIS: Status: ACTIVE | Noted: 2020-06-11

## 2020-06-11 PROBLEM — R79.89 ELEVATED BRAIN NATRIURETIC PEPTIDE (BNP) LEVEL: Status: ACTIVE | Noted: 2020-06-11

## 2020-06-11 LAB
ALBUMIN SERPL BCP-MCNC: 3.5 G/DL (ref 3.5–5.2)
ALP SERPL-CCNC: 83 U/L (ref 55–135)
ALT SERPL W/O P-5'-P-CCNC: 26 U/L (ref 10–44)
ANION GAP SERPL CALC-SCNC: 9 MMOL/L (ref 8–16)
AST SERPL-CCNC: 28 U/L (ref 10–40)
BASOPHILS # BLD AUTO: 0.05 K/UL (ref 0–0.2)
BASOPHILS NFR BLD: 0.4 % (ref 0–1.9)
BILIRUB SERPL-MCNC: 0.3 MG/DL (ref 0.1–1)
BUN SERPL-MCNC: 29 MG/DL (ref 8–23)
CALCIUM SERPL-MCNC: 8.9 MG/DL (ref 8.7–10.5)
CHLORIDE SERPL-SCNC: 102 MMOL/L (ref 95–110)
CO2 SERPL-SCNC: 25 MMOL/L (ref 23–29)
CREAT SERPL-MCNC: 1.6 MG/DL (ref 0.5–1.4)
DIFFERENTIAL METHOD: ABNORMAL
EOSINOPHIL # BLD AUTO: 0.2 K/UL (ref 0–0.5)
EOSINOPHIL NFR BLD: 1.3 % (ref 0–8)
ERYTHROCYTE [DISTWIDTH] IN BLOOD BY AUTOMATED COUNT: 13.1 % (ref 11.5–14.5)
EST. GFR  (AFRICAN AMERICAN): 37 ML/MIN/1.73 M^2
EST. GFR  (NON AFRICAN AMERICAN): 32 ML/MIN/1.73 M^2
GLUCOSE SERPL-MCNC: 371 MG/DL (ref 70–110)
HCT VFR BLD AUTO: 42.2 % (ref 37–48.5)
HGB BLD-MCNC: 12.7 G/DL (ref 12–16)
IMM GRANULOCYTES # BLD AUTO: 0.06 K/UL (ref 0–0.04)
IMM GRANULOCYTES NFR BLD AUTO: 0.5 % (ref 0–0.5)
LYMPHOCYTES # BLD AUTO: 3.5 K/UL (ref 1–4.8)
LYMPHOCYTES NFR BLD: 29.2 % (ref 18–48)
MAGNESIUM SERPL-MCNC: 2 MG/DL (ref 1.6–2.6)
MAGNESIUM SERPL-MCNC: 2 MG/DL (ref 1.6–2.6)
MCH RBC QN AUTO: 28.7 PG (ref 27–31)
MCHC RBC AUTO-ENTMCNC: 30.1 G/DL (ref 32–36)
MCV RBC AUTO: 95 FL (ref 82–98)
MONOCYTES # BLD AUTO: 0.9 K/UL (ref 0.3–1)
MONOCYTES NFR BLD: 7.4 % (ref 4–15)
NEUTROPHILS # BLD AUTO: 7.3 K/UL (ref 1.8–7.7)
NEUTROPHILS NFR BLD: 61.2 % (ref 38–73)
NRBC BLD-RTO: 0 /100 WBC
PHOSPHATE SERPL-MCNC: 3.5 MG/DL (ref 2.7–4.5)
PLATELET # BLD AUTO: 193 K/UL (ref 150–350)
PMV BLD AUTO: 11.2 FL (ref 9.2–12.9)
POCT GLUCOSE: 210 MG/DL (ref 70–110)
POCT GLUCOSE: 333 MG/DL (ref 70–110)
POCT GLUCOSE: 367 MG/DL (ref 70–110)
POCT GLUCOSE: 378 MG/DL (ref 70–110)
POTASSIUM SERPL-SCNC: 4.6 MMOL/L (ref 3.5–5.1)
PROT SERPL-MCNC: 7.2 G/DL (ref 6–8.4)
RBC # BLD AUTO: 4.43 M/UL (ref 4–5.4)
SODIUM SERPL-SCNC: 136 MMOL/L (ref 136–145)
T4 FREE SERPL-MCNC: 1.16 NG/DL (ref 0.71–1.51)
TROPONIN I SERPL DL<=0.01 NG/ML-MCNC: 0.01 NG/ML (ref 0–0.03)
TROPONIN I SERPL DL<=0.01 NG/ML-MCNC: <0.006 NG/ML (ref 0–0.03)
TSH SERPL DL<=0.005 MIU/L-ACNC: 0.17 UIU/ML (ref 0.4–4)
WBC # BLD AUTO: 12.01 K/UL (ref 3.9–12.7)

## 2020-06-11 PROCEDURE — 80053 COMPREHEN METABOLIC PANEL: CPT | Mod: HCNC

## 2020-06-11 PROCEDURE — 84484 ASSAY OF TROPONIN QUANT: CPT | Mod: 91,HCNC

## 2020-06-11 PROCEDURE — 84484 ASSAY OF TROPONIN QUANT: CPT | Mod: HCNC

## 2020-06-11 PROCEDURE — 25000003 PHARM REV CODE 250: Mod: HCNC | Performed by: NURSE PRACTITIONER

## 2020-06-11 PROCEDURE — 36415 COLL VENOUS BLD VENIPUNCTURE: CPT | Mod: HCNC

## 2020-06-11 PROCEDURE — C9399 UNCLASSIFIED DRUGS OR BIOLOG: HCPCS | Mod: HCNC | Performed by: NURSE PRACTITIONER

## 2020-06-11 PROCEDURE — 94761 N-INVAS EAR/PLS OXIMETRY MLT: CPT | Mod: HCNC

## 2020-06-11 PROCEDURE — 94760 N-INVAS EAR/PLS OXIMETRY 1: CPT | Mod: HCNC

## 2020-06-11 PROCEDURE — 25000003 PHARM REV CODE 250: Mod: HCNC | Performed by: HOSPITALIST

## 2020-06-11 PROCEDURE — 85025 COMPLETE CBC W/AUTO DIFF WBC: CPT | Mod: HCNC

## 2020-06-11 PROCEDURE — G0378 HOSPITAL OBSERVATION PER HR: HCPCS | Mod: HCNC

## 2020-06-11 PROCEDURE — G0378 HOSPITAL OBSERVATION PER HR: HCPCS | Mod: HCNC,CS

## 2020-06-11 PROCEDURE — 63600175 PHARM REV CODE 636 W HCPCS: Mod: HCNC | Performed by: NURSE PRACTITIONER

## 2020-06-11 PROCEDURE — 83735 ASSAY OF MAGNESIUM: CPT | Mod: 91,HCNC

## 2020-06-11 PROCEDURE — 84443 ASSAY THYROID STIM HORMONE: CPT | Mod: HCNC

## 2020-06-11 PROCEDURE — 84100 ASSAY OF PHOSPHORUS: CPT | Mod: HCNC

## 2020-06-11 PROCEDURE — 84439 ASSAY OF FREE THYROXINE: CPT | Mod: HCNC

## 2020-06-11 PROCEDURE — 96372 THER/PROPH/DIAG INJ SC/IM: CPT

## 2020-06-11 RX ORDER — POTASSIUM CHLORIDE 20 MEQ/15ML
40 SOLUTION ORAL
Status: DISCONTINUED | OUTPATIENT
Start: 2020-06-11 | End: 2020-06-12 | Stop reason: HOSPADM

## 2020-06-11 RX ORDER — TRAMADOL HYDROCHLORIDE 50 MG/1
50 TABLET ORAL EVERY 6 HOURS PRN
Status: DISCONTINUED | OUTPATIENT
Start: 2020-06-11 | End: 2020-06-12 | Stop reason: HOSPADM

## 2020-06-11 RX ORDER — ONDANSETRON 2 MG/ML
4 INJECTION INTRAMUSCULAR; INTRAVENOUS EVERY 6 HOURS PRN
Status: DISCONTINUED | OUTPATIENT
Start: 2020-06-11 | End: 2020-06-12 | Stop reason: HOSPADM

## 2020-06-11 RX ORDER — SODIUM CHLORIDE 9 MG/ML
INJECTION, SOLUTION INTRAVENOUS CONTINUOUS
Status: DISCONTINUED | OUTPATIENT
Start: 2020-06-11 | End: 2020-06-12 | Stop reason: HOSPADM

## 2020-06-11 RX ORDER — LANOLIN ALCOHOL/MO/W.PET/CERES
800 CREAM (GRAM) TOPICAL
Status: DISCONTINUED | OUTPATIENT
Start: 2020-06-11 | End: 2020-06-12 | Stop reason: HOSPADM

## 2020-06-11 RX ORDER — SODIUM CHLORIDE 0.9 % (FLUSH) 0.9 %
10 SYRINGE (ML) INJECTION
Status: DISCONTINUED | OUTPATIENT
Start: 2020-06-11 | End: 2020-06-12 | Stop reason: HOSPADM

## 2020-06-11 RX ORDER — INSULIN DEGLUDEC 200 U/ML
70 INJECTION, SOLUTION SUBCUTANEOUS DAILY
Qty: 18 ML | Refills: 6
Start: 2020-06-11 | End: 2020-06-16

## 2020-06-11 RX ORDER — IBUPROFEN 200 MG
16 TABLET ORAL
Status: DISCONTINUED | OUTPATIENT
Start: 2020-06-11 | End: 2020-06-12 | Stop reason: HOSPADM

## 2020-06-11 RX ORDER — ROSUVASTATIN CALCIUM 20 MG/1
40 TABLET, COATED ORAL NIGHTLY
Status: DISCONTINUED | OUTPATIENT
Start: 2020-06-11 | End: 2020-06-12 | Stop reason: HOSPADM

## 2020-06-11 RX ORDER — INSULIN ASPART 100 [IU]/ML
10 INJECTION, SOLUTION INTRAVENOUS; SUBCUTANEOUS ONCE
Status: COMPLETED | OUTPATIENT
Start: 2020-06-11 | End: 2020-06-11

## 2020-06-11 RX ORDER — INSULIN ASPART 100 [IU]/ML
1-10 INJECTION, SOLUTION INTRAVENOUS; SUBCUTANEOUS
Status: DISCONTINUED | OUTPATIENT
Start: 2020-06-11 | End: 2020-06-12 | Stop reason: HOSPADM

## 2020-06-11 RX ORDER — TRAZODONE HYDROCHLORIDE 50 MG/1
50 TABLET ORAL NIGHTLY
Status: DISCONTINUED | OUTPATIENT
Start: 2020-06-11 | End: 2020-06-12 | Stop reason: HOSPADM

## 2020-06-11 RX ORDER — ASPIRIN 81 MG/1
81 TABLET ORAL DAILY
Status: DISCONTINUED | OUTPATIENT
Start: 2020-06-11 | End: 2020-06-12 | Stop reason: HOSPADM

## 2020-06-11 RX ORDER — LANOLIN ALCOHOL/MO/W.PET/CERES
400 CREAM (GRAM) TOPICAL DAILY
Status: DISCONTINUED | OUTPATIENT
Start: 2020-06-11 | End: 2020-06-12 | Stop reason: HOSPADM

## 2020-06-11 RX ORDER — GLUCAGON 1 MG
1 KIT INJECTION
Status: DISCONTINUED | OUTPATIENT
Start: 2020-06-11 | End: 2020-06-12 | Stop reason: HOSPADM

## 2020-06-11 RX ORDER — INSULIN ASPART 100 [IU]/ML
15 INJECTION, SOLUTION INTRAVENOUS; SUBCUTANEOUS
Status: DISCONTINUED | OUTPATIENT
Start: 2020-06-11 | End: 2020-06-11

## 2020-06-11 RX ORDER — LEVOTHYROXINE SODIUM 100 UG/1
100 TABLET ORAL DAILY
Status: DISCONTINUED | OUTPATIENT
Start: 2020-06-11 | End: 2020-06-11

## 2020-06-11 RX ORDER — LEVOTHYROXINE SODIUM 100 UG/1
100 TABLET ORAL
Status: DISCONTINUED | OUTPATIENT
Start: 2020-06-11 | End: 2020-06-12 | Stop reason: HOSPADM

## 2020-06-11 RX ORDER — ACETAMINOPHEN 325 MG/1
650 TABLET ORAL EVERY 4 HOURS PRN
Status: DISCONTINUED | OUTPATIENT
Start: 2020-06-11 | End: 2020-06-12 | Stop reason: HOSPADM

## 2020-06-11 RX ORDER — METOPROLOL TARTRATE 25 MG/1
25 TABLET, FILM COATED ORAL 2 TIMES DAILY
Qty: 60 TABLET | Refills: 0 | Status: SHIPPED | OUTPATIENT
Start: 2020-06-11 | End: 2020-09-29

## 2020-06-11 RX ORDER — PANTOPRAZOLE SODIUM 40 MG/1
40 TABLET, DELAYED RELEASE ORAL DAILY
Status: DISCONTINUED | OUTPATIENT
Start: 2020-06-11 | End: 2020-06-12 | Stop reason: HOSPADM

## 2020-06-11 RX ORDER — METOPROLOL TARTRATE 50 MG/1
50 TABLET ORAL 2 TIMES DAILY
Status: DISCONTINUED | OUTPATIENT
Start: 2020-06-11 | End: 2020-06-11

## 2020-06-11 RX ORDER — METOPROLOL TARTRATE 25 MG/1
25 TABLET, FILM COATED ORAL 2 TIMES DAILY
Status: DISCONTINUED | OUTPATIENT
Start: 2020-06-11 | End: 2020-06-12 | Stop reason: HOSPADM

## 2020-06-11 RX ORDER — METOPROLOL TARTRATE 25 MG/1
25 TABLET, FILM COATED ORAL 2 TIMES DAILY
Status: DISCONTINUED | OUTPATIENT
Start: 2020-06-11 | End: 2020-06-11

## 2020-06-11 RX ORDER — INSULIN ASPART 100 [IU]/ML
10 INJECTION, SOLUTION INTRAVENOUS; SUBCUTANEOUS
Status: COMPLETED | OUTPATIENT
Start: 2020-06-11 | End: 2020-06-11

## 2020-06-11 RX ORDER — INSULIN ASPART 100 [IU]/ML
0-5 INJECTION, SOLUTION INTRAVENOUS; SUBCUTANEOUS
Status: DISCONTINUED | OUTPATIENT
Start: 2020-06-11 | End: 2020-06-11

## 2020-06-11 RX ORDER — IBUPROFEN 200 MG
24 TABLET ORAL
Status: DISCONTINUED | OUTPATIENT
Start: 2020-06-11 | End: 2020-06-12 | Stop reason: HOSPADM

## 2020-06-11 RX ORDER — METOPROLOL TARTRATE 25 MG/1
12.5 TABLET ORAL 2 TIMES DAILY
Status: DISCONTINUED | OUTPATIENT
Start: 2020-06-11 | End: 2020-06-11

## 2020-06-11 RX ORDER — INSULIN ASPART 100 [IU]/ML
30 INJECTION, SOLUTION INTRAVENOUS; SUBCUTANEOUS
Status: DISCONTINUED | OUTPATIENT
Start: 2020-06-12 | End: 2020-06-12 | Stop reason: HOSPADM

## 2020-06-11 RX ORDER — INSULIN ASPART 100 [IU]/ML
10 INJECTION, SOLUTION INTRAVENOUS; SUBCUTANEOUS
Status: DISCONTINUED | OUTPATIENT
Start: 2020-06-11 | End: 2020-06-11

## 2020-06-11 RX ORDER — LISINOPRIL 2.5 MG/1
5 TABLET ORAL 2 TIMES DAILY
Status: DISCONTINUED | OUTPATIENT
Start: 2020-06-11 | End: 2020-06-12 | Stop reason: HOSPADM

## 2020-06-11 RX ORDER — LANOLIN ALCOHOL/MO/W.PET/CERES
400 CREAM (GRAM) TOPICAL DAILY
Refills: 0 | COMMUNITY
Start: 2020-06-12 | End: 2023-08-18

## 2020-06-11 RX ADMIN — INSULIN ASPART 10 UNITS: 100 INJECTION, SOLUTION INTRAVENOUS; SUBCUTANEOUS at 04:06

## 2020-06-11 RX ADMIN — ASPIRIN 81 MG: 81 TABLET, COATED ORAL at 08:06

## 2020-06-11 RX ADMIN — SODIUM CHLORIDE: 0.9 INJECTION, SOLUTION INTRAVENOUS at 03:06

## 2020-06-11 RX ADMIN — PANTOPRAZOLE SODIUM 40 MG: 40 TABLET, DELAYED RELEASE ORAL at 08:06

## 2020-06-11 RX ADMIN — ROSUVASTATIN CALCIUM 40 MG: 20 TABLET, FILM COATED ORAL at 02:06

## 2020-06-11 RX ADMIN — LEVOTHYROXINE SODIUM 100 MCG: 100 TABLET ORAL at 05:06

## 2020-06-11 RX ADMIN — TRAZODONE HYDROCHLORIDE 50 MG: 50 TABLET ORAL at 02:06

## 2020-06-11 RX ADMIN — LISINOPRIL 5 MG: 2.5 TABLET ORAL at 02:06

## 2020-06-11 RX ADMIN — METOPROLOL TARTRATE 12.5 MG: 25 TABLET, FILM COATED ORAL at 08:06

## 2020-06-11 RX ADMIN — INSULIN ASPART 10 UNITS: 100 INJECTION, SOLUTION INTRAVENOUS; SUBCUTANEOUS at 11:06

## 2020-06-11 RX ADMIN — INSULIN DETEMIR 35 UNITS: 100 INJECTION, SOLUTION SUBCUTANEOUS at 08:06

## 2020-06-11 RX ADMIN — INSULIN ASPART 10 UNITS: 100 INJECTION, SOLUTION INTRAVENOUS; SUBCUTANEOUS at 05:06

## 2020-06-11 RX ADMIN — INSULIN ASPART 1 UNITS: 100 INJECTION, SOLUTION INTRAVENOUS; SUBCUTANEOUS at 02:06

## 2020-06-11 RX ADMIN — Medication 400 MG: at 11:06

## 2020-06-11 RX ADMIN — INSULIN ASPART 8 UNITS: 100 INJECTION, SOLUTION INTRAVENOUS; SUBCUTANEOUS at 11:06

## 2020-06-11 RX ADMIN — INSULIN ASPART 10 UNITS: 100 INJECTION, SOLUTION INTRAVENOUS; SUBCUTANEOUS at 09:06

## 2020-06-11 RX ADMIN — INSULIN ASPART 15 UNITS: 100 INJECTION, SOLUTION INTRAVENOUS; SUBCUTANEOUS at 04:06

## 2020-06-11 NOTE — HPI
Adele Hall is a 75-year-old female with a past medical history of CAD post quadruple bypass approximately 5 years ago, hypertension, hyperlipidemia, hypothyroidism, insulin-dependent diabetes, COPD, arthritis, and GERD who presents to the emergency room tonight with heart palpitations.  Patient states on Tuesday she underwent bilateral nerve blocks to both shoulders per Dr. England.  Patient reportedly has to torn rotator cuffs bilaterally, however does not wish to undergo surgical intervention at this time.  Patient states she began to experience heart fluttering yesterday.  Patient states she has a history of angina, however the angina since yesterday has been increasing in severity, intermittent, sharp in characteristic, located to left-sided chest wall, and worse with movement.  Patient also endorses shortness of breath with dyspnea on exertion.  Patient states she has been evaluated by a cardiologist and pulmonologist with no definite diagnosis regarding her shortness of breath and chronic angina.  Patient's workup in the emergency room revealing mildly elevated BNP.  Patient placed in observation on 06/10/2020 at approximately 11:30 p.m..  Patient states she resides at home with her , denies use of supplemental oxygen or ambulatory assist devices.

## 2020-06-11 NOTE — H&P
[facility] Medicine  History & Physical    Patient Name: Adele Hall  MRN: [id number]  Admission Date: 6/10/2020  Attending Physician: Mike Flores MD   Primary Care Provider: Wally Cazares MD         Patient information was obtained from patient, past medical records and ER records.     Subjective:     Principal Problem:Irregular heart rhythm    Chief Complaint:   Chief Complaint   Patient presents with    heart flutter     pt feels like her heart is fluttering; no CP        HPI: Adele Hall is a 75-year-old female with a past medical history of CAD post quadruple bypass approximately 5 years ago, hypertension, hyperlipidemia, hypothyroidism, insulin-dependent diabetes, COPD, arthritis, and GERD who presents to the emergency room tonight with heart palpitations.  Patient states on Tuesday she underwent bilateral nerve blocks to both shoulders per Dr. England.  Patient reportedly has to torn rotator cuffs bilaterally, however does not wish to undergo surgical intervention at this time.  Patient states she began to experience heart fluttering yesterday.  Patient states she has a history of angina, however the angina since yesterday has been increasing in severity, intermittent, sharp in characteristic, located to left-sided chest wall, and worse with movement.  Patient also endorses shortness of breath with dyspnea on exertion.  Patient states she has been evaluated by a cardiologist and pulmonologist with no definite diagnosis regarding her shortness of breath and chronic angina.  Patient's workup in the emergency room revealing mildly elevated BNP.  Patient placed in observation on 06/10/2020 at approximately 11:30 p.m..  Patient states she resides at home with her , denies use of supplemental oxygen or ambulatory assist devices.    Past Medical History:   Diagnosis Date    Anesthesia complication     took patient 6 days to come out of anethesia after  CABG    Anticoagulant long-term use     Arthritis     Chronic kidney disease     CKD (chronic kidney disease) stage 3, GFR 30-59 ml/min     Dr. Montero    COPD (chronic obstructive pulmonary disease) 2/7/2016    COPD (chronic obstructive pulmonary disease)     Coronary artery disease     Diabetes mellitus     Diabetes mellitus, type 2     GERD (gastroesophageal reflux disease)     Hx of colonic polyps     Hyperlipidemia     Hypertension     possible new onset CHF 7/30/2016    Tardive dyskinesia     Thyroid disease     Ulcer     Vertigo        Past Surgical History:   Procedure Laterality Date    CARDIAC SURGERY      CHOLECYSTECTOMY  01/26/2017    COLONOSCOPY      CORONARY ARTERY BYPASS GRAFT  01/19/2016    4 vessel    HYSTERECTOMY      OOPHORECTOMY      TONSILLECTOMY      TOTAL THYROIDECTOMY      TUBAL LIGATION         Review of patient's allergies indicates:   Allergen Reactions    Reglan [metoclopramide hcl]      Depression and weight loss.        No current facility-administered medications on file prior to encounter.      Current Outpatient Medications on File Prior to Encounter   Medication Sig    ammonium lactate (LAC-HYDRIN) 12 % lotion Apply topically as needed for Dry Skin.    aspirin (ECOTRIN) 81 MG EC tablet Take 81 mg by mouth once daily.    BD INSULIN SYRINGE ULTRA-FINE 1 mL 31 gauge x 5/16 Syrg USE AS DIRECTED 5 TIMES A DAY WITH LEVEMIR & NOVOLOG    blood sugar diagnostic (BLOOD GLUCOSE TEST) Strp 1 each by Misc.(Non-Drug; Combo Route) route 4 (four) times daily. DX E11.22 Pt is on insulin.    dicyclomine (BENTYL) 10 MG capsule     insulin aspart U-100 (NOVOLOG FLEXPEN U-100 INSULIN) 100 unit/mL (3 mL) InPn pen Inject 34 Units into the skin 2 (two) times daily with meals. Plus correction scale, max  units    insulin degludec (TRESIBA FLEXTOUCH U-200) 200 unit/mL (3 mL) InPn Inject 70 Units into the skin once daily.    KETOPROFEN TOP APPLY 1.6 GRAMS (1 PUMP)  "TO PAINFUL AREAS UP TO 5 TIMES DAILY. RUB IN WELL    lancets 33 gauge Misc 1 lancet by Misc.(Non-Drug; Combo Route) route 4 (four) times daily.    levothyroxine (SYNTHROID) 100 MCG tablet TAKE ONE TABLET BY MOUTH EVERY DAY    lisinopril (PRINIVIL,ZESTRIL) 5 MG tablet One po BID    meclizine (ANTIVERT) 25 mg tablet Take 1 tablet (25 mg total) by mouth 3 (three) times daily as needed.    metoprolol tartrate (LOPRESSOR) 25 MG tablet Take 0.5 tablets (12.5 mg total) by mouth 2 (two) times daily.    nitroGLYCERIN (NITROSTAT) 0.4 MG SL tablet Place 1 tablet (0.4 mg total) under the tongue every 5 (five) minutes as needed for Chest pain. (Patient taking differently: Place 0.4 mg under the tongue every 5 (five) minutes as needed for Chest pain. Seek medical help is pain is not relieved by the third dose.)    pantoprazole (PROTONIX) 40 MG tablet Take 1 tablet (40 mg total) by mouth once daily.    pen needle, diabetic (BD ULTRA-FINE YUMIKO PEN NEEDLE) 32 gauge x 5/32" Ndle Uses 3 daily, on multiple daily insulin injections    rosuvastatin (CRESTOR) 40 MG Tab TAKE ONE TABLET BY MOUTH EVERY EVENING    traMADoL (ULTRAM) 50 mg tablet Take 1 tablet (50 mg total) by mouth every 6 (six) hours as needed for Pain.    traZODone (DESYREL) 50 MG tablet TAKE ONE TABLET BY MOUTH AT BEDTIME AS NEEDED FOR INSOMNIA    vit C,F-Eh-jnzmj-lutein-zeaxan (PRESERVISION AREDS-2) [id number] mg-unit-mg-mg Cap Take 1 tablet by mouth 2 (two) times daily.     Family History     Problem Relation (Age of Onset)    Alcohol abuse Sister    Breast cancer Mother    Cancer Mother, Father    Early death Father    Heart disease Paternal Uncle, Brother    No Known Problems Son        Tobacco Use    Smoking status: Former Smoker     Packs/day: 1.00     Years: 27.00     Pack years: 27.00     Types: Cigarettes     Last attempt to quit: 2016     Years since quittin.3    Smokeless tobacco: Never Used   Substance and Sexual Activity    Alcohol " use: No     Alcohol/week: 0.0 standard drinks    Drug use: No    Sexual activity: Yes     Partners: Male     Review of Systems   Constitutional: Positive for activity change and fatigue. Negative for appetite change, chills and fever.   HENT: Negative for congestion, sinus pressure, sinus pain and sore throat.    Eyes: Negative for photophobia and visual disturbance.   Respiratory: Positive for shortness of breath. Negative for cough, choking, chest tightness and wheezing.    Cardiovascular: Positive for chest pain and palpitations. Negative for leg swelling.   Gastrointestinal: Positive for diarrhea. Negative for abdominal distention, abdominal pain, blood in stool, constipation, nausea and vomiting.        Chronic     Endocrine: Positive for polyuria.   Genitourinary: Negative for difficulty urinating, dysuria, flank pain and hematuria.   Musculoskeletal: Positive for myalgias. Negative for back pain, gait problem and joint swelling.        Bilateral shoulders   Skin: Negative for rash and wound.   Neurological: Negative for dizziness, syncope, weakness, light-headedness, numbness and headaches.   Psychiatric/Behavioral: Negative for confusion. The patient is not nervous/anxious.      Objective:     Vital Signs (Most Recent):  Temp: 98.3 °F (36.8 °C) (06/10/20 2135)  Pulse: 71 (06/11/20 0101)  Resp: 16 (06/10/20 2135)  BP: (!) 148/68 (06/11/20 0101)  SpO2: 95 % (06/11/20 0101) Vital Signs (24h Range):  Temp:  [98.3 °F (36.8 °C)] 98.3 °F (36.8 °C)  Pulse:  [55-71] 71  Resp:  [16] 16  SpO2:  [94 %-97 %] 95 %  BP: (148-207)/(67-94) 148/68     Weight: 83.9 kg (185 lb)  Body mass index is 33.84 kg/m².    Physical Exam   Constitutional: She is oriented to person, place, and time. She appears well-developed and well-nourished. No distress.   HENT:   Head: Normocephalic and atraumatic.   Eyeglasses in use     Eyes: Pupils are equal, round, and reactive to light. EOM are normal. Right eye exhibits no discharge. Left eye  exhibits no discharge.   Neck: Normal range of motion. No JVD present.   Cardiovascular: Regular rhythm, normal heart sounds and intact distal pulses.   No murmur heard.  Frequent PVCS noted on bedside continuous cardiac monitor.   Pulmonary/Chest: Effort normal. No respiratory distress. She has no wheezes. She has rales. She exhibits no tenderness.   Patient on room air during my initial interview and physical exam, patient in no acute respiratory distress.   Abdominal: Soft. Bowel sounds are normal. She exhibits no distension. There is no tenderness. There is no rebound and no guarding.   Genitourinary:   Genitourinary Comments: Exam Deferred      Musculoskeletal: She exhibits no edema, tenderness or deformity.   Limited range of motion to bilateral upper extremities   Neurological: She is alert and oriented to person, place, and time. No cranial nerve deficit or sensory deficit.   Skin: Skin is warm and dry. Capillary refill takes 2 to 3 seconds. No rash noted. She is not diaphoretic. No erythema.   Psychiatric: She has a normal mood and affect. Her behavior is normal. Judgment and thought content normal.   Nursing note and vitals reviewed.        CRANIAL NERVES     CN III, IV, VI   Pupils are equal, round, and reactive to light.  Extraocular motions are normal.        Significant Labs:   BMP:   Recent Labs   Lab 06/10/20  2150   *      K 4.1      CO2 23   BUN 30*   CREATININE 1.6*   CALCIUM 8.8     CBC:   Recent Labs   Lab 06/10/20  2150   WBC 12.90*   HGB 12.4   HCT 40.0        CMP:   Recent Labs   Lab 06/10/20  2150      K 4.1      CO2 23   *   BUN 30*   CREATININE 1.6*   CALCIUM 8.8   PROT 7.0   ALBUMIN 3.5   BILITOT 0.3   ALKPHOS 80   AST 34   ALT 27   ANIONGAP 11   EGFRNONAA 32*     Cardiac Markers:   Recent Labs   Lab 06/10/20  2150   *     Troponin:   Recent Labs   Lab 06/10/20  2150   TROPONINI 0.012     COVID - Negative     All pertinent labs within  the past 24 hours have been reviewed.    Significant Imaging: I have reviewed all pertinent imaging results/findings within the past 24 hours.     Chest Xray:  Impression:       Cardiomegaly without evidence of CHF.     EKG performed on 06/10/2020, impression as below:  Sinus bradycardia  LVH with repolarization abnormality  Abnormal ECG  When compared with ECG of 13-JAN-2018 21:07,  T wave inversion less evident in Lateral leads    Assessment/Plan:     * Irregular heart rhythm  In the setting of significant cardiac history, patient placed in observation for ACS workup.  Initial troponin was within normal limits, will trend.  BNP very mildly elevated.  Continuous telemetry monitoring.  Trend and replace electrolytes as needed.      Elevated brain natriuretic peptide (BNP) level  Noted, continuous telemetry monitoring. Echocardiogram reviewed from January 2018, results below:  CONCLUSIONS     1 - Normal left ventricular systolic function (EF 60-65%).     2 - No wall motion abnormalities.     3 - Concentric hypertrophy.     4 - Mild left atrial enlargement.     5 - Impaired LV relaxation, elevated LAP (grade 2 diastolic dysfunction).     6 - Normal right ventricular systolic function .     7 - Mild tricuspid regurgitation.     8 - The estimated PA systolic pressure is 26 mmHg.            Leukocytosis  Noted, will trend with daily CBC.        CKD (chronic kidney disease) stage 3, GFR 30-59 ml/min  Creatine stable for now. BMP reviewed- noted Estimated Creatinine Clearance: 31.4 mL/min (A) (based on SCr of 1.6 mg/dL (H)). according to latest data. Monitor UOP and serial BMP and adjust therapy as needed. Renally dose meds.            GERD (gastroesophageal reflux disease)  Chronic, controlled.  Will continue home medication.      Coronary artery disease involving native coronary artery  Noted, continue home medication.  Continuous telemetry monitoring.  Trend troponin.      Hyperlipidemia with target LDL less than  70    Chronic, controlled. Will continue home medication.    Lab Results   Component Value Date    CHOL 138 05/26/2020    CHOL 161 03/10/2020    CHOL 147 05/14/2019     Lab Results   Component Value Date    HDL 31 (L) 05/26/2020    HDL 45 03/10/2020    HDL 34 (L) 05/14/2019     Lab Results   Component Value Date    LDLCALC 49.2 (L) 05/26/2020    LDLCALC 83.4 03/10/2020    LDLCALC 61.8 (L) 05/14/2019     Lab Results   Component Value Date    TRIG 289 (H) 05/26/2020    TRIG 163 (H) 03/10/2020    TRIG 256 (H) 05/14/2019     Lab Results   Component Value Date    CHOLHDL 22.5 05/26/2020    CHOLHDL 28.0 03/10/2020    CHOLHDL 23.1 05/14/2019             Hypertension associated with diabetes  Chronic, uncontrolled.  Latest blood pressure and vitals reviewed-   Temp:  [98.3 °F (36.8 °C)]   Pulse:  [55-71]   Resp:  [16]   BP: (148-207)/(67-94)   SpO2:  [94 %-97 %] .   Home meds for hypertension were reviewed and noted below. Hospital anti-hypertensive changes were made as shown below.  Hypertension Medications             lisinopril (PRINIVIL,ZESTRIL) 5 MG tablet One po BID    metoprolol tartrate (LOPRESSOR) 25 MG tablet Take 0.5 tablets (12.5 mg total) by mouth 2 (two) times daily.    nitroGLYCERIN (NITROSTAT) 0.4 MG SL tablet Place 1 tablet (0.4 mg total) under the tongue every 5 (five) minutes as needed for Chest pain.        Will treat with PRN antihypertensives, as needed, to maintain BP less than 180/110 or if patient becomes symptomatic.          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  Will continue home medication    Type 2 diabetes mellitus with diabetic polyneuropathy, with long-term current use of insulin  Lab Results   Component Value Date    HGBA1C 8.3 (H) 05/26/2020       Chronic, uncontrolled.  Patient states she takes 70 units long-acting insulin in the morning and 30 to units of NovoLog before each meal.  Will utilize  low-dose sliding scale insulin as needed.  Will initiate Levemir at 50% of home dose.  Accu-Cheks, diabetic diet.      VTE Risk Mitigation (From admission, onward)    None         Time spent seeing patient( greater than 1/2 spent in direct contact) : 65 minutes     Ximena Anthony NP  Department of Hospital Medicine   [facility]

## 2020-06-11 NOTE — PLAN OF CARE
Pt clear from case management standpoint. Pt discharging to home. CM following.        06/11/20 1139   Final Note   Assessment Type Final Discharge Note   Anticipated Discharge Disposition Home   Hospital Follow Up  Appt(s) scheduled? Yes

## 2020-06-11 NOTE — PLAN OF CARE
CM completed discharge assessment bedside with pt. Pt verified information correct on facesheet. Denies POA/LW. Reports living at listed address with spouse. PCP is Dr. Cazares. Pharm is [facility]. Denies hh/hd. DME- glucometer, rollator (uses PRN). Reports being independent with ADLs and able to drive herself to apts. Reports that her spouse will provide transportation home upon DC. Verified insurance as [employer]. Denies recent inpt stay in last 30 days. DC plan is home. CM following to assist.        06/11/20 1045   Discharge Assessment   Assessment Type Discharge Planning Assessment   Confirmed/corrected address and phone number on facesheet? Yes   Assessment information obtained from? Patient   Communicated expected length of stay with patient/caregiver yes   Prior to hospitilization cognitive status: Alert/Oriented   Prior to hospitalization functional status: Independent   Current cognitive status: Alert/Oriented   Current Functional Status: Independent   Lives With spouse   Able to Return to Prior Arrangements yes   Is patient able to care for self after discharge? Yes   Patient's perception of discharge disposition home or selfcare   Readmission Within the Last 30 Days no previous admission in last 30 days   Patient currently being followed by outpatient case management? No   Patient currently receives any other outside agency services? No   Equipment Currently Used at Home glucometer;rollator   Do you have any problems affording any of your prescribed medications? No   Is the patient taking medications as prescribed? yes   Does the patient have transportation home? Yes   Transportation Anticipated family or friend will provide   Does the patient receive services at the [facility]? No   Discharge Plan A Home with family   Discharge Plan B Home   DME Needed Upon Discharge  none   Patient/Family in Agreement with Plan yes

## 2020-06-11 NOTE — ED PROVIDER NOTES
Encounter Date: 6/10/2020    SCRIBE #1 NOTE: I, Mellisa Prado, am scribing for, and in the presence of, Mike Flores MD.       History     Chief Complaint   Patient presents with    heart flutter     pt feels like her heart is fluttering; no CP       Time seen by provider: 09:52 PM on 06/10/2020    Adele Hall is a 75 y.o. female with a PMHx of angina, CAD, HTN and DM type 2 who presents to the ED with an onset of intermittent heart fluttering with associated chest pain  Since yesterday. Patient reports pain lasts less than a minutes but flutter lasts around 10 minutes. Last episodes was en route to the ER. Patient reports getting a nerve block in both shoulders yesterday and thought it could have caused her current symptoms. Patient endorses taking Nitroglycerin last night but did not take any today. Patient denies increased caffeine or nicotine intake. She hasn't seen her cardiologist for at least 1 year but endorses typical angina pain approximately 2 times per month. The patient denies leg swelling or any other symptoms at this time. PSHx includes CABG and thyroidectomy.      The history is provided by the patient.     Review of patient's allergies indicates:   Allergen Reactions    Reglan [metoclopramide hcl]      Depression and weight loss.      Past Medical History:   Diagnosis Date    Anesthesia complication     took patient 6 days to come out of anethesia after CABG    Anticoagulant long-term use     Arthritis     Chronic kidney disease     CKD (chronic kidney disease) stage 3, GFR 30-59 ml/min     Dr. Montero    COPD (chronic obstructive pulmonary disease) 2/7/2016    COPD (chronic obstructive pulmonary disease)     Coronary artery disease     Diabetes mellitus     Diabetes mellitus, type 2     GERD (gastroesophageal reflux disease)     Hx of colonic polyps     Hyperlipidemia     Hypertension     possible new onset CHF 7/30/2016    Tardive dyskinesia     Thyroid disease      Ulcer     Vertigo      Past Surgical History:   Procedure Laterality Date    CARDIAC SURGERY      CHOLECYSTECTOMY  2017    COLONOSCOPY      CORONARY ARTERY BYPASS GRAFT  2016    4 vessel    HYSTERECTOMY      OOPHORECTOMY      TONSILLECTOMY      TOTAL THYROIDECTOMY      TUBAL LIGATION       Family History   Problem Relation Age of Onset    Cancer Mother         LYMPHOMA    Breast cancer Mother     Cancer Father     Early death Father     Heart disease Paternal Uncle     Alcohol abuse Sister         x2    Heart disease Brother     No Known Problems Son      Social History     Tobacco Use    Smoking status: Former Smoker     Packs/day: 1.00     Years: 27.00     Pack years: 27.00     Types: Cigarettes     Last attempt to quit: 2016     Years since quittin.3    Smokeless tobacco: Never Used   Substance Use Topics    Alcohol use: No     Alcohol/week: 0.0 standard drinks    Drug use: No     Review of Systems   Constitutional: Negative for fever.   HENT: Negative for congestion.    Eyes: Negative for visual disturbance.   Respiratory: Negative for wheezing.    Cardiovascular: Positive for chest pain. Negative for leg swelling.   Gastrointestinal: Negative for abdominal pain.   Genitourinary: Negative for dysuria.   Musculoskeletal: Negative for joint swelling.   Skin: Negative for rash.   Neurological: Negative for syncope.   Hematological: Does not bruise/bleed easily.   Psychiatric/Behavioral: Negative for confusion.       Physical Exam     Initial Vitals [06/10/20 2135]   BP Pulse Resp Temp SpO2   (!) 180/76 65 16 98.3 °F (36.8 °C) 97 %      MAP       --         Physical Exam    Nursing note and vitals reviewed.  Constitutional: She appears well-nourished. She is Obese .   HENT:   Head: Normocephalic and atraumatic.   Eyes: Conjunctivae and EOM are normal.   Neck: Normal range of motion. Neck supple. No thyroid mass present.   Cardiovascular: Normal rate, regular rhythm and  normal heart sounds. Exam reveals no gallop and no friction rub.    No murmur heard.  Pulmonary/Chest: Breath sounds normal. She has no wheezes. She has no rhonchi. She has no rales.   Abdominal: Soft. Normal appearance and bowel sounds are normal. There is no tenderness.   Neurological: She is alert and oriented to person, place, and time. She has normal strength. No cranial nerve deficit or sensory deficit.   Skin: Skin is warm and dry. No rash noted. No erythema.   Psychiatric: She has a normal mood and affect. Her speech is normal. Cognition and memory are normal.         ED Course   Procedures  Labs Reviewed   CBC W/ AUTO DIFFERENTIAL - Abnormal; Notable for the following components:       Result Value    WBC 12.90 (*)     Mean Corpuscular Hemoglobin Conc 31.0 (*)     Immature Grans (Abs) 0.05 (*)     Mono # 1.1 (*)     All other components within normal limits   COMPREHENSIVE METABOLIC PANEL - Abnormal; Notable for the following components:    Glucose 232 (*)     BUN, Bld 30 (*)     Creatinine 1.6 (*)     eGFR if  37 (*)     eGFR if non  32 (*)     All other components within normal limits   B-TYPE NATRIURETIC PEPTIDE - Abnormal; Notable for the following components:     (*)     All other components within normal limits   TROPONIN I   SARS-COV-2 RNA AMPLIFICATION, QUAL   TROPONIN I     EKG Readings: (Independently Interpreted)   Sinus bradycardia at a rate of 58 bpm. Normal axis. Left ventricular hypertrophy. Normal intervals. No STEMI. No significant wave morphology changes from EKG on 01/13/2018.       Imaging Results          X-Ray Chest AP Portable (Final result)  Result time 06/10/20 22:23:41    Final result by Demar Bradshaw MD (06/10/20 22:23:41)                 Impression:      Cardiomegaly without evidence of CHF.      Electronically signed by: Demar Bradshaw MD  Date:    06/10/2020  Time:    22:23             Narrative:    EXAMINATION:  XR CHEST AP  PORTABLE    CLINICAL HISTORY:  Chest Pain;    TECHNIQUE:  Single frontal view of the chest was performed.    COMPARISON:  01/15/2019.    FINDINGS:  Monitoring EKG leads are present.  There are postoperative changes of median sternotomy.  The trachea is unremarkable.  There is stable enlargement of the cardiomediastinal silhouette.  The hemidiaphragms are unremarkable.  There is no evidence of free air beneath the hemidiaphragms.  There are no pleural effusions.  There is no evidence of a pneumothorax.  There is no evidence of pneumomediastinum.  No airspace opacity is present.  The osseous structures demonstrate degenerative changes.                                 Medical Decision Making:   History:   Old Medical Records: I decided to obtain old medical records.  Independently Interpreted Test(s):   I have ordered and independently interpreted X-rays - see prior notes.  I have ordered and independently interpreted EKG Reading(s) - see prior notes  Clinical Tests:   Lab Tests: Ordered and Reviewed  Radiological Study: Ordered and Reviewed  Medical Tests: Ordered and Reviewed  ED Management:  This patient was interviewed and examined emergently.  Initial vital signs are significant for hypertension.  Patient is reporting palpitations that have been frequent over the last 2 days after receiving bilateral shoulder nerve blocks.  She is also reporting increased frequency an anginal episodes, although brief.  Currently denies chest pain.  ACS workup is found to be unremarkable here but I do think the patient warrants observation for further rhythm strip monitoring, as well as for treatment/monitoring for angina frequency.  Case discussed with and accepted by the on-call nurse practitioner.  Patient and  updated on the plan of care and they are in agreement with this disposition.  She is transported to an observation bed in guarded condition.             Scribe Attestation:   Scribe #1: I performed the above  scribed service and the documentation accurately describes the services I performed. I attest to the accuracy of the note.    I, Dr. Mike Flores, personally performed the services described in this documentation. All medical record entries made by the scribe were at my direction and in my presence.  I have reviewed the chart and agree that the record reflects my personal performance and is accurate and complete. Mike Flores MD.  6:47 AM 06/11/2020\                      Clinical Impression:       ICD-10-CM ICD-9-CM   1. Irregular heart rhythm I49.9 427.9         Disposition:   Disposition: Admitted  Condition: Fair     ED Disposition Condition    Observation                           Mike Flores MD  06/11/20 0648

## 2020-06-11 NOTE — ASSESSMENT & PLAN NOTE
Chronic, controlled. Will continue home medication.    Lab Results   Component Value Date    CHOL 138 05/26/2020    CHOL 161 03/10/2020    CHOL 147 05/14/2019     Lab Results   Component Value Date    HDL 31 (L) 05/26/2020    HDL 45 03/10/2020    HDL 34 (L) 05/14/2019     Lab Results   Component Value Date    LDLCALC 49.2 (L) 05/26/2020    LDLCALC 83.4 03/10/2020    LDLCALC 61.8 (L) 05/14/2019     Lab Results   Component Value Date    TRIG 289 (H) 05/26/2020    TRIG 163 (H) 03/10/2020    TRIG 256 (H) 05/14/2019     Lab Results   Component Value Date    CHOLHDL 22.5 05/26/2020    CHOLHDL 28.0 03/10/2020    CHOLHDL 23.1 05/14/2019

## 2020-06-11 NOTE — ED NOTES
Assumed care:  Adele Hall is awake, alert and oriented x 3, skin warm and dry, in NAD.  Patient states that she had a procedure yesterday and after, started to feel flutter in her chest.  States she has occasional CP.    Patient identifiers for Adele Hall checked and correct.  LOC:  Adele Hall is awake, alert, and aware of environment with an appropriate affect. She is oriented x 3 and speaking appropriately.  APPEARANCE:  She is resting comfortably and in no acute distress. She is clean and well groomed, patient's clothing is properly fastened.  SKIN:  The skin is warm and dry. She has normal skin turgor and moist mucus membranes. Skin is intact; no bruising or breakdown noted.  MUSCULOSKELETAL:  She is moving all extremities well, no obvious deformities noted. Pulses intact.   RESPIRATORY:  Airway is open and patent. Respirations are spontaneous and non-labored with normal effort and rate.  CARDIAC:  She has a normal rate and rhythm. No peripheral edema noted. Capillary refill < 3 seconds.  Fluttery feeling, intermitting chest pain  ABDOMEN:  No distention noted.  Soft and non-tender upon palpation.  NEUROLOGICAL:  PERRL. Facial expression is symmetrical. Hand grasps are equal bilaterally. Normal sensation in all extremities when touched with finger.  Allergies reported:    Review of patient's allergies indicates:   Allergen Reactions    Reglan [metoclopramide hcl]      Depression and weight loss.      OTHER NOTES:

## 2020-06-11 NOTE — ASSESSMENT & PLAN NOTE
In the setting of significant cardiac history, patient placed in observation for ACS workup.  Initial troponin was within normal limits, will trend.  BNP very mildly elevated.  Continuous telemetry monitoring.  Trend and replace electrolytes as needed.

## 2020-06-11 NOTE — ASSESSMENT & PLAN NOTE
Creatine stable for now. BMP reviewed- noted Estimated Creatinine Clearance: 31.4 mL/min (A) (based on SCr of 1.6 mg/dL (H)). according to latest data. Monitor UOP and serial BMP and adjust therapy as needed. Renally dose meds.

## 2020-06-11 NOTE — PROGRESS NOTES
Chart review completed 06/11/2020.  Care Everywhere updates requested and reviewed.  Immunizations reconciled. Media reviewed.     DIS, Lab richie, and quest reviewed    HA1C COMPLETED RECENTLY, >8, PT IS FOLLOWED BY ENDOCRINE

## 2020-06-11 NOTE — SUBJECTIVE & OBJECTIVE
Past Medical History:   Diagnosis Date    Anesthesia complication     took patient 6 days to come out of anethesia after CABG    Anticoagulant long-term use     Arthritis     Chronic kidney disease     CKD (chronic kidney disease) stage 3, GFR 30-59 ml/min     Dr. Montero    COPD (chronic obstructive pulmonary disease) 2/7/2016    COPD (chronic obstructive pulmonary disease)     Coronary artery disease     Diabetes mellitus     Diabetes mellitus, type 2     GERD (gastroesophageal reflux disease)     Hx of colonic polyps     Hyperlipidemia     Hypertension     possible new onset CHF 7/30/2016    Tardive dyskinesia     Thyroid disease     Ulcer     Vertigo        Past Surgical History:   Procedure Laterality Date    CARDIAC SURGERY      CHOLECYSTECTOMY  01/26/2017    COLONOSCOPY      CORONARY ARTERY BYPASS GRAFT  01/19/2016    4 vessel    HYSTERECTOMY      OOPHORECTOMY      TONSILLECTOMY      TOTAL THYROIDECTOMY      TUBAL LIGATION         Review of patient's allergies indicates:   Allergen Reactions    Reglan [metoclopramide hcl]      Depression and weight loss.        No current facility-administered medications on file prior to encounter.      Current Outpatient Medications on File Prior to Encounter   Medication Sig    ammonium lactate (LAC-HYDRIN) 12 % lotion Apply topically as needed for Dry Skin.    aspirin (ECOTRIN) 81 MG EC tablet Take 81 mg by mouth once daily.    BD INSULIN SYRINGE ULTRA-FINE 1 mL 31 gauge x 5/16 Syrg USE AS DIRECTED 5 TIMES A DAY WITH LEVEMIR & NOVOLOG    blood sugar diagnostic (BLOOD GLUCOSE TEST) Strp 1 each by Misc.(Non-Drug; Combo Route) route 4 (four) times daily. DX E11.22 Pt is on insulin.    dicyclomine (BENTYL) 10 MG capsule     insulin aspart U-100 (NOVOLOG FLEXPEN U-100 INSULIN) 100 unit/mL (3 mL) InPn pen Inject 34 Units into the skin 2 (two) times daily with meals. Plus correction scale, max  units    insulin degludec (TRESIBA  "FLEXTOUCH U-200) 200 unit/mL (3 mL) InPn Inject 70 Units into the skin once daily.    KETOPROFEN TOP APPLY 1.6 GRAMS (1 PUMP) TO PAINFUL AREAS UP TO 5 TIMES DAILY. RUB IN WELL    lancets 33 gauge Misc 1 lancet by Misc.(Non-Drug; Combo Route) route 4 (four) times daily.    levothyroxine (SYNTHROID) 100 MCG tablet TAKE ONE TABLET BY MOUTH EVERY DAY    lisinopril (PRINIVIL,ZESTRIL) 5 MG tablet One po BID    meclizine (ANTIVERT) 25 mg tablet Take 1 tablet (25 mg total) by mouth 3 (three) times daily as needed.    metoprolol tartrate (LOPRESSOR) 25 MG tablet Take 0.5 tablets (12.5 mg total) by mouth 2 (two) times daily.    nitroGLYCERIN (NITROSTAT) 0.4 MG SL tablet Place 1 tablet (0.4 mg total) under the tongue every 5 (five) minutes as needed for Chest pain. (Patient taking differently: Place 0.4 mg under the tongue every 5 (five) minutes as needed for Chest pain. Seek medical help is pain is not relieved by the third dose.)    pantoprazole (PROTONIX) 40 MG tablet Take 1 tablet (40 mg total) by mouth once daily.    pen needle, diabetic (BD ULTRA-FINE YUMIKO PEN NEEDLE) 32 gauge x 5/32" Ndle Uses 3 daily, on multiple daily insulin injections    rosuvastatin (CRESTOR) 40 MG Tab TAKE ONE TABLET BY MOUTH EVERY EVENING    traMADoL (ULTRAM) 50 mg tablet Take 1 tablet (50 mg total) by mouth every 6 (six) hours as needed for Pain.    traZODone (DESYREL) 50 MG tablet TAKE ONE TABLET BY MOUTH AT BEDTIME AS NEEDED FOR INSOMNIA    vit C,Z-Sh-sgxfj-lutein-zeaxan (PRESERVISION AREDS-2) 305-178-12-1 mg-unit-mg-mg Cap Take 1 tablet by mouth 2 (two) times daily.     Family History     Problem Relation (Age of Onset)    Alcohol abuse Sister    Breast cancer Mother    Cancer Mother, Father    Early death Father    Heart disease Paternal Uncle, Brother    No Known Problems Son        Tobacco Use    Smoking status: Former Smoker     Packs/day: 1.00     Years: 27.00     Pack years: 27.00     Types: Cigarettes     Last attempt to " quit: 2016     Years since quittin.3    Smokeless tobacco: Never Used   Substance and Sexual Activity    Alcohol use: No     Alcohol/week: 0.0 standard drinks    Drug use: No    Sexual activity: Yes     Partners: Male     Review of Systems   Constitutional: Positive for activity change and fatigue. Negative for appetite change, chills and fever.   HENT: Negative for congestion, sinus pressure, sinus pain and sore throat.    Eyes: Negative for photophobia and visual disturbance.   Respiratory: Positive for shortness of breath. Negative for cough, choking, chest tightness and wheezing.    Cardiovascular: Positive for chest pain and palpitations. Negative for leg swelling.   Gastrointestinal: Positive for diarrhea. Negative for abdominal distention, abdominal pain, blood in stool, constipation, nausea and vomiting.        Chronic     Endocrine: Positive for polyuria.   Genitourinary: Negative for difficulty urinating, dysuria, flank pain and hematuria.   Musculoskeletal: Positive for myalgias. Negative for back pain, gait problem and joint swelling.        Bilateral shoulders   Skin: Negative for rash and wound.   Neurological: Negative for dizziness, syncope, weakness, light-headedness, numbness and headaches.   Psychiatric/Behavioral: Negative for confusion. The patient is not nervous/anxious.      Objective:     Vital Signs (Most Recent):  Temp: 98.3 °F (36.8 °C) (06/10/20 2135)  Pulse: 71 (20)  Resp: 16 (06/10/20 2135)  BP: (!) 148/68 (20)  SpO2: 95 % (20) Vital Signs (24h Range):  Temp:  [98.3 °F (36.8 °C)] 98.3 °F (36.8 °C)  Pulse:  [55-71] 71  Resp:  [16] 16  SpO2:  [94 %-97 %] 95 %  BP: (148-207)/(67-94) 148/68     Weight: 83.9 kg (185 lb)  Body mass index is 33.84 kg/m².    Physical Exam   Constitutional: She is oriented to person, place, and time. She appears well-developed and well-nourished. No distress.   HENT:   Head: Normocephalic and atraumatic.   Eyeglasses  in use     Eyes: Pupils are equal, round, and reactive to light. EOM are normal. Right eye exhibits no discharge. Left eye exhibits no discharge.   Neck: Normal range of motion. No JVD present.   Cardiovascular: Regular rhythm, normal heart sounds and intact distal pulses.   No murmur heard.  Frequent PVCS noted on bedside continuous cardiac monitor.   Pulmonary/Chest: Effort normal. No respiratory distress. She has no wheezes. She has rales. She exhibits no tenderness.   Patient on room air during my initial interview and physical exam, patient in no acute respiratory distress.   Abdominal: Soft. Bowel sounds are normal. She exhibits no distension. There is no tenderness. There is no rebound and no guarding.   Genitourinary:   Genitourinary Comments: Exam Deferred      Musculoskeletal: She exhibits no edema, tenderness or deformity.   Limited range of motion to bilateral upper extremities   Neurological: She is alert and oriented to person, place, and time. No cranial nerve deficit or sensory deficit.   Skin: Skin is warm and dry. Capillary refill takes 2 to 3 seconds. No rash noted. She is not diaphoretic. No erythema.   Psychiatric: She has a normal mood and affect. Her behavior is normal. Judgment and thought content normal.   Nursing note and vitals reviewed.        CRANIAL NERVES     CN III, IV, VI   Pupils are equal, round, and reactive to light.  Extraocular motions are normal.        Significant Labs:   BMP:   Recent Labs   Lab 06/10/20  2150   *      K 4.1      CO2 23   BUN 30*   CREATININE 1.6*   CALCIUM 8.8     CBC:   Recent Labs   Lab 06/10/20  2150   WBC 12.90*   HGB 12.4   HCT 40.0        CMP:   Recent Labs   Lab 06/10/20  2150      K 4.1      CO2 23   *   BUN 30*   CREATININE 1.6*   CALCIUM 8.8   PROT 7.0   ALBUMIN 3.5   BILITOT 0.3   ALKPHOS 80   AST 34   ALT 27   ANIONGAP 11   EGFRNONAA 32*     Cardiac Markers:   Recent Labs   Lab 06/10/20  2150   BNP  143*     Troponin:   Recent Labs   Lab 06/10/20  2150   TROPONINI 0.012     COVID - Negative     All pertinent labs within the past 24 hours have been reviewed.    Significant Imaging: I have reviewed all pertinent imaging results/findings within the past 24 hours.     Chest Xray:  Impression:       Cardiomegaly without evidence of CHF.     EKG performed on 06/10/2020, impression as below:  Sinus bradycardia  LVH with repolarization abnormality  Abnormal ECG  When compared with ECG of 13-JAN-2018 21:07,  T wave inversion less evident in Lateral leads

## 2020-06-11 NOTE — PLAN OF CARE
Reviewed POC with pt, pt verbalized understanding, A&O x4, room air maintained, no c/o sob or pain during shift, Diet maintained, Tele maintained, VTE maintained, IV maintained,  Visualized pt, safety intact, bed in low position, locked, bed alarm on, call light and personal items within reach. Will continue to monitor.             Problem: Fall Injury Risk  Goal: Absence of Fall and Fall-Related Injury  Outcome: Ongoing, Progressing     Problem: Adult Inpatient Plan of Care  Goal: Plan of Care Review  Outcome: Ongoing, Progressing  Goal: Patient-Specific Goal (Individualization)  Outcome: Ongoing, Progressing  Goal: Absence of Hospital-Acquired Illness or Injury  Outcome: Ongoing, Progressing  Goal: Optimal Comfort and Wellbeing  Outcome: Ongoing, Progressing  Goal: Readiness for Transition of Care  Outcome: Ongoing, Progressing  Goal: Rounds/Family Conference  Outcome: Ongoing, Progressing     Problem: Diabetes Comorbidity  Goal: Blood Glucose Level Within Desired Range  Outcome: Ongoing, Progressing

## 2020-06-11 NOTE — ASSESSMENT & PLAN NOTE
Chronic, uncontrolled.  Latest blood pressure and vitals reviewed-   Temp:  [98.3 °F (36.8 °C)]   Pulse:  [55-71]   Resp:  [16]   BP: (148-207)/(67-94)   SpO2:  [94 %-97 %] .   Home meds for hypertension were reviewed and noted below. Hospital anti-hypertensive changes were made as shown below.  Hypertension Medications             lisinopril (PRINIVIL,ZESTRIL) 5 MG tablet One po BID    metoprolol tartrate (LOPRESSOR) 25 MG tablet Take 0.5 tablets (12.5 mg total) by mouth 2 (two) times daily.    nitroGLYCERIN (NITROSTAT) 0.4 MG SL tablet Place 1 tablet (0.4 mg total) under the tongue every 5 (five) minutes as needed for Chest pain.        Will treat with PRN antihypertensives, as needed, to maintain BP less than 180/110 or if patient becomes symptomatic.

## 2020-06-11 NOTE — ASSESSMENT & PLAN NOTE
Lab Results   Component Value Date    TSH 0.647 12/10/2019       Chronic, controlled.  Will continue home medication

## 2020-06-11 NOTE — PROGRESS NOTES
"Pt reports continued heart fluttering with very occasional zaps of pain to her chest.  She denies any SOB.  She is experiencing hyperglycemia-just received steroid injection several days ago was told to expect some hyperglycemia.  She is due to see her endocrinologist today.    She reports that she can not just "live with this fluttering" and that this is very uncomfortable for her.    Physical exam:  Heart rate rhythm regular.  Frequent PVCs on telemetry monitor.  Lungs clear to auscultation.  Abdomen soft, nontender.  Obese.      A/P:  Symptomatic PVCs-discussed with Dr. Nava.  Will obtain echo.  Increased beta-blocker to 25 mg twice a day (reviewed her home regimen- takes 12.5mg BID).    Uncontrolled DM2-discussed with patient's endocrinologist.  We will utilize 15 units of aspart pre meal while in the hospital, as well as modified basal dosing.  Pt will reschedule her appointment with endocrinology and they will follow-up upon discharge.  She will resume her outpatient dosing upon discharge.    Possible DC home this afternoon after evaluated by cardiology and echo reviewed.    * I reviewed the telemetry monitor this afternoon-significant decrease in PVCs.    5pm Update:  Discussed with Cardiology.  Pt can discharge home on 25 mg of metoprolol twice daily, as well as magnesium once daily.    Her glucose is uncontrolled at 378.  I discussed with her endocrinologist.  We are adjusting her regimen tonight with increased basal dosing, as well as increased NovoLog with dinner.  We will recheck her blood sugar at 8 p.m.-if trending down, can discharge as planned.  Pt instructed that tomorrow she will take her to receive a at noon instead of in the morning.  She can continue her NovoLog for breakfast as per routine tomorrow.  She will follow-up with her endocrinologist upon discharge.  "

## 2020-06-11 NOTE — ASSESSMENT & PLAN NOTE
Noted, continuous telemetry monitoring. Echocardiogram reviewed from January 2018, results below:  CONCLUSIONS     1 - Normal left ventricular systolic function (EF 60-65%).     2 - No wall motion abnormalities.     3 - Concentric hypertrophy.     4 - Mild left atrial enlargement.     5 - Impaired LV relaxation, elevated LAP (grade 2 diastolic dysfunction).     6 - Normal right ventricular systolic function .     7 - Mild tricuspid regurgitation.     8 - The estimated PA systolic pressure is 26 mmHg.

## 2020-06-11 NOTE — PLAN OF CARE
06/11/20 0729   PRE-TX-O2   O2 Device (Oxygen Therapy) room air   SpO2 97 %   Pulse Oximetry Type Intermittent   $ Pulse Oximetry - Multiple Charge Pulse Oximetry - Multiple

## 2020-06-11 NOTE — ASSESSMENT & PLAN NOTE
Lab Results   Component Value Date    HGBA1C 8.3 (H) 05/26/2020       Chronic, uncontrolled.  Patient states she takes 70 units long-acting insulin in the morning and 30 to units of NovoLog before each meal.  Will utilize low-dose sliding scale insulin as needed.  Will initiate Levemir at 50% of home dose.  Accu-Cheks, diabetic diet.

## 2020-06-12 ENCOUNTER — TELEPHONE (OUTPATIENT)
Dept: MEDSURG UNIT | Facility: HOSPITAL | Age: 75
End: 2020-06-12

## 2020-06-12 ENCOUNTER — TELEPHONE (OUTPATIENT)
Dept: HEPATOLOGY | Facility: HOSPITAL | Age: 75
End: 2020-06-12

## 2020-06-12 LAB
AORTIC ROOT ANNULUS: 2.82 CM
AORTIC VALVE CUSP SEPERATION: 1.88 CM
ASCENDING AORTA: 3.17 CM
AV INDEX (PROSTH): 0.83
AV MEAN GRADIENT: 5 MMHG
AV PEAK GRADIENT: 7 MMHG
AV VALVE AREA: 2.54 CM2
AV VELOCITY RATIO: 0.75
BSA FOR ECHO PROCEDURE: 1.94 M2
CV ECHO LV RWT: 0.58 CM
DOP CALC AO PEAK VEL: 1.32 M/S
DOP CALC AO VTI: 29.43 CM
DOP CALC LVOT AREA: 3 CM2
DOP CALC LVOT DIAMETER: 1.97 CM
DOP CALC LVOT PEAK VEL: 0.99 M/S
DOP CALC LVOT STROKE VOLUME: 74.67 CM3
DOP CALCLVOT PEAK VEL VTI: 24.51 CM
E WAVE DECELERATION TIME: 338.74 MSEC
E/A RATIO: 1.01
E/E' RATIO: 13.86 M/S
ECHO LV POSTERIOR WALL: 1.36 CM (ref 0.6–1.1)
FRACTIONAL SHORTENING: 39 % (ref 28–44)
INTERVENTRICULAR SEPTUM: 1.17 CM (ref 0.6–1.1)
IVRT: 94.2 MSEC
LA MAJOR: 4.77 CM
LA MINOR: 5.44 CM
LA WIDTH: 2.66 CM
LEFT ATRIUM SIZE: 4.31 CM
LEFT ATRIUM VOLUME INDEX: 26.5 ML/M2
LEFT ATRIUM VOLUME: 49.53 CM3
LEFT INTERNAL DIMENSION IN SYSTOLE: 2.88 CM (ref 2.1–4)
LEFT VENTRICLE DIASTOLIC VOLUME INDEX: 54.63 ML/M2
LEFT VENTRICLE DIASTOLIC VOLUME: 102.17 ML
LEFT VENTRICLE MASS INDEX: 122 G/M2
LEFT VENTRICLE SYSTOLIC VOLUME INDEX: 16.9 ML/M2
LEFT VENTRICLE SYSTOLIC VOLUME: 31.66 ML
LEFT VENTRICULAR INTERNAL DIMENSION IN DIASTOLE: 4.7 CM (ref 3.5–6)
LEFT VENTRICULAR MASS: 228.66 G
LV LATERAL E/E' RATIO: 12.13 M/S
LV SEPTAL E/E' RATIO: 16.17 M/S
MV A" WAVE DURATION": 74 MSEC
MV PEAK A VEL: 0.96 M/S
MV PEAK E VEL: 0.97 M/S
MV STENOSIS PRESSURE HALF TIME: 50 MS
MV VALVE AREA P 1/2 METHOD: 4.4 CM2
PISA TR MAX VEL: 2.42 M/S
PULM VEIN A" WAVE DURATION": 74 MSEC
PV PEAK VELOCITY: 1 CM/S
RA MAJOR: 4.53 CM
RA WIDTH: 3.05 CM
RIGHT VENTRICULAR END-DIASTOLIC DIMENSION: 3.21 CM
STJ: 2.45 CM
TDI LATERAL: 0.08 M/S
TDI SEPTAL: 0.06 M/S
TDI: 0.07 M/S
TR MAX PG: 23 MMHG

## 2020-06-12 NOTE — HOSPITAL COURSE
Pt was monitor closely during her stay.  She continued to exhibit PVCs on the monitor.  Her case was discussed with cardiology and echo was ordered.  Her PVCs improved throughout the course of the day after beta-blockade administration and her palpitations became much improved per Pt report.  Her troponins were trended and remained negative.  Her like TriLyte showed no acute abnormalities.  She denies any chest pain while hospitalized.  Pt exhibited persistent hyperglycemia throughout her stay.  Her case was discussed with her endocrinologist and medications were adjusted with good glucose control prior to discharge.  Pt was D/C after preliminary review of her echo by cardiology.  Recommendations per cardiology were to increase beta-blockade to metoprolol 25 mg b.i.d., and add magnesium 400 mg daily.  Pt will follow-up with Dr. Nava in clinic within 1-2 weeks to establish care and for ongoing evaluation.  Pt to continue monitoring her glucose closely at home.  Because of need for alternative basal/bolus regimen while in hospital, her endocrinologist recommended taking her Tresiba at noon on 06/12.  This was related Pt who verbalized understanding.  Pt verbalized readiness for discharge.  She was D/C home with close follow-up with both Endocrinology and Cardiology.  Return precautions were discussed at length.  Education provided on PVCs, triggers, and treatment plan.

## 2020-06-12 NOTE — TELEPHONE ENCOUNTER
I called and checked on Pt after discharge.  She reports her sugars are much more under control.  She is much less palpitations.  States she is comfortable.  No questions or concerns at this time.

## 2020-06-12 NOTE — DISCHARGE SUMMARY
[facility] Hospital Medicine  Discharge Summary      Patient Name: Adele Hall  MRN: [id number]  Admission Date: 6/10/2020  Hospital Length of Stay: 0 days  Discharge Date and Time: 6/11/2020  9:00 PM  Attending Physician: Shruti att. providers found   Discharging Provider: Zoey Mcgovern NP  Primary Care Provider: Wally Cazares MD      HPI:   Adele Hall is a 75-year-old female with a past medical history of CAD post quadruple bypass approximately 5 years ago, hypertension, hyperlipidemia, hypothyroidism, insulin-dependent diabetes, COPD, arthritis, and GERD who presents to the emergency room tonight with heart palpitations.  Patient states on Tuesday she underwent bilateral nerve blocks to both shoulders per Dr. England.  Patient reportedly has to torn rotator cuffs bilaterally, however does not wish to undergo surgical intervention at this time.  Patient states she began to experience heart fluttering yesterday.  Patient states she has a history of angina, however the angina since yesterday has been increasing in severity, intermittent, sharp in characteristic, located to left-sided chest wall, and worse with movement.  Patient also endorses shortness of breath with dyspnea on exertion.  Patient states she has been evaluated by a cardiologist and pulmonologist with no definite diagnosis regarding her shortness of breath and chronic angina.  Patient's workup in the emergency room revealing mildly elevated BNP.  Patient placed in observation on 06/10/2020 at approximately 11:30 p.m..  Patient states she resides at home with her , denies use of supplemental oxygen or ambulatory assist devices.    * No surgery found *      Hospital Course:   Pt was monitor closely during her stay.  She continued to exhibit PVCs on the monitor.  Her case was discussed with cardiology and echo was ordered.  Her PVCs improved throughout the course of the day after beta-blockade administration  and her palpitations became much improved per Pt report.  Her troponins were trended and remained negative.  Her like TriLyte showed no acute abnormalities.  She denies any chest pain while hospitalized.  Pt exhibited persistent hyperglycemia throughout her stay.  Her case was discussed with her endocrinologist and medications were adjusted with good glucose control prior to discharge.  Pt was D/C after preliminary review of her echo by cardiology.  Recommendations per cardiology were to increase beta-blockade to metoprolol 25 mg b.i.d., and add magnesium 400 mg daily.  Pt will follow-up with Dr. Nava in clinic within 1-2 weeks to establish care and for ongoing evaluation.  Pt to continue monitoring her glucose closely at home.  Because of need for alternative basal/bolus regimen while in hospital, her endocrinologist recommended taking her Tresiba at noon on 06/12.  This was related Pt who verbalized understanding.  Pt verbalized readiness for discharge.  She was D/C home with close follow-up with both Endocrinology and Cardiology.  Return precautions were discussed at length.  Education provided on PVCs, triggers, and treatment plan.     Consults:     No new Assessment & Plan notes have been filed under this hospital service since the last note was generated.  Service: Hospital Medicine    Final Active Diagnoses:    Diagnosis Date Noted POA    PRINCIPAL PROBLEM:  Irregular heart rhythm [I49.9] 06/11/2020 Yes    Leukocytosis [D72.829] 06/11/2020 Yes    Elevated brain natriuretic peptide (BNP) level [R79.89] 06/11/2020 Yes    CKD (chronic kidney disease) stage 3, GFR 30-59 ml/min [N18.3] 03/28/2016 Yes    GERD (gastroesophageal reflux disease) [K21.9] 02/07/2016 Yes    Coronary artery disease involving native coronary artery [I25.10] 01/15/2016 Yes    Type 2 diabetes mellitus with diabetic polyneuropathy, with long-term current use of insulin [E11.42, Z79.4] 07/22/2015 Not Applicable    Congenital  hypothyroidism without goiter [E03.1] 07/22/2015 Yes    Hypertension associated with diabetes [E11.59, I10] 07/22/2015 Yes    Hyperlipidemia with target LDL less than 70 [E78.5] 07/22/2015 Yes      Problems Resolved During this Admission:       Discharged Condition: good    Disposition: Home or Self Care    Follow Up:  Follow-up Information     Wally Cazares MD In 1 week.    Specialty:  Family Medicine  Contact information:  [address]  [facility] [address]  [phone]             Corey Nava MD In 1 week.    Specialties:  Cardiovascular Disease, Cardiology, INTERVENTIONAL CARDIOLOGY  Why:  To establish care.  Please call his clinic and let the  no Dr. Nava requested in hospital you follow up in 1-2 weeks in clinic.  Contact information:  [address]  [address]  [place] [address]  [phone]             ISABEL Lopez,FNP-C In 1 week.    Specialty:  Endocrinology  Why:  Please call and reschedule your appointment  Contact information:  [address]  [facility] [id number] [phone]                 Patient Instructions:      Diet diabetic     Diet Cardiac     Notify your health care provider if you experience any of the following:  temperature >100.4     Notify your health care provider if you experience any of the following:  persistent nausea and vomiting or diarrhea     Notify your health care provider if you experience any of the following:  severe uncontrolled pain     Notify your health care provider if you experience any of the following:  redness, tenderness, or signs of infection (pain, swelling, redness, odor or green/yellow discharge around incision site)     Notify your health care provider if you experience any of the following:  difficulty breathing or increased cough     Notify your health care provider if you experience any of the following:  persistent dizziness, light-headedness, or visual disturbances     Notify your health care  provider if you experience any of the following:  increased confusion or weakness     Activity as tolerated       Significant Diagnostic Studies: Labs:   BMP:   Recent Labs   Lab 06/10/20  2150 06/11/20  0454   * 371*    136   K 4.1 4.6    102   CO2 23 25   BUN 30* 29*   CREATININE 1.6* 1.6*   CALCIUM 8.8 8.9   MG  --  2.0  2.0   , CBC   Recent Labs   Lab 06/10/20  2150 06/11/20  0454   WBC 12.90* 12.01   HGB 12.4 12.7   HCT 40.0 42.2    193   , Troponin   Recent Labs   Lab 06/11/20  0454   TROPONINI 0.010    and A1C:   Recent Labs   Lab 03/10/20  1152 05/26/20  0938   HGBA1C 7.3* 8.3*       Pending Diagnostic Studies:     Procedure Component Value Units Date/Time    Echo Color Flow Doppler? Yes [[id number]]  (Abnormal) Resulted:  06/11/20 1321    Order Status:  Sent Lab Status:  In process Updated:  06/11/20 1323     Ascending aorta 3.17 cm      STJ 2.45 cm      AV mean gradient 5 mmHg      Ao peak kannan 1.32 m/s      Ao VTI 29.43 cm      IVRT 94.20 msec      IVS 1.17 cm      LA size 4.31 cm      Left Atrium Major Axis 4.77 cm      Left Atrium Minor Axis 5.44 cm      LVIDD 4.70 cm      LVIDS 2.88 cm      LVOT diameter 1.97 cm      LVOT peak VTI 24.51 cm      PW 1.36 cm      MV Peak A Kannan 0.96 m/s      E wave decelartion time 338.74 msec      MV Peak E Kannan 0.97 m/s      RA Major Axis 4.53 cm      RA Width 3.05 cm      RVDD 3.21 cm      TR Max Kannan 2.42 m/s      LA WIDTH 2.66 cm      Ao root annulus 2.82 cm      AORTIC VALVE CUSP SEPERATION 1.88 cm      PV PEAK VELOCITY 1.00 cm/s      LV Diastolic Volume 102.17 mL      LV Systolic Volume 31.66 mL      LVOT peak kannan 0.99 m/s      FS 39 %      LA volume 49.53 cm3      LV mass 228.66 g      Left Ventricle Relative Wall Thickness 0.58 cm      AV valve area 2.54 cm2      AV Velocity Ratio 0.75     AV index (prosthetic) 0.83     E/A ratio 1.01     LVOT area 3.0 cm2      LVOT stroke volume 74.67 cm3      AV peak gradient 7 mmHg      LV Systolic Volume  "Index 16.9 mL/m2      LV Diastolic Volume Index 54.63 mL/m2      LA Volume Index 26.5 mL/m2      LV Mass Index 122 g/m2      Triscuspid Valve Regurgitation Peak Gradient 23 mmHg      BSA 1.94 m2      TDI SEPTAL 0.06 m/s      LV LATERAL E/E' RATIO 12.13 m/s      LV SEPTAL E/E' RATIO 16.17 m/s      TDI LATERAL 0.08 m/s      Mean e' 0.07 m/s      MV "A" wave duration 74.00 msec      Pulm vein "A" wave 74.00 msec      E/E' ratio 13.86 m/s      MV valve area p 1/2 method 4.40 cm2      MV stenosis pressure 1/2 time 50 ms     Narrative:       · Concentric left ventricular hypertrophy.       Impression:                Medications:  Reconciled Home Medications:      Medication List      START taking these medications    magnesium oxide 400 mg (241.3 mg magnesium) tablet  Commonly known as:  MAG-OX  Take 1 tablet (400 mg total) by mouth once daily.        CHANGE how you take these medications    metoprolol tartrate 25 MG tablet  Commonly known as:  LOPRESSOR  Take 1 tablet (25 mg total) by mouth 2 (two) times daily.  What changed:  how much to take     nitroGLYCERIN 0.4 MG SL tablet  Commonly known as:  NITROSTAT  Place 1 tablet (0.4 mg total) under the tongue every 5 (five) minutes as needed for Chest pain.  What changed:  additional instructions        CONTINUE taking these medications    ammonium lactate 12 % lotion  Commonly known as:  LAC-HYDRIN  Apply topically as needed for Dry Skin.     aspirin 81 MG EC tablet  Commonly known as:  ECOTRIN  Take 81 mg by mouth once daily.     BD INSULIN SYRINGE ULTRA-FINE 1 mL 31 gauge x 5/16 Syrg  Generic drug:  insulin syringe-needle U-100  USE AS DIRECTED 5 TIMES A DAY WITH LEVEMIR & NOVOLOG     blood sugar diagnostic Strp  Commonly known as:  BLOOD GLUCOSE TEST  1 each by Misc.(Non-Drug; Combo Route) route 4 (four) times daily. DX E11.22 Pt is on insulin.     dicyclomine 10 MG capsule  Commonly known as:  BENTYL     insulin aspart U-100 100 unit/mL (3 mL) Inpn pen  Commonly known " "as:  NovoLOG Flexpen U-100 Insulin  Inject 34 Units into the skin 2 (two) times daily with meals. Plus correction scale, max  units     insulin degludec 200 unit/mL (3 mL) Inpn  Commonly known as:  TRESIBA FLEXTOUCH U-200  Inject 70 Units into the skin once daily.     KETOPROFEN TOP  APPLY 1.6 GRAMS (1 PUMP) TO PAINFUL AREAS UP TO 5 TIMES DAILY. RUB IN WELL     lancets 33 gauge Misc  1 lancet by Misc.(Non-Drug; Combo Route) route 4 (four) times daily.     levothyroxine 100 MCG tablet  Commonly known as:  SYNTHROID  TAKE ONE TABLET BY MOUTH EVERY DAY     lisinopriL 5 MG tablet  Commonly known as:  PRINIVIL,ZESTRIL  One po BID     meclizine 25 mg tablet  Commonly known as:  ANTIVERT  Take 1 tablet (25 mg total) by mouth 3 (three) times daily as needed.     pantoprazole 40 MG tablet  Commonly known as:  PROTONIX  Take 1 tablet (40 mg total) by mouth once daily.     pen needle, diabetic 32 gauge x 5/32" Ndle  Commonly known as:  BD ULTRA-FINE YUMIKO PEN NEEDLE  Uses 3 daily, on multiple daily insulin injections     rosuvastatin 40 MG Tab  Commonly known as:  CRESTOR  TAKE ONE TABLET BY MOUTH EVERY EVENING     traMADoL 50 mg tablet  Commonly known as:  ULTRAM  Take 1 tablet (50 mg total) by mouth every 6 (six) hours as needed for Pain.     traZODone 50 MG tablet  Commonly known as:  DESYREL  TAKE ONE TABLET BY MOUTH AT BEDTIME AS NEEDED FOR INSOMNIA     vit C,K-Pf-ecogb-lutein-zeaxan [id number] mg-unit-mg-mg Cap  Commonly known as:  PRESERVISION AREDS-2  Take 1 tablet by mouth 2 (two) times daily.            Indwelling Lines/Drains at time of discharge:   Lines/Drains/Airways     None                 Time spent on the discharge of patient: 36 minutes  Patient was seen and examined on the date of discharge and determined to be suitable for discharge.         Zoey Mcgovern NP  Department of Hospital Medicine  [facility]  "

## 2020-06-12 NOTE — NURSING
Patient glucose 210. Per Nursing communication order patient may be discharged. Tele monitor and IV removed. Patient  called. Discharged instructions given to patient. Patient verbalize understanding. Wheeled to lobby via w/c.

## 2020-06-16 ENCOUNTER — PATIENT OUTREACH (OUTPATIENT)
Dept: ADMINISTRATIVE | Facility: OTHER | Age: 75
End: 2020-06-16

## 2020-06-16 ENCOUNTER — OFFICE VISIT (OUTPATIENT)
Dept: ENDOCRINOLOGY | Facility: CLINIC | Age: 75
End: 2020-06-16
Payer: MEDICARE

## 2020-06-16 VITALS
HEART RATE: 62 BPM | WEIGHT: 189.38 LBS | SYSTOLIC BLOOD PRESSURE: 112 MMHG | HEIGHT: 62 IN | DIASTOLIC BLOOD PRESSURE: 60 MMHG | BODY MASS INDEX: 34.85 KG/M2

## 2020-06-16 DIAGNOSIS — I25.10 CORONARY ARTERY DISEASE INVOLVING NATIVE CORONARY ARTERY OF NATIVE HEART WITHOUT ANGINA PECTORIS: ICD-10-CM

## 2020-06-16 DIAGNOSIS — Z79.4 TYPE 2 DIABETES MELLITUS WITH STAGE 3 CHRONIC KIDNEY DISEASE, WITH LONG-TERM CURRENT USE OF INSULIN: Primary | ICD-10-CM

## 2020-06-16 DIAGNOSIS — E78.5 HYPERLIPIDEMIA WITH TARGET LDL LESS THAN 70: ICD-10-CM

## 2020-06-16 DIAGNOSIS — N18.30 TYPE 2 DIABETES MELLITUS WITH STAGE 3 CHRONIC KIDNEY DISEASE, WITH LONG-TERM CURRENT USE OF INSULIN: Primary | ICD-10-CM

## 2020-06-16 DIAGNOSIS — Z79.4 TYPE 2 DIABETES MELLITUS WITH DIABETIC POLYNEUROPATHY, WITH LONG-TERM CURRENT USE OF INSULIN: ICD-10-CM

## 2020-06-16 DIAGNOSIS — E11.42 TYPE 2 DIABETES MELLITUS WITH DIABETIC POLYNEUROPATHY, WITH LONG-TERM CURRENT USE OF INSULIN: ICD-10-CM

## 2020-06-16 DIAGNOSIS — E07.9 THYROID DISEASE: ICD-10-CM

## 2020-06-16 DIAGNOSIS — E11.22 TYPE 2 DIABETES MELLITUS WITH STAGE 3 CHRONIC KIDNEY DISEASE, WITH LONG-TERM CURRENT USE OF INSULIN: Primary | ICD-10-CM

## 2020-06-16 DIAGNOSIS — I10 ESSENTIAL HYPERTENSION: ICD-10-CM

## 2020-06-16 PROCEDURE — 1159F PR MEDICATION LIST DOCUMENTED IN MEDICAL RECORD: ICD-10-PCS | Mod: HCNC,S$GLB,, | Performed by: NURSE PRACTITIONER

## 2020-06-16 PROCEDURE — 3074F PR MOST RECENT SYSTOLIC BLOOD PRESSURE < 130 MM HG: ICD-10-PCS | Mod: HCNC,CPTII,S$GLB, | Performed by: NURSE PRACTITIONER

## 2020-06-16 PROCEDURE — 99214 PR OFFICE/OUTPT VISIT, EST, LEVL IV, 30-39 MIN: ICD-10-PCS | Mod: HCNC,S$GLB,, | Performed by: NURSE PRACTITIONER

## 2020-06-16 PROCEDURE — 99214 OFFICE O/P EST MOD 30 MIN: CPT | Mod: HCNC,S$GLB,, | Performed by: NURSE PRACTITIONER

## 2020-06-16 PROCEDURE — 1100F PR PT FALLS ASSESS DOC 2+ FALLS/FALL W/INJURY/YR: ICD-10-PCS | Mod: HCNC,CPTII,S$GLB, | Performed by: NURSE PRACTITIONER

## 2020-06-16 PROCEDURE — 3078F DIAST BP <80 MM HG: CPT | Mod: HCNC,CPTII,S$GLB, | Performed by: NURSE PRACTITIONER

## 2020-06-16 PROCEDURE — 1100F PTFALLS ASSESS-DOCD GE2>/YR: CPT | Mod: HCNC,CPTII,S$GLB, | Performed by: NURSE PRACTITIONER

## 2020-06-16 PROCEDURE — 99499 UNLISTED E&M SERVICE: CPT | Mod: HCNC,S$GLB,, | Performed by: NURSE PRACTITIONER

## 2020-06-16 PROCEDURE — 3052F HG A1C>EQUAL 8.0%<EQUAL 9.0%: CPT | Mod: HCNC,CPTII,S$GLB, | Performed by: NURSE PRACTITIONER

## 2020-06-16 PROCEDURE — 1125F AMNT PAIN NOTED PAIN PRSNT: CPT | Mod: HCNC,S$GLB,, | Performed by: NURSE PRACTITIONER

## 2020-06-16 PROCEDURE — 1159F MED LIST DOCD IN RCRD: CPT | Mod: HCNC,S$GLB,, | Performed by: NURSE PRACTITIONER

## 2020-06-16 PROCEDURE — 3052F PR MOST RECENT HEMOGLOBIN A1C LEVEL 8.0 - < 9.0%: ICD-10-PCS | Mod: HCNC,CPTII,S$GLB, | Performed by: NURSE PRACTITIONER

## 2020-06-16 PROCEDURE — 99499 RISK ADDL DX/OHS AUDIT: ICD-10-PCS | Mod: HCNC,S$GLB,, | Performed by: NURSE PRACTITIONER

## 2020-06-16 PROCEDURE — 1125F PR PAIN SEVERITY QUANTIFIED, PAIN PRESENT: ICD-10-PCS | Mod: HCNC,S$GLB,, | Performed by: NURSE PRACTITIONER

## 2020-06-16 PROCEDURE — 3074F SYST BP LT 130 MM HG: CPT | Mod: HCNC,CPTII,S$GLB, | Performed by: NURSE PRACTITIONER

## 2020-06-16 PROCEDURE — 99999 PR PBB SHADOW E&M-EST. PATIENT-LVL IV: CPT | Mod: PBBFAC,HCNC,, | Performed by: NURSE PRACTITIONER

## 2020-06-16 PROCEDURE — 3078F PR MOST RECENT DIASTOLIC BLOOD PRESSURE < 80 MM HG: ICD-10-PCS | Mod: HCNC,CPTII,S$GLB, | Performed by: NURSE PRACTITIONER

## 2020-06-16 PROCEDURE — 3288F PR FALLS RISK ASSESSMENT DOCUMENTED: ICD-10-PCS | Mod: HCNC,CPTII,S$GLB, | Performed by: NURSE PRACTITIONER

## 2020-06-16 PROCEDURE — 99999 PR PBB SHADOW E&M-EST. PATIENT-LVL IV: ICD-10-PCS | Mod: PBBFAC,HCNC,, | Performed by: NURSE PRACTITIONER

## 2020-06-16 PROCEDURE — 3288F FALL RISK ASSESSMENT DOCD: CPT | Mod: HCNC,CPTII,S$GLB, | Performed by: NURSE PRACTITIONER

## 2020-06-16 RX ORDER — INSULIN ASPART 100 [IU]/ML
40 INJECTION, SOLUTION INTRAVENOUS; SUBCUTANEOUS 2 TIMES DAILY WITH MEALS
Qty: 30 ML | Refills: 6 | Status: SHIPPED | OUTPATIENT
Start: 2020-06-16 | End: 2020-06-17

## 2020-06-16 RX ORDER — INSULIN DEGLUDEC 200 U/ML
78 INJECTION, SOLUTION SUBCUTANEOUS DAILY
Qty: 18 ML | Refills: 6 | Status: SHIPPED | OUTPATIENT
Start: 2020-06-16 | End: 2020-08-19

## 2020-06-16 NOTE — PROGRESS NOTES
"CC: Ms. Adele Hall arrives today for management of Type 2 DM and review of chronic medical conditions, as listed in the visit diagnosis section of this encounter.     HPI: Ms. Adele Hall was diagnosed with Type 2 DM in ~ 2002. Metformin started at the time of diagnosis but was stopped d/t renal impairment, per PCP in [place]. Insulin was started ~ 2012. She has rotated between analog insulins and generic NPH and Regular insulins, due to cost. [employer] brand insulins, due to cost.  Denies FH of DM. She did have 1 ER admission after having BG of >700 in 2012, prior to starting insulin.   Follows with Dr. Burks for CAD - s/p CABG in 1/2016.   She follows with Dr. Montero for CKD.    Patient was last seen by me in December.     Patient states that during pandemic she "gave up." She took her insulin but stopped tracking her blood sugars.     She was admitted to [facility] last week for irregular heart beat. Workup in the emergency room revealing mildly elevated BNP.  Patient was placed in observation and PVCs were detected. Beta blocker dose was adjusted and patient was placed on magnesium. During admission, her basal insulin dose was reduced by half and she was placed on Novolog SSI. Glucoses increased to the 300s range. Hospital service contacted me for insulin recs, which were carried out. She was discharged the next morning with glucose of 210 mg/dL.     Of note, she had shoulder injection the day before presenting to hospital but this was not a steroid.      BG readings are checked 4x/day (fasting only).               Hypoglycemia: No    Missing Insulin/PO medication doses: No  Timing prandial insulin 5-15 minutes before meals: yes    Dietary Habits: Eats 1-2 meals/day. Denies frequent snacking. Drinks lemonade.       CURRENT DIABETIC MEDS: Tresiba U200 70 units QAM, Novolog 35 units AC (not using SSI with target 150, ISF 25)  Vial or pen: vials  Glucometer type: True Metrix    Previous DM " "treatments:  Metformin  Victoza - $   NPH/Regular insulins    Last Eye Exam: 2/4/2020, no DR.   Last Podiatry Exam: never    REVIEW OF SYSTEMS  Constitutional: no c/o weakness, fatigue, weight loss.   Eyes: denies visual disturbances  Cardiac: denies chest pain. Reports palpitations that have become less frequent since hospitalization. Recalls 2 episodes since and these only lasted about 1 second.   Respiratory: no cough. Reports chronic dyspnea on exertion. Cardiology aware.   GI: no c/o nausea, abdominal pain.   Skin: no lesions, rashes.   Neuro: denies recent numbness, tingling, paresthesias.   Endocrine: denies polyphagia, polydipsia, polyuria.      Personally reviewed Past Medical, Surgical, Social History.    Vital Signs  /60   Pulse 62   Ht 5' 2" (1.575 m)   Wt 85.9 kg (189 lb 6 oz)   BMI 34.64 kg/m²     Personally reviewed the below labs:    Hemoglobin A1C   Date Value Ref Range Status   05/26/2020 8.3 (H) 4.0 - 5.6 % Final     Comment:     ADA Screening Guidelines:  5.7-6.4%  Consistent with prediabetes  >or=6.5%  Consistent with diabetes  High levels of fetal hemoglobin interfere with the HbA1C  assay. Heterozygous hemoglobin variants (HbS, HgC, etc)do  not significantly interfere with this assay.   However, presence of multiple variants may affect accuracy.     03/10/2020 7.3 (H) 4.0 - 5.6 % Final     Comment:     ADA Screening Guidelines:  5.7-6.4%  Consistent with prediabetes  >or=6.5%  Consistent with diabetes  High levels of fetal hemoglobin interfere with the HbA1C  assay. Heterozygous hemoglobin variants (HbS, HgC, etc)do  not significantly interfere with this assay.   However, presence of multiple variants may affect accuracy.     12/10/2019 7.4 (H) 4.0 - 5.6 % Final     Comment:     ADA Screening Guidelines:  5.7-6.4%  Consistent with prediabetes  >or=6.5%  Consistent with diabetes  High levels of fetal hemoglobin interfere with the HbA1C  assay. Heterozygous hemoglobin variants (HbS, " HgC, etc)do  not significantly interfere with this assay.   However, presence of multiple variants may affect accuracy.         Chemistry        Component Value Date/Time     06/11/2020 0454    K 4.6 06/11/2020 0454     06/11/2020 0454    CO2 25 06/11/2020 0454    BUN 29 (H) 06/11/2020 0454    CREATININE 1.6 (H) 06/11/2020 0454     (H) 06/11/2020 0454        Component Value Date/Time    CALCIUM 8.9 06/11/2020 0454    ALKPHOS 83 06/11/2020 0454    AST 28 06/11/2020 0454    AST 45 (H) 01/30/2016 0431    ALT 26 06/11/2020 0454    BILITOT 0.3 06/11/2020 0454          Lab Results   Component Value Date    CHOL 138 05/26/2020    CHOL 161 03/10/2020    CHOL 147 05/14/2019     Lab Results   Component Value Date    HDL 31 (L) 05/26/2020    HDL 45 03/10/2020    HDL 34 (L) 05/14/2019     Lab Results   Component Value Date    LDLCALC 49.2 (L) 05/26/2020    LDLCALC 83.4 03/10/2020    LDLCALC 61.8 (L) 05/14/2019     Lab Results   Component Value Date    TRIG 289 (H) 05/26/2020    TRIG 163 (H) 03/10/2020    TRIG 256 (H) 05/14/2019     Lab Results   Component Value Date    CHOLHDL 22.5 05/26/2020    CHOLHDL 28.0 03/10/2020    CHOLHDL 23.1 05/14/2019       Lab Results   Component Value Date    MICALBCREAT 1111.1 (H) 04/04/2016     Lab Results   Component Value Date    TSH 0.172 (L) 06/11/2020       CrCl cannot be calculated (Unknown ideal weight.).    Vit D, 25-Hydroxy   Date Value Ref Range Status   02/04/2020 45 30 - 96 ng/mL Final     Comment:     Vitamin D deficiency.........<10 ng/mL                              Vitamin D insufficiency......10-29 ng/mL       Vitamin D sufficiency........> or equal to 30 ng/mL  Vitamin D toxicity............>100 ng/mL           PHYSICAL EXAMINATION  Constitutional: Appears well, no distress  Neck: Supple, trachea midline; no thyromegaly or nodules.   Respiratory: CTA, even and unlabored.  Cardiovascular: RRR, no murmurs, no carotid bruits. DP pulses  1+ bilaterally; no edema.     GI: normal bowel sounds, no hernia  Skin: warm and dry; no lipohypertrophy, or acanthosis nigracans observed. Plaque-like hypopigmented lesions noted to BLE, feet.   Neuro: DTR 1+ BUE/1+BLE. Previously, no loss of protective sensation via 10 gm monofilament. Vibratory exam decreased, bilaterally.   Feet: appropriate footwear.       A1c goal 7.5% or less, due to CKD, CAD      Assessment/Plan  1. Type 2 diabetes mellitus with stage 3 chronic kidney disease, with long-term current use of insulin  -- Uncontrolled with elevated A1c at 8.3%. glucoses globally elevated on logs.  -- increase Tresiba to 78 units   -- increase Novolog to 40 units AC. Reduce dose by half if eating a lower carb meal. Resume SSI.   -- strongly encouraged pt to eat 3 balanced meals/day. She is interested in DM education once visitors are allowed ( does the cooking).  -- check BG 4x/day   -- following with nephrology      -- Discussed diagnosis of DM, A1c goals, progression of disease, long term complications and tx options.   -- Reviewed hypoglycemia management: treat with 1/2 glass of juice, 1/2 can regular coke, or 4 glucose tablets. Monitor and repeat treatment every 15 minutes until BG is >70 Then have a snack, which includes a complex carbohydrate and protein.   Advised patient to check BG before activities, such as driving or exercise.    -- takes aspirin, statin, ace-I   2. Type 2 diabetes mellitus with diabetic polyneuropathy, with long-term current use of insulin  -- discussed the importance of daily inspection of bottoms of both feet, interspace areas. Advised patient against walking barefoot.     3. Coronary artery disease involving native coronary artery of native heart without angina pectoris  -- avoid hypoglycemia   4. Essential hypertension  -- controlled  -- continue current meds   5. Hyperlipidemia with target LDL less than 70  -- Uncontrolled with elevated triglycerides.  -- need to optimize DM control  -- Continue  Crestor  -- fenofibrate previously DC'd due to renal function   6. Thyroid disease  -- suppressed while checked in the inpatient setting  -- repeat in 4 weeks  -- continue levothyroxine 100 mcg daily.        FOLLOW UP  Follow up in about 3 months (around 9/16/2020).   Patient instructed to bring BG logs to each follow up   Patient encouraged to call for any BG/medication issues, concerns, or questions.    Orders Placed This Encounter   Procedures    TSH    Hemoglobin A1C    Comprehensive metabolic panel

## 2020-06-16 NOTE — PATIENT INSTRUCTIONS
-- Increase Tresiba to 78 units in the morning.   -- Increase Novolog base to 40 units. Continue sliding scale, based on pre-meal blood sugar.     Sliding scale:   150-200 + 2 units  201-250 + 4 units  251-300 + 6 units  301-350 + 8 units  >351 + 10 units

## 2020-06-17 ENCOUNTER — TELEPHONE (OUTPATIENT)
Dept: ENDOCRINOLOGY | Facility: CLINIC | Age: 75
End: 2020-06-17

## 2020-06-17 DIAGNOSIS — E11.42 TYPE 2 DIABETES MELLITUS WITH DIABETIC POLYNEUROPATHY, WITH LONG-TERM CURRENT USE OF INSULIN: ICD-10-CM

## 2020-06-17 DIAGNOSIS — Z79.4 TYPE 2 DIABETES MELLITUS WITH DIABETIC POLYNEUROPATHY, WITH LONG-TERM CURRENT USE OF INSULIN: ICD-10-CM

## 2020-06-17 RX ORDER — INSULIN ASPART 100 [IU]/ML
35 INJECTION, SOLUTION INTRAVENOUS; SUBCUTANEOUS 2 TIMES DAILY WITH MEALS
Qty: 30 ML | Refills: 6
Start: 2020-06-17 | End: 2020-08-19

## 2020-06-17 NOTE — TELEPHONE ENCOUNTER
Pt appt yesterday Novolog changed from 35 to 40 units ac  Lunch was 116 bg and had 40 units of Novolog, then at dinner yesterday bg was 71 and was symptomatic for hyoglycemia  Pt stating she wants to go back to Novolog 35 units AC    Please advise

## 2020-06-17 NOTE — TELEPHONE ENCOUNTER
Spoke with patient. Will reduce Novolog back to 35u AC. First dose of 78 units of Tresiba was this AM and glucoses have been in 140s today.

## 2020-06-17 NOTE — TELEPHONE ENCOUNTER
----- Message from Tan Poe sent at 6/17/2020  3:03 PM CDT -----  Regarding: call back  Pt calling in regards the change in her meds and sugar has drop to 71 last night and she needs to know should it be change again        Please advise pt can be contact at [phone]

## 2020-06-22 LAB
POCT GLUCOSE: 228 MG/DL (ref 70–110)
POCT GLUCOSE: 330 MG/DL (ref 70–110)

## 2020-06-29 ENCOUNTER — TELEPHONE (OUTPATIENT)
Dept: ENDOCRINOLOGY | Facility: CLINIC | Age: 75
End: 2020-06-29

## 2020-06-29 NOTE — TELEPHONE ENCOUNTER
----- Message from Rae Finn sent at 6/29/2020  4:46 PM CDT -----  Type: Needs Medical Advice  Who Called:  patient  Best Call Back Number: [phone] (home)   Additional Information: the patient  said she cant get her insulin correct she needs a nurse to call her back about the dosage asap please

## 2020-06-29 NOTE — TELEPHONE ENCOUNTER
Pt states she went down on her tresiba to 70units because she was having issues with it. Wants to know if this is ok. Please tiffanie

## 2020-06-30 NOTE — TELEPHONE ENCOUNTER
Pt confirmed that her bg was running on the low side and that is why she decreased her tresiba to 70 units. Advised pt this is ok per Provider and to monitor bg and let us know if any more low bg

## 2020-06-30 NOTE — TELEPHONE ENCOUNTER
Were her issues regarding low blood sugars? If she was running low, ok to decrease to 70 units. If her issue involves something else, please inquire and let me know. Thank you

## 2020-07-14 ENCOUNTER — LAB VISIT (OUTPATIENT)
Dept: LAB | Facility: HOSPITAL | Age: 75
End: 2020-07-14
Attending: NURSE PRACTITIONER
Payer: MEDICARE

## 2020-07-14 DIAGNOSIS — E07.9 THYROID DISEASE: ICD-10-CM

## 2020-07-14 PROCEDURE — 84443 ASSAY THYROID STIM HORMONE: CPT | Mod: HCNC

## 2020-07-14 PROCEDURE — 36415 COLL VENOUS BLD VENIPUNCTURE: CPT | Mod: HCNC,PO

## 2020-07-15 LAB — TSH SERPL DL<=0.005 MIU/L-ACNC: 0.48 UIU/ML (ref 0.4–4)

## 2020-07-31 DIAGNOSIS — E11.42 TYPE 2 DIABETES MELLITUS WITH DIABETIC POLYNEUROPATHY, WITH LONG-TERM CURRENT USE OF INSULIN: ICD-10-CM

## 2020-07-31 DIAGNOSIS — Z79.4 TYPE 2 DIABETES MELLITUS WITH DIABETIC POLYNEUROPATHY, WITH LONG-TERM CURRENT USE OF INSULIN: ICD-10-CM

## 2020-08-09 ENCOUNTER — PATIENT OUTREACH (OUTPATIENT)
Dept: ADMINISTRATIVE | Facility: OTHER | Age: 75
End: 2020-08-09

## 2020-08-09 NOTE — PROGRESS NOTES
Chart was reviewed for overdue [facility]active [facility] Encounters ([facility])  topics  Updates were requested from care everywhere  Health Maintenance has been updated

## 2020-08-12 ENCOUNTER — TELEPHONE (OUTPATIENT)
Dept: DIABETES | Facility: CLINIC | Age: 75
End: 2020-08-12

## 2020-08-14 ENCOUNTER — TELEPHONE (OUTPATIENT)
Dept: NEPHROLOGY | Facility: CLINIC | Age: 75
End: 2020-08-14

## 2020-08-14 NOTE — TELEPHONE ENCOUNTER
----- Message from Devi Posadas sent at 8/14/2020  2:49 PM CDT -----  Regarding: Pt Referral  Contact: Pt  Type:  Patient Returning Call    Who Called:  pt  Does the patient know what this is regarding?:  pt would like office to give her a call regarding seeing someone closer. Will not be able to commute to [facility] Call Back Number:    Additional Information:  Thank you

## 2020-08-19 ENCOUNTER — TELEPHONE (OUTPATIENT)
Dept: ENDOCRINOLOGY | Facility: CLINIC | Age: 75
End: 2020-08-19

## 2020-08-19 DIAGNOSIS — E11.42 TYPE 2 DIABETES MELLITUS WITH DIABETIC POLYNEUROPATHY, WITH LONG-TERM CURRENT USE OF INSULIN: ICD-10-CM

## 2020-08-19 DIAGNOSIS — Z79.4 TYPE 2 DIABETES MELLITUS WITH DIABETIC POLYNEUROPATHY, WITH LONG-TERM CURRENT USE OF INSULIN: ICD-10-CM

## 2020-08-19 RX ORDER — INSULIN DEGLUDEC 200 U/ML
70 INJECTION, SOLUTION SUBCUTANEOUS DAILY
Qty: 18 ML | Refills: 6
Start: 2020-08-19 | End: 2020-09-17

## 2020-08-19 RX ORDER — INSULIN ASPART 100 [IU]/ML
40 INJECTION, SOLUTION INTRAVENOUS; SUBCUTANEOUS 2 TIMES DAILY WITH MEALS
Qty: 30 ML | Refills: 6
Start: 2020-08-19 | End: 2020-09-17

## 2020-08-19 NOTE — TELEPHONE ENCOUNTER
----- Message from Harjeet Che sent at 8/19/2020  4:06 PM CDT -----  Type: Needs Medical Advice  Who Called: Patient  Symptoms (please be specific):  Blood sugar-190,272, 315,298, 231 on Tuesday, 188, 218 Today  How long has patient had these symptoms:      Best Call Back Number: [phone]  Additional Information: Please call to advise

## 2020-08-19 NOTE — TELEPHONE ENCOUNTER
Pt. States taking Januvia 73 and Novolog 40. Per Babs state on those doses. Pt. Verbalized understanding

## 2020-08-19 NOTE — TELEPHONE ENCOUNTER
According to her chart, her insulin doses are as follows:  Tresiba 70 units  Novolog 35 units with meals    Please verify this is what she is taking. If so, please increase Novolog to 40 units.

## 2020-08-21 ENCOUNTER — LAB VISIT (OUTPATIENT)
Dept: LAB | Facility: HOSPITAL | Age: 75
End: 2020-08-21
Attending: INTERNAL MEDICINE
Payer: MEDICARE

## 2020-08-21 DIAGNOSIS — N18.30 CKD (CHRONIC KIDNEY DISEASE) STAGE 3, GFR 30-59 ML/MIN: ICD-10-CM

## 2020-08-21 DIAGNOSIS — R80.1 PERSISTENT PROTEINURIA: ICD-10-CM

## 2020-08-21 LAB
ALBUMIN SERPL BCP-MCNC: 3.6 G/DL (ref 3.5–5.2)
ANION GAP SERPL CALC-SCNC: 10 MMOL/L (ref 8–16)
BUN SERPL-MCNC: 23 MG/DL (ref 8–23)
CALCIUM SERPL-MCNC: 9.6 MG/DL (ref 8.7–10.5)
CHLORIDE SERPL-SCNC: 102 MMOL/L (ref 95–110)
CO2 SERPL-SCNC: 26 MMOL/L (ref 23–29)
CREAT SERPL-MCNC: 1.6 MG/DL (ref 0.5–1.4)
EST. GFR  (AFRICAN AMERICAN): 36.6 ML/MIN/1.73 M^2
EST. GFR  (NON AFRICAN AMERICAN): 31.7 ML/MIN/1.73 M^2
GLUCOSE SERPL-MCNC: 199 MG/DL (ref 70–110)
PHOSPHATE SERPL-MCNC: 4.1 MG/DL (ref 2.7–4.5)
POTASSIUM SERPL-SCNC: 4.3 MMOL/L (ref 3.5–5.1)
SODIUM SERPL-SCNC: 138 MMOL/L (ref 136–145)

## 2020-08-21 PROCEDURE — 80069 RENAL FUNCTION PANEL: CPT | Mod: HCNC

## 2020-08-21 PROCEDURE — 36415 COLL VENOUS BLD VENIPUNCTURE: CPT | Mod: HCNC,PO

## 2020-08-21 PROCEDURE — 83970 ASSAY OF PARATHORMONE: CPT | Mod: HCNC

## 2020-08-22 LAB — PTH-INTACT SERPL-MCNC: 84 PG/ML (ref 9–77)

## 2020-08-25 ENCOUNTER — OFFICE VISIT (OUTPATIENT)
Dept: NEPHROLOGY | Facility: CLINIC | Age: 75
End: 2020-08-25
Payer: MEDICARE

## 2020-08-25 DIAGNOSIS — E11.59 HYPERTENSION ASSOCIATED WITH DIABETES: ICD-10-CM

## 2020-08-25 DIAGNOSIS — Z79.4 TYPE 2 DIABETES MELLITUS WITH STAGE 3 CHRONIC KIDNEY DISEASE, WITH LONG-TERM CURRENT USE OF INSULIN: ICD-10-CM

## 2020-08-25 DIAGNOSIS — E11.22 TYPE 2 DIABETES MELLITUS WITH STAGE 3 CHRONIC KIDNEY DISEASE, WITH LONG-TERM CURRENT USE OF INSULIN: ICD-10-CM

## 2020-08-25 DIAGNOSIS — Z87.891 FORMER SMOKER: ICD-10-CM

## 2020-08-25 DIAGNOSIS — I10 ESSENTIAL HYPERTENSION: ICD-10-CM

## 2020-08-25 DIAGNOSIS — I24.9 ACS (ACUTE CORONARY SYNDROME): ICD-10-CM

## 2020-08-25 DIAGNOSIS — N18.30 TYPE 2 DIABETES MELLITUS WITH STAGE 3 CHRONIC KIDNEY DISEASE, WITH LONG-TERM CURRENT USE OF INSULIN: ICD-10-CM

## 2020-08-25 DIAGNOSIS — I15.2 HYPERTENSION ASSOCIATED WITH DIABETES: ICD-10-CM

## 2020-08-25 DIAGNOSIS — N25.81 SECONDARY HYPERPARATHYROIDISM OF RENAL ORIGIN: ICD-10-CM

## 2020-08-25 DIAGNOSIS — I11.9 HYPERTENSIVE LEFT VENTRICULAR HYPERTROPHY, WITHOUT HEART FAILURE: ICD-10-CM

## 2020-08-25 DIAGNOSIS — E11.69 DYSLIPIDEMIA ASSOCIATED WITH TYPE 2 DIABETES MELLITUS: ICD-10-CM

## 2020-08-25 DIAGNOSIS — R80.1 PERSISTENT PROTEINURIA: ICD-10-CM

## 2020-08-25 DIAGNOSIS — N18.4 CKD (CHRONIC KIDNEY DISEASE) STAGE 4, GFR 15-29 ML/MIN: ICD-10-CM

## 2020-08-25 DIAGNOSIS — I25.118 CORONARY ARTERY DISEASE OF NATIVE ARTERY OF NATIVE HEART WITH STABLE ANGINA PECTORIS: ICD-10-CM

## 2020-08-25 DIAGNOSIS — N18.30 CKD (CHRONIC KIDNEY DISEASE) STAGE 3, GFR 30-59 ML/MIN: Primary | ICD-10-CM

## 2020-08-25 DIAGNOSIS — E66.01 CLASS 2 SEVERE OBESITY WITH SERIOUS COMORBIDITY IN ADULT, UNSPECIFIED BMI, UNSPECIFIED OBESITY TYPE: ICD-10-CM

## 2020-08-25 DIAGNOSIS — E78.5 DYSLIPIDEMIA ASSOCIATED WITH TYPE 2 DIABETES MELLITUS: ICD-10-CM

## 2020-08-25 DIAGNOSIS — Z95.1 S/P CABG (CORONARY ARTERY BYPASS GRAFT): ICD-10-CM

## 2020-08-25 DIAGNOSIS — I48.0 PAF (PAROXYSMAL ATRIAL FIBRILLATION): ICD-10-CM

## 2020-08-25 DIAGNOSIS — E11.21 DIABETIC NEPHROPATHY ASSOCIATED WITH TYPE 2 DIABETES MELLITUS: ICD-10-CM

## 2020-08-25 DIAGNOSIS — Z87.09 HISTORY OF ARDS: ICD-10-CM

## 2020-08-25 PROCEDURE — 99443 PR PHYSICIAN TELEPHONE EVALUATION 21-30 MIN: CPT | Mod: HCNC,95,, | Performed by: INTERNAL MEDICINE

## 2020-08-25 PROCEDURE — 99443 PR PHYSICIAN TELEPHONE EVALUATION 21-30 MIN: ICD-10-PCS | Mod: HCNC,95,, | Performed by: INTERNAL MEDICINE

## 2020-08-25 RX ORDER — CHOLECALCIFEROL (VITAMIN D3) 25 MCG
1000 TABLET ORAL DAILY
COMMUNITY

## 2020-08-25 NOTE — PROGRESS NOTES
"Subjective:       Patient ID: Adele Hall is a 75 y.o. White female who presents for follow-up evaluation of Chronic Kidney Disease    HPI     Interval history Jan 2020: she is feeling well minus bilateral shoulder pain. Last Hba1c was 7.4, down from 8.1. LE edema is about the same.     Interval history Aug 2020:  The patient location is: Home  The chief complaint leading to consultation is: CKD  Visit type: audiovisual  Face to Face time with patient: zero (Audio only)  32 minutes of total time spent on the encounter, which includes face to face time and non-face to face time preparing to see the patient (eg, review of tests), Obtaining and/or reviewing separately obtained history, Documenting clinical information in the electronic or other health record, Independently interpreting results (not separately reported) and communicating results to the patient/family/caregiver, or Care coordination (not separately reported).   Each patient to whom he or she provides medical services by telemedicine is:  (1) informed of the relationship between the physician and patient and the respective role of any other health care provider with respect to management of the patient; and(2) notified that he or she may decline to receive medical services by telemedicine and may withdraw from such care at any time.  Notes: She had a hospital stay with complaints of  "fluttering" and was found to have PVCs--BB was increased. She feels it was due to her lidocaine injection the day prior. Otherwise she feels great. Last Hba1c was  8.3      Review of Systems   Constitutional: Positive for fatigue. Negative for activity change and appetite change.   HENT: Negative for facial swelling.    Eyes: Negative for visual disturbance.   Respiratory: Negative for shortness of breath.    Cardiovascular: Positive for leg swelling. Negative for chest pain.   Gastrointestinal: Positive for constipation.   Endocrine: Positive for cold " intolerance.   Genitourinary: Negative for difficulty urinating and dysuria.   Musculoskeletal: Positive for arthralgias and back pain.   Allergic/Immunologic: Negative for immunocompromised state.   Neurological: Negative for weakness.       Objective:      Physical Exam  Constitutional:       General: She is not in acute distress.  Pulmonary:      Effort: No respiratory distress.   Neurological:      Mental Status: She is oriented to person, place, and time.   Psychiatric:         Mood and Affect: Mood normal.         Behavior: Behavior normal.         Thought Content: Thought content normal.         Assessment:       1. CKD (chronic kidney disease) stage 3, GFR 30-59 ml/min    2. Persistent proteinuria    3. Diabetic nephropathy associated with type 2 diabetes mellitus    4. Hypertension associated with diabetes    5. Hypertensive left ventricular hypertrophy, without heart failure    6. Type 2 diabetes mellitus with stage 3 chronic kidney disease, with long-term current use of insulin    7. S/P CABG (coronary artery bypass graft) - x4 01/19/2016; LIMA to LAD; SVG's to diag, OMB, PDA - all grafts patent 01/2018    8. PAF (paroxysmal atrial fibrillation), post CABG    9. Dyslipidemia associated with type 2 diabetes mellitus    10. Former smoker    11. Class 2 severe obesity with serious comorbidity in adult, unspecified BMI, unspecified obesity type    12. CKD (chronic kidney disease) stage 4, GFR 15-29 ml/min    13. Essential hypertension    14. History of ARDS, post CABG, 1/2016    15. ACS (acute coronary syndrome)    16. Coronary artery disease of native artery of native heart with stable angina pectoris    17. Secondary hyperparathyroidism of renal origin        Plan:           CKD with stable kidney function and proteinuria (< 1gram) Continue RAAS blockade for renal preservation     HTN is controlled at home with -130's. She is fine with keeping ace-inhibitor      Mineral and Bone Disease--PTH at goal.  Continue D2     DM is not well controlled. Continue Endocrine follow up       Anemia--resolved from last year        RTC 6 months with labs prior

## 2020-08-26 NOTE — TELEPHONE ENCOUNTER
"When opening a documentation only encounter, be sure to enter in \"Chief Complaint\" Forms and in \" Comments\" Title of form, description if needed.    Pinky is a 73 year old  female  Form received via: Fax  Form now resides in: Provider Ready    Quiana Swift MA    Form has been completed by provider.     Form sent out via: Fax to Narendra Truong at Fax Number: [phone]  Patient informed: No  Output date: September 2, 2020    Quiana Swift MA      **Please close the encounter**                  " Please advise her to start sliding scale in addition to her base dose of Regular insulin at meal time. To be based off of her blood sugar BEFORE meals.     150-200 + 2 units  201-250 + 4 units  251-300 + 6 units  301-350 + 8 units  >350 + 10 units    Also, please verify whether she is taking Novolin R or Novolog (ordered by Dr. Bergman in January). Also verify dose. Can use same sliding scale with whichever meal time insulin she is using.     Call if sugars are frequently >250 after steroid injection

## 2020-08-31 PROBLEM — N25.81 SECONDARY HYPERPARATHYROIDISM OF RENAL ORIGIN: Status: ACTIVE | Noted: 2020-08-31

## 2020-09-01 ENCOUNTER — TELEPHONE (OUTPATIENT)
Dept: NEPHROLOGY | Facility: CLINIC | Age: 75
End: 2020-09-01

## 2020-09-01 NOTE — TELEPHONE ENCOUNTER
----- Message from Henrique Montero MD sent at 8/31/2020 10:36 PM CDT -----  RTC 6mo Audio only labs prior [facility]

## 2020-09-10 ENCOUNTER — LAB VISIT (OUTPATIENT)
Dept: LAB | Facility: HOSPITAL | Age: 75
End: 2020-09-10
Attending: NURSE PRACTITIONER
Payer: MEDICARE

## 2020-09-10 DIAGNOSIS — N18.30 TYPE 2 DIABETES MELLITUS WITH STAGE 3 CHRONIC KIDNEY DISEASE, WITH LONG-TERM CURRENT USE OF INSULIN: ICD-10-CM

## 2020-09-10 DIAGNOSIS — E11.22 TYPE 2 DIABETES MELLITUS WITH STAGE 3 CHRONIC KIDNEY DISEASE, WITH LONG-TERM CURRENT USE OF INSULIN: ICD-10-CM

## 2020-09-10 DIAGNOSIS — Z79.4 TYPE 2 DIABETES MELLITUS WITH STAGE 3 CHRONIC KIDNEY DISEASE, WITH LONG-TERM CURRENT USE OF INSULIN: ICD-10-CM

## 2020-09-10 LAB
ALBUMIN SERPL BCP-MCNC: 3.5 G/DL (ref 3.5–5.2)
ALP SERPL-CCNC: 77 U/L (ref 55–135)
ALT SERPL W/O P-5'-P-CCNC: 22 U/L (ref 10–44)
ANION GAP SERPL CALC-SCNC: 9 MMOL/L (ref 8–16)
AST SERPL-CCNC: 33 U/L (ref 10–40)
BILIRUB SERPL-MCNC: 0.4 MG/DL (ref 0.1–1)
BUN SERPL-MCNC: 16 MG/DL (ref 8–23)
CALCIUM SERPL-MCNC: 9.3 MG/DL (ref 8.7–10.5)
CHLORIDE SERPL-SCNC: 102 MMOL/L (ref 95–110)
CO2 SERPL-SCNC: 30 MMOL/L (ref 23–29)
CREAT SERPL-MCNC: 1.4 MG/DL (ref 0.5–1.4)
EST. GFR  (AFRICAN AMERICAN): 43 ML/MIN/1.73 M^2
EST. GFR  (NON AFRICAN AMERICAN): 37.3 ML/MIN/1.73 M^2
GLUCOSE SERPL-MCNC: 142 MG/DL (ref 70–110)
POTASSIUM SERPL-SCNC: 3.8 MMOL/L (ref 3.5–5.1)
PROT SERPL-MCNC: 7 G/DL (ref 6–8.4)
SODIUM SERPL-SCNC: 141 MMOL/L (ref 136–145)

## 2020-09-10 PROCEDURE — 36415 COLL VENOUS BLD VENIPUNCTURE: CPT | Mod: HCNC,PO

## 2020-09-10 PROCEDURE — 83036 HEMOGLOBIN GLYCOSYLATED A1C: CPT | Mod: HCNC

## 2020-09-10 PROCEDURE — 80053 COMPREHEN METABOLIC PANEL: CPT | Mod: HCNC

## 2020-09-11 DIAGNOSIS — E11.9 TYPE 2 DIABETES MELLITUS WITHOUT COMPLICATION: ICD-10-CM

## 2020-09-11 LAB
ESTIMATED AVG GLUCOSE: 160 MG/DL (ref 68–131)
HBA1C MFR BLD HPLC: 7.2 % (ref 4–5.6)

## 2020-09-15 ENCOUNTER — PATIENT OUTREACH (OUTPATIENT)
Dept: ADMINISTRATIVE | Facility: OTHER | Age: 75
End: 2020-09-15

## 2020-09-15 NOTE — PROGRESS NOTES
Health Maintenance Due   Topic Date Due    TETANUS VACCINE  06/02/1963    Shingles Vaccine (2 of 3) 03/26/2016    Colorectal Cancer Screening  02/06/2020    Influenza Vaccine (1) 08/01/2020     Updates were requested from care everywhere.  Chart was reviewed for overdue [facility] Encounters ([facility]) topics (CRS, Breast Cancer Screening, Eye exam)  Health Maintenance has been updated.  LINKS immunization registry triggered.  Immunizations were reconciled.

## 2020-09-17 ENCOUNTER — OFFICE VISIT (OUTPATIENT)
Dept: ENDOCRINOLOGY | Facility: CLINIC | Age: 75
End: 2020-09-17
Payer: MEDICARE

## 2020-09-17 VITALS
BODY MASS INDEX: 35.62 KG/M2 | HEART RATE: 63 BPM | SYSTOLIC BLOOD PRESSURE: 110 MMHG | WEIGHT: 193.56 LBS | DIASTOLIC BLOOD PRESSURE: 60 MMHG | HEIGHT: 62 IN

## 2020-09-17 DIAGNOSIS — E11.42 TYPE 2 DIABETES MELLITUS WITH DIABETIC POLYNEUROPATHY, WITH LONG-TERM CURRENT USE OF INSULIN: ICD-10-CM

## 2020-09-17 DIAGNOSIS — E07.9 THYROID DISEASE: ICD-10-CM

## 2020-09-17 DIAGNOSIS — I25.10 CORONARY ARTERY DISEASE INVOLVING NATIVE CORONARY ARTERY OF NATIVE HEART WITHOUT ANGINA PECTORIS: ICD-10-CM

## 2020-09-17 DIAGNOSIS — N18.30 TYPE 2 DIABETES MELLITUS WITH STAGE 3 CHRONIC KIDNEY DISEASE, WITH LONG-TERM CURRENT USE OF INSULIN: Primary | ICD-10-CM

## 2020-09-17 DIAGNOSIS — Z79.4 TYPE 2 DIABETES MELLITUS WITH DIABETIC POLYNEUROPATHY, WITH LONG-TERM CURRENT USE OF INSULIN: ICD-10-CM

## 2020-09-17 DIAGNOSIS — E11.22 TYPE 2 DIABETES MELLITUS WITH STAGE 3 CHRONIC KIDNEY DISEASE, WITH LONG-TERM CURRENT USE OF INSULIN: Primary | ICD-10-CM

## 2020-09-17 DIAGNOSIS — E78.5 HYPERLIPIDEMIA WITH TARGET LDL LESS THAN 70: ICD-10-CM

## 2020-09-17 DIAGNOSIS — I10 ESSENTIAL HYPERTENSION: ICD-10-CM

## 2020-09-17 DIAGNOSIS — Z79.4 TYPE 2 DIABETES MELLITUS WITH STAGE 3 CHRONIC KIDNEY DISEASE, WITH LONG-TERM CURRENT USE OF INSULIN: Primary | ICD-10-CM

## 2020-09-17 PROCEDURE — 1101F PT FALLS ASSESS-DOCD LE1/YR: CPT | Mod: HCNC,CPTII,S$GLB, | Performed by: NURSE PRACTITIONER

## 2020-09-17 PROCEDURE — 99214 PR OFFICE/OUTPT VISIT, EST, LEVL IV, 30-39 MIN: ICD-10-PCS | Mod: HCNC,S$GLB,, | Performed by: NURSE PRACTITIONER

## 2020-09-17 PROCEDURE — 99214 OFFICE O/P EST MOD 30 MIN: CPT | Mod: HCNC,S$GLB,, | Performed by: NURSE PRACTITIONER

## 2020-09-17 PROCEDURE — 3074F SYST BP LT 130 MM HG: CPT | Mod: HCNC,CPTII,S$GLB, | Performed by: NURSE PRACTITIONER

## 2020-09-17 PROCEDURE — 1126F PR PAIN SEVERITY QUANTIFIED, NO PAIN PRESENT: ICD-10-PCS | Mod: HCNC,S$GLB,, | Performed by: NURSE PRACTITIONER

## 2020-09-17 PROCEDURE — 3008F BODY MASS INDEX DOCD: CPT | Mod: HCNC,CPTII,S$GLB, | Performed by: NURSE PRACTITIONER

## 2020-09-17 PROCEDURE — 3074F PR MOST RECENT SYSTOLIC BLOOD PRESSURE < 130 MM HG: ICD-10-PCS | Mod: HCNC,CPTII,S$GLB, | Performed by: NURSE PRACTITIONER

## 2020-09-17 PROCEDURE — 1159F MED LIST DOCD IN RCRD: CPT | Mod: HCNC,S$GLB,, | Performed by: NURSE PRACTITIONER

## 2020-09-17 PROCEDURE — 3051F HG A1C>EQUAL 7.0%<8.0%: CPT | Mod: HCNC,CPTII,S$GLB, | Performed by: NURSE PRACTITIONER

## 2020-09-17 PROCEDURE — 3051F PR MOST RECENT HEMOGLOBIN A1C LEVEL 7.0 - < 8.0%: ICD-10-PCS | Mod: HCNC,CPTII,S$GLB, | Performed by: NURSE PRACTITIONER

## 2020-09-17 PROCEDURE — 99499 RISK ADDL DX/OHS AUDIT: ICD-10-PCS | Mod: S$PBB,HCNC,, | Performed by: NURSE PRACTITIONER

## 2020-09-17 PROCEDURE — 3008F PR BODY MASS INDEX (BMI) DOCUMENTED: ICD-10-PCS | Mod: HCNC,CPTII,S$GLB, | Performed by: NURSE PRACTITIONER

## 2020-09-17 PROCEDURE — 3078F DIAST BP <80 MM HG: CPT | Mod: HCNC,CPTII,S$GLB, | Performed by: NURSE PRACTITIONER

## 2020-09-17 PROCEDURE — 3078F PR MOST RECENT DIASTOLIC BLOOD PRESSURE < 80 MM HG: ICD-10-PCS | Mod: HCNC,CPTII,S$GLB, | Performed by: NURSE PRACTITIONER

## 2020-09-17 PROCEDURE — 1126F AMNT PAIN NOTED NONE PRSNT: CPT | Mod: HCNC,S$GLB,, | Performed by: NURSE PRACTITIONER

## 2020-09-17 PROCEDURE — 99999 PR PBB SHADOW E&M-EST. PATIENT-LVL V: ICD-10-PCS | Mod: PBBFAC,HCNC,, | Performed by: NURSE PRACTITIONER

## 2020-09-17 PROCEDURE — 99499 UNLISTED E&M SERVICE: CPT | Mod: S$PBB,HCNC,, | Performed by: NURSE PRACTITIONER

## 2020-09-17 PROCEDURE — 1101F PR PT FALLS ASSESS DOC 0-1 FALLS W/OUT INJ PAST YR: ICD-10-PCS | Mod: HCNC,CPTII,S$GLB, | Performed by: NURSE PRACTITIONER

## 2020-09-17 PROCEDURE — 1159F PR MEDICATION LIST DOCUMENTED IN MEDICAL RECORD: ICD-10-PCS | Mod: HCNC,S$GLB,, | Performed by: NURSE PRACTITIONER

## 2020-09-17 PROCEDURE — 99999 PR PBB SHADOW E&M-EST. PATIENT-LVL V: CPT | Mod: PBBFAC,HCNC,, | Performed by: NURSE PRACTITIONER

## 2020-09-17 RX ORDER — PEN NEEDLE, DIABETIC 30 GX3/16"
NEEDLE, DISPOSABLE MISCELLANEOUS
Qty: 400 EACH | Refills: 3 | Status: SHIPPED | OUTPATIENT
Start: 2020-09-17 | End: 2021-10-06

## 2020-09-17 RX ORDER — INSULIN ASPART 100 [IU]/ML
38 INJECTION, SOLUTION INTRAVENOUS; SUBCUTANEOUS 2 TIMES DAILY WITH MEALS
Qty: 30 ML | Refills: 6
Start: 2020-09-17 | End: 2021-05-04

## 2020-09-17 RX ORDER — IBUPROFEN 200 MG
1 CAPSULE ORAL 4 TIMES DAILY
Qty: 400 STRIP | Refills: 3 | Status: SHIPPED | OUTPATIENT
Start: 2020-09-17 | End: 2022-01-04

## 2020-09-17 RX ORDER — INSULIN DEGLUDEC 200 U/ML
78 INJECTION, SOLUTION SUBCUTANEOUS DAILY
Qty: 18 ML | Refills: 6
Start: 2020-09-17 | End: 2020-12-21

## 2020-09-17 NOTE — PATIENT INSTRUCTIONS
Increase Tresiba to 78 units every morning.   Continue Novolog 38 units with each meal + sliding scale.

## 2020-09-17 NOTE — PROGRESS NOTES
"CC: Ms. Aedle Hall arrives today for management of Type 2 DM and review of chronic medical conditions, as listed in the visit diagnosis section of this encounter.     HPI: Ms. Adele Hall was diagnosed with Type 2 DM in ~ 2002. Metformin started at the time of diagnosis but was stopped d/t renal impairment, per PCP in [place]. Insulin was started ~ 2012. She has rotated between analog insulins and generic NPH and Regular insulins, due to cost. [employer] brand insulins, due to cost.  Denies FH of DM. She did have 1 ER admission after having BG of >700 in 2012, prior to starting insulin.   Follows with Dr. Burks for CAD - s/p CABG in 1/2016.   She follows with Dr. Montero for CKD.    Patient was last seen by me in June.    Patient states that she often skips breakfast and finds her blood sugar level elevated before lunch.  When she used 78 unit dose, she was concerned of low blood sugars and decreased her dose to 73 units but upon review of her logs from this period, most glucoses were .     BG readings are checked 3x/day.               Hypoglycemia: No    Missing Insulin/PO medication doses: No  Timing prandial insulin 5-15 minutes before meals: yes    Dietary Habits: Eats 1-2 meals/day. Sometimes eats a variety of finger foods as a meal but does take insulin with this. Occasional lemonade or sweet tea.       CURRENT DIABETIC MEDS: Tresiba U200 73 units QAM, Novolog 38 units AC + correction scale, target 150, ISF 25  Vial or pen: vials  Glucometer type: True Metrix    Previous DM treatments:  Metformin  Victoza - $   NPH/Regular insulins    Last Eye Exam: 2/4/2020, no DRPeter   Last Podiatry Exam: never    REVIEW OF SYSTEMS  Constitutional: no c/o weakness, fatigue, weight loss.   Eyes: denies visual disturbances  Cardiac: no chest pain, palpitations  Respiratory: no cough, dyspnea.   GI: no c/o nausea, abdominal pain.   Skin: no lesions, rashes.   Neuro: c/o intermittent "sharp" pain in " "feet   Endocrine: denies polyphagia, polydipsia, polyuria.      Personally reviewed Past Medical, Surgical, Social History.    Vital Signs  /60   Pulse 63   Ht 5' 2" (1.575 m)   Wt 87.8 kg (193 lb 9 oz)   BMI 35.40 kg/m²     Personally reviewed the below labs:    Hemoglobin A1C   Date Value Ref Range Status   09/10/2020 7.2 (H) 4.0 - 5.6 % Final     Comment:     ADA Screening Guidelines:  5.7-6.4%  Consistent with prediabetes  >or=6.5%  Consistent with diabetes  High levels of fetal hemoglobin interfere with the HbA1C  assay. Heterozygous hemoglobin variants (HbS, HgC, etc)do  not significantly interfere with this assay.   However, presence of multiple variants may affect accuracy.     05/26/2020 8.3 (H) 4.0 - 5.6 % Final     Comment:     ADA Screening Guidelines:  5.7-6.4%  Consistent with prediabetes  >or=6.5%  Consistent with diabetes  High levels of fetal hemoglobin interfere with the HbA1C  assay. Heterozygous hemoglobin variants (HbS, HgC, etc)do  not significantly interfere with this assay.   However, presence of multiple variants may affect accuracy.     03/10/2020 7.3 (H) 4.0 - 5.6 % Final     Comment:     ADA Screening Guidelines:  5.7-6.4%  Consistent with prediabetes  >or=6.5%  Consistent with diabetes  High levels of fetal hemoglobin interfere with the HbA1C  assay. Heterozygous hemoglobin variants (HbS, HgC, etc)do  not significantly interfere with this assay.   However, presence of multiple variants may affect accuracy.         Chemistry        Component Value Date/Time     09/10/2020 0954    K 3.8 09/10/2020 0954     09/10/2020 0954    CO2 30 (H) 09/10/2020 0954    BUN 16 09/10/2020 0954    CREATININE 1.4 09/10/2020 0954     (H) 09/10/2020 0954        Component Value Date/Time    CALCIUM 9.3 09/10/2020 0954    ALKPHOS 77 09/10/2020 0954    AST 33 09/10/2020 0954    AST 45 (H) 01/30/2016 0431    ALT 22 09/10/2020 0954    BILITOT 0.4 09/10/2020 0954          Lab Results "   Component Value Date    CHOL 138 05/26/2020    CHOL 161 03/10/2020    CHOL 147 05/14/2019     Lab Results   Component Value Date    HDL 31 (L) 05/26/2020    HDL 45 03/10/2020    HDL 34 (L) 05/14/2019     Lab Results   Component Value Date    LDLCALC 49.2 (L) 05/26/2020    LDLCALC 83.4 03/10/2020    LDLCALC 61.8 (L) 05/14/2019     Lab Results   Component Value Date    TRIG 289 (H) 05/26/2020    TRIG 163 (H) 03/10/2020    TRIG 256 (H) 05/14/2019     Lab Results   Component Value Date    CHOLHDL 22.5 05/26/2020    CHOLHDL 28.0 03/10/2020    CHOLHDL 23.1 05/14/2019       Lab Results   Component Value Date    MICALBCREAT 1111.1 (H) 04/04/2016     Lab Results   Component Value Date    TSH 0.479 07/14/2020       CrCl cannot be calculated (Patient's most recent lab result is older than the maximum 7 days allowed.).    Vit D, 25-Hydroxy   Date Value Ref Range Status   02/04/2020 45 30 - 96 ng/mL Final     Comment:     Vitamin D deficiency.........<10 ng/mL                              Vitamin D insufficiency......10-29 ng/mL       Vitamin D sufficiency........> or equal to 30 ng/mL  Vitamin D toxicity............>100 ng/mL           PHYSICAL EXAMINATION  Constitutional: Appears well, no distress  Neck: Supple, trachea midline; no thyromegaly or nodules.   Respiratory: CTA, even and unlabored.  Cardiovascular: RRR, no murmurs, no carotid bruits. DP pulses  1+ bilaterally; no edema.    GI: normal bowel sounds, no hernia  Skin: warm and dry; no lipohypertrophy, or acanthosis nigracans observed. Plaque-like hypopigmented lesions noted to BLE, feet.   Neuro: DTR 1+ BUE/1+BLE. Previously, no loss of protective sensation via 10 gm monofilament. Vibratory exam decreased, bilaterally.   Feet: appropriate footwear.       A1c goal 7.5% or less, due to CKD, CAD      Assessment/Plan  1. Type 2 diabetes mellitus with stage 3 chronic kidney disease, with long-term current use of insulin  -- Improving. However many glucoses are above  goal.   -- increase Tresiba to 78 units   -- can continue Novolog 38 units AC for now but she may need dose increase in the future. Reduce dose by half if eating a lower carb meal. Continue correction scale.   -- I encouraged pt to eat 3 balanced meals/day.   -- check BG 4x/day   -- following with nephrology      -- Discussed diagnosis of DM, A1c goals, progression of disease, long term complications and tx options.   -- Reviewed hypoglycemia management: treat with 1/2 glass of juice, 1/2 can regular coke, or 4 glucose tablets. Monitor and repeat treatment every 15 minutes until BG is >70 Then have a snack, which includes a complex carbohydrate and protein.   Advised patient to check BG before activities, such as driving or exercise.    -- takes aspirin, statin, ace-I   2. Type 2 diabetes mellitus with diabetic polyneuropathy, with long-term current use of insulin  -- discussed the importance of daily inspection of bottoms of both feet, interspace areas. Advised patient against walking barefoot.     3. Coronary artery disease involving native coronary artery of native heart without angina pectoris  -- avoid hypoglycemia   4. Essential hypertension  -- controlled  -- continue current meds   5. Hyperlipidemia with target LDL less than 70  -- Uncontrolled with elevated triglycerides  -- Continue Crestor  -- fenofibrate previously DC'd due to renal function   6. Thyroid disease  -- improved  -- continue levothyroxine 100 mcg daily.        FOLLOW UP  Follow up in about 5 months (around 2/17/2021).   Patient instructed to bring BG logs to each follow up   Patient encouraged to call for any BG/medication issues, concerns, or questions.    Orders Placed This Encounter   Procedures    Hemoglobin A1C    Basic metabolic panel

## 2020-09-29 ENCOUNTER — PATIENT MESSAGE (OUTPATIENT)
Dept: OTHER | Facility: OTHER | Age: 75
End: 2020-09-29

## 2020-10-27 ENCOUNTER — PATIENT OUTREACH (OUTPATIENT)
Dept: ADMINISTRATIVE | Facility: OTHER | Age: 75
End: 2020-10-27

## 2020-10-27 NOTE — PROGRESS NOTES
Chart was reviewed for overdue [facility] Encounters ([facility])  topics  Updates were requested from care everywhere  Health Maintenance has been updated  LINKS immunization registry triggered

## 2020-11-02 ENCOUNTER — OFFICE VISIT (OUTPATIENT)
Dept: ORTHOPEDICS | Facility: CLINIC | Age: 75
End: 2020-11-02
Payer: MEDICARE

## 2020-11-02 VITALS — WEIGHT: 193 LBS | BODY MASS INDEX: 35.51 KG/M2 | HEIGHT: 62 IN | RESPIRATION RATE: 17 BRPM

## 2020-11-02 DIAGNOSIS — M25.512 CHRONIC PAIN OF BOTH SHOULDERS: Primary | ICD-10-CM

## 2020-11-02 DIAGNOSIS — G89.29 CHRONIC PAIN OF BOTH SHOULDERS: Primary | ICD-10-CM

## 2020-11-02 DIAGNOSIS — M25.511 CHRONIC PAIN OF BOTH SHOULDERS: Primary | ICD-10-CM

## 2020-11-02 PROCEDURE — 3008F PR BODY MASS INDEX (BMI) DOCUMENTED: ICD-10-PCS | Mod: HCNC,CPTII,S$GLB, | Performed by: ORTHOPAEDIC SURGERY

## 2020-11-02 PROCEDURE — 99999 PR PBB SHADOW E&M-EST. PATIENT-LVL IV: ICD-10-PCS | Mod: PBBFAC,HCNC,, | Performed by: ORTHOPAEDIC SURGERY

## 2020-11-02 PROCEDURE — 1101F PT FALLS ASSESS-DOCD LE1/YR: CPT | Mod: HCNC,CPTII,S$GLB, | Performed by: ORTHOPAEDIC SURGERY

## 2020-11-02 PROCEDURE — 1125F PR PAIN SEVERITY QUANTIFIED, PAIN PRESENT: ICD-10-PCS | Mod: HCNC,S$GLB,, | Performed by: ORTHOPAEDIC SURGERY

## 2020-11-02 PROCEDURE — 20610 LARGE JOINT ASPIRATION/INJECTION: BILATERAL SUBACROMIAL BURSA: ICD-10-PCS | Mod: 50,HCNC,S$GLB, | Performed by: ORTHOPAEDIC SURGERY

## 2020-11-02 PROCEDURE — 3008F BODY MASS INDEX DOCD: CPT | Mod: HCNC,CPTII,S$GLB, | Performed by: ORTHOPAEDIC SURGERY

## 2020-11-02 PROCEDURE — 1101F PR PT FALLS ASSESS DOC 0-1 FALLS W/OUT INJ PAST YR: ICD-10-PCS | Mod: HCNC,CPTII,S$GLB, | Performed by: ORTHOPAEDIC SURGERY

## 2020-11-02 PROCEDURE — 1125F AMNT PAIN NOTED PAIN PRSNT: CPT | Mod: HCNC,S$GLB,, | Performed by: ORTHOPAEDIC SURGERY

## 2020-11-02 PROCEDURE — 99999 PR PBB SHADOW E&M-EST. PATIENT-LVL IV: CPT | Mod: PBBFAC,HCNC,, | Performed by: ORTHOPAEDIC SURGERY

## 2020-11-02 PROCEDURE — 99213 PR OFFICE/OUTPT VISIT, EST, LEVL III, 20-29 MIN: ICD-10-PCS | Mod: HCNC,25,S$GLB, | Performed by: ORTHOPAEDIC SURGERY

## 2020-11-02 PROCEDURE — 99213 OFFICE O/P EST LOW 20 MIN: CPT | Mod: HCNC,25,S$GLB, | Performed by: ORTHOPAEDIC SURGERY

## 2020-11-02 PROCEDURE — 1159F MED LIST DOCD IN RCRD: CPT | Mod: HCNC,S$GLB,, | Performed by: ORTHOPAEDIC SURGERY

## 2020-11-02 PROCEDURE — 1159F PR MEDICATION LIST DOCUMENTED IN MEDICAL RECORD: ICD-10-PCS | Mod: HCNC,S$GLB,, | Performed by: ORTHOPAEDIC SURGERY

## 2020-11-02 PROCEDURE — 20610 DRAIN/INJ JOINT/BURSA W/O US: CPT | Mod: 50,HCNC,S$GLB, | Performed by: ORTHOPAEDIC SURGERY

## 2020-11-02 RX ORDER — TRIAMCINOLONE ACETONIDE 40 MG/ML
40 INJECTION, SUSPENSION INTRA-ARTICULAR; INTRAMUSCULAR
Status: DISCONTINUED | OUTPATIENT
Start: 2020-11-02 | End: 2020-11-02 | Stop reason: HOSPADM

## 2020-11-02 RX ADMIN — TRIAMCINOLONE ACETONIDE 40 MG: 40 INJECTION, SUSPENSION INTRA-ARTICULAR; INTRAMUSCULAR at 02:11

## 2020-11-02 NOTE — PROGRESS NOTES
Past Medical History:   Diagnosis Date    Anesthesia complication     took patient 6 days to come out of anethesia after CABG    Anticoagulant long-term use     Arthritis     Chronic kidney disease     CKD (chronic kidney disease) stage 3, GFR 30-59 ml/min     Dr. Montero    COPD (chronic obstructive pulmonary disease) 2/7/2016    COPD (chronic obstructive pulmonary disease)     Coronary artery disease     Diabetes mellitus     Diabetes mellitus, type 2     GERD (gastroesophageal reflux disease)     Hx of colonic polyps     Hyperlipidemia     Hypertension     possible new onset CHF 7/30/2016    Tardive dyskinesia     Thyroid disease     Ulcer     Vertigo        Past Surgical History:   Procedure Laterality Date    CARDIAC SURGERY      CHOLECYSTECTOMY  01/26/2017    COLONOSCOPY      CORONARY ARTERY BYPASS GRAFT  01/19/2016    4 vessel    HYSTERECTOMY      OOPHORECTOMY      TONSILLECTOMY      TOTAL THYROIDECTOMY      TUBAL LIGATION         Current Outpatient Medications   Medication Sig    ammonium lactate (LAC-HYDRIN) 12 % lotion Apply topically as needed for Dry Skin.    aspirin (ECOTRIN) 81 MG EC tablet Take 81 mg by mouth once daily.    BD INSULIN SYRINGE ULTRA-FINE 1 mL 31 gauge x 5/16 Syrg USE AS DIRECTED 5 TIMES A DAY WITH LEVEMIR & NOVOLOG    blood sugar diagnostic (BLOOD GLUCOSE TEST) Strp 1 each by Misc.(Non-Drug; Combo Route) route 4 (four) times daily. DX E11.22 Pt is on insulin. True Metrix    dicyclomine (BENTYL) 10 MG capsule     insulin aspart U-100 (NOVOLOG FLEXPEN U-100 INSULIN) 100 unit/mL (3 mL) InPn pen Inject 38 Units into the skin 2 (two) times daily with meals. Plus correction scale, max  units    insulin degludec (TRESIBA FLEXTOUCH U-200) 200 unit/mL (3 mL) InPn Inject 78 Units into the skin once daily.    KETOPROFEN TOP APPLY 1.6 GRAMS (1 PUMP) TO PAINFUL AREAS UP TO 5 TIMES DAILY. RUB IN WELL    lancets 33 gauge Misc 1 lancet by Misc.(Non-Drug;  "Combo Route) route 4 (four) times daily.    levothyroxine (SYNTHROID) 100 MCG tablet TAKE ONE TABLET BY MOUTH EVERY DAY    lisinopriL (PRINIVIL,ZESTRIL) 5 MG tablet TAKE ONE TABLET BY MOUTH TWO TIMES A DAY    magnesium oxide (MAG-OX) 400 mg (241.3 mg magnesium) tablet Take 1 tablet (400 mg total) by mouth once daily.    meclizine (ANTIVERT) 25 mg tablet Take 1 tablet (25 mg total) by mouth 3 (three) times daily as needed.    metoprolol tartrate (LOPRESSOR) 25 MG tablet Take 1 tablet (25 mg total) by mouth 2 (two) times daily.    pantoprazole (PROTONIX) 40 MG tablet Take 1 tablet (40 mg total) by mouth once daily.    pen needle, diabetic (BD ULTRA-FINE YUMIKO PEN NEEDLE) 32 gauge x 5/32" Ndle USE FOUR TIMES A DAY WITH INSULIN    rosuvastatin (CRESTOR) 40 MG Tab TAKE ONE TABLET BY MOUTH EVERY EVENING    traMADoL (ULTRAM) 50 mg tablet Take 1 tablet (50 mg total) by mouth every 6 (six) hours as needed for Pain.    traZODone (DESYREL) 50 MG tablet TAKE ONE TABLET BY MOUTH AT BEDTIME AS NEEDED FOR INSOMNIA    vit C,V-Lk-wijen-lutein-zeaxan (PRESERVISION AREDS-2) 725-269-83-1 mg-unit-mg-mg Cap Take 1 tablet by mouth 2 (two) times daily.    vitamin D (VITAMIN D3) 1000 units Tab Take 1,000 Units by mouth once daily.    nitroGLYCERIN (NITROSTAT) 0.4 MG SL tablet Place 1 tablet (0.4 mg total) under the tongue every 5 (five) minutes as needed for Chest pain. (Patient taking differently: Place 0.4 mg under the tongue every 5 (five) minutes as needed for Chest pain. Seek medical help is pain is not relieved by the third dose.)     No current facility-administered medications for this visit.        Review of patient's allergies indicates:   Allergen Reactions    Reglan [metoclopramide hcl]      Depression and weight loss.        Family History   Problem Relation Age of Onset    Cancer Mother         LYMPHOMA    Breast cancer Mother     Cancer Father     Early death Father     Heart disease Paternal Uncle     " Alcohol abuse Sister         x2    Heart disease Brother     No Known Problems Son        Social History     Socioeconomic History    Marital status:      Spouse name: Not on file    Number of children: Not on file    Years of education: Not on file    Highest education level: Not on file   Occupational History    Not on file   Social Needs    Financial resource strain: Not on file    Food insecurity     Worry: Not on file     Inability: Not on file    Transportation needs     Medical: Not on file     Non-medical: Not on file   Tobacco Use    Smoking status: Former Smoker     Packs/day: 1.00     Years: 27.00     Pack years: 27.00     Types: Cigarettes     Quit date: 2016     Years since quittin.7    Smokeless tobacco: Never Used   Substance and Sexual Activity    Alcohol use: No     Alcohol/week: 0.0 standard drinks    Drug use: No    Sexual activity: Yes     Partners: Male   Lifestyle    Physical activity     Days per week: Not on file     Minutes per session: Not on file    Stress: Not on file   Relationships    Social connections     Talks on phone: Not on file     Gets together: Not on file     Attends [employer] service: Not on file     Active member of club or organization: Not on file     Attends meetings of clubs or organizations: Not on file     Relationship status: Not on file   Other Topics Concern    Not on file   Social History Narrative    Not on file       Chief Complaint:   Chief Complaint   Patient presents with    Right Shoulder - Pain    Left Shoulder - Pain       Consulting Physician: No ref. provider found    History of present illness:    This is a 75 y.o. year old female who complains of bilateral shoulder pain.  She states he has had this on and off for years.  She denies any new injury.  She puts her pain is much as 7/10 and worse with activities and at night.  She has done physical therapy which has not helped.  Our last injection helped for several  "months.    Review of Systems:    Constitution:   Denies chills, fever, and sweats.  HENT:   Denies headaches or blurry vision.  Cardiovascular:  Denies chest pain or irregular heart beat.  Respiratory:   Denies cough or shortness of breath.  Gastrointestinal:  Denies abdominal pain, nausea, or vomiting.  Musculoskeletal:   Denies muscle cramps.  Neurological:   Denies dizziness or focal weakness.  Psychiatric/Behavior: Normal mental status.  Hematology/Lymph:  Denies bleeding problem or easy bruising/bleeding.  Skin:    Denies rash or suspicious lesions.    Examination:    Vital Signs:    Vitals:    11/02/20 1324   Resp: 17   Weight: 87.5 kg (193 lb)   Height: 5' 2" (1.575 m)   PainSc:   7       Body mass index is 35.3 kg/m².    Constitution:   Well-developed, well nourished patient in no acute distress.  Neurological:   Alert and oriented x 3 and cooperative to examination.     Psychiatric/Behavior: Normal mental status.  Respiratory:   No shortness of breath.  Eyes:    Extraoccular muscles intact  Skin:    No scars, rash or suspicious lesions.    Physical Exam: Right Shoulder Exam     Skin  Rash:   None  Scars:   None    Inspection/Observation   Swelling:   none  Erythema:   none  Bruising:   none  Wounds:   none  Scapular Winging:  none  Scapular Dyskinesia:  none  Atrophy:   none  Masses:   None  Lymphadenopathy: None    Tenderness   AC Joint:   mild    Range of Motion   Active Forward Flexion:  150  Active External Rotation:  20  Active Internal Rotation:  Low back    Muscle Strength   Forward Flexion:  4+/5  External Rotation:  4+/5  Internal Rotation:  4+/5    Tests & Signs   Cross Arm:   positive  Impingement:   positive    Other   Sensation:   normal  Pulse:    2+ radial      Left Shoulder Exam     Skin  Rash:   None  Scars:   None    Inspection/Observation   Swelling:   none  Erythema:   none  Bruising:   none  Wounds:   none  Scapular Winging:  none  Scapular Dyskinesia:  none  Atrophy:   none  Masses: "   None  Lymphadenopathy: None    Tenderness   AC Joint:   mild    Range of Motion   Active Forward Flexion:  150  Active External Rotation:  20  Active Internal Rotation:  Hip pocket    Muscle Strength   Forward Flexion:  4/5  External Rotation:  4+/5  Internal Rotation:  4+/5    Tests & Signs   Cross Arm:   positive  Impingement:   positive    Other   Sensation:   normal  Pulse:    2+ radial          Imaging: X-rays of both shoulder show mild-to-moderate degenerative changes. The MRI study images of the left shoulder show a partial subscapularis tear along with a partial supraspinatus footprint tear and what appears to be a slap lesion.         Assessment: Chronic pain of both shoulders  -     Large Joint Aspiration/Injection: bilateral subacromial bursa        Plan:  She has heart issues and still does not want surgery.  We gave her another injection today into both shoulders.  She said she got an injection in her neck that did not help.  We will see her back as needed.    DISCLAIMER: This note may have been dictated using voice recognition software and may contain grammatical errors.     NOTE: Consult report sent to referring provider via [employer] EMR.

## 2020-11-02 NOTE — PROCEDURES
Large Joint Aspiration/Injection: bilateral subacromial bursa    Date/Time: 11/2/2020 2:00 PM  Performed by: Michael Fair MD  Authorized by: Michael Fair MD     Consent Done?:  Yes (Verbal)  Indications:  Pain  Timeout: prior to procedure the correct patient, procedure, and site was verified    Approach:  Lateral  Location:  Shoulder  Laterality:  Bilateral  Site:  Bilateral subacromial bursa  Medications (Right):  40 mg triamcinolone acetonide 40 mg/mL  Medications (Left):  40 mg triamcinolone acetonide 40 mg/mL

## 2020-12-01 ENCOUNTER — LAB VISIT (OUTPATIENT)
Dept: PRIMARY CARE CLINIC | Facility: OTHER | Age: 75
End: 2020-12-01
Attending: INTERNAL MEDICINE
Payer: MEDICARE

## 2020-12-01 DIAGNOSIS — Z03.818 ENCOUNTER FOR OBSERVATION FOR SUSPECTED EXPOSURE TO OTHER BIOLOGICAL AGENTS RULED OUT: ICD-10-CM

## 2020-12-01 PROCEDURE — U0003 INFECTIOUS AGENT DETECTION BY NUCLEIC ACID (DNA OR RNA); SEVERE ACUTE RESPIRATORY SYNDROME CORONAVIRUS 2 (SARS-COV-2) (CORONAVIRUS DISEASE [COVID-19]), AMPLIFIED PROBE TECHNIQUE, MAKING USE OF HIGH THROUGHPUT TECHNOLOGIES AS DESCRIBED BY CMS-2020-01-R: HCPCS | Mod: HCNC

## 2020-12-03 LAB — SARS-COV-2 RNA RESP QL NAA+PROBE: NOT DETECTED

## 2020-12-11 ENCOUNTER — PATIENT MESSAGE (OUTPATIENT)
Dept: OTHER | Facility: OTHER | Age: 75
End: 2020-12-11

## 2021-01-04 ENCOUNTER — PATIENT MESSAGE (OUTPATIENT)
Dept: ADMINISTRATIVE | Facility: HOSPITAL | Age: 76
End: 2021-01-04

## 2021-01-19 ENCOUNTER — IMMUNIZATION (OUTPATIENT)
Dept: PRIMARY CARE CLINIC | Facility: CLINIC | Age: 76
End: 2021-01-19
Payer: MEDICARE

## 2021-01-19 DIAGNOSIS — Z23 NEED FOR VACCINATION: Primary | ICD-10-CM

## 2021-01-19 PROCEDURE — 0001A COVID-19, MRNA, LNP-S, PF, 30 MCG/0.3 ML DOSE VACCINE: CPT | Mod: S$GLB,,, | Performed by: FAMILY MEDICINE

## 2021-01-19 PROCEDURE — 91300 COVID-19, MRNA, LNP-S, PF, 30 MCG/0.3 ML DOSE VACCINE: CPT | Mod: S$GLB,,, | Performed by: FAMILY MEDICINE

## 2021-01-19 PROCEDURE — 0001A COVID-19, MRNA, LNP-S, PF, 30 MCG/0.3 ML DOSE VACCINE: ICD-10-PCS | Mod: S$GLB,,, | Performed by: FAMILY MEDICINE

## 2021-01-19 PROCEDURE — 91300 COVID-19, MRNA, LNP-S, PF, 30 MCG/0.3 ML DOSE VACCINE: ICD-10-PCS | Mod: S$GLB,,, | Performed by: FAMILY MEDICINE

## 2021-01-29 ENCOUNTER — LAB VISIT (OUTPATIENT)
Dept: LAB | Facility: HOSPITAL | Age: 76
End: 2021-01-29
Attending: NURSE PRACTITIONER
Payer: MEDICARE

## 2021-01-29 DIAGNOSIS — Z79.4 TYPE 2 DIABETES MELLITUS WITH STAGE 3 CHRONIC KIDNEY DISEASE, WITH LONG-TERM CURRENT USE OF INSULIN: ICD-10-CM

## 2021-01-29 DIAGNOSIS — N18.30 TYPE 2 DIABETES MELLITUS WITH STAGE 3 CHRONIC KIDNEY DISEASE, WITH LONG-TERM CURRENT USE OF INSULIN: ICD-10-CM

## 2021-01-29 DIAGNOSIS — E11.22 TYPE 2 DIABETES MELLITUS WITH STAGE 3 CHRONIC KIDNEY DISEASE, WITH LONG-TERM CURRENT USE OF INSULIN: ICD-10-CM

## 2021-01-29 LAB
ANION GAP SERPL CALC-SCNC: 10 MMOL/L (ref 8–16)
BUN SERPL-MCNC: 22 MG/DL (ref 8–23)
CALCIUM SERPL-MCNC: 9.1 MG/DL (ref 8.7–10.5)
CHLORIDE SERPL-SCNC: 102 MMOL/L (ref 95–110)
CO2 SERPL-SCNC: 30 MMOL/L (ref 23–29)
CREAT SERPL-MCNC: 1.4 MG/DL (ref 0.5–1.4)
EST. GFR  (AFRICAN AMERICAN): 43 ML/MIN/1.73 M^2
EST. GFR  (NON AFRICAN AMERICAN): 37.3 ML/MIN/1.73 M^2
ESTIMATED AVG GLUCOSE: 154 MG/DL (ref 68–131)
GLUCOSE SERPL-MCNC: 198 MG/DL (ref 70–110)
HBA1C MFR BLD: 7 % (ref 4–5.6)
POTASSIUM SERPL-SCNC: 4 MMOL/L (ref 3.5–5.1)
SODIUM SERPL-SCNC: 142 MMOL/L (ref 136–145)

## 2021-01-29 PROCEDURE — 83036 HEMOGLOBIN GLYCOSYLATED A1C: CPT

## 2021-01-29 PROCEDURE — 80048 BASIC METABOLIC PNL TOTAL CA: CPT

## 2021-01-29 PROCEDURE — 36415 COLL VENOUS BLD VENIPUNCTURE: CPT | Mod: PO

## 2021-01-30 DIAGNOSIS — G47.00 INSOMNIA, UNSPECIFIED TYPE: ICD-10-CM

## 2021-02-01 RX ORDER — TRAZODONE HYDROCHLORIDE 50 MG/1
TABLET ORAL
Qty: 30 TABLET | Refills: 0 | Status: SHIPPED | OUTPATIENT
Start: 2021-02-01 | End: 2021-03-16

## 2021-02-02 ENCOUNTER — OFFICE VISIT (OUTPATIENT)
Dept: ENDOCRINOLOGY | Facility: CLINIC | Age: 76
End: 2021-02-02
Payer: MEDICARE

## 2021-02-02 ENCOUNTER — PATIENT MESSAGE (OUTPATIENT)
Dept: ENDOCRINOLOGY | Facility: CLINIC | Age: 76
End: 2021-02-02

## 2021-02-02 VITALS
SYSTOLIC BLOOD PRESSURE: 126 MMHG | WEIGHT: 195.56 LBS | BODY MASS INDEX: 35.99 KG/M2 | DIASTOLIC BLOOD PRESSURE: 82 MMHG | HEIGHT: 62 IN | HEART RATE: 57 BPM

## 2021-02-02 DIAGNOSIS — Z79.4 TYPE 2 DIABETES MELLITUS WITH STAGE 3 CHRONIC KIDNEY DISEASE, WITH LONG-TERM CURRENT USE OF INSULIN: ICD-10-CM

## 2021-02-02 DIAGNOSIS — E07.9 THYROID DISEASE: ICD-10-CM

## 2021-02-02 DIAGNOSIS — E78.5 HYPERLIPIDEMIA WITH TARGET LDL LESS THAN 70: ICD-10-CM

## 2021-02-02 DIAGNOSIS — Z79.4 TYPE 2 DIABETES MELLITUS WITH STAGE 3B CHRONIC KIDNEY DISEASE, WITH LONG-TERM CURRENT USE OF INSULIN: Primary | ICD-10-CM

## 2021-02-02 DIAGNOSIS — Z79.4 TYPE 2 DIABETES MELLITUS WITH DIABETIC POLYNEUROPATHY, WITH LONG-TERM CURRENT USE OF INSULIN: ICD-10-CM

## 2021-02-02 DIAGNOSIS — E11.22 TYPE 2 DIABETES MELLITUS WITH STAGE 3 CHRONIC KIDNEY DISEASE, WITH LONG-TERM CURRENT USE OF INSULIN: ICD-10-CM

## 2021-02-02 DIAGNOSIS — I25.10 CORONARY ARTERY DISEASE INVOLVING NATIVE CORONARY ARTERY OF NATIVE HEART WITHOUT ANGINA PECTORIS: ICD-10-CM

## 2021-02-02 DIAGNOSIS — I10 ESSENTIAL HYPERTENSION: ICD-10-CM

## 2021-02-02 DIAGNOSIS — N18.32 TYPE 2 DIABETES MELLITUS WITH STAGE 3B CHRONIC KIDNEY DISEASE, WITH LONG-TERM CURRENT USE OF INSULIN: Primary | ICD-10-CM

## 2021-02-02 DIAGNOSIS — N18.30 TYPE 2 DIABETES MELLITUS WITH STAGE 3 CHRONIC KIDNEY DISEASE, WITH LONG-TERM CURRENT USE OF INSULIN: ICD-10-CM

## 2021-02-02 DIAGNOSIS — R19.7 DIARRHEA, UNSPECIFIED TYPE: ICD-10-CM

## 2021-02-02 DIAGNOSIS — E11.22 TYPE 2 DIABETES MELLITUS WITH STAGE 3B CHRONIC KIDNEY DISEASE, WITH LONG-TERM CURRENT USE OF INSULIN: Primary | ICD-10-CM

## 2021-02-02 DIAGNOSIS — E11.42 TYPE 2 DIABETES MELLITUS WITH DIABETIC POLYNEUROPATHY, WITH LONG-TERM CURRENT USE OF INSULIN: ICD-10-CM

## 2021-02-02 PROCEDURE — 3008F BODY MASS INDEX DOCD: CPT | Mod: CPTII,S$GLB,, | Performed by: NURSE PRACTITIONER

## 2021-02-02 PROCEDURE — 3008F PR BODY MASS INDEX (BMI) DOCUMENTED: ICD-10-PCS | Mod: CPTII,S$GLB,, | Performed by: NURSE PRACTITIONER

## 2021-02-02 PROCEDURE — 99214 PR OFFICE/OUTPT VISIT, EST, LEVL IV, 30-39 MIN: ICD-10-PCS | Mod: S$GLB,,, | Performed by: NURSE PRACTITIONER

## 2021-02-02 PROCEDURE — 3051F PR MOST RECENT HEMOGLOBIN A1C LEVEL 7.0 - < 8.0%: ICD-10-PCS | Mod: CPTII,S$GLB,, | Performed by: NURSE PRACTITIONER

## 2021-02-02 PROCEDURE — 1159F MED LIST DOCD IN RCRD: CPT | Mod: S$GLB,,, | Performed by: NURSE PRACTITIONER

## 2021-02-02 PROCEDURE — 3074F PR MOST RECENT SYSTOLIC BLOOD PRESSURE < 130 MM HG: ICD-10-PCS | Mod: CPTII,S$GLB,, | Performed by: NURSE PRACTITIONER

## 2021-02-02 PROCEDURE — 3288F FALL RISK ASSESSMENT DOCD: CPT | Mod: CPTII,S$GLB,, | Performed by: NURSE PRACTITIONER

## 2021-02-02 PROCEDURE — 99499 UNLISTED E&M SERVICE: CPT | Mod: S$GLB,,, | Performed by: NURSE PRACTITIONER

## 2021-02-02 PROCEDURE — 99214 OFFICE O/P EST MOD 30 MIN: CPT | Mod: S$GLB,,, | Performed by: NURSE PRACTITIONER

## 2021-02-02 PROCEDURE — 3079F PR MOST RECENT DIASTOLIC BLOOD PRESSURE 80-89 MM HG: ICD-10-PCS | Mod: CPTII,S$GLB,, | Performed by: NURSE PRACTITIONER

## 2021-02-02 PROCEDURE — 3074F SYST BP LT 130 MM HG: CPT | Mod: CPTII,S$GLB,, | Performed by: NURSE PRACTITIONER

## 2021-02-02 PROCEDURE — 3051F HG A1C>EQUAL 7.0%<8.0%: CPT | Mod: CPTII,S$GLB,, | Performed by: NURSE PRACTITIONER

## 2021-02-02 PROCEDURE — 3072F PR LOW RISK FOR RETINOPATHY: ICD-10-PCS | Mod: S$GLB,,, | Performed by: NURSE PRACTITIONER

## 2021-02-02 PROCEDURE — 3288F PR FALLS RISK ASSESSMENT DOCUMENTED: ICD-10-PCS | Mod: CPTII,S$GLB,, | Performed by: NURSE PRACTITIONER

## 2021-02-02 PROCEDURE — 1126F AMNT PAIN NOTED NONE PRSNT: CPT | Mod: S$GLB,,, | Performed by: NURSE PRACTITIONER

## 2021-02-02 PROCEDURE — 1101F PR PT FALLS ASSESS DOC 0-1 FALLS W/OUT INJ PAST YR: ICD-10-PCS | Mod: CPTII,S$GLB,, | Performed by: NURSE PRACTITIONER

## 2021-02-02 PROCEDURE — 99499 RISK ADDL DX/OHS AUDIT: ICD-10-PCS | Mod: S$GLB,,, | Performed by: NURSE PRACTITIONER

## 2021-02-02 PROCEDURE — 1101F PT FALLS ASSESS-DOCD LE1/YR: CPT | Mod: CPTII,S$GLB,, | Performed by: NURSE PRACTITIONER

## 2021-02-02 PROCEDURE — 99999 PR PBB SHADOW E&M-EST. PATIENT-LVL V: CPT | Mod: PBBFAC,,, | Performed by: NURSE PRACTITIONER

## 2021-02-02 PROCEDURE — 99999 PR PBB SHADOW E&M-EST. PATIENT-LVL V: ICD-10-PCS | Mod: PBBFAC,,, | Performed by: NURSE PRACTITIONER

## 2021-02-02 PROCEDURE — 1159F PR MEDICATION LIST DOCUMENTED IN MEDICAL RECORD: ICD-10-PCS | Mod: S$GLB,,, | Performed by: NURSE PRACTITIONER

## 2021-02-02 PROCEDURE — 1126F PR PAIN SEVERITY QUANTIFIED, NO PAIN PRESENT: ICD-10-PCS | Mod: S$GLB,,, | Performed by: NURSE PRACTITIONER

## 2021-02-02 PROCEDURE — 3079F DIAST BP 80-89 MM HG: CPT | Mod: CPTII,S$GLB,, | Performed by: NURSE PRACTITIONER

## 2021-02-02 PROCEDURE — 3072F LOW RISK FOR RETINOPATHY: CPT | Mod: S$GLB,,, | Performed by: NURSE PRACTITIONER

## 2021-02-02 RX ORDER — INSULIN DEGLUDEC 200 U/ML
72 INJECTION, SOLUTION SUBCUTANEOUS DAILY
Qty: 18 ML | Refills: 6
Start: 2021-02-02 | End: 2021-05-04

## 2021-02-08 ENCOUNTER — TELEPHONE (OUTPATIENT)
Dept: FAMILY MEDICINE | Facility: CLINIC | Age: 76
End: 2021-02-08

## 2021-02-09 ENCOUNTER — IMMUNIZATION (OUTPATIENT)
Dept: PRIMARY CARE CLINIC | Facility: CLINIC | Age: 76
End: 2021-02-09
Payer: MEDICARE

## 2021-02-09 DIAGNOSIS — Z23 NEED FOR VACCINATION: Primary | ICD-10-CM

## 2021-02-09 PROCEDURE — 91300 COVID-19, MRNA, LNP-S, PF, 30 MCG/0.3 ML DOSE VACCINE: ICD-10-PCS | Mod: S$GLB,,, | Performed by: FAMILY MEDICINE

## 2021-02-09 PROCEDURE — 91300 COVID-19, MRNA, LNP-S, PF, 30 MCG/0.3 ML DOSE VACCINE: CPT | Mod: S$GLB,,, | Performed by: FAMILY MEDICINE

## 2021-02-09 PROCEDURE — 0002A COVID-19, MRNA, LNP-S, PF, 30 MCG/0.3 ML DOSE VACCINE: CPT | Mod: S$GLB,,, | Performed by: FAMILY MEDICINE

## 2021-02-09 PROCEDURE — 0002A COVID-19, MRNA, LNP-S, PF, 30 MCG/0.3 ML DOSE VACCINE: ICD-10-PCS | Mod: S$GLB,,, | Performed by: FAMILY MEDICINE

## 2021-03-04 ENCOUNTER — HOSPITAL ENCOUNTER (INPATIENT)
Facility: HOSPITAL | Age: 76
LOS: 5 days | Discharge: HOME HEALTH | DRG: 292 | End: 2021-03-12
Attending: EMERGENCY MEDICINE | Admitting: INTERNAL MEDICINE
Payer: MEDICARE

## 2021-03-04 ENCOUNTER — TELEPHONE (OUTPATIENT)
Dept: NEPHROLOGY | Facility: CLINIC | Age: 76
End: 2021-03-04

## 2021-03-04 DIAGNOSIS — R10.9 ABDOMINAL PAIN, UNSPECIFIED ABDOMINAL LOCATION: Primary | ICD-10-CM

## 2021-03-04 DIAGNOSIS — R07.9 CHEST PAIN: ICD-10-CM

## 2021-03-04 DIAGNOSIS — R06.02 SOB (SHORTNESS OF BREATH): ICD-10-CM

## 2021-03-04 DIAGNOSIS — I10 ACCELERATED HYPERTENSION: ICD-10-CM

## 2021-03-04 DIAGNOSIS — N18.30 STAGE 3 CHRONIC KIDNEY DISEASE, UNSPECIFIED WHETHER STAGE 3A OR 3B CKD: Primary | ICD-10-CM

## 2021-03-04 DIAGNOSIS — N17.9 AKI (ACUTE KIDNEY INJURY): ICD-10-CM

## 2021-03-04 DIAGNOSIS — I50.33 ACUTE ON CHRONIC DIASTOLIC HEART FAILURE: ICD-10-CM

## 2021-03-04 DIAGNOSIS — I20.89 ANGINA AT REST: ICD-10-CM

## 2021-03-04 LAB
ALBUMIN SERPL BCP-MCNC: 3.7 G/DL (ref 3.5–5.2)
ALP SERPL-CCNC: 81 U/L (ref 55–135)
ALT SERPL W/O P-5'-P-CCNC: 12 U/L (ref 10–44)
ANION GAP SERPL CALC-SCNC: 8 MMOL/L (ref 8–16)
AST SERPL-CCNC: 20 U/L (ref 10–40)
BACTERIA #/AREA URNS HPF: ABNORMAL /HPF
BASOPHILS # BLD AUTO: 0.06 K/UL (ref 0–0.2)
BASOPHILS NFR BLD: 0.7 % (ref 0–1.9)
BILIRUB SERPL-MCNC: 0.5 MG/DL (ref 0.1–1)
BILIRUB UR QL STRIP: NEGATIVE
BUN SERPL-MCNC: 41 MG/DL (ref 8–23)
CALCIUM SERPL-MCNC: 8.7 MG/DL (ref 8.7–10.5)
CHLORIDE SERPL-SCNC: 105 MMOL/L (ref 95–110)
CLARITY UR: CLEAR
CO2 SERPL-SCNC: 25 MMOL/L (ref 23–29)
COLOR UR: YELLOW
CREAT SERPL-MCNC: 2.7 MG/DL (ref 0.5–1.4)
DIFFERENTIAL METHOD: ABNORMAL
EOSINOPHIL # BLD AUTO: 0.2 K/UL (ref 0–0.5)
EOSINOPHIL NFR BLD: 2.6 % (ref 0–8)
ERYTHROCYTE [DISTWIDTH] IN BLOOD BY AUTOMATED COUNT: 13.8 % (ref 11.5–14.5)
EST. GFR  (AFRICAN AMERICAN): 19 ML/MIN/1.73 M^2
EST. GFR  (NON AFRICAN AMERICAN): 17 ML/MIN/1.73 M^2
GLUCOSE SERPL-MCNC: 165 MG/DL (ref 70–110)
GLUCOSE UR QL STRIP: NEGATIVE
HCT VFR BLD AUTO: 38.7 % (ref 37–48.5)
HGB BLD-MCNC: 11.7 G/DL (ref 12–16)
HGB UR QL STRIP: ABNORMAL
HYALINE CASTS #/AREA URNS LPF: 0 /LPF
IMM GRANULOCYTES # BLD AUTO: 0.03 K/UL (ref 0–0.04)
IMM GRANULOCYTES NFR BLD AUTO: 0.4 % (ref 0–0.5)
KETONES UR QL STRIP: NEGATIVE
LEUKOCYTE ESTERASE UR QL STRIP: ABNORMAL
LIPASE SERPL-CCNC: 72 U/L (ref 4–60)
LYMPHOCYTES # BLD AUTO: 3.7 K/UL (ref 1–4.8)
LYMPHOCYTES NFR BLD: 43.4 % (ref 18–48)
MCH RBC QN AUTO: 28.5 PG (ref 27–31)
MCHC RBC AUTO-ENTMCNC: 30.2 G/DL (ref 32–36)
MCV RBC AUTO: 94 FL (ref 82–98)
MICROSCOPIC COMMENT: ABNORMAL
MONOCYTES # BLD AUTO: 0.5 K/UL (ref 0.3–1)
MONOCYTES NFR BLD: 6.1 % (ref 4–15)
NEUTROPHILS # BLD AUTO: 3.9 K/UL (ref 1.8–7.7)
NEUTROPHILS NFR BLD: 46.8 % (ref 38–73)
NITRITE UR QL STRIP: NEGATIVE
NRBC BLD-RTO: 0 /100 WBC
PH UR STRIP: 6 [PH] (ref 5–8)
PLATELET # BLD AUTO: 183 K/UL (ref 150–350)
PMV BLD AUTO: 11.3 FL (ref 9.2–12.9)
POCT GLUCOSE: 101 MG/DL (ref 70–110)
POTASSIUM SERPL-SCNC: 4.1 MMOL/L (ref 3.5–5.1)
PROT SERPL-MCNC: 7.2 G/DL (ref 6–8.4)
PROT UR QL STRIP: ABNORMAL
RBC # BLD AUTO: 4.1 M/UL (ref 4–5.4)
RBC #/AREA URNS HPF: 3 /HPF (ref 0–4)
SARS-COV-2 RDRP RESP QL NAA+PROBE: NEGATIVE
SODIUM SERPL-SCNC: 138 MMOL/L (ref 136–145)
SP GR UR STRIP: 1.02 (ref 1–1.03)
SQUAMOUS #/AREA URNS HPF: 4 /HPF
URN SPEC COLLECT METH UR: ABNORMAL
UROBILINOGEN UR STRIP-ACNC: 1 EU/DL
WBC # BLD AUTO: 8.41 K/UL (ref 3.9–12.7)
WBC #/AREA URNS HPF: 6 /HPF (ref 0–5)

## 2021-03-04 PROCEDURE — 99285 EMERGENCY DEPT VISIT HI MDM: CPT | Mod: 25

## 2021-03-04 PROCEDURE — 83690 ASSAY OF LIPASE: CPT | Performed by: PHYSICIAN ASSISTANT

## 2021-03-04 PROCEDURE — 96372 THER/PROPH/DIAG INJ SC/IM: CPT | Mod: 59

## 2021-03-04 PROCEDURE — 63600175 PHARM REV CODE 636 W HCPCS: Performed by: PHYSICIAN ASSISTANT

## 2021-03-04 PROCEDURE — 81000 URINALYSIS NONAUTO W/SCOPE: CPT | Performed by: PHYSICIAN ASSISTANT

## 2021-03-04 PROCEDURE — 96361 HYDRATE IV INFUSION ADD-ON: CPT

## 2021-03-04 PROCEDURE — G0378 HOSPITAL OBSERVATION PER HR: HCPCS

## 2021-03-04 PROCEDURE — 87086 URINE CULTURE/COLONY COUNT: CPT | Performed by: PHYSICIAN ASSISTANT

## 2021-03-04 PROCEDURE — 96375 TX/PRO/DX INJ NEW DRUG ADDON: CPT

## 2021-03-04 PROCEDURE — U0002 COVID-19 LAB TEST NON-CDC: HCPCS | Performed by: PHYSICIAN ASSISTANT

## 2021-03-04 PROCEDURE — 25000003 PHARM REV CODE 250: Performed by: INTERNAL MEDICINE

## 2021-03-04 PROCEDURE — 80053 COMPREHEN METABOLIC PANEL: CPT | Performed by: PHYSICIAN ASSISTANT

## 2021-03-04 PROCEDURE — 36415 COLL VENOUS BLD VENIPUNCTURE: CPT | Performed by: PHYSICIAN ASSISTANT

## 2021-03-04 PROCEDURE — 85025 COMPLETE CBC W/AUTO DIFF WBC: CPT | Performed by: PHYSICIAN ASSISTANT

## 2021-03-04 PROCEDURE — 96374 THER/PROPH/DIAG INJ IV PUSH: CPT

## 2021-03-04 PROCEDURE — 25000003 PHARM REV CODE 250: Performed by: PHYSICIAN ASSISTANT

## 2021-03-04 PROCEDURE — 63600175 PHARM REV CODE 636 W HCPCS: Performed by: INTERNAL MEDICINE

## 2021-03-04 RX ORDER — MORPHINE SULFATE 4 MG/ML
4 INJECTION, SOLUTION INTRAMUSCULAR; INTRAVENOUS
Status: COMPLETED | OUTPATIENT
Start: 2021-03-04 | End: 2021-03-04

## 2021-03-04 RX ORDER — ENOXAPARIN SODIUM 100 MG/ML
30 INJECTION SUBCUTANEOUS EVERY 24 HOURS
Status: DISCONTINUED | OUTPATIENT
Start: 2021-03-04 | End: 2021-03-07

## 2021-03-04 RX ORDER — TRAMADOL HYDROCHLORIDE 50 MG/1
50 TABLET ORAL 2 TIMES DAILY PRN
Status: DISCONTINUED | OUTPATIENT
Start: 2021-03-04 | End: 2021-03-12 | Stop reason: HOSPADM

## 2021-03-04 RX ORDER — SODIUM CHLORIDE 0.9 % (FLUSH) 0.9 %
10 SYRINGE (ML) INJECTION
Status: DISCONTINUED | OUTPATIENT
Start: 2021-03-04 | End: 2021-03-12 | Stop reason: HOSPADM

## 2021-03-04 RX ORDER — ROSUVASTATIN CALCIUM 20 MG/1
20 TABLET, COATED ORAL NIGHTLY
Status: DISCONTINUED | OUTPATIENT
Start: 2021-03-04 | End: 2021-03-12 | Stop reason: HOSPADM

## 2021-03-04 RX ORDER — SODIUM,POTASSIUM PHOSPHATES 280-250MG
2 POWDER IN PACKET (EA) ORAL
Status: DISCONTINUED | OUTPATIENT
Start: 2021-03-04 | End: 2021-03-12 | Stop reason: HOSPADM

## 2021-03-04 RX ORDER — SODIUM CHLORIDE 9 MG/ML
INJECTION, SOLUTION INTRAVENOUS CONTINUOUS
Status: DISCONTINUED | OUTPATIENT
Start: 2021-03-04 | End: 2021-03-06

## 2021-03-04 RX ORDER — TRAZODONE HYDROCHLORIDE 50 MG/1
50 TABLET ORAL NIGHTLY
Status: DISCONTINUED | OUTPATIENT
Start: 2021-03-04 | End: 2021-03-12 | Stop reason: HOSPADM

## 2021-03-04 RX ORDER — LEVOTHYROXINE SODIUM 50 UG/1
100 TABLET ORAL
Status: DISCONTINUED | OUTPATIENT
Start: 2021-03-05 | End: 2021-03-12 | Stop reason: HOSPADM

## 2021-03-04 RX ORDER — ONDANSETRON 2 MG/ML
4 INJECTION INTRAMUSCULAR; INTRAVENOUS EVERY 8 HOURS PRN
Status: DISCONTINUED | OUTPATIENT
Start: 2021-03-04 | End: 2021-03-12 | Stop reason: HOSPADM

## 2021-03-04 RX ORDER — IBUPROFEN 200 MG
24 TABLET ORAL
Status: DISCONTINUED | OUTPATIENT
Start: 2021-03-04 | End: 2021-03-12 | Stop reason: HOSPADM

## 2021-03-04 RX ORDER — ASPIRIN 81 MG/1
81 TABLET ORAL DAILY
Status: DISCONTINUED | OUTPATIENT
Start: 2021-03-05 | End: 2021-03-12 | Stop reason: HOSPADM

## 2021-03-04 RX ORDER — GLUCAGON 1 MG
1 KIT INJECTION
Status: DISCONTINUED | OUTPATIENT
Start: 2021-03-04 | End: 2021-03-12 | Stop reason: HOSPADM

## 2021-03-04 RX ORDER — INSULIN ASPART 100 [IU]/ML
0-5 INJECTION, SOLUTION INTRAVENOUS; SUBCUTANEOUS
Status: DISCONTINUED | OUTPATIENT
Start: 2021-03-04 | End: 2021-03-06 | Stop reason: SDUPTHER

## 2021-03-04 RX ORDER — IBUPROFEN 200 MG
16 TABLET ORAL
Status: DISCONTINUED | OUTPATIENT
Start: 2021-03-04 | End: 2021-03-12 | Stop reason: HOSPADM

## 2021-03-04 RX ORDER — LANOLIN ALCOHOL/MO/W.PET/CERES
800 CREAM (GRAM) TOPICAL
Status: DISCONTINUED | OUTPATIENT
Start: 2021-03-04 | End: 2021-03-12 | Stop reason: HOSPADM

## 2021-03-04 RX ORDER — METOPROLOL TARTRATE 25 MG/1
25 TABLET, FILM COATED ORAL 2 TIMES DAILY
Status: DISCONTINUED | OUTPATIENT
Start: 2021-03-04 | End: 2021-03-12 | Stop reason: HOSPADM

## 2021-03-04 RX ORDER — PANTOPRAZOLE SODIUM 40 MG/1
40 TABLET, DELAYED RELEASE ORAL DAILY
Status: DISCONTINUED | OUTPATIENT
Start: 2021-03-05 | End: 2021-03-12 | Stop reason: HOSPADM

## 2021-03-04 RX ORDER — ONDANSETRON 2 MG/ML
4 INJECTION INTRAMUSCULAR; INTRAVENOUS
Status: COMPLETED | OUTPATIENT
Start: 2021-03-04 | End: 2021-03-04

## 2021-03-04 RX ADMIN — SODIUM CHLORIDE: 0.9 INJECTION, SOLUTION INTRAVENOUS at 09:03

## 2021-03-04 RX ADMIN — ENOXAPARIN SODIUM 30 MG: 30 INJECTION SUBCUTANEOUS at 09:03

## 2021-03-04 RX ADMIN — TRAZODONE HYDROCHLORIDE 50 MG: 50 TABLET ORAL at 09:03

## 2021-03-04 RX ADMIN — SODIUM CHLORIDE 1000 ML: 0.9 INJECTION, SOLUTION INTRAVENOUS at 04:03

## 2021-03-04 RX ADMIN — ONDANSETRON 4 MG: 2 INJECTION INTRAMUSCULAR; INTRAVENOUS at 04:03

## 2021-03-04 RX ADMIN — MORPHINE SULFATE 4 MG: 4 INJECTION, SOLUTION INTRAMUSCULAR; INTRAVENOUS at 04:03

## 2021-03-04 RX ADMIN — ROSUVASTATIN CALCIUM 20 MG: 20 TABLET, FILM COATED ORAL at 09:03

## 2021-03-04 RX ADMIN — METOPROLOL TARTRATE 25 MG: 25 TABLET, FILM COATED ORAL at 09:03

## 2021-03-04 RX ADMIN — CEFTRIAXONE SODIUM 1 G: 1 INJECTION, POWDER, FOR SOLUTION INTRAMUSCULAR; INTRAVENOUS at 09:03

## 2021-03-05 LAB
ALBUMIN SERPL BCP-MCNC: 3.1 G/DL (ref 3.5–5.2)
ALP SERPL-CCNC: 72 U/L (ref 55–135)
ALT SERPL W/O P-5'-P-CCNC: 12 U/L (ref 10–44)
ANION GAP SERPL CALC-SCNC: 9 MMOL/L (ref 8–16)
AST SERPL-CCNC: 18 U/L (ref 10–40)
BASOPHILS # BLD AUTO: 0.03 K/UL (ref 0–0.2)
BASOPHILS NFR BLD: 0.4 % (ref 0–1.9)
BILIRUB SERPL-MCNC: 0.2 MG/DL (ref 0.1–1)
BUN SERPL-MCNC: 32 MG/DL (ref 8–23)
CALCIUM SERPL-MCNC: 8 MG/DL (ref 8.7–10.5)
CHLORIDE SERPL-SCNC: 111 MMOL/L (ref 95–110)
CO2 SERPL-SCNC: 23 MMOL/L (ref 23–29)
CREAT SERPL-MCNC: 2 MG/DL (ref 0.5–1.4)
DIFFERENTIAL METHOD: ABNORMAL
EOSINOPHIL # BLD AUTO: 0.2 K/UL (ref 0–0.5)
EOSINOPHIL NFR BLD: 2.8 % (ref 0–8)
ERYTHROCYTE [DISTWIDTH] IN BLOOD BY AUTOMATED COUNT: 13.8 % (ref 11.5–14.5)
EST. GFR  (AFRICAN AMERICAN): 28 ML/MIN/1.73 M^2
EST. GFR  (NON AFRICAN AMERICAN): 24 ML/MIN/1.73 M^2
GLUCOSE SERPL-MCNC: 98 MG/DL (ref 70–110)
HCT VFR BLD AUTO: 36.2 % (ref 37–48.5)
HGB BLD-MCNC: 10.9 G/DL (ref 12–16)
IMM GRANULOCYTES # BLD AUTO: 0.01 K/UL (ref 0–0.04)
IMM GRANULOCYTES NFR BLD AUTO: 0.1 % (ref 0–0.5)
LYMPHOCYTES # BLD AUTO: 3 K/UL (ref 1–4.8)
LYMPHOCYTES NFR BLD: 42 % (ref 18–48)
MAGNESIUM SERPL-MCNC: 1.8 MG/DL (ref 1.6–2.6)
MCH RBC QN AUTO: 28.8 PG (ref 27–31)
MCHC RBC AUTO-ENTMCNC: 30.1 G/DL (ref 32–36)
MCV RBC AUTO: 96 FL (ref 82–98)
MONOCYTES # BLD AUTO: 0.7 K/UL (ref 0.3–1)
MONOCYTES NFR BLD: 9.2 % (ref 4–15)
NEUTROPHILS # BLD AUTO: 3.2 K/UL (ref 1.8–7.7)
NEUTROPHILS NFR BLD: 45.5 % (ref 38–73)
NRBC BLD-RTO: 0 /100 WBC
PHOSPHATE SERPL-MCNC: 3.9 MG/DL (ref 2.7–4.5)
PLATELET # BLD AUTO: 148 K/UL (ref 150–350)
PMV BLD AUTO: 11.5 FL (ref 9.2–12.9)
POCT GLUCOSE: 144 MG/DL (ref 70–110)
POCT GLUCOSE: 150 MG/DL (ref 70–110)
POCT GLUCOSE: 87 MG/DL (ref 70–110)
POCT GLUCOSE: 93 MG/DL (ref 70–110)
POTASSIUM SERPL-SCNC: 4.4 MMOL/L (ref 3.5–5.1)
PROT SERPL-MCNC: 6 G/DL (ref 6–8.4)
RBC # BLD AUTO: 3.78 M/UL (ref 4–5.4)
SODIUM SERPL-SCNC: 143 MMOL/L (ref 136–145)
WBC # BLD AUTO: 7.07 K/UL (ref 3.9–12.7)

## 2021-03-05 PROCEDURE — 80053 COMPREHEN METABOLIC PANEL: CPT | Performed by: INTERNAL MEDICINE

## 2021-03-05 PROCEDURE — 36415 COLL VENOUS BLD VENIPUNCTURE: CPT | Performed by: INTERNAL MEDICINE

## 2021-03-05 PROCEDURE — 83735 ASSAY OF MAGNESIUM: CPT | Performed by: INTERNAL MEDICINE

## 2021-03-05 PROCEDURE — 25000003 PHARM REV CODE 250: Performed by: INTERNAL MEDICINE

## 2021-03-05 PROCEDURE — G0378 HOSPITAL OBSERVATION PER HR: HCPCS

## 2021-03-05 PROCEDURE — 63600175 PHARM REV CODE 636 W HCPCS: Performed by: INTERNAL MEDICINE

## 2021-03-05 PROCEDURE — 96376 TX/PRO/DX INJ SAME DRUG ADON: CPT

## 2021-03-05 PROCEDURE — 96372 THER/PROPH/DIAG INJ SC/IM: CPT | Mod: 59

## 2021-03-05 PROCEDURE — 25000003 PHARM REV CODE 250: Performed by: NURSE PRACTITIONER

## 2021-03-05 PROCEDURE — 85025 COMPLETE CBC W/AUTO DIFF WBC: CPT | Performed by: INTERNAL MEDICINE

## 2021-03-05 PROCEDURE — 84100 ASSAY OF PHOSPHORUS: CPT | Performed by: INTERNAL MEDICINE

## 2021-03-05 RX ORDER — BISACODYL 5 MG
10 TABLET, DELAYED RELEASE (ENTERIC COATED) ORAL DAILY PRN
Status: DISCONTINUED | OUTPATIENT
Start: 2021-03-05 | End: 2021-03-12 | Stop reason: HOSPADM

## 2021-03-05 RX ORDER — LIDOCAINE 50 MG/G
1 PATCH TOPICAL
Status: DISCONTINUED | OUTPATIENT
Start: 2021-03-05 | End: 2021-03-12 | Stop reason: HOSPADM

## 2021-03-05 RX ORDER — ACETAMINOPHEN 325 MG/1
650 TABLET ORAL EVERY 6 HOURS PRN
Status: DISCONTINUED | OUTPATIENT
Start: 2021-03-05 | End: 2021-03-12 | Stop reason: HOSPADM

## 2021-03-05 RX ADMIN — LEVOTHYROXINE SODIUM 100 MCG: 0.05 TABLET ORAL at 06:03

## 2021-03-05 RX ADMIN — TRAMADOL HYDROCHLORIDE 50 MG: 50 TABLET, FILM COATED ORAL at 01:03

## 2021-03-05 RX ADMIN — ACETAMINOPHEN 650 MG: 325 TABLET ORAL at 09:03

## 2021-03-05 RX ADMIN — LIDOCAINE 1 PATCH: 50 PATCH TOPICAL at 12:03

## 2021-03-05 RX ADMIN — ENOXAPARIN SODIUM 30 MG: 30 INJECTION SUBCUTANEOUS at 04:03

## 2021-03-05 RX ADMIN — TRAMADOL HYDROCHLORIDE 50 MG: 50 TABLET, FILM COATED ORAL at 09:03

## 2021-03-05 RX ADMIN — CEFTRIAXONE SODIUM 1 G: 1 INJECTION, POWDER, FOR SOLUTION INTRAMUSCULAR; INTRAVENOUS at 08:03

## 2021-03-05 RX ADMIN — METOPROLOL TARTRATE 25 MG: 25 TABLET, FILM COATED ORAL at 08:03

## 2021-03-05 RX ADMIN — ROSUVASTATIN CALCIUM 20 MG: 20 TABLET, FILM COATED ORAL at 08:03

## 2021-03-05 RX ADMIN — PANTOPRAZOLE SODIUM 40 MG: 40 TABLET, DELAYED RELEASE ORAL at 09:03

## 2021-03-05 RX ADMIN — ASPIRIN 81 MG: 81 TABLET, COATED ORAL at 09:03

## 2021-03-05 RX ADMIN — METOPROLOL TARTRATE 25 MG: 25 TABLET, FILM COATED ORAL at 09:03

## 2021-03-05 RX ADMIN — TRAZODONE HYDROCHLORIDE 50 MG: 50 TABLET ORAL at 08:03

## 2021-03-06 PROBLEM — I10 ACCELERATED HYPERTENSION: Status: ACTIVE | Noted: 2021-03-06

## 2021-03-06 PROBLEM — I20.89 STABLE ANGINA: Status: ACTIVE | Noted: 2021-03-06

## 2021-03-06 LAB
ALBUMIN SERPL BCP-MCNC: 2.9 G/DL (ref 3.5–5.2)
ALP SERPL-CCNC: 65 U/L (ref 55–135)
ALT SERPL W/O P-5'-P-CCNC: 13 U/L (ref 10–44)
ANION GAP SERPL CALC-SCNC: 9 MMOL/L (ref 8–16)
AST SERPL-CCNC: 18 U/L (ref 10–40)
BACTERIA UR CULT: NORMAL
BACTERIA UR CULT: NORMAL
BASOPHILS # BLD AUTO: 0.05 K/UL (ref 0–0.2)
BASOPHILS NFR BLD: 0.7 % (ref 0–1.9)
BILIRUB SERPL-MCNC: 0.2 MG/DL (ref 0.1–1)
BUN SERPL-MCNC: 21 MG/DL (ref 8–23)
CALCIUM SERPL-MCNC: 7.9 MG/DL (ref 8.7–10.5)
CHLORIDE SERPL-SCNC: 109 MMOL/L (ref 95–110)
CK MB SERPL-MCNC: 1.6 NG/ML (ref 0.1–6.5)
CK MB SERPL-MCNC: 1.9 NG/ML (ref 0.1–6.5)
CK MB SERPL-RTO: 3 % (ref 0–5)
CK MB SERPL-RTO: 3 % (ref 0–5)
CK SERPL-CCNC: 54 U/L (ref 20–180)
CK SERPL-CCNC: 63 U/L (ref 20–180)
CO2 SERPL-SCNC: 22 MMOL/L (ref 23–29)
CREAT SERPL-MCNC: 1.5 MG/DL (ref 0.5–1.4)
DIFFERENTIAL METHOD: ABNORMAL
EOSINOPHIL # BLD AUTO: 0.3 K/UL (ref 0–0.5)
EOSINOPHIL NFR BLD: 3.7 % (ref 0–8)
ERYTHROCYTE [DISTWIDTH] IN BLOOD BY AUTOMATED COUNT: 13.8 % (ref 11.5–14.5)
EST. GFR  (AFRICAN AMERICAN): 40 ML/MIN/1.73 M^2
EST. GFR  (NON AFRICAN AMERICAN): 34 ML/MIN/1.73 M^2
GLUCOSE SERPL-MCNC: 183 MG/DL (ref 70–110)
HCT VFR BLD AUTO: 34.7 % (ref 37–48.5)
HGB BLD-MCNC: 10.3 G/DL (ref 12–16)
IMM GRANULOCYTES # BLD AUTO: 0.02 K/UL (ref 0–0.04)
IMM GRANULOCYTES NFR BLD AUTO: 0.3 % (ref 0–0.5)
LYMPHOCYTES # BLD AUTO: 2.1 K/UL (ref 1–4.8)
LYMPHOCYTES NFR BLD: 28.1 % (ref 18–48)
MAGNESIUM SERPL-MCNC: 1.5 MG/DL (ref 1.6–2.6)
MCH RBC QN AUTO: 28.4 PG (ref 27–31)
MCHC RBC AUTO-ENTMCNC: 29.7 G/DL (ref 32–36)
MCV RBC AUTO: 96 FL (ref 82–98)
MONOCYTES # BLD AUTO: 0.7 K/UL (ref 0.3–1)
MONOCYTES NFR BLD: 8.9 % (ref 4–15)
NEUTROPHILS # BLD AUTO: 4.3 K/UL (ref 1.8–7.7)
NEUTROPHILS NFR BLD: 58.3 % (ref 38–73)
NRBC BLD-RTO: 0 /100 WBC
PHOSPHATE SERPL-MCNC: 3.2 MG/DL (ref 2.7–4.5)
PLATELET # BLD AUTO: 157 K/UL (ref 150–350)
PMV BLD AUTO: 11.4 FL (ref 9.2–12.9)
POCT GLUCOSE: 102 MG/DL (ref 70–110)
POCT GLUCOSE: 126 MG/DL (ref 70–110)
POCT GLUCOSE: 147 MG/DL (ref 70–110)
POCT GLUCOSE: 169 MG/DL (ref 70–110)
POTASSIUM SERPL-SCNC: 4.3 MMOL/L (ref 3.5–5.1)
PROT SERPL-MCNC: 6 G/DL (ref 6–8.4)
RBC # BLD AUTO: 3.63 M/UL (ref 4–5.4)
SODIUM SERPL-SCNC: 140 MMOL/L (ref 136–145)
TROPONIN I SERPL DL<=0.01 NG/ML-MCNC: 0.06 NG/ML (ref 0–0.03)
TROPONIN I SERPL DL<=0.01 NG/ML-MCNC: 0.08 NG/ML (ref 0–0.03)
WBC # BLD AUTO: 7.39 K/UL (ref 3.9–12.7)

## 2021-03-06 PROCEDURE — 99900035 HC TECH TIME PER 15 MIN (STAT)

## 2021-03-06 PROCEDURE — 99223 1ST HOSP IP/OBS HIGH 75: CPT | Mod: ,,, | Performed by: INTERNAL MEDICINE

## 2021-03-06 PROCEDURE — 93005 ELECTROCARDIOGRAM TRACING: CPT

## 2021-03-06 PROCEDURE — 94618 PULMONARY STRESS TESTING: CPT

## 2021-03-06 PROCEDURE — 36415 COLL VENOUS BLD VENIPUNCTURE: CPT | Performed by: INTERNAL MEDICINE

## 2021-03-06 PROCEDURE — 63600175 PHARM REV CODE 636 W HCPCS: Performed by: INTERNAL MEDICINE

## 2021-03-06 PROCEDURE — 80053 COMPREHEN METABOLIC PANEL: CPT | Performed by: INTERNAL MEDICINE

## 2021-03-06 PROCEDURE — 82553 CREATINE MB FRACTION: CPT | Mod: 91 | Performed by: INTERNAL MEDICINE

## 2021-03-06 PROCEDURE — 96372 THER/PROPH/DIAG INJ SC/IM: CPT | Mod: 59

## 2021-03-06 PROCEDURE — 93010 EKG 12-LEAD: ICD-10-PCS | Mod: ,,, | Performed by: INTERNAL MEDICINE

## 2021-03-06 PROCEDURE — 84100 ASSAY OF PHOSPHORUS: CPT | Performed by: INTERNAL MEDICINE

## 2021-03-06 PROCEDURE — 25000003 PHARM REV CODE 250: Performed by: INTERNAL MEDICINE

## 2021-03-06 PROCEDURE — 27000221 HC OXYGEN, UP TO 24 HOURS

## 2021-03-06 PROCEDURE — 94761 N-INVAS EAR/PLS OXIMETRY MLT: CPT

## 2021-03-06 PROCEDURE — G0378 HOSPITAL OBSERVATION PER HR: HCPCS

## 2021-03-06 PROCEDURE — 93010 ELECTROCARDIOGRAM REPORT: CPT | Mod: 76,,, | Performed by: INTERNAL MEDICINE

## 2021-03-06 PROCEDURE — 96376 TX/PRO/DX INJ SAME DRUG ADON: CPT

## 2021-03-06 PROCEDURE — 99223 PR INITIAL HOSPITAL CARE,LEVL III: ICD-10-PCS | Mod: ,,, | Performed by: INTERNAL MEDICINE

## 2021-03-06 PROCEDURE — 96375 TX/PRO/DX INJ NEW DRUG ADDON: CPT

## 2021-03-06 PROCEDURE — 96361 HYDRATE IV INFUSION ADD-ON: CPT

## 2021-03-06 PROCEDURE — 93010 ELECTROCARDIOGRAM REPORT: CPT | Mod: ,,, | Performed by: INTERNAL MEDICINE

## 2021-03-06 PROCEDURE — 83735 ASSAY OF MAGNESIUM: CPT | Performed by: INTERNAL MEDICINE

## 2021-03-06 PROCEDURE — 82550 ASSAY OF CK (CPK): CPT | Mod: 91 | Performed by: INTERNAL MEDICINE

## 2021-03-06 PROCEDURE — 84484 ASSAY OF TROPONIN QUANT: CPT | Mod: 91 | Performed by: INTERNAL MEDICINE

## 2021-03-06 PROCEDURE — 85025 COMPLETE CBC W/AUTO DIFF WBC: CPT | Performed by: INTERNAL MEDICINE

## 2021-03-06 RX ORDER — HYDRALAZINE HYDROCHLORIDE 20 MG/ML
10 INJECTION INTRAMUSCULAR; INTRAVENOUS EVERY 4 HOURS PRN
Status: DISCONTINUED | OUTPATIENT
Start: 2021-03-06 | End: 2021-03-12 | Stop reason: HOSPADM

## 2021-03-06 RX ORDER — INSULIN ASPART 100 [IU]/ML
0-5 INJECTION, SOLUTION INTRAVENOUS; SUBCUTANEOUS
Status: DISCONTINUED | OUTPATIENT
Start: 2021-03-06 | End: 2021-03-12 | Stop reason: HOSPADM

## 2021-03-06 RX ORDER — GLUCAGON 1 MG
1 KIT INJECTION
Status: DISCONTINUED | OUTPATIENT
Start: 2021-03-06 | End: 2021-03-12 | Stop reason: HOSPADM

## 2021-03-06 RX ORDER — IBUPROFEN 200 MG
24 TABLET ORAL
Status: DISCONTINUED | OUTPATIENT
Start: 2021-03-06 | End: 2021-03-12 | Stop reason: HOSPADM

## 2021-03-06 RX ORDER — IBUPROFEN 200 MG
16 TABLET ORAL
Status: DISCONTINUED | OUTPATIENT
Start: 2021-03-06 | End: 2021-03-12 | Stop reason: HOSPADM

## 2021-03-06 RX ORDER — ISOSORBIDE MONONITRATE 30 MG/1
30 TABLET, EXTENDED RELEASE ORAL DAILY
Status: DISCONTINUED | OUTPATIENT
Start: 2021-03-06 | End: 2021-03-08

## 2021-03-06 RX ADMIN — TRAZODONE HYDROCHLORIDE 50 MG: 50 TABLET ORAL at 08:03

## 2021-03-06 RX ADMIN — LEVOTHYROXINE SODIUM 100 MCG: 0.05 TABLET ORAL at 05:03

## 2021-03-06 RX ADMIN — CEFTRIAXONE SODIUM 1 G: 1 INJECTION, POWDER, FOR SOLUTION INTRAMUSCULAR; INTRAVENOUS at 08:03

## 2021-03-06 RX ADMIN — HYDRALAZINE HYDROCHLORIDE 10 MG: 20 INJECTION, SOLUTION INTRAMUSCULAR; INTRAVENOUS at 03:03

## 2021-03-06 RX ADMIN — METOPROLOL TARTRATE 25 MG: 25 TABLET, FILM COATED ORAL at 08:03

## 2021-03-06 RX ADMIN — ENOXAPARIN SODIUM 30 MG: 30 INJECTION SUBCUTANEOUS at 06:03

## 2021-03-06 RX ADMIN — ASPIRIN 81 MG: 81 TABLET, COATED ORAL at 09:03

## 2021-03-06 RX ADMIN — ONDANSETRON 4 MG: 2 INJECTION INTRAMUSCULAR; INTRAVENOUS at 08:03

## 2021-03-06 RX ADMIN — BISACODYL 10 MG: 5 TABLET, COATED ORAL at 04:03

## 2021-03-06 RX ADMIN — METOPROLOL TARTRATE 25 MG: 25 TABLET, FILM COATED ORAL at 09:03

## 2021-03-06 RX ADMIN — TRAMADOL HYDROCHLORIDE 50 MG: 50 TABLET, FILM COATED ORAL at 09:03

## 2021-03-06 RX ADMIN — ISOSORBIDE MONONITRATE 30 MG: 30 TABLET, EXTENDED RELEASE ORAL at 08:03

## 2021-03-06 RX ADMIN — MAGNESIUM OXIDE TAB 400 MG (241.3 MG ELEMENTAL MG) 800 MG: 400 (241.3 MG) TAB at 11:03

## 2021-03-06 RX ADMIN — TRAMADOL HYDROCHLORIDE 50 MG: 50 TABLET, FILM COATED ORAL at 08:03

## 2021-03-06 RX ADMIN — MAGNESIUM OXIDE TAB 400 MG (241.3 MG ELEMENTAL MG) 800 MG: 400 (241.3 MG) TAB at 08:03

## 2021-03-06 RX ADMIN — PANTOPRAZOLE SODIUM 40 MG: 40 TABLET, DELAYED RELEASE ORAL at 09:03

## 2021-03-06 RX ADMIN — LIDOCAINE 1 PATCH: 50 PATCH TOPICAL at 12:03

## 2021-03-06 RX ADMIN — HYDRALAZINE HYDROCHLORIDE 10 MG: 20 INJECTION, SOLUTION INTRAMUSCULAR; INTRAVENOUS at 11:03

## 2021-03-06 RX ADMIN — ROSUVASTATIN CALCIUM 20 MG: 20 TABLET, FILM COATED ORAL at 08:03

## 2021-03-06 RX ADMIN — SODIUM CHLORIDE: 0.9 INJECTION, SOLUTION INTRAVENOUS at 05:03

## 2021-03-07 PROBLEM — R10.9 ABDOMINAL PAIN: Status: ACTIVE | Noted: 2021-03-07

## 2021-03-07 PROBLEM — I50.33 ACUTE ON CHRONIC DIASTOLIC HEART FAILURE: Status: ACTIVE | Noted: 2021-03-07

## 2021-03-07 PROBLEM — Z71.89 GOALS OF CARE, COUNSELING/DISCUSSION: Status: ACTIVE | Noted: 2021-03-07

## 2021-03-07 LAB
ALBUMIN SERPL BCP-MCNC: 3.3 G/DL (ref 3.5–5.2)
ALP SERPL-CCNC: 76 U/L (ref 55–135)
ALT SERPL W/O P-5'-P-CCNC: 13 U/L (ref 10–44)
ANION GAP SERPL CALC-SCNC: 10 MMOL/L (ref 8–16)
AST SERPL-CCNC: 17 U/L (ref 10–40)
BASOPHILS # BLD AUTO: 0.03 K/UL (ref 0–0.2)
BASOPHILS NFR BLD: 0.3 % (ref 0–1.9)
BILIRUB SERPL-MCNC: 0.5 MG/DL (ref 0.1–1)
BUN SERPL-MCNC: 16 MG/DL (ref 8–23)
CALCIUM SERPL-MCNC: 8.5 MG/DL (ref 8.7–10.5)
CHLORIDE SERPL-SCNC: 100 MMOL/L (ref 95–110)
CHOLEST SERPL-MCNC: 116 MG/DL (ref 120–199)
CHOLEST/HDLC SERPL: 3.2 {RATIO} (ref 2–5)
CK MB SERPL-MCNC: 2.2 NG/ML (ref 0.1–6.5)
CK MB SERPL-RTO: 3.4 % (ref 0–5)
CK SERPL-CCNC: 64 U/L (ref 20–180)
CO2 SERPL-SCNC: 25 MMOL/L (ref 23–29)
CREAT SERPL-MCNC: 1.1 MG/DL (ref 0.5–1.4)
DIFFERENTIAL METHOD: ABNORMAL
EOSINOPHIL # BLD AUTO: 0 K/UL (ref 0–0.5)
EOSINOPHIL NFR BLD: 0.1 % (ref 0–8)
ERYTHROCYTE [DISTWIDTH] IN BLOOD BY AUTOMATED COUNT: 13.7 % (ref 11.5–14.5)
EST. GFR  (AFRICAN AMERICAN): 58 ML/MIN/1.73 M^2
EST. GFR  (NON AFRICAN AMERICAN): 50 ML/MIN/1.73 M^2
GLUCOSE SERPL-MCNC: 199 MG/DL (ref 70–110)
HCT VFR BLD AUTO: 35.4 % (ref 37–48.5)
HDLC SERPL-MCNC: 36 MG/DL (ref 40–75)
HDLC SERPL: 31 % (ref 20–50)
HGB BLD-MCNC: 10.8 G/DL (ref 12–16)
IMM GRANULOCYTES # BLD AUTO: 0.08 K/UL (ref 0–0.04)
IMM GRANULOCYTES NFR BLD AUTO: 0.7 % (ref 0–0.5)
LDLC SERPL CALC-MCNC: 37.2 MG/DL (ref 63–159)
LYMPHOCYTES # BLD AUTO: 2 K/UL (ref 1–4.8)
LYMPHOCYTES NFR BLD: 17.3 % (ref 18–48)
MAGNESIUM SERPL-MCNC: 1.6 MG/DL (ref 1.6–2.6)
MCH RBC QN AUTO: 28 PG (ref 27–31)
MCHC RBC AUTO-ENTMCNC: 30.5 G/DL (ref 32–36)
MCV RBC AUTO: 92 FL (ref 82–98)
MONOCYTES # BLD AUTO: 0.8 K/UL (ref 0.3–1)
MONOCYTES NFR BLD: 6.9 % (ref 4–15)
NEUTROPHILS # BLD AUTO: 8.7 K/UL (ref 1.8–7.7)
NEUTROPHILS NFR BLD: 74.7 % (ref 38–73)
NONHDLC SERPL-MCNC: 80 MG/DL
NRBC BLD-RTO: 0 /100 WBC
PHOSPHATE SERPL-MCNC: 3.4 MG/DL (ref 2.7–4.5)
PLATELET # BLD AUTO: 155 K/UL (ref 150–350)
PMV BLD AUTO: 11.3 FL (ref 9.2–12.9)
POCT GLUCOSE: 168 MG/DL (ref 70–110)
POCT GLUCOSE: 174 MG/DL (ref 70–110)
POCT GLUCOSE: 76 MG/DL (ref 70–110)
POTASSIUM SERPL-SCNC: 3.6 MMOL/L (ref 3.5–5.1)
PROT SERPL-MCNC: 6.7 G/DL (ref 6–8.4)
RBC # BLD AUTO: 3.86 M/UL (ref 4–5.4)
SODIUM SERPL-SCNC: 135 MMOL/L (ref 136–145)
TRIGL SERPL-MCNC: 214 MG/DL (ref 30–150)
TROPONIN I SERPL DL<=0.01 NG/ML-MCNC: 0.04 NG/ML (ref 0–0.03)
TROPONIN I SERPL DL<=0.01 NG/ML-MCNC: 0.05 NG/ML (ref 0–0.03)
TROPONIN I SERPL DL<=0.01 NG/ML-MCNC: 0.06 NG/ML (ref 0–0.03)
WBC # BLD AUTO: 11.67 K/UL (ref 3.9–12.7)

## 2021-03-07 PROCEDURE — 96376 TX/PRO/DX INJ SAME DRUG ADON: CPT

## 2021-03-07 PROCEDURE — 94761 N-INVAS EAR/PLS OXIMETRY MLT: CPT

## 2021-03-07 PROCEDURE — 94618 PULMONARY STRESS TESTING: CPT

## 2021-03-07 PROCEDURE — 25000003 PHARM REV CODE 250: Performed by: INTERNAL MEDICINE

## 2021-03-07 PROCEDURE — 63600175 PHARM REV CODE 636 W HCPCS: Performed by: INTERNAL MEDICINE

## 2021-03-07 PROCEDURE — 83735 ASSAY OF MAGNESIUM: CPT | Performed by: INTERNAL MEDICINE

## 2021-03-07 PROCEDURE — 82550 ASSAY OF CK (CPK): CPT | Performed by: INTERNAL MEDICINE

## 2021-03-07 PROCEDURE — 84484 ASSAY OF TROPONIN QUANT: CPT | Mod: 91 | Performed by: INTERNAL MEDICINE

## 2021-03-07 PROCEDURE — 84484 ASSAY OF TROPONIN QUANT: CPT | Performed by: INTERNAL MEDICINE

## 2021-03-07 PROCEDURE — 96375 TX/PRO/DX INJ NEW DRUG ADDON: CPT

## 2021-03-07 PROCEDURE — 80061 LIPID PANEL: CPT | Performed by: INTERNAL MEDICINE

## 2021-03-07 PROCEDURE — 96372 THER/PROPH/DIAG INJ SC/IM: CPT | Mod: 59

## 2021-03-07 PROCEDURE — 99233 PR SUBSEQUENT HOSPITAL CARE,LEVL III: ICD-10-PCS | Mod: ,,, | Performed by: INTERNAL MEDICINE

## 2021-03-07 PROCEDURE — 12000002 HC ACUTE/MED SURGE SEMI-PRIVATE ROOM

## 2021-03-07 PROCEDURE — 36415 COLL VENOUS BLD VENIPUNCTURE: CPT | Performed by: INTERNAL MEDICINE

## 2021-03-07 PROCEDURE — 84100 ASSAY OF PHOSPHORUS: CPT | Performed by: INTERNAL MEDICINE

## 2021-03-07 PROCEDURE — 93010 EKG 12-LEAD: ICD-10-PCS | Mod: ,,, | Performed by: INTERNAL MEDICINE

## 2021-03-07 PROCEDURE — 27000221 HC OXYGEN, UP TO 24 HOURS

## 2021-03-07 PROCEDURE — 99233 SBSQ HOSP IP/OBS HIGH 50: CPT | Mod: ,,, | Performed by: INTERNAL MEDICINE

## 2021-03-07 PROCEDURE — 80053 COMPREHEN METABOLIC PANEL: CPT | Performed by: INTERNAL MEDICINE

## 2021-03-07 PROCEDURE — 93010 ELECTROCARDIOGRAM REPORT: CPT | Mod: ,,, | Performed by: INTERNAL MEDICINE

## 2021-03-07 PROCEDURE — 93005 ELECTROCARDIOGRAM TRACING: CPT

## 2021-03-07 PROCEDURE — 99900035 HC TECH TIME PER 15 MIN (STAT)

## 2021-03-07 PROCEDURE — 82553 CREATINE MB FRACTION: CPT | Performed by: INTERNAL MEDICINE

## 2021-03-07 PROCEDURE — 85025 COMPLETE CBC W/AUTO DIFF WBC: CPT | Performed by: INTERNAL MEDICINE

## 2021-03-07 RX ORDER — LISINOPRIL 10 MG/1
10 TABLET ORAL DAILY
Status: DISCONTINUED | OUTPATIENT
Start: 2021-03-07 | End: 2021-03-12 | Stop reason: HOSPADM

## 2021-03-07 RX ORDER — ALPRAZOLAM 0.25 MG/1
0.5 TABLET ORAL 2 TIMES DAILY PRN
Status: DISCONTINUED | OUTPATIENT
Start: 2021-03-07 | End: 2021-03-12 | Stop reason: HOSPADM

## 2021-03-07 RX ORDER — FUROSEMIDE 10 MG/ML
20 INJECTION INTRAMUSCULAR; INTRAVENOUS ONCE
Status: COMPLETED | OUTPATIENT
Start: 2021-03-07 | End: 2021-03-07

## 2021-03-07 RX ORDER — MORPHINE SULFATE 2 MG/ML
1 INJECTION, SOLUTION INTRAMUSCULAR; INTRAVENOUS EVERY 4 HOURS PRN
Status: DISCONTINUED | OUTPATIENT
Start: 2021-03-07 | End: 2021-03-12 | Stop reason: HOSPADM

## 2021-03-07 RX ORDER — ENOXAPARIN SODIUM 100 MG/ML
1 INJECTION SUBCUTANEOUS
Status: DISCONTINUED | OUTPATIENT
Start: 2021-03-07 | End: 2021-03-12 | Stop reason: HOSPADM

## 2021-03-07 RX ORDER — ENOXAPARIN SODIUM 100 MG/ML
40 INJECTION SUBCUTANEOUS EVERY 24 HOURS
Status: DISCONTINUED | OUTPATIENT
Start: 2021-03-07 | End: 2021-03-07

## 2021-03-07 RX ORDER — FUROSEMIDE 10 MG/ML
40 INJECTION INTRAMUSCULAR; INTRAVENOUS DAILY
Status: DISCONTINUED | OUTPATIENT
Start: 2021-03-08 | End: 2021-03-09

## 2021-03-07 RX ADMIN — PANTOPRAZOLE SODIUM 40 MG: 40 TABLET, DELAYED RELEASE ORAL at 09:03

## 2021-03-07 RX ADMIN — LISINOPRIL 10 MG: 10 TABLET ORAL at 09:03

## 2021-03-07 RX ADMIN — ISOSORBIDE MONONITRATE 30 MG: 30 TABLET, EXTENDED RELEASE ORAL at 09:03

## 2021-03-07 RX ADMIN — ENOXAPARIN SODIUM 90 MG: 100 INJECTION SUBCUTANEOUS at 03:03

## 2021-03-07 RX ADMIN — LEVOTHYROXINE SODIUM 100 MCG: 0.05 TABLET ORAL at 05:03

## 2021-03-07 RX ADMIN — ALPRAZOLAM 0.5 MG: 0.25 TABLET ORAL at 01:03

## 2021-03-07 RX ADMIN — TRAZODONE HYDROCHLORIDE 50 MG: 50 TABLET ORAL at 09:03

## 2021-03-07 RX ADMIN — METOPROLOL TARTRATE 25 MG: 25 TABLET, FILM COATED ORAL at 09:03

## 2021-03-07 RX ADMIN — LIDOCAINE 1 PATCH: 50 PATCH TOPICAL at 12:03

## 2021-03-07 RX ADMIN — HYDRALAZINE HYDROCHLORIDE 10 MG: 20 INJECTION, SOLUTION INTRAMUSCULAR; INTRAVENOUS at 09:03

## 2021-03-07 RX ADMIN — ASPIRIN 81 MG: 81 TABLET, COATED ORAL at 09:03

## 2021-03-07 RX ADMIN — TRAMADOL HYDROCHLORIDE 50 MG: 50 TABLET, FILM COATED ORAL at 09:03

## 2021-03-07 RX ADMIN — CEFTRIAXONE SODIUM 1 G: 1 INJECTION, POWDER, FOR SOLUTION INTRAMUSCULAR; INTRAVENOUS at 09:03

## 2021-03-07 RX ADMIN — NITROGLYCERIN 0.5 INCH: 20 OINTMENT TOPICAL at 06:03

## 2021-03-07 RX ADMIN — ROSUVASTATIN CALCIUM 20 MG: 20 TABLET, FILM COATED ORAL at 09:03

## 2021-03-07 RX ADMIN — FUROSEMIDE 20 MG: 10 INJECTION, SOLUTION INTRAVENOUS at 09:03

## 2021-03-08 LAB
ALBUMIN SERPL BCP-MCNC: 3.2 G/DL (ref 3.5–5.2)
ALP SERPL-CCNC: 73 U/L (ref 55–135)
ALT SERPL W/O P-5'-P-CCNC: 14 U/L (ref 10–44)
ANION GAP SERPL CALC-SCNC: 9 MMOL/L (ref 8–16)
AORTIC ROOT ANNULUS: 3.08 CM
AORTIC VALVE CUSP SEPERATION: 1.78 CM
AST SERPL-CCNC: 18 U/L (ref 10–40)
AV INDEX (PROSTH): 0.93
AV MEAN GRADIENT: 4 MMHG
AV PEAK GRADIENT: 7 MMHG
AV VALVE AREA: 3.02 CM2
AV VELOCITY RATIO: 0.82
BASOPHILS # BLD AUTO: 0.03 K/UL (ref 0–0.2)
BASOPHILS NFR BLD: 0.2 % (ref 0–1.9)
BILIRUB SERPL-MCNC: 0.5 MG/DL (ref 0.1–1)
BSA FOR ECHO PROCEDURE: 1.98 M2
BUN SERPL-MCNC: 16 MG/DL (ref 8–23)
CALCIUM SERPL-MCNC: 9 MG/DL (ref 8.7–10.5)
CHLORIDE SERPL-SCNC: 96 MMOL/L (ref 95–110)
CO2 SERPL-SCNC: 31 MMOL/L (ref 23–29)
CREAT SERPL-MCNC: 1.1 MG/DL (ref 0.5–1.4)
CV ECHO LV RWT: 0.63 CM
DIFFERENTIAL METHOD: ABNORMAL
DOP CALC AO PEAK VEL: 1.28 M/S
DOP CALC AO VTI: 20.45 CM
DOP CALC LVOT AREA: 3.2 CM2
DOP CALC LVOT DIAMETER: 2.03 CM
DOP CALC LVOT PEAK VEL: 1.05 M/S
DOP CALC LVOT STROKE VOLUME: 61.85 CM3
DOP CALCLVOT PEAK VEL VTI: 19.12 CM
E WAVE DECELERATION TIME: 229.15 MSEC
E/A RATIO: 1.1
E/E' RATIO: 14 M/S
ECHO LV POSTERIOR WALL: 1.22 CM (ref 0.6–1.1)
EOSINOPHIL # BLD AUTO: 0.1 K/UL (ref 0–0.5)
EOSINOPHIL NFR BLD: 0.4 % (ref 0–8)
ERYTHROCYTE [DISTWIDTH] IN BLOOD BY AUTOMATED COUNT: 13.8 % (ref 11.5–14.5)
EST. GFR  (AFRICAN AMERICAN): 58 ML/MIN/1.73 M^2
EST. GFR  (NON AFRICAN AMERICAN): 50 ML/MIN/1.73 M^2
FRACTIONAL SHORTENING: 39 % (ref 28–44)
GLUCOSE SERPL-MCNC: 56 MG/DL (ref 70–110)
HCT VFR BLD AUTO: 34.6 % (ref 37–48.5)
HGB BLD-MCNC: 10.4 G/DL (ref 12–16)
IMM GRANULOCYTES # BLD AUTO: 0.05 K/UL (ref 0–0.04)
IMM GRANULOCYTES NFR BLD AUTO: 0.4 % (ref 0–0.5)
INTERVENTRICULAR SEPTUM: 1.23 CM (ref 0.6–1.1)
LEFT ATRIUM SIZE: 4.44 CM
LEFT INTERNAL DIMENSION IN SYSTOLE: 2.37 CM (ref 2.1–4)
LEFT VENTRICLE DIASTOLIC VOLUME INDEX: 34.35 ML/M2
LEFT VENTRICLE DIASTOLIC VOLUME: 64.92 ML
LEFT VENTRICLE MASS INDEX: 86 G/M2
LEFT VENTRICLE SYSTOLIC VOLUME INDEX: 10.3 ML/M2
LEFT VENTRICLE SYSTOLIC VOLUME: 19.46 ML
LEFT VENTRICULAR INTERNAL DIMENSION IN DIASTOLE: 3.88 CM (ref 3.5–6)
LEFT VENTRICULAR MASS: 163.03 G
LV LATERAL E/E' RATIO: 15.4 M/S
LV SEPTAL E/E' RATIO: 12.83 M/S
LYMPHOCYTES # BLD AUTO: 2.4 K/UL (ref 1–4.8)
LYMPHOCYTES NFR BLD: 19.8 % (ref 18–48)
MAGNESIUM SERPL-MCNC: 1.8 MG/DL (ref 1.6–2.6)
MCH RBC QN AUTO: 28.2 PG (ref 27–31)
MCHC RBC AUTO-ENTMCNC: 30.1 G/DL (ref 32–36)
MCV RBC AUTO: 94 FL (ref 82–98)
MONOCYTES # BLD AUTO: 1.1 K/UL (ref 0.3–1)
MONOCYTES NFR BLD: 8.8 % (ref 4–15)
MV MEAN GRADIENT: 1 MMHG
MV PEAK A VEL: 0.7 M/S
MV PEAK E VEL: 0.77 M/S
MV PEAK GRADIENT: 5 MMHG
MV STENOSIS PRESSURE HALF TIME: 52.55 MS
MV VALVE AREA P 1/2 METHOD: 4.19 CM2
NEUTROPHILS # BLD AUTO: 8.7 K/UL (ref 1.8–7.7)
NEUTROPHILS NFR BLD: 70.4 % (ref 38–73)
NRBC BLD-RTO: 0 /100 WBC
PHOSPHATE SERPL-MCNC: 3.3 MG/DL (ref 2.7–4.5)
PISA TR MAX VEL: 3.83 M/S
PLATELET # BLD AUTO: 170 K/UL (ref 150–350)
PMV BLD AUTO: 11.1 FL (ref 9.2–12.9)
POCT GLUCOSE: 114 MG/DL (ref 70–110)
POCT GLUCOSE: 131 MG/DL (ref 70–110)
POCT GLUCOSE: 132 MG/DL (ref 70–110)
POCT GLUCOSE: 53 MG/DL (ref 70–110)
POTASSIUM SERPL-SCNC: 4 MMOL/L (ref 3.5–5.1)
PROT SERPL-MCNC: 6.6 G/DL (ref 6–8.4)
PV PEAK VELOCITY: 1.12 CM/S
RA PRESSURE: 15 MMHG
RBC # BLD AUTO: 3.69 M/UL (ref 4–5.4)
RIGHT VENTRICULAR END-DIASTOLIC DIMENSION: 2.8 CM
RV TISSUE DOPPLER FREE WALL SYSTOLIC VELOCITY 1 (APICAL 4 CHAMBER VIEW): 6.74 CM/S
SODIUM SERPL-SCNC: 136 MMOL/L (ref 136–145)
STJ: 2.73 CM
TDI LATERAL: 0.05 M/S
TDI SEPTAL: 0.06 M/S
TDI: 0.06 M/S
TR MAX PG: 59 MMHG
TV REST PULMONARY ARTERY PRESSURE: 74 MMHG
WBC # BLD AUTO: 12.33 K/UL (ref 3.9–12.7)

## 2021-03-08 PROCEDURE — 25000003 PHARM REV CODE 250: Performed by: GENERAL PRACTICE

## 2021-03-08 PROCEDURE — 84100 ASSAY OF PHOSPHORUS: CPT | Performed by: INTERNAL MEDICINE

## 2021-03-08 PROCEDURE — 94761 N-INVAS EAR/PLS OXIMETRY MLT: CPT

## 2021-03-08 PROCEDURE — 25000003 PHARM REV CODE 250: Performed by: INTERNAL MEDICINE

## 2021-03-08 PROCEDURE — 12000002 HC ACUTE/MED SURGE SEMI-PRIVATE ROOM

## 2021-03-08 PROCEDURE — 63600175 PHARM REV CODE 636 W HCPCS: Performed by: INTERNAL MEDICINE

## 2021-03-08 PROCEDURE — 80053 COMPREHEN METABOLIC PANEL: CPT | Performed by: INTERNAL MEDICINE

## 2021-03-08 PROCEDURE — 27000221 HC OXYGEN, UP TO 24 HOURS

## 2021-03-08 PROCEDURE — 36415 COLL VENOUS BLD VENIPUNCTURE: CPT | Performed by: INTERNAL MEDICINE

## 2021-03-08 PROCEDURE — 83735 ASSAY OF MAGNESIUM: CPT | Performed by: INTERNAL MEDICINE

## 2021-03-08 PROCEDURE — 85025 COMPLETE CBC W/AUTO DIFF WBC: CPT | Performed by: INTERNAL MEDICINE

## 2021-03-08 RX ORDER — AMLODIPINE BESYLATE 2.5 MG/1
2.5 TABLET ORAL DAILY
Status: DISCONTINUED | OUTPATIENT
Start: 2021-03-08 | End: 2021-03-12 | Stop reason: HOSPADM

## 2021-03-08 RX ORDER — INSULIN DEGLUDEC 200 U/ML
50 INJECTION, SOLUTION SUBCUTANEOUS DAILY
Status: DISCONTINUED | OUTPATIENT
Start: 2021-03-09 | End: 2021-03-12 | Stop reason: HOSPADM

## 2021-03-08 RX ADMIN — ALPRAZOLAM 0.5 MG: 0.25 TABLET ORAL at 09:03

## 2021-03-08 RX ADMIN — NITROGLYCERIN 0.5 INCH: 20 OINTMENT TOPICAL at 11:03

## 2021-03-08 RX ADMIN — TRAZODONE HYDROCHLORIDE 50 MG: 50 TABLET ORAL at 08:03

## 2021-03-08 RX ADMIN — AMLODIPINE BESYLATE 2.5 MG: 2.5 TABLET ORAL at 02:03

## 2021-03-08 RX ADMIN — NITROGLYCERIN 0.5 INCH: 20 OINTMENT TOPICAL at 12:03

## 2021-03-08 RX ADMIN — METOPROLOL TARTRATE 25 MG: 25 TABLET, FILM COATED ORAL at 09:03

## 2021-03-08 RX ADMIN — METOPROLOL TARTRATE 25 MG: 25 TABLET, FILM COATED ORAL at 08:03

## 2021-03-08 RX ADMIN — ASPIRIN 81 MG: 81 TABLET, COATED ORAL at 09:03

## 2021-03-08 RX ADMIN — TRAMADOL HYDROCHLORIDE 50 MG: 50 TABLET, FILM COATED ORAL at 08:03

## 2021-03-08 RX ADMIN — LIDOCAINE 1 PATCH: 50 PATCH TOPICAL at 11:03

## 2021-03-08 RX ADMIN — ENOXAPARIN SODIUM 90 MG: 100 INJECTION SUBCUTANEOUS at 02:03

## 2021-03-08 RX ADMIN — ROSUVASTATIN CALCIUM 20 MG: 20 TABLET, FILM COATED ORAL at 08:03

## 2021-03-08 RX ADMIN — LEVOTHYROXINE SODIUM 100 MCG: 0.05 TABLET ORAL at 05:03

## 2021-03-08 RX ADMIN — PANTOPRAZOLE SODIUM 40 MG: 40 TABLET, DELAYED RELEASE ORAL at 09:03

## 2021-03-08 RX ADMIN — NITROGLYCERIN 0.5 INCH: 20 OINTMENT TOPICAL at 05:03

## 2021-03-08 RX ADMIN — ENOXAPARIN SODIUM 90 MG: 100 INJECTION SUBCUTANEOUS at 01:03

## 2021-03-08 RX ADMIN — FUROSEMIDE 40 MG: 10 INJECTION, SOLUTION INTRAVENOUS at 09:03

## 2021-03-08 RX ADMIN — CEFTRIAXONE SODIUM 1 G: 1 INJECTION, POWDER, FOR SOLUTION INTRAMUSCULAR; INTRAVENOUS at 08:03

## 2021-03-08 RX ADMIN — LISINOPRIL 10 MG: 10 TABLET ORAL at 09:03

## 2021-03-09 LAB
ALBUMIN SERPL BCP-MCNC: 3 G/DL (ref 3.5–5.2)
ALP SERPL-CCNC: 87 U/L (ref 55–135)
ALT SERPL W/O P-5'-P-CCNC: 10 U/L (ref 10–44)
ANION GAP SERPL CALC-SCNC: 10 MMOL/L (ref 8–16)
AST SERPL-CCNC: 16 U/L (ref 10–40)
BASOPHILS # BLD AUTO: 0.04 K/UL (ref 0–0.2)
BASOPHILS NFR BLD: 0.4 % (ref 0–1.9)
BILIRUB SERPL-MCNC: 0.4 MG/DL (ref 0.1–1)
BUN SERPL-MCNC: 21 MG/DL (ref 8–23)
CALCIUM SERPL-MCNC: 8.9 MG/DL (ref 8.7–10.5)
CHLORIDE SERPL-SCNC: 92 MMOL/L (ref 95–110)
CO2 SERPL-SCNC: 32 MMOL/L (ref 23–29)
CREAT SERPL-MCNC: 1.2 MG/DL (ref 0.5–1.4)
DIFFERENTIAL METHOD: ABNORMAL
EOSINOPHIL # BLD AUTO: 0.2 K/UL (ref 0–0.5)
EOSINOPHIL NFR BLD: 2.1 % (ref 0–8)
ERYTHROCYTE [DISTWIDTH] IN BLOOD BY AUTOMATED COUNT: 13.8 % (ref 11.5–14.5)
EST. GFR  (AFRICAN AMERICAN): 52 ML/MIN/1.73 M^2
EST. GFR  (NON AFRICAN AMERICAN): 45 ML/MIN/1.73 M^2
GLUCOSE SERPL-MCNC: 104 MG/DL (ref 70–110)
HCT VFR BLD AUTO: 34.2 % (ref 37–48.5)
HGB BLD-MCNC: 10.3 G/DL (ref 12–16)
IMM GRANULOCYTES # BLD AUTO: 0.04 K/UL (ref 0–0.04)
IMM GRANULOCYTES NFR BLD AUTO: 0.4 % (ref 0–0.5)
LYMPHOCYTES # BLD AUTO: 3.4 K/UL (ref 1–4.8)
LYMPHOCYTES NFR BLD: 33.9 % (ref 18–48)
MAGNESIUM SERPL-MCNC: 1.8 MG/DL (ref 1.6–2.6)
MCH RBC QN AUTO: 28.2 PG (ref 27–31)
MCHC RBC AUTO-ENTMCNC: 30.1 G/DL (ref 32–36)
MCV RBC AUTO: 94 FL (ref 82–98)
MONOCYTES # BLD AUTO: 1.1 K/UL (ref 0.3–1)
MONOCYTES NFR BLD: 11 % (ref 4–15)
NEUTROPHILS # BLD AUTO: 5.2 K/UL (ref 1.8–7.7)
NEUTROPHILS NFR BLD: 52.2 % (ref 38–73)
NRBC BLD-RTO: 0 /100 WBC
PHOSPHATE SERPL-MCNC: 3.4 MG/DL (ref 2.7–4.5)
PLATELET # BLD AUTO: 183 K/UL (ref 150–350)
PMV BLD AUTO: 11.2 FL (ref 9.2–12.9)
POCT GLUCOSE: 106 MG/DL (ref 70–110)
POCT GLUCOSE: 108 MG/DL (ref 70–110)
POCT GLUCOSE: 176 MG/DL (ref 70–110)
POCT GLUCOSE: 188 MG/DL (ref 70–110)
POTASSIUM SERPL-SCNC: 3.7 MMOL/L (ref 3.5–5.1)
PROT SERPL-MCNC: 6.5 G/DL (ref 6–8.4)
RBC # BLD AUTO: 3.65 M/UL (ref 4–5.4)
SODIUM SERPL-SCNC: 134 MMOL/L (ref 136–145)
WBC # BLD AUTO: 10.01 K/UL (ref 3.9–12.7)

## 2021-03-09 PROCEDURE — 80053 COMPREHEN METABOLIC PANEL: CPT | Performed by: INTERNAL MEDICINE

## 2021-03-09 PROCEDURE — 83735 ASSAY OF MAGNESIUM: CPT | Performed by: INTERNAL MEDICINE

## 2021-03-09 PROCEDURE — 97161 PT EVAL LOW COMPLEX 20 MIN: CPT

## 2021-03-09 PROCEDURE — 27000221 HC OXYGEN, UP TO 24 HOURS

## 2021-03-09 PROCEDURE — 12000002 HC ACUTE/MED SURGE SEMI-PRIVATE ROOM

## 2021-03-09 PROCEDURE — 25000003 PHARM REV CODE 250: Performed by: INTERNAL MEDICINE

## 2021-03-09 PROCEDURE — 97166 OT EVAL MOD COMPLEX 45 MIN: CPT

## 2021-03-09 PROCEDURE — 84100 ASSAY OF PHOSPHORUS: CPT | Performed by: INTERNAL MEDICINE

## 2021-03-09 PROCEDURE — 63600175 PHARM REV CODE 636 W HCPCS: Performed by: GENERAL PRACTICE

## 2021-03-09 PROCEDURE — 25000003 PHARM REV CODE 250: Performed by: GENERAL PRACTICE

## 2021-03-09 PROCEDURE — 63600175 PHARM REV CODE 636 W HCPCS: Performed by: INTERNAL MEDICINE

## 2021-03-09 PROCEDURE — 94761 N-INVAS EAR/PLS OXIMETRY MLT: CPT

## 2021-03-09 PROCEDURE — 85025 COMPLETE CBC W/AUTO DIFF WBC: CPT | Performed by: INTERNAL MEDICINE

## 2021-03-09 PROCEDURE — 97116 GAIT TRAINING THERAPY: CPT

## 2021-03-09 PROCEDURE — 36415 COLL VENOUS BLD VENIPUNCTURE: CPT | Performed by: INTERNAL MEDICINE

## 2021-03-09 PROCEDURE — 97535 SELF CARE MNGMENT TRAINING: CPT

## 2021-03-09 RX ORDER — POTASSIUM CHLORIDE 20 MEQ/1
40 TABLET, EXTENDED RELEASE ORAL ONCE
Status: COMPLETED | OUTPATIENT
Start: 2021-03-09 | End: 2021-03-09

## 2021-03-09 RX ORDER — FUROSEMIDE 10 MG/ML
20 INJECTION INTRAMUSCULAR; INTRAVENOUS ONCE
Status: COMPLETED | OUTPATIENT
Start: 2021-03-09 | End: 2021-03-09

## 2021-03-09 RX ADMIN — METOPROLOL TARTRATE 25 MG: 25 TABLET, FILM COATED ORAL at 08:03

## 2021-03-09 RX ADMIN — AMLODIPINE BESYLATE 2.5 MG: 2.5 TABLET ORAL at 08:03

## 2021-03-09 RX ADMIN — ALPRAZOLAM 0.5 MG: 0.25 TABLET ORAL at 08:03

## 2021-03-09 RX ADMIN — TRAZODONE HYDROCHLORIDE 50 MG: 50 TABLET ORAL at 08:03

## 2021-03-09 RX ADMIN — PANTOPRAZOLE SODIUM 40 MG: 40 TABLET, DELAYED RELEASE ORAL at 08:03

## 2021-03-09 RX ADMIN — ENOXAPARIN SODIUM 90 MG: 100 INJECTION SUBCUTANEOUS at 01:03

## 2021-03-09 RX ADMIN — NITROGLYCERIN 0.5 INCH: 20 OINTMENT TOPICAL at 05:03

## 2021-03-09 RX ADMIN — INSULIN DEGLUDEC 50 UNITS: 200 INJECTION, SOLUTION SUBCUTANEOUS at 08:03

## 2021-03-09 RX ADMIN — LEVOTHYROXINE SODIUM 100 MCG: 0.05 TABLET ORAL at 05:03

## 2021-03-09 RX ADMIN — FUROSEMIDE 40 MG: 10 INJECTION, SOLUTION INTRAVENOUS at 08:03

## 2021-03-09 RX ADMIN — ROSUVASTATIN CALCIUM 20 MG: 20 TABLET, FILM COATED ORAL at 08:03

## 2021-03-09 RX ADMIN — ASPIRIN 81 MG: 81 TABLET, COATED ORAL at 08:03

## 2021-03-09 RX ADMIN — NITROGLYCERIN 0.5 INCH: 20 OINTMENT TOPICAL at 01:03

## 2021-03-09 RX ADMIN — CEFTRIAXONE SODIUM 1 G: 1 INJECTION, POWDER, FOR SOLUTION INTRAMUSCULAR; INTRAVENOUS at 08:03

## 2021-03-09 RX ADMIN — LISINOPRIL 10 MG: 10 TABLET ORAL at 08:03

## 2021-03-09 RX ADMIN — LIDOCAINE 1 PATCH: 50 PATCH TOPICAL at 11:03

## 2021-03-09 RX ADMIN — POTASSIUM CHLORIDE 40 MEQ: 1500 TABLET, EXTENDED RELEASE ORAL at 02:03

## 2021-03-09 RX ADMIN — FUROSEMIDE 20 MG: 10 INJECTION, SOLUTION INTRAMUSCULAR; INTRAVENOUS at 04:03

## 2021-03-09 RX ADMIN — NITROGLYCERIN 0.5 INCH: 20 OINTMENT TOPICAL at 11:03

## 2021-03-09 RX ADMIN — ENOXAPARIN SODIUM 90 MG: 100 INJECTION SUBCUTANEOUS at 02:03

## 2021-03-10 LAB
ANION GAP SERPL CALC-SCNC: 11 MMOL/L (ref 8–16)
BNP SERPL-MCNC: 550 PG/ML (ref 0–99)
BUN SERPL-MCNC: 33 MG/DL (ref 8–23)
CALCIUM SERPL-MCNC: 8.9 MG/DL (ref 8.7–10.5)
CHLORIDE SERPL-SCNC: 93 MMOL/L (ref 95–110)
CO2 SERPL-SCNC: 35 MMOL/L (ref 23–29)
CREAT SERPL-MCNC: 1.7 MG/DL (ref 0.5–1.4)
EST. GFR  (AFRICAN AMERICAN): 34 ML/MIN/1.73 M^2
EST. GFR  (NON AFRICAN AMERICAN): 29 ML/MIN/1.73 M^2
GLUCOSE SERPL-MCNC: 221 MG/DL (ref 70–110)
POCT GLUCOSE: 105 MG/DL (ref 70–110)
POCT GLUCOSE: 157 MG/DL (ref 70–110)
POCT GLUCOSE: 212 MG/DL (ref 70–110)
POCT GLUCOSE: 217 MG/DL (ref 70–110)
POCT GLUCOSE: 97 MG/DL (ref 70–110)
POTASSIUM SERPL-SCNC: 3.9 MMOL/L (ref 3.5–5.1)
SODIUM SERPL-SCNC: 139 MMOL/L (ref 136–145)

## 2021-03-10 PROCEDURE — 36415 COLL VENOUS BLD VENIPUNCTURE: CPT | Performed by: GENERAL PRACTICE

## 2021-03-10 PROCEDURE — 83880 ASSAY OF NATRIURETIC PEPTIDE: CPT | Performed by: GENERAL PRACTICE

## 2021-03-10 PROCEDURE — 25000003 PHARM REV CODE 250: Performed by: GENERAL PRACTICE

## 2021-03-10 PROCEDURE — 63600175 PHARM REV CODE 636 W HCPCS: Performed by: GENERAL PRACTICE

## 2021-03-10 PROCEDURE — 97116 GAIT TRAINING THERAPY: CPT

## 2021-03-10 PROCEDURE — 25000003 PHARM REV CODE 250: Performed by: INTERNAL MEDICINE

## 2021-03-10 PROCEDURE — 80048 BASIC METABOLIC PNL TOTAL CA: CPT | Performed by: INTERNAL MEDICINE

## 2021-03-10 PROCEDURE — 27000221 HC OXYGEN, UP TO 24 HOURS

## 2021-03-10 PROCEDURE — 63600175 PHARM REV CODE 636 W HCPCS: Performed by: INTERNAL MEDICINE

## 2021-03-10 PROCEDURE — 36415 COLL VENOUS BLD VENIPUNCTURE: CPT | Performed by: INTERNAL MEDICINE

## 2021-03-10 PROCEDURE — 94761 N-INVAS EAR/PLS OXIMETRY MLT: CPT

## 2021-03-10 PROCEDURE — 97535 SELF CARE MNGMENT TRAINING: CPT | Mod: CO

## 2021-03-10 PROCEDURE — 12000002 HC ACUTE/MED SURGE SEMI-PRIVATE ROOM

## 2021-03-10 RX ORDER — ACETAZOLAMIDE 250 MG/1
250 TABLET ORAL 3 TIMES DAILY
Status: DISCONTINUED | OUTPATIENT
Start: 2021-03-11 | End: 2021-03-12 | Stop reason: HOSPADM

## 2021-03-10 RX ADMIN — TRAZODONE HYDROCHLORIDE 50 MG: 50 TABLET ORAL at 08:03

## 2021-03-10 RX ADMIN — AMLODIPINE BESYLATE 2.5 MG: 2.5 TABLET ORAL at 10:03

## 2021-03-10 RX ADMIN — NITROGLYCERIN 0.5 INCH: 20 OINTMENT TOPICAL at 11:03

## 2021-03-10 RX ADMIN — ALPRAZOLAM 0.5 MG: 0.25 TABLET ORAL at 10:03

## 2021-03-10 RX ADMIN — ENOXAPARIN SODIUM 90 MG: 100 INJECTION SUBCUTANEOUS at 01:03

## 2021-03-10 RX ADMIN — NITROGLYCERIN 0.5 INCH: 20 OINTMENT TOPICAL at 12:03

## 2021-03-10 RX ADMIN — INSULIN ASPART 1 UNITS: 100 INJECTION, SOLUTION INTRAVENOUS; SUBCUTANEOUS at 09:03

## 2021-03-10 RX ADMIN — ROSUVASTATIN CALCIUM 20 MG: 20 TABLET, FILM COATED ORAL at 08:03

## 2021-03-10 RX ADMIN — LEVOTHYROXINE SODIUM 100 MCG: 0.05 TABLET ORAL at 06:03

## 2021-03-10 RX ADMIN — ALPRAZOLAM 0.5 MG: 0.25 TABLET ORAL at 08:03

## 2021-03-10 RX ADMIN — NITROGLYCERIN 0.5 INCH: 20 OINTMENT TOPICAL at 06:03

## 2021-03-10 RX ADMIN — PANTOPRAZOLE SODIUM 40 MG: 40 TABLET, DELAYED RELEASE ORAL at 10:03

## 2021-03-10 RX ADMIN — ACETAZOLAMIDE SODIUM 250 MG: 500 INJECTION, POWDER, LYOPHILIZED, FOR SOLUTION INTRAVENOUS at 05:03

## 2021-03-10 RX ADMIN — METOPROLOL TARTRATE 25 MG: 25 TABLET, FILM COATED ORAL at 08:03

## 2021-03-10 RX ADMIN — INSULIN DEGLUDEC 50 UNITS: 200 INJECTION, SOLUTION SUBCUTANEOUS at 08:03

## 2021-03-10 RX ADMIN — LIDOCAINE 1 PATCH: 50 PATCH TOPICAL at 11:03

## 2021-03-10 RX ADMIN — ASPIRIN 81 MG: 81 TABLET, COATED ORAL at 10:03

## 2021-03-10 RX ADMIN — INSULIN ASPART 2 UNITS: 100 INJECTION, SOLUTION INTRAVENOUS; SUBCUTANEOUS at 11:03

## 2021-03-10 RX ADMIN — LISINOPRIL 10 MG: 10 TABLET ORAL at 10:03

## 2021-03-10 RX ADMIN — ENOXAPARIN SODIUM 90 MG: 100 INJECTION SUBCUTANEOUS at 02:03

## 2021-03-10 RX ADMIN — METOPROLOL TARTRATE 25 MG: 25 TABLET, FILM COATED ORAL at 10:03

## 2021-03-10 RX ADMIN — CEFTRIAXONE SODIUM 1 G: 1 INJECTION, POWDER, FOR SOLUTION INTRAMUSCULAR; INTRAVENOUS at 08:03

## 2021-03-10 RX ADMIN — NITROGLYCERIN 0.5 INCH: 20 OINTMENT TOPICAL at 04:03

## 2021-03-11 LAB
ANION GAP SERPL CALC-SCNC: 8 MMOL/L (ref 8–16)
BASOPHILS # BLD AUTO: 0.07 K/UL (ref 0–0.2)
BASOPHILS NFR BLD: 0.9 % (ref 0–1.9)
BUN SERPL-MCNC: 31 MG/DL (ref 8–23)
CALCIUM SERPL-MCNC: 8.6 MG/DL (ref 8.7–10.5)
CHLORIDE SERPL-SCNC: 97 MMOL/L (ref 95–110)
CO2 SERPL-SCNC: 36 MMOL/L (ref 23–29)
CREAT SERPL-MCNC: 1.4 MG/DL (ref 0.5–1.4)
DIFFERENTIAL METHOD: ABNORMAL
EOSINOPHIL # BLD AUTO: 0.3 K/UL (ref 0–0.5)
EOSINOPHIL NFR BLD: 3.8 % (ref 0–8)
ERYTHROCYTE [DISTWIDTH] IN BLOOD BY AUTOMATED COUNT: 13.7 % (ref 11.5–14.5)
EST. GFR  (AFRICAN AMERICAN): 43 ML/MIN/1.73 M^2
EST. GFR  (NON AFRICAN AMERICAN): 37 ML/MIN/1.73 M^2
GLUCOSE SERPL-MCNC: 104 MG/DL (ref 70–110)
HCT VFR BLD AUTO: 34.9 % (ref 37–48.5)
HGB BLD-MCNC: 10.3 G/DL (ref 12–16)
IMM GRANULOCYTES # BLD AUTO: 0.03 K/UL (ref 0–0.04)
IMM GRANULOCYTES NFR BLD AUTO: 0.4 % (ref 0–0.5)
LYMPHOCYTES # BLD AUTO: 2.5 K/UL (ref 1–4.8)
LYMPHOCYTES NFR BLD: 30.6 % (ref 18–48)
MAGNESIUM SERPL-MCNC: 2 MG/DL (ref 1.6–2.6)
MCH RBC QN AUTO: 28.5 PG (ref 27–31)
MCHC RBC AUTO-ENTMCNC: 29.5 G/DL (ref 32–36)
MCV RBC AUTO: 96 FL (ref 82–98)
MONOCYTES # BLD AUTO: 1 K/UL (ref 0.3–1)
MONOCYTES NFR BLD: 11.5 % (ref 4–15)
NEUTROPHILS # BLD AUTO: 4.4 K/UL (ref 1.8–7.7)
NEUTROPHILS NFR BLD: 52.8 % (ref 38–73)
NRBC BLD-RTO: 0 /100 WBC
PHOSPHATE SERPL-MCNC: 4 MG/DL (ref 2.7–4.5)
PLATELET # BLD AUTO: 221 K/UL (ref 150–350)
PMV BLD AUTO: 10.6 FL (ref 9.2–12.9)
POCT GLUCOSE: 107 MG/DL (ref 70–110)
POCT GLUCOSE: 107 MG/DL (ref 70–110)
POCT GLUCOSE: 137 MG/DL (ref 70–110)
POCT GLUCOSE: 255 MG/DL (ref 70–110)
POTASSIUM SERPL-SCNC: 3.8 MMOL/L (ref 3.5–5.1)
RBC # BLD AUTO: 3.62 M/UL (ref 4–5.4)
SODIUM SERPL-SCNC: 141 MMOL/L (ref 136–145)
WBC # BLD AUTO: 8.23 K/UL (ref 3.9–12.7)

## 2021-03-11 PROCEDURE — 12000002 HC ACUTE/MED SURGE SEMI-PRIVATE ROOM

## 2021-03-11 PROCEDURE — 25000003 PHARM REV CODE 250: Performed by: INTERNAL MEDICINE

## 2021-03-11 PROCEDURE — 25000003 PHARM REV CODE 250: Performed by: GENERAL PRACTICE

## 2021-03-11 PROCEDURE — 84100 ASSAY OF PHOSPHORUS: CPT | Performed by: INTERNAL MEDICINE

## 2021-03-11 PROCEDURE — 63600175 PHARM REV CODE 636 W HCPCS: Performed by: INTERNAL MEDICINE

## 2021-03-11 PROCEDURE — 83735 ASSAY OF MAGNESIUM: CPT | Performed by: INTERNAL MEDICINE

## 2021-03-11 PROCEDURE — 63600175 PHARM REV CODE 636 W HCPCS: Performed by: GENERAL PRACTICE

## 2021-03-11 PROCEDURE — 97116 GAIT TRAINING THERAPY: CPT

## 2021-03-11 PROCEDURE — 80048 BASIC METABOLIC PNL TOTAL CA: CPT | Performed by: INTERNAL MEDICINE

## 2021-03-11 PROCEDURE — 94761 N-INVAS EAR/PLS OXIMETRY MLT: CPT

## 2021-03-11 PROCEDURE — 36415 COLL VENOUS BLD VENIPUNCTURE: CPT | Performed by: INTERNAL MEDICINE

## 2021-03-11 PROCEDURE — 27000221 HC OXYGEN, UP TO 24 HOURS

## 2021-03-11 PROCEDURE — 85025 COMPLETE CBC W/AUTO DIFF WBC: CPT | Performed by: INTERNAL MEDICINE

## 2021-03-11 RX ORDER — FUROSEMIDE 10 MG/ML
20 INJECTION INTRAMUSCULAR; INTRAVENOUS ONCE
Status: COMPLETED | OUTPATIENT
Start: 2021-03-11 | End: 2021-03-11

## 2021-03-11 RX ADMIN — ACETAZOLAMIDE 250 MG: 250 TABLET ORAL at 09:03

## 2021-03-11 RX ADMIN — NITROGLYCERIN 0.5 INCH: 20 OINTMENT TOPICAL at 05:03

## 2021-03-11 RX ADMIN — ROSUVASTATIN CALCIUM 20 MG: 20 TABLET, FILM COATED ORAL at 08:03

## 2021-03-11 RX ADMIN — NITROGLYCERIN 0.5 INCH: 20 OINTMENT TOPICAL at 11:03

## 2021-03-11 RX ADMIN — LIDOCAINE 1 PATCH: 50 PATCH TOPICAL at 11:03

## 2021-03-11 RX ADMIN — AMLODIPINE BESYLATE 2.5 MG: 2.5 TABLET ORAL at 09:03

## 2021-03-11 RX ADMIN — INSULIN ASPART 3 UNITS: 100 INJECTION, SOLUTION INTRAVENOUS; SUBCUTANEOUS at 11:03

## 2021-03-11 RX ADMIN — ACETAZOLAMIDE 250 MG: 250 TABLET ORAL at 05:03

## 2021-03-11 RX ADMIN — NITROGLYCERIN 0.5 INCH: 20 OINTMENT TOPICAL at 06:03

## 2021-03-11 RX ADMIN — PANTOPRAZOLE SODIUM 40 MG: 40 TABLET, DELAYED RELEASE ORAL at 09:03

## 2021-03-11 RX ADMIN — FUROSEMIDE 20 MG: 10 INJECTION, SOLUTION INTRAMUSCULAR; INTRAVENOUS at 06:03

## 2021-03-11 RX ADMIN — LEVOTHYROXINE SODIUM 100 MCG: 0.05 TABLET ORAL at 05:03

## 2021-03-11 RX ADMIN — LISINOPRIL 10 MG: 10 TABLET ORAL at 09:03

## 2021-03-11 RX ADMIN — ACETAZOLAMIDE 250 MG: 250 TABLET ORAL at 08:03

## 2021-03-11 RX ADMIN — ENOXAPARIN SODIUM 90 MG: 100 INJECTION SUBCUTANEOUS at 05:03

## 2021-03-11 RX ADMIN — TRAZODONE HYDROCHLORIDE 50 MG: 50 TABLET ORAL at 08:03

## 2021-03-11 RX ADMIN — METOPROLOL TARTRATE 25 MG: 25 TABLET, FILM COATED ORAL at 08:03

## 2021-03-11 RX ADMIN — INSULIN DEGLUDEC 50 PEN: 200 INJECTION, SOLUTION SUBCUTANEOUS at 09:03

## 2021-03-11 RX ADMIN — CEFTRIAXONE SODIUM 1 G: 1 INJECTION, POWDER, FOR SOLUTION INTRAMUSCULAR; INTRAVENOUS at 08:03

## 2021-03-11 RX ADMIN — METOPROLOL TARTRATE 25 MG: 25 TABLET, FILM COATED ORAL at 09:03

## 2021-03-11 RX ADMIN — ASPIRIN 81 MG: 81 TABLET, COATED ORAL at 09:03

## 2021-03-11 RX ADMIN — ENOXAPARIN SODIUM 90 MG: 100 INJECTION SUBCUTANEOUS at 02:03

## 2021-03-12 VITALS
OXYGEN SATURATION: 95 % | HEIGHT: 61 IN | WEIGHT: 200 LBS | BODY MASS INDEX: 37.76 KG/M2 | TEMPERATURE: 98 F | RESPIRATION RATE: 18 BRPM | HEART RATE: 60 BPM | DIASTOLIC BLOOD PRESSURE: 57 MMHG | SYSTOLIC BLOOD PRESSURE: 121 MMHG

## 2021-03-12 LAB
ANION GAP SERPL CALC-SCNC: 8 MMOL/L (ref 8–16)
BASOPHILS # BLD AUTO: 0.05 K/UL (ref 0–0.2)
BASOPHILS NFR BLD: 0.5 % (ref 0–1.9)
BNP SERPL-MCNC: 205 PG/ML (ref 0–99)
BUN SERPL-MCNC: 29 MG/DL (ref 8–23)
CALCIUM SERPL-MCNC: 8.8 MG/DL (ref 8.7–10.5)
CHLORIDE SERPL-SCNC: 97 MMOL/L (ref 95–110)
CO2 SERPL-SCNC: 33 MMOL/L (ref 23–29)
CREAT SERPL-MCNC: 1.5 MG/DL (ref 0.5–1.4)
DIFFERENTIAL METHOD: ABNORMAL
EOSINOPHIL # BLD AUTO: 0.4 K/UL (ref 0–0.5)
EOSINOPHIL NFR BLD: 4.4 % (ref 0–8)
ERYTHROCYTE [DISTWIDTH] IN BLOOD BY AUTOMATED COUNT: 13.9 % (ref 11.5–14.5)
EST. GFR  (AFRICAN AMERICAN): 40 ML/MIN/1.73 M^2
EST. GFR  (NON AFRICAN AMERICAN): 34 ML/MIN/1.73 M^2
GLUCOSE SERPL-MCNC: 126 MG/DL (ref 70–110)
HCT VFR BLD AUTO: 35.4 % (ref 37–48.5)
HGB BLD-MCNC: 10.2 G/DL (ref 12–16)
IMM GRANULOCYTES # BLD AUTO: 0.05 K/UL (ref 0–0.04)
IMM GRANULOCYTES NFR BLD AUTO: 0.5 % (ref 0–0.5)
LYMPHOCYTES # BLD AUTO: 3.1 K/UL (ref 1–4.8)
LYMPHOCYTES NFR BLD: 33.7 % (ref 18–48)
MAGNESIUM SERPL-MCNC: 2 MG/DL (ref 1.6–2.6)
MCH RBC QN AUTO: 28 PG (ref 27–31)
MCHC RBC AUTO-ENTMCNC: 28.8 G/DL (ref 32–36)
MCV RBC AUTO: 97 FL (ref 82–98)
MONOCYTES # BLD AUTO: 1 K/UL (ref 0.3–1)
MONOCYTES NFR BLD: 11.1 % (ref 4–15)
NEUTROPHILS # BLD AUTO: 4.6 K/UL (ref 1.8–7.7)
NEUTROPHILS NFR BLD: 49.8 % (ref 38–73)
NRBC BLD-RTO: 0 /100 WBC
PHOSPHATE SERPL-MCNC: 4 MG/DL (ref 2.7–4.5)
PLATELET # BLD AUTO: 224 K/UL (ref 150–350)
PMV BLD AUTO: 11 FL (ref 9.2–12.9)
POCT GLUCOSE: 112 MG/DL (ref 70–110)
POCT GLUCOSE: 115 MG/DL (ref 70–110)
POCT GLUCOSE: 133 MG/DL (ref 70–110)
POCT GLUCOSE: 186 MG/DL (ref 70–110)
POTASSIUM SERPL-SCNC: 3.8 MMOL/L (ref 3.5–5.1)
RBC # BLD AUTO: 3.64 M/UL (ref 4–5.4)
SODIUM SERPL-SCNC: 138 MMOL/L (ref 136–145)
WBC # BLD AUTO: 9.22 K/UL (ref 3.9–12.7)

## 2021-03-12 PROCEDURE — 83880 ASSAY OF NATRIURETIC PEPTIDE: CPT | Performed by: GENERAL PRACTICE

## 2021-03-12 PROCEDURE — 97116 GAIT TRAINING THERAPY: CPT

## 2021-03-12 PROCEDURE — 94618 PULMONARY STRESS TESTING: CPT

## 2021-03-12 PROCEDURE — 83735 ASSAY OF MAGNESIUM: CPT | Performed by: INTERNAL MEDICINE

## 2021-03-12 PROCEDURE — 85025 COMPLETE CBC W/AUTO DIFF WBC: CPT | Performed by: INTERNAL MEDICINE

## 2021-03-12 PROCEDURE — 97535 SELF CARE MNGMENT TRAINING: CPT | Mod: CO

## 2021-03-12 PROCEDURE — 25000003 PHARM REV CODE 250: Performed by: GENERAL PRACTICE

## 2021-03-12 PROCEDURE — 27000221 HC OXYGEN, UP TO 24 HOURS

## 2021-03-12 PROCEDURE — 25000003 PHARM REV CODE 250: Performed by: INTERNAL MEDICINE

## 2021-03-12 PROCEDURE — 99900031 HC PATIENT EDUCATION (STAT)

## 2021-03-12 PROCEDURE — 94761 N-INVAS EAR/PLS OXIMETRY MLT: CPT

## 2021-03-12 PROCEDURE — 97530 THERAPEUTIC ACTIVITIES: CPT

## 2021-03-12 PROCEDURE — 63600175 PHARM REV CODE 636 W HCPCS: Performed by: INTERNAL MEDICINE

## 2021-03-12 PROCEDURE — 84100 ASSAY OF PHOSPHORUS: CPT | Performed by: INTERNAL MEDICINE

## 2021-03-12 PROCEDURE — 80048 BASIC METABOLIC PNL TOTAL CA: CPT | Performed by: INTERNAL MEDICINE

## 2021-03-12 RX ORDER — FUROSEMIDE 20 MG/1
20 TABLET ORAL DAILY
Qty: 30 TABLET | Refills: 0 | Status: SHIPPED | OUTPATIENT
Start: 2021-03-12 | End: 2023-08-18

## 2021-03-12 RX ORDER — AMLODIPINE BESYLATE 2.5 MG/1
2.5 TABLET ORAL DAILY
Qty: 30 TABLET | Refills: 0 | Status: SHIPPED | OUTPATIENT
Start: 2021-03-13 | End: 2021-04-16 | Stop reason: SDUPTHER

## 2021-03-12 RX ADMIN — ENOXAPARIN SODIUM 90 MG: 100 INJECTION SUBCUTANEOUS at 01:03

## 2021-03-12 RX ADMIN — ACETAZOLAMIDE 250 MG: 250 TABLET ORAL at 09:03

## 2021-03-12 RX ADMIN — PANTOPRAZOLE SODIUM 40 MG: 40 TABLET, DELAYED RELEASE ORAL at 09:03

## 2021-03-12 RX ADMIN — INSULIN DEGLUDEC 50 UNITS: 200 INJECTION, SOLUTION SUBCUTANEOUS at 09:03

## 2021-03-12 RX ADMIN — ACETAZOLAMIDE 250 MG: 250 TABLET ORAL at 03:03

## 2021-03-12 RX ADMIN — METOPROLOL TARTRATE 25 MG: 25 TABLET, FILM COATED ORAL at 09:03

## 2021-03-12 RX ADMIN — LEVOTHYROXINE SODIUM 100 MCG: 0.05 TABLET ORAL at 06:03

## 2021-03-12 RX ADMIN — ASPIRIN 81 MG: 81 TABLET, COATED ORAL at 09:03

## 2021-03-12 RX ADMIN — NITROGLYCERIN 0.5 INCH: 20 OINTMENT TOPICAL at 06:03

## 2021-03-12 RX ADMIN — NITROGLYCERIN 0.5 INCH: 20 OINTMENT TOPICAL at 01:03

## 2021-03-13 PROCEDURE — G0180 PR HOME HEALTH MD CERTIFICATION: ICD-10-PCS | Mod: ,,, | Performed by: NURSE PRACTITIONER

## 2021-03-13 PROCEDURE — G0180 MD CERTIFICATION HHA PATIENT: HCPCS | Mod: ,,, | Performed by: NURSE PRACTITIONER

## 2021-03-15 ENCOUNTER — PATIENT OUTREACH (OUTPATIENT)
Dept: ADMINISTRATIVE | Facility: CLINIC | Age: 76
End: 2021-03-15

## 2021-03-15 DIAGNOSIS — G47.00 INSOMNIA, UNSPECIFIED TYPE: ICD-10-CM

## 2021-03-16 RX ORDER — TRAZODONE HYDROCHLORIDE 50 MG/1
50 TABLET ORAL NIGHTLY
Qty: 90 TABLET | Refills: 0 | Status: SHIPPED | OUTPATIENT
Start: 2021-03-16 | End: 2021-06-15

## 2021-03-18 ENCOUNTER — TELEPHONE (OUTPATIENT)
Dept: FAMILY MEDICINE | Facility: CLINIC | Age: 76
End: 2021-03-18

## 2021-03-18 ENCOUNTER — LAB VISIT (OUTPATIENT)
Dept: LAB | Facility: HOSPITAL | Age: 76
End: 2021-03-18
Attending: NURSE PRACTITIONER
Payer: MEDICARE

## 2021-03-18 ENCOUNTER — OFFICE VISIT (OUTPATIENT)
Dept: FAMILY MEDICINE | Facility: CLINIC | Age: 76
End: 2021-03-18
Payer: MEDICARE

## 2021-03-18 VITALS
TEMPERATURE: 98 F | WEIGHT: 200 LBS | BODY MASS INDEX: 37.76 KG/M2 | DIASTOLIC BLOOD PRESSURE: 84 MMHG | OXYGEN SATURATION: 95 % | HEART RATE: 56 BPM | HEIGHT: 61 IN | SYSTOLIC BLOOD PRESSURE: 136 MMHG

## 2021-03-18 DIAGNOSIS — I15.2 HYPERTENSION ASSOCIATED WITH DIABETES: ICD-10-CM

## 2021-03-18 DIAGNOSIS — I11.9 HYPERTENSIVE LEFT VENTRICULAR HYPERTROPHY, WITHOUT HEART FAILURE: ICD-10-CM

## 2021-03-18 DIAGNOSIS — E11.59 HYPERTENSION ASSOCIATED WITH DIABETES: ICD-10-CM

## 2021-03-18 DIAGNOSIS — E66.9 OBESITY (BMI 30-39.9): ICD-10-CM

## 2021-03-18 DIAGNOSIS — E78.5 HYPERLIPIDEMIA WITH TARGET LDL LESS THAN 70: ICD-10-CM

## 2021-03-18 DIAGNOSIS — I50.31 ACUTE DIASTOLIC HEART FAILURE: ICD-10-CM

## 2021-03-18 DIAGNOSIS — R19.7 DIARRHEA, UNSPECIFIED TYPE: ICD-10-CM

## 2021-03-18 DIAGNOSIS — Z09 HOSPITAL DISCHARGE FOLLOW-UP: Primary | ICD-10-CM

## 2021-03-18 DIAGNOSIS — Z09 HOSPITAL DISCHARGE FOLLOW-UP: ICD-10-CM

## 2021-03-18 LAB
BASOPHILS # BLD AUTO: 0.06 K/UL (ref 0–0.2)
BASOPHILS NFR BLD: 0.7 % (ref 0–1.9)
DIFFERENTIAL METHOD: ABNORMAL
EOSINOPHIL # BLD AUTO: 0.1 K/UL (ref 0–0.5)
EOSINOPHIL NFR BLD: 1.6 % (ref 0–8)
ERYTHROCYTE [DISTWIDTH] IN BLOOD BY AUTOMATED COUNT: 14.2 % (ref 11.5–14.5)
HCT VFR BLD AUTO: 35.4 % (ref 37–48.5)
HGB BLD-MCNC: 10.6 G/DL (ref 12–16)
IMM GRANULOCYTES # BLD AUTO: 0.02 K/UL (ref 0–0.04)
IMM GRANULOCYTES NFR BLD AUTO: 0.2 % (ref 0–0.5)
LYMPHOCYTES # BLD AUTO: 3.1 K/UL (ref 1–4.8)
LYMPHOCYTES NFR BLD: 34.5 % (ref 18–48)
MCH RBC QN AUTO: 28.3 PG (ref 27–31)
MCHC RBC AUTO-ENTMCNC: 29.9 G/DL (ref 32–36)
MCV RBC AUTO: 94 FL (ref 82–98)
MONOCYTES # BLD AUTO: 0.8 K/UL (ref 0.3–1)
MONOCYTES NFR BLD: 8.6 % (ref 4–15)
NEUTROPHILS # BLD AUTO: 4.9 K/UL (ref 1.8–7.7)
NEUTROPHILS NFR BLD: 54.4 % (ref 38–73)
NRBC BLD-RTO: 0 /100 WBC
PLATELET # BLD AUTO: 213 K/UL (ref 150–350)
PMV BLD AUTO: 11.4 FL (ref 9.2–12.9)
RBC # BLD AUTO: 3.75 M/UL (ref 4–5.4)
WBC # BLD AUTO: 8.99 K/UL (ref 3.9–12.7)

## 2021-03-18 PROCEDURE — 3072F LOW RISK FOR RETINOPATHY: CPT | Mod: S$GLB,,, | Performed by: NURSE PRACTITIONER

## 2021-03-18 PROCEDURE — 3288F FALL RISK ASSESSMENT DOCD: CPT | Mod: CPTII,S$GLB,, | Performed by: NURSE PRACTITIONER

## 2021-03-18 PROCEDURE — 3008F PR BODY MASS INDEX (BMI) DOCUMENTED: ICD-10-PCS | Mod: CPTII,S$GLB,, | Performed by: NURSE PRACTITIONER

## 2021-03-18 PROCEDURE — 36415 COLL VENOUS BLD VENIPUNCTURE: CPT | Mod: PO | Performed by: NURSE PRACTITIONER

## 2021-03-18 PROCEDURE — 99499 RISK ADDL DX/OHS AUDIT: ICD-10-PCS | Mod: S$GLB,,, | Performed by: NURSE PRACTITIONER

## 2021-03-18 PROCEDURE — 85025 COMPLETE CBC W/AUTO DIFF WBC: CPT | Performed by: NURSE PRACTITIONER

## 2021-03-18 PROCEDURE — 1101F PR PT FALLS ASSESS DOC 0-1 FALLS W/OUT INJ PAST YR: ICD-10-PCS | Mod: CPTII,S$GLB,, | Performed by: NURSE PRACTITIONER

## 2021-03-18 PROCEDURE — 87324 CLOSTRIDIUM AG IA: CPT | Performed by: NURSE PRACTITIONER

## 2021-03-18 PROCEDURE — 1101F PT FALLS ASSESS-DOCD LE1/YR: CPT | Mod: CPTII,S$GLB,, | Performed by: NURSE PRACTITIONER

## 2021-03-18 PROCEDURE — 3008F BODY MASS INDEX DOCD: CPT | Mod: CPTII,S$GLB,, | Performed by: NURSE PRACTITIONER

## 2021-03-18 PROCEDURE — 99499 UNLISTED E&M SERVICE: CPT | Mod: S$GLB,,, | Performed by: NURSE PRACTITIONER

## 2021-03-18 PROCEDURE — 3288F PR FALLS RISK ASSESSMENT DOCUMENTED: ICD-10-PCS | Mod: CPTII,S$GLB,, | Performed by: NURSE PRACTITIONER

## 2021-03-18 PROCEDURE — 99496 TRANSITIONAL CARE MANAGE SERVICE 7 DAY DISCHARGE: ICD-10-PCS | Mod: S$GLB,,, | Performed by: NURSE PRACTITIONER

## 2021-03-18 PROCEDURE — 3072F PR LOW RISK FOR RETINOPATHY: ICD-10-PCS | Mod: S$GLB,,, | Performed by: NURSE PRACTITIONER

## 2021-03-18 PROCEDURE — 83880 ASSAY OF NATRIURETIC PEPTIDE: CPT | Performed by: NURSE PRACTITIONER

## 2021-03-18 PROCEDURE — 87449 NOS EACH ORGANISM AG IA: CPT | Performed by: NURSE PRACTITIONER

## 2021-03-18 PROCEDURE — 99999 PR PBB SHADOW E&M-EST. PATIENT-LVL V: CPT | Mod: PBBFAC,,, | Performed by: NURSE PRACTITIONER

## 2021-03-18 PROCEDURE — 99999 PR PBB SHADOW E&M-EST. PATIENT-LVL V: ICD-10-PCS | Mod: PBBFAC,,, | Performed by: NURSE PRACTITIONER

## 2021-03-18 PROCEDURE — 99496 TRANSJ CARE MGMT HIGH F2F 7D: CPT | Mod: S$GLB,,, | Performed by: NURSE PRACTITIONER

## 2021-03-18 PROCEDURE — 80053 COMPREHEN METABOLIC PANEL: CPT | Performed by: NURSE PRACTITIONER

## 2021-03-19 ENCOUNTER — TELEPHONE (OUTPATIENT)
Dept: ORTHOPEDICS | Facility: CLINIC | Age: 76
End: 2021-03-19

## 2021-03-19 ENCOUNTER — TELEPHONE (OUTPATIENT)
Dept: FAMILY MEDICINE | Facility: CLINIC | Age: 76
End: 2021-03-19

## 2021-03-19 DIAGNOSIS — M25.512 LEFT SHOULDER PAIN, UNSPECIFIED CHRONICITY: ICD-10-CM

## 2021-03-19 LAB
ALBUMIN SERPL BCP-MCNC: 3.4 G/DL (ref 3.5–5.2)
ALP SERPL-CCNC: 76 U/L (ref 55–135)
ALT SERPL W/O P-5'-P-CCNC: 15 U/L (ref 10–44)
ANION GAP SERPL CALC-SCNC: 13 MMOL/L (ref 8–16)
AST SERPL-CCNC: 25 U/L (ref 10–40)
BILIRUB SERPL-MCNC: 0.4 MG/DL (ref 0.1–1)
BNP SERPL-MCNC: 307 PG/ML (ref 0–99)
BUN SERPL-MCNC: 22 MG/DL (ref 8–23)
C DIFF GDH STL QL: NEGATIVE
C DIFF TOX A+B STL QL IA: NEGATIVE
CALCIUM SERPL-MCNC: 9.1 MG/DL (ref 8.7–10.5)
CHLORIDE SERPL-SCNC: 102 MMOL/L (ref 95–110)
CO2 SERPL-SCNC: 25 MMOL/L (ref 23–29)
CREAT SERPL-MCNC: 1.2 MG/DL (ref 0.5–1.4)
EST. GFR  (AFRICAN AMERICAN): 51.8 ML/MIN/1.73 M^2
EST. GFR  (NON AFRICAN AMERICAN): 44.9 ML/MIN/1.73 M^2
GLUCOSE SERPL-MCNC: 103 MG/DL (ref 70–110)
POTASSIUM SERPL-SCNC: 4.2 MMOL/L (ref 3.5–5.1)
PROT SERPL-MCNC: 7 G/DL (ref 6–8.4)
SODIUM SERPL-SCNC: 140 MMOL/L (ref 136–145)

## 2021-03-19 RX ORDER — TRAMADOL HYDROCHLORIDE 50 MG/1
50 TABLET ORAL EVERY 6 HOURS PRN
Qty: 28 TABLET | Refills: 0 | Status: SHIPPED | OUTPATIENT
Start: 2021-03-19 | End: 2022-02-10 | Stop reason: ALTCHOICE

## 2021-03-20 DIAGNOSIS — L85.3 XEROSIS CUTIS: ICD-10-CM

## 2021-03-22 ENCOUNTER — EXTERNAL HOME HEALTH (OUTPATIENT)
Dept: HOME HEALTH SERVICES | Facility: HOSPITAL | Age: 76
End: 2021-03-22

## 2021-03-22 RX ORDER — AMMONIUM LACTATE 12 G/100G
LOTION TOPICAL
Qty: 226 G | Refills: 1 | Status: SHIPPED | OUTPATIENT
Start: 2021-03-22 | End: 2023-08-18

## 2021-03-23 ENCOUNTER — LAB VISIT (OUTPATIENT)
Dept: LAB | Facility: HOSPITAL | Age: 76
End: 2021-03-23
Attending: FAMILY MEDICINE
Payer: MEDICARE

## 2021-03-23 DIAGNOSIS — I15.2 HYPERTENSION ASSOCIATED WITH DIABETES: ICD-10-CM

## 2021-03-23 DIAGNOSIS — E11.59 HYPERTENSION ASSOCIATED WITH DIABETES: ICD-10-CM

## 2021-03-23 DIAGNOSIS — Z51.89 ENCOUNTER FOR VOCATIONAL THERAPY: ICD-10-CM

## 2021-03-23 DIAGNOSIS — R68.89 MECHANICAL AND MOTOR PROBLEMS WITH INTERNAL ORGANS: Primary | ICD-10-CM

## 2021-03-23 LAB
ANION GAP SERPL CALC-SCNC: 12 MMOL/L (ref 8–16)
BASOPHILS # BLD AUTO: 0.07 K/UL (ref 0–0.2)
BASOPHILS NFR BLD: 0.8 % (ref 0–1.9)
BUN SERPL-MCNC: 16 MG/DL (ref 8–23)
CALCIUM SERPL-MCNC: 9.2 MG/DL (ref 8.7–10.5)
CHLORIDE SERPL-SCNC: 99 MMOL/L (ref 95–110)
CO2 SERPL-SCNC: 30 MMOL/L (ref 23–29)
CREAT SERPL-MCNC: 1.4 MG/DL (ref 0.5–1.4)
DIFFERENTIAL METHOD: ABNORMAL
EOSINOPHIL # BLD AUTO: 0.2 K/UL (ref 0–0.5)
EOSINOPHIL NFR BLD: 2.2 % (ref 0–8)
ERYTHROCYTE [DISTWIDTH] IN BLOOD BY AUTOMATED COUNT: 14.4 % (ref 11.5–14.5)
EST. GFR  (AFRICAN AMERICAN): 43 ML/MIN/1.73 M^2
EST. GFR  (NON AFRICAN AMERICAN): 37 ML/MIN/1.73 M^2
GLUCOSE SERPL-MCNC: 138 MG/DL (ref 70–110)
HCT VFR BLD AUTO: 40.3 % (ref 37–48.5)
HGB BLD-MCNC: 12.3 G/DL (ref 12–16)
IMM GRANULOCYTES # BLD AUTO: 0.02 K/UL (ref 0–0.04)
IMM GRANULOCYTES NFR BLD AUTO: 0.2 % (ref 0–0.5)
LYMPHOCYTES # BLD AUTO: 3.6 K/UL (ref 1–4.8)
LYMPHOCYTES NFR BLD: 39.8 % (ref 18–48)
MCH RBC QN AUTO: 28.8 PG (ref 27–31)
MCHC RBC AUTO-ENTMCNC: 30.5 G/DL (ref 32–36)
MCV RBC AUTO: 94 FL (ref 82–98)
MONOCYTES # BLD AUTO: 0.5 K/UL (ref 0.3–1)
MONOCYTES NFR BLD: 5.8 % (ref 4–15)
NEUTROPHILS # BLD AUTO: 4.6 K/UL (ref 1.8–7.7)
NEUTROPHILS NFR BLD: 51.2 % (ref 38–73)
NRBC BLD-RTO: 0 /100 WBC
PLATELET # BLD AUTO: 286 K/UL (ref 150–350)
PMV BLD AUTO: 11.1 FL (ref 9.2–12.9)
POTASSIUM SERPL-SCNC: 3.8 MMOL/L (ref 3.5–5.1)
RBC # BLD AUTO: 4.27 M/UL (ref 4–5.4)
SODIUM SERPL-SCNC: 141 MMOL/L (ref 136–145)
WBC # BLD AUTO: 8.96 K/UL (ref 3.9–12.7)

## 2021-03-23 PROCEDURE — 85025 COMPLETE CBC W/AUTO DIFF WBC: CPT | Performed by: FAMILY MEDICINE

## 2021-03-23 PROCEDURE — 80048 BASIC METABOLIC PNL TOTAL CA: CPT | Performed by: FAMILY MEDICINE

## 2021-03-23 PROCEDURE — 36415 COLL VENOUS BLD VENIPUNCTURE: CPT | Performed by: FAMILY MEDICINE

## 2021-03-29 ENCOUNTER — LAB VISIT (OUTPATIENT)
Dept: LAB | Facility: HOSPITAL | Age: 76
End: 2021-03-29
Attending: FAMILY MEDICINE
Payer: MEDICARE

## 2021-03-29 DIAGNOSIS — R79.89 HYPOURICEMIA: ICD-10-CM

## 2021-03-29 DIAGNOSIS — R68.89 MECHANICAL AND MOTOR PROBLEMS WITH INTERNAL ORGANS: Primary | ICD-10-CM

## 2021-03-29 LAB
ANION GAP SERPL CALC-SCNC: 10 MMOL/L (ref 8–16)
BASOPHILS # BLD AUTO: 0.03 K/UL (ref 0–0.2)
BASOPHILS NFR BLD: 0.4 % (ref 0–1.9)
BUN SERPL-MCNC: 17 MG/DL (ref 8–23)
CALCIUM SERPL-MCNC: 10 MG/DL (ref 8.7–10.5)
CHLORIDE SERPL-SCNC: 99 MMOL/L (ref 95–110)
CO2 SERPL-SCNC: 31 MMOL/L (ref 23–29)
CREAT SERPL-MCNC: 1.6 MG/DL (ref 0.5–1.4)
DIFFERENTIAL METHOD: ABNORMAL
EOSINOPHIL # BLD AUTO: 0.2 K/UL (ref 0–0.5)
EOSINOPHIL NFR BLD: 3 % (ref 0–8)
ERYTHROCYTE [DISTWIDTH] IN BLOOD BY AUTOMATED COUNT: 14.1 % (ref 11.5–14.5)
EST. GFR  (AFRICAN AMERICAN): 37 ML/MIN/1.73 M^2
EST. GFR  (NON AFRICAN AMERICAN): 32 ML/MIN/1.73 M^2
GLUCOSE SERPL-MCNC: 195 MG/DL (ref 70–110)
HCT VFR BLD AUTO: 40 % (ref 37–48.5)
HGB BLD-MCNC: 12.2 G/DL (ref 12–16)
IMM GRANULOCYTES # BLD AUTO: 0.01 K/UL (ref 0–0.04)
IMM GRANULOCYTES NFR BLD AUTO: 0.1 % (ref 0–0.5)
LYMPHOCYTES # BLD AUTO: 2.6 K/UL (ref 1–4.8)
LYMPHOCYTES NFR BLD: 38.3 % (ref 18–48)
MCH RBC QN AUTO: 28.6 PG (ref 27–31)
MCHC RBC AUTO-ENTMCNC: 30.5 G/DL (ref 32–36)
MCV RBC AUTO: 94 FL (ref 82–98)
MONOCYTES # BLD AUTO: 0.5 K/UL (ref 0.3–1)
MONOCYTES NFR BLD: 7.5 % (ref 4–15)
NEUTROPHILS # BLD AUTO: 3.4 K/UL (ref 1.8–7.7)
NEUTROPHILS NFR BLD: 50.7 % (ref 38–73)
NRBC BLD-RTO: 0 /100 WBC
PLATELET # BLD AUTO: 211 K/UL (ref 150–450)
PMV BLD AUTO: 11.3 FL (ref 9.2–12.9)
POTASSIUM SERPL-SCNC: 4.7 MMOL/L (ref 3.5–5.1)
RBC # BLD AUTO: 4.26 M/UL (ref 4–5.4)
SODIUM SERPL-SCNC: 140 MMOL/L (ref 136–145)
WBC # BLD AUTO: 6.76 K/UL (ref 3.9–12.7)

## 2021-03-29 PROCEDURE — 80048 BASIC METABOLIC PNL TOTAL CA: CPT | Performed by: FAMILY MEDICINE

## 2021-03-29 PROCEDURE — 85025 COMPLETE CBC W/AUTO DIFF WBC: CPT | Performed by: FAMILY MEDICINE

## 2021-03-30 ENCOUNTER — TELEPHONE (OUTPATIENT)
Dept: FAMILY MEDICINE | Facility: CLINIC | Age: 76
End: 2021-03-30

## 2021-03-30 ENCOUNTER — DOCUMENT SCAN (OUTPATIENT)
Dept: HOME HEALTH SERVICES | Facility: HOSPITAL | Age: 76
End: 2021-03-30
Payer: MEDICARE

## 2021-03-30 ENCOUNTER — TELEPHONE (OUTPATIENT)
Dept: NEPHROLOGY | Facility: CLINIC | Age: 76
End: 2021-03-30

## 2021-04-06 ENCOUNTER — TELEPHONE (OUTPATIENT)
Dept: ENDOCRINOLOGY | Facility: CLINIC | Age: 76
End: 2021-04-06

## 2021-04-06 ENCOUNTER — OFFICE VISIT (OUTPATIENT)
Dept: FAMILY MEDICINE | Facility: CLINIC | Age: 76
End: 2021-04-06
Attending: FAMILY MEDICINE
Payer: MEDICARE

## 2021-04-06 VITALS
TEMPERATURE: 98 F | HEART RATE: 61 BPM | OXYGEN SATURATION: 94 % | WEIGHT: 181.88 LBS | HEIGHT: 61 IN | SYSTOLIC BLOOD PRESSURE: 108 MMHG | BODY MASS INDEX: 34.34 KG/M2 | DIASTOLIC BLOOD PRESSURE: 58 MMHG

## 2021-04-06 DIAGNOSIS — Z87.09 HISTORY OF ARDS: ICD-10-CM

## 2021-04-06 DIAGNOSIS — E11.59 HYPERTENSION ASSOCIATED WITH DIABETES: ICD-10-CM

## 2021-04-06 DIAGNOSIS — Z00.00 ENCOUNTER FOR PREVENTIVE HEALTH EXAMINATION: Primary | ICD-10-CM

## 2021-04-06 DIAGNOSIS — E03.1 CONGENITAL HYPOTHYROIDISM WITHOUT GOITER: ICD-10-CM

## 2021-04-06 DIAGNOSIS — N25.81 SECONDARY HYPERPARATHYROIDISM OF RENAL ORIGIN: ICD-10-CM

## 2021-04-06 DIAGNOSIS — D64.9 ANEMIA, UNSPECIFIED TYPE: ICD-10-CM

## 2021-04-06 DIAGNOSIS — Z95.1 S/P CABG (CORONARY ARTERY BYPASS GRAFT): ICD-10-CM

## 2021-04-06 DIAGNOSIS — N18.30 STAGE 3 CHRONIC KIDNEY DISEASE, UNSPECIFIED WHETHER STAGE 3A OR 3B CKD: ICD-10-CM

## 2021-04-06 DIAGNOSIS — Z95.1 HX OF CABG: ICD-10-CM

## 2021-04-06 DIAGNOSIS — E11.42 TYPE 2 DIABETES MELLITUS WITH DIABETIC POLYNEUROPATHY, WITH LONG-TERM CURRENT USE OF INSULIN: ICD-10-CM

## 2021-04-06 DIAGNOSIS — Z79.4 TYPE 2 DIABETES MELLITUS WITH DIABETIC POLYNEUROPATHY, WITH LONG-TERM CURRENT USE OF INSULIN: ICD-10-CM

## 2021-04-06 DIAGNOSIS — I15.2 HYPERTENSION ASSOCIATED WITH DIABETES: ICD-10-CM

## 2021-04-06 DIAGNOSIS — I25.119 CORONARY ARTERY DISEASE INVOLVING NATIVE CORONARY ARTERY OF NATIVE HEART WITH ANGINA PECTORIS: ICD-10-CM

## 2021-04-06 DIAGNOSIS — E78.5 DYSLIPIDEMIA ASSOCIATED WITH TYPE 2 DIABETES MELLITUS: ICD-10-CM

## 2021-04-06 DIAGNOSIS — K21.9 GASTROESOPHAGEAL REFLUX DISEASE, UNSPECIFIED WHETHER ESOPHAGITIS PRESENT: ICD-10-CM

## 2021-04-06 DIAGNOSIS — I50.33 ACUTE ON CHRONIC DIASTOLIC HEART FAILURE: ICD-10-CM

## 2021-04-06 DIAGNOSIS — E11.69 DYSLIPIDEMIA ASSOCIATED WITH TYPE 2 DIABETES MELLITUS: ICD-10-CM

## 2021-04-06 PROCEDURE — 99499 UNLISTED E&M SERVICE: CPT | Mod: S$GLB,,, | Performed by: FAMILY MEDICINE

## 2021-04-06 PROCEDURE — 99999 PR PBB SHADOW E&M-EST. PATIENT-LVL V: CPT | Mod: PBBFAC,,, | Performed by: FAMILY MEDICINE

## 2021-04-06 PROCEDURE — 3072F LOW RISK FOR RETINOPATHY: CPT | Mod: S$GLB,,, | Performed by: FAMILY MEDICINE

## 2021-04-06 PROCEDURE — 3074F SYST BP LT 130 MM HG: CPT | Mod: CPTII,S$GLB,, | Performed by: FAMILY MEDICINE

## 2021-04-06 PROCEDURE — 1101F PR PT FALLS ASSESS DOC 0-1 FALLS W/OUT INJ PAST YR: ICD-10-PCS | Mod: CPTII,S$GLB,, | Performed by: FAMILY MEDICINE

## 2021-04-06 PROCEDURE — 3074F PR MOST RECENT SYSTOLIC BLOOD PRESSURE < 130 MM HG: ICD-10-PCS | Mod: CPTII,S$GLB,, | Performed by: FAMILY MEDICINE

## 2021-04-06 PROCEDURE — 3288F FALL RISK ASSESSMENT DOCD: CPT | Mod: CPTII,S$GLB,, | Performed by: FAMILY MEDICINE

## 2021-04-06 PROCEDURE — 99397 PER PM REEVAL EST PAT 65+ YR: CPT | Mod: S$GLB,,, | Performed by: FAMILY MEDICINE

## 2021-04-06 PROCEDURE — 99499 RISK ADDL DX/OHS AUDIT: ICD-10-PCS | Mod: S$GLB,,, | Performed by: FAMILY MEDICINE

## 2021-04-06 PROCEDURE — 3072F PR LOW RISK FOR RETINOPATHY: ICD-10-PCS | Mod: S$GLB,,, | Performed by: FAMILY MEDICINE

## 2021-04-06 PROCEDURE — 3008F PR BODY MASS INDEX (BMI) DOCUMENTED: ICD-10-PCS | Mod: CPTII,S$GLB,, | Performed by: FAMILY MEDICINE

## 2021-04-06 PROCEDURE — 3078F DIAST BP <80 MM HG: CPT | Mod: CPTII,S$GLB,, | Performed by: FAMILY MEDICINE

## 2021-04-06 PROCEDURE — 99397 PR PREVENTIVE VISIT,EST,65 & OVER: ICD-10-PCS | Mod: S$GLB,,, | Performed by: FAMILY MEDICINE

## 2021-04-06 PROCEDURE — 3051F HG A1C>EQUAL 7.0%<8.0%: CPT | Mod: CPTII,S$GLB,, | Performed by: FAMILY MEDICINE

## 2021-04-06 PROCEDURE — 1126F AMNT PAIN NOTED NONE PRSNT: CPT | Mod: S$GLB,,, | Performed by: FAMILY MEDICINE

## 2021-04-06 PROCEDURE — 3078F PR MOST RECENT DIASTOLIC BLOOD PRESSURE < 80 MM HG: ICD-10-PCS | Mod: CPTII,S$GLB,, | Performed by: FAMILY MEDICINE

## 2021-04-06 PROCEDURE — 3051F PR MOST RECENT HEMOGLOBIN A1C LEVEL 7.0 - < 8.0%: ICD-10-PCS | Mod: CPTII,S$GLB,, | Performed by: FAMILY MEDICINE

## 2021-04-06 PROCEDURE — 1126F PR PAIN SEVERITY QUANTIFIED, NO PAIN PRESENT: ICD-10-PCS | Mod: S$GLB,,, | Performed by: FAMILY MEDICINE

## 2021-04-06 PROCEDURE — 3288F PR FALLS RISK ASSESSMENT DOCUMENTED: ICD-10-PCS | Mod: CPTII,S$GLB,, | Performed by: FAMILY MEDICINE

## 2021-04-06 PROCEDURE — 99999 PR PBB SHADOW E&M-EST. PATIENT-LVL V: ICD-10-PCS | Mod: PBBFAC,,, | Performed by: FAMILY MEDICINE

## 2021-04-06 PROCEDURE — 3008F BODY MASS INDEX DOCD: CPT | Mod: CPTII,S$GLB,, | Performed by: FAMILY MEDICINE

## 2021-04-06 PROCEDURE — 1101F PT FALLS ASSESS-DOCD LE1/YR: CPT | Mod: CPTII,S$GLB,, | Performed by: FAMILY MEDICINE

## 2021-04-06 RX ORDER — METOPROLOL TARTRATE 25 MG/1
TABLET, FILM COATED ORAL
Qty: 180 TABLET | Refills: 3 | Status: SHIPPED | OUTPATIENT
Start: 2021-04-06 | End: 2022-04-11

## 2021-04-06 RX ORDER — NITROGLYCERIN 0.4 MG/1
0.4 TABLET SUBLINGUAL EVERY 5 MIN PRN
Qty: 25 TABLET | Refills: 3 | Status: SHIPPED | OUTPATIENT
Start: 2021-04-06 | End: 2023-02-27

## 2021-04-12 ENCOUNTER — TELEPHONE (OUTPATIENT)
Dept: NEPHROLOGY | Facility: CLINIC | Age: 76
End: 2021-04-12

## 2021-04-16 ENCOUNTER — EXTERNAL HOME HEALTH (OUTPATIENT)
Dept: HOME HEALTH SERVICES | Facility: HOSPITAL | Age: 76
End: 2021-04-16
Payer: MEDICARE

## 2021-04-19 RX ORDER — AMLODIPINE BESYLATE 2.5 MG/1
TABLET ORAL
Qty: 30 TABLET | Refills: 1 | OUTPATIENT
Start: 2021-04-19

## 2021-04-19 RX ORDER — AMLODIPINE BESYLATE 2.5 MG/1
2.5 TABLET ORAL DAILY
Qty: 90 TABLET | Refills: 3 | Status: SHIPPED | OUTPATIENT
Start: 2021-04-19 | End: 2022-06-06

## 2021-04-22 ENCOUNTER — DOCUMENT SCAN (OUTPATIENT)
Dept: HOME HEALTH SERVICES | Facility: HOSPITAL | Age: 76
End: 2021-04-22
Payer: MEDICARE

## 2021-04-27 ENCOUNTER — LAB VISIT (OUTPATIENT)
Dept: LAB | Facility: HOSPITAL | Age: 76
End: 2021-04-27
Attending: NURSE PRACTITIONER
Payer: MEDICARE

## 2021-04-27 DIAGNOSIS — N18.30 STAGE 3 CHRONIC KIDNEY DISEASE, UNSPECIFIED WHETHER STAGE 3A OR 3B CKD: ICD-10-CM

## 2021-04-27 DIAGNOSIS — Z79.4 TYPE 2 DIABETES MELLITUS WITH STAGE 3B CHRONIC KIDNEY DISEASE, WITH LONG-TERM CURRENT USE OF INSULIN: ICD-10-CM

## 2021-04-27 DIAGNOSIS — N18.30 CKD (CHRONIC KIDNEY DISEASE) STAGE 3, GFR 30-59 ML/MIN: ICD-10-CM

## 2021-04-27 DIAGNOSIS — N18.32 TYPE 2 DIABETES MELLITUS WITH STAGE 3B CHRONIC KIDNEY DISEASE, WITH LONG-TERM CURRENT USE OF INSULIN: ICD-10-CM

## 2021-04-27 DIAGNOSIS — E11.22 TYPE 2 DIABETES MELLITUS WITH STAGE 3B CHRONIC KIDNEY DISEASE, WITH LONG-TERM CURRENT USE OF INSULIN: ICD-10-CM

## 2021-04-27 LAB
ALBUMIN SERPL BCP-MCNC: 3.6 G/DL (ref 3.5–5.2)
ALP SERPL-CCNC: 94 U/L (ref 55–135)
ALT SERPL W/O P-5'-P-CCNC: 20 U/L (ref 10–44)
ANION GAP SERPL CALC-SCNC: 8 MMOL/L (ref 8–16)
AST SERPL-CCNC: 26 U/L (ref 10–40)
BILIRUB SERPL-MCNC: 0.4 MG/DL (ref 0.1–1)
BUN SERPL-MCNC: 23 MG/DL (ref 8–23)
CALCIUM SERPL-MCNC: 9.8 MG/DL (ref 8.7–10.5)
CHLORIDE SERPL-SCNC: 101 MMOL/L (ref 95–110)
CHOLEST SERPL-MCNC: 127 MG/DL (ref 120–199)
CHOLEST/HDLC SERPL: 4.5 {RATIO} (ref 2–5)
CO2 SERPL-SCNC: 30 MMOL/L (ref 23–29)
CREAT SERPL-MCNC: 1.4 MG/DL (ref 0.5–1.4)
EST. GFR  (AFRICAN AMERICAN): 43 ML/MIN/1.73 M^2
EST. GFR  (NON AFRICAN AMERICAN): 37 ML/MIN/1.73 M^2
ESTIMATED AVG GLUCOSE: 163 MG/DL (ref 68–131)
GLUCOSE SERPL-MCNC: 224 MG/DL (ref 70–110)
HBA1C MFR BLD: 7.3 % (ref 4–5.6)
HDLC SERPL-MCNC: 28 MG/DL (ref 40–75)
HDLC SERPL: 22 % (ref 20–50)
LDLC SERPL CALC-MCNC: 29.6 MG/DL (ref 63–159)
NONHDLC SERPL-MCNC: 99 MG/DL
PHOSPHATE SERPL-MCNC: 3.9 MG/DL (ref 2.7–4.5)
POTASSIUM SERPL-SCNC: 4.3 MMOL/L (ref 3.5–5.1)
PROT SERPL-MCNC: 7.2 G/DL (ref 6–8.4)
PTH-INTACT SERPL-MCNC: 78.7 PG/ML (ref 9–77)
SODIUM SERPL-SCNC: 139 MMOL/L (ref 136–145)
T4 FREE SERPL-MCNC: 1.34 NG/DL (ref 0.71–1.51)
TRIGL SERPL-MCNC: 347 MG/DL (ref 30–150)
TSH SERPL DL<=0.005 MIU/L-ACNC: 0.07 UIU/ML (ref 0.4–4)

## 2021-04-27 PROCEDURE — 83036 HEMOGLOBIN GLYCOSYLATED A1C: CPT | Performed by: NURSE PRACTITIONER

## 2021-04-27 PROCEDURE — 84439 ASSAY OF FREE THYROXINE: CPT | Performed by: NURSE PRACTITIONER

## 2021-04-27 PROCEDURE — 80061 LIPID PANEL: CPT | Performed by: NURSE PRACTITIONER

## 2021-04-27 PROCEDURE — 83970 ASSAY OF PARATHORMONE: CPT | Performed by: INTERNAL MEDICINE

## 2021-04-27 PROCEDURE — 84100 ASSAY OF PHOSPHORUS: CPT | Performed by: INTERNAL MEDICINE

## 2021-04-27 PROCEDURE — 84443 ASSAY THYROID STIM HORMONE: CPT | Performed by: NURSE PRACTITIONER

## 2021-04-27 PROCEDURE — 80053 COMPREHEN METABOLIC PANEL: CPT | Performed by: NURSE PRACTITIONER

## 2021-04-29 ENCOUNTER — TELEPHONE (OUTPATIENT)
Dept: ENDOCRINOLOGY | Facility: CLINIC | Age: 76
End: 2021-04-29

## 2021-04-29 DIAGNOSIS — E07.9 THYROID DISEASE: ICD-10-CM

## 2021-04-29 RX ORDER — LEVOTHYROXINE SODIUM 88 UG/1
88 TABLET ORAL
Qty: 30 TABLET | Refills: 11 | Status: SHIPPED | OUTPATIENT
Start: 2021-04-29 | End: 2021-06-14

## 2021-05-03 ENCOUNTER — PATIENT MESSAGE (OUTPATIENT)
Dept: ADMINISTRATIVE | Facility: OTHER | Age: 76
End: 2021-05-03

## 2021-05-03 ENCOUNTER — PATIENT OUTREACH (OUTPATIENT)
Dept: ADMINISTRATIVE | Facility: OTHER | Age: 76
End: 2021-05-03

## 2021-05-03 DIAGNOSIS — Z12.31 ENCOUNTER FOR SCREENING MAMMOGRAM FOR MALIGNANT NEOPLASM OF BREAST: Primary | ICD-10-CM

## 2021-05-03 DIAGNOSIS — Z13.5 ENCOUNTER FOR SCREENING FOR DIABETIC RETINOPATHY: ICD-10-CM

## 2021-05-04 ENCOUNTER — TELEPHONE (OUTPATIENT)
Dept: FAMILY MEDICINE | Facility: CLINIC | Age: 76
End: 2021-05-04

## 2021-05-04 ENCOUNTER — PES CALL (OUTPATIENT)
Dept: ADMINISTRATIVE | Facility: CLINIC | Age: 76
End: 2021-05-04

## 2021-05-04 ENCOUNTER — TELEPHONE (OUTPATIENT)
Dept: ENDOCRINOLOGY | Facility: CLINIC | Age: 76
End: 2021-05-04

## 2021-05-04 ENCOUNTER — OFFICE VISIT (OUTPATIENT)
Dept: ENDOCRINOLOGY | Facility: CLINIC | Age: 76
End: 2021-05-04
Payer: MEDICARE

## 2021-05-04 VITALS
HEIGHT: 61 IN | HEART RATE: 55 BPM | WEIGHT: 183.19 LBS | DIASTOLIC BLOOD PRESSURE: 80 MMHG | SYSTOLIC BLOOD PRESSURE: 122 MMHG | BODY MASS INDEX: 34.58 KG/M2

## 2021-05-04 DIAGNOSIS — E11.22 TYPE 2 DIABETES MELLITUS WITH STAGE 3B CHRONIC KIDNEY DISEASE, WITH LONG-TERM CURRENT USE OF INSULIN: Primary | ICD-10-CM

## 2021-05-04 DIAGNOSIS — E11.22 TYPE 2 DIABETES MELLITUS WITH STAGE 3 CHRONIC KIDNEY DISEASE, WITH LONG-TERM CURRENT USE OF INSULIN: ICD-10-CM

## 2021-05-04 DIAGNOSIS — I25.10 CORONARY ARTERY DISEASE INVOLVING NATIVE CORONARY ARTERY OF NATIVE HEART WITHOUT ANGINA PECTORIS: ICD-10-CM

## 2021-05-04 DIAGNOSIS — Z79.4 TYPE 2 DIABETES MELLITUS WITH DIABETIC POLYNEUROPATHY, WITH LONG-TERM CURRENT USE OF INSULIN: ICD-10-CM

## 2021-05-04 DIAGNOSIS — Z79.4 TYPE 2 DIABETES MELLITUS WITH STAGE 3 CHRONIC KIDNEY DISEASE, WITH LONG-TERM CURRENT USE OF INSULIN: ICD-10-CM

## 2021-05-04 DIAGNOSIS — E11.42 TYPE 2 DIABETES MELLITUS WITH DIABETIC POLYNEUROPATHY, WITH LONG-TERM CURRENT USE OF INSULIN: ICD-10-CM

## 2021-05-04 DIAGNOSIS — Z79.4 TYPE 2 DIABETES MELLITUS WITH STAGE 3B CHRONIC KIDNEY DISEASE, WITH LONG-TERM CURRENT USE OF INSULIN: Primary | ICD-10-CM

## 2021-05-04 DIAGNOSIS — E07.9 THYROID DISEASE: ICD-10-CM

## 2021-05-04 DIAGNOSIS — I10 ESSENTIAL HYPERTENSION: ICD-10-CM

## 2021-05-04 DIAGNOSIS — N18.32 TYPE 2 DIABETES MELLITUS WITH STAGE 3B CHRONIC KIDNEY DISEASE, WITH LONG-TERM CURRENT USE OF INSULIN: Primary | ICD-10-CM

## 2021-05-04 DIAGNOSIS — N18.30 TYPE 2 DIABETES MELLITUS WITH STAGE 3 CHRONIC KIDNEY DISEASE, WITH LONG-TERM CURRENT USE OF INSULIN: ICD-10-CM

## 2021-05-04 DIAGNOSIS — E78.5 HYPERLIPIDEMIA WITH TARGET LDL LESS THAN 70: ICD-10-CM

## 2021-05-04 DIAGNOSIS — I50.9 CONGESTIVE HEART FAILURE, UNSPECIFIED HF CHRONICITY, UNSPECIFIED HEART FAILURE TYPE: ICD-10-CM

## 2021-05-04 PROCEDURE — 99999 PR PBB SHADOW E&M-EST. PATIENT-LVL V: ICD-10-PCS | Mod: PBBFAC,,, | Performed by: NURSE PRACTITIONER

## 2021-05-04 PROCEDURE — 1126F PR PAIN SEVERITY QUANTIFIED, NO PAIN PRESENT: ICD-10-PCS | Mod: S$GLB,,, | Performed by: NURSE PRACTITIONER

## 2021-05-04 PROCEDURE — 3288F PR FALLS RISK ASSESSMENT DOCUMENTED: ICD-10-PCS | Mod: CPTII,S$GLB,, | Performed by: NURSE PRACTITIONER

## 2021-05-04 PROCEDURE — 3072F PR LOW RISK FOR RETINOPATHY: ICD-10-PCS | Mod: S$GLB,,, | Performed by: NURSE PRACTITIONER

## 2021-05-04 PROCEDURE — 1159F MED LIST DOCD IN RCRD: CPT | Mod: S$GLB,,, | Performed by: NURSE PRACTITIONER

## 2021-05-04 PROCEDURE — 1101F PR PT FALLS ASSESS DOC 0-1 FALLS W/OUT INJ PAST YR: ICD-10-PCS | Mod: CPTII,S$GLB,, | Performed by: NURSE PRACTITIONER

## 2021-05-04 PROCEDURE — 99214 PR OFFICE/OUTPT VISIT, EST, LEVL IV, 30-39 MIN: ICD-10-PCS | Mod: S$GLB,,, | Performed by: NURSE PRACTITIONER

## 2021-05-04 PROCEDURE — 3051F PR MOST RECENT HEMOGLOBIN A1C LEVEL 7.0 - < 8.0%: ICD-10-PCS | Mod: CPTII,S$GLB,, | Performed by: NURSE PRACTITIONER

## 2021-05-04 PROCEDURE — 1101F PT FALLS ASSESS-DOCD LE1/YR: CPT | Mod: CPTII,S$GLB,, | Performed by: NURSE PRACTITIONER

## 2021-05-04 PROCEDURE — 3288F FALL RISK ASSESSMENT DOCD: CPT | Mod: CPTII,S$GLB,, | Performed by: NURSE PRACTITIONER

## 2021-05-04 PROCEDURE — 3072F LOW RISK FOR RETINOPATHY: CPT | Mod: S$GLB,,, | Performed by: NURSE PRACTITIONER

## 2021-05-04 PROCEDURE — 1126F AMNT PAIN NOTED NONE PRSNT: CPT | Mod: S$GLB,,, | Performed by: NURSE PRACTITIONER

## 2021-05-04 PROCEDURE — 99499 RISK ADDL DX/OHS AUDIT: ICD-10-PCS | Mod: S$GLB,,, | Performed by: NURSE PRACTITIONER

## 2021-05-04 PROCEDURE — 99499 UNLISTED E&M SERVICE: CPT | Mod: S$GLB,,, | Performed by: NURSE PRACTITIONER

## 2021-05-04 PROCEDURE — 3051F HG A1C>EQUAL 7.0%<8.0%: CPT | Mod: CPTII,S$GLB,, | Performed by: NURSE PRACTITIONER

## 2021-05-04 PROCEDURE — 3008F PR BODY MASS INDEX (BMI) DOCUMENTED: ICD-10-PCS | Mod: CPTII,S$GLB,, | Performed by: NURSE PRACTITIONER

## 2021-05-04 PROCEDURE — 3008F BODY MASS INDEX DOCD: CPT | Mod: CPTII,S$GLB,, | Performed by: NURSE PRACTITIONER

## 2021-05-04 PROCEDURE — 1159F PR MEDICATION LIST DOCUMENTED IN MEDICAL RECORD: ICD-10-PCS | Mod: S$GLB,,, | Performed by: NURSE PRACTITIONER

## 2021-05-04 PROCEDURE — 99214 OFFICE O/P EST MOD 30 MIN: CPT | Mod: S$GLB,,, | Performed by: NURSE PRACTITIONER

## 2021-05-04 PROCEDURE — 99999 PR PBB SHADOW E&M-EST. PATIENT-LVL V: CPT | Mod: PBBFAC,,, | Performed by: NURSE PRACTITIONER

## 2021-05-04 RX ORDER — INSULIN ASPART 100 [IU]/ML
8 INJECTION, SOLUTION INTRAVENOUS; SUBCUTANEOUS 2 TIMES DAILY WITH MEALS
Qty: 30 ML | Refills: 6
Start: 2021-05-04 | End: 2021-08-25

## 2021-05-04 RX ORDER — INSULIN DEGLUDEC 200 U/ML
50 INJECTION, SOLUTION SUBCUTANEOUS DAILY
Qty: 18 ML | Refills: 6
Start: 2021-05-04 | End: 2021-08-05

## 2021-05-10 DIAGNOSIS — E78.2 MIXED HYPERLIPIDEMIA: ICD-10-CM

## 2021-05-13 RX ORDER — ROSUVASTATIN CALCIUM 40 MG/1
40 TABLET, COATED ORAL NIGHTLY
Qty: 90 TABLET | Refills: 3 | Status: SHIPPED | OUTPATIENT
Start: 2021-05-13 | End: 2022-05-16

## 2021-06-01 DIAGNOSIS — K21.9 GASTROESOPHAGEAL REFLUX DISEASE, UNSPECIFIED WHETHER ESOPHAGITIS PRESENT: ICD-10-CM

## 2021-06-04 RX ORDER — PANTOPRAZOLE SODIUM 40 MG/1
TABLET, DELAYED RELEASE ORAL
Qty: 90 TABLET | Refills: 3 | Status: SHIPPED | OUTPATIENT
Start: 2021-06-04 | End: 2022-07-20

## 2021-06-10 ENCOUNTER — LAB VISIT (OUTPATIENT)
Dept: LAB | Facility: HOSPITAL | Age: 76
End: 2021-06-10
Attending: NURSE PRACTITIONER
Payer: MEDICAID

## 2021-06-10 DIAGNOSIS — E07.9 THYROID DISEASE: ICD-10-CM

## 2021-06-10 LAB — TSH SERPL DL<=0.005 MIU/L-ACNC: 0.2 UIU/ML (ref 0.4–4)

## 2021-06-10 PROCEDURE — 84439 ASSAY OF FREE THYROXINE: CPT | Performed by: NURSE PRACTITIONER

## 2021-06-10 PROCEDURE — 36415 COLL VENOUS BLD VENIPUNCTURE: CPT | Mod: PO | Performed by: NURSE PRACTITIONER

## 2021-06-10 PROCEDURE — 84443 ASSAY THYROID STIM HORMONE: CPT | Performed by: NURSE PRACTITIONER

## 2021-06-11 LAB — T4 FREE SERPL-MCNC: 1.19 NG/DL (ref 0.71–1.51)

## 2021-06-14 ENCOUNTER — TELEPHONE (OUTPATIENT)
Dept: ENDOCRINOLOGY | Facility: CLINIC | Age: 76
End: 2021-06-14

## 2021-06-14 DIAGNOSIS — E07.9 THYROID DISEASE: Primary | ICD-10-CM

## 2021-06-14 DIAGNOSIS — G47.00 INSOMNIA, UNSPECIFIED TYPE: ICD-10-CM

## 2021-06-14 RX ORDER — LEVOTHYROXINE SODIUM 75 UG/1
75 TABLET ORAL
Qty: 30 TABLET | Refills: 11 | Status: SHIPPED | OUTPATIENT
Start: 2021-06-14 | End: 2022-06-06

## 2021-06-15 RX ORDER — TRAZODONE HYDROCHLORIDE 50 MG/1
TABLET ORAL
Qty: 90 TABLET | Refills: 1 | Status: SHIPPED | OUTPATIENT
Start: 2021-06-15 | End: 2021-12-23

## 2021-06-18 ENCOUNTER — PATIENT OUTREACH (OUTPATIENT)
Dept: ADMINISTRATIVE | Facility: OTHER | Age: 76
End: 2021-06-18

## 2021-06-22 ENCOUNTER — OFFICE VISIT (OUTPATIENT)
Dept: CARDIOLOGY | Facility: CLINIC | Age: 76
End: 2021-06-22
Payer: MEDICARE

## 2021-06-22 VITALS
BODY MASS INDEX: 34.58 KG/M2 | HEIGHT: 61 IN | HEART RATE: 60 BPM | WEIGHT: 183.19 LBS | DIASTOLIC BLOOD PRESSURE: 76 MMHG | SYSTOLIC BLOOD PRESSURE: 132 MMHG

## 2021-06-22 DIAGNOSIS — J44.9 CHRONIC OBSTRUCTIVE PULMONARY DISEASE, UNSPECIFIED COPD TYPE: ICD-10-CM

## 2021-06-22 DIAGNOSIS — N18.30 STAGE 3 CHRONIC KIDNEY DISEASE, UNSPECIFIED WHETHER STAGE 3A OR 3B CKD: ICD-10-CM

## 2021-06-22 DIAGNOSIS — N18.30 TYPE 2 DIABETES MELLITUS WITH STAGE 3 CHRONIC KIDNEY DISEASE, WITH LONG-TERM CURRENT USE OF INSULIN, UNSPECIFIED WHETHER STAGE 3A OR 3B CKD: ICD-10-CM

## 2021-06-22 DIAGNOSIS — Z79.4 TYPE 2 DIABETES MELLITUS WITH STAGE 3 CHRONIC KIDNEY DISEASE, WITH LONG-TERM CURRENT USE OF INSULIN, UNSPECIFIED WHETHER STAGE 3A OR 3B CKD: ICD-10-CM

## 2021-06-22 DIAGNOSIS — Z95.1 S/P CABG (CORONARY ARTERY BYPASS GRAFT): ICD-10-CM

## 2021-06-22 DIAGNOSIS — I25.10 CORONARY ARTERY DISEASE INVOLVING NATIVE CORONARY ARTERY OF NATIVE HEART WITHOUT ANGINA PECTORIS: ICD-10-CM

## 2021-06-22 DIAGNOSIS — I50.32 CHRONIC DIASTOLIC CONGESTIVE HEART FAILURE: ICD-10-CM

## 2021-06-22 DIAGNOSIS — E11.22 TYPE 2 DIABETES MELLITUS WITH STAGE 3 CHRONIC KIDNEY DISEASE, WITH LONG-TERM CURRENT USE OF INSULIN, UNSPECIFIED WHETHER STAGE 3A OR 3B CKD: ICD-10-CM

## 2021-06-22 DIAGNOSIS — I11.0 HYPERTENSIVE LEFT VENTRICULAR HYPERTROPHY WITH HEART FAILURE: ICD-10-CM

## 2021-06-22 DIAGNOSIS — E78.5 DYSLIPIDEMIA ASSOCIATED WITH TYPE 2 DIABETES MELLITUS: ICD-10-CM

## 2021-06-22 DIAGNOSIS — E11.69 DYSLIPIDEMIA ASSOCIATED WITH TYPE 2 DIABETES MELLITUS: ICD-10-CM

## 2021-06-22 PROCEDURE — 1101F PT FALLS ASSESS-DOCD LE1/YR: CPT | Mod: CPTII,S$GLB,, | Performed by: INTERNAL MEDICINE

## 2021-06-22 PROCEDURE — 3288F FALL RISK ASSESSMENT DOCD: CPT | Mod: CPTII,S$GLB,, | Performed by: INTERNAL MEDICINE

## 2021-06-22 PROCEDURE — 3072F LOW RISK FOR RETINOPATHY: CPT | Mod: S$GLB,,, | Performed by: INTERNAL MEDICINE

## 2021-06-22 PROCEDURE — 1159F MED LIST DOCD IN RCRD: CPT | Mod: S$GLB,,, | Performed by: INTERNAL MEDICINE

## 2021-06-22 PROCEDURE — 99214 PR OFFICE/OUTPT VISIT, EST, LEVL IV, 30-39 MIN: ICD-10-PCS | Mod: S$GLB,,, | Performed by: INTERNAL MEDICINE

## 2021-06-22 PROCEDURE — 3008F BODY MASS INDEX DOCD: CPT | Mod: CPTII,S$GLB,, | Performed by: INTERNAL MEDICINE

## 2021-06-22 PROCEDURE — 1101F PR PT FALLS ASSESS DOC 0-1 FALLS W/OUT INJ PAST YR: ICD-10-PCS | Mod: CPTII,S$GLB,, | Performed by: INTERNAL MEDICINE

## 2021-06-22 PROCEDURE — 3051F HG A1C>EQUAL 7.0%<8.0%: CPT | Mod: CPTII,S$GLB,, | Performed by: INTERNAL MEDICINE

## 2021-06-22 PROCEDURE — 3072F PR LOW RISK FOR RETINOPATHY: ICD-10-PCS | Mod: S$GLB,,, | Performed by: INTERNAL MEDICINE

## 2021-06-22 PROCEDURE — 3051F PR MOST RECENT HEMOGLOBIN A1C LEVEL 7.0 - < 8.0%: ICD-10-PCS | Mod: CPTII,S$GLB,, | Performed by: INTERNAL MEDICINE

## 2021-06-22 PROCEDURE — 3008F PR BODY MASS INDEX (BMI) DOCUMENTED: ICD-10-PCS | Mod: CPTII,S$GLB,, | Performed by: INTERNAL MEDICINE

## 2021-06-22 PROCEDURE — 99214 OFFICE O/P EST MOD 30 MIN: CPT | Mod: S$GLB,,, | Performed by: INTERNAL MEDICINE

## 2021-06-22 PROCEDURE — 3288F PR FALLS RISK ASSESSMENT DOCUMENTED: ICD-10-PCS | Mod: CPTII,S$GLB,, | Performed by: INTERNAL MEDICINE

## 2021-06-22 PROCEDURE — 99999 PR PBB SHADOW E&M-EST. PATIENT-LVL III: CPT | Mod: PBBFAC,,, | Performed by: INTERNAL MEDICINE

## 2021-06-22 PROCEDURE — 99499 RISK ADDL DX/OHS AUDIT: ICD-10-PCS | Mod: S$GLB,,, | Performed by: INTERNAL MEDICINE

## 2021-06-22 PROCEDURE — 1159F PR MEDICATION LIST DOCUMENTED IN MEDICAL RECORD: ICD-10-PCS | Mod: S$GLB,,, | Performed by: INTERNAL MEDICINE

## 2021-06-22 PROCEDURE — 99499 UNLISTED E&M SERVICE: CPT | Mod: S$GLB,,, | Performed by: INTERNAL MEDICINE

## 2021-06-22 PROCEDURE — 99999 PR PBB SHADOW E&M-EST. PATIENT-LVL III: ICD-10-PCS | Mod: PBBFAC,,, | Performed by: INTERNAL MEDICINE

## 2021-07-07 ENCOUNTER — PATIENT MESSAGE (OUTPATIENT)
Dept: ADMINISTRATIVE | Facility: HOSPITAL | Age: 76
End: 2021-07-07

## 2021-07-29 ENCOUNTER — LAB VISIT (OUTPATIENT)
Dept: LAB | Facility: HOSPITAL | Age: 76
End: 2021-07-29
Attending: FAMILY MEDICINE
Payer: MEDICARE

## 2021-07-29 DIAGNOSIS — E11.22 TYPE 2 DIABETES MELLITUS WITH STAGE 3B CHRONIC KIDNEY DISEASE, WITH LONG-TERM CURRENT USE OF INSULIN: ICD-10-CM

## 2021-07-29 DIAGNOSIS — Z79.4 TYPE 2 DIABETES MELLITUS WITH STAGE 3B CHRONIC KIDNEY DISEASE, WITH LONG-TERM CURRENT USE OF INSULIN: ICD-10-CM

## 2021-07-29 DIAGNOSIS — E07.9 THYROID DISEASE: ICD-10-CM

## 2021-07-29 DIAGNOSIS — N18.32 TYPE 2 DIABETES MELLITUS WITH STAGE 3B CHRONIC KIDNEY DISEASE, WITH LONG-TERM CURRENT USE OF INSULIN: ICD-10-CM

## 2021-07-29 PROCEDURE — 36415 COLL VENOUS BLD VENIPUNCTURE: CPT | Mod: PO | Performed by: NURSE PRACTITIONER

## 2021-07-29 PROCEDURE — 80053 COMPREHEN METABOLIC PANEL: CPT | Performed by: NURSE PRACTITIONER

## 2021-07-29 PROCEDURE — 83036 HEMOGLOBIN GLYCOSYLATED A1C: CPT | Performed by: NURSE PRACTITIONER

## 2021-07-29 PROCEDURE — 84443 ASSAY THYROID STIM HORMONE: CPT | Performed by: NURSE PRACTITIONER

## 2021-07-30 LAB
ALBUMIN SERPL BCP-MCNC: 3.6 G/DL (ref 3.5–5.2)
ALP SERPL-CCNC: 87 U/L (ref 55–135)
ALT SERPL W/O P-5'-P-CCNC: 16 U/L (ref 10–44)
ANION GAP SERPL CALC-SCNC: 16 MMOL/L (ref 8–16)
AST SERPL-CCNC: 24 U/L (ref 10–40)
BILIRUB SERPL-MCNC: 0.5 MG/DL (ref 0.1–1)
BUN SERPL-MCNC: 15 MG/DL (ref 8–23)
CALCIUM SERPL-MCNC: 9.8 MG/DL (ref 8.7–10.5)
CHLORIDE SERPL-SCNC: 104 MMOL/L (ref 95–110)
CO2 SERPL-SCNC: 21 MMOL/L (ref 23–29)
CREAT SERPL-MCNC: 1.2 MG/DL (ref 0.5–1.4)
EST. GFR  (AFRICAN AMERICAN): 51.4 ML/MIN/1.73 M^2
EST. GFR  (NON AFRICAN AMERICAN): 44.6 ML/MIN/1.73 M^2
ESTIMATED AVG GLUCOSE: 209 MG/DL (ref 68–131)
GLUCOSE SERPL-MCNC: 177 MG/DL (ref 70–110)
HBA1C MFR BLD: 8.9 % (ref 4–5.6)
POTASSIUM SERPL-SCNC: 4.4 MMOL/L (ref 3.5–5.1)
PROT SERPL-MCNC: 7.3 G/DL (ref 6–8.4)
SODIUM SERPL-SCNC: 141 MMOL/L (ref 136–145)
TSH SERPL DL<=0.005 MIU/L-ACNC: 0.93 UIU/ML (ref 0.4–4)

## 2021-08-03 ENCOUNTER — PATIENT OUTREACH (OUTPATIENT)
Dept: ADMINISTRATIVE | Facility: OTHER | Age: 76
End: 2021-08-03

## 2021-08-04 DIAGNOSIS — Z79.4 TYPE 2 DIABETES MELLITUS WITH STAGE 3B CHRONIC KIDNEY DISEASE, WITH LONG-TERM CURRENT USE OF INSULIN: ICD-10-CM

## 2021-08-04 DIAGNOSIS — E11.22 TYPE 2 DIABETES MELLITUS WITH STAGE 3B CHRONIC KIDNEY DISEASE, WITH LONG-TERM CURRENT USE OF INSULIN: ICD-10-CM

## 2021-08-04 DIAGNOSIS — N18.32 TYPE 2 DIABETES MELLITUS WITH STAGE 3B CHRONIC KIDNEY DISEASE, WITH LONG-TERM CURRENT USE OF INSULIN: ICD-10-CM

## 2021-08-05 ENCOUNTER — OFFICE VISIT (OUTPATIENT)
Dept: ENDOCRINOLOGY | Facility: CLINIC | Age: 76
End: 2021-08-05
Payer: MEDICARE

## 2021-08-05 ENCOUNTER — TELEPHONE (OUTPATIENT)
Dept: FAMILY MEDICINE | Facility: CLINIC | Age: 76
End: 2021-08-05

## 2021-08-05 DIAGNOSIS — Z79.4 TYPE 2 DIABETES MELLITUS WITH DIABETIC POLYNEUROPATHY, WITH LONG-TERM CURRENT USE OF INSULIN: ICD-10-CM

## 2021-08-05 DIAGNOSIS — I50.9 CONGESTIVE HEART FAILURE, UNSPECIFIED HF CHRONICITY, UNSPECIFIED HEART FAILURE TYPE: ICD-10-CM

## 2021-08-05 DIAGNOSIS — E11.22 TYPE 2 DIABETES MELLITUS WITH STAGE 3B CHRONIC KIDNEY DISEASE, WITH LONG-TERM CURRENT USE OF INSULIN: Primary | ICD-10-CM

## 2021-08-05 DIAGNOSIS — E11.22 TYPE 2 DIABETES MELLITUS WITH STAGE 3 CHRONIC KIDNEY DISEASE, WITH LONG-TERM CURRENT USE OF INSULIN: ICD-10-CM

## 2021-08-05 DIAGNOSIS — Z79.4 TYPE 2 DIABETES MELLITUS WITH STAGE 3 CHRONIC KIDNEY DISEASE, WITH LONG-TERM CURRENT USE OF INSULIN: ICD-10-CM

## 2021-08-05 DIAGNOSIS — N18.30 TYPE 2 DIABETES MELLITUS WITH STAGE 3 CHRONIC KIDNEY DISEASE, WITH LONG-TERM CURRENT USE OF INSULIN: ICD-10-CM

## 2021-08-05 DIAGNOSIS — N18.32 TYPE 2 DIABETES MELLITUS WITH STAGE 3B CHRONIC KIDNEY DISEASE, WITH LONG-TERM CURRENT USE OF INSULIN: Primary | ICD-10-CM

## 2021-08-05 DIAGNOSIS — I25.10 CORONARY ARTERY DISEASE INVOLVING NATIVE CORONARY ARTERY OF NATIVE HEART WITHOUT ANGINA PECTORIS: ICD-10-CM

## 2021-08-05 DIAGNOSIS — Z79.4 TYPE 2 DIABETES MELLITUS WITH STAGE 3B CHRONIC KIDNEY DISEASE, WITH LONG-TERM CURRENT USE OF INSULIN: Primary | ICD-10-CM

## 2021-08-05 DIAGNOSIS — I10 ESSENTIAL HYPERTENSION: ICD-10-CM

## 2021-08-05 DIAGNOSIS — E07.9 THYROID DISEASE: ICD-10-CM

## 2021-08-05 DIAGNOSIS — E11.42 TYPE 2 DIABETES MELLITUS WITH DIABETIC POLYNEUROPATHY, WITH LONG-TERM CURRENT USE OF INSULIN: ICD-10-CM

## 2021-08-05 DIAGNOSIS — E78.5 HYPERLIPIDEMIA WITH TARGET LDL LESS THAN 70: ICD-10-CM

## 2021-08-05 PROCEDURE — 99999 PR PBB SHADOW E&M-EST. PATIENT-LVL V: CPT | Mod: PBBFAC,,, | Performed by: NURSE PRACTITIONER

## 2021-08-05 PROCEDURE — 99999 PR PBB SHADOW E&M-EST. PATIENT-LVL V: ICD-10-PCS | Mod: PBBFAC,,, | Performed by: NURSE PRACTITIONER

## 2021-08-05 PROCEDURE — 1160F RVW MEDS BY RX/DR IN RCRD: CPT | Mod: CPTII,S$GLB,, | Performed by: NURSE PRACTITIONER

## 2021-08-05 PROCEDURE — 3288F FALL RISK ASSESSMENT DOCD: CPT | Mod: CPTII,S$GLB,, | Performed by: NURSE PRACTITIONER

## 2021-08-05 PROCEDURE — 3288F PR FALLS RISK ASSESSMENT DOCUMENTED: ICD-10-PCS | Mod: CPTII,S$GLB,, | Performed by: NURSE PRACTITIONER

## 2021-08-05 PROCEDURE — 99214 PR OFFICE/OUTPT VISIT, EST, LEVL IV, 30-39 MIN: ICD-10-PCS | Mod: S$GLB,,, | Performed by: NURSE PRACTITIONER

## 2021-08-05 PROCEDURE — 1160F PR REVIEW ALL MEDS BY PRESCRIBER/CLIN PHARMACIST DOCUMENTED: ICD-10-PCS | Mod: CPTII,S$GLB,, | Performed by: NURSE PRACTITIONER

## 2021-08-05 PROCEDURE — 99499 UNLISTED E&M SERVICE: CPT | Mod: S$PBB,HCNC,, | Performed by: NURSE PRACTITIONER

## 2021-08-05 PROCEDURE — 99214 OFFICE O/P EST MOD 30 MIN: CPT | Mod: S$GLB,,, | Performed by: NURSE PRACTITIONER

## 2021-08-05 PROCEDURE — 1101F PT FALLS ASSESS-DOCD LE1/YR: CPT | Mod: CPTII,S$GLB,, | Performed by: NURSE PRACTITIONER

## 2021-08-05 PROCEDURE — 1159F MED LIST DOCD IN RCRD: CPT | Mod: CPTII,S$GLB,, | Performed by: NURSE PRACTITIONER

## 2021-08-05 PROCEDURE — 3072F PR LOW RISK FOR RETINOPATHY: ICD-10-PCS | Mod: CPTII,S$GLB,, | Performed by: NURSE PRACTITIONER

## 2021-08-05 PROCEDURE — 3072F LOW RISK FOR RETINOPATHY: CPT | Mod: CPTII,S$GLB,, | Performed by: NURSE PRACTITIONER

## 2021-08-05 PROCEDURE — 3052F PR MOST RECENT HEMOGLOBIN A1C LEVEL 8.0 - < 9.0%: ICD-10-PCS | Mod: CPTII,S$GLB,, | Performed by: NURSE PRACTITIONER

## 2021-08-05 PROCEDURE — 3052F HG A1C>EQUAL 8.0%<EQUAL 9.0%: CPT | Mod: CPTII,S$GLB,, | Performed by: NURSE PRACTITIONER

## 2021-08-05 PROCEDURE — 1159F PR MEDICATION LIST DOCUMENTED IN MEDICAL RECORD: ICD-10-PCS | Mod: CPTII,S$GLB,, | Performed by: NURSE PRACTITIONER

## 2021-08-05 PROCEDURE — 1101F PR PT FALLS ASSESS DOC 0-1 FALLS W/OUT INJ PAST YR: ICD-10-PCS | Mod: CPTII,S$GLB,, | Performed by: NURSE PRACTITIONER

## 2021-08-05 PROCEDURE — 99499 RISK ADDL DX/OHS AUDIT: ICD-10-PCS | Mod: S$PBB,HCNC,, | Performed by: NURSE PRACTITIONER

## 2021-08-05 RX ORDER — LEVOTHYROXINE SODIUM 100 UG/1
TABLET ORAL
COMMUNITY
Start: 2021-04-29 | End: 2021-08-05 | Stop reason: DRUGHIGH

## 2021-08-05 RX ORDER — INSULIN DEGLUDEC 200 U/ML
58 INJECTION, SOLUTION SUBCUTANEOUS DAILY
Start: 2021-08-05 | End: 2021-12-21

## 2021-08-11 ENCOUNTER — PROCEDURE VISIT (OUTPATIENT)
Dept: DIABETES | Facility: CLINIC | Age: 76
End: 2021-08-11
Payer: MEDICARE

## 2021-08-11 DIAGNOSIS — N18.30 TYPE 2 DIABETES MELLITUS WITH STAGE 3 CHRONIC KIDNEY DISEASE, WITH LONG-TERM CURRENT USE OF INSULIN, UNSPECIFIED WHETHER STAGE 3A OR 3B CKD: Primary | ICD-10-CM

## 2021-08-11 DIAGNOSIS — Z79.4 TYPE 2 DIABETES MELLITUS WITH STAGE 3 CHRONIC KIDNEY DISEASE, WITH LONG-TERM CURRENT USE OF INSULIN, UNSPECIFIED WHETHER STAGE 3A OR 3B CKD: Primary | ICD-10-CM

## 2021-08-11 DIAGNOSIS — E11.22 TYPE 2 DIABETES MELLITUS WITH STAGE 3 CHRONIC KIDNEY DISEASE, WITH LONG-TERM CURRENT USE OF INSULIN, UNSPECIFIED WHETHER STAGE 3A OR 3B CKD: Primary | ICD-10-CM

## 2021-08-18 ENCOUNTER — NUTRITION (OUTPATIENT)
Dept: DIABETES | Facility: CLINIC | Age: 76
End: 2021-08-18
Payer: MEDICARE

## 2021-08-18 DIAGNOSIS — E11.22 TYPE 2 DIABETES MELLITUS WITH STAGE 3B CHRONIC KIDNEY DISEASE, WITH LONG-TERM CURRENT USE OF INSULIN: ICD-10-CM

## 2021-08-18 DIAGNOSIS — N18.32 TYPE 2 DIABETES MELLITUS WITH STAGE 3B CHRONIC KIDNEY DISEASE, WITH LONG-TERM CURRENT USE OF INSULIN: ICD-10-CM

## 2021-08-18 DIAGNOSIS — Z79.4 TYPE 2 DIABETES MELLITUS WITH STAGE 3B CHRONIC KIDNEY DISEASE, WITH LONG-TERM CURRENT USE OF INSULIN: ICD-10-CM

## 2021-08-18 PROCEDURE — 95250 CONT GLUC MNTR PHYS/QHP EQP: CPT | Mod: S$GLB,,, | Performed by: DIETITIAN, REGISTERED

## 2021-08-18 PROCEDURE — 95250 PR GLUCOSE MONITORING,72 HRS,SUB-Q SENSOR: ICD-10-PCS | Mod: S$GLB,,, | Performed by: DIETITIAN, REGISTERED

## 2021-08-25 ENCOUNTER — OFFICE VISIT (OUTPATIENT)
Dept: ENDOCRINOLOGY | Facility: CLINIC | Age: 76
End: 2021-08-25
Payer: MEDICARE

## 2021-08-25 VITALS
DIASTOLIC BLOOD PRESSURE: 70 MMHG | SYSTOLIC BLOOD PRESSURE: 130 MMHG | BODY MASS INDEX: 34.47 KG/M2 | WEIGHT: 182.56 LBS | HEART RATE: 54 BPM | HEIGHT: 61 IN

## 2021-08-25 DIAGNOSIS — E11.42 TYPE 2 DIABETES MELLITUS WITH DIABETIC POLYNEUROPATHY, WITH LONG-TERM CURRENT USE OF INSULIN: ICD-10-CM

## 2021-08-25 DIAGNOSIS — E07.9 THYROID DISEASE: ICD-10-CM

## 2021-08-25 DIAGNOSIS — E11.22 TYPE 2 DIABETES MELLITUS WITH STAGE 3B CHRONIC KIDNEY DISEASE, WITH LONG-TERM CURRENT USE OF INSULIN: Primary | ICD-10-CM

## 2021-08-25 DIAGNOSIS — I25.10 CORONARY ARTERY DISEASE INVOLVING NATIVE CORONARY ARTERY OF NATIVE HEART WITHOUT ANGINA PECTORIS: ICD-10-CM

## 2021-08-25 DIAGNOSIS — Z79.4 TYPE 2 DIABETES MELLITUS WITH STAGE 3B CHRONIC KIDNEY DISEASE, WITH LONG-TERM CURRENT USE OF INSULIN: Primary | ICD-10-CM

## 2021-08-25 DIAGNOSIS — Z79.4 TYPE 2 DIABETES MELLITUS WITH DIABETIC POLYNEUROPATHY, WITH LONG-TERM CURRENT USE OF INSULIN: ICD-10-CM

## 2021-08-25 DIAGNOSIS — E78.5 HYPERLIPIDEMIA WITH TARGET LDL LESS THAN 70: ICD-10-CM

## 2021-08-25 DIAGNOSIS — N18.30 TYPE 2 DIABETES MELLITUS WITH STAGE 3 CHRONIC KIDNEY DISEASE, WITH LONG-TERM CURRENT USE OF INSULIN: ICD-10-CM

## 2021-08-25 DIAGNOSIS — Z79.4 TYPE 2 DIABETES MELLITUS WITH STAGE 3 CHRONIC KIDNEY DISEASE, WITH LONG-TERM CURRENT USE OF INSULIN: ICD-10-CM

## 2021-08-25 DIAGNOSIS — N18.32 TYPE 2 DIABETES MELLITUS WITH STAGE 3B CHRONIC KIDNEY DISEASE, WITH LONG-TERM CURRENT USE OF INSULIN: Primary | ICD-10-CM

## 2021-08-25 DIAGNOSIS — I10 ESSENTIAL HYPERTENSION: ICD-10-CM

## 2021-08-25 DIAGNOSIS — E11.22 TYPE 2 DIABETES MELLITUS WITH STAGE 3 CHRONIC KIDNEY DISEASE, WITH LONG-TERM CURRENT USE OF INSULIN: ICD-10-CM

## 2021-08-25 PROCEDURE — 99214 OFFICE O/P EST MOD 30 MIN: CPT | Mod: HCNC,S$GLB,, | Performed by: NURSE PRACTITIONER

## 2021-08-25 PROCEDURE — 99999 PR PBB SHADOW E&M-EST. PATIENT-LVL V: ICD-10-PCS | Mod: PBBFAC,HCNC,, | Performed by: NURSE PRACTITIONER

## 2021-08-25 PROCEDURE — 3072F PR LOW RISK FOR RETINOPATHY: ICD-10-PCS | Mod: HCNC,CPTII,S$GLB, | Performed by: NURSE PRACTITIONER

## 2021-08-25 PROCEDURE — 99215 OFFICE O/P EST HI 40 MIN: CPT | Mod: PBBFAC,HCNC,PN | Performed by: NURSE PRACTITIONER

## 2021-08-25 PROCEDURE — 99499 RISK ADDL DX/OHS AUDIT: ICD-10-PCS | Mod: HCNC,S$GLB,, | Performed by: NURSE PRACTITIONER

## 2021-08-25 PROCEDURE — 99499 UNLISTED E&M SERVICE: CPT | Mod: HCNC,S$GLB,, | Performed by: NURSE PRACTITIONER

## 2021-08-25 PROCEDURE — 95251 CONT GLUC MNTR ANALYSIS I&R: CPT | Mod: HCNC,S$GLB,, | Performed by: NURSE PRACTITIONER

## 2021-08-25 PROCEDURE — 95251 PR GLUCOSE MONITOR, 72 HOUR, PHYS INTERP: ICD-10-PCS | Mod: HCNC,S$GLB,, | Performed by: NURSE PRACTITIONER

## 2021-08-25 PROCEDURE — 99214 PR OFFICE/OUTPT VISIT, EST, LEVL IV, 30-39 MIN: ICD-10-PCS | Mod: HCNC,S$GLB,, | Performed by: NURSE PRACTITIONER

## 2021-08-25 PROCEDURE — 3072F LOW RISK FOR RETINOPATHY: CPT | Mod: HCNC,CPTII,S$GLB, | Performed by: NURSE PRACTITIONER

## 2021-08-25 PROCEDURE — 99999 PR PBB SHADOW E&M-EST. PATIENT-LVL V: CPT | Mod: PBBFAC,HCNC,, | Performed by: NURSE PRACTITIONER

## 2021-08-25 RX ORDER — INSULIN ASPART 100 [IU]/ML
42 INJECTION, SOLUTION INTRAVENOUS; SUBCUTANEOUS 2 TIMES DAILY WITH MEALS
Qty: 30 ML | Refills: 6
Start: 2021-08-25 | End: 2021-08-26

## 2021-08-25 RX ORDER — DULAGLUTIDE 0.75 MG/.5ML
0.75 INJECTION, SOLUTION SUBCUTANEOUS
Qty: 4 PEN | Refills: 6 | Status: SHIPPED | OUTPATIENT
Start: 2021-08-25 | End: 2022-03-09

## 2021-09-22 DIAGNOSIS — E11.9 TYPE 2 DIABETES MELLITUS WITHOUT COMPLICATION: ICD-10-CM

## 2021-10-07 ENCOUNTER — PATIENT MESSAGE (OUTPATIENT)
Dept: ADMINISTRATIVE | Facility: HOSPITAL | Age: 76
End: 2021-10-07

## 2021-10-21 ENCOUNTER — PATIENT OUTREACH (OUTPATIENT)
Dept: ADMINISTRATIVE | Facility: OTHER | Age: 76
End: 2021-10-21

## 2021-10-21 ENCOUNTER — LAB VISIT (OUTPATIENT)
Dept: LAB | Facility: HOSPITAL | Age: 76
End: 2021-10-21
Attending: FAMILY MEDICINE
Payer: MEDICARE

## 2021-10-21 DIAGNOSIS — Z79.4 TYPE 2 DIABETES MELLITUS WITH STAGE 3B CHRONIC KIDNEY DISEASE, WITH LONG-TERM CURRENT USE OF INSULIN: ICD-10-CM

## 2021-10-21 DIAGNOSIS — E11.22 TYPE 2 DIABETES MELLITUS WITH STAGE 3B CHRONIC KIDNEY DISEASE, WITH LONG-TERM CURRENT USE OF INSULIN: ICD-10-CM

## 2021-10-21 DIAGNOSIS — N18.32 TYPE 2 DIABETES MELLITUS WITH STAGE 3B CHRONIC KIDNEY DISEASE, WITH LONG-TERM CURRENT USE OF INSULIN: ICD-10-CM

## 2021-10-21 LAB
ALBUMIN SERPL BCP-MCNC: 3.5 G/DL (ref 3.5–5.2)
ALP SERPL-CCNC: 76 U/L (ref 55–135)
ALT SERPL W/O P-5'-P-CCNC: 13 U/L (ref 10–44)
ANION GAP SERPL CALC-SCNC: 9 MMOL/L (ref 8–16)
AST SERPL-CCNC: 22 U/L (ref 10–40)
BILIRUB SERPL-MCNC: 0.4 MG/DL (ref 0.1–1)
BUN SERPL-MCNC: 13 MG/DL (ref 8–23)
CALCIUM SERPL-MCNC: 9.3 MG/DL (ref 8.7–10.5)
CHLORIDE SERPL-SCNC: 100 MMOL/L (ref 95–110)
CO2 SERPL-SCNC: 30 MMOL/L (ref 23–29)
CREAT SERPL-MCNC: 1.2 MG/DL (ref 0.5–1.4)
EST. GFR  (AFRICAN AMERICAN): 51.4 ML/MIN/1.73 M^2
EST. GFR  (NON AFRICAN AMERICAN): 44.6 ML/MIN/1.73 M^2
GLUCOSE SERPL-MCNC: 133 MG/DL (ref 70–110)
POTASSIUM SERPL-SCNC: 4.2 MMOL/L (ref 3.5–5.1)
PROT SERPL-MCNC: 6.8 G/DL (ref 6–8.4)
SODIUM SERPL-SCNC: 139 MMOL/L (ref 136–145)

## 2021-10-21 PROCEDURE — 80053 COMPREHEN METABOLIC PANEL: CPT | Mod: HCNC | Performed by: NURSE PRACTITIONER

## 2021-10-21 PROCEDURE — 83036 HEMOGLOBIN GLYCOSYLATED A1C: CPT | Mod: HCNC | Performed by: NURSE PRACTITIONER

## 2021-10-21 PROCEDURE — 36415 COLL VENOUS BLD VENIPUNCTURE: CPT | Mod: HCNC,PO | Performed by: NURSE PRACTITIONER

## 2021-10-22 ENCOUNTER — OFFICE VISIT (OUTPATIENT)
Dept: OPHTHALMOLOGY | Facility: CLINIC | Age: 76
End: 2021-10-22
Payer: MEDICARE

## 2021-10-22 DIAGNOSIS — H35.363 RETINAL DRUSEN OF BOTH EYES: ICD-10-CM

## 2021-10-22 DIAGNOSIS — H02.59 FLOPPY EYELID SYNDROME OF BOTH EYES: ICD-10-CM

## 2021-10-22 DIAGNOSIS — H25.13 NUCLEAR SCLEROTIC CATARACT, BILATERAL: ICD-10-CM

## 2021-10-22 DIAGNOSIS — E11.9 DIABETES MELLITUS TYPE 2 WITHOUT RETINOPATHY: Primary | ICD-10-CM

## 2021-10-22 LAB
ESTIMATED AVG GLUCOSE: 160 MG/DL (ref 68–131)
HBA1C MFR BLD: 7.2 % (ref 4–5.6)

## 2021-10-22 PROCEDURE — 3060F POS MICROALBUMINURIA REV: CPT | Mod: HCNC,CPTII,S$GLB, | Performed by: OPHTHALMOLOGY

## 2021-10-22 PROCEDURE — 1159F MED LIST DOCD IN RCRD: CPT | Mod: HCNC,CPTII,S$GLB, | Performed by: OPHTHALMOLOGY

## 2021-10-22 PROCEDURE — 1126F AMNT PAIN NOTED NONE PRSNT: CPT | Mod: HCNC,CPTII,S$GLB, | Performed by: OPHTHALMOLOGY

## 2021-10-22 PROCEDURE — 3051F HG A1C>EQUAL 7.0%<8.0%: CPT | Mod: HCNC,CPTII,S$GLB, | Performed by: OPHTHALMOLOGY

## 2021-10-22 PROCEDURE — 92014 COMPRE OPH EXAM EST PT 1/>: CPT | Mod: HCNC,S$GLB,, | Performed by: OPHTHALMOLOGY

## 2021-10-22 PROCEDURE — 1126F PR PAIN SEVERITY QUANTIFIED, NO PAIN PRESENT: ICD-10-PCS | Mod: HCNC,CPTII,S$GLB, | Performed by: OPHTHALMOLOGY

## 2021-10-22 PROCEDURE — 99999 PR PBB SHADOW E&M-EST. PATIENT-LVL III: ICD-10-PCS | Mod: PBBFAC,HCNC,, | Performed by: OPHTHALMOLOGY

## 2021-10-22 PROCEDURE — 3288F PR FALLS RISK ASSESSMENT DOCUMENTED: ICD-10-PCS | Mod: HCNC,CPTII,S$GLB, | Performed by: OPHTHALMOLOGY

## 2021-10-22 PROCEDURE — 3288F FALL RISK ASSESSMENT DOCD: CPT | Mod: HCNC,CPTII,S$GLB, | Performed by: OPHTHALMOLOGY

## 2021-10-22 PROCEDURE — 3060F PR POS MICROALBUMINURIA RESULT DOCUMENTED/REVIEW: ICD-10-PCS | Mod: HCNC,CPTII,S$GLB, | Performed by: OPHTHALMOLOGY

## 2021-10-22 PROCEDURE — 3051F PR MOST RECENT HEMOGLOBIN A1C LEVEL 7.0 - < 8.0%: ICD-10-PCS | Mod: HCNC,CPTII,S$GLB, | Performed by: OPHTHALMOLOGY

## 2021-10-22 PROCEDURE — 92014 PR EYE EXAM, EST PATIENT,COMPREHESV: ICD-10-PCS | Mod: HCNC,S$GLB,, | Performed by: OPHTHALMOLOGY

## 2021-10-22 PROCEDURE — 1159F PR MEDICATION LIST DOCUMENTED IN MEDICAL RECORD: ICD-10-PCS | Mod: HCNC,CPTII,S$GLB, | Performed by: OPHTHALMOLOGY

## 2021-10-22 PROCEDURE — 1101F PT FALLS ASSESS-DOCD LE1/YR: CPT | Mod: HCNC,CPTII,S$GLB, | Performed by: OPHTHALMOLOGY

## 2021-10-22 PROCEDURE — 1101F PR PT FALLS ASSESS DOC 0-1 FALLS W/OUT INJ PAST YR: ICD-10-PCS | Mod: HCNC,CPTII,S$GLB, | Performed by: OPHTHALMOLOGY

## 2021-10-22 PROCEDURE — 2023F DILAT RTA XM W/O RTNOPTHY: CPT | Mod: HCNC,CPTII,S$GLB, | Performed by: OPHTHALMOLOGY

## 2021-10-22 PROCEDURE — 1160F RVW MEDS BY RX/DR IN RCRD: CPT | Mod: HCNC,CPTII,S$GLB, | Performed by: OPHTHALMOLOGY

## 2021-10-22 PROCEDURE — 99999 PR PBB SHADOW E&M-EST. PATIENT-LVL III: CPT | Mod: PBBFAC,HCNC,, | Performed by: OPHTHALMOLOGY

## 2021-10-22 PROCEDURE — 1160F PR REVIEW ALL MEDS BY PRESCRIBER/CLIN PHARMACIST DOCUMENTED: ICD-10-PCS | Mod: HCNC,CPTII,S$GLB, | Performed by: OPHTHALMOLOGY

## 2021-10-22 PROCEDURE — 3066F NEPHROPATHY DOC TX: CPT | Mod: HCNC,CPTII,S$GLB, | Performed by: OPHTHALMOLOGY

## 2021-10-22 PROCEDURE — 3066F PR DOCUMENTATION OF TREATMENT FOR NEPHROPATHY: ICD-10-PCS | Mod: HCNC,CPTII,S$GLB, | Performed by: OPHTHALMOLOGY

## 2021-10-22 PROCEDURE — 2023F PR DILATED RETINAL EXAM W/O EVID OF RETINOPATHY: ICD-10-PCS | Mod: HCNC,CPTII,S$GLB, | Performed by: OPHTHALMOLOGY

## 2021-10-28 ENCOUNTER — OFFICE VISIT (OUTPATIENT)
Dept: ENDOCRINOLOGY | Facility: CLINIC | Age: 76
End: 2021-10-28
Payer: MEDICARE

## 2021-10-28 VITALS
SYSTOLIC BLOOD PRESSURE: 110 MMHG | WEIGHT: 180 LBS | BODY MASS INDEX: 33.99 KG/M2 | DIASTOLIC BLOOD PRESSURE: 70 MMHG | HEART RATE: 72 BPM | HEIGHT: 61 IN

## 2021-10-28 DIAGNOSIS — E07.9 THYROID DISEASE: ICD-10-CM

## 2021-10-28 DIAGNOSIS — K62.5 RECTAL BLEEDING: ICD-10-CM

## 2021-10-28 DIAGNOSIS — Z79.4 TYPE 2 DIABETES MELLITUS WITH DIABETIC POLYNEUROPATHY, WITH LONG-TERM CURRENT USE OF INSULIN: ICD-10-CM

## 2021-10-28 DIAGNOSIS — E78.5 HYPERLIPIDEMIA WITH TARGET LDL LESS THAN 70: ICD-10-CM

## 2021-10-28 DIAGNOSIS — E11.22 TYPE 2 DIABETES MELLITUS WITH STAGE 3B CHRONIC KIDNEY DISEASE, WITH LONG-TERM CURRENT USE OF INSULIN: Primary | ICD-10-CM

## 2021-10-28 DIAGNOSIS — E11.42 TYPE 2 DIABETES MELLITUS WITH DIABETIC POLYNEUROPATHY, WITH LONG-TERM CURRENT USE OF INSULIN: ICD-10-CM

## 2021-10-28 DIAGNOSIS — I10 ESSENTIAL HYPERTENSION: ICD-10-CM

## 2021-10-28 DIAGNOSIS — Z79.4 TYPE 2 DIABETES MELLITUS WITH STAGE 3B CHRONIC KIDNEY DISEASE, WITH LONG-TERM CURRENT USE OF INSULIN: Primary | ICD-10-CM

## 2021-10-28 DIAGNOSIS — I25.10 CORONARY ARTERY DISEASE INVOLVING NATIVE CORONARY ARTERY OF NATIVE HEART WITHOUT ANGINA PECTORIS: ICD-10-CM

## 2021-10-28 DIAGNOSIS — N18.32 TYPE 2 DIABETES MELLITUS WITH STAGE 3B CHRONIC KIDNEY DISEASE, WITH LONG-TERM CURRENT USE OF INSULIN: Primary | ICD-10-CM

## 2021-10-28 PROCEDURE — 3066F NEPHROPATHY DOC TX: CPT | Mod: HCNC,CPTII,S$GLB, | Performed by: NURSE PRACTITIONER

## 2021-10-28 PROCEDURE — 3066F PR DOCUMENTATION OF TREATMENT FOR NEPHROPATHY: ICD-10-PCS | Mod: HCNC,CPTII,S$GLB, | Performed by: NURSE PRACTITIONER

## 2021-10-28 PROCEDURE — 3078F PR MOST RECENT DIASTOLIC BLOOD PRESSURE < 80 MM HG: ICD-10-PCS | Mod: HCNC,CPTII,S$GLB, | Performed by: NURSE PRACTITIONER

## 2021-10-28 PROCEDURE — 3008F PR BODY MASS INDEX (BMI) DOCUMENTED: ICD-10-PCS | Mod: HCNC,CPTII,S$GLB, | Performed by: NURSE PRACTITIONER

## 2021-10-28 PROCEDURE — 99999 PR PBB SHADOW E&M-EST. PATIENT-LVL V: CPT | Mod: PBBFAC,HCNC,, | Performed by: NURSE PRACTITIONER

## 2021-10-28 PROCEDURE — 3072F LOW RISK FOR RETINOPATHY: CPT | Mod: HCNC,CPTII,S$GLB, | Performed by: NURSE PRACTITIONER

## 2021-10-28 PROCEDURE — 3074F SYST BP LT 130 MM HG: CPT | Mod: HCNC,CPTII,S$GLB, | Performed by: NURSE PRACTITIONER

## 2021-10-28 PROCEDURE — 1101F PT FALLS ASSESS-DOCD LE1/YR: CPT | Mod: HCNC,CPTII,S$GLB, | Performed by: NURSE PRACTITIONER

## 2021-10-28 PROCEDURE — 1160F RVW MEDS BY RX/DR IN RCRD: CPT | Mod: HCNC,CPTII,S$GLB, | Performed by: NURSE PRACTITIONER

## 2021-10-28 PROCEDURE — 3060F POS MICROALBUMINURIA REV: CPT | Mod: HCNC,CPTII,S$GLB, | Performed by: NURSE PRACTITIONER

## 2021-10-28 PROCEDURE — 3288F FALL RISK ASSESSMENT DOCD: CPT | Mod: HCNC,CPTII,S$GLB, | Performed by: NURSE PRACTITIONER

## 2021-10-28 PROCEDURE — 1101F PR PT FALLS ASSESS DOC 0-1 FALLS W/OUT INJ PAST YR: ICD-10-PCS | Mod: HCNC,CPTII,S$GLB, | Performed by: NURSE PRACTITIONER

## 2021-10-28 PROCEDURE — 99499 UNLISTED E&M SERVICE: CPT | Mod: HCNC,S$GLB,, | Performed by: NURSE PRACTITIONER

## 2021-10-28 PROCEDURE — 3288F PR FALLS RISK ASSESSMENT DOCUMENTED: ICD-10-PCS | Mod: HCNC,CPTII,S$GLB, | Performed by: NURSE PRACTITIONER

## 2021-10-28 PROCEDURE — 1126F PR PAIN SEVERITY QUANTIFIED, NO PAIN PRESENT: ICD-10-PCS | Mod: HCNC,CPTII,S$GLB, | Performed by: NURSE PRACTITIONER

## 2021-10-28 PROCEDURE — 1126F AMNT PAIN NOTED NONE PRSNT: CPT | Mod: HCNC,CPTII,S$GLB, | Performed by: NURSE PRACTITIONER

## 2021-10-28 PROCEDURE — 3008F BODY MASS INDEX DOCD: CPT | Mod: HCNC,CPTII,S$GLB, | Performed by: NURSE PRACTITIONER

## 2021-10-28 PROCEDURE — 3051F PR MOST RECENT HEMOGLOBIN A1C LEVEL 7.0 - < 8.0%: ICD-10-PCS | Mod: HCNC,CPTII,S$GLB, | Performed by: NURSE PRACTITIONER

## 2021-10-28 PROCEDURE — 1160F PR REVIEW ALL MEDS BY PRESCRIBER/CLIN PHARMACIST DOCUMENTED: ICD-10-PCS | Mod: HCNC,CPTII,S$GLB, | Performed by: NURSE PRACTITIONER

## 2021-10-28 PROCEDURE — 3078F DIAST BP <80 MM HG: CPT | Mod: HCNC,CPTII,S$GLB, | Performed by: NURSE PRACTITIONER

## 2021-10-28 PROCEDURE — 1159F MED LIST DOCD IN RCRD: CPT | Mod: HCNC,CPTII,S$GLB, | Performed by: NURSE PRACTITIONER

## 2021-10-28 PROCEDURE — 3051F HG A1C>EQUAL 7.0%<8.0%: CPT | Mod: HCNC,CPTII,S$GLB, | Performed by: NURSE PRACTITIONER

## 2021-10-28 PROCEDURE — 99499 RISK ADDL DX/OHS AUDIT: ICD-10-PCS | Mod: HCNC,S$GLB,, | Performed by: NURSE PRACTITIONER

## 2021-10-28 PROCEDURE — 3072F PR LOW RISK FOR RETINOPATHY: ICD-10-PCS | Mod: HCNC,CPTII,S$GLB, | Performed by: NURSE PRACTITIONER

## 2021-10-28 PROCEDURE — 3060F PR POS MICROALBUMINURIA RESULT DOCUMENTED/REVIEW: ICD-10-PCS | Mod: HCNC,CPTII,S$GLB, | Performed by: NURSE PRACTITIONER

## 2021-10-28 PROCEDURE — 99214 OFFICE O/P EST MOD 30 MIN: CPT | Mod: HCNC,S$GLB,, | Performed by: NURSE PRACTITIONER

## 2021-10-28 PROCEDURE — 99999 PR PBB SHADOW E&M-EST. PATIENT-LVL V: ICD-10-PCS | Mod: PBBFAC,HCNC,, | Performed by: NURSE PRACTITIONER

## 2021-10-28 PROCEDURE — 99214 PR OFFICE/OUTPT VISIT, EST, LEVL IV, 30-39 MIN: ICD-10-PCS | Mod: HCNC,S$GLB,, | Performed by: NURSE PRACTITIONER

## 2021-10-28 PROCEDURE — 1159F PR MEDICATION LIST DOCUMENTED IN MEDICAL RECORD: ICD-10-PCS | Mod: HCNC,CPTII,S$GLB, | Performed by: NURSE PRACTITIONER

## 2021-10-28 PROCEDURE — 3074F PR MOST RECENT SYSTOLIC BLOOD PRESSURE < 130 MM HG: ICD-10-PCS | Mod: HCNC,CPTII,S$GLB, | Performed by: NURSE PRACTITIONER

## 2021-11-15 ENCOUNTER — TELEPHONE (OUTPATIENT)
Dept: ENDOCRINOLOGY | Facility: CLINIC | Age: 76
End: 2021-11-15
Payer: MEDICARE

## 2021-11-15 ENCOUNTER — TELEPHONE (OUTPATIENT)
Dept: SURGERY | Facility: CLINIC | Age: 76
End: 2021-11-15
Payer: MEDICARE

## 2021-11-23 ENCOUNTER — OFFICE VISIT (OUTPATIENT)
Dept: SURGERY | Facility: CLINIC | Age: 76
End: 2021-11-23
Payer: MEDICARE

## 2021-11-23 VITALS
TEMPERATURE: 98 F | HEART RATE: 62 BPM | BODY MASS INDEX: 33.84 KG/M2 | HEIGHT: 61 IN | SYSTOLIC BLOOD PRESSURE: 129 MMHG | DIASTOLIC BLOOD PRESSURE: 60 MMHG | WEIGHT: 179.25 LBS

## 2021-11-23 DIAGNOSIS — K59.00 CONSTIPATION, UNSPECIFIED CONSTIPATION TYPE: ICD-10-CM

## 2021-11-23 DIAGNOSIS — Z86.010 HISTORY OF COLON POLYPS: Primary | ICD-10-CM

## 2021-11-23 PROCEDURE — 99999 PR PBB SHADOW E&M-EST. PATIENT-LVL V: CPT | Mod: PBBFAC,HCNC,, | Performed by: SURGERY

## 2021-11-23 PROCEDURE — 99215 OFFICE O/P EST HI 40 MIN: CPT | Mod: PBBFAC,HCNC,PN | Performed by: SURGERY

## 2021-11-23 PROCEDURE — 3072F PR LOW RISK FOR RETINOPATHY: ICD-10-PCS | Mod: HCNC,CPTII,S$GLB,ICN | Performed by: SURGERY

## 2021-11-23 PROCEDURE — 3060F POS MICROALBUMINURIA REV: CPT | Mod: HCNC,CPTII,S$GLB,ICN | Performed by: SURGERY

## 2021-11-23 PROCEDURE — 3066F PR DOCUMENTATION OF TREATMENT FOR NEPHROPATHY: ICD-10-PCS | Mod: HCNC,CPTII,S$GLB,ICN | Performed by: SURGERY

## 2021-11-23 PROCEDURE — 3072F LOW RISK FOR RETINOPATHY: CPT | Mod: HCNC,CPTII,S$GLB,ICN | Performed by: SURGERY

## 2021-11-23 PROCEDURE — 3066F NEPHROPATHY DOC TX: CPT | Mod: HCNC,CPTII,S$GLB,ICN | Performed by: SURGERY

## 2021-11-23 PROCEDURE — 99203 OFFICE O/P NEW LOW 30 MIN: CPT | Mod: HCNC,S$GLB,ICN, | Performed by: SURGERY

## 2021-11-23 PROCEDURE — 3060F PR POS MICROALBUMINURIA RESULT DOCUMENTED/REVIEW: ICD-10-PCS | Mod: HCNC,CPTII,S$GLB,ICN | Performed by: SURGERY

## 2021-11-23 PROCEDURE — 99999 PR PBB SHADOW E&M-EST. PATIENT-LVL V: ICD-10-PCS | Mod: PBBFAC,HCNC,, | Performed by: SURGERY

## 2021-11-23 PROCEDURE — 99203 PR OFFICE/OUTPT VISIT, NEW, LEVL III, 30-44 MIN: ICD-10-PCS | Mod: HCNC,S$GLB,ICN, | Performed by: SURGERY

## 2021-11-23 RX ORDER — SODIUM CHLORIDE 0.9 % (FLUSH) 0.9 %
10 SYRINGE (ML) INJECTION
Status: CANCELLED | OUTPATIENT
Start: 2021-11-23

## 2021-11-23 RX ORDER — SODIUM CHLORIDE, SODIUM LACTATE, POTASSIUM CHLORIDE, CALCIUM CHLORIDE 600; 310; 30; 20 MG/100ML; MG/100ML; MG/100ML; MG/100ML
INJECTION, SOLUTION INTRAVENOUS CONTINUOUS
Status: CANCELLED | OUTPATIENT
Start: 2021-11-23

## 2021-12-06 ENCOUNTER — TELEPHONE (OUTPATIENT)
Dept: SURGERY | Facility: CLINIC | Age: 76
End: 2021-12-06
Payer: MEDICARE

## 2021-12-06 ENCOUNTER — TELEPHONE (OUTPATIENT)
Dept: ENDOCRINOLOGY | Facility: CLINIC | Age: 76
End: 2021-12-06
Payer: MEDICARE

## 2021-12-07 ENCOUNTER — ANESTHESIA EVENT (OUTPATIENT)
Dept: ENDOSCOPY | Facility: HOSPITAL | Age: 76
End: 2021-12-07
Payer: MEDICARE

## 2021-12-07 ENCOUNTER — ANESTHESIA (OUTPATIENT)
Dept: ENDOSCOPY | Facility: HOSPITAL | Age: 76
End: 2021-12-07
Payer: MEDICARE

## 2021-12-07 ENCOUNTER — HOSPITAL ENCOUNTER (OUTPATIENT)
Facility: HOSPITAL | Age: 76
Discharge: HOME | End: 2021-12-07
Attending: SURGERY | Admitting: SURGERY
Payer: MEDICARE

## 2021-12-07 VITALS
DIASTOLIC BLOOD PRESSURE: 56 MMHG | TEMPERATURE: 98 F | SYSTOLIC BLOOD PRESSURE: 116 MMHG | OXYGEN SATURATION: 95 % | RESPIRATION RATE: 16 BRPM | HEART RATE: 84 BPM

## 2021-12-07 DIAGNOSIS — Z12.11 SCREENING FOR COLON CANCER: ICD-10-CM

## 2021-12-07 LAB — POCT GLUCOSE: 137 MG/DL (ref 70–110)

## 2021-12-07 PROCEDURE — 63600175 PHARM REV CODE 636 W HCPCS: Mod: HCNC | Performed by: REGISTERED NURSE

## 2021-12-07 PROCEDURE — 25000003 PHARM REV CODE 250: Mod: HCNC | Performed by: SURGERY

## 2021-12-07 PROCEDURE — 37000009 HC ANESTHESIA EA ADD 15 MINS: Mod: HCNC | Performed by: SURGERY

## 2021-12-07 PROCEDURE — D9220A PRA ANESTHESIA: ICD-10-PCS | Mod: HCNC,ANES,, | Performed by: ANESTHESIOLOGY

## 2021-12-07 PROCEDURE — 82962 GLUCOSE BLOOD TEST: CPT | Mod: HCNC | Performed by: SURGERY

## 2021-12-07 PROCEDURE — 37000008 HC ANESTHESIA 1ST 15 MINUTES: Mod: HCNC | Performed by: SURGERY

## 2021-12-07 PROCEDURE — D9220A PRA ANESTHESIA: Mod: HCNC,CRNA,, | Performed by: REGISTERED NURSE

## 2021-12-07 PROCEDURE — G0121 COLON CA SCRN NOT HI RSK IND: ICD-10-PCS | Mod: HCNC,,, | Performed by: SURGERY

## 2021-12-07 PROCEDURE — D9220A PRA ANESTHESIA: Mod: HCNC,ANES,, | Performed by: ANESTHESIOLOGY

## 2021-12-07 PROCEDURE — G0121 COLON CA SCRN NOT HI RSK IND: HCPCS | Mod: HCNC | Performed by: SURGERY

## 2021-12-07 PROCEDURE — 25000003 PHARM REV CODE 250: Mod: HCNC | Performed by: REGISTERED NURSE

## 2021-12-07 PROCEDURE — D9220A PRA ANESTHESIA: ICD-10-PCS | Mod: HCNC,CRNA,, | Performed by: REGISTERED NURSE

## 2021-12-07 PROCEDURE — G0121 COLON CA SCRN NOT HI RSK IND: HCPCS | Mod: HCNC,,, | Performed by: SURGERY

## 2021-12-07 RX ORDER — ONDANSETRON 2 MG/ML
INJECTION INTRAMUSCULAR; INTRAVENOUS
Status: DISCONTINUED | OUTPATIENT
Start: 2021-12-07 | End: 2021-12-07

## 2021-12-07 RX ORDER — PROPOFOL 10 MG/ML
VIAL (ML) INTRAVENOUS
Status: DISCONTINUED | OUTPATIENT
Start: 2021-12-07 | End: 2021-12-07

## 2021-12-07 RX ORDER — SODIUM CHLORIDE 9 MG/ML
INJECTION, SOLUTION INTRAVENOUS CONTINUOUS
Status: DISCONTINUED | OUTPATIENT
Start: 2021-12-07 | End: 2021-12-07 | Stop reason: HOSPADM

## 2021-12-07 RX ORDER — LIDOCAINE HCL/PF 100 MG/5ML
SYRINGE (ML) INTRAVENOUS
Status: DISCONTINUED | OUTPATIENT
Start: 2021-12-07 | End: 2021-12-07

## 2021-12-07 RX ORDER — EPHEDRINE SULFATE 50 MG/ML
INJECTION, SOLUTION INTRAVENOUS
Status: DISCONTINUED | OUTPATIENT
Start: 2021-12-07 | End: 2021-12-07

## 2021-12-07 RX ADMIN — PROPOFOL 30 MG: 10 INJECTION, EMULSION INTRAVENOUS at 07:12

## 2021-12-07 RX ADMIN — ONDANSETRON 4 MG: 2 INJECTION, SOLUTION INTRAMUSCULAR; INTRAVENOUS at 07:12

## 2021-12-07 RX ADMIN — PROPOFOL 20 MG: 10 INJECTION, EMULSION INTRAVENOUS at 07:12

## 2021-12-07 RX ADMIN — EPHEDRINE SULFATE 25 MG: 50 INJECTION INTRAVENOUS at 07:12

## 2021-12-07 RX ADMIN — PROPOFOL 20 MG: 10 INJECTION, EMULSION INTRAVENOUS at 08:12

## 2021-12-07 RX ADMIN — SODIUM CHLORIDE: 0.9 INJECTION, SOLUTION INTRAVENOUS at 07:12

## 2021-12-07 RX ADMIN — EPHEDRINE SULFATE 5 MG: 50 INJECTION INTRAVENOUS at 07:12

## 2021-12-07 RX ADMIN — EPHEDRINE SULFATE 10 MG: 50 INJECTION INTRAVENOUS at 07:12

## 2021-12-07 RX ADMIN — LIDOCAINE HYDROCHLORIDE 100 MG: 20 INJECTION INTRAVENOUS at 07:12

## 2021-12-22 DIAGNOSIS — N18.30 TYPE 2 DIABETES MELLITUS WITH STAGE 3 CHRONIC KIDNEY DISEASE, WITH LONG-TERM CURRENT USE OF INSULIN: ICD-10-CM

## 2021-12-22 DIAGNOSIS — E11.22 TYPE 2 DIABETES MELLITUS WITH STAGE 3 CHRONIC KIDNEY DISEASE, WITH LONG-TERM CURRENT USE OF INSULIN: ICD-10-CM

## 2021-12-22 DIAGNOSIS — Z79.4 TYPE 2 DIABETES MELLITUS WITH STAGE 3 CHRONIC KIDNEY DISEASE, WITH LONG-TERM CURRENT USE OF INSULIN: ICD-10-CM

## 2021-12-29 DIAGNOSIS — N18.30 TYPE 2 DIABETES MELLITUS WITH STAGE 3 CHRONIC KIDNEY DISEASE, WITH LONG-TERM CURRENT USE OF INSULIN: ICD-10-CM

## 2021-12-29 DIAGNOSIS — E11.22 TYPE 2 DIABETES MELLITUS WITH STAGE 3 CHRONIC KIDNEY DISEASE, WITH LONG-TERM CURRENT USE OF INSULIN: ICD-10-CM

## 2021-12-29 DIAGNOSIS — Z79.4 TYPE 2 DIABETES MELLITUS WITH STAGE 3 CHRONIC KIDNEY DISEASE, WITH LONG-TERM CURRENT USE OF INSULIN: ICD-10-CM

## 2022-01-05 ENCOUNTER — TELEPHONE (OUTPATIENT)
Dept: FAMILY MEDICINE | Facility: CLINIC | Age: 77
End: 2022-01-05
Payer: MEDICARE

## 2022-01-05 DIAGNOSIS — E11.22 TYPE 2 DIABETES MELLITUS WITH STAGE 3 CHRONIC KIDNEY DISEASE, WITH LONG-TERM CURRENT USE OF INSULIN: ICD-10-CM

## 2022-01-05 DIAGNOSIS — Z79.4 TYPE 2 DIABETES MELLITUS WITH STAGE 3 CHRONIC KIDNEY DISEASE, WITH LONG-TERM CURRENT USE OF INSULIN: ICD-10-CM

## 2022-01-05 DIAGNOSIS — N18.30 TYPE 2 DIABETES MELLITUS WITH STAGE 3 CHRONIC KIDNEY DISEASE, WITH LONG-TERM CURRENT USE OF INSULIN: ICD-10-CM

## 2022-01-05 NOTE — TELEPHONE ENCOUNTER
----- Message from Kathy Grissom Patient Care Assistant sent at 1/5/2022  1:34 PM CST -----  Contact: Pt  Type: Same Day Appointment    Caller is requesting a same day appointment.  Caller declined first available appt listed below.    Name of caller:Pt  When is the first available appt:No available dates  Symptoms:Cough and Sore Throat for 3 days  Best Call back number:[phone]    Additional Info: Pt states that she need to be seen today for the symptoms she is having. If she is not able to be seen today she would like something prescribed to help with her illness. Please call back and advise-Thank you~

## 2022-01-06 ENCOUNTER — LAB VISIT (OUTPATIENT)
Dept: PRIMARY CARE CLINIC | Facility: OTHER | Age: 77
End: 2022-01-06
Attending: INTERNAL MEDICINE
Payer: MEDICARE

## 2022-01-06 DIAGNOSIS — Z20.822 ENCOUNTER FOR LABORATORY TESTING FOR COVID-19 VIRUS: ICD-10-CM

## 2022-01-06 PROCEDURE — U0003 INFECTIOUS AGENT DETECTION BY NUCLEIC ACID (DNA OR RNA); SEVERE ACUTE RESPIRATORY SYNDROME CORONAVIRUS 2 (SARS-COV-2) (CORONAVIRUS DISEASE [COVID-19]), AMPLIFIED PROBE TECHNIQUE, MAKING USE OF HIGH THROUGHPUT TECHNOLOGIES AS DESCRIBED BY CMS-2020-01-R: HCPCS | Mod: ST120,HCNC | Performed by: INTERNAL MEDICINE

## 2022-01-11 ENCOUNTER — LAB VISIT (OUTPATIENT)
Dept: LAB | Facility: HOSPITAL | Age: 77
End: 2022-01-11
Attending: NURSE PRACTITIONER
Payer: MEDICARE

## 2022-01-11 DIAGNOSIS — N18.32 TYPE 2 DIABETES MELLITUS WITH STAGE 3B CHRONIC KIDNEY DISEASE, WITH LONG-TERM CURRENT USE OF INSULIN: ICD-10-CM

## 2022-01-11 DIAGNOSIS — E11.22 TYPE 2 DIABETES MELLITUS WITH STAGE 3B CHRONIC KIDNEY DISEASE, WITH LONG-TERM CURRENT USE OF INSULIN: ICD-10-CM

## 2022-01-11 DIAGNOSIS — N18.30 CHRONIC KIDNEY DISEASE, STAGE III (MODERATE): Primary | ICD-10-CM

## 2022-01-11 DIAGNOSIS — Z79.4 TYPE 2 DIABETES MELLITUS WITH STAGE 3B CHRONIC KIDNEY DISEASE, WITH LONG-TERM CURRENT USE OF INSULIN: ICD-10-CM

## 2022-01-11 LAB
ALBUMIN SERPL BCP-MCNC: 3.6 G/DL (ref 3.5–5.2)
ALBUMIN SERPL BCP-MCNC: 3.7 G/DL (ref 3.5–5.2)
ALP SERPL-CCNC: 84 U/L (ref 55–135)
ALT SERPL W/O P-5'-P-CCNC: 15 U/L (ref 10–44)
ANION GAP SERPL CALC-SCNC: 7 MMOL/L (ref 8–16)
ANION GAP SERPL CALC-SCNC: 8 MMOL/L (ref 8–16)
AST SERPL-CCNC: 21 U/L (ref 10–40)
BILIRUB SERPL-MCNC: 0.4 MG/DL (ref 0.1–1)
BUN SERPL-MCNC: 15 MG/DL (ref 8–23)
BUN SERPL-MCNC: 15 MG/DL (ref 8–23)
CALCIUM SERPL-MCNC: 9.2 MG/DL (ref 8.7–10.5)
CALCIUM SERPL-MCNC: 9.4 MG/DL (ref 8.7–10.5)
CHLORIDE SERPL-SCNC: 101 MMOL/L (ref 95–110)
CHLORIDE SERPL-SCNC: 102 MMOL/L (ref 95–110)
CO2 SERPL-SCNC: 29 MMOL/L (ref 23–29)
CO2 SERPL-SCNC: 31 MMOL/L (ref 23–29)
CREAT SERPL-MCNC: 1.4 MG/DL (ref 0.5–1.4)
CREAT SERPL-MCNC: 1.4 MG/DL (ref 0.5–1.4)
EST. GFR  (AFRICAN AMERICAN): 42.7 ML/MIN/1.73 M^2
EST. GFR  (AFRICAN AMERICAN): 42.7 ML/MIN/1.73 M^2
EST. GFR  (NON AFRICAN AMERICAN): 37 ML/MIN/1.73 M^2
EST. GFR  (NON AFRICAN AMERICAN): 37 ML/MIN/1.73 M^2
ESTIMATED AVG GLUCOSE: 157 MG/DL (ref 68–131)
GLUCOSE SERPL-MCNC: 123 MG/DL (ref 70–110)
GLUCOSE SERPL-MCNC: 124 MG/DL (ref 70–110)
HBA1C MFR BLD: 7.1 % (ref 4–5.6)
PHOSPHATE SERPL-MCNC: 3.3 MG/DL (ref 2.7–4.5)
POTASSIUM SERPL-SCNC: 4 MMOL/L (ref 3.5–5.1)
POTASSIUM SERPL-SCNC: 4.2 MMOL/L (ref 3.5–5.1)
PROT SERPL-MCNC: 7.2 G/DL (ref 6–8.4)
SODIUM SERPL-SCNC: 139 MMOL/L (ref 136–145)
SODIUM SERPL-SCNC: 139 MMOL/L (ref 136–145)

## 2022-01-11 PROCEDURE — 80069 RENAL FUNCTION PANEL: CPT | Mod: HCNC,XB | Performed by: INTERNAL MEDICINE

## 2022-01-11 PROCEDURE — 83036 HEMOGLOBIN GLYCOSYLATED A1C: CPT | Mod: HCNC | Performed by: NURSE PRACTITIONER

## 2022-01-11 PROCEDURE — 36415 COLL VENOUS BLD VENIPUNCTURE: CPT | Mod: HCNC,PO | Performed by: INTERNAL MEDICINE

## 2022-01-11 PROCEDURE — 80053 COMPREHEN METABOLIC PANEL: CPT | Mod: HCNC | Performed by: NURSE PRACTITIONER

## 2022-01-12 DIAGNOSIS — N18.32 TYPE 2 DIABETES MELLITUS WITH STAGE 3B CHRONIC KIDNEY DISEASE, WITH LONG-TERM CURRENT USE OF INSULIN: ICD-10-CM

## 2022-01-12 DIAGNOSIS — Z79.4 TYPE 2 DIABETES MELLITUS WITH STAGE 3B CHRONIC KIDNEY DISEASE, WITH LONG-TERM CURRENT USE OF INSULIN: ICD-10-CM

## 2022-01-12 DIAGNOSIS — E11.22 TYPE 2 DIABETES MELLITUS WITH STAGE 3B CHRONIC KIDNEY DISEASE, WITH LONG-TERM CURRENT USE OF INSULIN: ICD-10-CM

## 2022-01-13 LAB — SARS-COV-2 RNA RESP QL NAA+PROBE: DETECTED

## 2022-01-18 ENCOUNTER — PATIENT OUTREACH (OUTPATIENT)
Dept: ADMINISTRATIVE | Facility: OTHER | Age: 77
End: 2022-01-18
Payer: MEDICARE

## 2022-01-18 NOTE — PROGRESS NOTES
Health Maintenance Due   Topic Date Due    TETANUS VACCINE  Never done    Shingles Vaccine (2 of 3) 03/26/2016    LDCT Lung Screen  08/01/2017    Mammogram  01/16/2021    COVID-19 Vaccine (3 - Booster for Pfizer series) 08/09/2021    Influenza Vaccine (1) 09/01/2021     Updates were requested from care everywhere.  Chart was reviewed for overdue Proactive [facility] Encounters (NILDA) topics (CRS, Breast Cancer Screening, Eye exam)  Health Maintenance has been updated.  LINKS immunization registry triggered.  Immunizations were reconciled.

## 2022-01-19 DIAGNOSIS — E11.22 TYPE 2 DIABETES MELLITUS WITH STAGE 3B CHRONIC KIDNEY DISEASE, WITH LONG-TERM CURRENT USE OF INSULIN: ICD-10-CM

## 2022-01-19 DIAGNOSIS — Z79.4 TYPE 2 DIABETES MELLITUS WITH STAGE 3B CHRONIC KIDNEY DISEASE, WITH LONG-TERM CURRENT USE OF INSULIN: ICD-10-CM

## 2022-01-19 DIAGNOSIS — N18.32 TYPE 2 DIABETES MELLITUS WITH STAGE 3B CHRONIC KIDNEY DISEASE, WITH LONG-TERM CURRENT USE OF INSULIN: ICD-10-CM

## 2022-01-26 ENCOUNTER — TELEPHONE (OUTPATIENT)
Dept: ORTHOPEDICS | Facility: CLINIC | Age: 77
End: 2022-01-26
Payer: MEDICARE

## 2022-01-26 NOTE — TELEPHONE ENCOUNTER
----- Message from Ana Philippe sent at 1/26/2022 12:06 PM CST -----  Contact: MARGO RANGEL [[id number]]  Type:  Same Day Appointment Request    Caller is requesting a same day appointment.  Caller declined first available appointment listed below.      Name of Caller:MARGO RANGEL [[id number]]    When is the first available appointment? 2/2    Symptoms: pain in shoulders, needing injection     Best Call Back Number: [phone] (mobile)    Additional Information:

## 2022-01-26 NOTE — TELEPHONE ENCOUNTER
Pt notified of next available appt and refused to scheduled at this time. Pt advised that provider out of clinic until next week. Pt to call back at her convenience.

## 2022-02-02 DIAGNOSIS — M25.512 CHRONIC PAIN OF BOTH SHOULDERS: Primary | ICD-10-CM

## 2022-02-02 DIAGNOSIS — M25.511 CHRONIC PAIN OF BOTH SHOULDERS: Primary | ICD-10-CM

## 2022-02-02 DIAGNOSIS — G89.29 CHRONIC PAIN OF BOTH SHOULDERS: Primary | ICD-10-CM

## 2022-02-07 ENCOUNTER — TELEPHONE (OUTPATIENT)
Dept: ENDOCRINOLOGY | Facility: CLINIC | Age: 77
End: 2022-02-07
Payer: MEDICARE

## 2022-02-07 NOTE — TELEPHONE ENCOUNTER
Please call patient and notify her that A1c is stable at 7.1%. Her glucose was 123 at the time of the lab. Electrolytes and liver function are normal. Kidney function stable when compared to prior labs.     If she is having any hypoglycemia, I'd recommend that she send in log for review. Otherwise, if not having issues, ok to reschedule her appt at her convenience.

## 2022-02-07 NOTE — TELEPHONE ENCOUNTER
Pt states she had labs over a month ago, then appt was canceled due to Provider with Covid.  Has to cancel this upcoming Wed appt due to  recovering from surgery and she does not know when she would be able to get back in with taking care of him and his recovery. Declined appts for next couple of weeks.    Asking if can review labs that were done and make suggestions, and she can f/u in future?   Please advise

## 2022-02-10 ENCOUNTER — OFFICE VISIT (OUTPATIENT)
Dept: ORTHOPEDICS | Facility: CLINIC | Age: 77
End: 2022-02-10
Payer: MEDICARE

## 2022-02-10 ENCOUNTER — TELEPHONE (OUTPATIENT)
Dept: ORTHOPEDICS | Facility: CLINIC | Age: 77
End: 2022-02-10

## 2022-02-10 ENCOUNTER — HOSPITAL ENCOUNTER (OUTPATIENT)
Dept: RADIOLOGY | Facility: HOSPITAL | Age: 77
Discharge: HOME | End: 2022-02-10
Attending: ORTHOPAEDIC SURGERY
Payer: MEDICARE

## 2022-02-10 VITALS — WEIGHT: 179 LBS | BODY MASS INDEX: 33.79 KG/M2 | RESPIRATION RATE: 18 BRPM | HEIGHT: 61 IN

## 2022-02-10 DIAGNOSIS — G89.29 CHRONIC PAIN OF BOTH SHOULDERS: Primary | ICD-10-CM

## 2022-02-10 DIAGNOSIS — M25.511 CHRONIC PAIN OF BOTH SHOULDERS: ICD-10-CM

## 2022-02-10 DIAGNOSIS — M25.512 LEFT SHOULDER PAIN, UNSPECIFIED CHRONICITY: ICD-10-CM

## 2022-02-10 DIAGNOSIS — G89.29 CHRONIC PAIN OF BOTH SHOULDERS: ICD-10-CM

## 2022-02-10 DIAGNOSIS — M25.511 CHRONIC PAIN OF BOTH SHOULDERS: Primary | ICD-10-CM

## 2022-02-10 DIAGNOSIS — M25.512 CHRONIC PAIN OF BOTH SHOULDERS: ICD-10-CM

## 2022-02-10 DIAGNOSIS — M25.512 CHRONIC PAIN OF BOTH SHOULDERS: Primary | ICD-10-CM

## 2022-02-10 DIAGNOSIS — M75.100 ROTATOR CUFF SYNDROME, UNSPECIFIED LATERALITY: ICD-10-CM

## 2022-02-10 PROCEDURE — 1125F AMNT PAIN NOTED PAIN PRSNT: CPT | Mod: HCNC,CPTII,S$GLB, | Performed by: ORTHOPAEDIC SURGERY

## 2022-02-10 PROCEDURE — 20610 DRAIN/INJ JOINT/BURSA W/O US: CPT | Mod: 50,HCNC,S$GLB, | Performed by: ORTHOPAEDIC SURGERY

## 2022-02-10 PROCEDURE — 20610 LARGE JOINT ASPIRATION/INJECTION: BILATERAL SUBACROMIAL BURSA: ICD-10-PCS | Mod: 50,HCNC,S$GLB, | Performed by: ORTHOPAEDIC SURGERY

## 2022-02-10 PROCEDURE — 1159F MED LIST DOCD IN RCRD: CPT | Mod: HCNC,CPTII,S$GLB, | Performed by: ORTHOPAEDIC SURGERY

## 2022-02-10 PROCEDURE — 1160F RVW MEDS BY RX/DR IN RCRD: CPT | Mod: HCNC,CPTII,S$GLB, | Performed by: ORTHOPAEDIC SURGERY

## 2022-02-10 PROCEDURE — 99213 OFFICE O/P EST LOW 20 MIN: CPT | Mod: 25,HCNC,S$GLB, | Performed by: ORTHOPAEDIC SURGERY

## 2022-02-10 PROCEDURE — 3051F PR MOST RECENT HEMOGLOBIN A1C LEVEL 7.0 - < 8.0%: ICD-10-PCS | Mod: HCNC,CPTII,S$GLB, | Performed by: ORTHOPAEDIC SURGERY

## 2022-02-10 PROCEDURE — 3072F PR LOW RISK FOR RETINOPATHY: ICD-10-PCS | Mod: HCNC,CPTII,S$GLB, | Performed by: ORTHOPAEDIC SURGERY

## 2022-02-10 PROCEDURE — 1159F PR MEDICATION LIST DOCUMENTED IN MEDICAL RECORD: ICD-10-PCS | Mod: HCNC,CPTII,S$GLB, | Performed by: ORTHOPAEDIC SURGERY

## 2022-02-10 PROCEDURE — 1160F PR REVIEW ALL MEDS BY PRESCRIBER/CLIN PHARMACIST DOCUMENTED: ICD-10-PCS | Mod: HCNC,CPTII,S$GLB, | Performed by: ORTHOPAEDIC SURGERY

## 2022-02-10 PROCEDURE — 3051F HG A1C>EQUAL 7.0%<8.0%: CPT | Mod: HCNC,CPTII,S$GLB, | Performed by: ORTHOPAEDIC SURGERY

## 2022-02-10 PROCEDURE — 3008F BODY MASS INDEX DOCD: CPT | Mod: HCNC,CPTII,S$GLB, | Performed by: ORTHOPAEDIC SURGERY

## 2022-02-10 PROCEDURE — 99999 PR PBB SHADOW E&M-EST. PATIENT-LVL IV: ICD-10-PCS | Mod: PBBFAC,HCNC,, | Performed by: ORTHOPAEDIC SURGERY

## 2022-02-10 PROCEDURE — 3008F PR BODY MASS INDEX (BMI) DOCUMENTED: ICD-10-PCS | Mod: HCNC,CPTII,S$GLB, | Performed by: ORTHOPAEDIC SURGERY

## 2022-02-10 PROCEDURE — 73030 X-RAY EXAM OF SHOULDER: CPT | Mod: 26,50,HCNC, | Performed by: RADIOLOGY

## 2022-02-10 PROCEDURE — 1125F PR PAIN SEVERITY QUANTIFIED, PAIN PRESENT: ICD-10-PCS | Mod: HCNC,CPTII,S$GLB, | Performed by: ORTHOPAEDIC SURGERY

## 2022-02-10 PROCEDURE — 3072F LOW RISK FOR RETINOPATHY: CPT | Mod: HCNC,CPTII,S$GLB, | Performed by: ORTHOPAEDIC SURGERY

## 2022-02-10 PROCEDURE — 99999 PR PBB SHADOW E&M-EST. PATIENT-LVL IV: CPT | Mod: PBBFAC,HCNC,, | Performed by: ORTHOPAEDIC SURGERY

## 2022-02-10 PROCEDURE — 73030 XR SHOULDER TRAUMA 3 VIEW BILATERAL: ICD-10-PCS | Mod: 26,50,HCNC, | Performed by: RADIOLOGY

## 2022-02-10 PROCEDURE — 99213 PR OFFICE/OUTPT VISIT, EST, LEVL III, 20-29 MIN: ICD-10-PCS | Mod: 25,HCNC,S$GLB, | Performed by: ORTHOPAEDIC SURGERY

## 2022-02-10 PROCEDURE — 73030 X-RAY EXAM OF SHOULDER: CPT | Mod: TC,50,PN

## 2022-02-10 RX ORDER — TRIAMCINOLONE ACETONIDE 40 MG/ML
40 INJECTION, SUSPENSION INTRA-ARTICULAR; INTRAMUSCULAR
Status: DISCONTINUED | OUTPATIENT
Start: 2022-02-10 | End: 2022-02-10 | Stop reason: HOSPADM

## 2022-02-10 RX ORDER — OXYCODONE AND ACETAMINOPHEN 5; 325 MG/1; MG/1
1 TABLET ORAL
Qty: 10 TABLET | Refills: 0 | Status: SHIPPED | OUTPATIENT
Start: 2022-02-10 | End: 2023-08-18

## 2022-02-10 RX ADMIN — TRIAMCINOLONE ACETONIDE 40 MG: 40 INJECTION, SUSPENSION INTRA-ARTICULAR; INTRAMUSCULAR at 02:02

## 2022-02-10 NOTE — TELEPHONE ENCOUNTER
----- Message from Nidhi Dave MA sent at 2/10/2022  3:49 PM CST -----  Contact: [facility]   trevin  Needs directions for med   Call back

## 2022-02-10 NOTE — PROCEDURES
Large Joint Aspiration/Injection: bilateral subacromial bursa    Date/Time: 2/10/2022 2:15 PM  Performed by: Caleb Reyes MD  Authorized by: Caleb Reyes MD     Consent Done?:  Yes (Verbal)  Indications:  Pain  Site marked: the procedure site was marked    Timeout: prior to procedure the correct patient, procedure, and site was verified    Local anesthetic:  Lidocaine 1% without epinephrine and bupivacaine 0.25% without epinephrine  Anesthetic total (ml):  6      Details:  Needle Size:  20 G  Ultrasonic Guidance for needle placement?: No    Approach:  Posterior  Location:  Shoulder  Laterality:  Bilateral  Site:  Bilateral subacromial bursa  Medications (Right):  40 mg triamcinolone acetonide 40 mg/mL  Medications (Left):  40 mg triamcinolone acetonide 40 mg/mL  Patient tolerance:  Patient tolerated the procedure well with no immediate complications

## 2022-02-10 NOTE — PROGRESS NOTES
Past Medical History:   Diagnosis Date    Anesthesia complication     took patient 6 days to come out of anethesia after CABG    Anticoagulant long-term use     Arthritis     Chronic kidney disease     CKD (chronic kidney disease) stage 3, GFR 30-59 ml/min     Dr. Montero    COPD (chronic obstructive pulmonary disease) 2/7/2016    COPD (chronic obstructive pulmonary disease)     Coronary artery disease     Diabetes mellitus     Diabetes mellitus, type 2     GERD (gastroesophageal reflux disease)     Hx of colonic polyps     Hyperlipidemia     Hypertension     possible new onset CHF 7/30/2016    Tardive dyskinesia     Thyroid disease     Ulcer     Vertigo        Past Surgical History:   Procedure Laterality Date    CARDIAC SURGERY      CHOLECYSTECTOMY  01/26/2017    COLONOSCOPY      COLONOSCOPY N/A 12/7/2021    Procedure: COLONOSCOPY;  Surgeon: Lito Will MD;  [facility];  Service: Endoscopy;  Laterality: N/A;    CORONARY ARTERY BYPASS GRAFT  01/19/2016    4 vessel    HYSTERECTOMY      OOPHORECTOMY      TONSILLECTOMY      TOTAL THYROIDECTOMY      TUBAL LIGATION         Current Outpatient Medications   Medication Sig    amLODIPine (NORVASC) 2.5 MG tablet Take 1 tablet (2.5 mg total) by mouth once daily.    ammonium lactate (LAC-HYDRIN) 12 % lotion APPLY TO AFFECTED AREA AS NEEDED FOR DRY SKIN    aspirin (ECOTRIN) 81 MG EC tablet Take 81 mg by mouth once daily.    BD INSULIN SYRINGE ULTRA-FINE 1 mL 31 gauge x 5/16 Syrg USE AS DIRECTED 5 TIMES A DAY WITH LEVEMIR & NOVOLOG    blood sugar diagnostic (TRUE METRIX GLUCOSE TEST STRIP) Strp Test blood sugar 3x/day    dicyclomine (BENTYL) 10 MG capsule     dulaglutide (TRULICITY) 0.75 mg/0.5 mL pen injector Inject 0.75 mg into the skin every 7 days.    furosemide (LASIX) 20 MG tablet Take 1 tablet (20 mg total) by mouth once daily.    KETOPROFEN TOP APPLY 1.6 GRAMS (1 PUMP) TO PAINFUL AREAS UP TO 5 TIMES DAILY. RUB IN WELL  "   KLCB ketoprofen 10%- LIDOcaine 5%- cyclobenzaprine 2%- baclofen 2% in transdermal cream Apply 1 to 2 grams 3 to 4 times daily    lancets 33 gauge Misc 1 lancet by Misc.(Non-Drug; Combo Route) route 4 (four) times daily.    levothyroxine (SYNTHROID) 75 MCG tablet Take 1 tablet (75 mcg total) by mouth before breakfast.    lisinopriL (PRINIVIL,ZESTRIL) 5 MG tablet TAKE ONE TABLET BY MOUTH TWO TIMES A DAY    magnesium oxide (MAG-OX) 400 mg (241.3 mg magnesium) tablet Take 1 tablet (400 mg total) by mouth once daily.    meclizine (ANTIVERT) 25 mg tablet Take 1 tablet (25 mg total) by mouth 3 (three) times daily as needed.    metoprolol tartrate (LOPRESSOR) 25 MG tablet TAKE ONE TABLET BY MOUTH TWO TIMES A DAY    nitroGLYCERIN (NITROSTAT) 0.4 MG SL tablet Place 1 tablet (0.4 mg total) under the tongue every 5 (five) minutes as needed for Chest pain. Seek medical help is pain is not relieved by the third dose.    NOVOLOG FLEXPEN U-100 INSULIN 100 unit/mL (3 mL) InPn pen INJECT 40 UNITS INTO THE SKIN 2 TIMES DAILY WITH MEALS. PLUS CORRECTION SCALE, MAX  UNITS (Patient taking differently: 48 Units.)    pantoprazole (PROTONIX) 40 MG tablet TAKE ONE TABLET BY MOUTH ONCE A DAY    pen needle, diabetic (BD ULTRA-FINE YUMIKO PEN NEEDLE) 32 gauge x 5/32" Ndle USE FOUR TIMES A DAY WITH INSULIN    rosuvastatin (CRESTOR) 40 MG Tab Take 1 tablet (40 mg total) by mouth every evening.    traMADoL (ULTRAM) 50 mg tablet Take 1 tablet (50 mg total) by mouth every 6 (six) hours as needed for Pain.    traZODone (DESYREL) 50 MG tablet TAKE ONE TABLET BY MOUTH NIGHTLY    TRESIBA FLEXTOUCH U-200 200 unit/mL (3 mL) insulin pen INJECT 70 UNITS INTO THE SKIN ONCE DAILY.    vitamin D (VITAMIN D3) 1000 units Tab Take 1,000 Units by mouth once daily.     No current facility-administered medications for this visit.       Review of patient's allergies indicates:   Allergen Reactions    Reglan [metoclopramide hcl]      Depression " and weight loss.        Family History   Problem Relation Age of Onset    Cancer Mother         LYMPHOMA    Breast cancer Mother     Cancer Father     Early death Father     Heart disease Paternal Uncle     Alcohol abuse Sister         x2    Heart disease Brother     No Known Problems Son     Glaucoma Neg Hx     Macular degeneration Neg Hx        Social History     Socioeconomic History    Marital status:    Tobacco Use    Smoking status: Former Smoker     Packs/day: 1.00     Years: 27.00     Pack years: 27.00     Types: Cigarettes     Quit date: 2016     Years since quittin.0    Smokeless tobacco: Never Used   Substance and Sexual Activity    Alcohol use: No     Alcohol/week: 0.0 standard drinks    Drug use: No    Sexual activity: Yes     Partners: Male       Chief Complaint:   Chief Complaint   Patient presents with    Right Shoulder - Pain    Left Shoulder - Pain       History of present illness:  This is a 76-year-old female seen for bilateral shoulder pain.  Patient has had pain for years.  Her last treatment was by Dr. Manjula salinas in 2020.  Got injections in both of her shoulders in the subacromial space.  Patient has had an MRI done previously on the left shoulder which showed some rotator cuff tendinopathy and a small tear.  She used to get injections about every 4-6 months.  She has been having to take care of her  who just had knee replacement surgery.  Shoulders and gotten worse over the last month.  Pain at night particularly.  Right shoulder is the worst of the 2. Still has decent range of motion, pain is her primary complaint.      Review of Systems:    Constitution: Negative for chills, fever, and sweats.  Negative for unexplained weight loss.    HENT:  Negative for headaches and blurry vision.    Cardiovascular:Negative for chest pain or irregular heart beat. Negative for hypertension.    Respiratory:  Negative for cough and shortness of  breath.    Gastrointestinal: Negative for abdominal pain, heartburn, melena, nausea, and vomitting.    Genitourinary:  Negative bladder incontinence and dysuria.    Musculoskeletal:  See HPI    Neurological: Negative for numbness.    Psychiatric/Behavioral: Negative for depression.  The patient is not nervous/anxious.      Endocrine: Negative for polyuria    Hematologic/Lymphatic: Negative for bleeding problem.  Does not bruise/bleed easily.    Skin: Negative for poor would healing and rash      Physical Examination:    Vital Signs:  There were no vitals filed for this visit.    There is no height or weight on file to calculate BMI.    This a well-developed, well nourished patient in no acute distress.  They are alert and oriented and cooperative to examination.  Pt. walks without an antalgic gait.      Examination of bilateral shoulders shows no rashes or erythema. There are no masses, ecchymosis, or atrophy. The patient has full range of motion in forward flexion, external rotation, and internal rotation to the mid T-spine. The patient has positive Harrington test.  Positive Neer - [place]'s test. - Speeds test. Nontender to palpation over a.c. joint. Normal stability anteriorly, posteriorly, and negative sulcus sign. Passive range of motion: Forward flexion of 180°, external rotation at 90° of 90°, internal rotation of 50°, and external rotation at 0° of 50°. 2+ radial pulse. Intact axillary, radial, median and ulnar sensation.  4+ out of 5 resisted forward flexion, external rotation, and negative lift off test.      X-rays:  X-rays of both shoulders are ordered and reviewed today which show some arthritic change particularly on the right.  Small humeral spur.  Some sclerosis of the right greater tuberosity.  More normal-appearing left shoulder.    MRI of the left shoulder is available for review and interpret today:Findings of rotator cuff tendinopathy and suggesting small full-thickness tear rotator cuff  tendon.   Degenerative changes     Assessment::  Bilateral rotator cuff syndrome with small tearing  Some underlying right shoulder arthritis    Plan:  I reviewed the findings with her today.  I agreed to inject her shoulders today.  Patient is not interested in surgical treatment at this time.  Follow up in about a month for evaluation of her knees.    This note was created using [employer] voice recognition software that occasionally misinterpreted phrases or words.    Consult note is delivered via Epic messaging service.

## 2022-04-07 ENCOUNTER — HOSPITAL ENCOUNTER (OUTPATIENT)
Dept: RADIOLOGY | Facility: CLINIC | Age: 77
Discharge: HOME | End: 2022-04-07
Attending: FAMILY MEDICINE
Payer: MEDICARE

## 2022-04-07 VITALS — WEIGHT: 179 LBS | HEIGHT: 61 IN | BODY MASS INDEX: 33.79 KG/M2

## 2022-04-07 DIAGNOSIS — Z12.31 ENCOUNTER FOR SCREENING MAMMOGRAM FOR MALIGNANT NEOPLASM OF BREAST: ICD-10-CM

## 2022-04-07 PROCEDURE — 77063 MAMMO DIGITAL SCREENING BILAT WITH TOMO: ICD-10-PCS | Mod: 26,,, | Performed by: RADIOLOGY

## 2022-04-07 PROCEDURE — 77067 SCR MAMMO BI INCL CAD: CPT | Mod: TC,PO

## 2022-04-07 PROCEDURE — 77063 BREAST TOMOSYNTHESIS BI: CPT | Mod: 26,,, | Performed by: RADIOLOGY

## 2022-04-07 PROCEDURE — 77067 MAMMO DIGITAL SCREENING BILAT WITH TOMO: ICD-10-PCS | Mod: 26,,, | Performed by: RADIOLOGY

## 2022-04-07 PROCEDURE — 77067 SCR MAMMO BI INCL CAD: CPT | Mod: 26,,, | Performed by: RADIOLOGY

## 2022-04-13 ENCOUNTER — TELEPHONE (OUTPATIENT)
Dept: OPHTHALMOLOGY | Facility: CLINIC | Age: 77
End: 2022-04-13
Payer: MEDICARE

## 2022-04-13 NOTE — TELEPHONE ENCOUNTER
----- Message from Harjeet Che sent at 4/13/2022 12:59 PM CDT -----  Type: Needs Medical Advice  Who Called: Patient  Symptoms (please be specific):  Eyes watering-itching and burning  How long has patient had these symptoms:  6 months  Pharmacy name and phone #:    Best Call Back Number: [phone]  Additional Information: Please call to advise

## 2022-04-14 ENCOUNTER — OFFICE VISIT (OUTPATIENT)
Dept: OPHTHALMOLOGY | Facility: CLINIC | Age: 77
End: 2022-04-14
Payer: MEDICARE

## 2022-04-14 DIAGNOSIS — H02.59 FLOPPY EYELID SYNDROME OF BOTH EYES: Primary | ICD-10-CM

## 2022-04-14 DIAGNOSIS — H01.01B ULCERATIVE BLEPHARITIS OF UPPER AND LOWER EYELIDS OF BOTH EYES: ICD-10-CM

## 2022-04-14 DIAGNOSIS — H01.01A ULCERATIVE BLEPHARITIS OF UPPER AND LOWER EYELIDS OF BOTH EYES: ICD-10-CM

## 2022-04-14 LAB
LEFT EYE DM RETINOPATHY: NEGATIVE
RIGHT EYE DM RETINOPATHY: NEGATIVE

## 2022-04-14 PROCEDURE — 1159F PR MEDICATION LIST DOCUMENTED IN MEDICAL RECORD: ICD-10-PCS | Mod: CPTII,S$GLB,, | Performed by: OPHTHALMOLOGY

## 2022-04-14 PROCEDURE — 99999 PR PBB SHADOW E&M-EST. PATIENT-LVL III: CPT | Mod: PBBFAC,,, | Performed by: OPHTHALMOLOGY

## 2022-04-14 PROCEDURE — 99214 OFFICE O/P EST MOD 30 MIN: CPT | Mod: S$GLB,,, | Performed by: OPHTHALMOLOGY

## 2022-04-14 PROCEDURE — 99214 PR OFFICE/OUTPT VISIT, EST, LEVL IV, 30-39 MIN: ICD-10-PCS | Mod: S$GLB,,, | Performed by: OPHTHALMOLOGY

## 2022-04-14 PROCEDURE — 3288F PR FALLS RISK ASSESSMENT DOCUMENTED: ICD-10-PCS | Mod: CPTII,S$GLB,, | Performed by: OPHTHALMOLOGY

## 2022-04-14 PROCEDURE — 1160F RVW MEDS BY RX/DR IN RCRD: CPT | Mod: CPTII,S$GLB,, | Performed by: OPHTHALMOLOGY

## 2022-04-14 PROCEDURE — 1126F AMNT PAIN NOTED NONE PRSNT: CPT | Mod: CPTII,S$GLB,, | Performed by: OPHTHALMOLOGY

## 2022-04-14 PROCEDURE — 1159F MED LIST DOCD IN RCRD: CPT | Mod: CPTII,S$GLB,, | Performed by: OPHTHALMOLOGY

## 2022-04-14 PROCEDURE — 3288F FALL RISK ASSESSMENT DOCD: CPT | Mod: CPTII,S$GLB,, | Performed by: OPHTHALMOLOGY

## 2022-04-14 PROCEDURE — 1160F PR REVIEW ALL MEDS BY PRESCRIBER/CLIN PHARMACIST DOCUMENTED: ICD-10-PCS | Mod: CPTII,S$GLB,, | Performed by: OPHTHALMOLOGY

## 2022-04-14 PROCEDURE — 3051F PR MOST RECENT HEMOGLOBIN A1C LEVEL 7.0 - < 8.0%: ICD-10-PCS | Mod: CPTII,S$GLB,, | Performed by: OPHTHALMOLOGY

## 2022-04-14 PROCEDURE — 99999 PR PBB SHADOW E&M-EST. PATIENT-LVL III: ICD-10-PCS | Mod: PBBFAC,,, | Performed by: OPHTHALMOLOGY

## 2022-04-14 PROCEDURE — 1126F PR PAIN SEVERITY QUANTIFIED, NO PAIN PRESENT: ICD-10-PCS | Mod: CPTII,S$GLB,, | Performed by: OPHTHALMOLOGY

## 2022-04-14 PROCEDURE — 1101F PT FALLS ASSESS-DOCD LE1/YR: CPT | Mod: CPTII,S$GLB,, | Performed by: OPHTHALMOLOGY

## 2022-04-14 PROCEDURE — 3051F HG A1C>EQUAL 7.0%<8.0%: CPT | Mod: CPTII,S$GLB,, | Performed by: OPHTHALMOLOGY

## 2022-04-14 PROCEDURE — 1101F PR PT FALLS ASSESS DOC 0-1 FALLS W/OUT INJ PAST YR: ICD-10-PCS | Mod: CPTII,S$GLB,, | Performed by: OPHTHALMOLOGY

## 2022-04-14 RX ORDER — NEOMYCIN SULFATE, POLYMYXIN B SULFATE, AND DEXAMETHASONE 3.5; 10000; 1 MG/G; [USP'U]/G; MG/G
OINTMENT OPHTHALMIC 2 TIMES DAILY
Qty: 3.5 G | Refills: 1 | Status: SHIPPED | OUTPATIENT
Start: 2022-04-14 | End: 2022-04-21

## 2022-04-22 ENCOUNTER — TELEPHONE (OUTPATIENT)
Dept: OPHTHALMOLOGY | Facility: CLINIC | Age: 77
End: 2022-04-22
Payer: MEDICARE

## 2022-04-22 NOTE — TELEPHONE ENCOUNTER
----- Message from Queenie Bingham sent at 4/22/2022  2:04 PM CDT -----  Contact: Patient  Type:  Needs Medical Advice    Who Called:  Patient       Would the patient rather a call back or a response via [facility]?  Call    Best Call Back Number: [phone] (home)       Additional Information:  Patient states she saw Dr Ram last week and he gave her steroid Eye drops and she has finished them and she needs to know what is the next step     Please call to advise

## 2022-05-03 ENCOUNTER — TELEPHONE (OUTPATIENT)
Dept: ADMINISTRATIVE | Facility: CLINIC | Age: 77
End: 2022-05-03
Payer: MEDICARE

## 2022-05-05 ENCOUNTER — TELEPHONE (OUTPATIENT)
Dept: ENDOCRINOLOGY | Facility: CLINIC | Age: 77
End: 2022-05-05
Payer: MEDICARE

## 2022-05-05 ENCOUNTER — OFFICE VISIT (OUTPATIENT)
Dept: FAMILY MEDICINE | Facility: CLINIC | Age: 77
End: 2022-05-05
Payer: MEDICARE

## 2022-05-05 VITALS
OXYGEN SATURATION: 94 % | SYSTOLIC BLOOD PRESSURE: 116 MMHG | HEIGHT: 61 IN | DIASTOLIC BLOOD PRESSURE: 68 MMHG | WEIGHT: 181 LBS | BODY MASS INDEX: 34.17 KG/M2 | HEART RATE: 66 BPM | TEMPERATURE: 98 F

## 2022-05-05 DIAGNOSIS — N18.30 STAGE 3 CHRONIC KIDNEY DISEASE, UNSPECIFIED WHETHER STAGE 3A OR 3B CKD: ICD-10-CM

## 2022-05-05 DIAGNOSIS — E11.21 DIABETIC NEPHROPATHY ASSOCIATED WITH TYPE 2 DIABETES MELLITUS: ICD-10-CM

## 2022-05-05 DIAGNOSIS — I70.0 ATHEROSCLEROSIS OF AORTA: ICD-10-CM

## 2022-05-05 DIAGNOSIS — E11.42 TYPE 2 DIABETES MELLITUS WITH DIABETIC POLYNEUROPATHY, WITH LONG-TERM CURRENT USE OF INSULIN: ICD-10-CM

## 2022-05-05 DIAGNOSIS — I15.2 HYPERTENSION ASSOCIATED WITH DIABETES: ICD-10-CM

## 2022-05-05 DIAGNOSIS — E11.59 HYPERTENSION ASSOCIATED WITH DIABETES: ICD-10-CM

## 2022-05-05 DIAGNOSIS — Z79.4 TYPE 2 DIABETES MELLITUS WITH DIABETIC POLYNEUROPATHY, WITH LONG-TERM CURRENT USE OF INSULIN: ICD-10-CM

## 2022-05-05 DIAGNOSIS — I25.119 CORONARY ARTERY DISEASE INVOLVING NATIVE CORONARY ARTERY OF NATIVE HEART WITH ANGINA PECTORIS: ICD-10-CM

## 2022-05-05 DIAGNOSIS — R26.9 ABNORMALITY OF GAIT AND MOBILITY: ICD-10-CM

## 2022-05-05 DIAGNOSIS — I10 ESSENTIAL HYPERTENSION: Primary | ICD-10-CM

## 2022-05-05 DIAGNOSIS — J44.9 CHRONIC OBSTRUCTIVE PULMONARY DISEASE, UNSPECIFIED COPD TYPE: ICD-10-CM

## 2022-05-05 DIAGNOSIS — I48.0 PAF (PAROXYSMAL ATRIAL FIBRILLATION): ICD-10-CM

## 2022-05-05 DIAGNOSIS — Z00.00 ENCOUNTER FOR PREVENTIVE HEALTH EXAMINATION: Primary | ICD-10-CM

## 2022-05-05 DIAGNOSIS — E78.5 HYPERLIPIDEMIA WITH TARGET LDL LESS THAN 70: ICD-10-CM

## 2022-05-05 PROCEDURE — 3288F FALL RISK ASSESSMENT DOCD: CPT | Mod: CPTII,S$GLB,, | Performed by: NURSE PRACTITIONER

## 2022-05-05 PROCEDURE — 3074F SYST BP LT 130 MM HG: CPT | Mod: CPTII,S$GLB,, | Performed by: NURSE PRACTITIONER

## 2022-05-05 PROCEDURE — 3078F PR MOST RECENT DIASTOLIC BLOOD PRESSURE < 80 MM HG: ICD-10-PCS | Mod: CPTII,S$GLB,, | Performed by: NURSE PRACTITIONER

## 2022-05-05 PROCEDURE — 99499 RISK ADDL DX/OHS AUDIT: ICD-10-PCS | Mod: S$GLB,,, | Performed by: NURSE PRACTITIONER

## 2022-05-05 PROCEDURE — 3288F PR FALLS RISK ASSESSMENT DOCUMENTED: ICD-10-PCS | Mod: CPTII,S$GLB,, | Performed by: NURSE PRACTITIONER

## 2022-05-05 PROCEDURE — 1160F PR REVIEW ALL MEDS BY PRESCRIBER/CLIN PHARMACIST DOCUMENTED: ICD-10-PCS | Mod: CPTII,S$GLB,, | Performed by: NURSE PRACTITIONER

## 2022-05-05 PROCEDURE — 3008F BODY MASS INDEX DOCD: CPT | Mod: CPTII,S$GLB,, | Performed by: NURSE PRACTITIONER

## 2022-05-05 PROCEDURE — 1170F PR FUNCTIONAL STATUS ASSESSED: ICD-10-PCS | Mod: CPTII,S$GLB,, | Performed by: NURSE PRACTITIONER

## 2022-05-05 PROCEDURE — 99499 UNLISTED E&M SERVICE: CPT | Mod: S$GLB,,, | Performed by: NURSE PRACTITIONER

## 2022-05-05 PROCEDURE — 1170F FXNL STATUS ASSESSED: CPT | Mod: CPTII,S$GLB,, | Performed by: NURSE PRACTITIONER

## 2022-05-05 PROCEDURE — G0439 PPPS, SUBSEQ VISIT: HCPCS | Mod: S$GLB,,, | Performed by: NURSE PRACTITIONER

## 2022-05-05 PROCEDURE — 1159F PR MEDICATION LIST DOCUMENTED IN MEDICAL RECORD: ICD-10-PCS | Mod: CPTII,S$GLB,, | Performed by: NURSE PRACTITIONER

## 2022-05-05 PROCEDURE — 3074F PR MOST RECENT SYSTOLIC BLOOD PRESSURE < 130 MM HG: ICD-10-PCS | Mod: CPTII,S$GLB,, | Performed by: NURSE PRACTITIONER

## 2022-05-05 PROCEDURE — 1101F PT FALLS ASSESS-DOCD LE1/YR: CPT | Mod: CPTII,S$GLB,, | Performed by: NURSE PRACTITIONER

## 2022-05-05 PROCEDURE — G0439 PR MEDICARE ANNUAL WELLNESS SUBSEQUENT VISIT: ICD-10-PCS | Mod: S$GLB,,, | Performed by: NURSE PRACTITIONER

## 2022-05-05 PROCEDURE — 3051F HG A1C>EQUAL 7.0%<8.0%: CPT | Mod: CPTII,S$GLB,, | Performed by: NURSE PRACTITIONER

## 2022-05-05 PROCEDURE — 3008F PR BODY MASS INDEX (BMI) DOCUMENTED: ICD-10-PCS | Mod: CPTII,S$GLB,, | Performed by: NURSE PRACTITIONER

## 2022-05-05 PROCEDURE — 1125F AMNT PAIN NOTED PAIN PRSNT: CPT | Mod: CPTII,S$GLB,, | Performed by: NURSE PRACTITIONER

## 2022-05-05 PROCEDURE — 3072F PR LOW RISK FOR RETINOPATHY: ICD-10-PCS | Mod: CPTII,S$GLB,, | Performed by: NURSE PRACTITIONER

## 2022-05-05 PROCEDURE — 99999 PR PBB SHADOW E&M-EST. PATIENT-LVL V: CPT | Mod: PBBFAC,,, | Performed by: NURSE PRACTITIONER

## 2022-05-05 PROCEDURE — 3078F DIAST BP <80 MM HG: CPT | Mod: CPTII,S$GLB,, | Performed by: NURSE PRACTITIONER

## 2022-05-05 PROCEDURE — G9919 SCRN ND POS ND PROV OF REC: HCPCS | Mod: CPTII,S$GLB,, | Performed by: NURSE PRACTITIONER

## 2022-05-05 PROCEDURE — 1101F PR PT FALLS ASSESS DOC 0-1 FALLS W/OUT INJ PAST YR: ICD-10-PCS | Mod: CPTII,S$GLB,, | Performed by: NURSE PRACTITIONER

## 2022-05-05 PROCEDURE — 1159F MED LIST DOCD IN RCRD: CPT | Mod: CPTII,S$GLB,, | Performed by: NURSE PRACTITIONER

## 2022-05-05 PROCEDURE — 3051F PR MOST RECENT HEMOGLOBIN A1C LEVEL 7.0 - < 8.0%: ICD-10-PCS | Mod: CPTII,S$GLB,, | Performed by: NURSE PRACTITIONER

## 2022-05-05 PROCEDURE — G9919 PR SCREENING AND POSITIVE: ICD-10-PCS | Mod: CPTII,S$GLB,, | Performed by: NURSE PRACTITIONER

## 2022-05-05 PROCEDURE — 1160F RVW MEDS BY RX/DR IN RCRD: CPT | Mod: CPTII,S$GLB,, | Performed by: NURSE PRACTITIONER

## 2022-05-05 PROCEDURE — 1125F PR PAIN SEVERITY QUANTIFIED, PAIN PRESENT: ICD-10-PCS | Mod: CPTII,S$GLB,, | Performed by: NURSE PRACTITIONER

## 2022-05-05 PROCEDURE — 99999 PR PBB SHADOW E&M-EST. PATIENT-LVL V: ICD-10-PCS | Mod: PBBFAC,,, | Performed by: NURSE PRACTITIONER

## 2022-05-05 PROCEDURE — 3072F LOW RISK FOR RETINOPATHY: CPT | Mod: CPTII,S$GLB,, | Performed by: NURSE PRACTITIONER

## 2022-05-05 NOTE — PROGRESS NOTES
"   Adele Hall presented for a  Medicare AWV and comprehensive Health Risk Assessment today. The following components were reviewed and updated:    · Medical history  · Family History  · Social history  · Allergies and Current Medications  · Health Risk Assessment  · Health Maintenance  · Care Team     Patient screened moderate and/or high risk for one or more social determinants of health (SDOH). Patient connected to community resources through the ED Navigator.      ** See Completed Assessments for Annual Wellness Visit within the encounter summary.**         The following assessments were completed:  · Living Situation  · CAGE  · Depression Screening  · Timed Get Up and Go  · Whisper Test  · Cognitive Function Screening  · Nutrition Screening  · ADL Screening  · PAQ Screening            Vitals:    05/05/22 1547   BP: 116/68   Pulse: 66   Temp: 97.8 °F (36.6 °C)   SpO2: (!) 94%   Weight: 82.1 kg (181 lb)   Height: 5' 1" (1.549 m)     Body mass index is 34.2 kg/m².  Physical Exam  Constitutional:       Appearance: She is well-developed.   HENT:      Head: Normocephalic and atraumatic.      Nose: Nose normal.   Eyes:      General: Lids are normal.      Conjunctiva/sclera: Conjunctivae normal.      Pupils: Pupils are equal, round, and reactive to light.   Cardiovascular:      Rate and Rhythm: Normal rate.      Pulses:           Dorsalis pedis pulses are 2+ on the right side and 2+ on the left side.   Pulmonary:      Effort: Pulmonary effort is normal.   Musculoskeletal:      Cervical back: Full passive range of motion without pain.      Right foot: Normal range of motion. No deformity, bunion, Charcot foot, foot drop or prominent metatarsal heads.      Left foot: Normal range of motion. No deformity, bunion, Charcot foot, foot drop or prominent metatarsal heads.   Feet:      Right foot:      Protective Sensation: 8 sites tested. 8 sites sensed.      Skin integrity: Dry skin present. No ulcer, blister, skin " breakdown, erythema, warmth, callus or fissure.      Toenail Condition: Right toenails are normal.      Left foot:      Protective Sensation: 8 sites tested. 8 sites sensed.      Skin integrity: Dry skin present. No ulcer, blister, skin breakdown, erythema, warmth, callus or fissure.      Toenail Condition: Left toenails are normal.   Skin:     General: Skin is warm and dry.   Neurological:      Mental Status: She is alert and oriented to person, place, and time.   Psychiatric:         Speech: Speech normal.         Behavior: Behavior normal.               Diagnoses and health risks identified today and associated recommendations/orders:    1. Encounter for preventive health examination  Discussed health maintenance guidelines appropriate for age.        2. Type 2 diabetes mellitus with diabetic polyneuropathy, with long-term current use of insulin  Controlled, continue current medication regimen  Last HgA1c - 7.1  ADA diet  Increase physical activity   Followed by       3. Atherosclerosis of aorta  Stable, continue to monitor  Followed by pcp    4. Coronary artery disease involving native coronary artery of native heart with angina pectoris  Stable, continue current medication regimen  Followed by cardiology     5. PAF (paroxysmal atrial fibrillation)  Stable, continue current medication regimen  Followed by cardiology       6. Chronic obstructive pulmonary disease, unspecified COPD type  Stable, continue to monitor    7. Abnormality of gait and mobility  Stable, continue to monitor    8. Hypertension associated with diabetes  Controlled, continue current medication regimen  Low salt diet  Increase physical activity  Followed by pcp      9. Hyperlipidemia with target LDL less than 70  Controlled, continue current medication regimen  Patient taking statin  Followed by pcp      10. Diabetic nephropathy associated with type 2 diabetes mellitus  Stable, continue to monitor  Followed by nephrology     11. Stage 3  chronic kidney disease, unspecified whether stage 3a or 3b CKD  Stable, continue to monitor  Followed by nephrology       Provided Adele with a 5-10 year written screening schedule and personal prevention plan. Recommendations were developed using the USPSTF age appropriate recommendations. Education, counseling, and referrals were provided as needed. After Visit Summary printed and given to patient which includes a list of additional screenings\tests needed.    Follow up for One year for Annual Wellness Visit.    Carola Buenrostro, NP  I offered to discuss advanced care planning, including how to pick a person who would make decisions for you if you were unable to make them for yourself, called a health care power of , and what kind of decisions you might make such as use of life sustaining treatments such as ventilators and tube feeding when faced with a life limiting illness recorded on a living will that they will need to know. (How you want to be cared for as you near the end of your natural life)     X  Patient is unwilling to engage in a discussion regarding advance directives at this time.

## 2022-05-05 NOTE — PATIENT INSTRUCTIONS
Counseling and Referral of Other Preventative  (Italic type indicates deductible and co-insurance are waived)    Patient Name: Adele Hall  Today's Date: 5/5/2022    Health Maintenance       Date Due Completion Date    TETANUS VACCINE Never done ---    Shingles Vaccine (2 of 3) 03/26/2016 1/30/2016    LDCT Lung Screen 08/01/2017 8/1/2016    COVID-19 Vaccine (3 - Booster for Pfizer series) 07/09/2021 2/9/2021    Foot Exam 04/06/2022 4/6/2021    Lipid Panel 04/27/2022 4/27/2021    Hemoglobin A1c 07/11/2022 1/11/2022    Influenza Vaccine (Season Ended) 09/01/2022 1/13/2020    Diabetes Urine Screening 10/21/2022 10/21/2021    Eye Exam 10/22/2022 10/22/2021    Override on 1/1/2014: Done    DEXA Scan 01/16/2023 1/16/2020    Override on 3/5/2014: Done ([facility]   report sent to scanning 12/7/16  kb)    Mammogram 04/07/2023 4/7/2022    High Dose Statin 05/05/2023 5/5/2022    Aspirin/Antiplatelet Therapy 05/05/2023 5/5/2022    Colorectal Cancer Screening 12/07/2031 12/7/2021        No orders of the defined types were placed in this encounter.    The following information is provided to all patients.  This information is to help you find resources for any of the problems found today that may be affecting your health:                Living healthy guide: www.[facility].[place].gov      Understanding Diabetes: www.diabetes.org      Eating healthy: www.cdc.gov/healthyweight      CDC home safety checklist: www.cdc.gov/steadi/patient.html      Agency on Aging: www.goea.[place].gov      Alcoholics anonymous (AA): www.aa.org      Physical Activity: www.kaushik.nih.gov/vk0cfdq      Tobacco use: www.quitwithusla.org

## 2022-05-05 NOTE — TELEPHONE ENCOUNTER
Left message for patient to call back to schedule labs      Any other labs you want pt to have done before her appt in July. I have A1C and CMP

## 2022-05-05 NOTE — TELEPHONE ENCOUNTER
----- Message from Samantha Louise sent at 5/5/2022  4:29 PM CDT -----  Regarding: labs  I just scheduled this patient for a follow up to see y'all in July and she needs labs ordered please.    Thank you,    Samantha

## 2022-05-06 PROBLEM — I70.0 ATHEROSCLEROSIS OF AORTA: Status: ACTIVE | Noted: 2022-05-06

## 2022-05-11 ENCOUNTER — OFFICE VISIT (OUTPATIENT)
Dept: CARDIOLOGY | Facility: CLINIC | Age: 77
End: 2022-05-11
Payer: MEDICARE

## 2022-05-11 VITALS — BODY MASS INDEX: 33.26 KG/M2 | WEIGHT: 180.75 LBS | HEIGHT: 62 IN

## 2022-05-11 DIAGNOSIS — Z95.1 S/P CABG (CORONARY ARTERY BYPASS GRAFT): ICD-10-CM

## 2022-05-11 DIAGNOSIS — I48.0 PAF (PAROXYSMAL ATRIAL FIBRILLATION): ICD-10-CM

## 2022-05-11 DIAGNOSIS — Z95.1 HX OF CABG: ICD-10-CM

## 2022-05-11 DIAGNOSIS — I25.10 CORONARY ARTERY DISEASE INVOLVING NATIVE CORONARY ARTERY OF NATIVE HEART WITHOUT ANGINA PECTORIS: Primary | ICD-10-CM

## 2022-05-11 PROCEDURE — 1126F PR PAIN SEVERITY QUANTIFIED, NO PAIN PRESENT: ICD-10-PCS | Mod: CPTII,S$GLB,, | Performed by: INTERNAL MEDICINE

## 2022-05-11 PROCEDURE — 99999 PR PBB SHADOW E&M-EST. PATIENT-LVL II: ICD-10-PCS | Mod: PBBFAC,,, | Performed by: INTERNAL MEDICINE

## 2022-05-11 PROCEDURE — 3008F PR BODY MASS INDEX (BMI) DOCUMENTED: ICD-10-PCS | Mod: CPTII,S$GLB,, | Performed by: INTERNAL MEDICINE

## 2022-05-11 PROCEDURE — 3072F LOW RISK FOR RETINOPATHY: CPT | Mod: CPTII,S$GLB,, | Performed by: INTERNAL MEDICINE

## 2022-05-11 PROCEDURE — 1160F PR REVIEW ALL MEDS BY PRESCRIBER/CLIN PHARMACIST DOCUMENTED: ICD-10-PCS | Mod: CPTII,S$GLB,, | Performed by: INTERNAL MEDICINE

## 2022-05-11 PROCEDURE — 99214 PR OFFICE/OUTPT VISIT, EST, LEVL IV, 30-39 MIN: ICD-10-PCS | Mod: S$GLB,,, | Performed by: INTERNAL MEDICINE

## 2022-05-11 PROCEDURE — 1160F RVW MEDS BY RX/DR IN RCRD: CPT | Mod: CPTII,S$GLB,, | Performed by: INTERNAL MEDICINE

## 2022-05-11 PROCEDURE — 1159F MED LIST DOCD IN RCRD: CPT | Mod: CPTII,S$GLB,, | Performed by: INTERNAL MEDICINE

## 2022-05-11 PROCEDURE — 3072F PR LOW RISK FOR RETINOPATHY: ICD-10-PCS | Mod: CPTII,S$GLB,, | Performed by: INTERNAL MEDICINE

## 2022-05-11 PROCEDURE — 3008F BODY MASS INDEX DOCD: CPT | Mod: CPTII,S$GLB,, | Performed by: INTERNAL MEDICINE

## 2022-05-11 PROCEDURE — 99999 PR PBB SHADOW E&M-EST. PATIENT-LVL II: CPT | Mod: PBBFAC,,, | Performed by: INTERNAL MEDICINE

## 2022-05-11 PROCEDURE — 99214 OFFICE O/P EST MOD 30 MIN: CPT | Mod: S$GLB,,, | Performed by: INTERNAL MEDICINE

## 2022-05-11 PROCEDURE — 3051F PR MOST RECENT HEMOGLOBIN A1C LEVEL 7.0 - < 8.0%: ICD-10-PCS | Mod: CPTII,S$GLB,, | Performed by: INTERNAL MEDICINE

## 2022-05-11 PROCEDURE — 1126F AMNT PAIN NOTED NONE PRSNT: CPT | Mod: CPTII,S$GLB,, | Performed by: INTERNAL MEDICINE

## 2022-05-11 PROCEDURE — 3051F HG A1C>EQUAL 7.0%<8.0%: CPT | Mod: CPTII,S$GLB,, | Performed by: INTERNAL MEDICINE

## 2022-05-11 PROCEDURE — 1159F PR MEDICATION LIST DOCUMENTED IN MEDICAL RECORD: ICD-10-PCS | Mod: CPTII,S$GLB,, | Performed by: INTERNAL MEDICINE

## 2022-05-11 NOTE — PROGRESS NOTES
Subjective:    Patient ID:  Adele Hall is a 76 y.o. female who presents for follow-up of Chest Pain ([facility]; former pt of Dr. Burks )      HPI  She comes with chest pain on and off for the last several months, not worsened lately.  It does get better with nitroglycerin at times  She did have Doppler CABG back in 2016 and all bypasses were patent in 2018    Review of Systems   Constitutional: Negative for decreased appetite, malaise/fatigue, weight gain and weight loss.   Cardiovascular: Negative for chest pain, dyspnea on exertion, leg swelling, palpitations and syncope.   Respiratory: Negative for cough and shortness of breath.    Gastrointestinal: Negative.    Neurological: Negative for weakness.   All other systems reviewed and are negative.       Objective:      Physical Exam  Vitals and nursing note reviewed.   Constitutional:       Appearance: Normal appearance. She is well-developed.   HENT:      Head: Normocephalic.   Eyes:      Pupils: Pupils are equal, round, and reactive to light.   Neck:      Thyroid: No thyromegaly.      Vascular: No carotid bruit or JVD.   Cardiovascular:      Rate and Rhythm: Normal rate and regular rhythm.      Chest Wall: PMI is not displaced.      Pulses: Normal pulses and intact distal pulses.      Heart sounds: Normal heart sounds. No murmur heard.    No gallop.   Pulmonary:      Effort: Pulmonary effort is normal.      Breath sounds: Normal breath sounds.   Abdominal:      Palpations: Abdomen is soft. There is no mass.      Tenderness: There is no abdominal tenderness.   Musculoskeletal:         General: Normal range of motion.      Cervical back: Normal range of motion and neck supple.   Skin:     General: Skin is warm.   Neurological:      Mental Status: She is alert and oriented to person, place, and time.      Sensory: No sensory deficit.      Deep Tendon Reflexes: Reflexes are normal and symmetric.           Assessment:       1. Coronary artery disease  involving native coronary artery of native heart without angina pectoris    2. Hx of CABG-  1/19/16 x 4    3. PAF (paroxysmal atrial fibrillation), post CABG    4. S/P CABG (coronary artery bypass graft) - x4 01/19/2016; LIMA to LAD; SVG's to diag, OMB, PDA - all grafts patent 01/2018         Plan:   We get the stress test (either nuclear or dobutamine stress echo) in [place].  Call with results  Continue all cardiac medications  Regular exercise program  Weight loss  Six-month follow-up with Melody if stress test negative

## 2022-05-12 ENCOUNTER — LAB VISIT (OUTPATIENT)
Dept: LAB | Facility: HOSPITAL | Age: 77
End: 2022-05-12
Attending: NURSE PRACTITIONER
Payer: MEDICARE

## 2022-05-12 DIAGNOSIS — E11.42 TYPE 2 DIABETES MELLITUS WITH DIABETIC POLYNEUROPATHY, WITH LONG-TERM CURRENT USE OF INSULIN: ICD-10-CM

## 2022-05-12 DIAGNOSIS — E78.2 MIXED HYPERLIPIDEMIA: ICD-10-CM

## 2022-05-12 DIAGNOSIS — I10 ESSENTIAL HYPERTENSION: ICD-10-CM

## 2022-05-12 DIAGNOSIS — Z79.4 TYPE 2 DIABETES MELLITUS WITH DIABETIC POLYNEUROPATHY, WITH LONG-TERM CURRENT USE OF INSULIN: ICD-10-CM

## 2022-05-12 LAB
ESTIMATED AVG GLUCOSE: 192 MG/DL (ref 68–131)
HBA1C MFR BLD: 8.3 % (ref 4–5.6)

## 2022-05-12 PROCEDURE — 83036 HEMOGLOBIN GLYCOSYLATED A1C: CPT | Performed by: NURSE PRACTITIONER

## 2022-05-12 PROCEDURE — 36415 COLL VENOUS BLD VENIPUNCTURE: CPT | Mod: PO | Performed by: NURSE PRACTITIONER

## 2022-05-12 PROCEDURE — 80061 LIPID PANEL: CPT | Performed by: NURSE PRACTITIONER

## 2022-05-12 PROCEDURE — 80053 COMPREHEN METABOLIC PANEL: CPT | Performed by: NURSE PRACTITIONER

## 2022-05-12 NOTE — TELEPHONE ENCOUNTER
It has been over a year since she was last seen an over a year since her last cholesterol labs were done.  She needs an office visit

## 2022-05-12 NOTE — TELEPHONE ENCOUNTER
No new care gaps identified.  [facility] Embedded Care Gaps. Reference number: [id number]. 5/12/2022   8:54:22 AM CDT

## 2022-05-12 NOTE — TELEPHONE ENCOUNTER
Refill Routing Note   Medication(s) are not appropriate for processing by [facility] for the following reason(s):      - Required laboratory values are outdated  - Patient has been seen in the ED/Hospital since the last PCP visit    [facility] action(s):  Defer          Medication reconciliation completed: No     Appointments  past 12m or future 3m with PCP    Date Provider   Last Visit   4/6/2021 Wally Cazares MD   Next Visit   Visit date not found Wally Cazares MD   ED visits in past 90 days: 0        Note composed:9:15 AM 05/12/2022

## 2022-05-13 LAB
ALBUMIN SERPL BCP-MCNC: 3.7 G/DL (ref 3.5–5.2)
ALP SERPL-CCNC: 73 U/L (ref 55–135)
ALT SERPL W/O P-5'-P-CCNC: 24 U/L (ref 10–44)
ANION GAP SERPL CALC-SCNC: 13 MMOL/L (ref 8–16)
AST SERPL-CCNC: 34 U/L (ref 10–40)
BILIRUB SERPL-MCNC: 0.6 MG/DL (ref 0.1–1)
BUN SERPL-MCNC: 13 MG/DL (ref 8–23)
CALCIUM SERPL-MCNC: 9.7 MG/DL (ref 8.7–10.5)
CHLORIDE SERPL-SCNC: 100 MMOL/L (ref 95–110)
CHOLEST SERPL-MCNC: 128 MG/DL (ref 120–199)
CHOLEST/HDLC SERPL: 3.6 {RATIO} (ref 2–5)
CO2 SERPL-SCNC: 26 MMOL/L (ref 23–29)
CREAT SERPL-MCNC: 1.2 MG/DL (ref 0.5–1.4)
EST. GFR  (AFRICAN AMERICAN): 51.4 ML/MIN/1.73 M^2
EST. GFR  (NON AFRICAN AMERICAN): 44.6 ML/MIN/1.73 M^2
GLUCOSE SERPL-MCNC: 148 MG/DL (ref 70–110)
HDLC SERPL-MCNC: 36 MG/DL (ref 40–75)
HDLC SERPL: 28.1 % (ref 20–50)
LDLC SERPL CALC-MCNC: 28.6 MG/DL (ref 63–159)
NONHDLC SERPL-MCNC: 92 MG/DL
POTASSIUM SERPL-SCNC: 3.8 MMOL/L (ref 3.5–5.1)
PROT SERPL-MCNC: 7.2 G/DL (ref 6–8.4)
SODIUM SERPL-SCNC: 139 MMOL/L (ref 136–145)
TRIGL SERPL-MCNC: 317 MG/DL (ref 30–150)

## 2022-05-13 NOTE — TELEPHONE ENCOUNTER
Call placed to patient. No answer received. Left message requesting return call to office. Also sent portal message to patient regarding this matter. Awaiting reply.

## 2022-05-16 RX ORDER — ROSUVASTATIN CALCIUM 40 MG/1
TABLET, COATED ORAL
Qty: 90 TABLET | Refills: 3 | Status: SHIPPED | OUTPATIENT
Start: 2022-05-16 | End: 2023-05-30

## 2022-05-16 NOTE — TELEPHONE ENCOUNTER
Follow up Call placed to patient for notification. Patient verbalized understanding. Appointment scheduled for the date of 7-14-22. Patient agreed to appointment date, time, and location. Will send follow up message to Dr. Cazares for notification.

## 2022-05-18 ENCOUNTER — OFFICE VISIT (OUTPATIENT)
Dept: ENDOCRINOLOGY | Facility: CLINIC | Age: 77
End: 2022-05-18
Payer: MEDICARE

## 2022-05-18 VITALS
WEIGHT: 180.44 LBS | DIASTOLIC BLOOD PRESSURE: 70 MMHG | HEART RATE: 69 BPM | BODY MASS INDEX: 33.21 KG/M2 | SYSTOLIC BLOOD PRESSURE: 114 MMHG | HEIGHT: 62 IN

## 2022-05-18 DIAGNOSIS — E11.22 TYPE 2 DIABETES MELLITUS WITH STAGE 3B CHRONIC KIDNEY DISEASE, WITH LONG-TERM CURRENT USE OF INSULIN: Primary | ICD-10-CM

## 2022-05-18 DIAGNOSIS — Z79.4 TYPE 2 DIABETES MELLITUS WITH DIABETIC POLYNEUROPATHY, WITH LONG-TERM CURRENT USE OF INSULIN: ICD-10-CM

## 2022-05-18 DIAGNOSIS — I10 ESSENTIAL HYPERTENSION: ICD-10-CM

## 2022-05-18 DIAGNOSIS — N18.32 TYPE 2 DIABETES MELLITUS WITH STAGE 3B CHRONIC KIDNEY DISEASE, WITH LONG-TERM CURRENT USE OF INSULIN: Primary | ICD-10-CM

## 2022-05-18 DIAGNOSIS — E11.42 TYPE 2 DIABETES MELLITUS WITH DIABETIC POLYNEUROPATHY, WITH LONG-TERM CURRENT USE OF INSULIN: ICD-10-CM

## 2022-05-18 DIAGNOSIS — N18.30 TYPE 2 DIABETES MELLITUS WITH STAGE 3 CHRONIC KIDNEY DISEASE, WITH LONG-TERM CURRENT USE OF INSULIN: ICD-10-CM

## 2022-05-18 DIAGNOSIS — E07.9 THYROID DISEASE: ICD-10-CM

## 2022-05-18 DIAGNOSIS — E11.22 TYPE 2 DIABETES MELLITUS WITH STAGE 3 CHRONIC KIDNEY DISEASE, WITH LONG-TERM CURRENT USE OF INSULIN: ICD-10-CM

## 2022-05-18 DIAGNOSIS — E78.5 HYPERLIPIDEMIA WITH TARGET LDL LESS THAN 70: ICD-10-CM

## 2022-05-18 DIAGNOSIS — Z79.4 TYPE 2 DIABETES MELLITUS WITH STAGE 3B CHRONIC KIDNEY DISEASE, WITH LONG-TERM CURRENT USE OF INSULIN: Primary | ICD-10-CM

## 2022-05-18 DIAGNOSIS — Z79.4 TYPE 2 DIABETES MELLITUS WITH STAGE 3 CHRONIC KIDNEY DISEASE, WITH LONG-TERM CURRENT USE OF INSULIN: ICD-10-CM

## 2022-05-18 DIAGNOSIS — I25.10 CORONARY ARTERY DISEASE INVOLVING NATIVE CORONARY ARTERY OF NATIVE HEART WITHOUT ANGINA PECTORIS: ICD-10-CM

## 2022-05-18 DIAGNOSIS — F39 MOOD DISORDER: ICD-10-CM

## 2022-05-18 LAB — GLUCOSE SERPL-MCNC: 206 MG/DL (ref 70–110)

## 2022-05-18 PROCEDURE — 99999 PR PBB SHADOW E&M-EST. PATIENT-LVL IV: ICD-10-PCS | Mod: PBBFAC,,, | Performed by: NURSE PRACTITIONER

## 2022-05-18 PROCEDURE — 3072F PR LOW RISK FOR RETINOPATHY: ICD-10-PCS | Mod: CPTII,S$GLB,, | Performed by: NURSE PRACTITIONER

## 2022-05-18 PROCEDURE — 3074F SYST BP LT 130 MM HG: CPT | Mod: CPTII,S$GLB,, | Performed by: NURSE PRACTITIONER

## 2022-05-18 PROCEDURE — 99499 UNLISTED E&M SERVICE: CPT | Mod: S$GLB,,, | Performed by: NURSE PRACTITIONER

## 2022-05-18 PROCEDURE — 3078F PR MOST RECENT DIASTOLIC BLOOD PRESSURE < 80 MM HG: ICD-10-PCS | Mod: CPTII,S$GLB,, | Performed by: NURSE PRACTITIONER

## 2022-05-18 PROCEDURE — 1101F PT FALLS ASSESS-DOCD LE1/YR: CPT | Mod: CPTII,S$GLB,, | Performed by: NURSE PRACTITIONER

## 2022-05-18 PROCEDURE — 99999 PR PBB SHADOW E&M-EST. PATIENT-LVL IV: CPT | Mod: PBBFAC,,, | Performed by: NURSE PRACTITIONER

## 2022-05-18 PROCEDURE — 99214 OFFICE O/P EST MOD 30 MIN: CPT | Mod: S$GLB,,, | Performed by: NURSE PRACTITIONER

## 2022-05-18 PROCEDURE — 3052F PR MOST RECENT HEMOGLOBIN A1C LEVEL 8.0 - < 9.0%: ICD-10-PCS | Mod: CPTII,S$GLB,, | Performed by: NURSE PRACTITIONER

## 2022-05-18 PROCEDURE — 82962 POCT GLUCOSE, HAND-HELD DEVICE: ICD-10-PCS | Mod: S$GLB,,, | Performed by: NURSE PRACTITIONER

## 2022-05-18 PROCEDURE — 3052F HG A1C>EQUAL 8.0%<EQUAL 9.0%: CPT | Mod: CPTII,S$GLB,, | Performed by: NURSE PRACTITIONER

## 2022-05-18 PROCEDURE — 3288F PR FALLS RISK ASSESSMENT DOCUMENTED: ICD-10-PCS | Mod: CPTII,S$GLB,, | Performed by: NURSE PRACTITIONER

## 2022-05-18 PROCEDURE — 1160F PR REVIEW ALL MEDS BY PRESCRIBER/CLIN PHARMACIST DOCUMENTED: ICD-10-PCS | Mod: CPTII,S$GLB,, | Performed by: NURSE PRACTITIONER

## 2022-05-18 PROCEDURE — 1159F MED LIST DOCD IN RCRD: CPT | Mod: CPTII,S$GLB,, | Performed by: NURSE PRACTITIONER

## 2022-05-18 PROCEDURE — 1101F PR PT FALLS ASSESS DOC 0-1 FALLS W/OUT INJ PAST YR: ICD-10-PCS | Mod: CPTII,S$GLB,, | Performed by: NURSE PRACTITIONER

## 2022-05-18 PROCEDURE — 3008F PR BODY MASS INDEX (BMI) DOCUMENTED: ICD-10-PCS | Mod: CPTII,S$GLB,, | Performed by: NURSE PRACTITIONER

## 2022-05-18 PROCEDURE — 3288F FALL RISK ASSESSMENT DOCD: CPT | Mod: CPTII,S$GLB,, | Performed by: NURSE PRACTITIONER

## 2022-05-18 PROCEDURE — 1125F PR PAIN SEVERITY QUANTIFIED, PAIN PRESENT: ICD-10-PCS | Mod: CPTII,S$GLB,, | Performed by: NURSE PRACTITIONER

## 2022-05-18 PROCEDURE — 3072F LOW RISK FOR RETINOPATHY: CPT | Mod: CPTII,S$GLB,, | Performed by: NURSE PRACTITIONER

## 2022-05-18 PROCEDURE — 3008F BODY MASS INDEX DOCD: CPT | Mod: CPTII,S$GLB,, | Performed by: NURSE PRACTITIONER

## 2022-05-18 PROCEDURE — 82962 GLUCOSE BLOOD TEST: CPT | Mod: S$GLB,,, | Performed by: NURSE PRACTITIONER

## 2022-05-18 PROCEDURE — 1160F RVW MEDS BY RX/DR IN RCRD: CPT | Mod: CPTII,S$GLB,, | Performed by: NURSE PRACTITIONER

## 2022-05-18 PROCEDURE — 1159F PR MEDICATION LIST DOCUMENTED IN MEDICAL RECORD: ICD-10-PCS | Mod: CPTII,S$GLB,, | Performed by: NURSE PRACTITIONER

## 2022-05-18 PROCEDURE — 1125F AMNT PAIN NOTED PAIN PRSNT: CPT | Mod: CPTII,S$GLB,, | Performed by: NURSE PRACTITIONER

## 2022-05-18 PROCEDURE — 99499 RISK ADDL DX/OHS AUDIT: ICD-10-PCS | Mod: S$GLB,,, | Performed by: NURSE PRACTITIONER

## 2022-05-18 PROCEDURE — 3074F PR MOST RECENT SYSTOLIC BLOOD PRESSURE < 130 MM HG: ICD-10-PCS | Mod: CPTII,S$GLB,, | Performed by: NURSE PRACTITIONER

## 2022-05-18 PROCEDURE — 99214 PR OFFICE/OUTPT VISIT, EST, LEVL IV, 30-39 MIN: ICD-10-PCS | Mod: S$GLB,,, | Performed by: NURSE PRACTITIONER

## 2022-05-18 PROCEDURE — 3078F DIAST BP <80 MM HG: CPT | Mod: CPTII,S$GLB,, | Performed by: NURSE PRACTITIONER

## 2022-05-18 RX ORDER — DULAGLUTIDE 1.5 MG/.5ML
1.5 INJECTION, SOLUTION SUBCUTANEOUS
Qty: 4 PEN | Refills: 6 | Status: SHIPPED | OUTPATIENT
Start: 2022-05-18 | End: 2022-08-25

## 2022-05-18 RX ORDER — SEMAGLUTIDE 1.34 MG/ML
0.25 INJECTION, SOLUTION SUBCUTANEOUS
Status: CANCELLED | OUTPATIENT
Start: 2022-05-18

## 2022-05-18 RX ORDER — INSULIN DEGLUDEC 200 U/ML
58 INJECTION, SOLUTION SUBCUTANEOUS DAILY
Qty: 10 PEN | Refills: 6 | Status: SHIPPED | OUTPATIENT
Start: 2022-05-18 | End: 2023-01-17

## 2022-05-18 RX ORDER — INSULIN ASPART 100 [IU]/ML
42 INJECTION, SOLUTION INTRAVENOUS; SUBCUTANEOUS
Qty: 15 ML | Refills: 6 | Status: SHIPPED | OUTPATIENT
Start: 2022-05-18 | End: 2022-08-25

## 2022-05-18 NOTE — PROGRESS NOTES
"CC: Ms. Adele Hall arrives today for management of Type 2 DM and review of chronic medical conditions, as listed in the visit diagnosis section of this encounter.     HPI: Ms. Adele Hall was diagnosed with Type 2 DM in ~ 2002. Metformin started at the time of diagnosis but was stopped d/t renal impairment, per PCP in [place]. Insulin was started ~ 2012. She has rotated between analog insulins and generic NPH and Regular insulins, due to cost. [employer] brand insulins, due to cost.  Denies FH of DM. She did have 1 ER admission after having BG of >700 in 2012, prior to starting insulin.   She has a dx of CAD - s/p CABG in 1/2016.   She follows with Dr. Davidson for CKD.    Patient was last seen by me in October.     Today she states that she hasn't been taking Novolog. Previous dose was 42 units with meals. She "doesn't care anymore." She reports losing interest in activities she previously enjoyed. States that she'd rather be sleeping. Sleeps most of the time.      Not checking BG readings. Does have a meter.     Hypoglycemia: None  Symptoms: dizziness   Treatment: sweets    Missing Insulin/PO medication doses: No  Timing prandial insulin 5-15 minutes before meals: n/a    Dietary Habits: Eats 1 meal/day. This meal may be restaurant meal.  May have popcorn at night.     Has upcoming stress test for evaluation of chest pain.      CURRENT DIABETIC MEDS: Trulicity 0.75 mg weekly, Tresiba U200 58 units QAM, Novolog 42 units with meal + correction scale, target 150, ISF 25 (currently not taking)  Vial or pen: pens  Glucometer type: True Metrix    Previous DM treatments:  Metformin  Victoza - $   NPH/Regular insulins  Novolog     Last Eye Exam: 10/2021, no DRPeter   Last Podiatry Exam: never    REVIEW OF SYSTEMS  Constitutional: no c/o weakness, fatigue, weight loss  Eyes: denies visual disturbances  Cardiac: no palpitations. Reports occasional chest pain that resolves with nitroglycerin. Not occurring " "now.   Respiratory: no cough. + dyspnea on exertion.   GI: no c/o nausea, abdominal pain.   Psych: reports "feeling lazy". Denies SI/HI. Denies anxiety  Neuro: c/o "sharp pain" that comes and goes to feet.   Skin: no lesions, rashes.   Endocrine: denies polyphagia, polydipsia, polyuria.      Personally reviewed Past Medical, Surgical, Social History.    Vital Signs  /70   Pulse 69   Ht 5' 2" (1.575 m)   Wt 81.8 kg (180 lb 7.1 oz)   BMI 33.00 kg/m²     Personally reviewed the below labs:    Hemoglobin A1C   Date Value Ref Range Status   05/12/2022 8.3 (H) 4.0 - 5.6 % Final     Comment:     ADA Screening Guidelines:  5.7-6.4%  Consistent with prediabetes  >or=6.5%  Consistent with diabetes    High levels of fetal hemoglobin interfere with the HbA1C  assay. Heterozygous hemoglobin variants (HbS, HgC, etc)do  not significantly interfere with this assay.   However, presence of multiple variants may affect accuracy.     01/11/2022 7.1 (H) 4.0 - 5.6 % Final     Comment:     ADA Screening Guidelines:  5.7-6.4%  Consistent with prediabetes  >or=6.5%  Consistent with diabetes    High levels of fetal hemoglobin interfere with the HbA1C  assay. Heterozygous hemoglobin variants (HbS, HgC, etc)do  not significantly interfere with this assay.   However, presence of multiple variants may affect accuracy.     10/21/2021 7.2 (H) 4.0 - 5.6 % Final     Comment:     ADA Screening Guidelines:  5.7-6.4%  Consistent with prediabetes  >or=6.5%  Consistent with diabetes    High levels of fetal hemoglobin interfere with the HbA1C  assay. Heterozygous hemoglobin variants (HbS, HgC, etc)do  not significantly interfere with this assay.   However, presence of multiple variants may affect accuracy.         Chemistry        Component Value Date/Time     05/12/2022 1103     05/12/2022 1103    K 3.8 05/12/2022 1103    K 4.1 05/12/2022 1103     05/12/2022 1103     05/12/2022 1103    CO2 26 05/12/2022 1103    CO2 28 " 05/12/2022 1103    BUN 13 05/12/2022 1103    BUN 14 05/12/2022 1103    CREATININE 1.2 05/12/2022 1103    CREATININE 1.3 05/12/2022 1103     (H) 05/12/2022 1103     (H) 05/12/2022 1103        Component Value Date/Time    CALCIUM 9.7 05/12/2022 1103    CALCIUM 9.9 05/12/2022 1103    ALKPHOS 73 05/12/2022 1103    AST 34 05/12/2022 1103    AST 45 (H) 01/30/2016 0431    ALT 24 05/12/2022 1103    BILITOT 0.6 05/12/2022 1103          Lab Results   Component Value Date    CHOL 128 05/12/2022    CHOL 127 04/27/2021    CHOL 116 (L) 03/07/2021     Lab Results   Component Value Date    HDL 36 (L) 05/12/2022    HDL 28 (L) 04/27/2021    HDL 36 (L) 03/07/2021     Lab Results   Component Value Date    LDLCALC 28.6 (L) 05/12/2022    LDLCALC 29.6 (L) 04/27/2021    LDLCALC 37.2 (L) 03/07/2021     Lab Results   Component Value Date    TRIG 317 (H) 05/12/2022    TRIG 347 (H) 04/27/2021    TRIG 214 (H) 03/07/2021     Lab Results   Component Value Date    CHOLHDL 28.1 05/12/2022    CHOLHDL 22.0 04/27/2021    CHOLHDL 31.0 03/07/2021       Lab Results   Component Value Date    MICALBCREAT 105.8 (H) 10/21/2021     Lab Results   Component Value Date    TSH 0.932 07/29/2021       Estimated Creatinine Clearance: 40.8 mL/min (based on SCr of 1.2 mg/dL).    Vit D, 25-Hydroxy   Date Value Ref Range Status   05/12/2022 35 30 - 96 ng/mL Final     Comment:     Vitamin D deficiency.........<10 ng/mL                              Vitamin D insufficiency......10-29 ng/mL       Vitamin D sufficiency........> or equal to 30 ng/mL  Vitamin D toxicity............>100 ng/mL         August 2021 Professional CGMS review: Fasting glucoses are reasonable. Prandial excursions noted in mid 200s range.  One episode of borderline hypoglycemia with unknown precipitant. Eating 2 meals/day on average with snacks.   Average glucose: 193 mg/dL  Above 250 mg/dL: 13 %  181-250 mg/dL: 44 %   mg/dL: 42 %  54-69 mg/dL: <1 %  Below 54 mg/dL: 0  %        PHYSICAL EXAMINATION  Constitutional: Appears well, no distress  Neck: Supple, trachea midline  Respiratory: CTA, even and unlabored.  Cardiovascular: RRR, no murmurs, no carotid bruits.   GI: normal bowel sounds, no hernia  Skin: warm and dry  Psych: flat affect, conversant  Neuro: oriented to person, place, time.   Feet: appropriate footwear.        Goals      HEMOGLOBIN A1C < 7.5               Assessment/Plan  1. Type 2 diabetes mellitus with stage 3 chronic kidney disease, with long-term current use of insulin  -- Worsening, complicated by noncompliance. History of large prandial excursions. Will resume Novolog at previous dose.   -- resume Novolog 42 units with main meal. Continue correction scale.   -- continue current Tresiba dose.  -- increase Trulicity to 1.5 mg weekly.   -- check BG 4x/day and bring log to follow up.   -- following with nephrology    -- Discussed diagnosis of DM, A1c goals, progression of disease, long term complications and tx options.   -- Reviewed hypoglycemia management: treat with 1/2 glass of juice, 1/2 can regular coke, or 4 glucose tablets. Monitor and repeat treatment every 15 minutes until BG is >70 Then have a snack, which includes a complex carbohydrate and protein.   Advised p  atient to check BG before activities, such as driving or exercise.    -- takes aspirin, statin, ace-I   2. Type 2 diabetes mellitus with diabetic polyneuropathy, with long-term current use of insulin  -- optimize DM control     3. Coronary artery disease involving native coronary artery of native heart without angina pectoris  -- avoid hypoglycemia   4. Essential hypertension  -- controlled  -- continue current meds   5. Hyperlipidemia with target LDL less than 70  -- Uncontrolled with elevated triglycerides.   -- Optimize DM control  -- Continue Crestor (Fenofibrate previously DC'd due to renal function)   6. Thyroid disease  -- controlled  -- continue levothyroxine 75 mcg daily   7. Mood  disorder  -- with depressive symptoms  -- has upcoming appt with PCP. I advised that she discuss with him. This may be contributing to noncompliance       FOLLOW UP  Follow up in about 3 months (around 8/18/2022).   Patient instructed to bring BG logs to each follow up   Patient encouraged to call for any BG/medication issues, concerns, or questions.    Orders Placed This Encounter   Procedures    Hemoglobin A1C    Basic Metabolic Panel    TSH    POCT Glucose, Hand-Held Device

## 2022-05-19 RX ORDER — METOPROLOL TARTRATE 25 MG/1
TABLET, FILM COATED ORAL
Qty: 180 TABLET | Refills: 3 | Status: SHIPPED | OUTPATIENT
Start: 2022-05-19 | End: 2023-06-21

## 2022-05-19 NOTE — TELEPHONE ENCOUNTER
No new care gaps identified.  [facility] Embedded Care Gaps. Reference number: [id number]. 5/19/2022   10:55:00 AM CDT

## 2022-05-19 NOTE — TELEPHONE ENCOUNTER
Refill Routing Note   Medication(s) are not appropriate for processing by [facility] for the following reason(s):      - Patient has been seen in the ED/Hospital since the last PCP visit    [facility] action(s):  Defer       Medication Therapy Plan: Last PCP Visit: 4/6/2021 Last Admission: 12/7/2021  Medication reconciliation completed: No     Appointments  past 12m or future 3m with PCP    Date Provider   Last Visit   4/6/2021 Wally Cazares MD   Next Visit   7/14/2022 Wally Cazares MD   ED visits in past 90 days: 0        Note composed:11:07 AM 05/19/2022

## 2022-05-20 ENCOUNTER — CLINICAL SUPPORT (OUTPATIENT)
Dept: CARDIOLOGY | Facility: HOSPITAL | Age: 77
End: 2022-05-20
Attending: INTERNAL MEDICINE
Payer: MEDICARE

## 2022-05-20 DIAGNOSIS — I25.10 CORONARY ARTERY DISEASE INVOLVING NATIVE CORONARY ARTERY OF NATIVE HEART WITHOUT ANGINA PECTORIS: ICD-10-CM

## 2022-05-20 DIAGNOSIS — Z95.1 S/P CABG (CORONARY ARTERY BYPASS GRAFT): ICD-10-CM

## 2022-05-20 DIAGNOSIS — I48.0 PAF (PAROXYSMAL ATRIAL FIBRILLATION): ICD-10-CM

## 2022-05-20 DIAGNOSIS — Z95.1 HX OF CABG: ICD-10-CM

## 2022-05-20 LAB
CV STRESS BASE HR: 70 BPM
DIASTOLIC BLOOD PRESSURE: 69 MMHG
EJECTION FRACTION: 65 %
OHS CV CPX 1 MINUTE RECOVERY HEART RATE: 93 BPM
OHS CV CPX 85 PERCENT MAX PREDICTED HEART RATE MALE: 120
OHS CV CPX ESTIMATED METS: 1
OHS CV CPX MAX PREDICTED HEART RATE: 141
OHS CV CPX PATIENT IS FEMALE: 1
OHS CV CPX PATIENT IS MALE: 0
OHS CV CPX PEAK DIASTOLIC BLOOD PRESSURE: 81 MMHG
OHS CV CPX PEAK HEAR RATE: 99 BPM
OHS CV CPX PEAK RATE PRESSURE PRODUCT: NORMAL
OHS CV CPX PEAK SYSTOLIC BLOOD PRESSURE: 207 MMHG
OHS CV CPX PERCENT MAX PREDICTED HEART RATE ACHIEVED: 70
OHS CV CPX RATE PRESSURE PRODUCT PRESENTING: 9030
SYSTOLIC BLOOD PRESSURE: 129 MMHG

## 2022-05-20 PROCEDURE — 93351 STRESS TTE COMPLETE: CPT | Mod: 26,,, | Performed by: INTERNAL MEDICINE

## 2022-05-20 PROCEDURE — 25000242 PHARM REV CODE 250 ALT 637 W/ HCPCS: Performed by: INTERNAL MEDICINE

## 2022-05-20 PROCEDURE — 93351 STRESS TTE COMPLETE: CPT

## 2022-05-20 PROCEDURE — 93351 STRESS ECHO (CUPID ONLY): ICD-10-PCS | Mod: 26,,, | Performed by: INTERNAL MEDICINE

## 2022-05-20 PROCEDURE — 63600150 PHARM REV CODE 636: Performed by: INTERNAL MEDICINE

## 2022-05-20 RX ORDER — NITROGLYCERIN 0.4 MG/1
0.4 TABLET SUBLINGUAL
Status: COMPLETED | OUTPATIENT
Start: 2022-05-20 | End: 2022-05-20

## 2022-05-20 RX ORDER — DOBUTAMINE HYDROCHLORIDE 100 MG/100ML
2.5 INJECTION INTRAVENOUS CONTINUOUS
Status: DISCONTINUED | OUTPATIENT
Start: 2022-05-20 | End: 2022-05-23 | Stop reason: HOSPADM

## 2022-05-20 RX ADMIN — NITROGLYCERIN 0.4 MG: 0.4 TABLET SUBLINGUAL at 10:05

## 2022-05-20 RX ADMIN — DOBUTAMINE HYDROCHLORIDE 10 MCG/KG/MIN: 100 INJECTION INTRAVENOUS at 10:05

## 2022-06-01 ENCOUNTER — TELEPHONE (OUTPATIENT)
Dept: FAMILY MEDICINE | Facility: CLINIC | Age: 77
End: 2022-06-01
Payer: MEDICARE

## 2022-06-01 NOTE — TELEPHONE ENCOUNTER
I called the patient in regards to her appointment that she has scheduled with Dr. Cazares on 07/14/2022 at 3:40pm. We need to reschedule her appointment Dr. Cazares will not be in the office this week.  No answer left voicemail to return our call.  I will send the patient a portal message as well.

## 2022-06-06 ENCOUNTER — PATIENT OUTREACH (OUTPATIENT)
Dept: ADMINISTRATIVE | Facility: OTHER | Age: 77
End: 2022-06-06
Payer: MEDICARE

## 2022-06-06 RX ORDER — AMLODIPINE BESYLATE 2.5 MG/1
TABLET ORAL
Qty: 90 TABLET | Refills: 0 | Status: SHIPPED | OUTPATIENT
Start: 2022-06-06 | End: 2022-09-20

## 2022-06-06 NOTE — PROGRESS NOTES
CHW - Initial Contact    This Community Health Worker completed OR updated the Social Determinant of Health questionnaire with Adele Hall by telephone today.    Pt identified barriers of most importance are: Exercise and Fitness   Referrals to community agencies completed with patient/caregiver consent outside of [facility] include: No  Referrals were put through [facility] - No  Support and Services: Patient declined.  Other information discussed the patient needs / wants help with: SDOH   Follow up required: NO  Initial Outreach - Due: 6/9/2022

## 2022-06-06 NOTE — TELEPHONE ENCOUNTER
Refill Routing Note   Medication(s) are not appropriate for processing by [facility] for the following reason(s):      - Patient has been seen in the ED/Hospital since the last PCP visit    [facility] action(s):  Route          Medication reconciliation completed: No     Appointments  past 12m or future 3m with PCP    Date Provider   Last Visit   4/6/2021 Wally Cazares MD   Next Visit   8/17/2022 Wally Cazares MD   ED visits in past 90 days: 0        Note composed:3:51 PM 06/06/2022

## 2022-06-06 NOTE — TELEPHONE ENCOUNTER
No new care gaps identified.  [facility] Embedded Care Gaps. Reference number: [id number]. 6/06/2022   1:02:23 PM CDT

## 2022-06-06 NOTE — PROGRESS NOTES
CHW - Case Closure    This Community Health Worker spoke to Adele Hall by telephone today.   Pt/Caregiver reported: Happy and Excited. She says she is not interested in any changes.  Pt/Caregiver denied any additional needs at this time and agrees with episode closure at this time.  Provided Adele Hall with Community Health Worker's contact information and encouraged him/her to contact this Community Health Worker if additional needs arise.

## 2022-08-17 ENCOUNTER — OFFICE VISIT (OUTPATIENT)
Dept: FAMILY MEDICINE | Facility: CLINIC | Age: 77
End: 2022-08-17
Attending: FAMILY MEDICINE
Payer: MEDICARE

## 2022-08-17 VITALS
TEMPERATURE: 98 F | BODY MASS INDEX: 32.66 KG/M2 | DIASTOLIC BLOOD PRESSURE: 72 MMHG | WEIGHT: 177.5 LBS | SYSTOLIC BLOOD PRESSURE: 118 MMHG | HEART RATE: 65 BPM | RESPIRATION RATE: 18 BRPM | OXYGEN SATURATION: 97 % | HEIGHT: 62 IN

## 2022-08-17 DIAGNOSIS — I25.10 CORONARY ARTERY DISEASE INVOLVING NATIVE CORONARY ARTERY OF NATIVE HEART WITHOUT ANGINA PECTORIS: ICD-10-CM

## 2022-08-17 DIAGNOSIS — E11.59 HYPERTENSION ASSOCIATED WITH DIABETES: ICD-10-CM

## 2022-08-17 DIAGNOSIS — I50.32 CHRONIC DIASTOLIC CONGESTIVE HEART FAILURE: ICD-10-CM

## 2022-08-17 DIAGNOSIS — I15.2 HYPERTENSION ASSOCIATED WITH DIABETES: ICD-10-CM

## 2022-08-17 DIAGNOSIS — I11.0 HYPERTENSIVE LEFT VENTRICULAR HYPERTROPHY WITH HEART FAILURE: ICD-10-CM

## 2022-08-17 DIAGNOSIS — Z95.1 S/P CABG (CORONARY ARTERY BYPASS GRAFT): ICD-10-CM

## 2022-08-17 DIAGNOSIS — J44.9 CHRONIC OBSTRUCTIVE PULMONARY DISEASE, UNSPECIFIED COPD TYPE: ICD-10-CM

## 2022-08-17 DIAGNOSIS — Z79.4 TYPE 2 DIABETES MELLITUS WITH STAGE 3 CHRONIC KIDNEY DISEASE, WITH LONG-TERM CURRENT USE OF INSULIN, UNSPECIFIED WHETHER STAGE 3A OR 3B CKD: ICD-10-CM

## 2022-08-17 DIAGNOSIS — E78.5 DYSLIPIDEMIA ASSOCIATED WITH TYPE 2 DIABETES MELLITUS: ICD-10-CM

## 2022-08-17 DIAGNOSIS — Z79.01 ANTICOAGULANT LONG-TERM USE: ICD-10-CM

## 2022-08-17 DIAGNOSIS — Z00.00 ENCOUNTER FOR PREVENTIVE HEALTH EXAMINATION: Primary | ICD-10-CM

## 2022-08-17 DIAGNOSIS — I48.0 PAF (PAROXYSMAL ATRIAL FIBRILLATION): ICD-10-CM

## 2022-08-17 DIAGNOSIS — N18.30 TYPE 2 DIABETES MELLITUS WITH STAGE 3 CHRONIC KIDNEY DISEASE, WITH LONG-TERM CURRENT USE OF INSULIN, UNSPECIFIED WHETHER STAGE 3A OR 3B CKD: ICD-10-CM

## 2022-08-17 DIAGNOSIS — E11.21 DIABETIC NEPHROPATHY ASSOCIATED WITH TYPE 2 DIABETES MELLITUS: ICD-10-CM

## 2022-08-17 DIAGNOSIS — E11.69 DYSLIPIDEMIA ASSOCIATED WITH TYPE 2 DIABETES MELLITUS: ICD-10-CM

## 2022-08-17 DIAGNOSIS — E11.22 TYPE 2 DIABETES MELLITUS WITH STAGE 3 CHRONIC KIDNEY DISEASE, WITH LONG-TERM CURRENT USE OF INSULIN, UNSPECIFIED WHETHER STAGE 3A OR 3B CKD: ICD-10-CM

## 2022-08-17 PROCEDURE — 1101F PR PT FALLS ASSESS DOC 0-1 FALLS W/OUT INJ PAST YR: ICD-10-PCS | Mod: CPTII,S$GLB,, | Performed by: FAMILY MEDICINE

## 2022-08-17 PROCEDURE — 3072F PR LOW RISK FOR RETINOPATHY: ICD-10-PCS | Mod: CPTII,S$GLB,, | Performed by: FAMILY MEDICINE

## 2022-08-17 PROCEDURE — 3052F HG A1C>EQUAL 8.0%<EQUAL 9.0%: CPT | Mod: CPTII,S$GLB,, | Performed by: FAMILY MEDICINE

## 2022-08-17 PROCEDURE — 3078F PR MOST RECENT DIASTOLIC BLOOD PRESSURE < 80 MM HG: ICD-10-PCS | Mod: CPTII,S$GLB,, | Performed by: FAMILY MEDICINE

## 2022-08-17 PROCEDURE — 3072F LOW RISK FOR RETINOPATHY: CPT | Mod: CPTII,S$GLB,, | Performed by: FAMILY MEDICINE

## 2022-08-17 PROCEDURE — 99999 PR PBB SHADOW E&M-EST. PATIENT-LVL V: ICD-10-PCS | Mod: PBBFAC,,, | Performed by: FAMILY MEDICINE

## 2022-08-17 PROCEDURE — 3288F PR FALLS RISK ASSESSMENT DOCUMENTED: ICD-10-PCS | Mod: CPTII,S$GLB,, | Performed by: FAMILY MEDICINE

## 2022-08-17 PROCEDURE — 99397 PER PM REEVAL EST PAT 65+ YR: CPT | Mod: S$GLB,,, | Performed by: FAMILY MEDICINE

## 2022-08-17 PROCEDURE — 1159F MED LIST DOCD IN RCRD: CPT | Mod: CPTII,S$GLB,, | Performed by: FAMILY MEDICINE

## 2022-08-17 PROCEDURE — 3074F PR MOST RECENT SYSTOLIC BLOOD PRESSURE < 130 MM HG: ICD-10-PCS | Mod: CPTII,S$GLB,, | Performed by: FAMILY MEDICINE

## 2022-08-17 PROCEDURE — 1160F PR REVIEW ALL MEDS BY PRESCRIBER/CLIN PHARMACIST DOCUMENTED: ICD-10-PCS | Mod: CPTII,S$GLB,, | Performed by: FAMILY MEDICINE

## 2022-08-17 PROCEDURE — 1160F RVW MEDS BY RX/DR IN RCRD: CPT | Mod: CPTII,S$GLB,, | Performed by: FAMILY MEDICINE

## 2022-08-17 PROCEDURE — 3078F DIAST BP <80 MM HG: CPT | Mod: CPTII,S$GLB,, | Performed by: FAMILY MEDICINE

## 2022-08-17 PROCEDURE — 1125F AMNT PAIN NOTED PAIN PRSNT: CPT | Mod: CPTII,S$GLB,, | Performed by: FAMILY MEDICINE

## 2022-08-17 PROCEDURE — 3074F SYST BP LT 130 MM HG: CPT | Mod: CPTII,S$GLB,, | Performed by: FAMILY MEDICINE

## 2022-08-17 PROCEDURE — 3288F FALL RISK ASSESSMENT DOCD: CPT | Mod: CPTII,S$GLB,, | Performed by: FAMILY MEDICINE

## 2022-08-17 PROCEDURE — 1101F PT FALLS ASSESS-DOCD LE1/YR: CPT | Mod: CPTII,S$GLB,, | Performed by: FAMILY MEDICINE

## 2022-08-17 PROCEDURE — 99999 PR PBB SHADOW E&M-EST. PATIENT-LVL V: CPT | Mod: PBBFAC,,, | Performed by: FAMILY MEDICINE

## 2022-08-17 PROCEDURE — 1159F PR MEDICATION LIST DOCUMENTED IN MEDICAL RECORD: ICD-10-PCS | Mod: CPTII,S$GLB,, | Performed by: FAMILY MEDICINE

## 2022-08-17 PROCEDURE — 1125F PR PAIN SEVERITY QUANTIFIED, PAIN PRESENT: ICD-10-PCS | Mod: CPTII,S$GLB,, | Performed by: FAMILY MEDICINE

## 2022-08-17 PROCEDURE — 99397 PR PREVENTIVE VISIT,EST,65 & OVER: ICD-10-PCS | Mod: S$GLB,,, | Performed by: FAMILY MEDICINE

## 2022-08-17 PROCEDURE — 3052F PR MOST RECENT HEMOGLOBIN A1C LEVEL 8.0 - < 9.0%: ICD-10-PCS | Mod: CPTII,S$GLB,, | Performed by: FAMILY MEDICINE

## 2022-08-17 NOTE — PROGRESS NOTES
Subjective:       Patient ID: Adeel Hall is a 77 y.o. female.    Chief Complaint: Annual Exam (Pt states that she is here for her annual exam )    77-year-old female coming in for annual exam I last saw April 6, 2021.  She has a history of COPD, coronary artery disease native heart native vessels and without angina, four vessel bypass surgery in 2016, diabetes with nephropathy and neuropathy, hypertension with left ventricular hypertrophy, hyperlipidemia, paroxysmal atrial fibrillation that occurred after bypass surgery, congestive heart failure, stage IIIB chronic kidney disease followed by Dr. Davidson, leukocytosis, anemia, hypothyroidism, and reflux.  She is also followed by Endocrinology for her diabetes with her last A1c three months ago at 8.3.  She is schedule tomorrow for an A1c, BMP, and TSH by Babs Wong.  [facility] has her coming due for a microalbumin/creatinine ratio but Dr. Davidson has been doing protein/creatinine ratios several times a year.    The patient admits to a very sedentary lifestyle spending up to 15 hours a day in bed.  She explains that her  keeps the house very cold and she enjoys lying in bed wrapped up in blankets.  She gets very little if any exercise and is unmotivated to change.  She has not experienced any episodes of hypoglycemia noting that she is supposed to be taking NovoLog 42 units at lunchtime and reports that she does not get out of bed until 4:00 p.m. and skips lunch.  She is supposed to be taking Tresiba 58 units daily and Trulicity 1.5 mg weekly along with Synthroid 75 mcg daily, Crestor 40 mg daily, amlodipine 2.5 mg daily lisinopril 5 mg daily and Lopressor 25 b.i.d..  She has obvious difficulty getting up from a chair and crossing 2 to 3 ft of exam room to the exam table, the table has to be lowered to assist her to get on to it.    Past Medical History:  No date: Anesthesia complication      Comment:  took patient 6 days to come out of anethesia after  CABG  No date: Anticoagulant long-term use  No date: Arthritis  No date: Chronic kidney disease  No date: CKD (chronic kidney disease) stage 3, GFR 30-59 ml/min      Comment:  Dr. Montero  2/7/2016: COPD (chronic obstructive pulmonary disease)  No date: COPD (chronic obstructive pulmonary disease)  No date: Coronary artery disease  No date: Diabetes mellitus  No date: Diabetes mellitus, type 2  No date: GERD (gastroesophageal reflux disease)  No date: Hx of colonic polyps  No date: Hyperlipidemia  No date: Hypertension  7/30/2016: possible new onset CHF  No date: Tardive dyskinesia  No date: Thyroid disease  No date: Ulcer  No date: Vertigo    Past Surgical History:  No date: CARDIAC SURGERY  01/26/2017: CHOLECYSTECTOMY  No date: COLONOSCOPY  12/7/2021: COLONOSCOPY; N/A      Comment:  Procedure: COLONOSCOPY;  Surgeon: Lito Will MD;                 Location: [facility];  Service: Endoscopy;  Laterality:                N/A;  01/19/2016: CORONARY ARTERY BYPASS GRAFT      Comment:  4 vessel  No date: HYSTERECTOMY  No date: OOPHORECTOMY  No date: TONSILLECTOMY  No date: TOTAL THYROIDECTOMY  No date: TUBAL LIGATION    Review of patient's family history indicates:  Problem: Cancer      Relation: Mother          Age of Onset: (Not Specified)          Comment: LYMPHOMA  Problem: Breast cancer      Relation: Mother          Age of Onset: (Not Specified)  Problem: Cancer      Relation: Father          Age of Onset: (Not Specified)  Problem: Early death      Relation: Father          Age of Onset: (Not Specified)  Problem: Heart disease      Relation: Paternal Uncle          Age of Onset: (Not Specified)  Problem: Alcohol abuse      Relation: Sister          Age of Onset: (Not Specified)          Comment: x2  Problem: Heart disease      Relation: Brother          Age of Onset: (Not Specified)  Problem: No Known Problems      Relation: Son          Age of Onset: (Not Specified)  Problem: Glaucoma      Relation: Neg Hx           Age of Onset: (Not Specified)  Problem: Macular degeneration      Relation: Neg Hx          Age of Onset: (Not Specified)    Social History    Tobacco Use      Smoking status: Former Smoker        Packs/day: 1.00        Years: 27.00        Pack years: 27        Types: Cigarettes        Quit date: 2016        Years since quittin.5      Smokeless tobacco: Never Used    Alcohol use: No      Alcohol/week: 0.0 standard drinks    Drug use: No    Current Outpatient Medications on File Prior to Visit:  amLODIPine (NORVASC) 2.5 MG tablet, TAKE ONE TABLET BY MOUTH ONCE A DAY, Disp: 90 tablet, Rfl: 0  ammonium lactate (LAC-HYDRIN) 12 % lotion, APPLY TO AFFECTED AREA AS NEEDED FOR DRY SKIN, Disp: 226 g, Rfl: 1  aspirin (ECOTRIN) 81 MG EC tablet, Take 81 mg by mouth once daily., Disp: , Rfl:   aspirin-sod bicarb-citric acid (ERICKA-SELTZER) 324 mg TbEF, Take 325 mg by mouth every 6 (six) hours as needed., Disp: , Rfl:   BD INSULIN SYRINGE ULTRA-FINE 1 mL 31 gauge x 5/16 Syrg, USE AS DIRECTED 5 TIMES A DAY WITH LEVEMIR & NOVOLOG, Disp: 200 each, Rfl: 10  blood sugar diagnostic (TRUE METRIX GLUCOSE TEST STRIP) Strp, Test blood sugar 3x/day, Disp: 300 strip, Rfl: 3  dulaglutide (TRULICITY) 1.5 mg/0.5 mL pen injector, Inject 1.5 mg into the skin every 7 days., Disp: 4 pen, Rfl: 6  insulin aspart U-100 (NOVOLOG FLEXPEN U-100 INSULIN) 100 unit/mL (3 mL) InPn pen, Inject 42 Units into the skin with lunch., Disp: 15 mL, Rfl: 6  insulin degludec (TRESIBA FLEXTOUCH U-200) 200 unit/mL (3 mL) insulin pen, Inject 58 Units into the skin once daily., Disp: 10 pen, Rfl: 6  KETOPROFEN TOP, APPLY 1.6 GRAMS (1 PUMP) TO PAINFUL AREAS UP TO 5 TIMES DAILY. RUB IN WELL, Disp: , Rfl: 5  KLCB ketoprofen 10%- LIDOcaine 5%- cyclobenzaprine 2%- baclofen 2% in transdermal cream, Apply 1 to 2 grams 3 to 4 times daily, Disp: 120 g, Rfl: 1  lancets 33 gauge Misc, 1 lancet by Misc.(Non-Drug; Combo Route) route 4 (four) times daily., Disp: , Rfl:  "  levothyroxine (SYNTHROID) 75 MCG tablet, TAKE ONE TABLET BY MOUTH EVERY MORNING BEFORE BREAKFAST, Disp: 30 tablet, Rfl: 11  lisinopriL (PRINIVIL,ZESTRIL) 5 MG tablet, TAKE ONE TABLET BY MOUTH TWO TIMES A DAY, Disp: 180 tablet, Rfl: 1  magnesium oxide (MAG-OX) 400 mg (241.3 mg magnesium) tablet, Take 1 tablet (400 mg total) by mouth once daily., Disp: , Rfl: 0  meclizine (ANTIVERT) 25 mg tablet, Take 1 tablet (25 mg total) by mouth 3 (three) times daily as needed., Disp: 20 tablet, Rfl: 0  metoprolol tartrate (LOPRESSOR) 25 MG tablet, TAKE ONE TABLET BY MOUTH TWO TIMES A DAY, Disp: 180 tablet, Rfl: 3  nitroGLYCERIN (NITROSTAT) 0.4 MG SL tablet, Place 1 tablet (0.4 mg total) under the tongue every 5 (five) minutes as needed for Chest pain. Seek medical help is pain is not relieved by the third dose., Disp: 25 tablet, Rfl: 3  pantoprazole (PROTONIX) 40 MG tablet, TAKE ONE TABLET BY MOUTH ONCE A DAY, Disp: 90 tablet, Rfl: 0  pen needle, diabetic (BD ULTRA-FINE YUMIKO PEN NEEDLE) 32 gauge x 5/32" Ndle, USE FOUR TIMES A DAY WITH INSULIN, Disp: 400 each, Rfl: 3  traZODone (DESYREL) 50 MG tablet, TAKE ONE TABLET BY MOUTH EVERY NIGHT, Disp: 90 tablet, Rfl: 0  vitamin D (VITAMIN D3) 1000 units Tab, Take 1,000 Units by mouth once daily., Disp: , Rfl:   dicyclomine (BENTYL) 10 MG capsule, , Disp: , Rfl: 3  furosemide (LASIX) 20 MG tablet, Take 1 tablet (20 mg total) by mouth once daily. (Patient not taking: Reported on 8/17/2022), Disp: 30 tablet, Rfl: 0  oxyCODONE-acetaminophen (PERCOCET) 5-325 mg per tablet, Take 1 tablet by mouth as needed for Pain. (Patient not taking: Reported on 8/17/2022), Disp: 10 tablet, Rfl: 0  rosuvastatin (CRESTOR) 40 MG Tab, TAKE ONE TABLET BY MOUTH EVERY EVENING, Disp: 90 tablet, Rfl: 3    No current facility-administered medications on file prior to visit.        Review of Systems   Constitutional: Negative for activity change, appetite change, chills and fever.   HENT: Negative for congestion, " rhinorrhea and sinus pressure.    Respiratory: Negative for cough, chest tightness and shortness of breath.    Cardiovascular: Negative for chest pain and palpitations.   Gastrointestinal: Negative for constipation, diarrhea and nausea.   Endocrine: Negative for polydipsia and polyuria.   Genitourinary: Negative for dysuria, frequency and hematuria.   Musculoskeletal: Positive for back pain.   Skin: Negative for color change and rash.   Neurological: Negative for dizziness, light-headedness and headaches.   Psychiatric/Behavioral: Negative for dysphoric mood. The patient is not nervous/anxious.        Objective:      Physical Exam  Vitals and nursing note reviewed.   Constitutional:       General: She is not in acute distress.     Appearance: Normal appearance. She is well-developed. She is obese. She is not ill-appearing, toxic-appearing or diaphoretic.      Comments: Good blood pressure control   Normal pulse with regular rhythm, no sign of atrial fibrillation   Obese with a BMI of 32.5 she is down 4.4 lb since her last visit with me April 6, 2021   HENT:      Head: Normocephalic and atraumatic.      Right Ear: Tympanic membrane, ear canal and external ear normal. There is no impacted cerumen.      Left Ear: Tympanic membrane, ear canal and external ear normal. There is no impacted cerumen.      Nose: Nose normal. No congestion or rhinorrhea.      Mouth/Throat:      Mouth: Mucous membranes are moist.      Pharynx: Oropharynx is clear. No oropharyngeal exudate or posterior oropharyngeal erythema.   Eyes:      General: No scleral icterus.        Right eye: No discharge.         Left eye: No discharge.      Extraocular Movements: Extraocular movements intact.      Conjunctiva/sclera: Conjunctivae normal.      Pupils: Pupils are equal, round, and reactive to light.   Neck:      Thyroid: No thyromegaly.      Vascular: No carotid bruit or JVD.   Cardiovascular:      Rate and Rhythm: Normal rate and regular rhythm.       Pulses:           Dorsalis pedis pulses are 1+ on the right side and 1+ on the left side.        Posterior tibial pulses are 1+ on the right side and 1+ on the left side.      Heart sounds: Normal heart sounds. No murmur heard.    No friction rub. No gallop.   Pulmonary:      Effort: Pulmonary effort is normal. No respiratory distress.      Breath sounds: Normal breath sounds. No stridor. No wheezing, rhonchi or rales.   Chest:      Chest wall: No tenderness.   Abdominal:      General: Bowel sounds are normal. There is no distension.      Palpations: Abdomen is soft. There is no mass.      Tenderness: There is no abdominal tenderness. There is no guarding or rebound.      Hernia: No hernia is present.   Musculoskeletal:         General: No swelling, tenderness, deformity or signs of injury. Normal range of motion.      Cervical back: Normal range of motion and neck supple. No rigidity or tenderness.      Right lower leg: No edema.      Left lower leg: No edema.      Right foot: Normal range of motion. No deformity, bunion, Charcot foot, foot drop or prominent metatarsal heads.      Left foot: Normal range of motion. No deformity, bunion, Charcot foot, foot drop or prominent metatarsal heads.   Feet:      Right foot:      Protective Sensation: 9 sites tested. 9 sites sensed.      Skin integrity: Skin integrity normal. No ulcer, blister, skin breakdown, erythema, warmth, callus, dry skin or fissure.      Toenail Condition: Right toenails are normal.      Left foot:      Protective Sensation: 9 sites tested. 9 sites sensed.      Skin integrity: Skin integrity normal. No ulcer, blister, skin breakdown, erythema, warmth, callus, dry skin or fissure.      Toenail Condition: Left toenails are normal.   Lymphadenopathy:      Cervical: No cervical adenopathy.   Skin:     General: Skin is warm and dry.      Capillary Refill: Capillary refill takes less than 2 seconds.      Coloration: Skin is not jaundiced or pale.       Findings: No bruising, erythema, lesion or rash.   Neurological:      Mental Status: She is alert and oriented to person, place, and time.      Cranial Nerves: No cranial nerve deficit.      Sensory: No sensory deficit.      Motor: Weakness present.      Gait: Gait abnormal (Somewhat antalgic).      Deep Tendon Reflexes: Reflexes are normal and symmetric.      Comments: Proprioception intact all 10 toes   Psychiatric:         Mood and Affect: Mood normal.         Behavior: Behavior normal.         Thought Content: Thought content normal.         Judgment: Judgment normal.         Assessment:       1. Encounter for preventive health examination    2. Chronic obstructive pulmonary disease, unspecified COPD type    3. Coronary artery disease involving native coronary artery of native heart without angina pectoris    4. S/P CABG (coronary artery bypass graft) - x4 01/19/2016; LIMA to LAD; SVG's to diag, OMB, PDA - all grafts patent 01/2018    5. PAF (paroxysmal atrial fibrillation), post CABG    6. Hypertensive left ventricular hypertrophy with heart failure    7. Hypertension associated with diabetes    8. Dyslipidemia associated with type 2 diabetes mellitus    9. Chronic diastolic congestive heart failure    10. Diabetic nephropathy associated with type 2 diabetes mellitus    11. Anticoagulant long-term use    12. Type 2 diabetes mellitus with stage 3 chronic kidney disease, with long-term current use of insulin, unspecified whether stage 3a or 3b CKD    13. BMI 32.0-32.9,adult        Plan:       1. Encounter for preventive health examination    2. Chronic obstructive pulmonary disease, unspecified COPD type  Compounded by obesity and sedentary lifestyle    3. Coronary artery disease involving native coronary artery of native heart without angina pectoris  Recently established care with Dr. Joy after the [facility] of .  She underwent a dobutamine stress test on May 20, 2022 with no arrhythmias, left  ventricle normal in size with normal systolic function, normal left ventricle diastolic function, ejection fraction estimated 65%.  EKG and echocardiogram were both negative for evidence of ischemia and the patient completed the protocol.    4. S/P CABG (coronary artery bypass graft) - x4 01/19/2016; LIMA to LAD; SVG's to diag, OMB, PDA - all grafts patent 01/2018  See above    5. PAF (paroxysmal atrial fibrillation), post CABG  Currently normal sinus rhythm, no a arrhythmias were noted on the pharmacologic stress test    6. Hypertensive left ventricular hypertrophy with heart failure  No abnormalities noted on echocardiogram during pharmacologic stress test    7. Hypertension associated with diabetes  Well controlled on amlodipine 2.5 daily, lisinopril 5 mg daily, and Lopressor 25 mg b.i.d.    8. Dyslipidemia associated with type 2 diabetes mellitus  Lab Results   Component Value Date    CHOL 128 05/12/2022    CHOL 127 04/27/2021    CHOL 116 (L) 03/07/2021     Lab Results   Component Value Date    HDL 36 (L) 05/12/2022    HDL 28 (L) 04/27/2021    HDL 36 (L) 03/07/2021     Lab Results   Component Value Date    LDLCALC 28.6 (L) 05/12/2022    LDLCALC 29.6 (L) 04/27/2021    LDLCALC 37.2 (L) 03/07/2021     Lab Results   Component Value Date    TRIG 317 (H) 05/12/2022    TRIG 347 (H) 04/27/2021    TRIG 214 (H) 03/07/2021     Lab Results   Component Value Date    CHOLHDL 28.1 05/12/2022    CHOLHDL 22.0 04/27/2021    CHOLHDL 31.0 03/07/2021     Relatively well controlled on Crestor 40 mg.    9. Chronic diastolic congestive heart failure  No sign of active diastolic dysfunction on echocardiogram during stress test.  Appears resolved    10. Diabetic nephropathy associated with type 2 diabetes mellitus  Followed by Dr. Davidson  Lab Results   Component Value Date    CREATININE 1.2 05/12/2022    CREATININE 1.3 05/12/2022    BUN 13 05/12/2022    BUN 14 05/12/2022     05/12/2022     05/12/2022    K 3.8 05/12/2022    K  4.1 05/12/2022     05/12/2022     05/12/2022    CO2 26 05/12/2022    CO2 28 05/12/2022     Last GFR was 40.5 May 12, 2022.  Prior readings were 44.6, 37.0, 44.6.    11. Anticoagulant long-term use  Currently taking only low-dose baby aspirin, 81 mg daily    12. Type 2 diabetes mellitus with stage 3 chronic kidney disease, with long-term current use of insulin, unspecified whether stage 3a or 3b CKD  Lab Results   Component Value Date    HGBA1C 8.3 (H) 05/12/2022     Followed by Endocrinology    13. BMI 32.0-32.9,adult  Encourage continued weight loss

## 2022-08-18 ENCOUNTER — LAB VISIT (OUTPATIENT)
Dept: LAB | Facility: HOSPITAL | Age: 77
End: 2022-08-18
Attending: INTERNAL MEDICINE
Payer: MEDICARE

## 2022-08-18 DIAGNOSIS — N18.32 TYPE 2 DIABETES MELLITUS WITH STAGE 3B CHRONIC KIDNEY DISEASE, WITH LONG-TERM CURRENT USE OF INSULIN: ICD-10-CM

## 2022-08-18 DIAGNOSIS — E11.22 TYPE 2 DIABETES MELLITUS WITH STAGE 3B CHRONIC KIDNEY DISEASE, WITH LONG-TERM CURRENT USE OF INSULIN: ICD-10-CM

## 2022-08-18 DIAGNOSIS — Z79.4 TYPE 2 DIABETES MELLITUS WITH STAGE 3B CHRONIC KIDNEY DISEASE, WITH LONG-TERM CURRENT USE OF INSULIN: ICD-10-CM

## 2022-08-18 LAB
ANION GAP SERPL CALC-SCNC: 11 MMOL/L (ref 8–16)
BUN SERPL-MCNC: 18 MG/DL (ref 8–23)
CALCIUM SERPL-MCNC: 9.2 MG/DL (ref 8.7–10.5)
CHLORIDE SERPL-SCNC: 101 MMOL/L (ref 95–110)
CO2 SERPL-SCNC: 28 MMOL/L (ref 23–29)
CREAT SERPL-MCNC: 1.4 MG/DL (ref 0.5–1.4)
EST. GFR  (NO RACE VARIABLE): 39.2 ML/MIN/1.73 M^2
ESTIMATED AVG GLUCOSE: 171 MG/DL (ref 68–131)
GLUCOSE SERPL-MCNC: 159 MG/DL (ref 70–110)
HBA1C MFR BLD: 7.6 % (ref 4–5.6)
POTASSIUM SERPL-SCNC: 4.1 MMOL/L (ref 3.5–5.1)
SODIUM SERPL-SCNC: 140 MMOL/L (ref 136–145)
TSH SERPL DL<=0.005 MIU/L-ACNC: 1.26 UIU/ML (ref 0.4–4)

## 2022-08-18 PROCEDURE — 80048 BASIC METABOLIC PNL TOTAL CA: CPT | Performed by: NURSE PRACTITIONER

## 2022-08-18 PROCEDURE — 36415 COLL VENOUS BLD VENIPUNCTURE: CPT | Mod: PO | Performed by: NURSE PRACTITIONER

## 2022-08-18 PROCEDURE — 84443 ASSAY THYROID STIM HORMONE: CPT | Performed by: NURSE PRACTITIONER

## 2022-08-18 PROCEDURE — 83036 HEMOGLOBIN GLYCOSYLATED A1C: CPT | Performed by: NURSE PRACTITIONER

## 2022-08-25 ENCOUNTER — OFFICE VISIT (OUTPATIENT)
Dept: ENDOCRINOLOGY | Facility: CLINIC | Age: 77
End: 2022-08-25
Payer: MEDICARE

## 2022-08-25 VITALS
BODY MASS INDEX: 33.31 KG/M2 | HEIGHT: 62 IN | SYSTOLIC BLOOD PRESSURE: 124 MMHG | HEART RATE: 74 BPM | DIASTOLIC BLOOD PRESSURE: 82 MMHG | WEIGHT: 181 LBS

## 2022-08-25 DIAGNOSIS — E07.9 THYROID DISEASE: ICD-10-CM

## 2022-08-25 DIAGNOSIS — I25.10 CORONARY ARTERY DISEASE INVOLVING NATIVE CORONARY ARTERY OF NATIVE HEART WITHOUT ANGINA PECTORIS: ICD-10-CM

## 2022-08-25 DIAGNOSIS — E11.22 TYPE 2 DIABETES MELLITUS WITH STAGE 3B CHRONIC KIDNEY DISEASE, WITH LONG-TERM CURRENT USE OF INSULIN: Primary | ICD-10-CM

## 2022-08-25 DIAGNOSIS — N18.32 TYPE 2 DIABETES MELLITUS WITH STAGE 3B CHRONIC KIDNEY DISEASE, WITH LONG-TERM CURRENT USE OF INSULIN: Primary | ICD-10-CM

## 2022-08-25 DIAGNOSIS — I10 ESSENTIAL HYPERTENSION: ICD-10-CM

## 2022-08-25 DIAGNOSIS — E78.5 HYPERLIPIDEMIA WITH TARGET LDL LESS THAN 70: ICD-10-CM

## 2022-08-25 DIAGNOSIS — E11.42 TYPE 2 DIABETES MELLITUS WITH DIABETIC POLYNEUROPATHY, WITH LONG-TERM CURRENT USE OF INSULIN: ICD-10-CM

## 2022-08-25 DIAGNOSIS — F39 MOOD DISORDER: ICD-10-CM

## 2022-08-25 DIAGNOSIS — Z79.4 TYPE 2 DIABETES MELLITUS WITH STAGE 3B CHRONIC KIDNEY DISEASE, WITH LONG-TERM CURRENT USE OF INSULIN: Primary | ICD-10-CM

## 2022-08-25 DIAGNOSIS — Z79.4 TYPE 2 DIABETES MELLITUS WITH DIABETIC POLYNEUROPATHY, WITH LONG-TERM CURRENT USE OF INSULIN: ICD-10-CM

## 2022-08-25 PROCEDURE — 3079F DIAST BP 80-89 MM HG: CPT | Mod: CPTII,S$GLB,, | Performed by: NURSE PRACTITIONER

## 2022-08-25 PROCEDURE — 3051F PR MOST RECENT HEMOGLOBIN A1C LEVEL 7.0 - < 8.0%: ICD-10-PCS | Mod: CPTII,S$GLB,, | Performed by: NURSE PRACTITIONER

## 2022-08-25 PROCEDURE — 3288F PR FALLS RISK ASSESSMENT DOCUMENTED: ICD-10-PCS | Mod: CPTII,S$GLB,, | Performed by: NURSE PRACTITIONER

## 2022-08-25 PROCEDURE — 3051F HG A1C>EQUAL 7.0%<8.0%: CPT | Mod: CPTII,S$GLB,, | Performed by: NURSE PRACTITIONER

## 2022-08-25 PROCEDURE — 1126F PR PAIN SEVERITY QUANTIFIED, NO PAIN PRESENT: ICD-10-PCS | Mod: CPTII,S$GLB,, | Performed by: NURSE PRACTITIONER

## 2022-08-25 PROCEDURE — 1101F PR PT FALLS ASSESS DOC 0-1 FALLS W/OUT INJ PAST YR: ICD-10-PCS | Mod: CPTII,S$GLB,, | Performed by: NURSE PRACTITIONER

## 2022-08-25 PROCEDURE — 99499 RISK ADDL DX/OHS AUDIT: ICD-10-PCS | Mod: S$GLB,,, | Performed by: NURSE PRACTITIONER

## 2022-08-25 PROCEDURE — 1159F MED LIST DOCD IN RCRD: CPT | Mod: CPTII,S$GLB,, | Performed by: NURSE PRACTITIONER

## 2022-08-25 PROCEDURE — 99999 PR PBB SHADOW E&M-EST. PATIENT-LVL V: ICD-10-PCS | Mod: PBBFAC,,, | Performed by: NURSE PRACTITIONER

## 2022-08-25 PROCEDURE — 99214 OFFICE O/P EST MOD 30 MIN: CPT | Mod: S$GLB,,, | Performed by: NURSE PRACTITIONER

## 2022-08-25 PROCEDURE — 1159F PR MEDICATION LIST DOCUMENTED IN MEDICAL RECORD: ICD-10-PCS | Mod: CPTII,S$GLB,, | Performed by: NURSE PRACTITIONER

## 2022-08-25 PROCEDURE — 99499 UNLISTED E&M SERVICE: CPT | Mod: S$GLB,,, | Performed by: NURSE PRACTITIONER

## 2022-08-25 PROCEDURE — 3074F PR MOST RECENT SYSTOLIC BLOOD PRESSURE < 130 MM HG: ICD-10-PCS | Mod: CPTII,S$GLB,, | Performed by: NURSE PRACTITIONER

## 2022-08-25 PROCEDURE — 3074F SYST BP LT 130 MM HG: CPT | Mod: CPTII,S$GLB,, | Performed by: NURSE PRACTITIONER

## 2022-08-25 PROCEDURE — 3288F FALL RISK ASSESSMENT DOCD: CPT | Mod: CPTII,S$GLB,, | Performed by: NURSE PRACTITIONER

## 2022-08-25 PROCEDURE — 1101F PT FALLS ASSESS-DOCD LE1/YR: CPT | Mod: CPTII,S$GLB,, | Performed by: NURSE PRACTITIONER

## 2022-08-25 PROCEDURE — 99999 PR PBB SHADOW E&M-EST. PATIENT-LVL V: CPT | Mod: PBBFAC,,, | Performed by: NURSE PRACTITIONER

## 2022-08-25 PROCEDURE — 1126F AMNT PAIN NOTED NONE PRSNT: CPT | Mod: CPTII,S$GLB,, | Performed by: NURSE PRACTITIONER

## 2022-08-25 PROCEDURE — 3072F PR LOW RISK FOR RETINOPATHY: ICD-10-PCS | Mod: CPTII,S$GLB,, | Performed by: NURSE PRACTITIONER

## 2022-08-25 PROCEDURE — 3072F LOW RISK FOR RETINOPATHY: CPT | Mod: CPTII,S$GLB,, | Performed by: NURSE PRACTITIONER

## 2022-08-25 PROCEDURE — 99214 PR OFFICE/OUTPT VISIT, EST, LEVL IV, 30-39 MIN: ICD-10-PCS | Mod: S$GLB,,, | Performed by: NURSE PRACTITIONER

## 2022-08-25 PROCEDURE — 1160F PR REVIEW ALL MEDS BY PRESCRIBER/CLIN PHARMACIST DOCUMENTED: ICD-10-PCS | Mod: CPTII,S$GLB,, | Performed by: NURSE PRACTITIONER

## 2022-08-25 PROCEDURE — 1160F RVW MEDS BY RX/DR IN RCRD: CPT | Mod: CPTII,S$GLB,, | Performed by: NURSE PRACTITIONER

## 2022-08-25 PROCEDURE — 3079F PR MOST RECENT DIASTOLIC BLOOD PRESSURE 80-89 MM HG: ICD-10-PCS | Mod: CPTII,S$GLB,, | Performed by: NURSE PRACTITIONER

## 2022-08-25 RX ORDER — INSULIN ASPART 100 [IU]/ML
INJECTION, SOLUTION INTRAVENOUS; SUBCUTANEOUS
Qty: 15 ML | Refills: 6
Start: 2022-08-25 | End: 2022-12-01

## 2022-08-25 RX ORDER — DULAGLUTIDE 3 MG/.5ML
3 INJECTION, SOLUTION SUBCUTANEOUS
Qty: 4 PEN | Refills: 6 | Status: SHIPPED | OUTPATIENT
Start: 2022-08-25 | End: 2022-12-27 | Stop reason: SDUPTHER

## 2022-08-25 NOTE — PROGRESS NOTES
CC: Ms. Adele Hall arrives today for management of Type 2 DM and review of chronic medical conditions, as listed in the visit diagnosis section of this encounter.     HPI: Ms. Adele Hall was diagnosed with Type 2 DM in ~ 2002. Metformin started at the time of diagnosis but was stopped d/t renal impairment, per PCP in [place]. Insulin was started ~ 2012. She has rotated between analog insulins and generic NPH and Regular insulins, due to cost. [employer] brand insulins, due to cost.  Denies FH of DM. She did have 1 ER admission after having BG of >700 in 2012, prior to starting insulin.   She has a dx of CAD - s/p CABG in 1/2016.   She follows with Dr. Davidson for CKD.    Patient was last seen by me in May.     Patient states she rarely takes Novolog base dose with her meal. She is honest about likely not taking this in the future.      BG monitoring 2x/day.            Hypoglycemia: Not recently   Symptoms: dizziness   Treatment: sweets    Missing Insulin/PO medication doses: Yes - often misses Novolog   Timing prandial insulin 5-15 minutes before meals: n/a    Dietary Habits: Eats 1 meal/day. This meal may be restaurant meal.  May snack on watermelon or a cookie.        CURRENT DIABETIC MEDS: Trulicity 1.5 mg weekly, Tresiba U200 58 units QAM, Novolog 42 units with meal (currently not taking) + correction scale, target 150, ISF 25   Vial or pen: pens  Glucometer type: True Metrix    Previous DM treatments:  Metformin  Victoza - $   NPH/Regular insulins  Novolog     Last Eye Exam: 10/2021, no DR.   Last Podiatry Exam: never    REVIEW OF SYSTEMS  Constitutional: no c/o weakness, fatigue, weight loss  Eyes: denies visual disturbances  Cardiac: no palpitations. Reports occasional chest pain that resolves with nitroglycerin. Not occurring now. Had recent stress echo, which was normal.   Respiratory: no cough, dyspnea   GI: no c/o nausea, abdominal pain. + intermittent constipation (not worsened  "since starting Trulicity). Uses Miralax as needed.   Neuro: c/o paresthesias in feet.  Skin: no lesions, rashes.   Endocrine: denies polyphagia, polydipsia, polyuria.      Personally reviewed Past Medical, Surgical, Social History.    Vital Signs  /82   Pulse 74   Ht 5' 2" (1.575 m)   Wt 82.1 kg (181 lb)   BMI 33.10 kg/m²     Personally reviewed the below labs:    Hemoglobin A1C   Date Value Ref Range Status   08/18/2022 7.6 (H) 4.0 - 5.6 % Final     Comment:     ADA Screening Guidelines:  5.7-6.4%  Consistent with prediabetes  >or=6.5%  Consistent with diabetes    High levels of fetal hemoglobin interfere with the HbA1C  assay. Heterozygous hemoglobin variants (HbS, HgC, etc)do  not significantly interfere with this assay.   However, presence of multiple variants may affect accuracy.     05/12/2022 8.3 (H) 4.0 - 5.6 % Final     Comment:     ADA Screening Guidelines:  5.7-6.4%  Consistent with prediabetes  >or=6.5%  Consistent with diabetes    High levels of fetal hemoglobin interfere with the HbA1C  assay. Heterozygous hemoglobin variants (HbS, HgC, etc)do  not significantly interfere with this assay.   However, presence of multiple variants may affect accuracy.     01/11/2022 7.1 (H) 4.0 - 5.6 % Final     Comment:     ADA Screening Guidelines:  5.7-6.4%  Consistent with prediabetes  >or=6.5%  Consistent with diabetes    High levels of fetal hemoglobin interfere with the HbA1C  assay. Heterozygous hemoglobin variants (HbS, HgC, etc)do  not significantly interfere with this assay.   However, presence of multiple variants may affect accuracy.         Chemistry        Component Value Date/Time     08/18/2022 0944     08/18/2022 0944    K 4.1 08/18/2022 0944    K 4.3 08/18/2022 0944     08/18/2022 0944     08/18/2022 0944    CO2 28 08/18/2022 0944    CO2 30 (H) 08/18/2022 0944    BUN 18 08/18/2022 0944    BUN 18 08/18/2022 0944    CREATININE 1.4 08/18/2022 0944    CREATININE 1.4 " 08/18/2022 0944     (H) 08/18/2022 0944     (H) 08/18/2022 0944        Component Value Date/Time    CALCIUM 9.2 08/18/2022 0944    CALCIUM 9.2 08/18/2022 0944    ALKPHOS 73 05/12/2022 1103    AST 34 05/12/2022 1103    AST 45 (H) 01/30/2016 0431    ALT 24 05/12/2022 1103    BILITOT 0.6 05/12/2022 1103          Lab Results   Component Value Date    CHOL 128 05/12/2022    CHOL 127 04/27/2021    CHOL 116 (L) 03/07/2021     Lab Results   Component Value Date    HDL 36 (L) 05/12/2022    HDL 28 (L) 04/27/2021    HDL 36 (L) 03/07/2021     Lab Results   Component Value Date    LDLCALC 28.6 (L) 05/12/2022    LDLCALC 29.6 (L) 04/27/2021    LDLCALC 37.2 (L) 03/07/2021     Lab Results   Component Value Date    TRIG 317 (H) 05/12/2022    TRIG 347 (H) 04/27/2021    TRIG 214 (H) 03/07/2021     Lab Results   Component Value Date    CHOLHDL 28.1 05/12/2022    CHOLHDL 22.0 04/27/2021    CHOLHDL 31.0 03/07/2021       Lab Results   Component Value Date    MICALBCREAT 105.8 (H) 10/21/2021     Lab Results   Component Value Date    TSH 1.264 08/18/2022       CrCl cannot be calculated (Patient's most recent lab result is older than the maximum 7 days allowed.).    Vit D, 25-Hydroxy   Date Value Ref Range Status   05/12/2022 35 30 - 96 ng/mL Final     Comment:     Vitamin D deficiency.........<10 ng/mL                              Vitamin D insufficiency......10-29 ng/mL       Vitamin D sufficiency........> or equal to 30 ng/mL  Vitamin D toxicity............>100 ng/mL         August 2021 Professional CGMS review: Fasting glucoses are reasonable. Prandial excursions noted in mid 200s range.  One episode of borderline hypoglycemia with unknown precipitant. Eating 2 meals/day on average with snacks.   Average glucose: 193 mg/dL  Above 250 mg/dL: 13 %  181-250 mg/dL: 44 %   mg/dL: 42 %  54-69 mg/dL: <1 %  Below 54 mg/dL: 0 %        PHYSICAL EXAMINATION  Constitutional: Appears well, no distress  Respiratory: CTA, even and  unlabored.  Cardiovascular: RRR, no murmurs, no carotid bruits.   GI: normal bowel sounds, no hernia  Skin: warm and dry  Neuro: oriented to person, place, time.   Feet: appropriate footwear.        Goals      HEMOGLOBIN A1C < 7.5               Assessment/Plan  1. Type 2 diabetes mellitus with stage 3 chronic kidney disease, with long-term current use of insulin  -- Improving. Will modify treatment regimen to one she will comply with. Overall PO intake has decreased so GLP-1RA may suffice her need for prandial coverage.  -- will discontinue Novolog base dose with main meal since she is not taking regularly. Continue Novolog correction scale. Explained proper timing.  -- continue current Tresiba dose.  -- increase Trulicity to 3 mg weekly.   -- check BG 4x/day and bring log to follow up.   -- following with nephrology    -- Discussed diagnosis of DM, A1c goals, progression of disease, long term complications and tx options.   -- Reviewed hypoglycemia management: treat with 1/2 glass of juice, 1/2 can regular coke, or 4 glucose tablets. Monitor and repeat treatment every 15 minutes until BG is >70 Then have a snack, which includes a complex carbohydrate and protein.   Advised p  atient to check BG before activities, such as driving or exercise.    -- takes aspirin, statin, ace-I   2. Type 2 diabetes mellitus with diabetic polyneuropathy, with long-term current use of insulin  -- optimize DM control     3. Coronary artery disease involving native coronary artery of native heart without angina pectoris  -- avoid hypoglycemia   4. Essential hypertension  -- controlled  -- continue current meds   5. Hyperlipidemia with target LDL less than 70  -- Uncontrolled with elevated triglycerides.   -- Optimize DM control  -- Continue Crestor (Fenofibrate previously DC'd due to renal function)   6. Thyroid disease  -- controlled  -- continue levothyroxine 75 mcg daily       FOLLOW UP  Follow up in about 3 months (around  11/25/2022).   Patient instructed to bring BG logs to each follow up   Patient encouraged to call for any BG/medication issues, concerns, or questions.    Orders Placed This Encounter   Procedures    Hemoglobin A1C

## 2022-09-28 ENCOUNTER — TELEPHONE (OUTPATIENT)
Dept: ENDOCRINOLOGY | Facility: CLINIC | Age: 77
End: 2022-09-28
Payer: MEDICARE

## 2022-09-28 NOTE — TELEPHONE ENCOUNTER
Please call pt and ask if she felt that she tolerated the 1.5 mg Trulicity dose better. I had increased it to 3 mg at end of August. If she tolerated 1.5 mg, I will send this dose back to her pharmacy if she is ok with that

## 2022-09-28 NOTE — TELEPHONE ENCOUNTER
----- Message from Herberth Dumont sent at 9/28/2022  1:47 PM CDT -----  Type: Patient Call Back         Who called:Pt          What is the request in detail: pt states that she would like to speak with Dr Brice in regards to her feeling nausea from medication          Can the clinic reply by [facility]?no          Would the patient rather a call back or a response via [facility]?call back          Best call back number:[phone] (mobile)          Additional Information:           Thank You

## 2022-09-28 NOTE — TELEPHONE ENCOUNTER
S/w pt. States she thinks the trulicity is giving her very bad nausea. States she stop novolog because she has not been eating much and has lost weight    Please advise

## 2022-10-05 DIAGNOSIS — M25.561 ACUTE PAIN OF BOTH KNEES: Primary | ICD-10-CM

## 2022-10-05 DIAGNOSIS — M25.562 ACUTE PAIN OF BOTH KNEES: Primary | ICD-10-CM

## 2022-10-10 ENCOUNTER — HOSPITAL ENCOUNTER (OUTPATIENT)
Dept: RADIOLOGY | Facility: HOSPITAL | Age: 77
Discharge: HOME | End: 2022-10-10
Attending: ORTHOPAEDIC SURGERY
Payer: MEDICARE

## 2022-10-10 ENCOUNTER — OFFICE VISIT (OUTPATIENT)
Dept: ORTHOPEDICS | Facility: CLINIC | Age: 77
End: 2022-10-10
Payer: MEDICARE

## 2022-10-10 VITALS — WEIGHT: 181 LBS | HEIGHT: 62 IN | RESPIRATION RATE: 18 BRPM | BODY MASS INDEX: 33.31 KG/M2

## 2022-10-10 DIAGNOSIS — M25.562 ACUTE PAIN OF BOTH KNEES: ICD-10-CM

## 2022-10-10 DIAGNOSIS — M25.562 ACUTE PAIN OF BOTH KNEES: Primary | ICD-10-CM

## 2022-10-10 DIAGNOSIS — M25.561 ACUTE PAIN OF BOTH KNEES: Primary | ICD-10-CM

## 2022-10-10 DIAGNOSIS — M17.10 ARTHRITIS OF KNEE: ICD-10-CM

## 2022-10-10 DIAGNOSIS — M25.561 ACUTE PAIN OF BOTH KNEES: ICD-10-CM

## 2022-10-10 PROCEDURE — 20610 LARGE JOINT ASPIRATION/INJECTION: BILATERAL KNEE: ICD-10-PCS | Mod: 50,S$GLB,, | Performed by: ORTHOPAEDIC SURGERY

## 2022-10-10 PROCEDURE — 99999 PR PBB SHADOW E&M-EST. PATIENT-LVL IV: ICD-10-PCS | Mod: PBBFAC,,, | Performed by: ORTHOPAEDIC SURGERY

## 2022-10-10 PROCEDURE — 1160F RVW MEDS BY RX/DR IN RCRD: CPT | Mod: CPTII,S$GLB,, | Performed by: ORTHOPAEDIC SURGERY

## 2022-10-10 PROCEDURE — 3051F HG A1C>EQUAL 7.0%<8.0%: CPT | Mod: CPTII,S$GLB,, | Performed by: ORTHOPAEDIC SURGERY

## 2022-10-10 PROCEDURE — 1159F MED LIST DOCD IN RCRD: CPT | Mod: CPTII,S$GLB,, | Performed by: ORTHOPAEDIC SURGERY

## 2022-10-10 PROCEDURE — 1159F PR MEDICATION LIST DOCUMENTED IN MEDICAL RECORD: ICD-10-PCS | Mod: CPTII,S$GLB,, | Performed by: ORTHOPAEDIC SURGERY

## 2022-10-10 PROCEDURE — 99213 OFFICE O/P EST LOW 20 MIN: CPT | Mod: 25,S$GLB,, | Performed by: ORTHOPAEDIC SURGERY

## 2022-10-10 PROCEDURE — 1101F PR PT FALLS ASSESS DOC 0-1 FALLS W/OUT INJ PAST YR: ICD-10-PCS | Mod: CPTII,S$GLB,, | Performed by: ORTHOPAEDIC SURGERY

## 2022-10-10 PROCEDURE — 73564 X-RAY EXAM KNEE 4 OR MORE: CPT | Mod: TC,50,PN

## 2022-10-10 PROCEDURE — 1125F PR PAIN SEVERITY QUANTIFIED, PAIN PRESENT: ICD-10-PCS | Mod: CPTII,S$GLB,, | Performed by: ORTHOPAEDIC SURGERY

## 2022-10-10 PROCEDURE — 1160F PR REVIEW ALL MEDS BY PRESCRIBER/CLIN PHARMACIST DOCUMENTED: ICD-10-PCS | Mod: CPTII,S$GLB,, | Performed by: ORTHOPAEDIC SURGERY

## 2022-10-10 PROCEDURE — 99999 PR PBB SHADOW E&M-EST. PATIENT-LVL IV: CPT | Mod: PBBFAC,,, | Performed by: ORTHOPAEDIC SURGERY

## 2022-10-10 PROCEDURE — 3072F LOW RISK FOR RETINOPATHY: CPT | Mod: CPTII,S$GLB,, | Performed by: ORTHOPAEDIC SURGERY

## 2022-10-10 PROCEDURE — 1125F AMNT PAIN NOTED PAIN PRSNT: CPT | Mod: CPTII,S$GLB,, | Performed by: ORTHOPAEDIC SURGERY

## 2022-10-10 PROCEDURE — 73564 XR KNEE ORTHO BILAT WITH FLEXION: ICD-10-PCS | Mod: 26,50,, | Performed by: RADIOLOGY

## 2022-10-10 PROCEDURE — 3288F PR FALLS RISK ASSESSMENT DOCUMENTED: ICD-10-PCS | Mod: CPTII,S$GLB,, | Performed by: ORTHOPAEDIC SURGERY

## 2022-10-10 PROCEDURE — 20610 DRAIN/INJ JOINT/BURSA W/O US: CPT | Mod: 50,S$GLB,, | Performed by: ORTHOPAEDIC SURGERY

## 2022-10-10 PROCEDURE — 3072F PR LOW RISK FOR RETINOPATHY: ICD-10-PCS | Mod: CPTII,S$GLB,, | Performed by: ORTHOPAEDIC SURGERY

## 2022-10-10 PROCEDURE — 1101F PT FALLS ASSESS-DOCD LE1/YR: CPT | Mod: CPTII,S$GLB,, | Performed by: ORTHOPAEDIC SURGERY

## 2022-10-10 PROCEDURE — 3051F PR MOST RECENT HEMOGLOBIN A1C LEVEL 7.0 - < 8.0%: ICD-10-PCS | Mod: CPTII,S$GLB,, | Performed by: ORTHOPAEDIC SURGERY

## 2022-10-10 PROCEDURE — 99213 PR OFFICE/OUTPT VISIT, EST, LEVL III, 20-29 MIN: ICD-10-PCS | Mod: 25,S$GLB,, | Performed by: ORTHOPAEDIC SURGERY

## 2022-10-10 PROCEDURE — 3288F FALL RISK ASSESSMENT DOCD: CPT | Mod: CPTII,S$GLB,, | Performed by: ORTHOPAEDIC SURGERY

## 2022-10-10 PROCEDURE — 73564 X-RAY EXAM KNEE 4 OR MORE: CPT | Mod: 26,50,, | Performed by: RADIOLOGY

## 2022-10-10 RX ORDER — TRIAMCINOLONE ACETONIDE 40 MG/ML
40 INJECTION, SUSPENSION INTRA-ARTICULAR; INTRAMUSCULAR
Status: DISCONTINUED | OUTPATIENT
Start: 2022-10-10 | End: 2022-10-10 | Stop reason: HOSPADM

## 2022-10-10 RX ADMIN — TRIAMCINOLONE ACETONIDE 40 MG: 40 INJECTION, SUSPENSION INTRA-ARTICULAR; INTRAMUSCULAR at 02:10

## 2022-10-10 NOTE — PROGRESS NOTES
Past Medical History:   Diagnosis Date    Anesthesia complication     took patient 6 days to come out of anethesia after CABG    Anticoagulant long-term use     Arthritis     Chronic kidney disease     CKD (chronic kidney disease) stage 3, GFR 30-59 ml/min     Dr. Montero    COPD (chronic obstructive pulmonary disease) 2/7/2016    COPD (chronic obstructive pulmonary disease)     Coronary artery disease     Diabetes mellitus     Diabetes mellitus, type 2     GERD (gastroesophageal reflux disease)     Hx of colonic polyps     Hyperlipidemia     Hypertension     possible new onset CHF 7/30/2016    Tardive dyskinesia     Thyroid disease     Ulcer     Vertigo        Past Surgical History:   Procedure Laterality Date    CARDIAC SURGERY      CHOLECYSTECTOMY  01/26/2017    COLONOSCOPY      COLONOSCOPY N/A 12/7/2021    Procedure: COLONOSCOPY;  Surgeon: Lito Will MD;  Location: [facility];  Service: Endoscopy;  Laterality: N/A;    CORONARY ARTERY BYPASS GRAFT  01/19/2016    4 vessel    HYSTERECTOMY      OOPHORECTOMY      TONSILLECTOMY      TOTAL THYROIDECTOMY      TUBAL LIGATION         Current Outpatient Medications   Medication Sig    amLODIPine (NORVASC) 2.5 MG tablet TAKE ONE TABLET BY MOUTH ONCE A DAY    ammonium lactate (LAC-HYDRIN) 12 % lotion APPLY TO AFFECTED AREA AS NEEDED FOR DRY SKIN    aspirin (ECOTRIN) 81 MG EC tablet Take 81 mg by mouth once daily.    aspirin-sod bicarb-citric acid (ERICKA-SELTZER) 324 mg TbEF Take 325 mg by mouth every 6 (six) hours as needed.    BD INSULIN SYRINGE ULTRA-FINE 1 mL 31 gauge x 5/16 Syrg USE AS DIRECTED 5 TIMES A DAY WITH LEVEMIR & NOVOLOG    blood sugar diagnostic (TRUE METRIX GLUCOSE TEST STRIP) Strp Test blood sugar 3x/day    dicyclomine (BENTYL) 10 MG capsule     dulaglutide (TRULICITY) 3 mg/0.5 mL pen injector Inject 3 mg into the skin every 7 days.    insulin aspart U-100 (NOVOLOG FLEXPEN U-100 INSULIN) 100 unit/mL (3 mL) InPn pen Use three times daily, per sliding  "scale, max TDD 30    insulin degludec (TRESIBA FLEXTOUCH U-200) 200 unit/mL (3 mL) insulin pen Inject 58 Units into the skin once daily.    KETOPROFEN TOP APPLY 1.6 GRAMS (1 PUMP) TO PAINFUL AREAS UP TO 5 TIMES DAILY. RUB IN WELL    KLCB ketoprofen 10%- LIDOcaine 5%- cyclobenzaprine 2%- baclofen 2% in transdermal cream Apply 1 to 2 grams 3 to 4 times daily    lancets 33 gauge Misc 1 lancet by Misc.(Non-Drug; Combo Route) route 4 (four) times daily.    levothyroxine (SYNTHROID) 75 MCG tablet TAKE ONE TABLET BY MOUTH EVERY MORNING BEFORE BREAKFAST    lisinopriL (PRINIVIL,ZESTRIL) 5 MG tablet TAKE ONE TABLET BY MOUTH TWO TIMES A DAY    magnesium oxide (MAG-OX) 400 mg (241.3 mg magnesium) tablet Take 1 tablet (400 mg total) by mouth once daily.    meclizine (ANTIVERT) 25 mg tablet Take 1 tablet (25 mg total) by mouth 3 (three) times daily as needed.    metoprolol tartrate (LOPRESSOR) 25 MG tablet TAKE ONE TABLET BY MOUTH TWO TIMES A DAY    pantoprazole (PROTONIX) 40 MG tablet TAKE ONE TABLET BY MOUTH ONCE A DAY    pen needle, diabetic (BD ULTRA-FINE YUMIKO PEN NEEDLE) 32 gauge x 5/32" Ndle USE FOUR TIMES A DAY WITH INSULIN    rosuvastatin (CRESTOR) 40 MG Tab TAKE ONE TABLET BY MOUTH EVERY EVENING    traZODone (DESYREL) 50 MG tablet TAKE ONE TABLET BY MOUTH EVERY NIGHT    vitamin D (VITAMIN D3) 1000 units Tab Take 1,000 Units by mouth once daily.    furosemide (LASIX) 20 MG tablet Take 1 tablet (20 mg total) by mouth once daily. (Patient not taking: Reported on 8/17/2022)    nitroGLYCERIN (NITROSTAT) 0.4 MG SL tablet Place 1 tablet (0.4 mg total) under the tongue every 5 (five) minutes as needed for Chest pain. Seek medical help is pain is not relieved by the third dose.    oxyCODONE-acetaminophen (PERCOCET) 5-325 mg per tablet Take 1 tablet by mouth as needed for Pain. (Patient not taking: No sig reported)     No current facility-administered medications for this visit.       Review of patient's allergies indicates: "   Allergen Reactions    Reglan [metoclopramide hcl]      Depression and weight loss.        Family History   Problem Relation Age of Onset    Cancer Mother         LYMPHOMA    Breast cancer Mother     Cancer Father     Early death Father     Heart disease Paternal Uncle     Alcohol abuse Sister         x2    Heart disease Brother     No Known Problems Son     Glaucoma Neg Hx     Macular degeneration Neg Hx        Social History     Socioeconomic History    Marital status:    Tobacco Use    Smoking status: Former     Packs/day: 1.00     Years: 27.00     Pack years: 27.00     Types: Cigarettes     Quit date: 2016     Years since quittin.7    Smokeless tobacco: Never   Substance and Sexual Activity    Alcohol use: No     Alcohol/week: 0.0 standard drinks    Drug use: No    Sexual activity: Yes     Partners: Male     Social Determinants of Health     Financial Resource Strain: Low Risk     Difficulty of Paying Living Expenses: Not hard at all   Food Insecurity: No Food Insecurity    Worried About Running Out of Food in the Last Year: Never true    Ran Out of Food in the Last Year: Never true   Transportation Needs: No Transportation Needs    Lack of Transportation (Medical): No    Lack of Transportation (Non-Medical): No   Physical Activity: Inactive    Days of Exercise per Week: 0 days    Minutes of Exercise per Session: 0 min   Stress: No Stress Concern Present    Feeling of Stress : Not at all   Social Connections: Moderately Isolated    Frequency of Communication with Friends and Family: Three times a week    Frequency of Social Gatherings with Friends and Family: Twice a week    Attends Restorationism Services: Never    Active Member of Clubs or Organizations: No    Attends Club or Organization Meetings: Never    Marital Status:    Housing Stability: Low Risk     Unable to Pay for Housing in the Last Year: No    Number of Places Lived in the Last Year: 1    Unstable Housing in the Last Year: No        Chief Complaint:   Chief Complaint   Patient presents with    Left Knee - Pain    Right Knee - Pain       History of present illness:  This is a 77 year-old female seen for bilateral knee pain.  Patient has had pain for a few months but worse over last week.  Right is worse than the left.  Pain in the right is a 10/10.  Pain left is an 8/10.  No injury or trauma.  No recent treatment.        Review of Systems:    Constitution: Negative for chills, fever, and sweats.  Negative for unexplained weight loss.    HENT:  Negative for headaches and blurry vision.    Cardiovascular:Negative for chest pain or irregular heart beat. Negative for hypertension.    Respiratory:  Negative for cough and shortness of breath.    Gastrointestinal: Negative for abdominal pain, heartburn, melena, nausea, and vomitting.    Genitourinary:  Negative bladder incontinence and dysuria.    Musculoskeletal:  See HPI    Neurological: Negative for numbness.    Psychiatric/Behavioral: Negative for depression.  The patient is not nervous/anxious.      Endocrine: Negative for polyuria    Hematologic/Lymphatic: Negative for bleeding problem.  Does not bruise/bleed easily.    Skin: Negative for poor would healing and rash      Physical Examination:    Vital Signs:    Vitals:    10/10/22 1425   Resp: 18       Body mass index is 33.11 kg/m².    This a well-developed, well nourished patient in no acute distress.  They are alert and oriented and cooperative to examination.  Pt. walks without an antalgic gait.      Examination of bilateral knees shows no rashes or erythema. There are no masses ecchymosis or effusion. Patient has full range of motion from 0-130°. Patient is nontender to palpation over lateral joint line and moderately tender to palpation over the medial joint line. Patient has a - Lachman exam, - anterior drawer exam, and - posterior drawer exam. - Suellen's exam. Knee is stable to varus and valgus stress. 5 out of 5 motor strength.  Palpable distal pulses. Intact light touch sensation. Negative Patellofemoral crepitus    X-rays:  X-rays of both knees are ordered and reviewed which show moderate medial joint space narrowing bilaterally     Assessment::  Bilateral knee arthritis    Plan:  I reviewed the findings with her today.  I agreed to inject her knees today.  Patient is not interested in surgical treatment at this time.      This note was created using [employer] voice recognition software that occasionally misinterpreted phrases or words.    Consult note is delivered via Epic messaging service.

## 2022-10-10 NOTE — PROCEDURES
Large Joint Aspiration/Injection: bilateral knee    Date/Time: 10/10/2022 2:45 PM  Performed by: Caleb Reyes MD  Authorized by: Caleb Reyes MD     Consent Done?:  Yes (Verbal)  Indications:  Pain  Site marked: the procedure site was marked    Timeout: prior to procedure the correct patient, procedure, and site was verified    Prep: patient was prepped and draped in usual sterile fashion      Local anesthesia used?: Yes    Anesthesia:  Local infiltration  Local anesthetic:  Bupivacaine 0.25% without epinephrine and lidocaine 2% without epinephrine  Anesthetic total (ml):  6      Details:  Needle Size:  22 G  Ultrasonic Guidance for needle placement?: No    Approach:  Anterolateral  Location:  Knee  Laterality:  Bilateral  Site:  Bilateral knee  Medications (Right):  40 mg triamcinolone acetonide 40 mg/mL  Medications (Left):  40 mg triamcinolone acetonide 40 mg/mL  Patient tolerance:  Patient tolerated the procedure well with no immediate complications

## 2022-10-25 ENCOUNTER — TELEPHONE (OUTPATIENT)
Dept: OPHTHALMOLOGY | Facility: CLINIC | Age: 77
End: 2022-10-25
Payer: MEDICARE

## 2022-11-10 ENCOUNTER — OFFICE VISIT (OUTPATIENT)
Dept: CARDIOLOGY | Facility: CLINIC | Age: 77
End: 2022-11-10
Payer: MEDICARE

## 2022-11-10 VITALS
SYSTOLIC BLOOD PRESSURE: 102 MMHG | DIASTOLIC BLOOD PRESSURE: 62 MMHG | WEIGHT: 174.38 LBS | BODY MASS INDEX: 32.09 KG/M2 | HEIGHT: 62 IN

## 2022-11-10 DIAGNOSIS — E11.69 DYSLIPIDEMIA ASSOCIATED WITH TYPE 2 DIABETES MELLITUS: ICD-10-CM

## 2022-11-10 DIAGNOSIS — I48.0 PAF (PAROXYSMAL ATRIAL FIBRILLATION): ICD-10-CM

## 2022-11-10 DIAGNOSIS — I10 PRIMARY HYPERTENSION: ICD-10-CM

## 2022-11-10 DIAGNOSIS — Z79.4 TYPE 2 DIABETES MELLITUS WITH STAGE 3 CHRONIC KIDNEY DISEASE, WITH LONG-TERM CURRENT USE OF INSULIN, UNSPECIFIED WHETHER STAGE 3A OR 3B CKD: ICD-10-CM

## 2022-11-10 DIAGNOSIS — E07.9 THYROID DISEASE: ICD-10-CM

## 2022-11-10 DIAGNOSIS — Z95.1 S/P CABG (CORONARY ARTERY BYPASS GRAFT): ICD-10-CM

## 2022-11-10 DIAGNOSIS — I25.10 CORONARY ARTERY DISEASE INVOLVING NATIVE CORONARY ARTERY OF NATIVE HEART WITHOUT ANGINA PECTORIS: ICD-10-CM

## 2022-11-10 DIAGNOSIS — I70.0 ATHEROSCLEROSIS OF AORTA: Primary | ICD-10-CM

## 2022-11-10 DIAGNOSIS — E78.5 HYPERLIPIDEMIA WITH TARGET LDL LESS THAN 70: ICD-10-CM

## 2022-11-10 DIAGNOSIS — Z87.891 FORMER SMOKER: ICD-10-CM

## 2022-11-10 DIAGNOSIS — N18.30 STAGE 3 CHRONIC KIDNEY DISEASE, UNSPECIFIED WHETHER STAGE 3A OR 3B CKD: ICD-10-CM

## 2022-11-10 DIAGNOSIS — I50.32 CHRONIC DIASTOLIC CONGESTIVE HEART FAILURE: ICD-10-CM

## 2022-11-10 DIAGNOSIS — Z95.1 HX OF CABG: ICD-10-CM

## 2022-11-10 DIAGNOSIS — E11.22 TYPE 2 DIABETES MELLITUS WITH STAGE 3 CHRONIC KIDNEY DISEASE, WITH LONG-TERM CURRENT USE OF INSULIN, UNSPECIFIED WHETHER STAGE 3A OR 3B CKD: ICD-10-CM

## 2022-11-10 DIAGNOSIS — J44.9 CHRONIC OBSTRUCTIVE PULMONARY DISEASE, UNSPECIFIED COPD TYPE: ICD-10-CM

## 2022-11-10 DIAGNOSIS — N18.30 TYPE 2 DIABETES MELLITUS WITH STAGE 3 CHRONIC KIDNEY DISEASE, WITH LONG-TERM CURRENT USE OF INSULIN, UNSPECIFIED WHETHER STAGE 3A OR 3B CKD: ICD-10-CM

## 2022-11-10 DIAGNOSIS — E78.5 DYSLIPIDEMIA ASSOCIATED WITH TYPE 2 DIABETES MELLITUS: ICD-10-CM

## 2022-11-10 PROCEDURE — 3051F HG A1C>EQUAL 7.0%<8.0%: CPT | Mod: CPTII,S$GLB,, | Performed by: PHYSICIAN ASSISTANT

## 2022-11-10 PROCEDURE — 1126F PR PAIN SEVERITY QUANTIFIED, NO PAIN PRESENT: ICD-10-PCS | Mod: CPTII,S$GLB,, | Performed by: PHYSICIAN ASSISTANT

## 2022-11-10 PROCEDURE — 99214 OFFICE O/P EST MOD 30 MIN: CPT | Mod: S$GLB,,, | Performed by: PHYSICIAN ASSISTANT

## 2022-11-10 PROCEDURE — 3078F DIAST BP <80 MM HG: CPT | Mod: CPTII,S$GLB,, | Performed by: PHYSICIAN ASSISTANT

## 2022-11-10 PROCEDURE — 1159F PR MEDICATION LIST DOCUMENTED IN MEDICAL RECORD: ICD-10-PCS | Mod: CPTII,S$GLB,, | Performed by: PHYSICIAN ASSISTANT

## 2022-11-10 PROCEDURE — 3288F PR FALLS RISK ASSESSMENT DOCUMENTED: ICD-10-PCS | Mod: CPTII,S$GLB,, | Performed by: PHYSICIAN ASSISTANT

## 2022-11-10 PROCEDURE — 1101F PR PT FALLS ASSESS DOC 0-1 FALLS W/OUT INJ PAST YR: ICD-10-PCS | Mod: CPTII,S$GLB,, | Performed by: PHYSICIAN ASSISTANT

## 2022-11-10 PROCEDURE — 3288F FALL RISK ASSESSMENT DOCD: CPT | Mod: CPTII,S$GLB,, | Performed by: PHYSICIAN ASSISTANT

## 2022-11-10 PROCEDURE — 3072F LOW RISK FOR RETINOPATHY: CPT | Mod: CPTII,S$GLB,, | Performed by: PHYSICIAN ASSISTANT

## 2022-11-10 PROCEDURE — 99999 PR PBB SHADOW E&M-EST. PATIENT-LVL III: CPT | Mod: PBBFAC,,, | Performed by: PHYSICIAN ASSISTANT

## 2022-11-10 PROCEDURE — 3051F PR MOST RECENT HEMOGLOBIN A1C LEVEL 7.0 - < 8.0%: ICD-10-PCS | Mod: CPTII,S$GLB,, | Performed by: PHYSICIAN ASSISTANT

## 2022-11-10 PROCEDURE — 1126F AMNT PAIN NOTED NONE PRSNT: CPT | Mod: CPTII,S$GLB,, | Performed by: PHYSICIAN ASSISTANT

## 2022-11-10 PROCEDURE — 3078F PR MOST RECENT DIASTOLIC BLOOD PRESSURE < 80 MM HG: ICD-10-PCS | Mod: CPTII,S$GLB,, | Performed by: PHYSICIAN ASSISTANT

## 2022-11-10 PROCEDURE — 1101F PT FALLS ASSESS-DOCD LE1/YR: CPT | Mod: CPTII,S$GLB,, | Performed by: PHYSICIAN ASSISTANT

## 2022-11-10 PROCEDURE — 3072F PR LOW RISK FOR RETINOPATHY: ICD-10-PCS | Mod: CPTII,S$GLB,, | Performed by: PHYSICIAN ASSISTANT

## 2022-11-10 PROCEDURE — 1159F MED LIST DOCD IN RCRD: CPT | Mod: CPTII,S$GLB,, | Performed by: PHYSICIAN ASSISTANT

## 2022-11-10 PROCEDURE — 99999 PR PBB SHADOW E&M-EST. PATIENT-LVL III: ICD-10-PCS | Mod: PBBFAC,,, | Performed by: PHYSICIAN ASSISTANT

## 2022-11-10 PROCEDURE — 3074F SYST BP LT 130 MM HG: CPT | Mod: CPTII,S$GLB,, | Performed by: PHYSICIAN ASSISTANT

## 2022-11-10 PROCEDURE — 3074F PR MOST RECENT SYSTOLIC BLOOD PRESSURE < 130 MM HG: ICD-10-PCS | Mod: CPTII,S$GLB,, | Performed by: PHYSICIAN ASSISTANT

## 2022-11-10 PROCEDURE — 99214 PR OFFICE/OUTPT VISIT, EST, LEVL IV, 30-39 MIN: ICD-10-PCS | Mod: S$GLB,,, | Performed by: PHYSICIAN ASSISTANT

## 2022-11-10 NOTE — PROGRESS NOTES
Subjective:    Patient ID:  Adele Hall is a 77 y.o. female who presents for follow-up of CAD.      HPI  Ms. Hall is a very pleasant lady who follows with Dr. Joy. She presents today for routine follow up. She is without cardiovascular complaints. No JONES, or change in her exercise tolerance. She has chronic stable angina for which she takes SL nitro about 2x/month.     She had a stress echo in May that showed:  There were no arrhythmias during stress.  The left ventricle is normal in size with normal systolic function.  Normal left ventricular diastolic function.  The estimated ejection fraction is 65%.  Normal right ventricular size with normal right ventricular systolic function.  The stress echo portion of this study is negative for myocardial ischemia at submaximal heart rate  Sensitivity is impaired due to the patient's failure to achieve target heart rate.  The patient reached the end of the protocol.  The ECG portion of this study is negative for myocardial ischemia.    Review of Systems   Constitutional: Negative for weight gain and weight loss.   Eyes:  Negative for blurred vision, vision loss in left eye, vision loss in right eye and visual disturbance.   Cardiovascular:  Negative for claudication, dyspnea on exertion, leg swelling, near-syncope, orthopnea, palpitations, paroxysmal nocturnal dyspnea and syncope.   Respiratory:  Negative for hemoptysis, shortness of breath, sputum production and wheezing.    Endocrine: Negative for cold intolerance and heat intolerance.   Hematologic/Lymphatic: Negative for adenopathy. Does not bruise/bleed easily.   Musculoskeletal:  Negative for falls and muscle weakness.   Gastrointestinal:  Negative for constipation, diarrhea and melena.   Genitourinary:  Negative for bladder incontinence.   Neurological:  Negative for dizziness, focal weakness and light-headedness.   Psychiatric/Behavioral:  Negative for altered mental status.       Vitals:     "11/10/22 1031   BP: 102/62   Weight: 79.1 kg (174 lb 6.1 oz)   Height: 5' 2" (1.575 m)   Body mass index is 31.9 kg/m².    Objective:    Physical Exam  Constitutional:       General: She is not in acute distress.     Appearance: She is well-developed.   HENT:      Head: Normocephalic and atraumatic.   Eyes:      General: No scleral icterus.     Conjunctiva/sclera: Conjunctivae normal.      Pupils: Pupils are equal, round, and reactive to light.   Neck:      Vascular: No JVD.      Trachea: No tracheal deviation.   Cardiovascular:      Rate and Rhythm: Normal rate and regular rhythm.      Heart sounds: No murmur heard.    No friction rub. No gallop.   Pulmonary:      Effort: Pulmonary effort is normal. No respiratory distress.      Breath sounds: Normal breath sounds. No wheezing or rales.   Chest:      Chest wall: No tenderness.   Abdominal:      General: Bowel sounds are normal. There is no distension.      Palpations: Abdomen is soft.      Tenderness: There is no abdominal tenderness.   Musculoskeletal:         General: No tenderness.      Cervical back: Neck supple.   Skin:     General: Skin is warm and dry.      Findings: No erythema or rash.   Neurological:      Mental Status: She is alert and oriented to person, place, and time.   Psychiatric:         Behavior: Behavior normal.         Assessment:       Problem List Items Addressed This Visit          Cardiology Problems    Atherosclerosis of aorta - Primary    CHF (congestive heart failure)    Coronary artery disease involving native coronary artery    Hyperlipidemia with target LDL less than 70    Hypertension    PAF (paroxysmal atrial fibrillation), post CABG       Other    CKD (chronic kidney disease) stage 3, GFR 30-59 ml/min    COPD (chronic obstructive pulmonary disease)    Dyslipidemia associated with type 2 diabetes mellitus    Former smoker    Hx of CABG-  1/19/16 x 4    S/P CABG (coronary artery bypass graft) - x4 01/19/2016; LIMA to LAD; SVG's to " berta, OMB, PDA - all grafts patent 01/2018    Thyroid disease    Type 2 diabetes mellitus with stage 3 chronic kidney disease, with long-term current use of insulin        Plan:       Doing well.   Continue current cardiac medications.   Risk factor modification including diet and exercise.   F/U with Dr. Joy in 6 months.

## 2022-11-16 ENCOUNTER — LAB VISIT (OUTPATIENT)
Dept: LAB | Facility: HOSPITAL | Age: 77
End: 2022-11-16
Attending: NURSE PRACTITIONER
Payer: MEDICARE

## 2022-11-16 DIAGNOSIS — N18.32 TYPE 2 DIABETES MELLITUS WITH STAGE 3B CHRONIC KIDNEY DISEASE, WITH LONG-TERM CURRENT USE OF INSULIN: ICD-10-CM

## 2022-11-16 DIAGNOSIS — Z79.4 TYPE 2 DIABETES MELLITUS WITH STAGE 3B CHRONIC KIDNEY DISEASE, WITH LONG-TERM CURRENT USE OF INSULIN: ICD-10-CM

## 2022-11-16 DIAGNOSIS — E11.22 TYPE 2 DIABETES MELLITUS WITH STAGE 3B CHRONIC KIDNEY DISEASE, WITH LONG-TERM CURRENT USE OF INSULIN: ICD-10-CM

## 2022-11-16 LAB
ESTIMATED AVG GLUCOSE: 169 MG/DL (ref 68–131)
HBA1C MFR BLD: 7.5 % (ref 4–5.6)

## 2022-11-16 PROCEDURE — 83036 HEMOGLOBIN GLYCOSYLATED A1C: CPT | Performed by: NURSE PRACTITIONER

## 2022-12-01 ENCOUNTER — OFFICE VISIT (OUTPATIENT)
Dept: ENDOCRINOLOGY | Facility: CLINIC | Age: 77
End: 2022-12-01
Payer: MEDICARE

## 2022-12-01 VITALS
SYSTOLIC BLOOD PRESSURE: 110 MMHG | BODY MASS INDEX: 32.23 KG/M2 | HEART RATE: 64 BPM | WEIGHT: 175.13 LBS | HEIGHT: 62 IN | DIASTOLIC BLOOD PRESSURE: 60 MMHG

## 2022-12-01 DIAGNOSIS — E07.9 THYROID DISEASE: ICD-10-CM

## 2022-12-01 DIAGNOSIS — N18.32 TYPE 2 DIABETES MELLITUS WITH STAGE 3B CHRONIC KIDNEY DISEASE, WITH LONG-TERM CURRENT USE OF INSULIN: Primary | ICD-10-CM

## 2022-12-01 DIAGNOSIS — I25.10 CORONARY ARTERY DISEASE INVOLVING NATIVE CORONARY ARTERY OF NATIVE HEART WITHOUT ANGINA PECTORIS: ICD-10-CM

## 2022-12-01 DIAGNOSIS — I10 ESSENTIAL HYPERTENSION: ICD-10-CM

## 2022-12-01 DIAGNOSIS — E11.22 TYPE 2 DIABETES MELLITUS WITH STAGE 3B CHRONIC KIDNEY DISEASE, WITH LONG-TERM CURRENT USE OF INSULIN: Primary | ICD-10-CM

## 2022-12-01 DIAGNOSIS — E11.42 TYPE 2 DIABETES MELLITUS WITH DIABETIC POLYNEUROPATHY, WITH LONG-TERM CURRENT USE OF INSULIN: ICD-10-CM

## 2022-12-01 DIAGNOSIS — Z79.4 TYPE 2 DIABETES MELLITUS WITH DIABETIC POLYNEUROPATHY, WITH LONG-TERM CURRENT USE OF INSULIN: ICD-10-CM

## 2022-12-01 DIAGNOSIS — Z79.4 TYPE 2 DIABETES MELLITUS WITH STAGE 3B CHRONIC KIDNEY DISEASE, WITH LONG-TERM CURRENT USE OF INSULIN: Primary | ICD-10-CM

## 2022-12-01 DIAGNOSIS — E78.5 HYPERLIPIDEMIA WITH TARGET LDL LESS THAN 70: ICD-10-CM

## 2022-12-01 PROCEDURE — 99214 OFFICE O/P EST MOD 30 MIN: CPT | Mod: S$GLB,,, | Performed by: NURSE PRACTITIONER

## 2022-12-01 PROCEDURE — 99499 RISK ADDL DX/OHS AUDIT: ICD-10-PCS | Mod: HCNC,S$GLB,, | Performed by: NURSE PRACTITIONER

## 2022-12-01 PROCEDURE — 3074F PR MOST RECENT SYSTOLIC BLOOD PRESSURE < 130 MM HG: ICD-10-PCS | Mod: CPTII,S$GLB,, | Performed by: NURSE PRACTITIONER

## 2022-12-01 PROCEDURE — 3051F PR MOST RECENT HEMOGLOBIN A1C LEVEL 7.0 - < 8.0%: ICD-10-PCS | Mod: CPTII,S$GLB,, | Performed by: NURSE PRACTITIONER

## 2022-12-01 PROCEDURE — 99999 PR PBB SHADOW E&M-EST. PATIENT-LVL IV: CPT | Mod: PBBFAC,,, | Performed by: NURSE PRACTITIONER

## 2022-12-01 PROCEDURE — 3072F PR LOW RISK FOR RETINOPATHY: ICD-10-PCS | Mod: CPTII,S$GLB,, | Performed by: NURSE PRACTITIONER

## 2022-12-01 PROCEDURE — 1160F RVW MEDS BY RX/DR IN RCRD: CPT | Mod: CPTII,S$GLB,, | Performed by: NURSE PRACTITIONER

## 2022-12-01 PROCEDURE — 1101F PR PT FALLS ASSESS DOC 0-1 FALLS W/OUT INJ PAST YR: ICD-10-PCS | Mod: CPTII,S$GLB,, | Performed by: NURSE PRACTITIONER

## 2022-12-01 PROCEDURE — 3078F PR MOST RECENT DIASTOLIC BLOOD PRESSURE < 80 MM HG: ICD-10-PCS | Mod: CPTII,S$GLB,, | Performed by: NURSE PRACTITIONER

## 2022-12-01 PROCEDURE — 1126F PR PAIN SEVERITY QUANTIFIED, NO PAIN PRESENT: ICD-10-PCS | Mod: CPTII,S$GLB,, | Performed by: NURSE PRACTITIONER

## 2022-12-01 PROCEDURE — 1159F PR MEDICATION LIST DOCUMENTED IN MEDICAL RECORD: ICD-10-PCS | Mod: CPTII,S$GLB,, | Performed by: NURSE PRACTITIONER

## 2022-12-01 PROCEDURE — 1160F PR REVIEW ALL MEDS BY PRESCRIBER/CLIN PHARMACIST DOCUMENTED: ICD-10-PCS | Mod: CPTII,S$GLB,, | Performed by: NURSE PRACTITIONER

## 2022-12-01 PROCEDURE — 1101F PT FALLS ASSESS-DOCD LE1/YR: CPT | Mod: CPTII,S$GLB,, | Performed by: NURSE PRACTITIONER

## 2022-12-01 PROCEDURE — 3078F DIAST BP <80 MM HG: CPT | Mod: CPTII,S$GLB,, | Performed by: NURSE PRACTITIONER

## 2022-12-01 PROCEDURE — 1159F MED LIST DOCD IN RCRD: CPT | Mod: CPTII,S$GLB,, | Performed by: NURSE PRACTITIONER

## 2022-12-01 PROCEDURE — 3288F FALL RISK ASSESSMENT DOCD: CPT | Mod: CPTII,S$GLB,, | Performed by: NURSE PRACTITIONER

## 2022-12-01 PROCEDURE — 3288F PR FALLS RISK ASSESSMENT DOCUMENTED: ICD-10-PCS | Mod: CPTII,S$GLB,, | Performed by: NURSE PRACTITIONER

## 2022-12-01 PROCEDURE — 99999 PR PBB SHADOW E&M-EST. PATIENT-LVL IV: ICD-10-PCS | Mod: PBBFAC,,, | Performed by: NURSE PRACTITIONER

## 2022-12-01 PROCEDURE — 99214 PR OFFICE/OUTPT VISIT, EST, LEVL IV, 30-39 MIN: ICD-10-PCS | Mod: S$GLB,,, | Performed by: NURSE PRACTITIONER

## 2022-12-01 PROCEDURE — 3051F HG A1C>EQUAL 7.0%<8.0%: CPT | Mod: CPTII,S$GLB,, | Performed by: NURSE PRACTITIONER

## 2022-12-01 PROCEDURE — 3074F SYST BP LT 130 MM HG: CPT | Mod: CPTII,S$GLB,, | Performed by: NURSE PRACTITIONER

## 2022-12-01 PROCEDURE — 1126F AMNT PAIN NOTED NONE PRSNT: CPT | Mod: CPTII,S$GLB,, | Performed by: NURSE PRACTITIONER

## 2022-12-01 PROCEDURE — 3072F LOW RISK FOR RETINOPATHY: CPT | Mod: CPTII,S$GLB,, | Performed by: NURSE PRACTITIONER

## 2022-12-01 PROCEDURE — 99499 UNLISTED E&M SERVICE: CPT | Mod: HCNC,S$GLB,, | Performed by: NURSE PRACTITIONER

## 2022-12-01 NOTE — PROGRESS NOTES
CC: Ms. Adele Hall arrives today for management of Type 2 DM and review of chronic medical conditions, as listed in the visit diagnosis section of this encounter.     HPI: Ms. Adele Hall was diagnosed with Type 2 DM in ~ 2002. Metformin started at the time of diagnosis but was stopped d/t renal impairment, per PCP in [place]. Insulin was started ~ 2012. She has rotated between analog insulins and generic NPH and Regular insulins, due to cost. [employer] brand insulins, due to cost.  Denies FH of DM. She did have 1 ER admission after having BG of >700 in 2012, prior to starting insulin.   She has a dx of CAD - s/p CABG in 1/2016.   She follows with Dr. Davidson for CKD.    Patient was last seen by me in August. At that time, she had admitted Novolog noncompliance so this was discontinued and Trulicity dose was increased.      Rare BG monitoring.    Hypoglycemia: Denies   Symptoms: dizziness   Treatment: sweets    Missing Insulin/PO medication doses: No  Timing prandial insulin 5-15 minutes before meals: n/a    Dietary Habits: Eats 1 meal/day - usually late afternoon. May snack in evening - popcorn. Avoids sugary beverages.     She will be establishing with Dr. Dick for CAD. Recently saw cardiology NP.       CURRENT DIABETIC MEDS: Trulicity 3 mg weekly, Tresiba U200 58 units QAM  Vial or pen: pens  Glucometer type: True Metrix    Previous DM treatments:  Metformin  Victoza - $   NPH/Regular insulins  Novolog     Last Eye Exam: 10/2021, no DR.   Last Podiatry Exam: never    REVIEW OF SYSTEMS  Constitutional: no c/o weakness, fatigue. + 6# weight loss.  Eyes: denies visual disturbances  Cardiac: no palpitations. Reports infrequent chest pain that resolves with nitroglycerin. Not occurring now. Had recent cardiology eval.   Respiratory: no cough, dyspnea   GI: no c/o nausea, abdominal pain.   Neuro: c/o paresthesias in feet. Uses compounded topical. Didn't care for gabapentin.  Skin: no lesions,  "rashes.   Endocrine: denies polyphagia, polydipsia, polyuria.      Personally reviewed Past Medical, Surgical, Social History.    Vital Signs  /60   Pulse 64   Ht 5' 2" (1.575 m)   Wt 79.5 kg (175 lb 2.5 oz)   BMI 32.04 kg/m²     Personally reviewed the below labs:    Hemoglobin A1C   Date Value Ref Range Status   11/16/2022 7.5 (H) 4.0 - 5.6 % Final     Comment:     ADA Screening Guidelines:  5.7-6.4%  Consistent with prediabetes  >or=6.5%  Consistent with diabetes    High levels of fetal hemoglobin interfere with the HbA1C  assay. Heterozygous hemoglobin variants (HbS, HgC, etc)do  not significantly interfere with this assay.   However, presence of multiple variants may affect accuracy.     08/18/2022 7.6 (H) 4.0 - 5.6 % Final     Comment:     ADA Screening Guidelines:  5.7-6.4%  Consistent with prediabetes  >or=6.5%  Consistent with diabetes    High levels of fetal hemoglobin interfere with the HbA1C  assay. Heterozygous hemoglobin variants (HbS, HgC, etc)do  not significantly interfere with this assay.   However, presence of multiple variants may affect accuracy.     05/12/2022 8.3 (H) 4.0 - 5.6 % Final     Comment:     ADA Screening Guidelines:  5.7-6.4%  Consistent with prediabetes  >or=6.5%  Consistent with diabetes    High levels of fetal hemoglobin interfere with the HbA1C  assay. Heterozygous hemoglobin variants (HbS, HgC, etc)do  not significantly interfere with this assay.   However, presence of multiple variants may affect accuracy.         Chemistry        Component Value Date/Time     11/16/2022 1131    K 4.0 11/16/2022 1131     11/16/2022 1131    CO2 31 (H) 11/16/2022 1131    BUN 17 11/16/2022 1131    CREATININE 1.4 11/16/2022 1131     (H) 11/16/2022 1131        Component Value Date/Time    CALCIUM 9.1 11/16/2022 1131    ALKPHOS 73 05/12/2022 1103    AST 34 05/12/2022 1103    AST 45 (H) 01/30/2016 0431    ALT 24 05/12/2022 1103    BILITOT 0.6 05/12/2022 1103          Lab " Results   Component Value Date    CHOL 128 05/12/2022    CHOL 127 04/27/2021    CHOL 116 (L) 03/07/2021     Lab Results   Component Value Date    HDL 36 (L) 05/12/2022    HDL 28 (L) 04/27/2021    HDL 36 (L) 03/07/2021     Lab Results   Component Value Date    LDLCALC 28.6 (L) 05/12/2022    LDLCALC 29.6 (L) 04/27/2021    LDLCALC 37.2 (L) 03/07/2021     Lab Results   Component Value Date    TRIG 317 (H) 05/12/2022    TRIG 347 (H) 04/27/2021    TRIG 214 (H) 03/07/2021     Lab Results   Component Value Date    CHOLHDL 28.1 05/12/2022    CHOLHDL 22.0 04/27/2021    CHOLHDL 31.0 03/07/2021       Lab Results   Component Value Date    MICALBCREAT 105.8 (H) 10/21/2021     Lab Results   Component Value Date    TSH 1.264 08/18/2022       CrCl cannot be calculated (Patient's most recent lab result is older than the maximum 7 days allowed.).    Vit D, 25-Hydroxy   Date Value Ref Range Status   05/12/2022 35 30 - 96 ng/mL Final     Comment:     Vitamin D deficiency.........<10 ng/mL                              Vitamin D insufficiency......10-29 ng/mL       Vitamin D sufficiency........> or equal to 30 ng/mL  Vitamin D toxicity............>100 ng/mL         August 2021 Professional CGMS review: Fasting glucoses are reasonable. Prandial excursions noted in mid 200s range.  One episode of borderline hypoglycemia with unknown precipitant. Eating 2 meals/day on average with snacks.   Average glucose: 193 mg/dL  Above 250 mg/dL: 13 %  181-250 mg/dL: 44 %   mg/dL: 42 %  54-69 mg/dL: <1 %  Below 54 mg/dL: 0 %        PHYSICAL EXAMINATION  Constitutional: Appears well, no distress  Respiratory: CTA, even and unlabored.  Cardiovascular: RRR, no murmurs, no carotid bruits.   GI: normal bowel sounds, no hernia  Skin: warm and dry  Neuro: oriented to person, place, time.   Feet: appropriate footwear.        Goals        HEMOGLOBIN A1C < 7.5                 Assessment/Plan  1. Type 2 diabetes mellitus with stage 3 chronic kidney disease,  with long-term current use of insulin  -- A1c is stable without hypoglycemia.   -- continue current Tresiba, Trulicity doses.  -- check BG 1x/day and notify me if often >180  -- following with nephrology  -- schedule eye exam    -- Discussed diagnosis of DM, A1c goals, progression of disease, long term complications and tx options.   -- Reviewed hypoglycemia management: treat with 1/2 glass of juice, 1/2 can regular coke, or 4 glucose tablets. Monitor and repeat treatment every 15 minutes until BG is >70 Then have a snack, which includes a complex carbohydrate and protein.   Advised p  atient to check BG before activities, such as driving or exercise.    -- takes aspirin, statin, ace-I   2. Type 2 diabetes mellitus with diabetic polyneuropathy, with long-term current use of insulin  -- optimize DM control     3. Coronary artery disease involving native coronary artery of native heart without angina pectoris  -- avoid hypoglycemia   4. Essential hypertension  -- controlled  -- continue current meds   5. Hyperlipidemia with target LDL less than 70  -- Uncontrolled with elevated triglycerides.   -- Continue Crestor (Fenofibrate previously DC'd due to renal function)  -- lipid panel with RTC   6. Thyroid disease  -- controlled  -- continue levothyroxine 75 mcg daily  -- TSH with RTC       FOLLOW UP  Follow up in about 6 months (around 6/1/2023).   Patient instructed to bring BG logs to each follow up   Patient encouraged to call for any BG/medication issues, concerns, or questions.    Orders Placed This Encounter   Procedures    Hemoglobin A1C    Comprehensive Metabolic Panel    TSH    Lipid Panel

## 2022-12-05 ENCOUNTER — TELEPHONE (OUTPATIENT)
Dept: ENDOCRINOLOGY | Facility: CLINIC | Age: 77
End: 2022-12-05
Payer: MEDICARE

## 2022-12-05 NOTE — TELEPHONE ENCOUNTER
"Robitussin will be ok. If he can find sugar free Robitussin, that is preferable. Some options also marketed as "diabetic tussin" If all he can find is regular Robitussin, it's ok  "

## 2022-12-05 NOTE — TELEPHONE ENCOUNTER
----- Message from Carmen Coleman sent at 12/5/2022 11:41 AM CST -----  Contact: Patient  Type: Needs Medical Advice  Who Called: Patient's Spouse  Symptoms (please be specific): cold   How long has patient had these symptoms: n/a  Best Call Back Number: [phone]  Additional Information:Patient's Spouse called in and stated patient has a cold and would like to know can patient take robitussin MD with her other medication, please contact patient's Spouse to advise.

## 2022-12-05 NOTE — TELEPHONE ENCOUNTER
W . He wants to know if wife can take robitussin MD is it is ok because of her other meds she takes. If not he wants to know if you can prescribe something else

## 2022-12-06 NOTE — TELEPHONE ENCOUNTER
----- Message from Harjete Che sent at 12/6/2022  7:53 AM CST -----  Type:  Patient Returning Call    Who Called:  / Sami Hall   Who Left Message for Patient:  Kaycee  Does the patient know what this is regarding?:  Cold medication  Best Call Back Number:  [phone]  Additional Information:

## 2022-12-29 ENCOUNTER — TELEPHONE (OUTPATIENT)
Dept: ENDOCRINOLOGY | Facility: CLINIC | Age: 77
End: 2022-12-29
Payer: MEDICARE

## 2022-12-29 DIAGNOSIS — E11.9 TYPE 2 DIABETES MELLITUS WITHOUT COMPLICATION: ICD-10-CM

## 2022-12-29 DIAGNOSIS — N18.32 TYPE 2 DIABETES MELLITUS WITH STAGE 3B CHRONIC KIDNEY DISEASE, WITH LONG-TERM CURRENT USE OF INSULIN: Primary | ICD-10-CM

## 2022-12-29 DIAGNOSIS — Z79.4 TYPE 2 DIABETES MELLITUS WITH STAGE 3B CHRONIC KIDNEY DISEASE, WITH LONG-TERM CURRENT USE OF INSULIN: Primary | ICD-10-CM

## 2022-12-29 DIAGNOSIS — E11.22 TYPE 2 DIABETES MELLITUS WITH STAGE 3B CHRONIC KIDNEY DISEASE, WITH LONG-TERM CURRENT USE OF INSULIN: Primary | ICD-10-CM

## 2022-12-29 RX ORDER — TIRZEPATIDE 10 MG/.5ML
10 INJECTION, SOLUTION SUBCUTANEOUS
Qty: 4 PEN | Refills: 6 | Status: SHIPPED | OUTPATIENT
Start: 2022-12-29 | End: 2023-01-04

## 2022-12-29 NOTE — TELEPHONE ENCOUNTER
Pt states her pharmacy is out of Trulicity 3 mg.  Advised pt to check with her other local pharmacies and if none have it, we can check with Ms. Araya about possibly changing to something else.  Pt will let us know.

## 2022-12-29 NOTE — TELEPHONE ENCOUNTER
----- Message from Samanta Packer sent at 12/29/2022  1:22 PM CST -----  Type: Needs Medical Advice  Who Called:  Pt   Best Call Back Number: [phone]  Additional Information: Pt need a new prescription due to Pharmacy is out and can not get dulaglutide (TRULICITY) 3 mg/0.5 mL pen injector. Please call back to advise.

## 2022-12-29 NOTE — TELEPHONE ENCOUNTER
Pt has checked with all her local pharmacies, no one has Trulicity.  Asking to be changed to something else.  Will use [facility] in [place] for the new RX.

## 2022-12-29 NOTE — TELEPHONE ENCOUNTER
----- Message from Morales Bob sent at 12/29/2022  2:16 PM CST -----  Contact: self  Type: RX Refill Request        Who Called: patient   Refill or New Rx: refill  RX Name and Strength: dulaglutide (TRULICITY) 3 mg/0.5 mL pen injector  Preferred Pharmacy with phone number:   [facility] [address] [facility] [phone] [id number] [facility] [address]  [place] [address]  Phone: [phone] Fax: [phone]  Local or Mail Order: local   Ordering Provider: Dr. Babs Mccartney Call Back Number: [phone]  Additional Information: Pt states she has called all over no one has the TRULICITY  plz all today to see what else can be sent in. Thanks

## 2022-12-29 NOTE — TELEPHONE ENCOUNTER
Please let patient know that I sent in Moujaro to replace the Trulicity. It is also once weekly and comes in a pre-filled pen like Trulicity. Same class of medication. Please advise her to notify me if having any nausea, diarrhea, etc.

## 2023-01-03 ENCOUNTER — PATIENT OUTREACH (OUTPATIENT)
Dept: ADMINISTRATIVE | Facility: HOSPITAL | Age: 78
End: 2023-01-03
Payer: MEDICARE

## 2023-01-03 NOTE — TELEPHONE ENCOUNTER
Pt unable to obtain trulicity, and switched to Mounjaro. Currently states 10 mg causing GI issues with heartburn being the worst part of it  States if she needs to she can tolerate it  Asking for advice    (Could mounjaro be decreased for less GI issues?)

## 2023-01-03 NOTE — TELEPHONE ENCOUNTER
----- Message from Eveline Juan A sent at 1/3/2023  1:40 PM CST -----  Contact: patient  Type:  Needs Medical Advice    Who Called:  patient       Would the patient rather a call back or a response via [facility]? Call     Best Call Back Number: [phone] (home)      Additional Information:  Patient would like to speak with the nurse about a new medication that [facility] was prescribed and she is having problems with her stomach cramping.     Please call to advise

## 2023-01-03 NOTE — TELEPHONE ENCOUNTER
Please call pt. Yes, acid reflex can be a side effect to Mounjaro and that class of medication as a whole. I'd recommend she call [facility] and ask if they have the 7.5 mg dose in stock. If not, we can send to [facility] pharmacy, as of today they have some.

## 2023-01-04 RX ORDER — TIRZEPATIDE 7.5 MG/.5ML
7.5 INJECTION, SOLUTION SUBCUTANEOUS
Qty: 4 PEN | Refills: 6 | Status: SHIPPED | OUTPATIENT
Start: 2023-01-04 | End: 2023-04-11

## 2023-01-04 NOTE — TELEPHONE ENCOUNTER
S/w pt and notified her of Basb's prev msg and verb understanding. States she is still w/ abdominal pain,a lot of burping and gas.    I called her pharmacy and the 7.5mg is in b/o

## 2023-02-07 DIAGNOSIS — Z00.00 ENCOUNTER FOR MEDICARE ANNUAL WELLNESS EXAM: ICD-10-CM

## 2023-02-09 DIAGNOSIS — Z00.00 ENCOUNTER FOR MEDICARE ANNUAL WELLNESS EXAM: ICD-10-CM

## 2023-02-23 ENCOUNTER — OFFICE VISIT (OUTPATIENT)
Dept: ORTHOPEDICS | Facility: CLINIC | Age: 78
End: 2023-02-23
Payer: MEDICARE

## 2023-02-23 ENCOUNTER — LAB VISIT (OUTPATIENT)
Dept: LAB | Facility: HOSPITAL | Age: 78
End: 2023-02-23
Payer: MEDICARE

## 2023-02-23 VITALS — WEIGHT: 175 LBS | RESPIRATION RATE: 18 BRPM | BODY MASS INDEX: 32.2 KG/M2 | HEIGHT: 62 IN

## 2023-02-23 DIAGNOSIS — N18.30 CHRONIC KIDNEY DISEASE, STAGE III (MODERATE): Primary | ICD-10-CM

## 2023-02-23 DIAGNOSIS — M75.100 ROTATOR CUFF SYNDROME, UNSPECIFIED LATERALITY: ICD-10-CM

## 2023-02-23 DIAGNOSIS — M25.512 LEFT SHOULDER PAIN, UNSPECIFIED CHRONICITY: ICD-10-CM

## 2023-02-23 DIAGNOSIS — M25.512 CHRONIC PAIN OF BOTH SHOULDERS: Primary | ICD-10-CM

## 2023-02-23 DIAGNOSIS — G89.29 CHRONIC PAIN OF BOTH SHOULDERS: Primary | ICD-10-CM

## 2023-02-23 DIAGNOSIS — M17.10 ARTHRITIS OF KNEE: ICD-10-CM

## 2023-02-23 DIAGNOSIS — M25.511 CHRONIC PAIN OF BOTH SHOULDERS: Primary | ICD-10-CM

## 2023-02-23 LAB
ALBUMIN SERPL BCP-MCNC: 3.6 G/DL (ref 3.5–5.2)
ANION GAP SERPL CALC-SCNC: 10 MMOL/L (ref 8–16)
BUN SERPL-MCNC: 15 MG/DL (ref 8–23)
CALCIUM SERPL-MCNC: 9.3 MG/DL (ref 8.7–10.5)
CHLORIDE SERPL-SCNC: 101 MMOL/L (ref 95–110)
CO2 SERPL-SCNC: 27 MMOL/L (ref 23–29)
CREAT SERPL-MCNC: 1.4 MG/DL (ref 0.5–1.4)
EST. GFR  (NO RACE VARIABLE): 39.2 ML/MIN/1.73 M^2
GLUCOSE SERPL-MCNC: 93 MG/DL (ref 70–110)
PHOSPHATE SERPL-MCNC: 3.7 MG/DL (ref 2.7–4.5)
POTASSIUM SERPL-SCNC: 4 MMOL/L (ref 3.5–5.1)
SODIUM SERPL-SCNC: 138 MMOL/L (ref 136–145)

## 2023-02-23 PROCEDURE — 20610 LARGE JOINT ASPIRATION/INJECTION: R KNEE: ICD-10-PCS | Mod: HCNC,RT,S$GLB, | Performed by: ORTHOPAEDIC SURGERY

## 2023-02-23 PROCEDURE — 99499 NO LOS: ICD-10-PCS | Mod: HCNC,S$GLB,, | Performed by: ORTHOPAEDIC SURGERY

## 2023-02-23 PROCEDURE — 1159F MED LIST DOCD IN RCRD: CPT | Mod: HCNC,CPTII,S$GLB, | Performed by: ORTHOPAEDIC SURGERY

## 2023-02-23 PROCEDURE — 1159F PR MEDICATION LIST DOCUMENTED IN MEDICAL RECORD: ICD-10-PCS | Mod: HCNC,CPTII,S$GLB, | Performed by: ORTHOPAEDIC SURGERY

## 2023-02-23 PROCEDURE — 36415 COLL VENOUS BLD VENIPUNCTURE: CPT | Mod: HCNC,PO | Performed by: INTERNAL MEDICINE

## 2023-02-23 PROCEDURE — 1160F RVW MEDS BY RX/DR IN RCRD: CPT | Mod: HCNC,CPTII,S$GLB, | Performed by: ORTHOPAEDIC SURGERY

## 2023-02-23 PROCEDURE — 1160F PR REVIEW ALL MEDS BY PRESCRIBER/CLIN PHARMACIST DOCUMENTED: ICD-10-PCS | Mod: HCNC,CPTII,S$GLB, | Performed by: ORTHOPAEDIC SURGERY

## 2023-02-23 PROCEDURE — 99999 PR PBB SHADOW E&M-EST. PATIENT-LVL II: CPT | Mod: PBBFAC,HCNC,, | Performed by: ORTHOPAEDIC SURGERY

## 2023-02-23 PROCEDURE — 99499 UNLISTED E&M SERVICE: CPT | Mod: HCNC,S$GLB,, | Performed by: ORTHOPAEDIC SURGERY

## 2023-02-23 PROCEDURE — 20610 DRAIN/INJ JOINT/BURSA W/O US: CPT | Mod: HCNC,RT,S$GLB, | Performed by: ORTHOPAEDIC SURGERY

## 2023-02-23 PROCEDURE — 80069 RENAL FUNCTION PANEL: CPT | Mod: HCNC | Performed by: INTERNAL MEDICINE

## 2023-02-23 PROCEDURE — 99999 PR PBB SHADOW E&M-EST. PATIENT-LVL II: ICD-10-PCS | Mod: PBBFAC,HCNC,, | Performed by: ORTHOPAEDIC SURGERY

## 2023-02-23 RX ORDER — TRIAMCINOLONE ACETONIDE 40 MG/ML
40 INJECTION, SUSPENSION INTRA-ARTICULAR; INTRAMUSCULAR
Status: DISCONTINUED | OUTPATIENT
Start: 2023-02-23 | End: 2023-02-23 | Stop reason: HOSPADM

## 2023-02-23 RX ADMIN — TRIAMCINOLONE ACETONIDE 40 MG: 40 INJECTION, SUSPENSION INTRA-ARTICULAR; INTRAMUSCULAR at 02:02

## 2023-02-23 NOTE — PROGRESS NOTES
Past Medical History:   Diagnosis Date    Anesthesia complication     took patient 6 days to come out of anethesia after CABG    Anticoagulant long-term use     Arthritis     Chronic kidney disease     CKD (chronic kidney disease) stage 3, GFR 30-59 ml/min     Dr. Montero    COPD (chronic obstructive pulmonary disease) 2/7/2016    COPD (chronic obstructive pulmonary disease)     Coronary artery disease     Diabetes mellitus     Diabetes mellitus, type 2     GERD (gastroesophageal reflux disease)     Hx of colonic polyps     Hyperlipidemia     Hypertension     possible new onset CHF 7/30/2016    Tardive dyskinesia     Thyroid disease     Ulcer     Vertigo        Past Surgical History:   Procedure Laterality Date    CARDIAC SURGERY      CHOLECYSTECTOMY  01/26/2017    COLONOSCOPY      COLONOSCOPY N/A 12/7/2021    Procedure: COLONOSCOPY;  Surgeon: Lito Will MD;  Location: [facility];  Service: Endoscopy;  Laterality: N/A;    CORONARY ARTERY BYPASS GRAFT  01/19/2016    4 vessel    HYSTERECTOMY      OOPHORECTOMY      TONSILLECTOMY      TOTAL THYROIDECTOMY      TUBAL LIGATION         Current Outpatient Medications   Medication Sig    amLODIPine (NORVASC) 2.5 MG tablet TAKE ONE TABLET BY MOUTH ONCE A DAY    ammonium lactate (LAC-HYDRIN) 12 % lotion APPLY TO AFFECTED AREA AS NEEDED FOR DRY SKIN    aspirin (ECOTRIN) 81 MG EC tablet Take 81 mg by mouth once daily.    aspirin-sod bicarb-citric acid (ERICKA-SELTZER) 324 mg TbEF Take 325 mg by mouth every 6 (six) hours as needed.    BD INSULIN SYRINGE ULTRA-FINE 1 mL 31 gauge x 5/16 Syrg USE AS DIRECTED 5 TIMES A DAY WITH LEVEMIR & NOVOLOG    blood sugar diagnostic (TRUE METRIX GLUCOSE TEST STRIP) Strp Test blood sugar 3x/day    dicyclomine (BENTYL) 10 MG capsule     furosemide (LASIX) 20 MG tablet Take 1 tablet (20 mg total) by mouth once daily. (Patient not taking: Reported on 8/17/2022)    insulin degludec (TRESIBA FLEXTOUCH U-200) 200 unit/mL (3 mL) insulin pen INJECT 58  "UNITS INTO THE SKIN ONCE DAILY.    KETOPROFEN TOP APPLY 1.6 GRAMS (1 PUMP) TO PAINFUL AREAS UP TO 5 TIMES DAILY. RUB IN WELL    [facility] ketoprofen 10%- LIDOcaine 5%- cyclobenzaprine 2%- baclofen 2% in transdermal cream Apply 1 to 2 grams 3 to 4 times daily    lancets 33 gauge Misc 1 lancet by Misc.(Non-Drug; Combo Route) route 4 (four) times daily.    levothyroxine (SYNTHROID) 75 MCG tablet TAKE ONE TABLET BY MOUTH EVERY MORNING BEFORE BREAKFAST    lisinopriL (PRINIVIL,ZESTRIL) 5 MG tablet TAKE ONE TABLET BY MOUTH TWO TIMES A DAY    magnesium oxide (MAG-OX) 400 mg (241.3 mg magnesium) tablet Take 1 tablet (400 mg total) by mouth once daily.    meclizine (ANTIVERT) 25 mg tablet Take 1 tablet (25 mg total) by mouth 3 (three) times daily as needed.    metoprolol tartrate (LOPRESSOR) 25 MG tablet TAKE ONE TABLET BY MOUTH TWO TIMES A DAY    nitroGLYCERIN (NITROSTAT) 0.4 MG SL tablet Place 1 tablet (0.4 mg total) under the tongue every 5 (five) minutes as needed for Chest pain. Seek medical help is pain is not relieved by the third dose.    oxyCODONE-acetaminophen (PERCOCET) 5-325 mg per tablet Take 1 tablet by mouth as needed for Pain.    pantoprazole (PROTONIX) 40 MG tablet TAKE ONE TABLET BY MOUTH ONCE A DAY    pen needle, diabetic (BD ULTRA-FINE YUMIKO PEN NEEDLE) 32 gauge x 5/32" Ndle USE FOUR TIMES A DAY WITH INSULIN    rosuvastatin (CRESTOR) 40 MG Tab TAKE ONE TABLET BY MOUTH EVERY EVENING    tirzepatide (MOUNJARO) 7.5 mg/0.5 mL PnIj Inject 7.5 mg into the skin every 7 days.    traZODone (DESYREL) 50 MG tablet TAKE ONE TABLET BY MOUTH EVERY NIGHT    vitamin D (VITAMIN D3) 1000 units Tab Take 1,000 Units by mouth once daily.     No current facility-administered medications for this visit.       Review of patient's allergies indicates:   Allergen Reactions    Reglan [metoclopramide hcl]      Depression and weight loss.        Family History   Problem Relation Age of Onset    Cancer Mother         LYMPHOMA    Breast cancer " Mother     Cancer Father     Early death Father     Heart disease Paternal Uncle     Alcohol abuse Sister         x2    Heart disease Brother     No Known Problems Son     Glaucoma Neg Hx     Macular degeneration Neg Hx        Social History     Socioeconomic History    Marital status:    Tobacco Use    Smoking status: Former     Packs/day: 1.00     Years: 27.00     Pack years: 27.00     Types: Cigarettes     Quit date: 2016     Years since quittin.1    Smokeless tobacco: Never   Substance and Sexual Activity    Alcohol use: No     Alcohol/week: 0.0 standard drinks    Drug use: No    Sexual activity: Yes     Partners: Male     Social Determinants of Health     Financial Resource Strain: Low Risk     Difficulty of Paying Living Expenses: Not hard at all   Food Insecurity: No Food Insecurity    Worried About Running Out of Food in the Last Year: Never true    Ran Out of Food in the Last Year: Never true   Transportation Needs: No Transportation Needs    Lack of Transportation (Medical): No    Lack of Transportation (Non-Medical): No   Physical Activity: Inactive    Days of Exercise per Week: 0 days    Minutes of Exercise per Session: 0 min   Stress: No Stress Concern Present    Feeling of Stress : Not at all   Social Connections: Moderately Isolated    Frequency of Communication with Friends and Family: Three times a week    Frequency of Social Gatherings with Friends and Family: Twice a week    Attends [employer] Services: Never    Active Member of Clubs or Organizations: No    Attends Club or Organization Meetings: Never    Marital Status:    Housing Stability: Low Risk     Unable to Pay for Housing in the Last Year: No    Number of Places Lived in the Last Year: 1    Unstable Housing in the Last Year: No       Chief Complaint:   No chief complaint on file.      History of present illness:  This is a 77-year-old female seen for bilateral shoulder pain.  Patient has had pain for years. Got  injections in both of her shoulders in the subacromial space.  Patient has had an MRI done previously on the left shoulder which showed some rotator cuff tendinopathy and a small tear.  She used to get injections about every 4-6 months.   Shoulders have gotten worse over the last month.  Pain at night particularly.  Right shoulder is the worst of the 2. Still has decent range of motion, pain is her primary complaint.  Right knee is hurting her as well.  History of knee arthritis.      Review of Systems:    Constitution: Negative for chills, fever, and sweats.  Negative for unexplained weight loss.    HENT:  Negative for headaches and blurry vision.    Cardiovascular:Negative for chest pain or irregular heart beat. Negative for hypertension.    Respiratory:  Negative for cough and shortness of breath.    Gastrointestinal: Negative for abdominal pain, heartburn, melena, nausea, and vomitting.    Genitourinary:  Negative bladder incontinence and dysuria.    Musculoskeletal:  See HPI    Neurological: Negative for numbness.    Psychiatric/Behavioral: Negative for depression.  The patient is not nervous/anxious.      Endocrine: Negative for polyuria    Hematologic/Lymphatic: Negative for bleeding problem.  Does not bruise/bleed easily.    Skin: Negative for poor would healing and rash      Physical Examination:    Vital Signs:  There were no vitals filed for this visit.    There is no height or weight on file to calculate BMI.    This a well-developed, well nourished patient in no acute distress.  They are alert and oriented and cooperative to examination.  Pt. walks without an antalgic gait.      Examination of bilateral shoulders shows no rashes or erythema. There are no masses, ecchymosis, or atrophy. The patient has full range of motion in forward flexion, external rotation, and internal rotation to the mid T-spine. The patient has positive Harrington test.  Positive Neer - [place]'s test. - Speeds test. Nontender to  palpation over a.c. joint. Normal stability anteriorly, posteriorly, and negative sulcus sign. Passive range of motion: Forward flexion of 180°, external rotation at 90° of 90°, internal rotation of 50°, and external rotation at 0° of 50°. 2+ radial pulse. Intact axillary, radial, median and ulnar sensation.  4+ out of 5 resisted forward flexion, external rotation, and negative lift off test.      X-rays:  X-rays of both shoulders are reviewed today which show some arthritic change particularly on the right.  Small humeral spur.  Some sclerosis of the right greater tuberosity.  More normal-appearing left shoulder.    MRI of the left shoulder is available for review and interpret today:Findings of rotator cuff tendinopathy and suggesting small full-thickness tear rotator cuff tendon.   Degenerative changes     Assessment::  Bilateral rotator cuff syndrome with small tearing  Some underlying right shoulder arthritis  Right knee arthritis    Plan:  I reviewed the findings with her today.  I agreed to inject her right shoulder and knee today.  Patient is not interested in surgical treatment at this time.     This note was created using "[employer] voice recognition software that occasionally misinterpreted phrases or words.    Consult note is delivered via Epic messaging service.

## 2023-02-23 NOTE — PROCEDURES
Large Joint Aspiration/Injection: R knee    Date/Time: 2/23/2023 2:15 PM  Performed by: Caleb Reyes MD  Authorized by: Caleb Reyes MD     Consent Done?:  Yes (Verbal)  Indications:  Pain  Site marked: the procedure site was marked    Timeout: prior to procedure the correct patient, procedure, and site was verified    Local anesthetic:  Lidocaine 1% without epinephrine and bupivacaine 0.25% without epinephrine  Anesthetic total (ml):  6      Details:  Needle Size:  20 G  Approach:  Anterolateral  Location:  Knee  Site:  R knee  Medications:  40 mg triamcinolone acetonide 40 mg/mL  Patient tolerance:  Patient tolerated the procedure well with no immediate complications

## 2023-02-23 NOTE — PROCEDURES
Large Joint Aspiration/Injection: R subacromial bursa    Date/Time: 2/23/2023 2:15 PM  Performed by: Caleb Reyes MD  Authorized by: Caleb Reyes MD     Consent Done?:  Yes (Verbal)  Indications:  Pain  Site marked: the procedure site was marked    Timeout: prior to procedure the correct patient, procedure, and site was verified    Local anesthetic:  Lidocaine 1% without epinephrine and bupivacaine 0.25% without epinephrine  Anesthetic total (ml):  6      Details:  Needle Size:  20 G  Ultrasonic Guidance for needle placement?: No    Approach:  Posterior  Location:  Shoulder  Site:  R subacromial bursa  Medications:  40 mg triamcinolone acetonide 40 mg/mL  Patient tolerance:  Patient tolerated the procedure well with no immediate complications

## 2023-02-24 DIAGNOSIS — I25.119 CORONARY ARTERY DISEASE INVOLVING NATIVE CORONARY ARTERY OF NATIVE HEART WITH ANGINA PECTORIS: ICD-10-CM

## 2023-02-24 NOTE — TELEPHONE ENCOUNTER
Care Due:                  Date            Visit Type   Department     Provider  --------------------------------------------------------------------------------                                EP -                              PRIMARY      [facility]  Last Visit: 08-      CARE ([facility])   PRACTICE       Wally Cazares                              EP -                              PRIMARY      [facility]  Next Visit: 08-      CARE ([facility])   PRACTICE       Wally Cazares                                                            Last  Test          Frequency    Reason                     Performed    Due Date  --------------------------------------------------------------------------------    CMP.........  12 months..  rosuvastatin.............  05- 05-    Lipid Panel.  12 months..  rosuvastatin.............  05- 05-    [facility] Embedded Care Gaps. Reference number: [id number]. 2/24/2023   12:43:52 PM CST

## 2023-02-25 NOTE — TELEPHONE ENCOUNTER
Refill Routing Note   Medication(s) are not appropriate for processing by [facility] for the following reason(s):       Due for refill >6 months ago    [facility] action(s):  Defer    Medication Therapy Plan: Nitrostat 0.4 mg #25 with 3 refills order 4/6/21. Rx has not been filled in the past 6 months per claims data. FLOS 5/25/22    Appointments  past 12m or future 3m with PCP    Date Provider   Last Visit   8/17/2022 Wally Cazares MD   Next Visit   8/18/2023 Wally Cazares MD   ED visits in past 90 days: 0        Note composed:7:39 PM 02/24/2023

## 2023-02-27 RX ORDER — NITROGLYCERIN 0.4 MG/1
TABLET SUBLINGUAL
Qty: 25 TABLET | Refills: 5 | Status: SHIPPED | OUTPATIENT
Start: 2023-02-27

## 2023-03-02 ENCOUNTER — TELEPHONE (OUTPATIENT)
Dept: FAMILY MEDICINE | Facility: CLINIC | Age: 78
End: 2023-03-02
Payer: MEDICARE

## 2023-03-02 NOTE — TELEPHONE ENCOUNTER
----- Message from Ginradha Paris sent at 3/2/2023  3:16 PM CST -----  Contact: MARGO RANGEL    Type: Needs Medical Advice    Who Called:  MARGO RANGEL Call Back Number: [phone] (home)     Pharmacy:   [facility]- [place], [place] - [address] [address]  Phone: [phone] Fax: [phone]        Additional Information: Patient is calling office to speak with nurse/MA regarding prior authorization (KLCB ketoprofen 10%- LIDOcaine 5%- cyclobenzaprine 2%- baclofen 2% in transdermal cream )  Please call back and advise. Thanks

## 2023-03-02 NOTE — TELEPHONE ENCOUNTER
Patient notified I have faxed prior authorization form to [facility]. I will let her know outcome.

## 2023-03-06 ENCOUNTER — TELEPHONE (OUTPATIENT)
Dept: FAMILY MEDICINE | Facility: CLINIC | Age: 78
End: 2023-03-06
Payer: MEDICARE

## 2023-03-06 NOTE — TELEPHONE ENCOUNTER
Patient notified KLCB cream is a compound and not covered on her insurance. Please send it to [facility]. Patient has been paying cash for this for several years.

## 2023-03-06 NOTE — TELEPHONE ENCOUNTER
----- Message from Madonna Mckeon sent at 3/6/2023  2:11 PM CST -----  Contact: CHICO isabel  Type:  Needs Medical Advice    Who Called: From Yesy  Would the patient rather a call back or a response via [facility]? call  Best Call Back Number: [phone] ext [phone]  Additional Information: States they need to verify Rx KLCB ketoprofen 10%- LIDOcaine 5%- cyclobenzaprine 2%- baclofen 2% in transdermal cream. Please advise thank you

## 2023-03-07 NOTE — TELEPHONE ENCOUNTER
Compounded medications cannot be sent by [facility].  I set it for print and it did not by the time I left the office.

## 2023-04-10 ENCOUNTER — TELEPHONE (OUTPATIENT)
Dept: ENDOCRINOLOGY | Facility: CLINIC | Age: 78
End: 2023-04-10
Payer: MEDICARE

## 2023-04-10 NOTE — TELEPHONE ENCOUNTER
----- Message from Bonita Garcia sent at 4/10/2023 12:20 PM CDT -----  Name of Who is Calling:MARGO RANGEL [[id number]]       What is the request in detail: pt stated that manufacture of below rx does not have it anymore, pt stated that she was due for her weekly shot on today, please assist with this matter   tirzepatide (MOUNJARO) 7.5 mg/0.5 mL PnIj       Can the clinic reply by [facility]: no       What [facility] Back [facility]:[phone]

## 2023-04-11 ENCOUNTER — TELEPHONE (OUTPATIENT)
Dept: ENDOCRINOLOGY | Facility: CLINIC | Age: 78
End: 2023-04-11
Payer: MEDICARE

## 2023-04-11 DIAGNOSIS — E11.22 TYPE 2 DIABETES MELLITUS WITH STAGE 3B CHRONIC KIDNEY DISEASE, WITH LONG-TERM CURRENT USE OF INSULIN: Primary | ICD-10-CM

## 2023-04-11 DIAGNOSIS — Z79.4 TYPE 2 DIABETES MELLITUS WITH STAGE 3B CHRONIC KIDNEY DISEASE, WITH LONG-TERM CURRENT USE OF INSULIN: Primary | ICD-10-CM

## 2023-04-11 DIAGNOSIS — N18.32 TYPE 2 DIABETES MELLITUS WITH STAGE 3B CHRONIC KIDNEY DISEASE, WITH LONG-TERM CURRENT USE OF INSULIN: Primary | ICD-10-CM

## 2023-04-11 RX ORDER — TIRZEPATIDE 5 MG/.5ML
5 INJECTION, SOLUTION SUBCUTANEOUS
Qty: 4 PEN | Refills: 6 | Status: SHIPPED | OUTPATIENT
Start: 2023-04-11 | End: 2023-10-31

## 2023-04-11 NOTE — TELEPHONE ENCOUNTER
The Mounjaro 5 mg pen is much more available. If she is open to changing the dose, I can send the 5 mg pen to her pharmacy and we can see how her blood sugars respond.  Alternative option would be changing to Ozempic weekly.    Please let me know how she'd like to proceed.

## 2023-04-11 NOTE — TELEPHONE ENCOUNTER
----- Message from Maco Chase sent at 4/11/2023 11:08 AM CDT -----  Contact: self  Type:  Sooner Appointment Request    Caller is requesting a sooner appointment.  Caller declined first available appointment listed below.  Caller will not accept being placed on the waitlist and is requesting a message be sent to doctor.    Name of Caller:  pt  When is the first available appointment?  6/1  Symptoms:  t2d  Best Call Back Number:  [phone]    Additional Information:  Pt called to get an earlier appt and labs  Thank you

## 2023-04-11 NOTE — TELEPHONE ENCOUNTER
Pt states she wants Mounjaro 5mg to be send to [facility] [place]

## 2023-04-11 NOTE — TELEPHONE ENCOUNTER
Pt would like to change to something else since having so much trouble getting mounjaro d/t backorder.  Please advise.

## 2023-04-12 ENCOUNTER — PATIENT MESSAGE (OUTPATIENT)
Dept: ADMINISTRATIVE | Facility: HOSPITAL | Age: 78
End: 2023-04-12
Payer: MEDICARE

## 2023-05-01 ENCOUNTER — PES CALL (OUTPATIENT)
Dept: ADMINISTRATIVE | Facility: CLINIC | Age: 78
End: 2023-05-01
Payer: MEDICARE

## 2023-05-02 ENCOUNTER — PATIENT MESSAGE (OUTPATIENT)
Dept: FAMILY MEDICINE | Facility: CLINIC | Age: 78
End: 2023-05-02
Payer: MEDICARE

## 2023-05-02 ENCOUNTER — TELEPHONE (OUTPATIENT)
Dept: FAMILY MEDICINE | Facility: CLINIC | Age: 78
End: 2023-05-02
Payer: MEDICARE

## 2023-05-23 ENCOUNTER — OFFICE VISIT (OUTPATIENT)
Dept: CARDIOLOGY | Facility: CLINIC | Age: 78
End: 2023-05-23
Payer: MEDICARE

## 2023-05-23 VITALS
SYSTOLIC BLOOD PRESSURE: 139 MMHG | HEIGHT: 61 IN | BODY MASS INDEX: 31.51 KG/M2 | DIASTOLIC BLOOD PRESSURE: 74 MMHG | HEART RATE: 73 BPM | WEIGHT: 166.88 LBS

## 2023-05-23 DIAGNOSIS — E78.5 HYPERLIPIDEMIA WITH TARGET LDL LESS THAN 70: ICD-10-CM

## 2023-05-23 DIAGNOSIS — I15.2 HYPERTENSION ASSOCIATED WITH DIABETES: ICD-10-CM

## 2023-05-23 DIAGNOSIS — I48.0 PAF (PAROXYSMAL ATRIAL FIBRILLATION): ICD-10-CM

## 2023-05-23 DIAGNOSIS — Z95.1 S/P CABG (CORONARY ARTERY BYPASS GRAFT): Primary | ICD-10-CM

## 2023-05-23 DIAGNOSIS — E11.59 HYPERTENSION ASSOCIATED WITH DIABETES: ICD-10-CM

## 2023-05-23 DIAGNOSIS — I20.89 STABLE ANGINA: ICD-10-CM

## 2023-05-23 PROCEDURE — 99214 PR OFFICE/OUTPT VISIT, EST, LEVL IV, 30-39 MIN: ICD-10-PCS | Mod: S$GLB,,, | Performed by: INTERNAL MEDICINE

## 2023-05-23 PROCEDURE — 99999 PR PBB SHADOW E&M-EST. PATIENT-LVL IV: ICD-10-PCS | Mod: PBBFAC,,, | Performed by: INTERNAL MEDICINE

## 2023-05-23 PROCEDURE — 1126F AMNT PAIN NOTED NONE PRSNT: CPT | Mod: CPTII,S$GLB,, | Performed by: INTERNAL MEDICINE

## 2023-05-23 PROCEDURE — 3075F PR MOST RECENT SYSTOLIC BLOOD PRESS GE 130-139MM HG: ICD-10-PCS | Mod: CPTII,S$GLB,, | Performed by: INTERNAL MEDICINE

## 2023-05-23 PROCEDURE — 3078F DIAST BP <80 MM HG: CPT | Mod: CPTII,S$GLB,, | Performed by: INTERNAL MEDICINE

## 2023-05-23 PROCEDURE — 3288F FALL RISK ASSESSMENT DOCD: CPT | Mod: CPTII,S$GLB,, | Performed by: INTERNAL MEDICINE

## 2023-05-23 PROCEDURE — 1159F MED LIST DOCD IN RCRD: CPT | Mod: CPTII,S$GLB,, | Performed by: INTERNAL MEDICINE

## 2023-05-23 PROCEDURE — 99999 PR PBB SHADOW E&M-EST. PATIENT-LVL IV: CPT | Mod: PBBFAC,,, | Performed by: INTERNAL MEDICINE

## 2023-05-23 PROCEDURE — 3288F PR FALLS RISK ASSESSMENT DOCUMENTED: ICD-10-PCS | Mod: CPTII,S$GLB,, | Performed by: INTERNAL MEDICINE

## 2023-05-23 PROCEDURE — 3075F SYST BP GE 130 - 139MM HG: CPT | Mod: CPTII,S$GLB,, | Performed by: INTERNAL MEDICINE

## 2023-05-23 PROCEDURE — 99214 OFFICE O/P EST MOD 30 MIN: CPT | Mod: S$GLB,,, | Performed by: INTERNAL MEDICINE

## 2023-05-23 PROCEDURE — 93010 ELECTROCARDIOGRAM REPORT: CPT | Mod: S$GLB,,, | Performed by: INTERNAL MEDICINE

## 2023-05-23 PROCEDURE — 1101F PR PT FALLS ASSESS DOC 0-1 FALLS W/OUT INJ PAST YR: ICD-10-PCS | Mod: CPTII,S$GLB,, | Performed by: INTERNAL MEDICINE

## 2023-05-23 PROCEDURE — 93005 ELECTROCARDIOGRAM TRACING: CPT | Mod: PO

## 2023-05-23 PROCEDURE — 93010 EKG 12-LEAD: ICD-10-PCS | Mod: S$GLB,,, | Performed by: INTERNAL MEDICINE

## 2023-05-23 PROCEDURE — 3078F PR MOST RECENT DIASTOLIC BLOOD PRESSURE < 80 MM HG: ICD-10-PCS | Mod: CPTII,S$GLB,, | Performed by: INTERNAL MEDICINE

## 2023-05-23 PROCEDURE — 1160F RVW MEDS BY RX/DR IN RCRD: CPT | Mod: CPTII,S$GLB,, | Performed by: INTERNAL MEDICINE

## 2023-05-23 PROCEDURE — 1160F PR REVIEW ALL MEDS BY PRESCRIBER/CLIN PHARMACIST DOCUMENTED: ICD-10-PCS | Mod: CPTII,S$GLB,, | Performed by: INTERNAL MEDICINE

## 2023-05-23 PROCEDURE — 1159F PR MEDICATION LIST DOCUMENTED IN MEDICAL RECORD: ICD-10-PCS | Mod: CPTII,S$GLB,, | Performed by: INTERNAL MEDICINE

## 2023-05-23 PROCEDURE — 1101F PT FALLS ASSESS-DOCD LE1/YR: CPT | Mod: CPTII,S$GLB,, | Performed by: INTERNAL MEDICINE

## 2023-05-23 PROCEDURE — 1126F PR PAIN SEVERITY QUANTIFIED, NO PAIN PRESENT: ICD-10-PCS | Mod: CPTII,S$GLB,, | Performed by: INTERNAL MEDICINE

## 2023-05-23 NOTE — PROGRESS NOTES
Subjective:    Patient ID:  Adele Hall is a 77 y.o. female who presents for follow-up of Chest Pain      HPI  She comes with no major issues, occasional chest pain that she attributes to upset stomach or reflux that gets better with [address]      Review of Systems   Constitutional: Negative for decreased appetite, malaise/fatigue, weight gain and weight loss.   Cardiovascular:  Negative for chest pain, dyspnea on exertion, leg swelling, palpitations and syncope.   Respiratory:  Negative for cough and shortness of breath.    Gastrointestinal: Negative.    Neurological:  Negative for weakness.   All other systems reviewed and are negative.     Objective:      Physical Exam  Vitals and nursing note reviewed.   Constitutional:       Appearance: Normal appearance. She is well-developed.   HENT:      Head: Normocephalic.   Eyes:      Pupils: Pupils are equal, round, and reactive to light.   Neck:      Thyroid: No thyromegaly.      Vascular: No carotid bruit or JVD.   Cardiovascular:      Rate and Rhythm: Normal rate and regular rhythm.      Chest Wall: PMI is not displaced.      Pulses: Normal pulses and intact distal pulses.      Heart sounds: Normal heart sounds. No murmur heard.    No gallop.   Pulmonary:      Effort: Pulmonary effort is normal.      Breath sounds: Normal breath sounds.   Abdominal:      Palpations: Abdomen is soft. There is no mass.      Tenderness: There is no abdominal tenderness.   Musculoskeletal:         General: Normal range of motion.      Cervical back: Normal range of motion and neck supple.   Skin:     General: Skin is warm.   Neurological:      Mental Status: She is alert and oriented to person, place, and time.      Sensory: No sensory deficit.      Deep Tendon Reflexes: Reflexes are normal and symmetric.       EKG today reviewed.  No changes compared to previous ones    Assessment:       1. S/P CABG (coronary artery bypass graft) - x4 01/19/2016; LIMA to LAD; SVG's to  diag, OMB, PDA - all grafts patent 01/2018    2. Stable angina    3. PAF (paroxysmal atrial fibrillation), post CABG    4. Hypertension associated with diabetes    5. Hyperlipidemia with target LDL less than 70         Plan:     Continue all cardiac medications  Regular exercise program  Weight loss  1 year follow-up with dobutamine stress echo

## 2023-05-25 ENCOUNTER — LAB VISIT (OUTPATIENT)
Dept: LAB | Facility: HOSPITAL | Age: 78
End: 2023-05-25
Attending: NURSE PRACTITIONER
Payer: MEDICARE

## 2023-05-25 DIAGNOSIS — N18.32 TYPE 2 DIABETES MELLITUS WITH STAGE 3B CHRONIC KIDNEY DISEASE, WITH LONG-TERM CURRENT USE OF INSULIN: ICD-10-CM

## 2023-05-25 DIAGNOSIS — E11.22 TYPE 2 DIABETES MELLITUS WITH STAGE 3B CHRONIC KIDNEY DISEASE, WITH LONG-TERM CURRENT USE OF INSULIN: ICD-10-CM

## 2023-05-25 DIAGNOSIS — Z79.4 TYPE 2 DIABETES MELLITUS WITH STAGE 3B CHRONIC KIDNEY DISEASE, WITH LONG-TERM CURRENT USE OF INSULIN: ICD-10-CM

## 2023-05-25 DIAGNOSIS — E11.9 TYPE 2 DIABETES MELLITUS WITHOUT COMPLICATION: ICD-10-CM

## 2023-05-25 LAB
ALBUMIN SERPL BCP-MCNC: 3.5 G/DL (ref 3.5–5.2)
ALP SERPL-CCNC: 68 U/L (ref 55–135)
ALT SERPL W/O P-5'-P-CCNC: 18 U/L (ref 10–44)
ANION GAP SERPL CALC-SCNC: 11 MMOL/L (ref 8–16)
AST SERPL-CCNC: 21 U/L (ref 10–40)
BILIRUB SERPL-MCNC: 0.5 MG/DL (ref 0.1–1)
BUN SERPL-MCNC: 20 MG/DL (ref 8–23)
CALCIUM SERPL-MCNC: 9.8 MG/DL (ref 8.7–10.5)
CHLORIDE SERPL-SCNC: 101 MMOL/L (ref 95–110)
CHOLEST SERPL-MCNC: 124 MG/DL (ref 120–199)
CHOLEST/HDLC SERPL: 4.1 {RATIO} (ref 2–5)
CO2 SERPL-SCNC: 29 MMOL/L (ref 23–29)
CREAT SERPL-MCNC: 1.3 MG/DL (ref 0.5–1.4)
EST. GFR  (NO RACE VARIABLE): 42.9 ML/MIN/1.73 M^2
ESTIMATED AVG GLUCOSE: 114 MG/DL (ref 68–131)
GLUCOSE SERPL-MCNC: 126 MG/DL (ref 70–110)
HBA1C MFR BLD: 5.6 % (ref 4–5.6)
HDLC SERPL-MCNC: 30 MG/DL (ref 40–75)
HDLC SERPL: 24.2 % (ref 20–50)
LDLC SERPL CALC-MCNC: 39.6 MG/DL (ref 63–159)
NONHDLC SERPL-MCNC: 94 MG/DL
POTASSIUM SERPL-SCNC: 3.7 MMOL/L (ref 3.5–5.1)
PROT SERPL-MCNC: 6.8 G/DL (ref 6–8.4)
SODIUM SERPL-SCNC: 141 MMOL/L (ref 136–145)
TRIGL SERPL-MCNC: 272 MG/DL (ref 30–150)
TSH SERPL DL<=0.005 MIU/L-ACNC: 0.41 UIU/ML (ref 0.4–4)

## 2023-05-25 PROCEDURE — 83036 HEMOGLOBIN GLYCOSYLATED A1C: CPT | Performed by: NURSE PRACTITIONER

## 2023-05-25 PROCEDURE — 80053 COMPREHEN METABOLIC PANEL: CPT | Performed by: NURSE PRACTITIONER

## 2023-05-25 PROCEDURE — 36415 COLL VENOUS BLD VENIPUNCTURE: CPT | Mod: PO | Performed by: NURSE PRACTITIONER

## 2023-05-25 PROCEDURE — 84443 ASSAY THYROID STIM HORMONE: CPT | Performed by: NURSE PRACTITIONER

## 2023-05-25 PROCEDURE — 80061 LIPID PANEL: CPT | Performed by: NURSE PRACTITIONER

## 2023-05-26 ENCOUNTER — TELEPHONE (OUTPATIENT)
Dept: OPHTHALMOLOGY | Facility: CLINIC | Age: 78
End: 2023-05-26
Payer: MEDICARE

## 2023-05-26 NOTE — TELEPHONE ENCOUNTER
Lvm for pt to call back to schedule appt     ----- Message from Elizabeth Pryor sent at 5/26/2023  2:12 PM CDT -----  Type:  Sooner Appointment Request    Caller is requesting a sooner appointment.  Caller declined first available appointment listed below.  Caller will not accept being placed on the waitlist and is requesting a message be sent to doctor.    Name of Caller:  pt   When is the first available appointment?  Oct 24  Symptoms:  check up/ cant see   Best Call Back Number:  [phone] (home)     Additional Information:  requesting a call back and a appt asap please advise thank you

## 2023-05-29 ENCOUNTER — TELEPHONE (OUTPATIENT)
Dept: OPTOMETRY | Facility: CLINIC | Age: 78
End: 2023-05-29
Payer: MEDICARE

## 2023-05-29 NOTE — TELEPHONE ENCOUNTER
----- Message from Harriet Silver sent at 5/29/2023  3:54 PM CDT -----  Regarding: pt called  Name of Who is Calling: MARGO RANGEL [[id number]]      What is the request in detail: pt is requesting to speak with the office about an appt . Pt states her vision is blurry. Please advise       Can the clinic reply by [facility]: No      What Number to Call Back [facility]: [phone]

## 2023-05-29 NOTE — TELEPHONE ENCOUNTER
Spoke to patient scheduled soonest appt for annual eye exam. Patient okay with going to [facility]. Time date location and doctor confirmed.

## 2023-05-30 DIAGNOSIS — E78.2 MIXED HYPERLIPIDEMIA: ICD-10-CM

## 2023-05-30 RX ORDER — ROSUVASTATIN CALCIUM 40 MG/1
TABLET, COATED ORAL
Qty: 90 TABLET | Refills: 0 | Status: SHIPPED | OUTPATIENT
Start: 2023-05-30 | End: 2023-08-18 | Stop reason: SDUPTHER

## 2023-05-30 NOTE — TELEPHONE ENCOUNTER
Refill Routing Note   Medication(s) are not appropriate for processing by [facility] for the following reason(s):       Drug-disease interaction : rosuvastatin and NANCY (acute kidney injury)    [facility] action(s):  Defer      Medication Therapy Plan: FOV: 8/18/23    Appointments  past 12m or future 3m with PCP    Date Provider   Last Visit   8/17/2022 Wally Cazares MD   Next Visit   8/18/2023 Wally Cazares MD   ED visits in past 90 days: 0        Note composed:2:23 PM 05/30/2023

## 2023-05-30 NOTE — TELEPHONE ENCOUNTER
Refill Decision Note   Adele Hall  is requesting a refill authorization.  Brief Assessment and Rationale for Refill:  Approve     Medication Therapy Plan:  FOV: 8/18/23      Alert overridden per protocol: Yes   Pharmacist review requested: Yes   Comments:     No Care Gaps recommended.     Note composed:4:36 PM 05/30/2023

## 2023-05-30 NOTE — TELEPHONE ENCOUNTER
No care due was identified.  [facility] Embedded Care Due Messages. Reference number: [id number].   5/30/2023 9:12:39 AM CDT

## 2023-05-31 DIAGNOSIS — E11.9 TYPE 2 DIABETES MELLITUS WITHOUT COMPLICATION: ICD-10-CM

## 2023-06-01 ENCOUNTER — OFFICE VISIT (OUTPATIENT)
Dept: ENDOCRINOLOGY | Facility: CLINIC | Age: 78
End: 2023-06-01
Payer: MEDICARE

## 2023-06-01 VITALS
HEIGHT: 61 IN | HEART RATE: 73 BPM | WEIGHT: 168.13 LBS | BODY MASS INDEX: 31.74 KG/M2 | SYSTOLIC BLOOD PRESSURE: 110 MMHG | DIASTOLIC BLOOD PRESSURE: 70 MMHG

## 2023-06-01 DIAGNOSIS — I25.10 CORONARY ARTERY DISEASE INVOLVING NATIVE CORONARY ARTERY OF NATIVE HEART WITHOUT ANGINA PECTORIS: ICD-10-CM

## 2023-06-01 DIAGNOSIS — E11.42 TYPE 2 DIABETES MELLITUS WITH DIABETIC POLYNEUROPATHY, WITH LONG-TERM CURRENT USE OF INSULIN: ICD-10-CM

## 2023-06-01 DIAGNOSIS — E78.5 HYPERLIPIDEMIA WITH TARGET LDL LESS THAN 70: ICD-10-CM

## 2023-06-01 DIAGNOSIS — Z79.4 TYPE 2 DIABETES MELLITUS WITH DIABETIC POLYNEUROPATHY, WITH LONG-TERM CURRENT USE OF INSULIN: ICD-10-CM

## 2023-06-01 DIAGNOSIS — N18.32 TYPE 2 DIABETES MELLITUS WITH STAGE 3B CHRONIC KIDNEY DISEASE, WITH LONG-TERM CURRENT USE OF INSULIN: Primary | ICD-10-CM

## 2023-06-01 DIAGNOSIS — Z79.4 TYPE 2 DIABETES MELLITUS WITH STAGE 3B CHRONIC KIDNEY DISEASE, WITH LONG-TERM CURRENT USE OF INSULIN: Primary | ICD-10-CM

## 2023-06-01 DIAGNOSIS — E11.22 TYPE 2 DIABETES MELLITUS WITH STAGE 3B CHRONIC KIDNEY DISEASE, WITH LONG-TERM CURRENT USE OF INSULIN: Primary | ICD-10-CM

## 2023-06-01 DIAGNOSIS — E07.9 THYROID DISEASE: ICD-10-CM

## 2023-06-01 DIAGNOSIS — I10 ESSENTIAL HYPERTENSION: ICD-10-CM

## 2023-06-01 LAB — GLUCOSE SERPL-MCNC: 104 MG/DL (ref 70–110)

## 2023-06-01 PROCEDURE — 3078F DIAST BP <80 MM HG: CPT | Mod: CPTII,S$GLB,, | Performed by: NURSE PRACTITIONER

## 2023-06-01 PROCEDURE — 99214 PR OFFICE/OUTPT VISIT, EST, LEVL IV, 30-39 MIN: ICD-10-PCS | Mod: S$GLB,,, | Performed by: NURSE PRACTITIONER

## 2023-06-01 PROCEDURE — 1126F AMNT PAIN NOTED NONE PRSNT: CPT | Mod: CPTII,S$GLB,, | Performed by: NURSE PRACTITIONER

## 2023-06-01 PROCEDURE — 3078F PR MOST RECENT DIASTOLIC BLOOD PRESSURE < 80 MM HG: ICD-10-PCS | Mod: CPTII,S$GLB,, | Performed by: NURSE PRACTITIONER

## 2023-06-01 PROCEDURE — 1160F PR REVIEW ALL MEDS BY PRESCRIBER/CLIN PHARMACIST DOCUMENTED: ICD-10-PCS | Mod: CPTII,S$GLB,, | Performed by: NURSE PRACTITIONER

## 2023-06-01 PROCEDURE — 3074F PR MOST RECENT SYSTOLIC BLOOD PRESSURE < 130 MM HG: ICD-10-PCS | Mod: CPTII,S$GLB,, | Performed by: NURSE PRACTITIONER

## 2023-06-01 PROCEDURE — 3044F PR MOST RECENT HEMOGLOBIN A1C LEVEL <7.0%: ICD-10-PCS | Mod: CPTII,S$GLB,, | Performed by: NURSE PRACTITIONER

## 2023-06-01 PROCEDURE — 1159F MED LIST DOCD IN RCRD: CPT | Mod: CPTII,S$GLB,, | Performed by: NURSE PRACTITIONER

## 2023-06-01 PROCEDURE — 82962 POCT GLUCOSE, HAND-HELD DEVICE: ICD-10-PCS | Mod: S$GLB,,, | Performed by: NURSE PRACTITIONER

## 2023-06-01 PROCEDURE — 1159F PR MEDICATION LIST DOCUMENTED IN MEDICAL RECORD: ICD-10-PCS | Mod: CPTII,S$GLB,, | Performed by: NURSE PRACTITIONER

## 2023-06-01 PROCEDURE — 99999 PR PBB SHADOW E&M-EST. PATIENT-LVL IV: ICD-10-PCS | Mod: PBBFAC,,, | Performed by: NURSE PRACTITIONER

## 2023-06-01 PROCEDURE — 3044F HG A1C LEVEL LT 7.0%: CPT | Mod: CPTII,S$GLB,, | Performed by: NURSE PRACTITIONER

## 2023-06-01 PROCEDURE — 99214 OFFICE O/P EST MOD 30 MIN: CPT | Mod: S$GLB,,, | Performed by: NURSE PRACTITIONER

## 2023-06-01 PROCEDURE — 1101F PR PT FALLS ASSESS DOC 0-1 FALLS W/OUT INJ PAST YR: ICD-10-PCS | Mod: CPTII,S$GLB,, | Performed by: NURSE PRACTITIONER

## 2023-06-01 PROCEDURE — 1126F PR PAIN SEVERITY QUANTIFIED, NO PAIN PRESENT: ICD-10-PCS | Mod: CPTII,S$GLB,, | Performed by: NURSE PRACTITIONER

## 2023-06-01 PROCEDURE — 3074F SYST BP LT 130 MM HG: CPT | Mod: CPTII,S$GLB,, | Performed by: NURSE PRACTITIONER

## 2023-06-01 PROCEDURE — 3288F FALL RISK ASSESSMENT DOCD: CPT | Mod: CPTII,S$GLB,, | Performed by: NURSE PRACTITIONER

## 2023-06-01 PROCEDURE — 82962 GLUCOSE BLOOD TEST: CPT | Mod: S$GLB,,, | Performed by: NURSE PRACTITIONER

## 2023-06-01 PROCEDURE — 3288F PR FALLS RISK ASSESSMENT DOCUMENTED: ICD-10-PCS | Mod: CPTII,S$GLB,, | Performed by: NURSE PRACTITIONER

## 2023-06-01 PROCEDURE — 1101F PT FALLS ASSESS-DOCD LE1/YR: CPT | Mod: CPTII,S$GLB,, | Performed by: NURSE PRACTITIONER

## 2023-06-01 PROCEDURE — 99999 PR PBB SHADOW E&M-EST. PATIENT-LVL IV: CPT | Mod: PBBFAC,,, | Performed by: NURSE PRACTITIONER

## 2023-06-01 PROCEDURE — 1160F RVW MEDS BY RX/DR IN RCRD: CPT | Mod: CPTII,S$GLB,, | Performed by: NURSE PRACTITIONER

## 2023-06-01 RX ORDER — INSULIN DEGLUDEC 200 U/ML
48 INJECTION, SOLUTION SUBCUTANEOUS DAILY
Qty: 3 PEN | Refills: 6
Start: 2023-06-01 | End: 2023-10-06

## 2023-06-01 NOTE — PROGRESS NOTES
CC: Ms. Adele Hall arrives today for management of Type 2 DM and review of chronic medical conditions, as listed in the visit diagnosis section of this encounter.     HPI: Ms. Adele Hall was diagnosed with Type 2 DM in ~ 2002. Metformin started at the time of diagnosis but was stopped d/t renal impairment, per PCP in [place]. Insulin was started ~ 2012. She has rotated between analog insulins and generic NPH and Regular insulins, due to cost. [employer] brand insulins, due to cost.  Denies FH of DM. She did have 1 ER admission after having BG of >700 in 2012, prior to starting insulin.   She has a dx of CAD - s/p CABG in 1/2016. Follows with Dr. Dick.  She follows with Dr. Davidson for CKD.    Patient was last seen by me in December. Trulicity was changed to Mounjaro in December.     She is very pleased with the better blood sugar control and weight loss.     BG monitoring 1x/day. Reports these range 140s.    Hypoglycemia: Denies   Symptoms: dizziness   Treatment: sweets    Missing Insulin/PO medication doses: No  Timing prandial insulin 5-15 minutes before meals: n/a    Dietary Habits: Eats 1 meal/day - usually late afternoon. Occasional snack. Avoids sugary beverages.       CURRENT DIABETIC MEDS: Mounjaro 5 mg weekly, Tresiba U200 58 units QAM  Vial or pen: pens  Glucometer type: True Metrix    Previous DM treatments:  Metformin  Victoza - $   NPH/Regular insulins  Novolog     Last Eye Exam: 10/2021, no DRPeter Has appt on Monday with Dr. Renteria   Last Podiatry Exam: never    REVIEW OF SYSTEMS  Constitutional: no c/o weakness, fatigue. + 6# weight loss.  Eyes: denies visual disturbances  Cardiac: no palpitations. Reports occasional chest pain. Sometimes takes nitroglycerin but this is sometimes relieved with Indu [place]. Not occurring now. Recently saw cardiology.  Respiratory: no cough, dyspnea   GI: no c/o nausea, abdominal pain. + chronic constipation with cramping. States that this isn't  "worse since starting Mounjaro.   Neuro: c/o paresthesias in feet. Uses compounded topical.   Skin: no lesions, rashes.   Endocrine: denies polyphagia, polydipsia, polyuria.      Personally reviewed Past Medical, Surgical, Social History.    Vital Signs  /70   Pulse 73   Ht 5' 1" (1.549 m)   Wt 76.2 kg (168 lb 1.6 oz)   BMI 31.76 kg/m²     Personally reviewed the below labs:    Hemoglobin A1C   Date Value Ref Range Status   05/25/2023 5.6 4.0 - 5.6 % Final     Comment:     ADA Screening Guidelines:  5.7-6.4%  Consistent with prediabetes  >or=6.5%  Consistent with diabetes    High levels of fetal hemoglobin interfere with the HbA1C  assay. Heterozygous hemoglobin variants (HbS, HgC, etc)do  not significantly interfere with this assay.   However, presence of multiple variants may affect accuracy.     11/16/2022 7.5 (H) 4.0 - 5.6 % Final     Comment:     ADA Screening Guidelines:  5.7-6.4%  Consistent with prediabetes  >or=6.5%  Consistent with diabetes    High levels of fetal hemoglobin interfere with the HbA1C  assay. Heterozygous hemoglobin variants (HbS, HgC, etc)do  not significantly interfere with this assay.   However, presence of multiple variants may affect accuracy.     08/18/2022 7.6 (H) 4.0 - 5.6 % Final     Comment:     ADA Screening Guidelines:  5.7-6.4%  Consistent with prediabetes  >or=6.5%  Consistent with diabetes    High levels of fetal hemoglobin interfere with the HbA1C  assay. Heterozygous hemoglobin variants (HbS, HgC, etc)do  not significantly interfere with this assay.   However, presence of multiple variants may affect accuracy.         Chemistry        Component Value Date/Time     05/25/2023 1115    K 3.7 05/25/2023 1115     05/25/2023 1115    CO2 29 05/25/2023 1115    BUN 20 05/25/2023 1115    CREATININE 1.3 05/25/2023 1115     (H) 05/25/2023 1115        Component Value Date/Time    CALCIUM 9.8 05/25/2023 1115    ALKPHOS 68 05/25/2023 1115    AST 21 05/25/2023 " 1115    AST 45 (H) 01/30/2016 0431    ALT 18 05/25/2023 1115    BILITOT 0.5 05/25/2023 1115          Lab Results   Component Value Date    CHOL 124 05/25/2023    CHOL 128 05/12/2022    CHOL 127 04/27/2021     Lab Results   Component Value Date    HDL 30 (L) 05/25/2023    HDL 36 (L) 05/12/2022    HDL 28 (L) 04/27/2021     Lab Results   Component Value Date    LDLCALC 39.6 (L) 05/25/2023    LDLCALC 28.6 (L) 05/12/2022    LDLCALC 29.6 (L) 04/27/2021     Lab Results   Component Value Date    TRIG 272 (H) 05/25/2023    TRIG 317 (H) 05/12/2022    TRIG 347 (H) 04/27/2021     Lab Results   Component Value Date    CHOLHDL 24.2 05/25/2023    CHOLHDL 28.1 05/12/2022    CHOLHDL 22.0 04/27/2021       Lab Results   Component Value Date    MICALBCREAT 105.8 (H) 10/21/2021     Lab Results   Component Value Date    TSH 0.410 05/25/2023       CrCl cannot be calculated (Patient's most recent lab result is older than the maximum 7 days allowed.).    Vit D, 25-Hydroxy   Date Value Ref Range Status   05/12/2022 35 30 - 96 ng/mL Final     Comment:     Vitamin D deficiency.........<10 ng/mL                              Vitamin D insufficiency......10-29 ng/mL       Vitamin D sufficiency........> or equal to 30 ng/mL  Vitamin D toxicity............>100 ng/mL         August 2021 Professional CGMS review: Fasting glucoses are reasonable. Prandial excursions noted in mid 200s range.  One episode of borderline hypoglycemia with unknown precipitant. Eating 2 meals/day on average with snacks.   Average glucose: 193 mg/dL  Above 250 mg/dL: 13 %  181-250 mg/dL: 44 %   mg/dL: 42 %  54-69 mg/dL: <1 %  Below 54 mg/dL: 0 %        PHYSICAL EXAMINATION  Constitutional: Appears well, no distress  Respiratory: CTA, even and unlabored.  Cardiovascular: RRR, no murmurs, no carotid bruits.   GI: normal bowel sounds, no hernia  Skin: warm and dry  Neuro: oriented to person, place, time.   Feet: appropriate footwear.        Goals        HEMOGLOBIN A1C <  7.5                 Assessment/Plan  1. Type 2 diabetes mellitus with stage 3 chronic kidney disease, with long-term current use of insulin  -- A1c is tightly controlled but denies hypoglycemia.   -- decrease Tresiba to 48 units.  -- continue Mounjaro  -- check BG 1x/day and notify me if often >150  -- following with nephrology    -- Discussed diagnosis of DM, A1c goals, progression of disease, long term complications and tx options.   -- Reviewed hypoglycemia management: treat with 1/2 glass of juice, 1/2 can regular coke, or 4 glucose tablets. Monitor and repeat treatment every 15 minutes until BG is >70 Then have a snack, which includes a complex carbohydrate and protein.   Advised p  atient to check BG before activities, such as driving or exercise.    -- takes aspirin, statin, ace-I   2. Type 2 diabetes mellitus with diabetic polyneuropathy, with long-term current use of insulin  -- optimize DM control     3. Coronary artery disease involving native coronary artery of native heart without angina pectoris  -- avoid hypoglycemia   4. Essential hypertension  -- controlled  -- continue current meds   5. Hyperlipidemia with target LDL less than 70  -- Uncontrolled with elevated triglycerides but trending down.  -- Continue Crestor (Fenofibrate previously DC'd due to renal function)   6. Thyroid disease  -- controlled  -- continue levothyroxine 75 mcg daily       FOLLOW UP  Follow up in about 6 months (around 12/1/2023).   Patient instructed to bring BG logs to each follow up   Patient encouraged to call for any BG/medication issues, concerns, or questions.    Orders Placed This Encounter   Procedures    Hemoglobin A1C    Microalbumin/Creatinine Ratio, Urine    Comprehensive Metabolic Panel    POCT Glucose, Hand-Held Device

## 2023-06-01 NOTE — PATIENT INSTRUCTIONS
Decrease Tresiba to 48 units.     Continue Mounjaro.     Notify me if blood sugars are frequently >150.

## 2023-06-05 ENCOUNTER — PATIENT MESSAGE (OUTPATIENT)
Dept: ADMINISTRATIVE | Facility: HOSPITAL | Age: 78
End: 2023-06-05
Payer: MEDICARE

## 2023-06-05 ENCOUNTER — OFFICE VISIT (OUTPATIENT)
Dept: OPTOMETRY | Facility: CLINIC | Age: 78
End: 2023-06-05
Payer: MEDICARE

## 2023-06-05 DIAGNOSIS — E11.36 CATARACT ASSOCIATED WITH TYPE 2 DIABETES MELLITUS: ICD-10-CM

## 2023-06-05 DIAGNOSIS — E11.9 DIABETES MELLITUS TYPE 2 WITHOUT RETINOPATHY: Primary | ICD-10-CM

## 2023-06-05 DIAGNOSIS — H52.4 HYPEROPIA WITH PRESBYOPIA OF BOTH EYES: ICD-10-CM

## 2023-06-05 DIAGNOSIS — Z13.5 GLAUCOMA SCREENING: ICD-10-CM

## 2023-06-05 DIAGNOSIS — H52.03 HYPEROPIA WITH PRESBYOPIA OF BOTH EYES: ICD-10-CM

## 2023-06-05 DIAGNOSIS — H35.363 MACULAR DRUSEN, BILATERAL: ICD-10-CM

## 2023-06-05 PROCEDURE — 3288F PR FALLS RISK ASSESSMENT DOCUMENTED: ICD-10-PCS | Mod: CPTII,S$GLB,, | Performed by: OPTOMETRIST

## 2023-06-05 PROCEDURE — 92134 CPTRZ OPH DX IMG PST SGM RTA: CPT | Mod: S$GLB,,, | Performed by: OPTOMETRIST

## 2023-06-05 PROCEDURE — 99999 PR PBB SHADOW E&M-EST. PATIENT-LVL IV: CPT | Mod: PBBFAC,,, | Performed by: OPTOMETRIST

## 2023-06-05 PROCEDURE — 1101F PR PT FALLS ASSESS DOC 0-1 FALLS W/OUT INJ PAST YR: ICD-10-PCS | Mod: CPTII,S$GLB,, | Performed by: OPTOMETRIST

## 2023-06-05 PROCEDURE — 99999 PR PBB SHADOW E&M-EST. PATIENT-LVL IV: ICD-10-PCS | Mod: PBBFAC,,, | Performed by: OPTOMETRIST

## 2023-06-05 PROCEDURE — 1126F PR PAIN SEVERITY QUANTIFIED, NO PAIN PRESENT: ICD-10-PCS | Mod: CPTII,S$GLB,, | Performed by: OPTOMETRIST

## 2023-06-05 PROCEDURE — 3288F FALL RISK ASSESSMENT DOCD: CPT | Mod: CPTII,S$GLB,, | Performed by: OPTOMETRIST

## 2023-06-05 PROCEDURE — 1159F PR MEDICATION LIST DOCUMENTED IN MEDICAL RECORD: ICD-10-PCS | Mod: CPTII,S$GLB,, | Performed by: OPTOMETRIST

## 2023-06-05 PROCEDURE — 92004 PR EYE EXAM, NEW PATIENT,COMPREHESV: ICD-10-PCS | Mod: S$GLB,,, | Performed by: OPTOMETRIST

## 2023-06-05 PROCEDURE — 1159F MED LIST DOCD IN RCRD: CPT | Mod: CPTII,S$GLB,, | Performed by: OPTOMETRIST

## 2023-06-05 PROCEDURE — 92004 COMPRE OPH EXAM NEW PT 1/>: CPT | Mod: S$GLB,,, | Performed by: OPTOMETRIST

## 2023-06-05 PROCEDURE — 2023F PR DILATED RETINAL EXAM W/O EVID OF RETINOPATHY: ICD-10-PCS | Mod: CPTII,S$GLB,, | Performed by: OPTOMETRIST

## 2023-06-05 PROCEDURE — 1126F AMNT PAIN NOTED NONE PRSNT: CPT | Mod: CPTII,S$GLB,, | Performed by: OPTOMETRIST

## 2023-06-05 PROCEDURE — 1101F PT FALLS ASSESS-DOCD LE1/YR: CPT | Mod: CPTII,S$GLB,, | Performed by: OPTOMETRIST

## 2023-06-05 PROCEDURE — 2023F DILAT RTA XM W/O RTNOPTHY: CPT | Mod: CPTII,S$GLB,, | Performed by: OPTOMETRIST

## 2023-06-05 PROCEDURE — 92134 POSTERIOR SEGMENT OCT RETINA (OCULAR COHERENCE TOMOGRAPHY)-BOTH EYES: ICD-10-PCS | Mod: S$GLB,,, | Performed by: OPTOMETRIST

## 2023-06-05 NOTE — PROGRESS NOTES
HPI    Routine dm eye exam-dle-4/22    Pt complains of blurred va at distance and near. States glasses don't   work. Denies any flashes or floaters. BSL controlled. Complains of eyes   watering prn. Using margarita prn    Hemoglobin A1C       Date                     Value               Ref Range             Status                05/25/2023               5.6                 4.0 - 5.6 %           Final                      11/16/2022               7.5 (H)             4.0 - 5.6 %           Final                      08/18/2022               7.6 (H)             4.0 - 5.6 %           Final              C  Last edited by Susu Vu on 6/5/2023  4:26 PM.            Assessment /Plan     For exam results, see Encounter Report.    Diabetes mellitus type 2 without retinopathy    Cataract associated with type 2 diabetes mellitus  -     Ambulatory referral/consult to Ophthalmology; Future; Expected date: 06/12/2023    Macular drusen, bilateral  -     Posterior Segment OCT Retina-Both eyes    Glaucoma screening    Hyperopia with presbyopia of both eyes      No jose f/ no csme, gave Diabetic Retinopathy info, advise tight control glucose, BP---Advise annual dilated fundus exam  Vis sig NS w/ some myopic shift --ready to consider CE   Schedule 1st avail, but pt can travel to      3.   Early dry AMD OU   OCT 6/2023   Moderate central hard drusen OU w/ small PEDs     Home amsler   Will repeat OCT following CE     4.   Not suspect   5.   No new refraction // continue with current and/ or otc for near    Schedule consult

## 2023-06-05 NOTE — Clinical Note
Hi, I've scheduled Ms Hall in [place] in September, but she's willing to travel to  for cataracts if she can be seen sooner. Thanks Dr RODGERS

## 2023-06-05 NOTE — PATIENT INSTRUCTIONS
"DRY EYES -- BURNING OR CHRISTINE SYMPTOMS:  Use Over The Counter artificial tears as needed for dry eye symptoms.   Some common brands include:  Systane, Optive, Refresh, and Thera-Tears.  These drops can be used as frequently as desired, but may be most helpful use during long periods of concentrated work.  For example, reading / working at the computer. Start with 3-4x per day.     Nighttime Ophthalmic gel or ointments are available: Refresh PM, Genteal, and Lacrilube.    Avoid drops that "get redness out" (Visine, Murine, Clear Eyes), as these may contain medication that could further irritate the eyes, especially with chronic use.    ALLERGY EYES -- ITCHING SYMPTOMS:  Over the counter medications include--Pataday, Zaditor, and Alaway.  Use as directed 1-2 drops daily for symptoms of itching / watering eyes.  These drops will not help for dry eye or exposure symptoms.    REDNESS RELIEF:  Lumify---is a good redness reliever that will not cause irritation if used chronically.        FLASHES / FLOATERS / POSTERIOR VITREOUS DETACHMENT    Call the clinic if you have any further changes in symptoms.  Including:  Increased numbers of floaters or flashing lights, dimness or darkness that moves through or stays constant in your vision, or any pain in the eye (s).    You may sometimes see small specks or clouds moving in your field of vision.  They are called FLOATERS.  You can often see them when looking at a plain background, like a blank wall or blue casi.  Floaters are actually tiny clumps of gel or cells inside the VITREOUS, the clear jelly-like fluid that fills the inside of your eye.    While these objects look like they are in front of your eye, they are actually floating inside.  What you see are the shadows they cast on the RETINA, the nerve layer at the back of the eye that senses light and allows you to see.      POSTERIOR VITREOUS DETACHMENT    The appearance of new floaters may be alarming.  If you suddenly " develop new floaters, you should contact your eye care professional  right away.    The retina can tear if the shrinking vitreous pulls away from the wall of the eye.  This sometimes causes a small amount of bleeding in the eye that may appear as new floaters.    A torn retina is always a serious problem, since it can lead to a retinal detachment.  You should see your eye care professional as soon as possible if:    even one new floater appears suddenly;  you see sudden flashes of light;  you notice other symptoms, like the loss of side vision, or a curtain closes down in your vision        POSTERIOR VITREOUS DETACHMENT is more common for people who:    are nearsighted;  have had cataract surgery;  have had YAG laser surgery of the eye;  have had inflammation inside the eye;  are over age 60.      While some floaters may remain visible, many of them will fade over time and become less noticeable.  Even if you've had some floaters for years, you should have your eyes checked as soon as possible if you notice new ones.    FLASHING LIGHTS    When the vitreous gel rubs or pulls on the retina, you may see what look like flashing lights or lightning streaks.  These flashes can appear off and on for several weeks or months.      Some people experience flashes of light that appear as jagged lines or heat waves in both eyes, lasting 10-20 minutes.  These flashes are caused by a spasm of blood vessels in the brain, which is called a migraine.    If a headache follows these flashes, it's called a migraine headache.  If   no headache occurs, these flashes are called Ophthalmic or Ocular Migraine.               Cataract Surgery FAQs  Frequently Asked Questions    My doctor told me I have a cataract     What do I do next?   Relax. Cataracts are a normal part of aging, and cataract surgery is among the safest and most common procedures performed today.  Most patients who have had cataract surgery report significant  improvement in their quality of life because of their improved vision, with a speedy recovery and minimal discomfort or inconvenience.    Your optometrist will recommend a cataract surgeon who will examine your eyes, evaluate the need for surgery, and discuss the details with you and your family.     What exactly is a cataract?   A cataract is simply a darkening or clouding of the eyes internal lens, which blurs your vision.  It is not a film which grows over the eye, but rather a degenerative process which causes the eyes normally clear lens to become cloudy or hazy.     Because this degenerative process occurs slowly over many years, we generally wait until the cataract begins to significantly impact your vision, before recommend surgery.                     How is cataract surgery performed?  Cataract surgery involves removal of the dark or cloudy lens from the eye, and implantation of a new, clear lens implant or IOL.  Cataract surgery is a lens replacement surgery.          Modern cataract surgery is performed as a same-day surgery and typically takes about 15 minutes to complete.  It is performed while you are awake, but you will be medicated so that you are relaxed and comfortable. Of course, the eye is anesthetized so that you dont feel any discomfort, and many people only vaguely remember the actual procedure, recalling only lights and the sensation of moisture on the eye.     Although is takes about a week to fully heal, most people are able to see better and resume their normal activities the very next day!  You will need to use eye drops for about one month after your surgery.     Will I need glasses after cataract surgery?   The purpose of cataract surgery is to improve the overall quality of your vision, but it does not necessarily eliminate the need for glasses. Although some people dont need to wear glasses after standard cataract surgery, most people will still need to wear glasses for  certain tasks.  If you are interested in minimizing or even eliminating the need for glasses, you should ask your surgeon about Refractive Cataract Surgery.    What is Refractive Cataract Surgery?   Refractive Cataract Surgery refers to the use of additional techniques performed either at the time of your cataract surgery or soon afterwards, designed to minimize and often eliminate your need for glasses.  Technologies such as LASIK, Astigmatism Correcting Incisions, Multifocal, Accommodating, or Toric lens implants can all be used, depending on the particular needs of each patient. Only your surgeon can determine if you are a candidate and which techniques are appropriate for your goals and your eye.                         LASIK                Multifocal IOL         Will my insurance cover these additional procedures?   Although your insurance will fully cover your cataract surgery, any other additional procedures -designed solely to eliminate the need for glasses- are considered elective (not medically necessary), and therefore not covered by your insurance.  Rest assured that your vision will be better after cataract surgery, in either case.  You simply need to decide whether minimizing your dependence on glasses is worth the additional investment in your eyes.  (Costs typically range between $1,000 to $2,000 per eye, depending on your needs).    View a 1hr video discussing cataract surgery with live patient questions and answers on the [facility] Web Site (Keywords: [facility] Cat[facility]act):    [email or web address]

## 2023-06-07 ENCOUNTER — TELEPHONE (OUTPATIENT)
Dept: OPHTHALMOLOGY | Facility: CLINIC | Age: 78
End: 2023-06-07
Payer: MEDICARE

## 2023-06-09 DIAGNOSIS — E07.9 THYROID DISEASE: ICD-10-CM

## 2023-06-09 RX ORDER — LEVOTHYROXINE SODIUM 75 UG/1
TABLET ORAL
Qty: 30 TABLET | Refills: 11 | Status: SHIPPED | OUTPATIENT
Start: 2023-06-09

## 2023-06-21 RX ORDER — METOPROLOL TARTRATE 25 MG/1
TABLET, FILM COATED ORAL
Qty: 180 TABLET | Refills: 0 | Status: SHIPPED | OUTPATIENT
Start: 2023-06-21 | End: 2023-08-18 | Stop reason: SDUPTHER

## 2023-06-21 NOTE — TELEPHONE ENCOUNTER
No care due was identified.  [facility] Embedded Care Due Messages. Reference number: [id number].   6/21/2023 11:55:31 AM CDT

## 2023-06-21 NOTE — TELEPHONE ENCOUNTER
Refill Decision Note   Adele Hall  is requesting a refill authorization.  Brief Assessment and Rationale for Refill:  Approve     Medication Therapy Plan:       Medication Reconciliation Completed: No   Comments:     No Care Gaps recommended.     Note composed:12:50 PM 06/21/2023

## 2023-07-03 ENCOUNTER — TELEPHONE (OUTPATIENT)
Dept: OPHTHALMOLOGY | Facility: CLINIC | Age: 78
End: 2023-07-03
Payer: MEDICARE

## 2023-08-01 ENCOUNTER — TELEPHONE (OUTPATIENT)
Dept: OPHTHALMOLOGY | Facility: CLINIC | Age: 78
End: 2023-08-01
Payer: MEDICARE

## 2023-08-14 ENCOUNTER — OFFICE VISIT (OUTPATIENT)
Dept: ORTHOPEDICS | Facility: CLINIC | Age: 78
End: 2023-08-14
Payer: MEDICARE

## 2023-08-14 VITALS — WEIGHT: 168 LBS | BODY MASS INDEX: 31.72 KG/M2 | HEIGHT: 61 IN | RESPIRATION RATE: 18 BRPM

## 2023-08-14 DIAGNOSIS — M75.100 ROTATOR CUFF SYNDROME, UNSPECIFIED LATERALITY: ICD-10-CM

## 2023-08-14 DIAGNOSIS — M25.511 CHRONIC PAIN OF BOTH SHOULDERS: Primary | ICD-10-CM

## 2023-08-14 DIAGNOSIS — M25.512 CHRONIC PAIN OF BOTH SHOULDERS: Primary | ICD-10-CM

## 2023-08-14 DIAGNOSIS — M17.12 ARTHRITIS OF KNEE, LEFT: ICD-10-CM

## 2023-08-14 DIAGNOSIS — M25.512 LEFT SHOULDER PAIN, UNSPECIFIED CHRONICITY: ICD-10-CM

## 2023-08-14 DIAGNOSIS — G89.29 CHRONIC PAIN OF BOTH SHOULDERS: Primary | ICD-10-CM

## 2023-08-14 PROCEDURE — 1125F AMNT PAIN NOTED PAIN PRSNT: CPT | Mod: CPTII,S$GLB,, | Performed by: ORTHOPAEDIC SURGERY

## 2023-08-14 PROCEDURE — 1159F PR MEDICATION LIST DOCUMENTED IN MEDICAL RECORD: ICD-10-PCS | Mod: CPTII,S$GLB,, | Performed by: ORTHOPAEDIC SURGERY

## 2023-08-14 PROCEDURE — 1159F MED LIST DOCD IN RCRD: CPT | Mod: CPTII,S$GLB,, | Performed by: ORTHOPAEDIC SURGERY

## 2023-08-14 PROCEDURE — 99999 PR PBB SHADOW E&M-EST. PATIENT-LVL IV: ICD-10-PCS | Mod: PBBFAC,,, | Performed by: ORTHOPAEDIC SURGERY

## 2023-08-14 PROCEDURE — 20610 LARGE JOINT ASPIRATION/INJECTION: L KNEE: ICD-10-PCS | Mod: 50,S$GLB,, | Performed by: ORTHOPAEDIC SURGERY

## 2023-08-14 PROCEDURE — 1125F PR PAIN SEVERITY QUANTIFIED, PAIN PRESENT: ICD-10-PCS | Mod: CPTII,S$GLB,, | Performed by: ORTHOPAEDIC SURGERY

## 2023-08-14 PROCEDURE — 1101F PT FALLS ASSESS-DOCD LE1/YR: CPT | Mod: CPTII,S$GLB,, | Performed by: ORTHOPAEDIC SURGERY

## 2023-08-14 PROCEDURE — 20610 DRAIN/INJ JOINT/BURSA W/O US: CPT | Mod: 50,S$GLB,, | Performed by: ORTHOPAEDIC SURGERY

## 2023-08-14 PROCEDURE — 99214 OFFICE O/P EST MOD 30 MIN: CPT | Mod: 25,S$GLB,, | Performed by: ORTHOPAEDIC SURGERY

## 2023-08-14 PROCEDURE — 3288F PR FALLS RISK ASSESSMENT DOCUMENTED: ICD-10-PCS | Mod: CPTII,S$GLB,, | Performed by: ORTHOPAEDIC SURGERY

## 2023-08-14 PROCEDURE — 1101F PR PT FALLS ASSESS DOC 0-1 FALLS W/OUT INJ PAST YR: ICD-10-PCS | Mod: CPTII,S$GLB,, | Performed by: ORTHOPAEDIC SURGERY

## 2023-08-14 PROCEDURE — 99999 PR PBB SHADOW E&M-EST. PATIENT-LVL IV: CPT | Mod: PBBFAC,,, | Performed by: ORTHOPAEDIC SURGERY

## 2023-08-14 PROCEDURE — 99214 PR OFFICE/OUTPT VISIT, EST, LEVL IV, 30-39 MIN: ICD-10-PCS | Mod: 25,S$GLB,, | Performed by: ORTHOPAEDIC SURGERY

## 2023-08-14 PROCEDURE — 3288F FALL RISK ASSESSMENT DOCD: CPT | Mod: CPTII,S$GLB,, | Performed by: ORTHOPAEDIC SURGERY

## 2023-08-14 RX ORDER — TRIAMCINOLONE ACETONIDE 40 MG/ML
40 INJECTION, SUSPENSION INTRA-ARTICULAR; INTRAMUSCULAR
Status: DISCONTINUED | OUTPATIENT
Start: 2023-08-14 | End: 2023-08-14 | Stop reason: HOSPADM

## 2023-08-14 RX ADMIN — TRIAMCINOLONE ACETONIDE 40 MG: 40 INJECTION, SUSPENSION INTRA-ARTICULAR; INTRAMUSCULAR at 03:08

## 2023-08-14 NOTE — PROGRESS NOTES
Past Medical History:   Diagnosis Date    Anesthesia complication     took patient 6 days to come out of anethesia after CABG    Anticoagulant long-term use     Arthritis     Chronic kidney disease     CKD (chronic kidney disease) stage 3, GFR 30-59 ml/min     Dr. oMntero    COPD (chronic obstructive pulmonary disease) 2/7/2016    COPD (chronic obstructive pulmonary disease)     Coronary artery disease     Diabetes mellitus     Diabetes mellitus, type 2     GERD (gastroesophageal reflux disease)     Hx of colonic polyps     Hyperlipidemia     Hypertension     possible new onset CHF 7/30/2016    Tardive dyskinesia     Thyroid disease     Ulcer     Vertigo        Past Surgical History:   Procedure Laterality Date    CARDIAC SURGERY      CHOLECYSTECTOMY  01/26/2017    COLONOSCOPY      COLONOSCOPY N/A 12/7/2021    Procedure: COLONOSCOPY;  Surgeon: Lito Will MD;  Location: [facility];  Service: Endoscopy;  Laterality: N/A;    CORONARY ARTERY BYPASS GRAFT  01/19/2016    4 vessel    HYSTERECTOMY      OOPHORECTOMY      TONSILLECTOMY      TOTAL THYROIDECTOMY      TUBAL LIGATION         Current Outpatient Medications   Medication Sig    amLODIPine (NORVASC) 2.5 MG tablet TAKE ONE TABLET BY MOUTH ONCE A DAY    ammonium lactate (LAC-HYDRIN) 12 % lotion APPLY TO AFFECTED AREA AS NEEDED FOR DRY SKIN    aspirin (ECOTRIN) 81 MG EC tablet Take 81 mg by mouth once daily.    aspirin-sod bicarb-citric acid (ERICKA-SELTZER) 324 mg TbEF Take 325 mg by mouth every 6 (six) hours as needed.    BD INSULIN SYRINGE ULTRA-FINE 1 mL 31 gauge x 5/16 Syrg USE AS DIRECTED 5 TIMES A DAY WITH LEVEMIR & NOVOLOG    blood sugar diagnostic (TRUE METRIX GLUCOSE TEST STRIP) Strp Test blood sugar 3x/day    dicyclomine (BENTYL) 10 MG capsule     furosemide (LASIX) 20 MG tablet Take 1 tablet (20 mg total) by mouth once daily. (Patient not taking: Reported on 8/17/2022)    insulin degludec (TRESIBA FLEXTOUCH U-200) 200 unit/mL (3 mL) insulin pen Inject 48  "Units into the skin once daily.    KETOPROFEN TOP APPLY 1.6 GRAMS (1 PUMP) TO PAINFUL AREAS UP TO 5 TIMES DAILY. RUB IN WELL    [facility] ketoprofen 10%- LIDOcaine 5%- cyclobenzaprine 2%- baclofen 2% in transdermal cream Apply 1 to 2 grams 3 to 4 times daily (Patient not taking: Reported on 6/1/2023)    lancets 33 gauge Misc 1 lancet by Misc.(Non-Drug; Combo Route) route 4 (four) times daily.    levothyroxine (SYNTHROID) 75 MCG tablet TAKE ONE TABLET BY MOUTH EVERY MORNING BEFORE BREAKFAST    lisinopriL (PRINIVIL,ZESTRIL) 5 MG tablet TAKE ONE TABLET BY MOUTH TWO TIMES A DAY    magnesium oxide (MAG-OX) 400 mg (241.3 mg magnesium) tablet Take 1 tablet (400 mg total) by mouth once daily.    meclizine (ANTIVERT) 25 mg tablet Take 1 tablet (25 mg total) by mouth 3 (three) times daily as needed.    metoprolol tartrate (LOPRESSOR) 25 MG tablet TAKE ONE TABLET BY MOUTH TWO TIMES A DAY    nitroGLYCERIN (NITROSTAT) 0.4 MG SL tablet Place 1 tablet (0.4 mg total) under the tongue every 5 (five) minutes as needed for Chest pain. Seek medical help is pain is not relieved by the third dose.    oxyCODONE-acetaminophen (PERCOCET) 5-325 mg per tablet Take 1 tablet by mouth as needed for Pain. (Patient not taking: Reported on 6/1/2023)    pantoprazole (PROTONIX) 40 MG tablet TAKE ONE TABLET BY MOUTH ONCE A DAY    pen needle, diabetic (BD ULTRA-FINE YUMIKO PEN NEEDLE) 32 gauge x 5/32" Ndle USE FOUR TIMES A DAY WITH INSULIN    rosuvastatin (CRESTOR) 40 MG Tab TAKE ONE TABLET BY MOUTH EVERY EVENING    tirzepatide (MOUNJARO) 5 mg/0.5 mL PnIj Inject 5 mg into the skin every 7 days.    traZODone (DESYREL) 50 MG tablet TAKE ONE TABLET BY MOUTH EVERY NIGHT    vitamin D (VITAMIN D3) 1000 units Tab Take 1,000 Units by mouth once daily.     No current facility-administered medications for this visit.       Review of patient's allergies indicates:   Allergen Reactions    Reglan [metoclopramide hcl]      Depression and weight loss.        Family History "   Problem Relation Age of Onset    Cancer Mother         LYMPHOMA    Breast cancer Mother     Cancer Father     Early death Father     Heart disease Paternal Uncle     Alcohol abuse Sister         x2    Heart disease Brother     No Known Problems Son     Glaucoma Neg Hx     Macular degeneration Neg Hx        Social History     Socioeconomic History    Marital status:    Tobacco Use    Smoking status: Former     Current packs/day: 0.00     Average packs/day: 1 pack/day for 27.0 years (27.0 ttl pk-yrs)     Types: Cigarettes     Start date: 1989     Quit date: 2016     Years since quittin.5    Smokeless tobacco: Never   Substance and Sexual Activity    Alcohol use: No     Alcohol/week: 0.0 standard drinks of alcohol    Drug use: No    Sexual activity: Yes     Partners: Male     Social Determinants of Health     Financial Resource Strain: Low Risk  (2022)    Overall Financial Resource Strain (CARDIA)     Difficulty of Paying Living Expenses: Not hard at all   Food Insecurity: No Food Insecurity (2022)    Hunger Vital Sign     Worried About Running Out of Food in the Last Year: Never true     Ran Out of Food in the Last Year: Never true   Transportation Needs: No Transportation Needs (2022)    PRAPARE - Transportation     Lack of Transportation (Medical): No     Lack of Transportation (Non-Medical): No   Physical Activity: Inactive (2022)    Exercise Vital Sign     Days of Exercise per Week: 0 days     Minutes of Exercise per Session: 0 min   Stress: No Stress Concern Present (2022)    [place] [employer] - Occupational Stress Questionnaire     Feeling of Stress : Not at all   Social Connections: Moderately Isolated (2022)    Social Connection and Isolation Panel [NHANES]     Frequency of Communication with Friends and Family: Three times a week     Frequency of Social Gatherings with Friends and Family: Twice a week     Attends Yazidism Services: Never      Active Member of Clubs or Organizations: No     Attends Club or Organization Meetings: Never     Marital Status:    Housing Stability: Low Risk  (5/5/2022)    Housing Stability Vital Sign     Unable to Pay for Housing in the Last Year: No     Number of Places Lived in the Last Year: 1     Unstable Housing in the Last Year: No       Chief Complaint:   No chief complaint on file.      History of present illness:  This is a 78-year-old female seen for bilateral shoulder pain.  Patient has had pain for years. Got injections in both of her shoulders in the subacromial space.  Patient has had an MRI done previously on the left shoulder which showed some rotator cuff tendinopathy and a small tear.  She used to get injections about every 4-6 months.   Shoulders have gotten worse over the last month.  Pain at night particularly.  Right shoulder is the worst of the 2. Still has decent range of motion, pain is her primary complaint.  Also having some left knee pain.      Review of Systems:  Musculoskeletal:  See HPI    Physical Examination:    Vital Signs:  There were no vitals filed for this visit.    There is no height or weight on file to calculate BMI.    This a well-developed, well nourished patient in no acute distress.  They are alert and oriented and cooperative to examination.  Pt. walks without an antalgic gait.      Examination of bilateral shoulders shows no rashes or erythema. There are no masses, ecchymosis, or atrophy. The patient has full range of motion in forward flexion, external rotation, and internal rotation to the mid T-spine. The patient has positive Harrington test.  Positive Neer - Ashley's test. - Speeds test. Nontender to palpation over a.c. joint. Normal stability anteriorly, posteriorly, and negative sulcus sign. Passive range of motion: Forward flexion of 180°, external rotation at 90° of 90°, internal rotation of 50°, and external rotation at 0° of 50°. 2+ radial pulse. Intact axillary,  radial, median and ulnar sensation.  4+ out of 5 resisted forward flexion, external rotation, and negative lift off test.      X-rays:  X-rays of both shoulders are reviewed today which show some arthritic change particularly on the right.  Small humeral spur.  Some sclerosis of the right greater tuberosity.  More normal-appearing left shoulder.    MRI of the left shoulder is available for review and interpret today:Findings of rotator cuff tendinopathy and suggesting small full-thickness tear rotator cuff tendon.   Degenerative changes     Assessment::  Bilateral rotator cuff syndrome with small tearing  Some underlying right shoulder arthritis  Left knee arthritis      Plan:  I reviewed the findings with her today. Patient is not interested in surgical treatment at this time.  Agreed to inject her right shoulder and left knee again today.  Follow up as needed.    This note was created using [employer] voice recognition software that occasionally misinterpreted phrases or words.    Consult note is delivered via Epic messaging service.

## 2023-08-14 NOTE — PROCEDURES
Large Joint Aspiration/Injection: R subacromial bursa    Date/Time: 8/14/2023 3:00 PM    Performed by: Caleb Reyes MD  Authorized by: Caleb Reyes MD    Consent Done?:  Yes (Verbal)  Indications:  Pain  Site marked: the procedure site was marked    Timeout: prior to procedure the correct patient, procedure, and site was verified    Local anesthetic: Ropivicaine.  Anesthetic total (ml):  3      Details:  Needle Size:  20 G  Ultrasonic Guidance for needle placement?: No    Approach:  Posterior  Location:  Shoulder  Site:  R subacromial bursa  Medications:  40 mg triamcinolone acetonide 40 mg/mL  Patient tolerance:  Patient tolerated the procedure well with no immediate complications

## 2023-08-14 NOTE — PROCEDURES
Large Joint Aspiration/Injection: L knee    Date/Time: 8/14/2023 3:00 PM    Performed by: Caleb Reyes MD  Authorized by: Caleb Reyes MD    Consent Done?:  Yes (Verbal)  Indications:  Pain  Site marked: the procedure site was marked    Timeout: prior to procedure the correct patient, procedure, and site was verified    Local anesthetic:  Lidocaine 1% without epinephrine and bupivacaine 0.25% without epinephrine  Anesthetic total (ml):  6      Details:  Needle Size:  20 G  Approach:  Anterolateral  Location:  Knee  Site:  L knee  Medications:  40 mg triamcinolone acetonide 40 mg/mL  Patient tolerance:  Patient tolerated the procedure well with no immediate complications

## 2023-08-18 ENCOUNTER — OFFICE VISIT (OUTPATIENT)
Dept: FAMILY MEDICINE | Facility: CLINIC | Age: 78
End: 2023-08-18
Attending: FAMILY MEDICINE
Payer: MEDICARE

## 2023-08-18 VITALS
SYSTOLIC BLOOD PRESSURE: 104 MMHG | HEART RATE: 59 BPM | TEMPERATURE: 98 F | RESPIRATION RATE: 17 BRPM | BODY MASS INDEX: 29.72 KG/M2 | OXYGEN SATURATION: 96 % | HEIGHT: 61 IN | WEIGHT: 157.44 LBS | DIASTOLIC BLOOD PRESSURE: 69 MMHG

## 2023-08-18 DIAGNOSIS — Z95.1 HX OF CABG: ICD-10-CM

## 2023-08-18 DIAGNOSIS — E11.59 HYPERTENSION ASSOCIATED WITH DIABETES: ICD-10-CM

## 2023-08-18 DIAGNOSIS — I70.0 ATHEROSCLEROSIS OF AORTA: ICD-10-CM

## 2023-08-18 DIAGNOSIS — E11.69 DYSLIPIDEMIA ASSOCIATED WITH TYPE 2 DIABETES MELLITUS: ICD-10-CM

## 2023-08-18 DIAGNOSIS — Z79.4 TYPE 2 DIABETES MELLITUS WITH DIABETIC POLYNEUROPATHY, WITH LONG-TERM CURRENT USE OF INSULIN: ICD-10-CM

## 2023-08-18 DIAGNOSIS — L72.3 SEBACEOUS CYST: ICD-10-CM

## 2023-08-18 DIAGNOSIS — F51.04 CHRONIC INSOMNIA: ICD-10-CM

## 2023-08-18 DIAGNOSIS — E11.21 DIABETIC NEPHROPATHY ASSOCIATED WITH TYPE 2 DIABETES MELLITUS: ICD-10-CM

## 2023-08-18 DIAGNOSIS — Z00.00 ENCOUNTER FOR PREVENTIVE HEALTH EXAMINATION: Primary | ICD-10-CM

## 2023-08-18 DIAGNOSIS — J44.9 CHRONIC OBSTRUCTIVE PULMONARY DISEASE, UNSPECIFIED COPD TYPE: ICD-10-CM

## 2023-08-18 DIAGNOSIS — I50.32 CHRONIC DIASTOLIC CONGESTIVE HEART FAILURE: ICD-10-CM

## 2023-08-18 DIAGNOSIS — E78.5 DYSLIPIDEMIA ASSOCIATED WITH TYPE 2 DIABETES MELLITUS: ICD-10-CM

## 2023-08-18 DIAGNOSIS — E11.42 TYPE 2 DIABETES MELLITUS WITH DIABETIC POLYNEUROPATHY, WITH LONG-TERM CURRENT USE OF INSULIN: ICD-10-CM

## 2023-08-18 DIAGNOSIS — I48.0 PAF (PAROXYSMAL ATRIAL FIBRILLATION): ICD-10-CM

## 2023-08-18 DIAGNOSIS — I15.2 HYPERTENSION ASSOCIATED WITH DIABETES: ICD-10-CM

## 2023-08-18 DIAGNOSIS — K21.9 GASTROESOPHAGEAL REFLUX DISEASE, UNSPECIFIED WHETHER ESOPHAGITIS PRESENT: ICD-10-CM

## 2023-08-18 DIAGNOSIS — N25.81 SECONDARY HYPERPARATHYROIDISM OF RENAL ORIGIN: ICD-10-CM

## 2023-08-18 DIAGNOSIS — I25.10 CORONARY ARTERY DISEASE INVOLVING NATIVE CORONARY ARTERY OF NATIVE HEART WITHOUT ANGINA PECTORIS: ICD-10-CM

## 2023-08-18 PROBLEM — I50.33 ACUTE ON CHRONIC DIASTOLIC HEART FAILURE: Status: RESOLVED | Noted: 2021-03-07 | Resolved: 2023-08-18

## 2023-08-18 PROCEDURE — 99397 PER PM REEVAL EST PAT 65+ YR: CPT | Mod: S$GLB,,, | Performed by: FAMILY MEDICINE

## 2023-08-18 PROCEDURE — 99999 PR PBB SHADOW E&M-EST. PATIENT-LVL III: CPT | Mod: PBBFAC,,, | Performed by: FAMILY MEDICINE

## 2023-08-18 PROCEDURE — 3078F PR MOST RECENT DIASTOLIC BLOOD PRESSURE < 80 MM HG: ICD-10-PCS | Mod: CPTII,S$GLB,, | Performed by: FAMILY MEDICINE

## 2023-08-18 PROCEDURE — 1126F PR PAIN SEVERITY QUANTIFIED, NO PAIN PRESENT: ICD-10-PCS | Mod: CPTII,S$GLB,, | Performed by: FAMILY MEDICINE

## 2023-08-18 PROCEDURE — 99397 PR PREVENTIVE VISIT,EST,65 & OVER: ICD-10-PCS | Mod: S$GLB,,, | Performed by: FAMILY MEDICINE

## 2023-08-18 PROCEDURE — 1126F AMNT PAIN NOTED NONE PRSNT: CPT | Mod: CPTII,S$GLB,, | Performed by: FAMILY MEDICINE

## 2023-08-18 PROCEDURE — 1160F PR REVIEW ALL MEDS BY PRESCRIBER/CLIN PHARMACIST DOCUMENTED: ICD-10-PCS | Mod: CPTII,S$GLB,, | Performed by: FAMILY MEDICINE

## 2023-08-18 PROCEDURE — 1159F MED LIST DOCD IN RCRD: CPT | Mod: CPTII,S$GLB,, | Performed by: FAMILY MEDICINE

## 2023-08-18 PROCEDURE — 3288F FALL RISK ASSESSMENT DOCD: CPT | Mod: CPTII,S$GLB,, | Performed by: FAMILY MEDICINE

## 2023-08-18 PROCEDURE — 1101F PR PT FALLS ASSESS DOC 0-1 FALLS W/OUT INJ PAST YR: ICD-10-PCS | Mod: CPTII,S$GLB,, | Performed by: FAMILY MEDICINE

## 2023-08-18 PROCEDURE — 1160F RVW MEDS BY RX/DR IN RCRD: CPT | Mod: CPTII,S$GLB,, | Performed by: FAMILY MEDICINE

## 2023-08-18 PROCEDURE — 3078F DIAST BP <80 MM HG: CPT | Mod: CPTII,S$GLB,, | Performed by: FAMILY MEDICINE

## 2023-08-18 PROCEDURE — 3074F PR MOST RECENT SYSTOLIC BLOOD PRESSURE < 130 MM HG: ICD-10-PCS | Mod: CPTII,S$GLB,, | Performed by: FAMILY MEDICINE

## 2023-08-18 PROCEDURE — 1159F PR MEDICATION LIST DOCUMENTED IN MEDICAL RECORD: ICD-10-PCS | Mod: CPTII,S$GLB,, | Performed by: FAMILY MEDICINE

## 2023-08-18 PROCEDURE — 99999 PR PBB SHADOW E&M-EST. PATIENT-LVL III: ICD-10-PCS | Mod: PBBFAC,,, | Performed by: FAMILY MEDICINE

## 2023-08-18 PROCEDURE — 3074F SYST BP LT 130 MM HG: CPT | Mod: CPTII,S$GLB,, | Performed by: FAMILY MEDICINE

## 2023-08-18 PROCEDURE — 3288F PR FALLS RISK ASSESSMENT DOCUMENTED: ICD-10-PCS | Mod: CPTII,S$GLB,, | Performed by: FAMILY MEDICINE

## 2023-08-18 PROCEDURE — 1101F PT FALLS ASSESS-DOCD LE1/YR: CPT | Mod: CPTII,S$GLB,, | Performed by: FAMILY MEDICINE

## 2023-08-18 RX ORDER — ROSUVASTATIN CALCIUM 40 MG/1
40 TABLET, COATED ORAL NIGHTLY
Qty: 90 TABLET | Refills: 3 | Status: SHIPPED | OUTPATIENT
Start: 2023-08-18

## 2023-08-18 RX ORDER — AMLODIPINE BESYLATE 2.5 MG/1
2.5 TABLET ORAL DAILY
Qty: 90 TABLET | Refills: 3 | Status: ON HOLD | OUTPATIENT
Start: 2023-08-18 | End: 2024-03-21

## 2023-08-18 RX ORDER — METOPROLOL TARTRATE 25 MG/1
25 TABLET, FILM COATED ORAL 2 TIMES DAILY
Qty: 180 TABLET | Refills: 3 | Status: SHIPPED | OUTPATIENT
Start: 2023-08-18

## 2023-08-18 RX ORDER — PANTOPRAZOLE SODIUM 40 MG/1
40 TABLET, DELAYED RELEASE ORAL DAILY
Qty: 90 TABLET | Refills: 3 | Status: SHIPPED | OUTPATIENT
Start: 2023-08-18 | End: 2024-04-02

## 2023-08-18 NOTE — PROGRESS NOTES
Subjective:       Patient ID: Adele Hall is a 78 y.o. female.    Chief Complaint: Annual Exam (Pt states that she is here for her annual exam )    78-year-old female coming in for annual exam and follow-up of multiple health issues.  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  Recent lab work was reviewed an is all satisfactory.  Currently she is taking Tresiba 48 units daily and mounjaro 5 mg weekly.  She reports a decrease in appetite and decrease in intake but states that that started long before she began using the Mounjaro although the addition of the medication may have compound that it.  She gets constipation secondary to decreased intake and is not taking any fiber supplements.  Her  has been giving her MiraLax which is not really working since there is very little solid to move.  I suggested that she should try taking some Metamucil starting with a heaping tsp daily an increase every 3 to 4 days until satisfactory response is noted.  She may use a softener also if needed but until she gets some bulk added a softener probably would not help.  She also needs to make sure she gets at least a minimum of nutritional needs met to eat if necessary and if necessary reducing the Mounjaro.  She has additional history of hypertension, hyperlipidemia, coronary artery disease with four vessel bypass surgery in 2016.  She had paroxysmal atrial fibrillation after the surgery but it has not been an ongoing problem and she is not on anticoagulation.  She has stage 3 chronic kidney disease and is being followed by Dr. Davidson.  She has anemia with leukocytosis, hypothyroidism as noted above, reflux, osteopenia and a history of chronic diastolic CHF.  She status post total thyroidectomy, hysterectomy, cholecystectomy, the four vessel bypass surgery, and her last colonoscopy was done by Dr. Will in  December 2021.  She has cataract surgery planned in the near future that will be done over in [place].  She is due for a low-dose CT scan and due for a DEXA scan but she wants to defer those until after she has recovered fully from the cataract surgery.  She is due for a microalbumin creatinine ratio but Dr. Davidson recently did a urine protein that was very similar.  Her next eye exam is scheduled August 21, 2023 and I believe will be a preop for her cataracts.    She has a large sebaceous cyst on her right flank area with two black adds in it.  When she had her bypass surgery her cardiac surgeon actually took it off but it has recurred and she is asking about getting it removed again.  I suggested that we could send her to general surgery or dermatology and the surgeons would be preferred.  She is complaining of insomnia and is taking low-dose trazodone without relief.  She has used melatonin in the past without significant relief and she is looking for something stronger.  We discussed the Beers list and the risk of hip fracture of sedative agents and I would suggest that we push the trazodone up to as much as 150 mg possibly taking it with the melatonin as a combined agent before giving up on it.  Anything in addition to that would carry some risk of hip fracture.  She does admit that she had a fall a couple of months ago.    Past Medical History:  No date: Anesthesia complication      Comment:  took patient 6 days to come out of anethesia after CABG  No date: Anticoagulant long-term use  No date: Arthritis  No date: Chronic kidney disease  No date: CKD (chronic kidney disease) stage 3, GFR 30-59 ml/min      Comment:  Dr. Montero  2/7/2016: COPD (chronic obstructive pulmonary disease)  No date: COPD (chronic obstructive pulmonary disease)  No date: Coronary artery disease  No date: Diabetes mellitus  No date: Diabetes mellitus, type 2  No date: GERD (gastroesophageal reflux disease)  No date: Hx of colonic  polyps  No date: Hyperlipidemia  No date: Hypertension  2016: possible new onset CHF  No date: Tardive dyskinesia  No date: Thyroid disease  No date: Ulcer  No date: Vertigo    Past Surgical History:  No date: CARDIAC SURGERY  2017: CHOLECYSTECTOMY  No date: COLONOSCOPY  2021: COLONOSCOPY; N/A      Comment:  Procedure: COLONOSCOPY;  Surgeon: Lito Will MD;                 Location: [facility];  Service: Endoscopy;  Laterality:                N/A;  2016: CORONARY ARTERY BYPASS GRAFT      Comment:  4 vessel  No date: HYSTERECTOMY  No date: OOPHORECTOMY  No date: TONSILLECTOMY  No date: TOTAL THYROIDECTOMY  No date: TUBAL LIGATION    Review of patient's family history indicates:  Problem: Cancer      Relation: Mother          Age of Onset: (Not Specified)          Comment: LYMPHOMA  Problem: Breast cancer      Relation: Mother          Age of Onset: (Not Specified)  Problem: Cancer      Relation: Father          Age of Onset: (Not Specified)  Problem: Early death      Relation: Father          Age of Onset: (Not Specified)  Problem: Heart disease      Relation: Paternal Uncle          Age of Onset: (Not Specified)  Problem: Alcohol abuse      Relation: Sister          Age of Onset: (Not Specified)          Comment: x2  Problem: Heart disease      Relation: Brother          Age of Onset: (Not Specified)  Problem: No Known Problems      Relation: Son          Age of Onset: (Not Specified)  Problem: Glaucoma      Relation: Neg Hx          Age of Onset: (Not Specified)  Problem: Macular degeneration      Relation: Neg Hx          Age of Onset: (Not Specified)    Social History    Tobacco Use      Smoking status: Former        Packs/day: 0        Years: 1 pack/day for 27.0 years (27.0 ttl pk-yrs)        Types: Cigarettes        Start date: 1989        Quit date: 2016        Years since quittin.5      Smokeless tobacco: Never    Alcohol use: No      Alcohol/week: 0.0 standard drinks  "of alcohol    Drug use: No    Current Outpatient Medications on File Prior to Visit:  aspirin (ECOTRIN) 81 MG EC tablet, Take 81 mg by mouth once daily., Disp: , Rfl:   aspirin-sod bicarb-citric acid (ERICKA-SELTZER) 324 mg TbEF, Take 325 mg by mouth every 6 (six) hours as needed., Disp: , Rfl:   BD INSULIN SYRINGE ULTRA-FINE 1 mL 31 gauge x 5/16 Syrg, USE AS DIRECTED 5 TIMES A DAY WITH LEVEMIR & NOVOLOG, Disp: 200 each, Rfl: 10  blood sugar diagnostic (TRUE METRIX GLUCOSE TEST STRIP) Strp, Test blood sugar 3x/day, Disp: 300 strip, Rfl: 3  insulin degludec (TRESIBA FLEXTOUCH U-200) 200 unit/mL (3 mL) insulin pen, Inject 48 Units into the skin once daily., Disp: 3 pen, Rfl: 6  lancets 33 gauge Misc, 1 lancet by Misc.(Non-Drug; Combo Route) route 4 (four) times daily., Disp: , Rfl:   levothyroxine (SYNTHROID) 75 MCG tablet, TAKE ONE TABLET BY MOUTH EVERY MORNING BEFORE BREAKFAST, Disp: 30 tablet, Rfl: 11  meclizine (ANTIVERT) 25 mg tablet, Take 1 tablet (25 mg total) by mouth 3 (three) times daily as needed., Disp: 20 tablet, Rfl: 0  nitroGLYCERIN (NITROSTAT) 0.4 MG SL tablet, Place 1 tablet (0.4 mg total) under the tongue every 5 (five) minutes as needed for Chest pain. Seek medical help is pain is not relieved by the third dose., Disp: 25 tablet, Rfl: 5  pen needle, diabetic (BD ULTRA-FINE YUMIKO PEN NEEDLE) 32 gauge x 5/32" Ndle, USE FOUR TIMES A DAY WITH INSULIN, Disp: 400 each, Rfl: 3  tirzepatide (MOUNJARO) 5 mg/0.5 mL PnIj, Inject 5 mg into the skin every 7 days., Disp: 4 pen, Rfl: 6  traZODone (DESYREL) 50 MG tablet, TAKE ONE TABLET BY MOUTH EVERY NIGHT, Disp: 90 tablet, Rfl: 1  vitamin D (VITAMIN D3) 1000 units Tab, Take 1,000 Units by mouth once daily., Disp: , Rfl:   [DISCONTINUED] amLODIPine (NORVASC) 2.5 MG tablet, TAKE ONE TABLET BY MOUTH ONCE A DAY, Disp: 90 tablet, Rfl: 3  [DISCONTINUED] metoprolol tartrate (LOPRESSOR) 25 MG tablet, TAKE ONE TABLET BY MOUTH TWO TIMES A DAY, Disp: 180 tablet, Rfl: " 0  [DISCONTINUED] pantoprazole (PROTONIX) 40 MG tablet, TAKE ONE TABLET BY MOUTH ONCE A DAY, Disp: 90 tablet, Rfl: 3  [DISCONTINUED] rosuvastatin (CRESTOR) 40 MG Tab, TAKE ONE TABLET BY MOUTH EVERY EVENING, Disp: 90 tablet, Rfl: 0  dicyclomine (BENTYL) 10 MG capsule, , Disp: , Rfl: 3  KETOPROFEN TOP, APPLY 1.6 GRAMS (1 PUMP) TO PAINFUL AREAS UP TO 5 TIMES DAILY. RUB IN WELL, Disp: , Rfl: 5  lisinopriL (PRINIVIL,ZESTRIL) 5 MG tablet, TAKE ONE TABLET BY MOUTH TWO TIMES A DAY, Disp: 180 tablet, Rfl: 1  [DISCONTINUED] ammonium lactate (LAC-HYDRIN) 12 % lotion, APPLY TO AFFECTED AREA AS NEEDED FOR DRY SKIN (Patient not taking: Reported on 8/18/2023), Disp: 226 g, Rfl: 1  [DISCONTINUED] furosemide (LASIX) 20 MG tablet, Take 1 tablet (20 mg total) by mouth once daily. (Patient not taking: Reported on 8/17/2022), Disp: 30 tablet, Rfl: 0  [DISCONTINUED] KLCB ketoprofen 10%- LIDOcaine 5%- cyclobenzaprine 2%- baclofen 2% in transdermal cream, Apply 1 to 2 grams 3 to 4 times daily (Patient not taking: Reported on 6/1/2023), Disp: 120 g, Rfl: 1  [DISCONTINUED] magnesium oxide (MAG-OX) 400 mg (241.3 mg magnesium) tablet, Take 1 tablet (400 mg total) by mouth once daily. (Patient not taking: Reported on 8/18/2023), Disp: , Rfl: 0  [DISCONTINUED] oxyCODONE-acetaminophen (PERCOCET) 5-325 mg per tablet, Take 1 tablet by mouth as needed for Pain. (Patient not taking: Reported on 6/1/2023), Disp: 10 tablet, Rfl: 0    No current facility-administered medications on file prior to visit.          Review of Systems   Constitutional:  Negative for chills, diaphoresis, fatigue, fever and unexpected weight change.   HENT:  Negative for congestion, ear pain, hearing loss, postnasal drip and sinus pressure.    Eyes:  Negative for itching and visual disturbance.   Respiratory:  Negative for cough, chest tightness, shortness of breath and wheezing.    Cardiovascular:  Negative for chest pain, palpitations and leg swelling.   Gastrointestinal:   Negative for abdominal pain, blood in stool, constipation, diarrhea, nausea and vomiting.   Genitourinary:  Negative for dysuria, frequency and hematuria.   Musculoskeletal:  Positive for back pain (Occasional). Negative for arthralgias, joint swelling and myalgias.   Skin:  Negative for color change and rash.        Large sebaceous cyst in the right flank area with two blackheads within   Neurological:  Negative for dizziness and headaches.   Hematological:  Negative for adenopathy.   Psychiatric/Behavioral:  Positive for sleep disturbance. The patient is not nervous/anxious.        Objective:      Physical Exam  Vitals and nursing note reviewed.   Constitutional:       General: She is not in acute distress.     Appearance: Normal appearance. She is well-developed. She is not ill-appearing, toxic-appearing or diaphoretic.      Comments: Good blood pressure control   Mild bradycardia with a pulse of 59 and a regular rhythm no evidence of atrial fibrillation   Mildly overweight with a BMI of 29.7 she is down 20 lb since August 17, 2022 which she attributes to the Mounjaro   HENT:      Head: Normocephalic and atraumatic.      Right Ear: Tympanic membrane, ear canal and external ear normal. There is no impacted cerumen.      Left Ear: Tympanic membrane, ear canal and external ear normal. There is no impacted cerumen.      Nose: Nose normal. No congestion or rhinorrhea.      Mouth/Throat:      Mouth: Mucous membranes are dry.      Pharynx: Oropharynx is clear. No oropharyngeal exudate or posterior oropharyngeal erythema.   Eyes:      General: No scleral icterus.        Right eye: No discharge.         Left eye: No discharge.      Conjunctiva/sclera: Conjunctivae normal.      Pupils: Pupils are equal, round, and reactive to light.   Neck:      Thyroid: No thyromegaly.      Vascular: No carotid bruit or JVD.   Cardiovascular:      Rate and Rhythm: Normal rate and regular rhythm.      Pulses: Normal pulses.      Heart  sounds: Normal heart sounds. No murmur heard.     No friction rub. No gallop.   Pulmonary:      Effort: Pulmonary effort is normal. No respiratory distress.      Breath sounds: Normal breath sounds. No stridor. No wheezing, rhonchi or rales.   Chest:      Chest wall: No tenderness.   Abdominal:      General: Bowel sounds are normal. There is no distension.      Palpations: Abdomen is soft. There is no mass.      Tenderness: There is no abdominal tenderness. There is no guarding or rebound.      Hernia: No hernia is present.   Musculoskeletal:         General: No swelling, tenderness, deformity or signs of injury. Normal range of motion.      Cervical back: Normal range of motion and neck supple. No rigidity or tenderness.      Right lower leg: No edema.      Left lower leg: No edema.   Lymphadenopathy:      Cervical: No cervical adenopathy.   Skin:     General: Skin is warm and dry.      Coloration: Skin is not jaundiced or pale.      Findings: No bruising, erythema, lesion or rash.          Neurological:      General: No focal deficit present.      Mental Status: She is alert and oriented to person, place, and time. Mental status is at baseline.      Cranial Nerves: No cranial nerve deficit.      Sensory: No sensory deficit.      Motor: No weakness.      Coordination: Coordination normal.      Gait: Gait normal.      Deep Tendon Reflexes: Reflexes are normal and symmetric. Reflexes normal.   Psychiatric:         Mood and Affect: Mood normal.         Behavior: Behavior normal.         Thought Content: Thought content normal.         Judgment: Judgment normal.         Assessment:       1. Encounter for preventive health examination    2. Hypertension associated with diabetes    3. Dyslipidemia associated with type 2 diabetes mellitus    4. Type 2 diabetes mellitus with diabetic polyneuropathy, with long-term current use of insulin    5. Chronic obstructive pulmonary disease, unspecified COPD type    6.  "Gastroesophageal reflux disease, unspecified whether esophagitis present    7. Coronary artery disease involving native coronary artery of native heart without angina pectoris    8. Hx of CABG-  1/19/16 x 4    9. Chronic diastolic congestive heart failure    10. PAF (paroxysmal atrial fibrillation), post CABG    11. Diabetic nephropathy associated with type 2 diabetes mellitus    12. Secondary hyperparathyroidism of renal origin    13. Sebaceous cyst    14. Chronic insomnia    15. Atherosclerosis of aorta    16. BMI 29.0-29.9,adult        Plan:       1. Encounter for preventive health examination    2. Hypertension associated with diabetes  Good control with no medication changes needed  - amLODIPine (NORVASC) 2.5 MG tablet; Take 1 tablet (2.5 mg total) by mouth once daily.  Dispense: 90 tablet; Refill: 3  - metoprolol tartrate (LOPRESSOR) 25 MG tablet; Take 1 tablet (25 mg total) by mouth 2 (two) times daily.  Dispense: 180 tablet; Refill: 3    3. Dyslipidemia associated with type 2 diabetes mellitus  Lab Results   Component Value Date    CHOL 124 05/25/2023    CHOL 128 05/12/2022    CHOL 127 04/27/2021     Lab Results   Component Value Date    HDL 30 (L) 05/25/2023    HDL 36 (L) 05/12/2022    HDL 28 (L) 04/27/2021     Lab Results   Component Value Date    LDLCALC 39.6 (L) 05/25/2023    LDLCALC 28.6 (L) 05/12/2022    LDLCALC 29.6 (L) 04/27/2021     No results found for: "DLDL"  Lab Results   Component Value Date    TRIG 272 (H) 05/25/2023    TRIG 317 (H) 05/12/2022    TRIG 347 (H) 04/27/2021       f1   Lab Results   Component Value Date    CHOLHDL 24.2 05/25/2023    CHOLHDL 28.1 05/12/2022    CHOLHDL 22.0 04/27/2021     Good control with no changes needed in the Crestor 40 mg, recheck in May of next year  - rosuvastatin (CRESTOR) 40 MG Tab; Take 1 tablet (40 mg total) by mouth every evening.  Dispense: 90 tablet; Refill: 3    4. Type 2 diabetes mellitus with diabetic polyneuropathy, with long-term current use of " insulin  Lab Results   Component Value Date    HGBA1C 5.6 05/25/2023     Good control, may need to reduce the Tresiba    5. Chronic obstructive pulmonary disease, unspecified COPD type  Stable no symptoms at this time    6. Gastroesophageal reflux disease, unspecified whether esophagitis present  Controlled on Protonix, no symptoms at this time  - pantoprazole (PROTONIX) 40 MG tablet; Take 1 tablet (40 mg total) by mouth once daily.  Dispense: 90 tablet; Refill: 3    7. Coronary artery disease involving native coronary artery of native heart without angina pectoris  Asymptomatic, carries her nitroglycerin with her    8. Hx of CABG-  1/19/16 x 4  Stable    9. Chronic diastolic congestive heart failure  No orthopnea or other symptoms at this time    10. PAF (paroxysmal atrial fibrillation), post CABG  Occurred postop CABG, asymptomatic since then/resolved    11. Diabetic nephropathy associated with type 2 diabetes mellitus  BMP  Lab Results   Component Value Date     05/25/2023    K 3.7 05/25/2023     05/25/2023    CO2 29 05/25/2023    BUN 20 05/25/2023    CREATININE 1.3 05/25/2023    CALCIUM 9.8 05/25/2023    ANIONGAP 11 05/25/2023    EGFRNORACEVR 42.9 (A) 05/25/2023     Currently stage IIIB chronic kidney disease, followed by Dr. Davidson      12. Secondary hyperparathyroidism of renal origin  Lab Results   Component Value Date    PTH 78.0 (H) 05/12/2022    CALCIUM 9.8 05/25/2023    CAION 1.16 01/20/2016    PHOS 3.7 02/23/2023     Mild and asymptomatic, followed by Dr. Davidson    13. Sebaceous cyst  Offered surgical consult, she defers until after she gets her cataract surgery completed    14. Chronic insomnia  Increase trazodone to 75 mg taking 1-1/2 of her 50s, if that is insufficient she can try 100 mg taking two of the 50s and may go up to as much as 150 taking 350s.  She also may take melatonin with it to try to improve initiation of sleep.  I am trying to avoid Beers list agents    15. Atherosclerosis  of aorta  Asymptomatic, maintain blood pressure, cholesterol, and diabetes control    16. BMI 29.0-29.9,adult  Losing weight relatively rapidly probably due to the Mounjaro

## 2023-08-21 ENCOUNTER — TELEPHONE (OUTPATIENT)
Dept: OPHTHALMOLOGY | Facility: CLINIC | Age: 78
End: 2023-08-21

## 2023-08-21 ENCOUNTER — OFFICE VISIT (OUTPATIENT)
Dept: OPHTHALMOLOGY | Facility: CLINIC | Age: 78
End: 2023-08-21
Payer: MEDICARE

## 2023-08-21 DIAGNOSIS — E11.36 CATARACT ASSOCIATED WITH TYPE 2 DIABETES MELLITUS: ICD-10-CM

## 2023-08-21 DIAGNOSIS — H25.12 NUCLEAR SCLEROTIC CATARACT OF LEFT EYE: Primary | ICD-10-CM

## 2023-08-21 DIAGNOSIS — H25.11 NUCLEAR SCLEROTIC CATARACT OF RIGHT EYE: Primary | ICD-10-CM

## 2023-08-21 DIAGNOSIS — H25.13 NUCLEAR SCLEROTIC CATARACT, BILATERAL: ICD-10-CM

## 2023-08-21 DIAGNOSIS — H25.12 NUCLEAR SCLEROTIC CATARACT OF LEFT EYE: ICD-10-CM

## 2023-08-21 PROCEDURE — 92136 OPHTHALMIC BIOMETRY: CPT | Mod: RT,S$GLB,, | Performed by: OPHTHALMOLOGY

## 2023-08-21 PROCEDURE — 3288F FALL RISK ASSESSMENT DOCD: CPT | Mod: CPTII,S$GLB,, | Performed by: OPHTHALMOLOGY

## 2023-08-21 PROCEDURE — 99999 PR PBB SHADOW E&M-EST. PATIENT-LVL III: ICD-10-PCS | Mod: PBBFAC,,, | Performed by: OPHTHALMOLOGY

## 2023-08-21 PROCEDURE — 2023F DILAT RTA XM W/O RTNOPTHY: CPT | Mod: CPTII,S$GLB,, | Performed by: OPHTHALMOLOGY

## 2023-08-21 PROCEDURE — 2023F PR DILATED RETINAL EXAM W/O EVID OF RETINOPATHY: ICD-10-PCS | Mod: CPTII,S$GLB,, | Performed by: OPHTHALMOLOGY

## 2023-08-21 PROCEDURE — 1159F PR MEDICATION LIST DOCUMENTED IN MEDICAL RECORD: ICD-10-PCS | Mod: CPTII,S$GLB,, | Performed by: OPHTHALMOLOGY

## 2023-08-21 PROCEDURE — 99214 OFFICE O/P EST MOD 30 MIN: CPT | Mod: S$GLB,,, | Performed by: OPHTHALMOLOGY

## 2023-08-21 PROCEDURE — 3288F PR FALLS RISK ASSESSMENT DOCUMENTED: ICD-10-PCS | Mod: CPTII,S$GLB,, | Performed by: OPHTHALMOLOGY

## 2023-08-21 PROCEDURE — 99999 PR PBB SHADOW E&M-EST. PATIENT-LVL III: CPT | Mod: PBBFAC,,, | Performed by: OPHTHALMOLOGY

## 2023-08-21 PROCEDURE — 1101F PT FALLS ASSESS-DOCD LE1/YR: CPT | Mod: CPTII,S$GLB,, | Performed by: OPHTHALMOLOGY

## 2023-08-21 PROCEDURE — 1101F PR PT FALLS ASSESS DOC 0-1 FALLS W/OUT INJ PAST YR: ICD-10-PCS | Mod: CPTII,S$GLB,, | Performed by: OPHTHALMOLOGY

## 2023-08-21 PROCEDURE — 1126F AMNT PAIN NOTED NONE PRSNT: CPT | Mod: CPTII,S$GLB,, | Performed by: OPHTHALMOLOGY

## 2023-08-21 PROCEDURE — 1159F MED LIST DOCD IN RCRD: CPT | Mod: CPTII,S$GLB,, | Performed by: OPHTHALMOLOGY

## 2023-08-21 PROCEDURE — 99214 PR OFFICE/OUTPT VISIT, EST, LEVL IV, 30-39 MIN: ICD-10-PCS | Mod: S$GLB,,, | Performed by: OPHTHALMOLOGY

## 2023-08-21 PROCEDURE — 92136 IOL MASTER - OD - RIGHT EYE: ICD-10-PCS | Mod: RT,S$GLB,, | Performed by: OPHTHALMOLOGY

## 2023-08-21 PROCEDURE — 1126F PR PAIN SEVERITY QUANTIFIED, NO PAIN PRESENT: ICD-10-PCS | Mod: CPTII,S$GLB,, | Performed by: OPHTHALMOLOGY

## 2023-08-21 RX ORDER — KETOROLAC TROMETHAMINE 5 MG/ML
1 SOLUTION OPHTHALMIC 4 TIMES DAILY
Qty: 5 ML | Refills: 0 | Status: SHIPPED | OUTPATIENT
Start: 2023-08-21 | End: 2023-12-21

## 2023-08-21 RX ORDER — PREDNISOLONE ACETATE 10 MG/ML
1 SUSPENSION/ DROPS OPHTHALMIC EVERY 4 HOURS
Qty: 5 ML | Refills: 0 | Status: SHIPPED | OUTPATIENT
Start: 2023-08-21 | End: 2023-09-11

## 2023-08-21 RX ORDER — OFLOXACIN 3 MG/ML
1 SOLUTION/ DROPS OPHTHALMIC EVERY 4 HOURS
Qty: 5 ML | Refills: 0 | Status: SHIPPED | OUTPATIENT
Start: 2023-08-21 | End: 2023-08-31

## 2023-08-21 NOTE — PROGRESS NOTES
HPI    Dr. Renteria    Diabetes mellitus type 2 without retinopathy  Cataract associated with type 2 diabetes mellitus  Macular drusen, bilateral    GTTS: none    Pt is here today for cat eval. Pt states that vision is blurry, foggy, and   cloudy. Pt states that she is sensitive to light and glare. Pt is   sensitive to headlights during the night. Pt no longer drives and because   of her poor vision. Pt states that she near vision has become increasing   difficult. Pt denies eye pain but states that eyes feel irritated and are   always watering.   Last edited by Miryam Hart MA on 8/21/2023 11:04 AM.            Assessment /Plan     For exam results, see Encounter Report.    Nuclear sclerotic cataract of right eye    Cataract associated with type 2 diabetes mellitus  -     Ambulatory referral/consult to Ophthalmology  -     IOL Master - OD - Right Eye    Nuclear sclerotic cataract of left eye      Visually significant nuclear sclerotic cataract   - Interfering with activities of daily living.  Pt desires cataract surgery for [facility] rehabilitation.   - R/B/A discussed and pt agrees to proceed with surgery.   - IOL options discussed according to patient's goals and concomitant ocular pathology; and pt content with monofocal lens.    - Target: plano.    Diboo 20.5 OD    (Diboo 21.0 OS)    COV pt    Dry amd  - amsler, mvi

## 2023-08-28 ENCOUNTER — TELEPHONE (OUTPATIENT)
Dept: OPHTHALMOLOGY | Facility: CLINIC | Age: 78
End: 2023-08-28
Payer: MEDICARE

## 2023-08-28 NOTE — TELEPHONE ENCOUNTER
Spoke with pt provided arrival time of 8:00 for sx on 8/30/23 with Dr. Lam @ [place]. Pt has eyedrops and packet.

## 2023-08-29 ENCOUNTER — PATIENT MESSAGE (OUTPATIENT)
Dept: PREADMISSION TESTING | Facility: HOSPITAL | Age: 78
End: 2023-08-29
Payer: MEDICARE

## 2023-08-29 NOTE — H&P
History    Chief complaint:  Painless progressive vision loss    Present Ilness/Diagnosis: Nuclear sclerotic Cataract    Past Medical History:  has a past medical history of Anesthesia complication, Anticoagulant long-term use, Arthritis, Chronic kidney disease, CKD (chronic kidney disease) stage 3, GFR 30-59 ml/min, COPD (chronic obstructive pulmonary disease) (2/7/2016), COPD (chronic obstructive pulmonary disease), Coronary artery disease, Diabetes mellitus, Diabetes mellitus, type 2, GERD (gastroesophageal reflux disease), colonic polyps, Hyperlipidemia, Hypertension, possible new onset CHF (7/30/2016), Tardive dyskinesia, Thyroid disease, Ulcer, and Vertigo.    Family History/Social History: refer to chart    Allergies:   Review of patient's allergies indicates:   Allergen Reactions    Reglan [metoclopramide hcl]      Depression and weight loss.        Current Medications: No current facility-administered medications for this encounter.    Current Outpatient Medications:     amLODIPine (NORVASC) 2.5 MG tablet, Take 1 tablet (2.5 mg total) by mouth once daily., Disp: 90 tablet, Rfl: 3    aspirin (ECOTRIN) 81 MG EC tablet, Take 81 mg by mouth once daily., Disp: , Rfl:     aspirin-sod bicarb-citric acid (ERICKA-SELTZER) 324 mg TbEF, Take 325 mg by mouth every 6 (six) hours as needed., Disp: , Rfl:     BD INSULIN SYRINGE ULTRA-FINE 1 mL 31 gauge x 5/16 Syrg, USE AS DIRECTED 5 TIMES A DAY WITH LEVEMIR & NOVOLOG, Disp: 200 each, Rfl: 10    blood sugar diagnostic (TRUE METRIX GLUCOSE TEST STRIP) Strp, Test blood sugar 3x/day, Disp: 300 strip, Rfl: 3    dicyclomine (BENTYL) 10 MG capsule, , Disp: , Rfl: 3    insulin degludec (TRESIBA FLEXTOUCH U-200) 200 unit/mL (3 mL) insulin pen, Inject 48 Units into the skin once daily., Disp: 3 pen, Rfl: 6    KETOPROFEN TOP, APPLY 1.6 GRAMS (1 PUMP) TO PAINFUL AREAS UP TO 5 TIMES DAILY. RUB IN WELL, Disp: , Rfl: 5    ketorolac 0.5% (ACULAR) 0.5 % Drop, Place 1 drop into the right eye  "4 (four) times daily., Disp: 5 mL, Rfl: 0    lancets 33 gauge Misc, 1 lancet by Misc.(Non-Drug; Combo Route) route 4 (four) times daily., Disp: , Rfl:     levothyroxine (SYNTHROID) 75 MCG tablet, TAKE ONE TABLET BY MOUTH EVERY MORNING BEFORE BREAKFAST, Disp: 30 tablet, Rfl: 11    lisinopriL (PRINIVIL,ZESTRIL) 5 MG tablet, TAKE ONE TABLET BY MOUTH TWO TIMES A DAY, Disp: 180 tablet, Rfl: 1    meclizine (ANTIVERT) 25 mg tablet, Take 1 tablet (25 mg total) by mouth 3 (three) times daily as needed., Disp: 20 tablet, Rfl: 0    metoprolol tartrate (LOPRESSOR) 25 MG tablet, Take 1 tablet (25 mg total) by mouth 2 (two) times daily., Disp: 180 tablet, Rfl: 3    nitroGLYCERIN (NITROSTAT) 0.4 MG SL tablet, Place 1 tablet (0.4 mg total) under the tongue every 5 (five) minutes as needed for Chest pain. Seek medical help is pain is not relieved by the third dose., Disp: 25 tablet, Rfl: 5    ofloxacin (OCUFLOX) 0.3 % ophthalmic solution, Place 1 drop into the right eye every 4 (four) hours. for 10 days, Disp: 5 mL, Rfl: 0    pantoprazole (PROTONIX) 40 MG tablet, Take 1 tablet (40 mg total) by mouth once daily., Disp: 90 tablet, Rfl: 3    pen needle, diabetic (BD ULTRA-FINE YUMIKO PEN NEEDLE) 32 gauge x 5/32" Ndle, USE FOUR TIMES A DAY WITH INSULIN, Disp: 400 each, Rfl: 3    prednisoLONE acetate (PRED FORTE) 1 % DrpS, Place 1 drop into the right eye every 4 (four) hours., Disp: 5 mL, Rfl: 0    rosuvastatin (CRESTOR) 40 MG Tab, Take 1 tablet (40 mg total) by mouth every evening., Disp: 90 tablet, Rfl: 3    tirzepatide (MOUNJARO) 5 mg/0.5 mL PnIj, Inject 5 mg into the skin every 7 days., Disp: 4 pen, Rfl: 6    traZODone (DESYREL) 50 MG tablet, TAKE ONE TABLET BY MOUTH EVERY NIGHT, Disp: 90 tablet, Rfl: 1    vitamin D (VITAMIN D3) 1000 units Tab, Take 1,000 Units by mouth once daily., Disp: , Rfl:     Physical Exam    BP: Vital signs stable  General: No apparent distress  HEENT: nuclear sclerotic cataract  Lungs: adequate " respirations  Heart: + pulses  Abdomen: soft  Rectal/pelvic: deferred    Impression: Visually significant Cataract.    See previous clinic notes for surgical indications.    Plan: Phacoemulsification with implantation of Intraocular lens

## 2023-08-30 ENCOUNTER — ANESTHESIA (OUTPATIENT)
Dept: SURGERY | Facility: HOSPITAL | Age: 78
End: 2023-08-30
Payer: MEDICARE

## 2023-08-30 ENCOUNTER — HOSPITAL ENCOUNTER (OUTPATIENT)
Facility: HOSPITAL | Age: 78
Discharge: HOME | End: 2023-08-30
Attending: OPHTHALMOLOGY | Admitting: OPHTHALMOLOGY
Payer: MEDICARE

## 2023-08-30 ENCOUNTER — ANESTHESIA EVENT (OUTPATIENT)
Dept: SURGERY | Facility: HOSPITAL | Age: 78
End: 2023-08-30
Payer: MEDICARE

## 2023-08-30 VITALS
HEIGHT: 61 IN | OXYGEN SATURATION: 97 % | WEIGHT: 157 LBS | DIASTOLIC BLOOD PRESSURE: 65 MMHG | SYSTOLIC BLOOD PRESSURE: 131 MMHG | HEART RATE: 68 BPM | BODY MASS INDEX: 29.64 KG/M2 | RESPIRATION RATE: 20 BRPM | TEMPERATURE: 98 F

## 2023-08-30 DIAGNOSIS — H25.10 AGE-RELATED NUCLEAR CATARACT: ICD-10-CM

## 2023-08-30 DIAGNOSIS — H25.11 NUCLEAR SCLEROTIC CATARACT OF RIGHT EYE: Primary | ICD-10-CM

## 2023-08-30 LAB — POCT GLUCOSE: 98 MG/DL (ref 70–110)

## 2023-08-30 PROCEDURE — 99900035 HC TECH TIME PER 15 MIN (STAT)

## 2023-08-30 PROCEDURE — 25000003 PHARM REV CODE 250: Performed by: OPHTHALMOLOGY

## 2023-08-30 PROCEDURE — 66984 PR REMOVAL, CATARACT, W/INSRT INTRAOC LENS, W/O ENDO CYCLO: ICD-10-PCS | Mod: RT,,, | Performed by: OPHTHALMOLOGY

## 2023-08-30 PROCEDURE — D9220A PRA ANESTHESIA: ICD-10-PCS | Mod: ,,, | Performed by: NURSE ANESTHETIST, CERTIFIED REGISTERED

## 2023-08-30 PROCEDURE — 71000015 HC POSTOP RECOV 1ST HR: Performed by: OPHTHALMOLOGY

## 2023-08-30 PROCEDURE — 63600175 PHARM REV CODE 636 W HCPCS: Performed by: NURSE ANESTHETIST, CERTIFIED REGISTERED

## 2023-08-30 PROCEDURE — 66984 XCAPSL CTRC RMVL W/O ECP: CPT | Mod: RT,,, | Performed by: OPHTHALMOLOGY

## 2023-08-30 PROCEDURE — 36000707: Performed by: OPHTHALMOLOGY

## 2023-08-30 PROCEDURE — 37000009 HC ANESTHESIA EA ADD 15 MINS: Performed by: OPHTHALMOLOGY

## 2023-08-30 PROCEDURE — 82962 GLUCOSE BLOOD TEST: CPT | Performed by: OPHTHALMOLOGY

## 2023-08-30 PROCEDURE — D9220A PRA ANESTHESIA: Mod: ,,, | Performed by: NURSE ANESTHETIST, CERTIFIED REGISTERED

## 2023-08-30 PROCEDURE — 94761 N-INVAS EAR/PLS OXIMETRY MLT: CPT

## 2023-08-30 PROCEDURE — 37000008 HC ANESTHESIA 1ST 15 MINUTES: Performed by: OPHTHALMOLOGY

## 2023-08-30 PROCEDURE — 36000706: Performed by: OPHTHALMOLOGY

## 2023-08-30 PROCEDURE — V2632 POST CHMBR INTRAOCULAR LENS: HCPCS | Performed by: OPHTHALMOLOGY

## 2023-08-30 DEVICE — LENS EYHANCE +20.5D: Type: IMPLANTABLE DEVICE | Site: EYE | Status: FUNCTIONAL

## 2023-08-30 RX ORDER — MOXIFLOXACIN 5 MG/ML
SOLUTION/ DROPS OPHTHALMIC
Status: DISCONTINUED | OUTPATIENT
Start: 2023-08-30 | End: 2023-08-30 | Stop reason: HOSPADM

## 2023-08-30 RX ORDER — CYCLOP/TROP/PROPA/PHEN/KET/WAT 1-1-0.1%
1 DROPS (EA) OPHTHALMIC (EYE)
Status: COMPLETED | OUTPATIENT
Start: 2023-08-30 | End: 2023-08-30

## 2023-08-30 RX ORDER — MOXIFLOXACIN 5 MG/ML
1 SOLUTION/ DROPS OPHTHALMIC
Status: COMPLETED | OUTPATIENT
Start: 2023-08-30 | End: 2023-08-30

## 2023-08-30 RX ORDER — PHENYLEPHRINE HYDROCHLORIDE 100 MG/ML
1 SOLUTION/ DROPS OPHTHALMIC
Status: DISCONTINUED | OUTPATIENT
Start: 2023-08-30 | End: 2023-08-30 | Stop reason: HOSPADM

## 2023-08-30 RX ORDER — TETRACAINE HYDROCHLORIDE 5 MG/ML
SOLUTION OPHTHALMIC
Status: DISCONTINUED | OUTPATIENT
Start: 2023-08-30 | End: 2023-08-30 | Stop reason: HOSPADM

## 2023-08-30 RX ORDER — SODIUM CHLORIDE 0.9 % (FLUSH) 0.9 %
2 SYRINGE (ML) INJECTION
Status: DISCONTINUED | OUTPATIENT
Start: 2023-08-30 | End: 2023-08-30 | Stop reason: HOSPADM

## 2023-08-30 RX ORDER — MIDAZOLAM HYDROCHLORIDE 1 MG/ML
INJECTION, SOLUTION INTRAMUSCULAR; INTRAVENOUS
Status: DISCONTINUED | OUTPATIENT
Start: 2023-08-30 | End: 2023-08-30

## 2023-08-30 RX ORDER — ACETAMINOPHEN 325 MG/1
650 TABLET ORAL EVERY 4 HOURS PRN
Status: DISCONTINUED | OUTPATIENT
Start: 2023-08-30 | End: 2023-08-30 | Stop reason: HOSPADM

## 2023-08-30 RX ORDER — PROPARACAINE HYDROCHLORIDE 5 MG/ML
1 SOLUTION/ DROPS OPHTHALMIC
Status: DISCONTINUED | OUTPATIENT
Start: 2023-08-30 | End: 2023-08-30 | Stop reason: HOSPADM

## 2023-08-30 RX ORDER — TETRACAINE HYDROCHLORIDE 5 MG/ML
1 SOLUTION OPHTHALMIC
Status: DISCONTINUED | OUTPATIENT
Start: 2023-08-30 | End: 2023-08-30 | Stop reason: HOSPADM

## 2023-08-30 RX ORDER — PROPARACAINE HYDROCHLORIDE 5 MG/ML
1 SOLUTION/ DROPS OPHTHALMIC DAILY PRN
Status: DISCONTINUED | OUTPATIENT
Start: 2023-08-30 | End: 2023-08-30 | Stop reason: HOSPADM

## 2023-08-30 RX ORDER — LIDOCAINE HYDROCHLORIDE 40 MG/ML
INJECTION, SOLUTION RETROBULBAR
Status: DISCONTINUED | OUTPATIENT
Start: 2023-08-30 | End: 2023-08-30 | Stop reason: HOSPADM

## 2023-08-30 RX ORDER — LIDOCAINE HYDROCHLORIDE 10 MG/ML
INJECTION, SOLUTION EPIDURAL; INFILTRATION; INTRACAUDAL; PERINEURAL
Status: DISCONTINUED | OUTPATIENT
Start: 2023-08-30 | End: 2023-08-30 | Stop reason: HOSPADM

## 2023-08-30 RX ORDER — PREDNISOLONE ACETATE 10 MG/ML
SUSPENSION/ DROPS OPHTHALMIC
Status: DISCONTINUED | OUTPATIENT
Start: 2023-08-30 | End: 2023-08-30 | Stop reason: HOSPADM

## 2023-08-30 RX ADMIN — MOXIFLOXACIN 1 DROP: 5 SOLUTION/ DROPS OPHTHALMIC at 10:08

## 2023-08-30 RX ADMIN — Medication 1 DROP: at 08:08

## 2023-08-30 RX ADMIN — MOXIFLOXACIN OPHTHALMIC 1 DROP: 5 SOLUTION/ DROPS OPHTHALMIC at 08:08

## 2023-08-30 RX ADMIN — MIDAZOLAM HYDROCHLORIDE 1 MG: 1 INJECTION, SOLUTION INTRAMUSCULAR; INTRAVENOUS at 09:08

## 2023-08-30 NOTE — ANESTHESIA PREPROCEDURE EVALUATION
08/30/2023  Adele Hall is a 78 y.o., female.      Pre-op Assessment    I have reviewed the Patient Summary Reports.     I have reviewed the Nursing Notes. I have reviewed the NPO Status.   I have reviewed the Medications.     Review of Systems  Anesthesia Hx:  Denies Family Hx of Anesthesia complications.   Denies Personal Hx of Anesthesia complications.   Cardiovascular:   Hypertension CAD  CABG/stent     Endocrine:   Diabetes        Physical Exam    Airway:  Mallampati: II   Mouth Opening: Normal  Neck ROM: Normal ROM        Anesthesia Plan  Type of Anesthesia, risks & benefits discussed:    Anesthesia Type: MAC  Intra-op Monitoring Plan: Standard ASA Monitors  Induction:  IV  Informed Consent: Informed consent signed with the Patient and all parties understand the risks and agree with anesthesia plan.  All questions answered.   ASA Score: 3  Day of Surgery Review of History & Physical: H&P Update referred to the surgeon/provider.    Ready For Surgery From Anesthesia Perspective.     .

## 2023-08-30 NOTE — DISCHARGE INSTRUCTIONS
Dr. Lam     Cataract Post-Operative Instructions       Day of surgery:     -Resume drops THREE times daily into the operative eye.     -Do not rub your eye     -Wear protective sunglasses during the day.     -Resume moderate activity.     -Bathe/shower/wash face normally     -Do not apply makeup around the operative eye for 1 week.     -You should expect:     Blurry vision and halos for 24-48 hours     Dilated pupil for 24-48 hours     Scratchy feeling in the eye for 1-2 days     Curved shadow in your peripheral vision for 2-3 weeks     Occasional flickering of lights for up to 1 week     -If you experience severe pain or nausea, call Dr. Lam or the on-call doctor at [phone].       Plan to see Dr. Lam tomorrow at:     [facility] [address]     10TH FLOOR     [facility] [address]     **Most patients can drive the next morning.  If you do not feel comfortable driving, please arrange for transportation. **

## 2023-08-30 NOTE — TRANSFER OF CARE
"Anesthesia Transfer of Care Note    Patient: Adele Hall    Procedure(s) Performed: Procedure(s) (LRB):  EXTRACTION, CATARACT, WITH IOL INSERTION (Right)    Patient location: PACU    Anesthesia Type: MAC    Transport from OR: Transported from OR on room air with adequate spontaneous ventilation    Post pain: adequate analgesia    Post assessment: no apparent anesthetic complications    Post vital signs: stable    Level of consciousness: awake    Nausea/Vomiting: no nausea/vomiting    Complications: none    Transfer of care protocol was followed      Last vitals:   Visit Vitals  /65 (BP Location: Right arm, Patient Position: Lying)   Pulse 64   Temp 36.7 °C (98 °F) (Oral)   Resp 16   Ht 5' 1" (1.549 m)   Wt 71.2 kg (157 lb)   SpO2 97%   Breastfeeding No   BMI 29.66 kg/m²     "

## 2023-08-30 NOTE — OP NOTE
DATE OF PROCEDURE: 08/30/2023    SURGEON: CINDY HUFF MD    PREOPERATIVE DIAGNOSIS:  Senile nuclear sclerotic cataract right eye.     POSTOPERATIVE DIAGNOSIS: Senile nuclear sclerotic cataract right eye.     PROCEDURE PERFORMED:  Phacoemulsification with placement of intraocular lens, right eye.    IMPLANT:  DIBOO  20.5    ANESTHESIA:  Topical and MAC    COMPLICATIONS: none    ESTIMATED BLOOD LOSS: <1cc    SPECIMENS: none    INDICATIONS FOR PROCEDURE:   The patient has a history of painless progressive vision loss.  The patient has described difficulties with activities of daily living, which specifically include driving, which is secondary to cataract formation and progression. After we had a thorough discussion about risks, benefits, and alternatives to cataract surgery, the patient agreed to proceed with phacoemulsification and implantation of a lens in the right eye.  These risks include, but are not limited to, hemorrhage, pain, infection, need for additional surgery, need for glasses or contacts, loss of vision, or even loss of the eye.    PROCEDURE IN DETAIL:  The patient was met in the preop holding area.  Consent was confirmed to be signed.  The operative site was marked.  The patient was brought into the operating room by the anesthesia team and placed under monitored anesthesia care.  The right eye was prepped and draped in a sterile ophthalmic fashion.  A Pallavi speculum was placed into the right eye.   A paracentesis site was made and 1% preservative-free lidocaine was injected into the anterior chamber.  Viscoelastic  material was injected into the anterior chamber.  A keratome blade was used to make a clear corneal incision.  A cystotome was used to initiate the continuous curvilinear capsulorrhexis which was completed with Utrata forceps.  BSS on a aguilar cannula was used to perform hydrodissection.  The phacoemulsification tip was introduced into the eye and the nucleus was removed in a  standard divide-and-conquer fashion.  Remaining cortical material was removed from the eye using irrigation-aspiration.  The capsular bag was filled with viscoelastic material and the intraocular lens was injected and positioned into place. Remaining viscoelastic material was removed from the eye using irrigation and aspiration.  The corneal wounds were hydrated.  The eye was filled to physiologic pressure. The wounds were found to be watertight. Drops of Vigamox and prednisilone were placed into the eye.  The eye was washed, dried, and shielded.  The patient tolerated the procedure well and knows to follow up with me tomorrow morning, sooner if needed.

## 2023-08-30 NOTE — DISCHARGE SUMMARY
[facility]  Discharge Note  Short Stay    Procedure(s) (LRB):  EXTRACTION, CATARACT, WITH IOL INSERTION (Right)    BRIEF DISCHARGE NOTE:    Date of discharge: 08/30/2023    Reason for hospitalization -  Cataract surgery     Final Diagnosis - Visually significant Cataract    Procedures and treatment provided - Status post phacoemulsification with placement of intraocular lens     Diet - Advance to regular as tolerated    Activity - as tolerated    Disposition at the end of the case - Good.    Discharge: to home    The patient tolerated the procedure well and knows to follow up with me tomorrow morning in the eye clinic, sooner if needed.    Patient and family instructions (as appropriate) - Given to patient on discharge    Tamiko Lam MD

## 2023-08-30 NOTE — ANESTHESIA POSTPROCEDURE EVALUATION
Anesthesia Post Evaluation    Patient: Adele Hall    Procedure(s) Performed: Procedure(s) (LRB):  EXTRACTION, CATARACT, WITH IOL INSERTION (Right)    Final Anesthesia Type: MAC      Patient location during evaluation: PACU  Patient participation: Yes- Able to Participate  Level of consciousness: awake and alert  Post-procedure vital signs: reviewed and stable  Pain management: adequate  Airway patency: patent    PONV status at discharge: No PONV  Anesthetic complications: no      Cardiovascular status: stable  Respiratory status: spontaneous ventilation  Hydration status: euvolemic  Follow-up not needed.          Vitals Value Taken Time   /65 08/30/23 1017   Temp 36.6 °C (97.8 °F) 08/30/23 1000   Pulse 68 08/30/23 1020   Resp 20 08/30/23 1016   SpO2 98 % 08/30/23 1020   Vitals shown include unvalidated device data.      No case tracking events are documented in the log.      Pain/Antonio Score: Antonio Score: 10 (8/30/2023 10:10 AM)

## 2023-08-31 ENCOUNTER — OFFICE VISIT (OUTPATIENT)
Dept: OPHTHALMOLOGY | Facility: CLINIC | Age: 78
End: 2023-08-31
Payer: MEDICARE

## 2023-08-31 DIAGNOSIS — Z98.41 STATUS POST CATARACT EXTRACTION AND INSERTION OF INTRAOCULAR LENS, RIGHT: Primary | ICD-10-CM

## 2023-08-31 DIAGNOSIS — Z96.1 STATUS POST CATARACT EXTRACTION AND INSERTION OF INTRAOCULAR LENS, RIGHT: Primary | ICD-10-CM

## 2023-08-31 PROCEDURE — 99024 PR POST-OP FOLLOW-UP VISIT: ICD-10-PCS | Mod: S$GLB,,, | Performed by: OPHTHALMOLOGY

## 2023-08-31 PROCEDURE — 3288F PR FALLS RISK ASSESSMENT DOCUMENTED: ICD-10-PCS | Mod: CPTII,S$GLB,, | Performed by: OPHTHALMOLOGY

## 2023-08-31 PROCEDURE — 3288F FALL RISK ASSESSMENT DOCD: CPT | Mod: CPTII,S$GLB,, | Performed by: OPHTHALMOLOGY

## 2023-08-31 PROCEDURE — 1126F AMNT PAIN NOTED NONE PRSNT: CPT | Mod: CPTII,S$GLB,, | Performed by: OPHTHALMOLOGY

## 2023-08-31 PROCEDURE — 1101F PT FALLS ASSESS-DOCD LE1/YR: CPT | Mod: CPTII,S$GLB,, | Performed by: OPHTHALMOLOGY

## 2023-08-31 PROCEDURE — 1126F PR PAIN SEVERITY QUANTIFIED, NO PAIN PRESENT: ICD-10-PCS | Mod: CPTII,S$GLB,, | Performed by: OPHTHALMOLOGY

## 2023-08-31 PROCEDURE — 1101F PR PT FALLS ASSESS DOC 0-1 FALLS W/OUT INJ PAST YR: ICD-10-PCS | Mod: CPTII,S$GLB,, | Performed by: OPHTHALMOLOGY

## 2023-08-31 PROCEDURE — 99024 POSTOP FOLLOW-UP VISIT: CPT | Mod: S$GLB,,, | Performed by: OPHTHALMOLOGY

## 2023-09-01 NOTE — PROGRESS NOTES
HPI    Dr. Renteria    S/p: Phacoemulsification with placement of intraocular lens, right eye.   08/30/2023    Diabetes mellitus type 2 without retinopathy  Cataract associated with type 2 diabetes mellitus  Macular drusen, bilateral    GTTS: Ketorolac TID OD              Pred Forte TID OD              Ofloxacin TID OD    Patient is here today for 1 day post op OD. Pt. States vision is still   blurry at distance and near. Pt. Denies pain or discomfort.   Last edited by Liz Warner on 8/31/2023  3:28 PM.            Assessment /Plan     For exam results, see Encounter Report.    Status post cataract extraction and insertion of intraocular lens, right      Slit Lamp Exam  L/L - normal  C/s - quiet  Cornea - clear  A/C - 1+ cell  Lens - PCIOL    POD #1 s/p phaco/IOL  - doing well  - continue the following drops:    vigamox or ocuflox TID x 1 wk then stop  Pred forte TID x  4 wks  Ketorolac TID until runs out    Versus:    Combination drop - 1 drop TID x total of 1 month    Appropriate precautions and post op medications reviewed.  Patient instructed to call or come in if symptoms of redness, decreased vision, or pain are experienced.    -f/up 1-2 wks, sooner PRN. Or 4 wks with optom for mrx if needed.    Cat OS - can sign consent next if ready to proceed.

## 2023-09-08 ENCOUNTER — OFFICE VISIT (OUTPATIENT)
Dept: OPHTHALMOLOGY | Facility: CLINIC | Age: 78
End: 2023-09-08
Payer: MEDICARE

## 2023-09-08 DIAGNOSIS — Z98.41 STATUS POST CATARACT EXTRACTION AND INSERTION OF INTRAOCULAR LENS, RIGHT: Primary | ICD-10-CM

## 2023-09-08 DIAGNOSIS — H25.12 NUCLEAR SCLEROTIC CATARACT OF LEFT EYE: ICD-10-CM

## 2023-09-08 DIAGNOSIS — Z96.1 STATUS POST CATARACT EXTRACTION AND INSERTION OF INTRAOCULAR LENS, RIGHT: Primary | ICD-10-CM

## 2023-09-08 PROCEDURE — 1101F PT FALLS ASSESS-DOCD LE1/YR: CPT | Mod: CPTII,S$GLB,, | Performed by: OPHTHALMOLOGY

## 2023-09-08 PROCEDURE — 3288F FALL RISK ASSESSMENT DOCD: CPT | Mod: CPTII,S$GLB,, | Performed by: OPHTHALMOLOGY

## 2023-09-08 PROCEDURE — 3288F PR FALLS RISK ASSESSMENT DOCUMENTED: ICD-10-PCS | Mod: CPTII,S$GLB,, | Performed by: OPHTHALMOLOGY

## 2023-09-08 PROCEDURE — 99999 PR PBB SHADOW E&M-EST. PATIENT-LVL III: CPT | Mod: PBBFAC,,, | Performed by: OPHTHALMOLOGY

## 2023-09-08 PROCEDURE — 99024 PR POST-OP FOLLOW-UP VISIT: ICD-10-PCS | Mod: S$GLB,,, | Performed by: OPHTHALMOLOGY

## 2023-09-08 PROCEDURE — 99999 PR PBB SHADOW E&M-EST. PATIENT-LVL III: ICD-10-PCS | Mod: PBBFAC,,, | Performed by: OPHTHALMOLOGY

## 2023-09-08 PROCEDURE — 1159F PR MEDICATION LIST DOCUMENTED IN MEDICAL RECORD: ICD-10-PCS | Mod: CPTII,S$GLB,, | Performed by: OPHTHALMOLOGY

## 2023-09-08 PROCEDURE — 1159F MED LIST DOCD IN RCRD: CPT | Mod: CPTII,S$GLB,, | Performed by: OPHTHALMOLOGY

## 2023-09-08 PROCEDURE — 92136 OPHTHALMIC BIOMETRY: CPT | Mod: 26,LT,S$GLB, | Performed by: OPHTHALMOLOGY

## 2023-09-08 PROCEDURE — 99024 POSTOP FOLLOW-UP VISIT: CPT | Mod: S$GLB,,, | Performed by: OPHTHALMOLOGY

## 2023-09-08 PROCEDURE — 92136 IOL MASTER - OU - BOTH EYES: ICD-10-PCS | Mod: 26,LT,S$GLB, | Performed by: OPHTHALMOLOGY

## 2023-09-08 PROCEDURE — 1101F PR PT FALLS ASSESS DOC 0-1 FALLS W/OUT INJ PAST YR: ICD-10-PCS | Mod: CPTII,S$GLB,, | Performed by: OPHTHALMOLOGY

## 2023-09-08 NOTE — PROGRESS NOTES
Assessment /Plan     For exam results, see Encounter Report.    Status post cataract extraction and insertion of intraocular lens, right    Nuclear sclerotic cataract of left eye  -     IOL Master - OU - Both Eyes      PO week #1 s/p phaco/IOL -    - doing well, no issues    Continue combo drops for a total of 1 month versus: d/c abx gtt, continue PF/ketorolocTID for total of 1 month         Visually significant nuclear sclerotic cataract   - Interfering with activities of daily living.  Pt desires cataract surgery for [facility] rehabilitation.   - R/B/A discussed and pt agrees to proceed with surgery.   - IOL options discussed according to patient's goals and concomitant ocular pathology; and pt content with monofocal lens.    - Target: plano.     (Diboo 21.0 OS)    COV pt    Dry amd  - amsler, mvi

## 2023-09-11 DIAGNOSIS — H25.12 NUCLEAR SCLEROTIC CATARACT OF LEFT EYE: Primary | ICD-10-CM

## 2023-09-11 DIAGNOSIS — H25.11 NUCLEAR SCLEROTIC CATARACT OF RIGHT EYE: ICD-10-CM

## 2023-09-11 DIAGNOSIS — G47.00 INSOMNIA, UNSPECIFIED TYPE: ICD-10-CM

## 2023-09-11 RX ORDER — TRAZODONE HYDROCHLORIDE 50 MG/1
TABLET ORAL
Qty: 90 TABLET | Refills: 3 | Status: SHIPPED | OUTPATIENT
Start: 2023-09-11 | End: 2023-10-30 | Stop reason: SDUPTHER

## 2023-09-11 RX ORDER — OFLOXACIN 3 MG/ML
1 SOLUTION/ DROPS OPHTHALMIC 3 TIMES DAILY
Qty: 5 ML | Refills: 3 | Status: SHIPPED | OUTPATIENT
Start: 2023-09-11 | End: 2023-12-21

## 2023-09-11 RX ORDER — PREDNISOLONE ACETATE 10 MG/ML
1 SUSPENSION/ DROPS OPHTHALMIC 3 TIMES DAILY
Qty: 5 ML | Refills: 3 | Status: SHIPPED | OUTPATIENT
Start: 2023-09-11 | End: 2023-12-21

## 2023-09-11 NOTE — TELEPHONE ENCOUNTER
Refill Decision Note   Adele Hall  is requesting a refill authorization.  Brief Assessment and Rationale for Refill:  Approve     Medication Therapy Plan:         Comments:     No Care Gaps recommended.     Note composed:5:49 PM 09/11/2023

## 2023-09-11 NOTE — TELEPHONE ENCOUNTER
No care due was identified.  [facility] Embedded Care Due Messages. Reference number: [id number].   9/11/2023 10:44:59 AM CDT

## 2023-09-11 NOTE — TELEPHONE ENCOUNTER
Spoke with pt provided arrival time of 8:30 for sx on 9/13/23 with Dr. Lam @ [place]. Pt has eyedrops and packet.

## 2023-09-11 NOTE — H&P
History    Chief complaint:  Painless progressive vision loss    Present Ilness/Diagnosis: Nuclear sclerotic Cataract    Past Medical History:  has a past medical history of Anesthesia complication, Anticoagulant long-term use, Arthritis, Chronic kidney disease, CKD (chronic kidney disease) stage 3, GFR 30-59 ml/min, COPD (chronic obstructive pulmonary disease) (2/7/2016), COPD (chronic obstructive pulmonary disease), Coronary artery disease, Diabetes mellitus, Diabetes mellitus, type 2, GERD (gastroesophageal reflux disease), colonic polyps, Hyperlipidemia, Hypertension, possible new onset CHF (7/30/2016), Tardive dyskinesia, Thyroid disease, Ulcer, and Vertigo.    Family History/Social History: refer to chart    Allergies:   Review of patient's allergies indicates:   Allergen Reactions    Reglan [metoclopramide hcl]      Depression and weight loss.        Current Medications: No current facility-administered medications for this encounter.    Current Outpatient Medications:     amLODIPine (NORVASC) 2.5 MG tablet, Take 1 tablet (2.5 mg total) by mouth once daily., Disp: 90 tablet, Rfl: 3    aspirin (ECOTRIN) 81 MG EC tablet, Take 81 mg by mouth once daily., Disp: , Rfl:     aspirin-sod bicarb-citric acid (ERICKA-SELTZER) 324 mg TbEF, Take 325 mg by mouth every 6 (six) hours as needed., Disp: , Rfl:     BD INSULIN SYRINGE ULTRA-FINE 1 mL 31 gauge x 5/16 Syrg, USE AS DIRECTED 5 TIMES A DAY WITH LEVEMIR & NOVOLOG, Disp: 200 each, Rfl: 10    blood sugar diagnostic (TRUE METRIX GLUCOSE TEST STRIP) Strp, Test blood sugar 3x/day, Disp: 300 strip, Rfl: 3    dicyclomine (BENTYL) 10 MG capsule, , Disp: , Rfl: 3    insulin degludec (TRESIBA FLEXTOUCH U-200) 200 unit/mL (3 mL) insulin pen, Inject 48 Units into the skin once daily., Disp: 3 pen, Rfl: 6    KETOPROFEN TOP, APPLY 1.6 GRAMS (1 PUMP) TO PAINFUL AREAS UP TO 5 TIMES DAILY. RUB IN WELL, Disp: , Rfl: 5    ketorolac 0.5% (ACULAR) 0.5 % Drop, Place 1 drop into the right eye  "4 (four) times daily., Disp: 5 mL, Rfl: 0    lancets 33 gauge Misc, 1 lancet by Misc.(Non-Drug; Combo Route) route 4 (four) times daily., Disp: , Rfl:     levothyroxine (SYNTHROID) 75 MCG tablet, TAKE ONE TABLET BY MOUTH EVERY MORNING BEFORE BREAKFAST, Disp: 30 tablet, Rfl: 11    lisinopriL (PRINIVIL,ZESTRIL) 5 MG tablet, TAKE ONE TABLET BY MOUTH TWO TIMES A DAY, Disp: 180 tablet, Rfl: 1    meclizine (ANTIVERT) 25 mg tablet, Take 1 tablet (25 mg total) by mouth 3 (three) times daily as needed., Disp: 20 tablet, Rfl: 0    metoprolol tartrate (LOPRESSOR) 25 MG tablet, Take 1 tablet (25 mg total) by mouth 2 (two) times daily., Disp: 180 tablet, Rfl: 3    nitroGLYCERIN (NITROSTAT) 0.4 MG SL tablet, Place 1 tablet (0.4 mg total) under the tongue every 5 (five) minutes as needed for Chest pain. Seek medical help is pain is not relieved by the third dose., Disp: 25 tablet, Rfl: 5    pantoprazole (PROTONIX) 40 MG tablet, Take 1 tablet (40 mg total) by mouth once daily., Disp: 90 tablet, Rfl: 3    pen needle, diabetic (BD ULTRA-FINE YUMIKO PEN NEEDLE) 32 gauge x 5/32" Ndle, USE FOUR TIMES A DAY WITH INSULIN, Disp: 400 each, Rfl: 3    prednisoLONE acetate (PRED FORTE) 1 % DrpS, Place 1 drop into the right eye every 4 (four) hours., Disp: 5 mL, Rfl: 0    rosuvastatin (CRESTOR) 40 MG Tab, Take 1 tablet (40 mg total) by mouth every evening., Disp: 90 tablet, Rfl: 3    tirzepatide (MOUNJARO) 5 mg/0.5 mL PnIj, Inject 5 mg into the skin every 7 days., Disp: 4 pen, Rfl: 6    traZODone (DESYREL) 50 MG tablet, TAKE ONE TABLET BY MOUTH EVERY NIGHT, Disp: 90 tablet, Rfl: 1    vitamin D (VITAMIN D3) 1000 units Tab, Take 1,000 Units by mouth once daily., Disp: , Rfl:     Physical Exam    BP: Vital signs stable  General: No apparent distress  HEENT: nuclear sclerotic cataract  Lungs: adequate respirations  Heart: + pulses  Abdomen: soft  Rectal/pelvic: deferred    Impression: Visually significant Cataract.    See previous clinic notes for " surgical indications.    Plan: Phacoemulsification with implantation of Intraocular lens

## 2023-09-12 ENCOUNTER — ANESTHESIA EVENT (OUTPATIENT)
Dept: SURGERY | Facility: HOSPITAL | Age: 78
End: 2023-09-12
Payer: MEDICARE

## 2023-09-12 NOTE — ANESTHESIA PREPROCEDURE EVALUATION
09/12/2023  Adele Hall is a 78 y.o., female.      Pre-op Assessment    I have reviewed the Patient Summary Reports.    I have reviewed the NPO Status.   I have reviewed the Medications.     Review of Systems  Anesthesia Hx:  Denies Family Hx of Anesthesia complications.   Denies Personal Hx of Anesthesia complications.   Social:  Former Smoker    Hematology/Oncology:         -- Anemia:   EENT/Dental:   Eyes:   Cardiovascular:   Hypertension CAD  CABG/stent (2016 )  Angina CHF    Pulmonary:   COPD Shortness of breath    Renal/:   Chronic Renal Disease    Hepatic/GI:   GERD    Neurological:   Neuromuscular Disease,    Endocrine:   Diabetes, type 2 Hypothyroidism Took her diabetic injectable weight loss meds 2 days ago                                                                                                                09/12/2023.  Arthritis Diabetes mellitus, type 2   GERD (gastroesophageal reflux disease) Hyperlipidemia   Hypertension Ulcer   Thyroid disease Coronary artery disease   Anticoagulant long-term use  CHF   COPD (chronic obstructive pulmonary disease) COPD (chronic obstructive pulmonary disease)   Anesthesia complication Tardive dyskinesia   Vertigo Diabetes mellitus   Chronic kidney disease CKD (chronic kidney disease) stage 3, GFR 30-59 ml/min   Hx of colonic polyps      Surgical History    COLONOSCOPY HYSTERECTOMY   TONSILLECTOMY TUBAL LIGATION   TOTAL THYROIDECTOMY CORONARY ARTERY BYPASS GRAFT   CARDIAC SURGERY CHOLECYSTECTOMY   OOPHORECTOMY COLONOSCOPY   CATARACT EXTRACTION W/  INTRAOCULAR LENS IMPLANT            Physical Exam  General: Well nourished, Cooperative, Alert and Oriented    Airway:  Mallampati: II   Mouth Opening: Normal  Neck ROM: Normal ROM        Anesthesia Plan  Type of Anesthesia, risks & benefits discussed:    Anesthesia Type: MAC  Intra-op Monitoring  Plan: Standard ASA Monitors  Post Op Pain Control Plan: IV/PO Opioids PRN and multimodal analgesia  Induction:  IV  Informed Consent: Informed consent signed with the Patient and all parties understand the risks and agree with anesthesia plan.  All questions answered.   ASA Score: 3  Day of Surgery Review of History & Physical: H&P Update referred to the surgeon/provider.I have interviewed and examined the patient. I have reviewed the patient's H&P dated: There are no significant changes. H&P completed by Anesthesiologist.  Anesthesia Plan Notes: No versed  Tylenol IV Ok      Ready For Surgery From Anesthesia Perspective.     .

## 2023-09-12 NOTE — PRE-PROCEDURE INSTRUCTIONS
Unable to reach pt via phone. Left message with instructions along with a request for a call back. RN emphasized for pt to call back if they have been taking abx in the past 2 weeks. The following was sent to pt portal.    - Nothing to eat or drink after midinight, except AM meds with small sips of water  - Hold all Diabetic meds AM of surgery  - Hold all Insulin AM of surgery  - Hold all Fluid pills AM of surgery  - Hold all non-insulin shots until after surgery (Ozempic, Mounjaro, Trulicity, Victoza, Byetta, Wegovy and Adlyxin) (up to 7 days prior)  - Take all B/P meds, except those that contain a fluid pill  - Hold all vits and herbal meds AM of surgery  - Use inhalers as needed and bring AM of surgery  - Take blood thinner meds AM of surgery  - Use eye drops as directed  - Shower and wash face with dial soap for 3 mins PM prior and AM of surgery  - No powder, lotions, creams, (makeup),  or jewelry    - Wear comfortable clothing (button up shirt)     (Patients ride may not leave while patient is in surgery)     -- 2nd floor surgery ctr at [facility] @ [address] [facility] [address], [place] [address]

## 2023-09-13 ENCOUNTER — ANESTHESIA (OUTPATIENT)
Dept: SURGERY | Facility: HOSPITAL | Age: 78
End: 2023-09-13
Payer: MEDICARE

## 2023-09-13 ENCOUNTER — HOSPITAL ENCOUNTER (OUTPATIENT)
Facility: HOSPITAL | Age: 78
Discharge: HOME | End: 2023-09-13
Attending: OPHTHALMOLOGY | Admitting: OPHTHALMOLOGY
Payer: MEDICARE

## 2023-09-13 VITALS
HEART RATE: 67 BPM | DIASTOLIC BLOOD PRESSURE: 55 MMHG | OXYGEN SATURATION: 100 % | TEMPERATURE: 98 F | RESPIRATION RATE: 20 BRPM | SYSTOLIC BLOOD PRESSURE: 117 MMHG

## 2023-09-13 DIAGNOSIS — H25.12 NUCLEAR SCLEROTIC CATARACT OF LEFT EYE: Primary | ICD-10-CM

## 2023-09-13 DIAGNOSIS — H25.12 AGE-RELATED NUCLEAR CATARACT, LEFT: ICD-10-CM

## 2023-09-13 PROBLEM — Z78.9 DIFFICULT INTRAVENOUS ACCESS: Status: ACTIVE | Noted: 2023-09-13

## 2023-09-13 LAB — POCT GLUCOSE: 112 MG/DL (ref 70–110)

## 2023-09-13 PROCEDURE — 25000003 PHARM REV CODE 250: Performed by: OPHTHALMOLOGY

## 2023-09-13 PROCEDURE — D9220A PRA ANESTHESIA: ICD-10-PCS | Mod: ,,, | Performed by: NURSE ANESTHETIST, CERTIFIED REGISTERED

## 2023-09-13 PROCEDURE — 63600175 PHARM REV CODE 636 W HCPCS: Performed by: OPHTHALMOLOGY

## 2023-09-13 PROCEDURE — 63600175 PHARM REV CODE 636 W HCPCS: Performed by: ANESTHESIOLOGY

## 2023-09-13 PROCEDURE — 66984 XCAPSL CTRC RMVL W/O ECP: CPT | Mod: 79,LT,, | Performed by: OPHTHALMOLOGY

## 2023-09-13 PROCEDURE — 37000009 HC ANESTHESIA EA ADD 15 MINS: Performed by: OPHTHALMOLOGY

## 2023-09-13 PROCEDURE — 36000707: Performed by: OPHTHALMOLOGY

## 2023-09-13 PROCEDURE — 99900035 HC TECH TIME PER 15 MIN (STAT)

## 2023-09-13 PROCEDURE — V2632 POST CHMBR INTRAOCULAR LENS: HCPCS | Performed by: OPHTHALMOLOGY

## 2023-09-13 PROCEDURE — 94761 N-INVAS EAR/PLS OXIMETRY MLT: CPT

## 2023-09-13 PROCEDURE — 82962 GLUCOSE BLOOD TEST: CPT | Performed by: OPHTHALMOLOGY

## 2023-09-13 PROCEDURE — 37000008 HC ANESTHESIA 1ST 15 MINUTES: Performed by: OPHTHALMOLOGY

## 2023-09-13 PROCEDURE — 36000706: Performed by: OPHTHALMOLOGY

## 2023-09-13 PROCEDURE — 71000015 HC POSTOP RECOV 1ST HR: Performed by: OPHTHALMOLOGY

## 2023-09-13 PROCEDURE — D9220A PRA ANESTHESIA: Mod: ,,, | Performed by: NURSE ANESTHETIST, CERTIFIED REGISTERED

## 2023-09-13 PROCEDURE — 66984 PR REMOVAL, CATARACT, W/INSRT INTRAOC LENS, W/O ENDO CYCLO: ICD-10-PCS | Mod: 79,LT,, | Performed by: OPHTHALMOLOGY

## 2023-09-13 DEVICE — LENS EYHANCE +21.0D: Type: IMPLANTABLE DEVICE | Site: EYE | Status: FUNCTIONAL

## 2023-09-13 RX ORDER — MOXIFLOXACIN 5 MG/ML
SOLUTION/ DROPS OPHTHALMIC
Status: DISCONTINUED | OUTPATIENT
Start: 2023-09-13 | End: 2023-09-13 | Stop reason: HOSPADM

## 2023-09-13 RX ORDER — PHENYLEPHRINE HYDROCHLORIDE 100 MG/ML
1 SOLUTION/ DROPS OPHTHALMIC
Status: DISCONTINUED | OUTPATIENT
Start: 2023-09-13 | End: 2023-09-13 | Stop reason: HOSPADM

## 2023-09-13 RX ORDER — TETRACAINE HYDROCHLORIDE 5 MG/ML
SOLUTION OPHTHALMIC
Status: DISCONTINUED | OUTPATIENT
Start: 2023-09-13 | End: 2023-09-13 | Stop reason: HOSPADM

## 2023-09-13 RX ORDER — MOXIFLOXACIN 5 MG/ML
1 SOLUTION/ DROPS OPHTHALMIC
Status: COMPLETED | OUTPATIENT
Start: 2023-09-13 | End: 2023-09-13

## 2023-09-13 RX ORDER — PREDNISOLONE ACETATE 10 MG/ML
SUSPENSION/ DROPS OPHTHALMIC
Status: DISCONTINUED | OUTPATIENT
Start: 2023-09-13 | End: 2023-09-13 | Stop reason: HOSPADM

## 2023-09-13 RX ORDER — ACETAMINOPHEN 10 MG/ML
1000 INJECTION, SOLUTION INTRAVENOUS ONCE
Status: COMPLETED | OUTPATIENT
Start: 2023-09-13 | End: 2023-09-13

## 2023-09-13 RX ORDER — PROPARACAINE HYDROCHLORIDE 5 MG/ML
1 SOLUTION/ DROPS OPHTHALMIC
Status: DISCONTINUED | OUTPATIENT
Start: 2023-09-13 | End: 2023-09-13 | Stop reason: HOSPADM

## 2023-09-13 RX ORDER — LIDOCAINE HYDROCHLORIDE 40 MG/ML
INJECTION, SOLUTION RETROBULBAR
Status: DISCONTINUED | OUTPATIENT
Start: 2023-09-13 | End: 2023-09-13 | Stop reason: HOSPADM

## 2023-09-13 RX ORDER — ACETAMINOPHEN 325 MG/1
650 TABLET ORAL EVERY 4 HOURS PRN
Status: DISCONTINUED | OUTPATIENT
Start: 2023-09-13 | End: 2023-09-13 | Stop reason: HOSPADM

## 2023-09-13 RX ORDER — EPINEPHRINE CONVENIENCE KIT 1 MG/ML(1)
KIT INTRAMUSCULAR; SUBCUTANEOUS
Status: DISCONTINUED | OUTPATIENT
Start: 2023-09-13 | End: 2023-09-13 | Stop reason: HOSPADM

## 2023-09-13 RX ORDER — LIDOCAINE HYDROCHLORIDE 10 MG/ML
INJECTION, SOLUTION EPIDURAL; INFILTRATION; INTRACAUDAL; PERINEURAL
Status: DISCONTINUED | OUTPATIENT
Start: 2023-09-13 | End: 2023-09-13 | Stop reason: HOSPADM

## 2023-09-13 RX ORDER — TETRACAINE HYDROCHLORIDE 5 MG/ML
1 SOLUTION OPHTHALMIC
Status: DISCONTINUED | OUTPATIENT
Start: 2023-09-13 | End: 2023-09-13 | Stop reason: HOSPADM

## 2023-09-13 RX ORDER — CYCLOP/TROP/PROPA/PHEN/KET/WAT 1-1-0.1%
1 DROPS (EA) OPHTHALMIC (EYE)
Status: COMPLETED | OUTPATIENT
Start: 2023-09-13 | End: 2023-09-13

## 2023-09-13 RX ORDER — MIDAZOLAM HYDROCHLORIDE 1 MG/ML
INJECTION, SOLUTION INTRAMUSCULAR; INTRAVENOUS
Status: DISCONTINUED | OUTPATIENT
Start: 2023-09-13 | End: 2023-09-13

## 2023-09-13 RX ORDER — SODIUM CHLORIDE 0.9 % (FLUSH) 0.9 %
2 SYRINGE (ML) INJECTION
Status: DISCONTINUED | OUTPATIENT
Start: 2023-09-13 | End: 2023-09-13 | Stop reason: HOSPADM

## 2023-09-13 RX ADMIN — Medication 1 DROP: at 09:09

## 2023-09-13 RX ADMIN — MOXIFLOXACIN OPHTHALMIC 1 DROP: 5 SOLUTION/ DROPS OPHTHALMIC at 09:09

## 2023-09-13 RX ADMIN — MOXIFLOXACIN 1 DROP: 5 SOLUTION/ DROPS OPHTHALMIC at 11:09

## 2023-09-13 RX ADMIN — MIDAZOLAM HYDROCHLORIDE 0.5 MG: 1 INJECTION, SOLUTION INTRAMUSCULAR; INTRAVENOUS at 10:09

## 2023-09-13 RX ADMIN — MIDAZOLAM HYDROCHLORIDE 1 MG: 1 INJECTION, SOLUTION INTRAMUSCULAR; INTRAVENOUS at 10:09

## 2023-09-13 RX ADMIN — ACETAMINOPHEN 1000 MG: 10 INJECTION INTRAVENOUS at 10:09

## 2023-09-13 NOTE — DISCHARGE SUMMARY
[facility]  Discharge Note  Short Stay    Procedure(s) (LRB):  EXTRACTION, CATARACT, WITH IOL INSERTION (Left)    BRIEF DISCHARGE NOTE:    Date of discharge: 09/13/2023    Reason for hospitalization -  Cataract surgery     Final Diagnosis - Visually significant Cataract    Procedures and treatment provided - Status post phacoemulsification with placement of intraocular lens     Diet - Advance to regular as tolerated    Activity - as tolerated    Disposition at the end of the case - Good.    Discharge: to home    The patient tolerated the procedure well and knows to follow up with me tomorrow morning in the eye clinic, sooner if needed.    Patient and family instructions (as appropriate) - Given to patient on discharge    Tamiko Lam MD

## 2023-09-13 NOTE — DISCHARGE INSTRUCTIONS
Dr. Lam   Cataract Post-Operative Instructions     Day of surgery:     -Resume drops THREE times daily into the operative eye.     -Do not rub your eye     -Wear protective sunglasses during the day.     -Resume moderate activity.     -Bathe/shower/wash face normally     -Do not apply makeup around the operative eye for 1 week.     -You should expect:     Blurry vision and halos for 24-48 hours     Dilated pupil for 24-48 hours     Scratchy feeling in the eye for 1-2 days     Curved shadow in your peripheral vision for 2-3 weeks     Occasional flickering of lights for up to 1 week     -If you experience severe pain or nausea, call Dr. Lam or the on-call doctor at [phone].     Plan to see Dr. Lam tomorrow at:     [facility] [address]   10TH FLOOR   [facility] [address]     **Most patients can drive the next morning.  If you do not feel comfortable driving, please arrange for transportation. **

## 2023-09-13 NOTE — PLAN OF CARE
Dr Gonsalez at bedside speaking with pt regarding last dose of Mounjaro 9/11/23. Pt informed that she may not be a candidate for anesthesia.

## 2023-09-13 NOTE — PLAN OF CARE
Vel Lam & Sunshine at bedside, speaking with pt. Pt agrreable to undergo surgery under local anesthesia, pre medicated with tylenol preop, benadryl in the OR.

## 2023-09-13 NOTE — ANESTHESIA POSTPROCEDURE EVALUATION
Anesthesia Post Evaluation    Patient: Adele Hall    Procedure(s) Performed: Procedure(s) (LRB):  EXTRACTION, CATARACT, WITH IOL INSERTION (Left)    Final Anesthesia Type: MAC      Patient location during evaluation: PACU  Patient participation: Yes- Able to Participate  Level of consciousness: awake and alert, awake and oriented  Post-procedure vital signs: reviewed and stable  Pain management: adequate  Airway patency: patent    PONV status at discharge: No PONV  Anesthetic complications: no      Cardiovascular status: blood pressure returned to baseline  Respiratory status: unassisted, spontaneous ventilation and room air  Hydration status: euvolemic  Follow-up not needed.          Vitals Value Taken Time   /78 09/13/23 1120     09/13/23 1120   Pulse 69 09/13/23 1120   Resp 20 09/13/23 1120   SpO2 100 % 09/13/23 1120   Vitals shown include unvalidated device data.      No case tracking events are documented in the log.      Pain/Antonio Score: Pain Rating Prior to Med Admin: 4 (9/13/2023 10:23 AM)  Antonio Score: 9 (9/13/2023 11:04 AM)

## 2023-09-13 NOTE — TRANSFER OF CARE
Anesthesia Transfer of Care Note    Patient: Adele Hall    Procedure(s) Performed: Procedure(s) (LRB):  EXTRACTION, CATARACT, WITH IOL INSERTION (Left)    Patient location: OPS    Anesthesia Type: MAC    Transport from OR: Transported from OR on room air with adequate spontaneous ventilation    Post pain: adequate analgesia    Post assessment: no apparent anesthetic complications    Post vital signs: stable    Level of consciousness: awake, alert and oriented    Nausea/Vomiting: no nausea/vomiting    Complications: none    Transfer of care protocol was followed      Last vitals:   Visit Vitals  BP (!) 169/78 (BP Location: Right arm, Patient Position: Sitting)   Pulse 68   Temp 36.9 °C (98.4 °F)   Resp 18   SpO2 98%   Breastfeeding No

## 2023-09-13 NOTE — OP NOTE
DATE OF PROCEDURE: 09/13/2023    SURGEON: CINDY HUFF MD    PREOPERATIVE DIAGNOSIS:  Senile nuclear sclerotic cataract left eye.     POSTOPERATIVE DIAGNOSIS: Senile nuclear sclerotic cataract left eye.     PROCEDURE PERFORMED:  Phacoemulsification with placement of intraocular lens, left eye.    IMPLANT:  DIB00 21.0    ANESTHESIA:  Topical and MAC    COMPLICATIONS: none    ESTIMATED BLOOD LOSS: <1cc    SPECIMENS: none    INDICATIONS FOR PROCEDURE:   The patient has a history of painless progressive vision loss.  The patient has described difficulties with activities of daily living, which specifically include driving, which is secondary to cataract formation and progression. After we had a thorough discussion about risks, benefits, and alternatives to cataract surgery, the patient agreed to proceed with phacoemulsification and implantation of a lens in the left eye.  These risks include, but are not limited to, hemorrhage, pain, infection, need for additional surgery, need for glasses or contacts, loss of vision, or even loss of the eye.    PROCEDURE IN DETAIL:  The patient was met in the preop holding area.  Consent was confirmed to be signed.  The operative site was marked.  The patient was brought into the operating room by the anesthesia team and placed under monitored anesthesia care.  The left eye was prepped and draped in a sterile ophthalmic fashion.  A Pallavi speculum was placed into the left eye.   A paracentesis site was made and 1% preservative-free lidocaine was injected into the anterior chamber.  Viscoelastic  material was injected into the anterior chamber.  A keratome blade was used to make a clear corneal incision.  A cystotome was used to initiate the continuous curvilinear capsulorrhexis which was completed with Utrata forceps.  BSS on a aguilar cannula was used to perform hydrodissection.  The phacoemulsification tip was introduced into the eye and the nucleus was removed in a standard  divide-and-conquer fashion.  Remaining cortical material was removed from the eye using irrigation-aspiration.  The capsular bag was filled with viscoelastic material and the intraocular lens was injected and positioned into place. Remaining viscoelastic material was removed from the eye using irrigation and aspiration.  The corneal wounds were hydrated.  The eye was filled to physiologic pressure. The wounds were found to be watertight. Drops of Vigamox and prednisilone were placed into the eye.  The eye was washed, dried, and shielded.  The patient tolerated the procedure well and knows to follow up with me tomorrow morning, sooner if needed.

## 2023-09-14 ENCOUNTER — OFFICE VISIT (OUTPATIENT)
Dept: OPHTHALMOLOGY | Facility: CLINIC | Age: 78
End: 2023-09-14
Payer: MEDICARE

## 2023-09-14 DIAGNOSIS — Z96.1 STATUS POST CATARACT EXTRACTION AND INSERTION OF INTRAOCULAR LENS, RIGHT: ICD-10-CM

## 2023-09-14 DIAGNOSIS — Z98.42 STATUS POST CATARACT EXTRACTION AND INSERTION OF INTRAOCULAR LENS, LEFT: Primary | ICD-10-CM

## 2023-09-14 DIAGNOSIS — Z98.41 STATUS POST CATARACT EXTRACTION AND INSERTION OF INTRAOCULAR LENS, RIGHT: ICD-10-CM

## 2023-09-14 DIAGNOSIS — Z96.1 STATUS POST CATARACT EXTRACTION AND INSERTION OF INTRAOCULAR LENS, LEFT: Primary | ICD-10-CM

## 2023-09-14 PROCEDURE — 1159F MED LIST DOCD IN RCRD: CPT | Mod: CPTII,S$GLB,, | Performed by: OPHTHALMOLOGY

## 2023-09-14 PROCEDURE — 99999 PR PBB SHADOW E&M-EST. PATIENT-LVL III: ICD-10-PCS | Mod: PBBFAC,,, | Performed by: OPHTHALMOLOGY

## 2023-09-14 PROCEDURE — 1159F PR MEDICATION LIST DOCUMENTED IN MEDICAL RECORD: ICD-10-PCS | Mod: CPTII,S$GLB,, | Performed by: OPHTHALMOLOGY

## 2023-09-14 PROCEDURE — 1101F PT FALLS ASSESS-DOCD LE1/YR: CPT | Mod: CPTII,S$GLB,, | Performed by: OPHTHALMOLOGY

## 2023-09-14 PROCEDURE — 3288F PR FALLS RISK ASSESSMENT DOCUMENTED: ICD-10-PCS | Mod: CPTII,S$GLB,, | Performed by: OPHTHALMOLOGY

## 2023-09-14 PROCEDURE — 99024 PR POST-OP FOLLOW-UP VISIT: ICD-10-PCS | Mod: S$GLB,,, | Performed by: OPHTHALMOLOGY

## 2023-09-14 PROCEDURE — 3288F FALL RISK ASSESSMENT DOCD: CPT | Mod: CPTII,S$GLB,, | Performed by: OPHTHALMOLOGY

## 2023-09-14 PROCEDURE — 99999 PR PBB SHADOW E&M-EST. PATIENT-LVL III: CPT | Mod: PBBFAC,,, | Performed by: OPHTHALMOLOGY

## 2023-09-14 PROCEDURE — 1101F PR PT FALLS ASSESS DOC 0-1 FALLS W/OUT INJ PAST YR: ICD-10-PCS | Mod: CPTII,S$GLB,, | Performed by: OPHTHALMOLOGY

## 2023-09-14 PROCEDURE — 99024 POSTOP FOLLOW-UP VISIT: CPT | Mod: S$GLB,,, | Performed by: OPHTHALMOLOGY

## 2023-09-14 PROCEDURE — 1126F PR PAIN SEVERITY QUANTIFIED, NO PAIN PRESENT: ICD-10-PCS | Mod: CPTII,S$GLB,, | Performed by: OPHTHALMOLOGY

## 2023-09-14 PROCEDURE — 1126F AMNT PAIN NOTED NONE PRSNT: CPT | Mod: CPTII,S$GLB,, | Performed by: OPHTHALMOLOGY

## 2023-09-15 NOTE — PROGRESS NOTES
HPI    LINSABA     S/p: Phacoemulsification with placement of intraocular lens, right eye.   08/30/2023  S/p: Phaco /IOL OS 09/13/2023    Diabetes mellitus type 2 without retinopathy  Cataract associated with type 2 diabetes mellitus  Macular drusen, bilateral    GTTS: Ketorolac TID OU              Pred Forte TID OU              Ofloxacin TID OS    Patient is here today for 1 day post op OS. Doing well. Denies any pain  States no flashers or floaters       Last edited by Ermias Tomlin MA on 9/14/2023  2:32 PM.            Assessment /Plan     For exam results, see Encounter Report.    Status post cataract extraction and insertion of intraocular lens, left    Status post cataract extraction and insertion of intraocular lens, right      Slit Lamp Exam  L/L - normal  C/s - quiet  Cornea - clear  A/C - 1+ cell  Lens - PCIOL    POD #1 s/p phaco/IOL  - doing well  - continue the following drops:    vigamox or ocuflox TID x 1 wk then stop  Pred forte TID x  4 wks  Ketorolac TID until runs out    Versus:    Combination drop - 1 drop TID x total of 1 month    Appropriate precautions and post op medications reviewed.  Patient instructed to call or come in if symptoms of redness, decreased vision, or pain are experienced.    -f/up 1-2 wks, sooner PRN. Or 4 wks with optom for mrx if needed.

## 2023-09-20 DIAGNOSIS — Z78.0 MENOPAUSE: ICD-10-CM

## 2023-10-04 ENCOUNTER — TELEPHONE (OUTPATIENT)
Dept: ENDOCRINOLOGY | Facility: CLINIC | Age: 78
End: 2023-10-04
Payer: MEDICARE

## 2023-10-04 NOTE — TELEPHONE ENCOUNTER
Received Audit form from [employer] for [facility] . Form signed and faxed to [facility]

## 2023-10-04 NOTE — TELEPHONE ENCOUNTER
S/w pharmacy. States they are having to do an audit and needs signature from the ordering physician regarding the pts tresiba prescription. They are faxing to [facility].

## 2023-10-04 NOTE — TELEPHONE ENCOUNTER
----- Message from Bree Thomasey sent at 10/4/2023 11:23 AM CDT -----  Regarding: Clarfication on drug  Type:  Pharmacy Calling to Clarify an RX    Name of Caller:Carisa    Pharmacy Name:  [facility] - [place], [place] - [address]  [address]  [place] [address]  Phone: [phone] Fax: [phone]      Prescription Name:insulin degludec (TRESIBA FLEXTOUCH U-200) 200 unit/mL (3 mL) insulin pen    What do they need to clarify?:Regarding direction and quantity of the prescription    Best Call Back Number:[phone]    Additional Information: Thank

## 2023-10-06 DIAGNOSIS — E11.22 TYPE 2 DIABETES MELLITUS WITH STAGE 3B CHRONIC KIDNEY DISEASE, WITH LONG-TERM CURRENT USE OF INSULIN: ICD-10-CM

## 2023-10-06 DIAGNOSIS — Z79.4 TYPE 2 DIABETES MELLITUS WITH STAGE 3B CHRONIC KIDNEY DISEASE, WITH LONG-TERM CURRENT USE OF INSULIN: ICD-10-CM

## 2023-10-06 DIAGNOSIS — N18.32 TYPE 2 DIABETES MELLITUS WITH STAGE 3B CHRONIC KIDNEY DISEASE, WITH LONG-TERM CURRENT USE OF INSULIN: ICD-10-CM

## 2023-10-06 RX ORDER — INSULIN DEGLUDEC 200 U/ML
48 INJECTION, SOLUTION SUBCUTANEOUS DAILY
Qty: 3 PEN | Refills: 11 | Status: SHIPPED | OUTPATIENT
Start: 2023-10-06 | End: 2023-12-05 | Stop reason: SDUPTHER

## 2023-10-09 ENCOUNTER — LAB VISIT (OUTPATIENT)
Dept: LAB | Facility: HOSPITAL | Age: 78
End: 2023-10-09
Attending: INTERNAL MEDICINE
Payer: MEDICARE

## 2023-10-09 DIAGNOSIS — E79.0 URICACIDEMIA: ICD-10-CM

## 2023-10-09 DIAGNOSIS — N25.81 SECONDARY HYPERPARATHYROIDISM OF RENAL ORIGIN: ICD-10-CM

## 2023-10-09 DIAGNOSIS — N18.30 CHRONIC KIDNEY DISEASE, STAGE III (MODERATE): Primary | ICD-10-CM

## 2023-10-09 DIAGNOSIS — D64.9 ANEMIA, UNSPECIFIED: ICD-10-CM

## 2023-10-09 DIAGNOSIS — E83.42 HYPOMAGNESEMIA: ICD-10-CM

## 2023-10-09 LAB
ALBUMIN SERPL BCP-MCNC: 3.2 G/DL (ref 3.5–5.2)
ANION GAP SERPL CALC-SCNC: 10 MMOL/L (ref 8–16)
BUN SERPL-MCNC: 18 MG/DL (ref 8–23)
CALCIUM SERPL-MCNC: 9.2 MG/DL (ref 8.7–10.5)
CHLORIDE SERPL-SCNC: 104 MMOL/L (ref 95–110)
CO2 SERPL-SCNC: 25 MMOL/L (ref 23–29)
CREAT SERPL-MCNC: 1.3 MG/DL (ref 0.5–1.4)
CREAT UR-MCNC: 329 MG/DL (ref 15–325)
EST. GFR  (NO RACE VARIABLE): 42.6 ML/MIN/1.73 M^2
GLUCOSE SERPL-MCNC: 179 MG/DL (ref 70–110)
MAGNESIUM SERPL-MCNC: 1.7 MG/DL (ref 1.6–2.6)
PHOSPHATE SERPL-MCNC: 3.1 MG/DL (ref 2.7–4.5)
POTASSIUM SERPL-SCNC: 3.8 MMOL/L (ref 3.5–5.1)
PROT UR-MCNC: 190 MG/DL (ref 0–15)
PROT/CREAT UR: 0.58 MG/G{CREAT} (ref 0–0.2)
PTH-INTACT SERPL-MCNC: 99.9 PG/ML (ref 9–77)
SODIUM SERPL-SCNC: 139 MMOL/L (ref 136–145)
URATE SERPL-MCNC: 4.9 MG/DL (ref 2.4–5.7)

## 2023-10-09 PROCEDURE — 82570 ASSAY OF URINE CREATININE: CPT | Performed by: INTERNAL MEDICINE

## 2023-10-09 PROCEDURE — 80069 RENAL FUNCTION PANEL: CPT | Performed by: INTERNAL MEDICINE

## 2023-10-09 PROCEDURE — 81001 URINALYSIS AUTO W/SCOPE: CPT | Performed by: INTERNAL MEDICINE

## 2023-10-09 PROCEDURE — 82306 VITAMIN D 25 HYDROXY: CPT | Performed by: INTERNAL MEDICINE

## 2023-10-09 PROCEDURE — 84550 ASSAY OF BLOOD/URIC ACID: CPT | Performed by: INTERNAL MEDICINE

## 2023-10-09 PROCEDURE — 83970 ASSAY OF PARATHORMONE: CPT | Performed by: INTERNAL MEDICINE

## 2023-10-09 PROCEDURE — 85025 COMPLETE CBC W/AUTO DIFF WBC: CPT | Performed by: INTERNAL MEDICINE

## 2023-10-09 PROCEDURE — 83735 ASSAY OF MAGNESIUM: CPT | Performed by: INTERNAL MEDICINE

## 2023-10-10 LAB
25(OH)D3+25(OH)D2 SERPL-MCNC: 32 NG/ML (ref 30–96)
BACTERIA #/AREA URNS AUTO: ABNORMAL /HPF
BASOPHILS # BLD AUTO: 0.05 K/UL (ref 0–0.2)
BASOPHILS NFR BLD: 0.6 % (ref 0–1.9)
BILIRUB UR QL STRIP: NEGATIVE
CLARITY UR REFRACT.AUTO: ABNORMAL
COLOR UR AUTO: YELLOW
DIFFERENTIAL METHOD: ABNORMAL
EOSINOPHIL # BLD AUTO: 0.2 K/UL (ref 0–0.5)
EOSINOPHIL NFR BLD: 2.5 % (ref 0–8)
ERYTHROCYTE [DISTWIDTH] IN BLOOD BY AUTOMATED COUNT: 13.9 % (ref 11.5–14.5)
GLUCOSE UR QL STRIP: NEGATIVE
HCT VFR BLD AUTO: 42.5 % (ref 37–48.5)
HGB BLD-MCNC: 13.1 G/DL (ref 12–16)
HGB UR QL STRIP: NEGATIVE
HYALINE CASTS UR QL AUTO: 11 /LPF
IMM GRANULOCYTES # BLD AUTO: 0.02 K/UL (ref 0–0.04)
IMM GRANULOCYTES NFR BLD AUTO: 0.2 % (ref 0–0.5)
KETONES UR QL STRIP: NEGATIVE
LEUKOCYTE ESTERASE UR QL STRIP: NEGATIVE
LYMPHOCYTES # BLD AUTO: 3.4 K/UL (ref 1–4.8)
LYMPHOCYTES NFR BLD: 38.3 % (ref 18–48)
MCH RBC QN AUTO: 29.2 PG (ref 27–31)
MCHC RBC AUTO-ENTMCNC: 30.8 G/DL (ref 32–36)
MCV RBC AUTO: 95 FL (ref 82–98)
MICROSCOPIC COMMENT: ABNORMAL
MONOCYTES # BLD AUTO: 0.5 K/UL (ref 0.3–1)
MONOCYTES NFR BLD: 6.1 % (ref 4–15)
NEUTROPHILS # BLD AUTO: 4.6 K/UL (ref 1.8–7.7)
NEUTROPHILS NFR BLD: 52.3 % (ref 38–73)
NITRITE UR QL STRIP: NEGATIVE
NRBC BLD-RTO: 0 /100 WBC
PH UR STRIP: 6 [PH] (ref 5–8)
PLATELET # BLD AUTO: 208 K/UL (ref 150–450)
PMV BLD AUTO: 11.7 FL (ref 9.2–12.9)
PROT UR QL STRIP: ABNORMAL
RBC # BLD AUTO: 4.49 M/UL (ref 4–5.4)
RBC #/AREA URNS AUTO: 1 /HPF (ref 0–4)
SP GR UR STRIP: 1.02 (ref 1–1.03)
SQUAMOUS #/AREA URNS AUTO: 38 /HPF
URN SPEC COLLECT METH UR: ABNORMAL
WBC # BLD AUTO: 8.75 K/UL (ref 3.9–12.7)
WBC #/AREA URNS AUTO: 3 /HPF (ref 0–5)

## 2023-10-16 ENCOUNTER — OFFICE VISIT (OUTPATIENT)
Dept: OPTOMETRY | Facility: CLINIC | Age: 78
End: 2023-10-16
Payer: MEDICARE

## 2023-10-16 DIAGNOSIS — H52.7 REFRACTIVE ERROR: ICD-10-CM

## 2023-10-16 DIAGNOSIS — Z98.42 S/P BILATERAL CATARACT EXTRACTION: ICD-10-CM

## 2023-10-16 DIAGNOSIS — Z96.1 PSEUDOPHAKIA OF BOTH EYES: ICD-10-CM

## 2023-10-16 DIAGNOSIS — Z98.890 POST-OPERATIVE STATE: Primary | ICD-10-CM

## 2023-10-16 DIAGNOSIS — Z98.41 S/P BILATERAL CATARACT EXTRACTION: ICD-10-CM

## 2023-10-16 PROCEDURE — 92015 DETERMINE REFRACTIVE STATE: CPT | Mod: S$GLB,,, | Performed by: OPTOMETRIST

## 2023-10-16 PROCEDURE — 1159F PR MEDICATION LIST DOCUMENTED IN MEDICAL RECORD: ICD-10-PCS | Mod: CPTII,S$GLB,, | Performed by: OPTOMETRIST

## 2023-10-16 PROCEDURE — 99024 POSTOP FOLLOW-UP VISIT: CPT | Mod: S$GLB,,, | Performed by: OPTOMETRIST

## 2023-10-16 PROCEDURE — 1101F PT FALLS ASSESS-DOCD LE1/YR: CPT | Mod: CPTII,S$GLB,, | Performed by: OPTOMETRIST

## 2023-10-16 PROCEDURE — 3288F PR FALLS RISK ASSESSMENT DOCUMENTED: ICD-10-PCS | Mod: CPTII,S$GLB,, | Performed by: OPTOMETRIST

## 2023-10-16 PROCEDURE — 1159F MED LIST DOCD IN RCRD: CPT | Mod: CPTII,S$GLB,, | Performed by: OPTOMETRIST

## 2023-10-16 PROCEDURE — 99024 PR POST-OP FOLLOW-UP VISIT: ICD-10-PCS | Mod: S$GLB,,, | Performed by: OPTOMETRIST

## 2023-10-16 PROCEDURE — 1126F AMNT PAIN NOTED NONE PRSNT: CPT | Mod: CPTII,S$GLB,, | Performed by: OPTOMETRIST

## 2023-10-16 PROCEDURE — 3288F FALL RISK ASSESSMENT DOCD: CPT | Mod: CPTII,S$GLB,, | Performed by: OPTOMETRIST

## 2023-10-16 PROCEDURE — 1126F PR PAIN SEVERITY QUANTIFIED, NO PAIN PRESENT: ICD-10-PCS | Mod: CPTII,S$GLB,, | Performed by: OPTOMETRIST

## 2023-10-16 PROCEDURE — 1101F PR PT FALLS ASSESS DOC 0-1 FALLS W/OUT INJ PAST YR: ICD-10-PCS | Mod: CPTII,S$GLB,, | Performed by: OPTOMETRIST

## 2023-10-16 PROCEDURE — 99999 PR PBB SHADOW E&M-EST. PATIENT-LVL III: ICD-10-PCS | Mod: PBBFAC,,, | Performed by: OPTOMETRIST

## 2023-10-16 PROCEDURE — 99999 PR PBB SHADOW E&M-EST. PATIENT-LVL III: CPT | Mod: PBBFAC,,, | Performed by: OPTOMETRIST

## 2023-10-16 PROCEDURE — 92015 PR REFRACTION: ICD-10-PCS | Mod: S$GLB,,, | Performed by: OPTOMETRIST

## 2023-10-16 NOTE — PROGRESS NOTES
"HPI    Patient in for progress check/ post-op   Last glasses were trifocal.     Being followed for (diagnosis):   Phaco/IOL OU - finished treatment   regimen with post-op eyedrops in both eyes.  Both eye generally feel okay, but states she does not notice much of a   difference in her visual acuity since cataract surgery    Date last seen:  9/14/23    Doctor last seen:  Dr. Lam    Prescribed eye medications(s) using:  None    OTC eye medication(s) using:  None    Signs/symptoms of condition resolved/better/stable/worse?:  VA OU about   the same. OU does not water as much. Has old trifocals at home but have   not used since surgery.    PD 59/55  Reading distance = 14"              Last edited by Franki Hoffman, OD on 10/16/2023 10:30 AM.            Assessment /Plan     For exam results, see Encounter Report.    1. Post-operative state        2. S/P bilateral cataract extraction        3. Pseudophakia of both eyes        4. Refractive error                       S/p bilateral cataract surgery, with bilateral posterior chamber intraocular lens.  Good surgical outcome in each eye.  Slight residual distance refractive error in the right eye.  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    New spectacle lens Rx issued for use as desired.    Recheck in one year - or prior, if any problems noted in the interim       "

## 2023-10-16 NOTE — PATIENT INSTRUCTIONS
S/p bilateral cataract surgery, with bilateral posterior chamber intraocular lens.  Good surgical outcome in each eye.  Slight residual distance refractive error in the right eye.  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    New spectacle lens Rx issued for use as desired.    Recheck in one year - or prior, if any problems noted in the interim

## 2023-10-30 DIAGNOSIS — G47.00 INSOMNIA, UNSPECIFIED TYPE: ICD-10-CM

## 2023-10-30 NOTE — TELEPHONE ENCOUNTER
No care due was identified.  [facility] Embedded Care Due Messages. Reference number: [id number].   10/30/2023 9:18:55 AM CDT

## 2023-10-30 NOTE — TELEPHONE ENCOUNTER
Requesting refill for  Trazodone     Last visit 8/18/2023  Next visit 8/19/2024  Please change dosage  She was increased to 100 mg nightly         ----- Message from Jimy Byrd sent at 10/27/2023  3:03 PM CDT -----  Regarding: Refill  Contact: patient  Type:  RX Refill Request    Who Called: patient  Refill or New Rx:Refill  RX Name and Strength:traZODone (DESYREL) 50 MG tablet  How is the patient currently taking it? (ex. 1XDay):2 a day per MD  Is this a 30 day or 90 day RX:90  Preferred Pharmacy with phone number:  [facility] - [place], [place] - [address] [address] [id number] [address]  [place] [address]  Phone: [phone] Fax: [phone]  Local or Mail Order:local  Ordering Provider:Wally Cazares  Would the patient rather a call back or a response via [facility]? Please refill with 2 a day per MD  Best Call Back Number:[phone]  Additional Information:

## 2023-10-31 DIAGNOSIS — E11.22 TYPE 2 DIABETES MELLITUS WITH STAGE 3B CHRONIC KIDNEY DISEASE, WITH LONG-TERM CURRENT USE OF INSULIN: ICD-10-CM

## 2023-10-31 DIAGNOSIS — N18.32 TYPE 2 DIABETES MELLITUS WITH STAGE 3B CHRONIC KIDNEY DISEASE, WITH LONG-TERM CURRENT USE OF INSULIN: ICD-10-CM

## 2023-10-31 DIAGNOSIS — Z79.4 TYPE 2 DIABETES MELLITUS WITH STAGE 3B CHRONIC KIDNEY DISEASE, WITH LONG-TERM CURRENT USE OF INSULIN: ICD-10-CM

## 2023-10-31 RX ORDER — TRAZODONE HYDROCHLORIDE 50 MG/1
50 TABLET ORAL NIGHTLY PRN
Qty: 90 TABLET | Refills: 3 | Status: SHIPPED | OUTPATIENT
Start: 2023-10-31 | End: 2023-11-02 | Stop reason: DRUGHIGH

## 2023-10-31 RX ORDER — TIRZEPATIDE 5 MG/.5ML
5 INJECTION, SOLUTION SUBCUTANEOUS
Qty: 4 PEN | Refills: 6 | Status: SHIPPED | OUTPATIENT
Start: 2023-10-31

## 2023-11-02 RX ORDER — TRAZODONE HYDROCHLORIDE 100 MG/1
100 TABLET ORAL NIGHTLY
Qty: 90 TABLET | Refills: 3 | Status: SHIPPED | OUTPATIENT
Start: 2023-11-02 | End: 2024-11-01

## 2023-11-02 NOTE — TELEPHONE ENCOUNTER
No care due was identified.  [facility] Embedded Care Due Messages. Reference number: [id number].   11/02/2023 2:00:43 PM CDT

## 2023-11-02 NOTE — TELEPHONE ENCOUNTER
----- Message from Lupis Elizalde, Patient Care Assistant sent at 11/2/2023  1:49 PM CDT -----  Regarding: advice  Contact: pt's katerin Gallardo  Type: Needs Medical Advice    Who Called:  pt's katerin Gallardo    Best Call Back Number: [phone] (home)     Additional Information: pt's katerin Asmi states he would like a callback regarding traZODone (DESYREL) 50 MG tablet. Please call pt to advise. Thanks!

## 2023-11-06 NOTE — PROGRESS NOTES
Detail Level: Generalized Past Medical History:   Diagnosis Date    Anesthesia complication     took patient 6 days to come out of anethesia after CABG    Anticoagulant long-term use     Arthritis     Chronic kidney disease     CKD (chronic kidney disease) stage 3, GFR 30-59 ml/min     Dr. Montero    COPD (chronic obstructive pulmonary disease) 2/7/2016    COPD (chronic obstructive pulmonary disease)     Coronary artery disease     Diabetes mellitus     Diabetes mellitus, type 2     GERD (gastroesophageal reflux disease)     Hx of colonic polyps     Hyperlipidemia     Hypertension     possible new onset CHF 7/30/2016    Tardive dyskinesia     Thyroid disease     Ulcer     Vertigo        Past Surgical History:   Procedure Laterality Date    CARDIAC SURGERY      CHOLECYSTECTOMY  01/26/2017    COLONOSCOPY      CORONARY ARTERY BYPASS GRAFT  01/19/2016    4 vessel    HYSTERECTOMY      OOPHORECTOMY      TONSILLECTOMY      TOTAL THYROIDECTOMY      TUBAL LIGATION         Current Outpatient Medications   Medication Sig    ammonium lactate (LAC-HYDRIN) 12 % lotion Apply topically as needed for Dry Skin.    aspirin (ECOTRIN) 81 MG EC tablet Take 81 mg by mouth once daily.    BD INSULIN SYRINGE ULTRA-FINE 1 mL 31 gauge x 5/16 Syrg USE AS DIRECTED 5 TIMES A DAY WITH LEVEMIR & NOVOLOG    blood sugar diagnostic (BLOOD GLUCOSE TEST) Strp 1 each by Misc.(Non-Drug; Combo Route) route 4 (four) times daily. DX E11.22 Pt is on insulin.    dicyclomine (BENTYL) 10 MG capsule     insulin aspart U-100 (NOVOLOG FLEXPEN U-100 INSULIN) 100 unit/mL (3 mL) InPn pen Inject 34 Units into the skin 2 (two) times daily with meals. Plus correction scale, max  units    insulin degludec (TRESIBA FLEXTOUCH U-200) 200 unit/mL (3 mL) InPn Inject 70 Units into the skin once daily.    KETOPROFEN TOP APPLY 1.6 GRAMS (1 PUMP) TO PAINFUL AREAS UP TO 5 TIMES DAILY. RUB IN WELL    lancets 33 gauge Misc 1 lancet by Misc.(Non-Drug; Combo Route)  "route 4 (four) times daily.    levothyroxine (SYNTHROID) 100 MCG tablet TAKE ONE TABLET BY MOUTH EVERY DAY    lisinopril (PRINIVIL,ZESTRIL) 5 MG tablet One po BID    meclizine (ANTIVERT) 25 mg tablet Take 1 tablet (25 mg total) by mouth 3 (three) times daily as needed.    metoprolol tartrate (LOPRESSOR) 25 MG tablet TAKE 1/2 TABLET BY MOUTH TWICE A DAY    pantoprazole (PROTONIX) 40 MG tablet Take 1 tablet (40 mg total) by mouth once daily.    pen needle, diabetic (BD ULTRA-FINE YUMIKO PEN NEEDLE) 32 gauge x 5/32" Ndle Uses 3 daily, on multiple daily insulin injections    rosuvastatin (CRESTOR) 40 MG Tab TAKE ONE TABLET BY MOUTH EVERY EVENING    traZODone (DESYREL) 50 MG tablet TAKE ONE TABLET BY MOUTH AT BEDTIME AS NEEDED FOR INSOMNIA    vit C,P-Re-wmnye-lutein-zeaxan (PRESERVISION AREDS-2) 721-674-03-1 mg-unit-mg-mg Cap Take 1 tablet by mouth 2 (two) times daily.    nitroGLYCERIN (NITROSTAT) 0.4 MG SL tablet Place 1 tablet (0.4 mg total) under the tongue every 5 (five) minutes as needed for Chest pain. (Patient taking differently: Place 0.4 mg under the tongue every 5 (five) minutes as needed for Chest pain. Seek medical help is pain is not relieved by the third dose.)    traMADol (ULTRAM) 50 mg tablet Take 1 tablet (50 mg total) by mouth every 6 (six) hours as needed for Pain.     No current facility-administered medications for this visit.        Review of patient's allergies indicates:   Allergen Reactions    Reglan [metoclopramide hcl]      Depression and weight loss.        Family History   Problem Relation Age of Onset    Cancer Mother         [place]    Breast cancer Mother     Cancer Father     Early death Father     Heart disease Paternal Uncle     Alcohol abuse Sister         x2    Heart disease Brother     No Known Problems Son        Social History     Socioeconomic History    Marital status:      Spouse name: Not on file    Number of children: Not on file    Years of " education: Not on file    Highest education level: Not on file   Occupational History    Not on file   Social Needs    Financial resource strain: Not on file    Food insecurity:     Worry: Not on file     Inability: Not on file    Transportation needs:     Medical: Not on file     Non-medical: Not on file   Tobacco Use    Smoking status: Former Smoker     Packs/day: 1.00     Years: 27.00     Pack years: 27.00     Types: Cigarettes     Last attempt to quit: 2016     Years since quittin.1    Smokeless tobacco: Never Used   Substance and Sexual Activity    Alcohol use: No     Alcohol/week: 0.0 standard drinks    Drug use: No    Sexual activity: Yes     Partners: Male   Lifestyle    Physical activity:     Days per week: Not on file     Minutes per session: Not on file    Stress: Not on file   Relationships    Social connections:     Talks on phone: Not on file     Gets together: Not on file     Attends [employer] service: Not on file     Active member of club or organization: Not on file     Attends meetings of clubs or organizations: Not on file     Relationship status: Not on file   Other Topics Concern    Not on file   Social History Narrative    Not on file       Chief Complaint:   Chief Complaint   Patient presents with    Right Shoulder - Pain    Left Shoulder - Pain       Consulting Physician: No ref. provider found    History of present illness:    This is a 74 y.o. year old female who complains of bilateral shoulder pain with left worse than right.  She states he has had this on and off for years.  She denies any injury.  She puts her pain is much as 6/10 and worse with activities and at night.  She has done physical therapy which has not helped.  Our injection helped completely for 3 days and then wore off.    Review of Systems:    Constitution:   Denies chills, fever, and sweats.  HENT:   Denies headaches or blurry vision.  Cardiovascular:  Denies chest pain or irregular heart  "beat.  Respiratory:   Denies cough or shortness of breath.  Gastrointestinal:  Denies abdominal pain, nausea, or vomiting.  Musculoskeletal:   Denies muscle cramps.  Neurological:   Denies dizziness or focal weakness.  Psychiatric/Behavior: Normal mental status.  Hematology/Lymph:  Denies bleeding problem or easy bruising/bleeding.  Skin:    Denies rash or suspicious lesions.    Examination:    Vital Signs:    Vitals:    03/02/20 1303   Resp: 16   Weight: 85.3 kg (188 lb)   Height: 5' 1" (1.549 m)   PainSc:   6   PainLoc: Shoulder       Body mass index is 35.52 kg/m².    Constitution:   Well-developed, well nourished patient in no acute distress.  Neurological:   Alert and oriented x 3 and cooperative to examination.     Psychiatric/Behavior: Normal mental status.  Respiratory:   No shortness of breath.  Eyes:    Extraoccular muscles intact  Skin:    No scars, rash or suspicious lesions.    Physical Exam: Right Shoulder Exam     Skin  Rash:   None  Scars:   None    Inspection/Observation   Swelling:   none  Erythema:   none  Bruising:   none  Wounds:   none  Scapular Winging:  none  Scapular Dyskinesia:  none  Atrophy:   none  Masses:   None  Lymphadenopathy: None    Tenderness   AC Joint:   mild    Range of Motion   Active Forward Flexion:  150  Active External Rotation:  20  Active Internal Rotation:  Low back    Muscle Strength   Forward Flexion:  4+/5  External Rotation:  4+/5  Internal Rotation:  4+/5    Tests & Signs   Cross Arm:   positive  Impingement:   positive    Other   Sensation:   normal  Pulse:    2+ radial      Left Shoulder Exam     Skin  Rash:   None  Scars:   None    Inspection/Observation   Swelling:   none  Erythema:   none  Bruising:   none  Wounds:   none  Scapular Winging:  none  Scapular Dyskinesia:  none  Atrophy:   none  Masses:   None  Lymphadenopathy: None    Tenderness   AC Joint:   mild    Range of Motion   Active Forward Flexion:  150  Active External Rotation:  20  Active Internal " Rotation:  Hip pocket    Muscle Strength   Forward Flexion:  4/5  External Rotation:  4+/5  Internal Rotation:  4+/5    Tests & Signs   Cross Arm:   positive  Impingement:   positive    Other   Sensation:   normal  Pulse:    2+ radial          Imaging: X-rays of both shoulder show mild-to-moderate degenerative changes. The MRI study images left shoulder partial subscapularis tear along with a partial supraspinatus footprint tear and what appears to be a slap lesion.         Assessment: Chronic pain of both shoulders  -     Large Joint Aspiration/Injection: bilateral subacromial bursa        Plan:  At this point she is considering surgical alternatives.  We gave her another injection today.  Will give her prescription for Ultram.  She declined physical therapy.    DISCLAIMER: This note may have been dictated using voice recognition software and may contain grammatical errors.     NOTE: Consult report sent to referring provider via [employer] EMR.   Detail Level: Detailed Detail Level: Simple

## 2023-11-28 ENCOUNTER — LAB VISIT (OUTPATIENT)
Dept: LAB | Facility: HOSPITAL | Age: 78
End: 2023-11-28
Attending: NURSE PRACTITIONER
Payer: MEDICARE

## 2023-11-28 DIAGNOSIS — E11.22 TYPE 2 DIABETES MELLITUS WITH STAGE 3B CHRONIC KIDNEY DISEASE, WITH LONG-TERM CURRENT USE OF INSULIN: ICD-10-CM

## 2023-11-28 DIAGNOSIS — N18.32 TYPE 2 DIABETES MELLITUS WITH STAGE 3B CHRONIC KIDNEY DISEASE, WITH LONG-TERM CURRENT USE OF INSULIN: ICD-10-CM

## 2023-11-28 DIAGNOSIS — Z79.4 TYPE 2 DIABETES MELLITUS WITH STAGE 3B CHRONIC KIDNEY DISEASE, WITH LONG-TERM CURRENT USE OF INSULIN: ICD-10-CM

## 2023-11-28 LAB
ALBUMIN SERPL BCP-MCNC: 3.4 G/DL (ref 3.5–5.2)
ALP SERPL-CCNC: 65 U/L (ref 55–135)
ALT SERPL W/O P-5'-P-CCNC: 8 U/L (ref 10–44)
ANION GAP SERPL CALC-SCNC: 10 MMOL/L (ref 8–16)
AST SERPL-CCNC: 19 U/L (ref 10–40)
BILIRUB SERPL-MCNC: 0.5 MG/DL (ref 0.1–1)
BUN SERPL-MCNC: 15 MG/DL (ref 8–23)
CALCIUM SERPL-MCNC: 9.1 MG/DL (ref 8.7–10.5)
CHLORIDE SERPL-SCNC: 104 MMOL/L (ref 95–110)
CO2 SERPL-SCNC: 28 MMOL/L (ref 23–29)
CREAT SERPL-MCNC: 1.6 MG/DL (ref 0.5–1.4)
EST. GFR  (NO RACE VARIABLE): 33.2 ML/MIN/1.73 M^2
ESTIMATED AVG GLUCOSE: 111 MG/DL (ref 68–131)
GLUCOSE SERPL-MCNC: 93 MG/DL (ref 70–110)
HBA1C MFR BLD: 5.5 % (ref 4–5.6)
POTASSIUM SERPL-SCNC: 3.2 MMOL/L (ref 3.5–5.1)
PROT SERPL-MCNC: 6.6 G/DL (ref 6–8.4)
SODIUM SERPL-SCNC: 142 MMOL/L (ref 136–145)

## 2023-11-28 PROCEDURE — 83036 HEMOGLOBIN GLYCOSYLATED A1C: CPT | Performed by: NURSE PRACTITIONER

## 2023-11-28 PROCEDURE — 80053 COMPREHEN METABOLIC PANEL: CPT | Performed by: NURSE PRACTITIONER

## 2023-11-28 PROCEDURE — 36415 COLL VENOUS BLD VENIPUNCTURE: CPT | Mod: PO | Performed by: NURSE PRACTITIONER

## 2023-12-05 ENCOUNTER — OFFICE VISIT (OUTPATIENT)
Dept: ENDOCRINOLOGY | Facility: CLINIC | Age: 78
End: 2023-12-05
Payer: MEDICARE

## 2023-12-05 VITALS
WEIGHT: 159.81 LBS | HEIGHT: 61 IN | DIASTOLIC BLOOD PRESSURE: 60 MMHG | SYSTOLIC BLOOD PRESSURE: 102 MMHG | HEART RATE: 69 BPM | BODY MASS INDEX: 30.17 KG/M2

## 2023-12-05 DIAGNOSIS — Z79.4 TYPE 2 DIABETES MELLITUS WITH STAGE 3B CHRONIC KIDNEY DISEASE, WITH LONG-TERM CURRENT USE OF INSULIN: Primary | ICD-10-CM

## 2023-12-05 DIAGNOSIS — E07.9 THYROID DISEASE: ICD-10-CM

## 2023-12-05 DIAGNOSIS — I10 ESSENTIAL HYPERTENSION: ICD-10-CM

## 2023-12-05 DIAGNOSIS — E78.5 HYPERLIPIDEMIA WITH TARGET LDL LESS THAN 70: ICD-10-CM

## 2023-12-05 DIAGNOSIS — E11.22 TYPE 2 DIABETES MELLITUS WITH STAGE 3B CHRONIC KIDNEY DISEASE, WITH LONG-TERM CURRENT USE OF INSULIN: Primary | ICD-10-CM

## 2023-12-05 DIAGNOSIS — I25.10 CORONARY ARTERY DISEASE INVOLVING NATIVE CORONARY ARTERY OF NATIVE HEART WITHOUT ANGINA PECTORIS: ICD-10-CM

## 2023-12-05 DIAGNOSIS — N18.32 TYPE 2 DIABETES MELLITUS WITH STAGE 3B CHRONIC KIDNEY DISEASE, WITH LONG-TERM CURRENT USE OF INSULIN: Primary | ICD-10-CM

## 2023-12-05 DIAGNOSIS — Z79.4 TYPE 2 DIABETES MELLITUS WITH DIABETIC POLYNEUROPATHY, WITH LONG-TERM CURRENT USE OF INSULIN: ICD-10-CM

## 2023-12-05 DIAGNOSIS — R07.9 CHEST PAIN, UNSPECIFIED TYPE: ICD-10-CM

## 2023-12-05 DIAGNOSIS — E11.42 TYPE 2 DIABETES MELLITUS WITH DIABETIC POLYNEUROPATHY, WITH LONG-TERM CURRENT USE OF INSULIN: ICD-10-CM

## 2023-12-05 LAB — GLUCOSE SERPL-MCNC: 74 MG/DL (ref 70–110)

## 2023-12-05 PROCEDURE — 3074F SYST BP LT 130 MM HG: CPT | Mod: CPTII,S$GLB,, | Performed by: NURSE PRACTITIONER

## 2023-12-05 PROCEDURE — 99214 OFFICE O/P EST MOD 30 MIN: CPT | Mod: S$GLB,,, | Performed by: NURSE PRACTITIONER

## 2023-12-05 PROCEDURE — 1160F RVW MEDS BY RX/DR IN RCRD: CPT | Mod: CPTII,S$GLB,, | Performed by: NURSE PRACTITIONER

## 2023-12-05 PROCEDURE — 3288F PR FALLS RISK ASSESSMENT DOCUMENTED: ICD-10-PCS | Mod: CPTII,S$GLB,, | Performed by: NURSE PRACTITIONER

## 2023-12-05 PROCEDURE — 3078F PR MOST RECENT DIASTOLIC BLOOD PRESSURE < 80 MM HG: ICD-10-PCS | Mod: CPTII,S$GLB,, | Performed by: NURSE PRACTITIONER

## 2023-12-05 PROCEDURE — 1125F AMNT PAIN NOTED PAIN PRSNT: CPT | Mod: CPTII,S$GLB,, | Performed by: NURSE PRACTITIONER

## 2023-12-05 PROCEDURE — 3078F DIAST BP <80 MM HG: CPT | Mod: CPTII,S$GLB,, | Performed by: NURSE PRACTITIONER

## 2023-12-05 PROCEDURE — 1159F MED LIST DOCD IN RCRD: CPT | Mod: CPTII,S$GLB,, | Performed by: NURSE PRACTITIONER

## 2023-12-05 PROCEDURE — 1159F PR MEDICATION LIST DOCUMENTED IN MEDICAL RECORD: ICD-10-PCS | Mod: CPTII,S$GLB,, | Performed by: NURSE PRACTITIONER

## 2023-12-05 PROCEDURE — 1125F PR PAIN SEVERITY QUANTIFIED, PAIN PRESENT: ICD-10-PCS | Mod: CPTII,S$GLB,, | Performed by: NURSE PRACTITIONER

## 2023-12-05 PROCEDURE — 1101F PT FALLS ASSESS-DOCD LE1/YR: CPT | Mod: CPTII,S$GLB,, | Performed by: NURSE PRACTITIONER

## 2023-12-05 PROCEDURE — 82962 GLUCOSE BLOOD TEST: CPT | Mod: S$GLB,,, | Performed by: NURSE PRACTITIONER

## 2023-12-05 PROCEDURE — 99999 PR PBB SHADOW E&M-EST. PATIENT-LVL IV: CPT | Mod: PBBFAC,,, | Performed by: NURSE PRACTITIONER

## 2023-12-05 PROCEDURE — 3288F FALL RISK ASSESSMENT DOCD: CPT | Mod: CPTII,S$GLB,, | Performed by: NURSE PRACTITIONER

## 2023-12-05 PROCEDURE — 1160F PR REVIEW ALL MEDS BY PRESCRIBER/CLIN PHARMACIST DOCUMENTED: ICD-10-PCS | Mod: CPTII,S$GLB,, | Performed by: NURSE PRACTITIONER

## 2023-12-05 PROCEDURE — 3074F PR MOST RECENT SYSTOLIC BLOOD PRESSURE < 130 MM HG: ICD-10-PCS | Mod: CPTII,S$GLB,, | Performed by: NURSE PRACTITIONER

## 2023-12-05 PROCEDURE — 99214 PR OFFICE/OUTPT VISIT, EST, LEVL IV, 30-39 MIN: ICD-10-PCS | Mod: S$GLB,,, | Performed by: NURSE PRACTITIONER

## 2023-12-05 PROCEDURE — 1101F PR PT FALLS ASSESS DOC 0-1 FALLS W/OUT INJ PAST YR: ICD-10-PCS | Mod: CPTII,S$GLB,, | Performed by: NURSE PRACTITIONER

## 2023-12-05 PROCEDURE — 82962 POCT GLUCOSE, HAND-HELD DEVICE: ICD-10-PCS | Mod: S$GLB,,, | Performed by: NURSE PRACTITIONER

## 2023-12-05 PROCEDURE — 99999 PR PBB SHADOW E&M-EST. PATIENT-LVL IV: ICD-10-PCS | Mod: PBBFAC,,, | Performed by: NURSE PRACTITIONER

## 2023-12-05 RX ORDER — INSULIN DEGLUDEC 200 U/ML
36 INJECTION, SOLUTION SUBCUTANEOUS DAILY
Qty: 3 PEN | Refills: 11
Start: 2023-12-05 | End: 2024-01-03

## 2023-12-05 NOTE — PROGRESS NOTES
CC: Ms. Adele Hall arrives today for management of Type 2 DM and review of chronic medical conditions, as listed in the visit diagnosis section of this encounter.     HPI: Ms. Adele Hall was diagnosed with Type 2 DM in ~ 2002. Metformin started at the time of diagnosis but was stopped d/t renal impairment, per PCP in [place]. Insulin was started ~ 2012. She has rotated between analog insulins and generic NPH and Regular insulins, due to cost. [employer] brand insulins, due to cost.  Denies FH of DM. She did have 1 ER admission after having BG of >700 in 2012, prior to starting insulin.   She has a dx of CAD - s/p CABG in 1/2016. Follows with Dr. Dick.  She follows with Dr. Davidson for CKD.    Patient was last seen by me in June. Insulin dose was decreased then.    Reports chest pain today. Has taken 2 nitroglycerin today but still ongoing. She describes as her typical chest pain but states it's lasting longer than usual.  drove her here today. Patient feels that she needs to eat and is wondering if this is contributing to her chest pain. It's 3:15 and she hasn't eaten yet today. Hasn't eaten since some fruit last night.      BG monitoring 1x/day. Reports these range 140s.    Hypoglycemia: Denies   Symptoms: dizziness   Treatment: sweets    Missing Insulin/PO medication doses: No    Dietary Habits: Eats twice/day - main meal usually late afternoon. Occasional snack. Avoids sugary beverages.       CURRENT DIABETIC MEDS: Mounjaro 5 mg weekly, Tresiba U200 48 units QAM  Vial or pen: pens  Glucometer type: True Metrix    Previous DM treatments:  Metformin  Victoza - $   NPH/Regular insulins  Novolog     Last Eye Exam: 10/2023, no DR.   Last Podiatry Exam: never    REVIEW OF SYSTEMS  Constitutional: no c/o weakness, fatigue. + 9# weight loss.  Eyes: denies visual disturbances  Cardiac: no palpitations. Reports occasional chest pain. Has episode today and has taken 2 nitroglycerin pills. She  "has with her in the car and plans to take another.  drove her.   Respiratory: no cough, dyspnea   GI: no c/o nausea, abdominal pain. + chronic constipation. Takes fiber. States that this isn't worse since starting Mounjaro.   Neuro: c/o paresthesias in feet. Uses compounded topical.   Skin: no lesions, rashes.   Endocrine: denies polyphagia, polydipsia, polyuria.      Personally reviewed Past Medical, Surgical, Social History.    Vital Signs  /60   Pulse 69   Ht 5' 1" (1.549 m)   Wt 72.5 kg (159 lb 13.3 oz)   BMI 30.20 kg/m²     Personally reviewed the below labs:    Hemoglobin A1C   Date Value Ref Range Status   11/28/2023 5.5 4.0 - 5.6 % Final     Comment:     ADA Screening Guidelines:  5.7-6.4%  Consistent with prediabetes  >or=6.5%  Consistent with diabetes    High levels of fetal hemoglobin interfere with the HbA1C  assay. Heterozygous hemoglobin variants (HbS, HgC, etc)do  not significantly interfere with this assay.   However, presence of multiple variants may affect accuracy.     05/25/2023 5.6 4.0 - 5.6 % Final     Comment:     ADA Screening Guidelines:  5.7-6.4%  Consistent with prediabetes  >or=6.5%  Consistent with diabetes    High levels of fetal hemoglobin interfere with the HbA1C  assay. Heterozygous hemoglobin variants (HbS, HgC, etc)do  not significantly interfere with this assay.   However, presence of multiple variants may affect accuracy.     11/16/2022 7.5 (H) 4.0 - 5.6 % Final     Comment:     ADA Screening Guidelines:  5.7-6.4%  Consistent with prediabetes  >or=6.5%  Consistent with diabetes    High levels of fetal hemoglobin interfere with the HbA1C  assay. Heterozygous hemoglobin variants (HbS, HgC, etc)do  not significantly interfere with this assay.   However, presence of multiple variants may affect accuracy.         Chemistry        Component Value Date/Time     11/28/2023 1032    K 3.2 (L) 11/28/2023 1032     11/28/2023 1032    CO2 28 11/28/2023 1032    BUN " 15 11/28/2023 1032    CREATININE 1.6 (H) 11/28/2023 1032    GLU 93 11/28/2023 1032        Component Value Date/Time    CALCIUM 9.1 11/28/2023 1032    ALKPHOS 65 11/28/2023 1032    AST 19 11/28/2023 1032    AST 45 (H) 01/30/2016 0431    ALT 8 (L) 11/28/2023 1032    BILITOT 0.5 11/28/2023 1032          Lab Results   Component Value Date    CHOL 124 05/25/2023    CHOL 128 05/12/2022    CHOL 127 04/27/2021     Lab Results   Component Value Date    HDL 30 (L) 05/25/2023    HDL 36 (L) 05/12/2022    HDL 28 (L) 04/27/2021     Lab Results   Component Value Date    LDLCALC 39.6 (L) 05/25/2023    LDLCALC 28.6 (L) 05/12/2022    LDLCALC 29.6 (L) 04/27/2021     Lab Results   Component Value Date    TRIG 272 (H) 05/25/2023    TRIG 317 (H) 05/12/2022    TRIG 347 (H) 04/27/2021     Lab Results   Component Value Date    CHOLHDL 24.2 05/25/2023    CHOLHDL 28.1 05/12/2022    CHOLHDL 22.0 04/27/2021       Lab Results   Component Value Date    MICALBCREAT 192.4 (H) 11/28/2023     Lab Results   Component Value Date    TSH 0.410 05/25/2023       CrCl cannot be calculated (Patient's most recent lab result is older than the maximum 7 days allowed.).    Vit D, 25-Hydroxy   Date Value Ref Range Status   10/09/2023 32 30 - 96 ng/mL Final     Comment:     Vitamin D deficiency.........<10 ng/mL                              Vitamin D insufficiency......10-29 ng/mL       Vitamin D sufficiency........> or equal to 30 ng/mL  Vitamin D toxicity............>100 ng/mL         August 2021 Professional CGMS review: Fasting glucoses are reasonable. Prandial excursions noted in mid 200s range.  One episode of borderline hypoglycemia with unknown precipitant. Eating 2 meals/day on average with snacks.   Average glucose: 193 mg/dL  Above 250 mg/dL: 13 %  181-250 mg/dL: 44 %   mg/dL: 42 %  54-69 mg/dL: <1 %  Below 54 mg/dL: 0 %        PHYSICAL EXAMINATION  Constitutional: Appears well, no distress  Respiratory: CTA, even and unlabored.  Cardiovascular:  RRR, no murmurs, no carotid bruits.   GI: normal bowel sounds, no hernia  Skin: warm and dry  Neuro: oriented to person, place, time.   Feet: appropriate footwear.        Goals        HEMOGLOBIN A1C < 7.5                 Assessment/Plan  1. Type 2 diabetes mellitus with stage 3 chronic kidney disease, with long-term current use of insulin  -- POCT glucose 74 in clinic. She was given crackers and PB. She hadn't eaten yet today. Recommend personal CGM.   -- decrease Tresiba to 36 units.  -- continue Mounjaro  -- order Dexcom G7 through DME  -- following with nephrology    -- Discussed diagnosis of DM, A1c goals, progression of disease, long term complications and tx options.   -- Reviewed hypoglycemia management: treat with 1/2 glass of juice, 1/2 can regular coke, or 4 glucose tablets. Monitor and repeat treatment every 15 minutes until BG is >70 Then have a snack, which includes a complex carbohydrate and protein.   Advised p  atient to check BG before activities, such as driving or exercise.    -- takes aspirin, statin, ace-I   2. Type 2 diabetes mellitus with diabetic polyneuropathy, with long-term current use of insulin  -- optimize DM control     3. Coronary artery disease involving native coronary artery of native heart without angina pectoris  -- follows with cardiology  -- see A/P #7   4. Essential hypertension  -- controlled  -- continue current meds   5. Hyperlipidemia with target LDL less than 70  -- Uncontrolled with elevated triglycerides   -- Continue Crestor (Fenofibrate previously DC'd due to renal function)   6. Thyroid disease  -- controlled  -- continue levothyroxine 75 mcg daily   7. Chest pain  -- I advised pt go to ER since chest pain continues after 2 nitroglycerin. Patient declines going now because she wants to see if symptoms subside after she eats. Going with  (who is driving) to eat now. She states she will go to ER if this persists after meal and 3rd nitroglycerin. I also asked her  to notify her cardiologist's office of this event.        FOLLOW UP  Follow up in about 3 months (around 3/5/2024).   Patient instructed to bring BG logs to each follow up   Patient encouraged to call for any BG/medication issues, concerns, or questions.    Orders Placed This Encounter   Procedures    Hemoglobin A1C    Basic Metabolic Panel    POCT Glucose, Hand-Held Device

## 2023-12-05 NOTE — PATIENT INSTRUCTIONS
Decrease Tresiba to 36 units.    Continue Mounjaro.     Notify me once you have Dexcom G7 so we can set up for training with the educator in [place].

## 2023-12-06 ENCOUNTER — HOSPITAL ENCOUNTER (OUTPATIENT)
Dept: RADIOLOGY | Facility: CLINIC | Age: 78
Discharge: HOME | End: 2023-12-06
Attending: FAMILY MEDICINE
Payer: MEDICARE

## 2023-12-06 ENCOUNTER — TELEPHONE (OUTPATIENT)
Dept: ENDOCRINOLOGY | Facility: CLINIC | Age: 78
End: 2023-12-06
Payer: MEDICARE

## 2023-12-06 DIAGNOSIS — Z78.0 MENOPAUSE: ICD-10-CM

## 2023-12-06 PROCEDURE — 77080 DXA BONE DENSITY AXIAL: CPT | Mod: 26,,, | Performed by: RADIOLOGY

## 2023-12-06 PROCEDURE — 77080 DXA BONE DENSITY AXIAL SKELETON 1 OR MORE SITES: ICD-10-PCS | Mod: 26,,, | Performed by: RADIOLOGY

## 2023-12-06 PROCEDURE — 77080 DXA BONE DENSITY AXIAL: CPT | Mod: TC,PO

## 2023-12-07 DIAGNOSIS — M81.0 OSTEOPOROSIS, UNSPECIFIED OSTEOPOROSIS TYPE, UNSPECIFIED PATHOLOGICAL FRACTURE PRESENCE: Primary | ICD-10-CM

## 2023-12-07 RX ORDER — ALENDRONATE SODIUM 70 MG/1
70 TABLET ORAL
Qty: 12 TABLET | Refills: 3 | OUTPATIENT
Start: 2023-12-07 | End: 2024-12-06

## 2023-12-07 NOTE — TELEPHONE ENCOUNTER
No care due was identified.  [facility] Embedded Care Due Messages. Reference number: [id number].   12/07/2023 11:51:57 AM CST

## 2023-12-07 NOTE — TELEPHONE ENCOUNTER
----- Message from Morales Bob sent at 12/7/2023 11:02 AM CST -----  Contact: self  Type: Needs Medical Advice  Who Called: Patient   Best Call Back Number: [phone]  Additional Information: Plz call the pt back when possible. Pt states she says she wants to go through with getting on medication to stop and prevent on setting of osteoporosis. Plz call her back to advice. Thanks

## 2023-12-08 NOTE — TELEPHONE ENCOUNTER
I hate to tell her but since we started this she has had a chemistry panel done by Endocrinology and her kidney function is now below the level that will allow her to use the medication.  She can not use it now

## 2023-12-11 NOTE — TELEPHONE ENCOUNTER
Patient advised she can't start the Fosamax due to kidney function. Advised on keeping diabetes and hypertension under control and drink plenty of fluids. Copy of lab faxed to  her nephrologist.

## 2023-12-14 ENCOUNTER — TELEPHONE (OUTPATIENT)
Dept: ENDOCRINOLOGY | Facility: CLINIC | Age: 78
End: 2023-12-14
Payer: MEDICARE

## 2023-12-21 ENCOUNTER — OFFICE VISIT (OUTPATIENT)
Dept: ORTHOPEDICS | Facility: CLINIC | Age: 78
End: 2023-12-21
Payer: MEDICARE

## 2023-12-21 VITALS — HEIGHT: 61 IN | BODY MASS INDEX: 30.02 KG/M2 | WEIGHT: 159 LBS

## 2023-12-21 DIAGNOSIS — M25.511 CHRONIC PAIN OF BOTH SHOULDERS: Primary | ICD-10-CM

## 2023-12-21 DIAGNOSIS — G89.29 CHRONIC PAIN OF BOTH SHOULDERS: Primary | ICD-10-CM

## 2023-12-21 DIAGNOSIS — M25.512 CHRONIC PAIN OF BOTH SHOULDERS: Primary | ICD-10-CM

## 2023-12-21 PROCEDURE — 99214 OFFICE O/P EST MOD 30 MIN: CPT | Mod: 25,S$GLB,, | Performed by: ORTHOPAEDIC SURGERY

## 2023-12-21 PROCEDURE — 20610 DRAIN/INJ JOINT/BURSA W/O US: CPT | Mod: 50,S$GLB,, | Performed by: ORTHOPAEDIC SURGERY

## 2023-12-21 PROCEDURE — 99999 PR PBB SHADOW E&M-EST. PATIENT-LVL II: CPT | Mod: PBBFAC,,, | Performed by: ORTHOPAEDIC SURGERY

## 2023-12-21 PROCEDURE — 99999 PR PBB SHADOW E&M-EST. PATIENT-LVL II: ICD-10-PCS | Mod: PBBFAC,,, | Performed by: ORTHOPAEDIC SURGERY

## 2023-12-21 PROCEDURE — 1159F PR MEDICATION LIST DOCUMENTED IN MEDICAL RECORD: ICD-10-PCS | Mod: CPTII,S$GLB,, | Performed by: ORTHOPAEDIC SURGERY

## 2023-12-21 PROCEDURE — 1125F AMNT PAIN NOTED PAIN PRSNT: CPT | Mod: CPTII,S$GLB,, | Performed by: ORTHOPAEDIC SURGERY

## 2023-12-21 PROCEDURE — 1160F PR REVIEW ALL MEDS BY PRESCRIBER/CLIN PHARMACIST DOCUMENTED: ICD-10-PCS | Mod: CPTII,S$GLB,, | Performed by: ORTHOPAEDIC SURGERY

## 2023-12-21 PROCEDURE — 1160F RVW MEDS BY RX/DR IN RCRD: CPT | Mod: CPTII,S$GLB,, | Performed by: ORTHOPAEDIC SURGERY

## 2023-12-21 PROCEDURE — 1159F MED LIST DOCD IN RCRD: CPT | Mod: CPTII,S$GLB,, | Performed by: ORTHOPAEDIC SURGERY

## 2023-12-21 PROCEDURE — 1125F PR PAIN SEVERITY QUANTIFIED, PAIN PRESENT: ICD-10-PCS | Mod: CPTII,S$GLB,, | Performed by: ORTHOPAEDIC SURGERY

## 2023-12-21 PROCEDURE — 99214 PR OFFICE/OUTPT VISIT, EST, LEVL IV, 30-39 MIN: ICD-10-PCS | Mod: 25,S$GLB,, | Performed by: ORTHOPAEDIC SURGERY

## 2023-12-21 PROCEDURE — 20610 LARGE JOINT ASPIRATION/INJECTION: BILATERAL SUBACROMIAL BURSA: ICD-10-PCS | Mod: 50,S$GLB,, | Performed by: ORTHOPAEDIC SURGERY

## 2023-12-21 RX ORDER — TRIAMCINOLONE ACETONIDE 40 MG/ML
40 INJECTION, SUSPENSION INTRA-ARTICULAR; INTRAMUSCULAR
Status: DISCONTINUED | OUTPATIENT
Start: 2023-12-21 | End: 2023-12-21 | Stop reason: HOSPADM

## 2023-12-21 RX ADMIN — TRIAMCINOLONE ACETONIDE 40 MG: 40 INJECTION, SUSPENSION INTRA-ARTICULAR; INTRAMUSCULAR at 11:12

## 2023-12-21 NOTE — PROGRESS NOTES
Past Medical History:   Diagnosis Date    Anesthesia complication     took patient 6 days to come out of anethesia after CABG    Anticoagulant long-term use     Arthritis     Chronic kidney disease     CKD (chronic kidney disease) stage 3, GFR 30-59 ml/min     Dr. Montero    COPD (chronic obstructive pulmonary disease) 2/7/2016    COPD (chronic obstructive pulmonary disease)     Coronary artery disease     Diabetes mellitus     Diabetes mellitus, type 2     GERD (gastroesophageal reflux disease)     Hx of colonic polyps     Hyperlipidemia     Hypertension     possible new onset CHF 7/30/2016    Tardive dyskinesia     Thyroid disease     Ulcer     Vertigo        Past Surgical History:   Procedure Laterality Date    CARDIAC SURGERY      CATARACT EXTRACTION W/  INTRAOCULAR LENS IMPLANT Right 8/30/2023    Procedure: EXTRACTION, CATARACT, WITH IOL INSERTION;  Surgeon: Tamiko Lam MD;  Location: [facility] OR;  Service: Ophthalmology;  Laterality: Right;    CATARACT EXTRACTION W/  INTRAOCULAR LENS IMPLANT Left 9/13/2023    Procedure: EXTRACTION, CATARACT, WITH IOL INSERTION;  Surgeon: Tamiko Lam MD;  Location: [facility] OR;  Service: Ophthalmology;  Laterality: Left;    CHOLECYSTECTOMY  01/26/2017    COLONOSCOPY      COLONOSCOPY N/A 12/7/2021    Procedure: COLONOSCOPY;  Surgeon: Lito Will MD;  Location: [facility] END[facility];  Service: Endoscopy;  Laterality: N/A;    CORONARY ARTERY BYPASS GRAFT  01/19/2016    4 vessel    HYSTERECTOMY      OOPHORECTOMY      TONSILLECTOMY      TOTAL THYROIDECTOMY      TUBAL LIGATION         Current Outpatient Medications   Medication Sig    amLODIPine (NORVASC) 2.5 MG tablet Take 1 tablet (2.5 mg total) by mouth once daily.    aspirin (ECOTRIN) 81 MG EC tablet Take 81 mg by mouth once daily.    aspirin-sod bicarb-citric acid (ERICKA-SELTZER) 324 mg TbEF Take 325 mg by mouth every 6 (six) hours as needed.    BD INSULIN SYRINGE ULTRA-FINE 1 mL 31 gauge x 5/16 Syrg USE AS DIRECTED 5 TIMES A DAY  "WITH LEVEMIR & NOVOLOG    blood sugar diagnostic (TRUE METRIX GLUCOSE TEST STRIP) Strp Test blood sugar 3x/day    dicyclomine (BENTYL) 10 MG capsule     lancets 33 gauge Misc 1 lancet by Misc.(Non-Drug; Combo Route) route 4 (four) times daily.    levothyroxine (SYNTHROID) 75 MCG tablet TAKE ONE TABLET BY MOUTH EVERY MORNING BEFORE BREAKFAST    lisinopriL (PRINIVIL,ZESTRIL) 5 MG tablet TAKE ONE TABLET BY MOUTH TWO TIMES A DAY    meclizine (ANTIVERT) 25 mg tablet Take 1 tablet (25 mg total) by mouth 3 (three) times daily as needed.    metoprolol tartrate (LOPRESSOR) 25 MG tablet Take 1 tablet (25 mg total) by mouth 2 (two) times daily.    MOUNJARO 5 mg/0.5 mL PnIj INJECT 5 MG INTO THE SKIN EVERY 7 DAYS.    nitroGLYCERIN (NITROSTAT) 0.4 MG SL tablet Place 1 tablet (0.4 mg total) under the tongue every 5 (five) minutes as needed for Chest pain. Seek medical help is pain is not relieved by the third dose.    pantoprazole (PROTONIX) 40 MG tablet Take 1 tablet (40 mg total) by mouth once daily.    pen needle, diabetic (BD ULTRA-FINE YUMIKO PEN NEEDLE) 32 gauge x 5/32" Ndle USE FOUR TIMES A DAY WITH INSULIN    rosuvastatin (CRESTOR) 40 MG Tab Take 1 tablet (40 mg total) by mouth every evening.    traZODone (DESYREL) 100 MG tablet Take 1 tablet (100 mg total) by mouth every evening.    TRESIBA FLEXTOUCH U-200 200 unit/mL (3 mL) insulin pen Inject 36 Units into the skin once daily.    vitamin D (VITAMIN D3) 1000 units Tab Take 1,000 Units by mouth once daily.     No current facility-administered medications for this visit.       Review of patient's allergies indicates:   Allergen Reactions    Reglan [metoclopramide hcl]      Depression and weight loss.        Family History   Problem Relation Age of Onset    Cancer Mother         LYMPHOMA    Breast cancer Mother     Cancer Father     Early death Father     Heart disease Paternal Uncle     Alcohol abuse Sister         x2    Heart disease Brother     No Known Problems Son     " Glaucoma Neg Hx     Macular degeneration Neg Hx        Social History     Socioeconomic History    Marital status:    Tobacco Use    Smoking status: Former     Current packs/day: 0.00     Average packs/day: 1 pack/day for 27.0 years (27.0 ttl pk-yrs)     Types: Cigarettes     Start date: 1989     Quit date: 2016     Years since quittin.9    Smokeless tobacco: Never   Substance and Sexual Activity    Alcohol use: No     Alcohol/week: 0.0 standard drinks of alcohol    Drug use: No    Sexual activity: Yes     Partners: Male     Social Determinants of Health     Financial Resource Strain: Low Risk  (2022)    Overall Financial Resource Strain (CARDIA)     Difficulty of Paying Living Expenses: Not hard at all   Food Insecurity: No Food Insecurity (2022)    Hunger Vital Sign     Worried About Running Out of Food in the Last Year: Never true     Ran Out of Food in the Last Year: Never true   Transportation Needs: No Transportation Needs (2022)    PRAPARE - Transportation     Lack of Transportation (Medical): No     Lack of Transportation (Non-Medical): No   Physical Activity: Inactive (2022)    Exercise Vital Sign     Days of Exercise per Week: 0 days     Minutes of Exercise per Session: 0 min   Stress: No Stress Concern Present (2022)    [employer] [place] [employer] - Occupational Stress Questionnaire     Feeling of Stress : Not at all   Social Connections: Moderately Isolated (2022)    Social Connection and Isolation Panel [NHANES]     Frequency of Communication with Friends and Family: Three times a week     Frequency of Social Gatherings with Friends and Family: Twice a week     Attends Pent[employer]al Services: Never     Active Member of Clubs or Organizations: No     Attends Club or Organization Meetings: Never     Marital Status:    Housing Stability: Low Risk  (2022)    Housing Stability Vital Sign     Unable to Pay for Housing in the Last Year: No      Number of Places Lived in the Last Year: 1     Unstable Housing in the Last Year: No       Chief Complaint:   No chief complaint on file.      History of present illness:  This is a 78-year-old female seen for bilateral shoulder pain.  Patient has had pain for years. Got injections in both of her shoulders in the subacromial space.  Patient has had an MRI done previously on the left shoulder which showed some rotator cuff tendinopathy and a small tear.  She used to get injections about every 4-6 months.   Shoulders have gotten worse over the last month.  Pain at night particularly.  Right shoulder is the worst of the 2. Still has decent range of motion, pain is her primary complaint.        Review of Systems:  Musculoskeletal:  See HPI    Physical Examination:    Vital Signs:  There were no vitals filed for this visit.    There is no height or weight on file to calculate BMI.    This a well-developed, well nourished patient in no acute distress.  They are alert and oriented and cooperative to examination.  Pt. walks without an antalgic gait.      Examination of bilateral shoulders shows no rashes or erythema. There are no masses, ecchymosis, or atrophy. The patient has full range of motion in forward flexion, external rotation, and internal rotation to the mid T-spine. The patient has positive Harrington test.  Positive Neer - Bon Homme's test. - Speeds test. Nontender to palpation over a.c. joint. Normal stability anteriorly, posteriorly, and negative sulcus sign. Passive range of motion: Forward flexion of 180°, external rotation at 90° of 90°, internal rotation of 50°, and external rotation at 0° of 50°. 2+ radial pulse. Intact axillary, radial, median and ulnar sensation.  4+ out of 5 resisted forward flexion, external rotation, and negative lift off test.      X-rays:  X-rays of both shoulders are reviewed today which show some arthritic change particularly on the right.  Small humeral spur.  Some sclerosis of the  right greater tuberosity.  More normal-appearing left shoulder.    MRI of the left shoulder is available for review and interpret today:Findings of rotator cuff tendinopathy and suggesting small full-thickness tear rotator cuff tendon.   Degenerative changes     Assessment::  Bilateral rotator cuff syndrome with small tearing  Some underlying right shoulder arthritis  Left knee arthritis      Plan:  I reviewed the findings with her today. Patient is not interested in surgical treatment at this time.  Patient would like to have injections in both of her shoulders which I agreed to today.    This note was created using [employer] voice recognition software that occasionally misinterpreted phrases or words.    Consult note is delivered via Epic messaging service.

## 2024-01-03 ENCOUNTER — HOSPITAL ENCOUNTER (EMERGENCY)
Facility: HOSPITAL | Age: 79
Discharge: HOME | End: 2024-01-03
Attending: STUDENT IN AN ORGANIZED HEALTH CARE EDUCATION/TRAINING PROGRAM
Payer: MEDICARE

## 2024-01-03 VITALS
RESPIRATION RATE: 18 BRPM | DIASTOLIC BLOOD PRESSURE: 62 MMHG | TEMPERATURE: 98 F | HEIGHT: 61 IN | OXYGEN SATURATION: 94 % | HEART RATE: 75 BPM | SYSTOLIC BLOOD PRESSURE: 120 MMHG | BODY MASS INDEX: 28.13 KG/M2 | WEIGHT: 149 LBS

## 2024-01-03 DIAGNOSIS — R53.83 FATIGUE: ICD-10-CM

## 2024-01-03 DIAGNOSIS — Z79.4 TYPE 2 DIABETES MELLITUS WITH STAGE 3B CHRONIC KIDNEY DISEASE, WITH LONG-TERM CURRENT USE OF INSULIN: ICD-10-CM

## 2024-01-03 DIAGNOSIS — N18.32 TYPE 2 DIABETES MELLITUS WITH STAGE 3B CHRONIC KIDNEY DISEASE, WITH LONG-TERM CURRENT USE OF INSULIN: ICD-10-CM

## 2024-01-03 DIAGNOSIS — U07.1 COVID-19: Primary | ICD-10-CM

## 2024-01-03 DIAGNOSIS — I25.10 CORONARY ARTERY DISEASE, UNSPECIFIED VESSEL OR LESION TYPE, UNSPECIFIED WHETHER ANGINA PRESENT, UNSPECIFIED WHETHER NATIVE OR TRANSPLANTED HEART: ICD-10-CM

## 2024-01-03 DIAGNOSIS — E11.22 TYPE 2 DIABETES MELLITUS WITH STAGE 3B CHRONIC KIDNEY DISEASE, WITH LONG-TERM CURRENT USE OF INSULIN: ICD-10-CM

## 2024-01-03 DIAGNOSIS — R05.9 COUGH: ICD-10-CM

## 2024-01-03 DIAGNOSIS — U07.1 COVID-19 VIRUS DETECTED: ICD-10-CM

## 2024-01-03 DIAGNOSIS — J44.9 CHRONIC OBSTRUCTIVE PULMONARY DISEASE, UNSPECIFIED COPD TYPE: ICD-10-CM

## 2024-01-03 LAB
ALBUMIN SERPL BCP-MCNC: 3.5 G/DL (ref 3.5–5.2)
ALP SERPL-CCNC: 68 U/L (ref 55–135)
ALT SERPL W/O P-5'-P-CCNC: 14 U/L (ref 10–44)
ANION GAP SERPL CALC-SCNC: 9 MMOL/L (ref 8–16)
AST SERPL-CCNC: 17 U/L (ref 10–40)
BACTERIA #/AREA URNS HPF: ABNORMAL /HPF
BASOPHILS # BLD AUTO: 0.07 K/UL (ref 0–0.2)
BASOPHILS NFR BLD: 0.4 % (ref 0–1.9)
BILIRUB SERPL-MCNC: 0.6 MG/DL (ref 0.1–1)
BILIRUB UR QL STRIP: ABNORMAL
BNP SERPL-MCNC: 107 PG/ML (ref 0–99)
BUN SERPL-MCNC: 23 MG/DL (ref 8–23)
CALCIUM SERPL-MCNC: 9.6 MG/DL (ref 8.7–10.5)
CHLORIDE SERPL-SCNC: 105 MMOL/L (ref 95–110)
CLARITY UR: ABNORMAL
CO2 SERPL-SCNC: 27 MMOL/L (ref 23–29)
COLOR UR: YELLOW
CREAT SERPL-MCNC: 1.8 MG/DL (ref 0.5–1.4)
DIFFERENTIAL METHOD BLD: ABNORMAL
EOSINOPHIL # BLD AUTO: 0.1 K/UL (ref 0–0.5)
EOSINOPHIL NFR BLD: 0.7 % (ref 0–8)
ERYTHROCYTE [DISTWIDTH] IN BLOOD BY AUTOMATED COUNT: 13.1 % (ref 11.5–14.5)
EST. GFR  (NO RACE VARIABLE): 29 ML/MIN/1.73 M^2
GLUCOSE SERPL-MCNC: 87 MG/DL (ref 70–110)
GLUCOSE UR QL STRIP: NEGATIVE
HCT VFR BLD AUTO: 45.8 % (ref 37–48.5)
HGB BLD-MCNC: 14.1 G/DL (ref 12–16)
HGB UR QL STRIP: ABNORMAL
HYALINE CASTS #/AREA URNS LPF: 17 /LPF
IMM GRANULOCYTES # BLD AUTO: 0.05 K/UL (ref 0–0.04)
IMM GRANULOCYTES NFR BLD AUTO: 0.3 % (ref 0–0.5)
INFLUENZA A, MOLECULAR: NEGATIVE
INFLUENZA B, MOLECULAR: NEGATIVE
KETONES UR QL STRIP: NEGATIVE
LEUKOCYTE ESTERASE UR QL STRIP: NEGATIVE
LYMPHOCYTES # BLD AUTO: 4.4 K/UL (ref 1–4.8)
LYMPHOCYTES NFR BLD: 25.9 % (ref 18–48)
MCH RBC QN AUTO: 29.4 PG (ref 27–31)
MCHC RBC AUTO-ENTMCNC: 30.8 G/DL (ref 32–36)
MCV RBC AUTO: 96 FL (ref 82–98)
MICROSCOPIC COMMENT: ABNORMAL
MONOCYTES # BLD AUTO: 1.1 K/UL (ref 0.3–1)
MONOCYTES NFR BLD: 6.6 % (ref 4–15)
NEUTROPHILS # BLD AUTO: 11.1 K/UL (ref 1.8–7.7)
NEUTROPHILS NFR BLD: 66.1 % (ref 38–73)
NITRITE UR QL STRIP: NEGATIVE
NRBC BLD-RTO: 0 /100 WBC
PH UR STRIP: 6 [PH] (ref 5–8)
PLATELET # BLD AUTO: 237 K/UL (ref 150–450)
PMV BLD AUTO: 10.3 FL (ref 9.2–12.9)
POTASSIUM SERPL-SCNC: 3.4 MMOL/L (ref 3.5–5.1)
PROT SERPL-MCNC: 7.4 G/DL (ref 6–8.4)
PROT UR QL STRIP: ABNORMAL
RBC # BLD AUTO: 4.79 M/UL (ref 4–5.4)
RBC #/AREA URNS HPF: 2 /HPF (ref 0–4)
SARS-COV-2 RDRP RESP QL NAA+PROBE: POSITIVE
SODIUM SERPL-SCNC: 141 MMOL/L (ref 136–145)
SP GR UR STRIP: >1.03 (ref 1–1.03)
SPECIMEN SOURCE: NORMAL
SQUAMOUS #/AREA URNS HPF: 29 /HPF
TROPONIN I SERPL DL<=0.01 NG/ML-MCNC: 0.01 NG/ML (ref 0–0.03)
URN SPEC COLLECT METH UR: ABNORMAL
UROBILINOGEN UR STRIP-ACNC: ABNORMAL EU/DL
WBC # BLD AUTO: 16.78 K/UL (ref 3.9–12.7)
WBC #/AREA URNS HPF: 9 /HPF (ref 0–5)

## 2024-01-03 PROCEDURE — 36415 COLL VENOUS BLD VENIPUNCTURE: CPT | Performed by: NURSE PRACTITIONER

## 2024-01-03 PROCEDURE — 36415 COLL VENOUS BLD VENIPUNCTURE: CPT | Performed by: STUDENT IN AN ORGANIZED HEALTH CARE EDUCATION/TRAINING PROGRAM

## 2024-01-03 PROCEDURE — 93005 ELECTROCARDIOGRAM TRACING: CPT

## 2024-01-03 PROCEDURE — 63600175 PHARM REV CODE 636 W HCPCS: Performed by: STUDENT IN AN ORGANIZED HEALTH CARE EDUCATION/TRAINING PROGRAM

## 2024-01-03 PROCEDURE — 87502 INFLUENZA DNA AMP PROBE: CPT | Performed by: NURSE PRACTITIONER

## 2024-01-03 PROCEDURE — 84484 ASSAY OF TROPONIN QUANT: CPT | Performed by: STUDENT IN AN ORGANIZED HEALTH CARE EDUCATION/TRAINING PROGRAM

## 2024-01-03 PROCEDURE — 25000003 PHARM REV CODE 250: Performed by: STUDENT IN AN ORGANIZED HEALTH CARE EDUCATION/TRAINING PROGRAM

## 2024-01-03 PROCEDURE — 81003 URINALYSIS AUTO W/O SCOPE: CPT | Performed by: NURSE PRACTITIONER

## 2024-01-03 PROCEDURE — 81000 URINALYSIS NONAUTO W/SCOPE: CPT | Performed by: NURSE PRACTITIONER

## 2024-01-03 PROCEDURE — 80053 COMPREHEN METABOLIC PANEL: CPT | Performed by: NURSE PRACTITIONER

## 2024-01-03 PROCEDURE — 96365 THER/PROPH/DIAG IV INF INIT: CPT

## 2024-01-03 PROCEDURE — 99285 EMERGENCY DEPT VISIT HI MDM: CPT | Mod: 25

## 2024-01-03 PROCEDURE — 83880 ASSAY OF NATRIURETIC PEPTIDE: CPT | Performed by: STUDENT IN AN ORGANIZED HEALTH CARE EDUCATION/TRAINING PROGRAM

## 2024-01-03 PROCEDURE — U0002 COVID-19 LAB TEST NON-CDC: HCPCS | Performed by: NURSE PRACTITIONER

## 2024-01-03 PROCEDURE — 85025 COMPLETE CBC W/AUTO DIFF WBC: CPT | Performed by: NURSE PRACTITIONER

## 2024-01-03 PROCEDURE — 93010 ELECTROCARDIOGRAM REPORT: CPT | Mod: ,,, | Performed by: INTERNAL MEDICINE

## 2024-01-03 RX ORDER — INSULIN DEGLUDEC INJECTION 200 U/ML
INJECTION, SOLUTION SUBCUTANEOUS
Qty: 3 PEN | Refills: 11 | Status: SHIPPED | OUTPATIENT
Start: 2024-01-03

## 2024-01-03 RX ORDER — DOXYCYCLINE 100 MG/1
100 CAPSULE ORAL 2 TIMES DAILY
Qty: 14 CAPSULE | Refills: 0 | Status: SHIPPED | OUTPATIENT
Start: 2024-01-03 | End: 2024-01-10

## 2024-01-03 RX ORDER — CEFDINIR 300 MG/1
300 CAPSULE ORAL EVERY 12 HOURS
Qty: 14 CAPSULE | Refills: 0 | Status: SHIPPED | OUTPATIENT
Start: 2024-01-03 | End: 2024-01-10

## 2024-01-03 RX ADMIN — CEFTRIAXONE SODIUM 1 G: 1 INJECTION, POWDER, FOR SOLUTION INTRAMUSCULAR; INTRAVENOUS at 05:01

## 2024-01-03 NOTE — ED NOTES
Patient arrived at ED alone, alert, oriented and ambulatory. Complains of cough, headache, flu-like symptoms. GCS 15

## 2024-01-03 NOTE — FIRST PROVIDER EVALUATION
Emergency Department TeleTriage Encounter Note      CHIEF COMPLAINT    Chief Complaint   Patient presents with    Cough     Cough, cold and diarrhea. States she has been feeling unwell since the day before genaro and the symptoms are lingering        VITAL SIGNS   Initial Vitals   BP Pulse Resp Temp SpO2   01/03/24 1231 01/03/24 1231 01/03/24 1231 01/03/24 1231 01/03/24 1232   (!) 88/58 68 19 98.4 °F (36.9 °C) 96 %      MAP       --                   ALLERGIES    Review of patient's allergies indicates:   Allergen Reactions    Reglan [metoclopramide hcl]      Depression and weight loss.        PROVIDER TRIAGE NOTE  Verbal consent for the teletriage evaluation was given by the patient at the start of the evaluation.  All efforts will be made to maintain patient's privacy during the evaluation.      This is a teletriage evaluation of a 78 y.o. female presenting to the ED with c/o cough, diarrhea, and feeling bad since 12/24/2023. Limited physical exam via telehealth: The patient is awake, alert, answering questions appropriately and is not in respiratory distress.  As the Teletriage provider, I performed an initial assessment and ordered appropriate labs and imaging studies, if any, to facilitate the patient's care once placed in the ED. Once a room is available, care and a full evaluation will be completed by an alternate ED provider.  Any additional orders and the final disposition will be determined by that provider.  All imaging and labs will not be followed-up by the Teletriage Team, including myself.          ORDERS  Labs Reviewed - No data to display    ED Orders (720h ago, onward)      Start Ordered     Status Ordering Provider    01/03/24 1343 01/03/24 1342  COVID-19 Rapid Screening  STAT         Ordered GASPER MINA    01/03/24 1343 01/03/24 1342  Influenza A & B by Molecular  Once         Ordered GASPER MINA    01/03/24 1343 01/03/24 1342  Saline lock IV  Once         Ordered GASPER MINA     01/03/24 1343 01/03/24 1342  CBC auto differential  STAT         Ordered GASPER MINA    01/03/24 1343 01/03/24 1342  Comprehensive metabolic panel  STAT         Ordered GASPER MINA    01/03/24 1343 01/03/24 1342  Urinalysis, Reflex to Urine Culture Urine, Clean Catch  STAT         Ordered GASPER MINA ZANE              Virtual Visit Note: The provider triage portion of this emergency department evaluation and documentation was performed via [employer]ingCheckout, a HIPAA-compliant telemedicine application, in concert with a tele-presenter in the room. A face to face patient evaluation with one of my colleagues will occur once the patient is placed in an emergency department room.      DISCLAIMER: This note was prepared with [employer] voice recognition transcription software. Garbled syntax, mangled pronouns, and other bizarre constructions may be attributed to that software system.

## 2024-01-03 NOTE — ED PROVIDER NOTES
Encounter Date: 1/3/2024       History     Chief Complaint   Patient presents with    Cough     Cough, cold and diarrhea. States she has been feeling unwell since the day before genaro and the symptoms are lingering      78-year-old female comorbidities including CAD, COPD and CKD presents with fatigue ongoing for 1 week and persistent cough.  No active chest pain or shortness a breath.  COVID exposure within household over the holidays        Review of patient's allergies indicates:   Allergen Reactions    Reglan [metoclopramide hcl]      Depression and weight loss.      Past Medical History:   Diagnosis Date    Anesthesia complication     took patient 6 days to come out of anethesia after CABG    Anticoagulant long-term use     Arthritis     Chronic kidney disease     CKD (chronic kidney disease) stage 3, GFR 30-59 ml/min     Dr. Montero    COPD (chronic obstructive pulmonary disease) 2/7/2016    COPD (chronic obstructive pulmonary disease)     Coronary artery disease     Diabetes mellitus     Diabetes mellitus, type 2     GERD (gastroesophageal reflux disease)     Hx of colonic polyps     Hyperlipidemia     Hypertension     possible new onset CHF 7/30/2016    Tardive dyskinesia     Thyroid disease     Ulcer     Vertigo      Past Surgical History:   Procedure Laterality Date    CARDIAC SURGERY      CATARACT EXTRACTION W/  INTRAOCULAR LENS IMPLANT Right 8/30/2023    Procedure: EXTRACTION, CATARACT, WITH IOL INSERTION;  Surgeon: Tamiko Lam MD;  Location: [facility] OR;  Service: Ophthalmology;  Laterality: Right;    CATARACT EXTRACTION W/  INTRAOCULAR LENS IMPLANT Left 9/13/2023    Procedure: EXTRACTION, CATARACT, WITH IOL INSERTION;  Surgeon: Tamiko Lam MD;  Location: [facility] OR;  Service: Ophthalmology;  Laterality: Left;    CHOLECYSTECTOMY  01/26/2017    COLONOSCOPY      COLONOSCOPY N/A 12/7/2021    Procedure: COLONOSCOPY;  Surgeon: Lito Will MD;  Location: [facility];  Service: Endoscopy;   Laterality: N/A;    CORONARY ARTERY BYPASS GRAFT  2016    4 vessel    HYSTERECTOMY      OOPHORECTOMY      TONSILLECTOMY      TOTAL THYROIDECTOMY      TUBAL LIGATION       Family History   Problem Relation Age of Onset    Cancer Mother         LYMPHOMA    Breast cancer Mother     Cancer Father     Early death Father     Heart disease Paternal Uncle     Alcohol abuse Sister         x2    Heart disease Brother     No Known Problems Son     Glaucoma Neg Hx     Macular degeneration Neg Hx      Social History     Tobacco Use    Smoking status: Former     Current packs/day: 0.00     Average packs/day: 1 pack/day for 27.0 years (27.0 ttl pk-yrs)     Types: Cigarettes     Start date: 1989     Quit date: 2016     Years since quittin.9    Smokeless tobacco: Never   Substance Use Topics    Alcohol use: No     Alcohol/week: 0.0 standard drinks of alcohol    Drug use: No     Review of Systems   All other systems reviewed and are negative.      Physical Exam     Initial Vitals   BP Pulse Resp Temp SpO2   24 1231 24 1231 24 1231 24 1231 24 1232   (!) 88/58 68 19 98.4 °F (36.9 °C) 96 %      MAP       --                Physical Exam    Nursing note and vitals reviewed.  Constitutional:   Fatigued appearing   HENT:   Head: Normocephalic and atraumatic.   Eyes: Right eye exhibits no discharge. Left eye exhibits no discharge.   Cardiovascular:  Normal rate and regular rhythm.           Pulmonary/Chest: Effort normal. No stridor. No respiratory distress.   Abdominal: Abdomen is soft. There is no abdominal tenderness.   Musculoskeletal:         General: No tenderness.     Neurological: She is alert.   Skin: Skin is warm. No rash noted. No erythema.         ED Course   Procedures  Labs Reviewed   SARS-COV-2 RNA AMPLIFICATION, QUAL - Abnormal; Notable for the following components:       Result Value    SARS-CoV-2 RNA, Amplification, Qual Positive (*)     All other components within normal  limits    Narrative:     RAPID COVID   critical result(s) called and verbal readback obtained   from ALON BARRAZA by TN3 01/03/2024 16:42   CBC W/ AUTO DIFFERENTIAL - Abnormal; Notable for the following components:    WBC 16.78 (*)     MCHC 30.8 (*)     Gran # (ANC) 11.1 (*)     Immature Grans (Abs) 0.05 (*)     Mono # 1.1 (*)     All other components within normal limits   COMPREHENSIVE METABOLIC PANEL - Abnormal; Notable for the following components:    Potassium 3.4 (*)     Creatinine 1.8 (*)     eGFR 29 (*)     All other components within normal limits   URINALYSIS, REFLEX TO URINE CULTURE - Abnormal; Notable for the following components:    Appearance, UA Hazy (*)     Specific Gravity, UA >1.030 (*)     Protein, UA 3+ (*)     Bilirubin (UA) 1+ (*)     Occult Blood UA Trace (*)     Urobilinogen, UA 2.0-3.0 (*)     All other components within normal limits    Narrative:     Specimen Source->Urine   URINALYSIS MICROSCOPIC - Abnormal; Notable for the following components:    WBC, UA 9 (*)     Hyaline Casts, UA 17 (*)     All other components within normal limits    Narrative:     Specimen Source->Urine   B-TYPE NATRIURETIC PEPTIDE - Abnormal; Notable for the following components:     (*)     All other components within normal limits   INFLUENZA A & B BY MOLECULAR   TROPONIN I          Imaging Results              X-Ray Chest 1 View (Final result)  Result time 01/03/24 17:00:57      Final result by Naman Silverman MD (01/03/24 17:00:57)                   Impression:      No acute process.  CABG and cardiomegaly.      Electronically signed by: Naman Silverman MD  Date:    01/03/2024  Time:    17:00               Narrative:    EXAMINATION:  XR CHEST 1 VIEW    CLINICAL HISTORY:  Cough, unspecified    TECHNIQUE:  Single frontal view of the chest was performed.    COMPARISON:  03/09/2021    FINDINGS:  There are postoperative changes of CABG.  The heart is mildly enlarged.  Lungs are well expanded.   There is mild atelectasis in the left lower lung zone.  No consolidation, edema, or pleural effusion.                                       Medications   cefTRIAXone (ROCEPHIN) 1 g in dextrose 5 % in water (D5W) 100 mL IVPB (MB+) (0 g Intravenous Stopped 1/3/24 1821)     Medical Decision Making  78-year-old female multiple comorbidities presents with cough and congestion.  Close COVID exposure.  Viral tested positive for COVID-19, lab work with leukocytosis patient with history of CKD and creatinine at 1.8 which is close to baseline.  Minimally elevated BNP which is within her previous, troponin within normal limits, no active chest pain or shortness a breath.  EKG obtained with no STEMI.  Chest x-ray obtained with no focal infiltrate per my read.  Do not think acute coronary syndrome is going on at this time, patient administered IV antibiotics and will be discharged home with oral antibiotics and close PCP follow up for re-evaluation and strict return precautions for worsening symptoms.  History of COPD as well.    Amount and/or Complexity of Data Reviewed  Labs: ordered. Decision-making details documented in ED Course.  Radiology: ordered and independent interpretation performed.     Details: No focal infiltrate per my read  ECG/medicine tests: ordered and independent interpretation performed.     Details: Rate 70, normal sinus rhythm, QRS 78, , no STEMI    Risk  Prescription drug management.               ED Course as of 01/03/24 2033 Wed Jan 03, 2024 1712 Patient with no active chest pain [KB]   1712 Repeat blood pressure improved spontaneously to 133/67 [KB]   1720 WBC(!): 16.78 [KB]   1720 Influenza A, Molecular: Negative [KB]   1720 Influenza B, Molecular: Negative [KB]   1720 Potassium(!): 3.4 [KB]   1720 Creatinine(!): 1.8 [KB]   1720 eGFR(!): 29 [KB]   1720 Leukocytes, UA: Negative [KB]   1735 Troponin I: 0.013 [KB]   1740 BNP(!): 107 [KB]      ED Course User Index  [KB] Michael Khalil Jr.,  DO                           Clinical Impression:  Final diagnoses:  [R05.9] Cough  [R53.83] Fatigue  [U07.1] COVID-19 (Primary)  [J44.9] Chronic obstructive pulmonary disease, unspecified COPD type  [I25.10] Coronary artery disease, unspecified vessel or lesion type, unspecified whether angina present, unspecified whether native or transplanted heart          ED Disposition Condition    Discharge Stable          ED Prescriptions       Medication Sig Dispense Start Date End Date Auth. Provider    doxycycline (VIBRAMYCIN) 100 MG Cap Take 1 capsule (100 mg total) by mouth 2 (two) times daily. for 7 days 14 capsule 1/3/2024 1/10/2024 Michael Khalil Jr.,     cefdinir (OMNICEF) 300 MG capsule Take 1 capsule (300 mg total) by mouth every 12 (twelve) hours. for 7 days 14 capsule 1/3/2024 1/10/2024 Michael Khalil Jr., DO          Follow-up Information       Follow up With Specialties Details Why Contact Info Additional Information    [facility] Emergency Medicine In 1 day As needed, If symptoms worsen [address] [place] [address] [address] [place] [id number] 1st floor    Wally Cazares MD Family Medicine In 1 week  [address] [facility] [address]  [place] [address]  [phone]                Michael Khalil Jr.,   01/03/24 2033

## 2024-01-26 NOTE — TELEPHONE ENCOUNTER
----- Message from Ramana Ruiz sent at 3/6/2023  1:13 PM CST -----  Contact: pt  Type: Needs Medical Advice  Who Called:  pt     Best Call Back Number: [phone]    Additional Information: pt is trying to follow up on getting her medication. Please advise.          [de-identified] : NSR.  Diffuse nonspecific T wave changes

## 2024-03-06 ENCOUNTER — LAB VISIT (OUTPATIENT)
Dept: LAB | Facility: HOSPITAL | Age: 79
End: 2024-03-06
Attending: NURSE PRACTITIONER
Payer: MEDICARE

## 2024-03-06 DIAGNOSIS — Z79.4 TYPE 2 DIABETES MELLITUS WITH STAGE 3B CHRONIC KIDNEY DISEASE, WITH LONG-TERM CURRENT USE OF INSULIN: ICD-10-CM

## 2024-03-06 DIAGNOSIS — E11.22 TYPE 2 DIABETES MELLITUS WITH STAGE 3B CHRONIC KIDNEY DISEASE, WITH LONG-TERM CURRENT USE OF INSULIN: ICD-10-CM

## 2024-03-06 DIAGNOSIS — N18.32 TYPE 2 DIABETES MELLITUS WITH STAGE 3B CHRONIC KIDNEY DISEASE, WITH LONG-TERM CURRENT USE OF INSULIN: ICD-10-CM

## 2024-03-06 LAB
ANION GAP SERPL CALC-SCNC: 9 MMOL/L (ref 8–16)
BUN SERPL-MCNC: 13 MG/DL (ref 8–23)
CALCIUM SERPL-MCNC: 9.5 MG/DL (ref 8.7–10.5)
CHLORIDE SERPL-SCNC: 104 MMOL/L (ref 95–110)
CO2 SERPL-SCNC: 28 MMOL/L (ref 23–29)
CREAT SERPL-MCNC: 1.3 MG/DL (ref 0.5–1.4)
EST. GFR  (NO RACE VARIABLE): 42.6 ML/MIN/1.73 M^2
ESTIMATED AVG GLUCOSE: 111 MG/DL (ref 68–131)
GLUCOSE SERPL-MCNC: 116 MG/DL (ref 70–110)
HBA1C MFR BLD: 5.5 % (ref 4–5.6)
POTASSIUM SERPL-SCNC: 3.4 MMOL/L (ref 3.5–5.1)
SODIUM SERPL-SCNC: 141 MMOL/L (ref 136–145)

## 2024-03-06 PROCEDURE — 80048 BASIC METABOLIC PNL TOTAL CA: CPT | Performed by: NURSE PRACTITIONER

## 2024-03-06 PROCEDURE — 83036 HEMOGLOBIN GLYCOSYLATED A1C: CPT | Performed by: NURSE PRACTITIONER

## 2024-03-06 PROCEDURE — 36415 COLL VENOUS BLD VENIPUNCTURE: CPT | Mod: PO | Performed by: NURSE PRACTITIONER

## 2024-03-13 ENCOUNTER — OFFICE VISIT (OUTPATIENT)
Dept: ENDOCRINOLOGY | Facility: CLINIC | Age: 79
End: 2024-03-13
Payer: MEDICARE

## 2024-03-13 VITALS
HEART RATE: 72 BPM | HEIGHT: 62 IN | DIASTOLIC BLOOD PRESSURE: 60 MMHG | SYSTOLIC BLOOD PRESSURE: 90 MMHG | WEIGHT: 152.75 LBS | BODY MASS INDEX: 28.11 KG/M2

## 2024-03-13 DIAGNOSIS — I25.10 CORONARY ARTERY DISEASE INVOLVING NATIVE CORONARY ARTERY OF NATIVE HEART WITHOUT ANGINA PECTORIS: ICD-10-CM

## 2024-03-13 DIAGNOSIS — E07.9 THYROID DISEASE: ICD-10-CM

## 2024-03-13 DIAGNOSIS — I10 ESSENTIAL HYPERTENSION: ICD-10-CM

## 2024-03-13 DIAGNOSIS — N18.32 TYPE 2 DIABETES MELLITUS WITH STAGE 3B CHRONIC KIDNEY DISEASE, WITH LONG-TERM CURRENT USE OF INSULIN: Primary | ICD-10-CM

## 2024-03-13 DIAGNOSIS — E78.5 HYPERLIPIDEMIA WITH TARGET LDL LESS THAN 70: ICD-10-CM

## 2024-03-13 DIAGNOSIS — N25.81 SECONDARY HYPERPARATHYROIDISM OF RENAL ORIGIN: ICD-10-CM

## 2024-03-13 DIAGNOSIS — Z79.4 TYPE 2 DIABETES MELLITUS WITH DIABETIC POLYNEUROPATHY, WITH LONG-TERM CURRENT USE OF INSULIN: ICD-10-CM

## 2024-03-13 DIAGNOSIS — E11.42 TYPE 2 DIABETES MELLITUS WITH DIABETIC POLYNEUROPATHY, WITH LONG-TERM CURRENT USE OF INSULIN: ICD-10-CM

## 2024-03-13 DIAGNOSIS — Z79.4 TYPE 2 DIABETES MELLITUS WITH STAGE 3B CHRONIC KIDNEY DISEASE, WITH LONG-TERM CURRENT USE OF INSULIN: Primary | ICD-10-CM

## 2024-03-13 DIAGNOSIS — E11.22 TYPE 2 DIABETES MELLITUS WITH STAGE 3B CHRONIC KIDNEY DISEASE, WITH LONG-TERM CURRENT USE OF INSULIN: Primary | ICD-10-CM

## 2024-03-13 LAB — GLUCOSE SERPL-MCNC: 101 MG/DL (ref 70–110)

## 2024-03-13 PROCEDURE — 1101F PT FALLS ASSESS-DOCD LE1/YR: CPT | Mod: CPTII,S$GLB,, | Performed by: NURSE PRACTITIONER

## 2024-03-13 PROCEDURE — 99214 OFFICE O/P EST MOD 30 MIN: CPT | Mod: S$GLB,,, | Performed by: NURSE PRACTITIONER

## 2024-03-13 PROCEDURE — 1126F AMNT PAIN NOTED NONE PRSNT: CPT | Mod: CPTII,S$GLB,, | Performed by: NURSE PRACTITIONER

## 2024-03-13 PROCEDURE — G2211 COMPLEX E/M VISIT ADD ON: HCPCS | Mod: S$GLB,,, | Performed by: NURSE PRACTITIONER

## 2024-03-13 PROCEDURE — 82962 GLUCOSE BLOOD TEST: CPT | Mod: S$GLB,,, | Performed by: NURSE PRACTITIONER

## 2024-03-13 PROCEDURE — 1160F RVW MEDS BY RX/DR IN RCRD: CPT | Mod: CPTII,S$GLB,, | Performed by: NURSE PRACTITIONER

## 2024-03-13 PROCEDURE — 3288F FALL RISK ASSESSMENT DOCD: CPT | Mod: CPTII,S$GLB,, | Performed by: NURSE PRACTITIONER

## 2024-03-13 PROCEDURE — 99999 PR PBB SHADOW E&M-EST. PATIENT-LVL IV: CPT | Mod: PBBFAC,,, | Performed by: NURSE PRACTITIONER

## 2024-03-13 PROCEDURE — 1159F MED LIST DOCD IN RCRD: CPT | Mod: CPTII,S$GLB,, | Performed by: NURSE PRACTITIONER

## 2024-03-13 PROCEDURE — 3078F DIAST BP <80 MM HG: CPT | Mod: CPTII,S$GLB,, | Performed by: NURSE PRACTITIONER

## 2024-03-13 PROCEDURE — 3074F SYST BP LT 130 MM HG: CPT | Mod: CPTII,S$GLB,, | Performed by: NURSE PRACTITIONER

## 2024-03-13 RX ORDER — CALCITRIOL 0.25 UG/1
0.25 CAPSULE ORAL
COMMUNITY
Start: 2024-01-03

## 2024-03-13 RX ORDER — INSULIN DEGLUDEC 200 U/ML
INJECTION, SOLUTION SUBCUTANEOUS
Qty: 3 PEN | Refills: 11 | Status: SHIPPED | OUTPATIENT
Start: 2024-03-13 | End: 2024-04-12

## 2024-03-13 NOTE — PROGRESS NOTES
"CC: Ms. Adele Hall arrives today for management of Type 2 DM and review of chronic medical conditions, as listed in the visit diagnosis section of this encounter.     HPI: Ms. Adele Hall was diagnosed with Type 2 DM in ~ 2002. Metformin started at the time of diagnosis but was stopped d/t renal impairment, per PCP in [place]. Insulin was started ~ 2012. She has rotated between analog insulins and generic NPH and Regular insulins, due to cost. [employer] brand insulins, due to cost.  Denies FH of DM. She did have 1 ER admission after having BG of >700 in 2012, prior to starting insulin.   She has a dx of CAD - s/p CABG in 1/2016. Follows with Dr. Dick.  She follows with Dr. Davidson for CKD.    Patient was last seen by me in December. Insulin dose was decreased then. Dexcom G7 was ordered.     She received Dexcom G7 recently. Brings box to appointment today.      Not monitoring BG readings.    Hypoglycemia: Denies   Symptoms: dizziness   Treatment: sweets    Missing Insulin/PO medication doses: No    Dietary Habits: Eats usually 1 meal/day. Usually goes out to eat. Sleeps in. Usually wakes at noon and has meal in the evening.  Rare snack. Avoids sugary beverages.       CURRENT DIABETIC MEDS: Mounjaro 5 mg weekly, Tresiba U200 38 units QAM  Vial or pen: pens  Glucometer type: True Metrix    Previous DM treatments:  Metformin  Victoza - $   NPH/Regular insulins  Novolog     Last Eye Exam: 10/2023, no DR.   Last Podiatry Exam: never    REVIEW OF SYSTEMS  Constitutional: no c/o weakness. + fatigue. + 7# weight loss.  Cardiac: no chest pain. Reports occasional "flutters" over the past week. These are brief, self-limited, not associated with other symptoms. Has upcoming cardiology appt.   Respiratory: no cough, dyspnea   GI: no c/o nausea, abdominal pain. + chronic alternating constipation and diarrhea. States that this isn't worse since starting Mounjaro.   Neuro: c/o paresthesias in feet.   Skin: no " "lesions, rashes. + dry skin  Endocrine: denies polyphagia, polydipsia, polyuria.      Personally reviewed Past Medical, Surgical, Social History.    Vital Signs  BP 90/60   Pulse 72   Ht 5' 1.5" (1.562 m)   Wt 69.3 kg (152 lb 12.5 oz)   BMI 28.40 kg/m²     Personally reviewed the below labs:    Hemoglobin A1C   Date Value Ref Range Status   03/06/2024 5.5 4.0 - 5.6 % Final     Comment:     ADA Screening Guidelines:  5.7-6.4%  Consistent with prediabetes  >or=6.5%  Consistent with diabetes    High levels of fetal hemoglobin interfere with the HbA1C  assay. Heterozygous hemoglobin variants (HbS, HgC, etc)do  not significantly interfere with this assay.   However, presence of multiple variants may affect accuracy.     11/28/2023 5.5 4.0 - 5.6 % Final     Comment:     ADA Screening Guidelines:  5.7-6.4%  Consistent with prediabetes  >or=6.5%  Consistent with diabetes    High levels of fetal hemoglobin interfere with the HbA1C  assay. Heterozygous hemoglobin variants (HbS, HgC, etc)do  not significantly interfere with this assay.   However, presence of multiple variants may affect accuracy.     05/25/2023 5.6 4.0 - 5.6 % Final     Comment:     ADA Screening Guidelines:  5.7-6.4%  Consistent with prediabetes  >or=6.5%  Consistent with diabetes    High levels of fetal hemoglobin interfere with the HbA1C  assay. Heterozygous hemoglobin variants (HbS, HgC, etc)do  not significantly interfere with this assay.   However, presence of multiple variants may affect accuracy.         Chemistry        Component Value Date/Time     03/06/2024 1101    K 3.4 (L) 03/06/2024 1101     03/06/2024 1101    CO2 28 03/06/2024 1101    BUN 13 03/06/2024 1101    CREATININE 1.3 03/06/2024 1101     (H) 03/06/2024 1101        Component Value Date/Time    CALCIUM 9.5 03/06/2024 1101    ALKPHOS 68 01/03/2024 1613    AST 17 01/03/2024 1613    AST 45 (H) 01/30/2016 0431    ALT 14 01/03/2024 1613    BILITOT 0.6 01/03/2024 1613    "       Lab Results   Component Value Date    CHOL 124 05/25/2023    CHOL 128 05/12/2022    CHOL 127 04/27/2021     Lab Results   Component Value Date    HDL 30 (L) 05/25/2023    HDL 36 (L) 05/12/2022    HDL 28 (L) 04/27/2021     Lab Results   Component Value Date    LDLCALC 39.6 (L) 05/25/2023    LDLCALC 28.6 (L) 05/12/2022    LDLCALC 29.6 (L) 04/27/2021     Lab Results   Component Value Date    TRIG 272 (H) 05/25/2023    TRIG 317 (H) 05/12/2022    TRIG 347 (H) 04/27/2021     Lab Results   Component Value Date    CHOLHDL 24.2 05/25/2023    CHOLHDL 28.1 05/12/2022    CHOLHDL 22.0 04/27/2021       Lab Results   Component Value Date    MICALBCREAT 192.4 (H) 11/28/2023     Lab Results   Component Value Date    TSH 0.410 05/25/2023       CrCl cannot be calculated (Patient's most recent lab result is older than the maximum 7 days allowed.).    Vit D, 25-Hydroxy   Date Value Ref Range Status   10/09/2023 32 30 - 96 ng/mL Final     Comment:     Vitamin D deficiency.........<10 ng/mL                              Vitamin D insufficiency......10-29 ng/mL       Vitamin D sufficiency........> or equal to 30 ng/mL  Vitamin D toxicity............>100 ng/mL             PHYSICAL EXAMINATION  Constitutional: Appears well, no distress  Respiratory: CTA, even and unlabored.  Cardiovascular: RRR, no murmurs, no carotid bruits.   GI: normal bowel sounds, no hernia  Skin: warm and dry  Neuro: oriented to person, place, time.   Feet: appropriate footwear.        Goals        HEMOGLOBIN A1C < 7.5                 Assessment/Plan  1. Type 2 diabetes mellitus with stage 3 chronic kidney disease, with long-term current use of insulin  -- A1c suppressed. Will continue weaning insulin.   -- decrease Tresiba to 32 units.  -- continue Mounjaro  -- DM education for Dexcom G7 training   -- following with nephrology    -- Discussed diagnosis of DM, A1c goals, progression of disease, long term complications and tx options.   -- Reviewed hypoglycemia  management: treat with 1/2 glass of juice, 1/2 can regular coke, or 4 glucose tablets. Monitor and repeat treatment every 15 minutes until BG is >70 Then have a snack, which includes a complex carbohydrate and protein.   Advised p  atient to check BG before activities, such as driving or exercise.    -- takes aspirin, statin, ace-I   2. Type 2 diabetes mellitus with diabetic polyneuropathy, with long-term current use of insulin  -- optimize DM control     3. Coronary artery disease involving native coronary artery of native heart without angina pectoris  -- follows with cardiology  -- see A/P #7   4. Essential hypertension  -- lower end of normal range  -- continue current meds   5. Hyperlipidemia with target LDL less than 70  -- Uncontrolled with elevated triglycerides   -- Continue Crestor (Fenofibrate previously DC'd due to renal function)  -- lipid panel with RTC   6. Thyroid disease  -- controlled  -- continue levothyroxine 75 mcg daily  -- TSH with RTC   7. Secondary hyperparathyroidism of renal origin  -- managed by nephrology (Dr. Leigh)       FOLLOW UP  Follow up in about 4 months (around 7/13/2024).   Patient instructed to bring BG logs to each follow up   Patient encouraged to call for any BG/medication issues, concerns, or questions.    Orders Placed This Encounter   Procedures    Hemoglobin A1C    TSH    Comprehensive Metabolic Panel    Lipid Panel    Ambulatory referral/consult to Diabetes Education    POCT Glucose, Hand-Held Device

## 2024-03-14 ENCOUNTER — PATIENT OUTREACH (OUTPATIENT)
Dept: DIABETES | Facility: CLINIC | Age: 79
End: 2024-03-14
Payer: MEDICARE

## 2024-03-14 NOTE — PATIENT INSTRUCTIONS
Called and spoke with pt about upcoming appt to train/start dexcom G7.  Pt states her phone does not always work well, so she would prefer to use .  Pt will bring supplies in on 3/20/24 at 1PM

## 2024-03-20 ENCOUNTER — HOSPITAL ENCOUNTER (OUTPATIENT)
Facility: HOSPITAL | Age: 79
Discharge: HOME | DRG: 313 | End: 2024-03-21
Attending: EMERGENCY MEDICINE | Admitting: INTERNAL MEDICINE
Payer: MEDICARE

## 2024-03-20 ENCOUNTER — NUTRITION (OUTPATIENT)
Dept: DIABETES | Facility: CLINIC | Age: 79
End: 2024-03-20
Payer: MEDICARE

## 2024-03-20 DIAGNOSIS — R07.9 CHEST PAIN: ICD-10-CM

## 2024-03-20 DIAGNOSIS — I15.2 HYPERTENSION ASSOCIATED WITH DIABETES: ICD-10-CM

## 2024-03-20 DIAGNOSIS — R94.31 EKG ABNORMALITY: ICD-10-CM

## 2024-03-20 DIAGNOSIS — E11.59 HYPERTENSION ASSOCIATED WITH DIABETES: ICD-10-CM

## 2024-03-20 DIAGNOSIS — Z79.4 TYPE 2 DIABETES MELLITUS WITH STAGE 3B CHRONIC KIDNEY DISEASE, WITH LONG-TERM CURRENT USE OF INSULIN: ICD-10-CM

## 2024-03-20 DIAGNOSIS — E11.22 TYPE 2 DIABETES MELLITUS WITH STAGE 3B CHRONIC KIDNEY DISEASE, WITH LONG-TERM CURRENT USE OF INSULIN: ICD-10-CM

## 2024-03-20 DIAGNOSIS — N18.32 TYPE 2 DIABETES MELLITUS WITH STAGE 3B CHRONIC KIDNEY DISEASE, WITH LONG-TERM CURRENT USE OF INSULIN: ICD-10-CM

## 2024-03-20 LAB
BASOPHILS # BLD AUTO: 0.05 K/UL (ref 0–0.2)
BASOPHILS NFR BLD: 0.5 % (ref 0–1.9)
DIFFERENTIAL METHOD BLD: NORMAL
EOSINOPHIL # BLD AUTO: 0.2 K/UL (ref 0–0.5)
EOSINOPHIL NFR BLD: 1.6 % (ref 0–8)
ERYTHROCYTE [DISTWIDTH] IN BLOOD BY AUTOMATED COUNT: 13.6 % (ref 11.5–14.5)
HCT VFR BLD AUTO: 39.1 % (ref 37–48.5)
HGB BLD-MCNC: 12.7 G/DL (ref 12–16)
IMM GRANULOCYTES # BLD AUTO: 0.02 K/UL (ref 0–0.04)
IMM GRANULOCYTES NFR BLD AUTO: 0.2 % (ref 0–0.5)
LYMPHOCYTES # BLD AUTO: 4 K/UL (ref 1–4.8)
LYMPHOCYTES NFR BLD: 39.1 % (ref 18–48)
MCH RBC QN AUTO: 29.9 PG (ref 27–31)
MCHC RBC AUTO-ENTMCNC: 32.5 G/DL (ref 32–36)
MCV RBC AUTO: 92 FL (ref 82–98)
MONOCYTES # BLD AUTO: 0.8 K/UL (ref 0.3–1)
MONOCYTES NFR BLD: 7.5 % (ref 4–15)
NEUTROPHILS # BLD AUTO: 5.2 K/UL (ref 1.8–7.7)
NEUTROPHILS NFR BLD: 51.1 % (ref 38–73)
NRBC BLD-RTO: 0 /100 WBC
PLATELET # BLD AUTO: 187 K/UL (ref 150–450)
PMV BLD AUTO: 10.6 FL (ref 9.2–12.9)
RBC # BLD AUTO: 4.25 M/UL (ref 4–5.4)
WBC # BLD AUTO: 10.19 K/UL (ref 3.9–12.7)

## 2024-03-20 PROCEDURE — 99999 PR PBB SHADOW E&M-EST. PATIENT-LVL III: CPT | Mod: PBBFAC,,, | Performed by: NUTRITIONIST

## 2024-03-20 PROCEDURE — 93010 ELECTROCARDIOGRAM REPORT: CPT | Mod: ,,, | Performed by: GENERAL PRACTICE

## 2024-03-20 PROCEDURE — 83880 ASSAY OF NATRIURETIC PEPTIDE: CPT | Performed by: EMERGENCY MEDICINE

## 2024-03-20 PROCEDURE — 96374 THER/PROPH/DIAG INJ IV PUSH: CPT

## 2024-03-20 PROCEDURE — 99285 EMERGENCY DEPT VISIT HI MDM: CPT | Mod: 25

## 2024-03-20 PROCEDURE — 84484 ASSAY OF TROPONIN QUANT: CPT | Performed by: EMERGENCY MEDICINE

## 2024-03-20 PROCEDURE — 36415 COLL VENOUS BLD VENIPUNCTURE: CPT | Performed by: EMERGENCY MEDICINE

## 2024-03-20 PROCEDURE — G0108 DIAB MANAGE TRN  PER INDIV: HCPCS | Mod: S$GLB,,, | Performed by: NUTRITIONIST

## 2024-03-20 PROCEDURE — 80053 COMPREHEN METABOLIC PANEL: CPT | Performed by: EMERGENCY MEDICINE

## 2024-03-20 PROCEDURE — 93005 ELECTROCARDIOGRAM TRACING: CPT

## 2024-03-20 PROCEDURE — 85025 COMPLETE CBC W/AUTO DIFF WBC: CPT | Performed by: EMERGENCY MEDICINE

## 2024-03-20 PROCEDURE — 25000003 PHARM REV CODE 250: Performed by: EMERGENCY MEDICINE

## 2024-03-20 PROCEDURE — 63600175 PHARM REV CODE 636 W HCPCS: Performed by: EMERGENCY MEDICINE

## 2024-03-20 RX ORDER — HEPARIN SODIUM 5000 [USP'U]/ML
5000 INJECTION, SOLUTION INTRAVENOUS; SUBCUTANEOUS
Status: COMPLETED | OUTPATIENT
Start: 2024-03-20 | End: 2024-03-20

## 2024-03-20 RX ORDER — ASPIRIN 325 MG
325 TABLET ORAL
Status: COMPLETED | OUTPATIENT
Start: 2024-03-20 | End: 2024-03-20

## 2024-03-20 RX ADMIN — ASPIRIN 325 MG: 325 TABLET ORAL at 11:03

## 2024-03-20 RX ADMIN — HEPARIN SODIUM 5000 UNITS: 5000 INJECTION INTRAVENOUS; SUBCUTANEOUS at 11:03

## 2024-03-20 NOTE — PROGRESS NOTES
Diabetes Care Specialist Progress Note  Author: Wanda Garibay RD  Date: 3/20/2024    Referral 3/13/24    Program Intake  Reason for Diabetes Program Visit:: Intervention  Type of Intervention:: Individual  Individual: Device Training  Device Training: Personal CGM (Dexcom G7 via [employer])  Current diabetes risk level:: low  In the last 12 months, have you:: none  Permission to speak with others about care:: yes (, Sami)  Continuous Glucose Monitoring  Patient has CGM: Yes  Personal CGM type::  (Dexcom G7 w/)    Lab Results   Component Value Date    HGBA1C 5.5 03/06/2024       Clinical    Patient Health Rating  Compared to other people your age, how would you rate your health?: Good    Problem Review  Reviewed Problem List with Patient: yes  Active comorbidities affecting diabetes self-care.: yes  Comorbidities: Cardiovascular Disease, Hypertension, Chronic Kidney Disease  Reviewed health maintenance: yes    Clinical Assessment  Current Diabetes Treatment: Injectable, Insulin (Mounjaro 5 mg on MON; Tresiba 32 units AM)  Have you ever experienced hypoglycemia (low blood sugar)?: no  Have you ever experienced hyperglycemia (high blood sugar)?: no    Medication Information  How do you obtain your medications?: Patient drives  How many days a week do you miss your medications?: Never  Do you use a pill box or medication chart to help you manage your medications?: Pill box  Do you sometimes have difficulty refilling your medications?: No  Medication adherence impacting ability to self-manage diabetes?: No    Labs  Do you have regular lab work to monitor your medications?: Yes  Type of Regular Lab Work: A1c, Cholesterol, CBC, Other  Where do you get your labs drawn?: [facility]  Lab Compliance Barriers: No    Additional Social History    Support  Does anyone support you with your diabetes care?: yes  Who supports you?: self, spouse  Who takes you to your medical appointments?: self, spouse  Does the current  support meet the patient's needs?: Yes  Is Support an area impacting ability to self-manage diabetes?: No    Access to Mass Media & Technology  Does the patient have access to any of the following devices or technologies?: Smart phone  Media or technology needs impacting ability to self-manage diabetes?: No    Cognitive/Behavioral Health  Alert and Oriented: Yes  Difficulty Thinking: No  Requires Prompting: No  Requires assistance for routine expression?: No  Cognitive or behavioral barriers impacting ability to self-manage diabetes?: No    Culture/Yarsani  Culture or Hinduism beliefs that may impact ability to access healthcare: No    Communication  Language preference: English  Hearing Problems: No  Vision Problems: No  Communication needs impacting ability to self-manage diabetes?: No    Health Literacy  Preferred Learning Method: Face to Face, Demonstration  How often do you need to have someone help you read instructions, pamphlets, or written material from your doctor or pharmacy?: Never  Health literacy needs impacting ability to self-manage diabetes?: No      Diabetes Self-Management Skills Assessment    Diabetes Disease Process/Treatment Options  Patient/caregiver able to state what happens when someone has diabetes.: yes  Patient/caregiver knows what type of diabetes they have.: yes  Diabetes Type : Type II  Patient/caregiver able to identify at least three signs and symptoms of diabetes.: yes  Identified signs and symptoms:: blurred vision, fatigue, frequent infections, frequent urination, increased thirst  Patient able to identify at least three risk factors for diabetes.: yes  Identified risk factors:: age over 40, being overweight, family history, reduced activity  Diabetes Disease Process/Treatment Options: Skills Assessment Completed: Yes  Assessment indicates:: Adequate understanding  Area of need?: No    Medications  Patient is able to describe current diabetes management routine.: yes  Diabetes  management routine:: insulin, injectable medications (Mounjaro 5 mg on MON; Tresiba 32 units AM)  Patient is able to identify current diabetes medications, dosages, and appropriate timing of medications.: yes  Patient understands the purpose of the medications taken for diabetes.: yes  Patient reports problems or concerns with current medication regimen.: no  Medication Skills Assessment Completed:: Yes  Assessment indicates:: Adequate understanding  Area of need?: No    Home Blood Glucose Monitoring  Patient states that blood sugar is checked at home daily.: yes  Monitoring Method:: personal continuous glucose monitor (start/train today)  Personal CGM type::  (Dexcom G7 w/)  Home Blood Glucose Monitoring Skills Assessment Completed: : Yes  Assessment indicates:: Instruction Needed  Area of need?: Yes    Acute Complications  Acute Complications Skills Assessment Completed: : No  Deffered due to:: Time  Area of need?: No    Chronic Complications  Patient can identify major chronic complications of diabetes.: yes  Stated chronic complications:: heart disease/heart attack, kidney disease, neuropathy/nerve damage, retinopathy, stroke  Patient can identify ways to prevent or delay diabetes complications.: yes  Stated ways to prevent complications:: healthy eating and regular activity, controlling blood sugar  Patient is aware that having diabetes increases risk of heart disease?: Yes  Patient is aware that heart disease is the leading cause of death and disability in people with diabetes?: Yes  Patient able to state risk factors for heart disease?: Yes  Patient stated risk factors for heart disease:: High blood pressure, High cholesterol, Diet, Limited activity, Medication non-adherance, Having diabetes  Patient is taking statin?: Yes  Chronic Complications Skills Assessment Completed: : Yes  Assessment indicates:: Adequate understanding  Area of need?: No    Psychosocial/Coping  Patient can identify ways of  coping with chronic disease.: yes  Patient-stated ways of coping with chronic disease:: support from loved ones  Psychosocial/Coping Skills Assessment Completed: : Yes  Assessment indicates:: Adequate understanding  Area of need?: No      Assessment Summary and Plan    Based on today's diabetes care assessment, the following areas of need were identified:          3/20/2024    12:01 AM   Social   Support No   Access to Mass Media/Tech No   Cognitive/Behavioral Health No   Culture/Sabianism No   Communication No   Health Literacy No            3/20/2024    12:01 AM   Clinical   Medication Adherence No   Lab Compliance No            3/20/2024    12:01 AM   Diabetes Self-Management Skills   Diabetes Disease Process/Treatment Options No   Medication No   Home Blood Glucose Monitoring Yes--see Care Plan   Acute Complications No   Chronic Complications No   Psychosocial/Coping No          Today's interventions were provided through individual discussion, instruction, and written materials were provided.      Patient verbalized understanding of instruction and written materials.  Pt was able to return back demonstration of instructions today. Patient understood key points, needs reinforcement and further instruction.     Diabetes Self-Management Care Plan:    Today's Diabetes Self-Management Care Plan was developed with Adele's input. Adele has agreed to work toward the following goal(s) to improve his/her overall diabetes control.      Care Plan: Diabetes Management   Updates made since 2/19/2024 12:00 AM        Problem: Blood Glucose Self-Monitoring         Goal: Patient will use Dexcom G7 CGM to continuously monitor blood glucose levels daily    Start Date: 3/20/2024   Expected End Date: 6/20/2024   Priority: High   Barriers: No Barriers Identified   Note:    Task: Patient agrees to use and understands  the importance of changing sensor every 10 days (with 12 hour cody period). Pt will use  to follow steps  "for reinsertion and activation     Task:   Pt aware that 30 minute warm up period required before CGM will provide BG levels and will check BG as needed manually     DEXCOM G7 CGM TRAINING  Dexcom G7 mobile apps downloaded to phone. Education was provided using "Quick Start Guide" and demo device per DexApp[employer]oo protocol. [facility] data share set-up.    Patient will use Dexcom G7  to receive continuous glucose data.        Overview:  5 minute glucose reading updates, trending arrows, BG graph screens, reports screen,  connectivity and functions.   Menus: Trend graph, start sensor, enter BG, events, alerts, settings, replace sensor, stop sensor, and shutdown  Dexcom G7 mobile kate/ Settings:                          * Urgent Low: 55 mg/dL                          * Urgent Low Soon: On                          * Low Alert: 80 mg/dL; Snooze 15 mins                          * High Alert: 200 mg/dL; Snooze off                           * Signal Loss: off                          * Brief Sensor Issue: off     Reviewed additional setting options.  Patient paired sensor using 4-digit code listed on sensor cap..    Reviewed where to find sensor insertion time and expiration date.   Reviewed appropriate calibration techniques.  Reviewed sensor site selection. Patient selected and prepped site using aseptic technique, inserted sensor, applied overlay tape and started session.   Reviewed sensor removal from site. Discussed times to remove sensor per  guidelines include MRI or diatherapy.   Patient able to demonstrate without difficulty. Encouraged to review manual and/or training videos prior to starting another sensor.   Reviewed problem solving aspects of sensor transmission/variables that can disrupt RF transmission.  range 20 feet, but the first 3 hrs keep within 3 feet of transmitter.  Patient instructed on lag time of interstitial fluid from CBG and was advised to treat " hypoglycemia and dose insulin based on SMBG values.  Dexcom technical support contact number given and examples of when to contact them discussed.       SUPPLIES FROM:  [facility]     Follow Up Plan     Follow up in about 2 weeks (around 4/3/2024) for f/u on 4/1/24 to assist with changing G7 sensor using .    Today's care plan and follow up schedule was discussed with patient.  Adele verbalized understanding of the care plan, goals, and agrees to follow up plan.        The patient was encouraged to communicate with his/her health care provider/physician and care team regarding his/her condition(s) and treatment.  I provided the patient with my contact information today and encouraged to contact me via phone or [facility] Patient Portal as needed.     Length of Visit   Total Time: 60 Minutes

## 2024-03-21 ENCOUNTER — CLINICAL SUPPORT (OUTPATIENT)
Dept: CARDIOLOGY | Facility: HOSPITAL | Age: 79
DRG: 313 | End: 2024-03-21
Attending: INTERNAL MEDICINE
Payer: MEDICARE

## 2024-03-21 VITALS
BODY MASS INDEX: 28.51 KG/M2 | HEART RATE: 74 BPM | OXYGEN SATURATION: 97 % | TEMPERATURE: 97 F | DIASTOLIC BLOOD PRESSURE: 60 MMHG | SYSTOLIC BLOOD PRESSURE: 126 MMHG | WEIGHT: 151 LBS | RESPIRATION RATE: 16 BRPM | HEIGHT: 61 IN

## 2024-03-21 VITALS — HEIGHT: 61 IN | WEIGHT: 151 LBS | BODY MASS INDEX: 28.51 KG/M2

## 2024-03-21 LAB
ALBUMIN SERPL BCP-MCNC: 3.5 G/DL (ref 3.5–5.2)
ALBUMIN SERPL BCP-MCNC: 3.7 G/DL (ref 3.5–5.2)
ALP SERPL-CCNC: 52 U/L (ref 55–135)
ALP SERPL-CCNC: 61 U/L (ref 55–135)
ALT SERPL W/O P-5'-P-CCNC: 6 U/L (ref 10–44)
ALT SERPL W/O P-5'-P-CCNC: 9 U/L (ref 10–44)
ANION GAP SERPL CALC-SCNC: 12 MMOL/L (ref 8–16)
ANION GAP SERPL CALC-SCNC: 8 MMOL/L (ref 8–16)
AORTIC ROOT ANNULUS: 2.9 CM
AORTIC VALVE CUSP SEPERATION: 1.8 CM
AST SERPL-CCNC: 13 U/L (ref 10–40)
AST SERPL-CCNC: 13 U/L (ref 10–40)
AV INDEX (PROSTH): 0.95
AV MEAN GRADIENT: 3 MMHG
AV PEAK GRADIENT: 5 MMHG
AV VALVE AREA BY VELOCITY RATIO: 2.99 CM²
AV VALVE AREA: 2.97 CM²
AV VELOCITY RATIO: 0.95
BILIRUB SERPL-MCNC: 0.4 MG/DL (ref 0.1–1)
BILIRUB SERPL-MCNC: 0.5 MG/DL (ref 0.1–1)
BNP SERPL-MCNC: 62 PG/ML (ref 0–99)
BSA FOR ECHO PROCEDURE: 1.72 M2
BUN SERPL-MCNC: 19 MG/DL (ref 8–23)
BUN SERPL-MCNC: 21 MG/DL (ref 8–23)
CALCIUM SERPL-MCNC: 9.2 MG/DL (ref 8.7–10.5)
CALCIUM SERPL-MCNC: 9.3 MG/DL (ref 8.7–10.5)
CHLORIDE SERPL-SCNC: 104 MMOL/L (ref 95–110)
CHLORIDE SERPL-SCNC: 105 MMOL/L (ref 95–110)
CO2 SERPL-SCNC: 25 MMOL/L (ref 23–29)
CO2 SERPL-SCNC: 28 MMOL/L (ref 23–29)
CREAT SERPL-MCNC: 1.4 MG/DL (ref 0.5–1.4)
CREAT SERPL-MCNC: 1.6 MG/DL (ref 0.5–1.4)
CV ECHO LV RWT: 0.65 CM
DOP CALC AO PEAK VEL: 1.08 M/S
DOP CALC AO VTI: 24.1 CM
DOP CALC LVOT AREA: 3.1 CM2
DOP CALC LVOT DIAMETER: 2 CM
DOP CALC LVOT PEAK VEL: 1.03 M/S
DOP CALC LVOT STROKE VOLUME: 71.59 CM3
DOP CALC MV VTI: 30.5 CM
DOP CALCLVOT PEAK VEL VTI: 22.8 CM
E WAVE DECELERATION TIME: 250 MSEC
E/A RATIO: 0.74
E/E' RATIO: 7.78 M/S
ECHO LV POSTERIOR WALL: 1.22 CM (ref 0.6–1.1)
EST. GFR  (NO RACE VARIABLE): 33 ML/MIN/1.73 M^2
EST. GFR  (NO RACE VARIABLE): 39 ML/MIN/1.73 M^2
ESTIMATED AVG GLUCOSE: 114 MG/DL (ref 68–131)
FRACTIONAL SHORTENING: 37 % (ref 28–44)
GLUCOSE SERPL-MCNC: 93 MG/DL (ref 70–110)
GLUCOSE SERPL-MCNC: 95 MG/DL (ref 70–110)
HBA1C MFR BLD: 5.6 % (ref 4.5–6.2)
INTERVENTRICULAR SEPTUM: 1.51 CM (ref 0.6–1.1)
IVC DIAMETER: 1.21 CM
LACTATE SERPL-SCNC: 0.7 MMOL/L (ref 0.5–1.9)
LEFT INTERNAL DIMENSION IN SYSTOLE: 2.37 CM (ref 2.1–4)
LEFT VENTRICLE DIASTOLIC VOLUME INDEX: 36.42 ML/M2
LEFT VENTRICLE DIASTOLIC VOLUME: 61.18 ML
LEFT VENTRICLE MASS INDEX: 110 G/M2
LEFT VENTRICLE SYSTOLIC VOLUME INDEX: 11.6 ML/M2
LEFT VENTRICLE SYSTOLIC VOLUME: 19.54 ML
LEFT VENTRICULAR INTERNAL DIMENSION IN DIASTOLE: 3.78 CM (ref 3.5–6)
LEFT VENTRICULAR MASS: 185.21 G
LV LATERAL E/E' RATIO: 7 M/S
LV SEPTAL E/E' RATIO: 8.75 M/S
LVOT MG: 2 MMHG
LVOT MV: 0.69 CM/S
MAGNESIUM SERPL-MCNC: 1.7 MG/DL (ref 1.6–2.6)
MV MEAN GRADIENT: 2 MMHG
MV PEAK A VEL: 0.95 M/S
MV PEAK E VEL: 0.7 M/S
MV PEAK GRADIENT: 4 MMHG
MV STENOSIS PRESSURE HALF TIME: 79 MS
MV VALVE AREA BY CONTINUITY EQUATION: 2.35 CM2
MV VALVE AREA P 1/2 METHOD: 2.78 CM2
OHS QRS DURATION: 80 MS
OHS QTC CALCULATION: 441 MS
PISA TR MAX VEL: 2.97 M/S
POTASSIUM SERPL-SCNC: 3.3 MMOL/L (ref 3.5–5.1)
POTASSIUM SERPL-SCNC: 3.6 MMOL/L (ref 3.5–5.1)
PROT SERPL-MCNC: 6.2 G/DL (ref 6–8.4)
PROT SERPL-MCNC: 6.8 G/DL (ref 6–8.4)
PV MV: 0.69 M/S
PV PEAK GRADIENT: 4 MMHG
PV PEAK VELOCITY: 1.01 M/S
RA PRESSURE ESTIMATED: 3 MMHG
RV TB RVSP: 6 MMHG
SODIUM SERPL-SCNC: 141 MMOL/L (ref 136–145)
SODIUM SERPL-SCNC: 141 MMOL/L (ref 136–145)
TDI LATERAL: 0.1 M/S
TDI SEPTAL: 0.08 M/S
TDI: 0.09 M/S
TR MAX PG: 35 MMHG
TRICUSPID ANNULAR PLANE SYSTOLIC EXCURSION: 1.19 CM
TROPONIN I SERPL DL<=0.01 NG/ML-MCNC: 0.01 NG/ML (ref 0–0.03)
TROPONIN I SERPL DL<=0.01 NG/ML-MCNC: <0.006 NG/ML (ref 0–0.03)
TROPONIN I SERPL HS-MCNC: 5.8 PG/ML (ref 0–14.9)
TV REST PULMONARY ARTERY PRESSURE: 38 MMHG
Z-SCORE OF LEFT VENTRICULAR DIMENSION IN END DIASTOLE: -2.15
Z-SCORE OF LEFT VENTRICULAR DIMENSION IN END SYSTOLE: -1.61

## 2024-03-21 PROCEDURE — 99223 1ST HOSP IP/OBS HIGH 75: CPT | Mod: ,,, | Performed by: STUDENT IN AN ORGANIZED HEALTH CARE EDUCATION/TRAINING PROGRAM

## 2024-03-21 PROCEDURE — 83735 ASSAY OF MAGNESIUM: CPT | Performed by: INTERNAL MEDICINE

## 2024-03-21 PROCEDURE — 94799 UNLISTED PULMONARY SVC/PX: CPT

## 2024-03-21 PROCEDURE — 99900035 HC TECH TIME PER 15 MIN (STAT)

## 2024-03-21 PROCEDURE — 96374 THER/PROPH/DIAG INJ IV PUSH: CPT | Mod: 59

## 2024-03-21 PROCEDURE — 83605 ASSAY OF LACTIC ACID: CPT | Performed by: INTERNAL MEDICINE

## 2024-03-21 PROCEDURE — 93005 ELECTROCARDIOGRAM TRACING: CPT

## 2024-03-21 PROCEDURE — 21000000 HC CCU ICU ROOM CHARGE

## 2024-03-21 PROCEDURE — 94760 N-INVAS EAR/PLS OXIMETRY 1: CPT

## 2024-03-21 PROCEDURE — 96375 TX/PRO/DX INJ NEW DRUG ADDON: CPT | Mod: 59

## 2024-03-21 PROCEDURE — 93306 TTE W/DOPPLER COMPLETE: CPT

## 2024-03-21 PROCEDURE — G0378 HOSPITAL OBSERVATION PER HR: HCPCS

## 2024-03-21 PROCEDURE — 93306 TTE W/DOPPLER COMPLETE: CPT | Mod: 26,,, | Performed by: INTERNAL MEDICINE

## 2024-03-21 PROCEDURE — 96361 HYDRATE IV INFUSION ADD-ON: CPT

## 2024-03-21 PROCEDURE — 96366 THER/PROPH/DIAG IV INF ADDON: CPT

## 2024-03-21 PROCEDURE — 84484 ASSAY OF TROPONIN QUANT: CPT | Performed by: EMERGENCY MEDICINE

## 2024-03-21 PROCEDURE — 36415 COLL VENOUS BLD VENIPUNCTURE: CPT | Performed by: INTERNAL MEDICINE

## 2024-03-21 PROCEDURE — 96365 THER/PROPH/DIAG IV INF INIT: CPT | Mod: 59

## 2024-03-21 PROCEDURE — 80053 COMPREHEN METABOLIC PANEL: CPT | Performed by: INTERNAL MEDICINE

## 2024-03-21 PROCEDURE — 93010 ELECTROCARDIOGRAM REPORT: CPT | Mod: ,,, | Performed by: GENERAL PRACTICE

## 2024-03-21 PROCEDURE — 83036 HEMOGLOBIN GLYCOSYLATED A1C: CPT | Performed by: INTERNAL MEDICINE

## 2024-03-21 PROCEDURE — 36415 COLL VENOUS BLD VENIPUNCTURE: CPT | Performed by: EMERGENCY MEDICINE

## 2024-03-21 PROCEDURE — 99900031 HC PATIENT EDUCATION (STAT)

## 2024-03-21 PROCEDURE — 84484 ASSAY OF TROPONIN QUANT: CPT | Mod: 91 | Performed by: INTERNAL MEDICINE

## 2024-03-21 PROCEDURE — 63600175 PHARM REV CODE 636 W HCPCS: Performed by: INTERNAL MEDICINE

## 2024-03-21 PROCEDURE — 25000003 PHARM REV CODE 250: Performed by: INTERNAL MEDICINE

## 2024-03-21 RX ORDER — HYDROCODONE BITARTRATE AND ACETAMINOPHEN 5; 325 MG/1; MG/1
1 TABLET ORAL EVERY 6 HOURS PRN
Status: DISCONTINUED | OUTPATIENT
Start: 2024-03-21 | End: 2024-03-21 | Stop reason: HOSPADM

## 2024-03-21 RX ORDER — TALC
6 POWDER (GRAM) TOPICAL NIGHTLY PRN
Status: DISCONTINUED | OUTPATIENT
Start: 2024-03-21 | End: 2024-03-21 | Stop reason: HOSPADM

## 2024-03-21 RX ORDER — MORPHINE SULFATE 2 MG/ML
2 INJECTION, SOLUTION INTRAMUSCULAR; INTRAVENOUS
Status: DISCONTINUED | OUTPATIENT
Start: 2024-03-21 | End: 2024-03-21

## 2024-03-21 RX ORDER — CALCITRIOL 0.25 UG/1
0.25 CAPSULE ORAL
Status: DISCONTINUED | OUTPATIENT
Start: 2024-03-21 | End: 2024-03-21 | Stop reason: HOSPADM

## 2024-03-21 RX ORDER — HEPARIN SODIUM 5000 [USP'U]/ML
5000 INJECTION, SOLUTION INTRAVENOUS; SUBCUTANEOUS EVERY 8 HOURS
Status: DISCONTINUED | OUTPATIENT
Start: 2024-03-21 | End: 2024-03-21 | Stop reason: HOSPADM

## 2024-03-21 RX ORDER — ONDANSETRON HYDROCHLORIDE 2 MG/ML
4 INJECTION, SOLUTION INTRAVENOUS EVERY 6 HOURS PRN
Status: DISCONTINUED | OUTPATIENT
Start: 2024-03-21 | End: 2024-03-21 | Stop reason: HOSPADM

## 2024-03-21 RX ORDER — MAGNESIUM SULFATE HEPTAHYDRATE 40 MG/ML
2 INJECTION, SOLUTION INTRAVENOUS ONCE
Status: COMPLETED | OUTPATIENT
Start: 2024-03-21 | End: 2024-03-21

## 2024-03-21 RX ORDER — MORPHINE SULFATE 2 MG/ML
2 INJECTION, SOLUTION INTRAMUSCULAR; INTRAVENOUS
Status: DISCONTINUED | OUTPATIENT
Start: 2024-03-21 | End: 2024-03-21 | Stop reason: HOSPADM

## 2024-03-21 RX ORDER — LANOLIN ALCOHOL/MO/W.PET/CERES
400 CREAM (GRAM) TOPICAL 2 TIMES DAILY
Status: DISCONTINUED | OUTPATIENT
Start: 2024-03-21 | End: 2024-03-21 | Stop reason: HOSPADM

## 2024-03-21 RX ORDER — SODIUM,POTASSIUM PHOSPHATES 280-250MG
2 POWDER IN PACKET (EA) ORAL
Status: DISCONTINUED | OUTPATIENT
Start: 2024-03-21 | End: 2024-03-21 | Stop reason: HOSPADM

## 2024-03-21 RX ORDER — AMLODIPINE BESYLATE 5 MG/1
5 TABLET ORAL DAILY
Qty: 30 TABLET | Refills: 0 | Status: SHIPPED | OUTPATIENT
Start: 2024-03-21 | End: 2024-04-29

## 2024-03-21 RX ORDER — FAMOTIDINE 10 MG/ML
20 INJECTION INTRAVENOUS DAILY
Status: DISCONTINUED | OUTPATIENT
Start: 2024-03-21 | End: 2024-03-21 | Stop reason: HOSPADM

## 2024-03-21 RX ORDER — SODIUM CHLORIDE 0.9 % (FLUSH) 0.9 %
10 SYRINGE (ML) INJECTION EVERY 12 HOURS PRN
Status: DISCONTINUED | OUTPATIENT
Start: 2024-03-21 | End: 2024-03-21 | Stop reason: HOSPADM

## 2024-03-21 RX ORDER — ACETAMINOPHEN 325 MG/1
650 TABLET ORAL EVERY 8 HOURS PRN
Status: DISCONTINUED | OUTPATIENT
Start: 2024-03-21 | End: 2024-03-21 | Stop reason: HOSPADM

## 2024-03-21 RX ORDER — TRAZODONE HYDROCHLORIDE 50 MG/1
100 TABLET ORAL NIGHTLY
Status: DISCONTINUED | OUTPATIENT
Start: 2024-03-21 | End: 2024-03-21 | Stop reason: HOSPADM

## 2024-03-21 RX ORDER — AMOXICILLIN 250 MG
1 CAPSULE ORAL DAILY
Status: DISCONTINUED | OUTPATIENT
Start: 2024-03-21 | End: 2024-03-21 | Stop reason: HOSPADM

## 2024-03-21 RX ORDER — FAMOTIDINE 20 MG/50ML
20 INJECTION, SOLUTION INTRAVENOUS 2 TIMES DAILY
Status: DISCONTINUED | OUTPATIENT
Start: 2024-03-21 | End: 2024-03-21

## 2024-03-21 RX ORDER — ASPIRIN 325 MG
325 TABLET, DELAYED RELEASE (ENTERIC COATED) ORAL DAILY
Status: DISCONTINUED | OUTPATIENT
Start: 2024-03-21 | End: 2024-03-21 | Stop reason: HOSPADM

## 2024-03-21 RX ORDER — LANOLIN ALCOHOL/MO/W.PET/CERES
800 CREAM (GRAM) TOPICAL
Status: DISCONTINUED | OUTPATIENT
Start: 2024-03-21 | End: 2024-03-21 | Stop reason: HOSPADM

## 2024-03-21 RX ORDER — IBUPROFEN 200 MG
24 TABLET ORAL
Status: DISCONTINUED | OUTPATIENT
Start: 2024-03-21 | End: 2024-03-21 | Stop reason: HOSPADM

## 2024-03-21 RX ORDER — LEVOTHYROXINE SODIUM 25 UG/1
75 TABLET ORAL
Status: DISCONTINUED | OUTPATIENT
Start: 2024-03-21 | End: 2024-03-21 | Stop reason: HOSPADM

## 2024-03-21 RX ORDER — METOPROLOL TARTRATE 1 MG/ML
5 INJECTION, SOLUTION INTRAVENOUS EVERY 6 HOURS
Status: DISCONTINUED | OUTPATIENT
Start: 2024-03-21 | End: 2024-03-21 | Stop reason: HOSPADM

## 2024-03-21 RX ORDER — GLUCAGON 1 MG
1 KIT INJECTION
Status: DISCONTINUED | OUTPATIENT
Start: 2024-03-21 | End: 2024-03-21 | Stop reason: HOSPADM

## 2024-03-21 RX ORDER — NALOXONE HCL 0.4 MG/ML
0.02 VIAL (ML) INJECTION
Status: DISCONTINUED | OUTPATIENT
Start: 2024-03-21 | End: 2024-03-21 | Stop reason: HOSPADM

## 2024-03-21 RX ORDER — IBUPROFEN 200 MG
16 TABLET ORAL
Status: DISCONTINUED | OUTPATIENT
Start: 2024-03-21 | End: 2024-03-21 | Stop reason: HOSPADM

## 2024-03-21 RX ORDER — POTASSIUM CHLORIDE 20 MEQ/1
20 TABLET, EXTENDED RELEASE ORAL 2 TIMES DAILY
Status: DISCONTINUED | OUTPATIENT
Start: 2024-03-21 | End: 2024-03-21 | Stop reason: HOSPADM

## 2024-03-21 RX ORDER — ACETAMINOPHEN 325 MG/1
650 TABLET ORAL EVERY 4 HOURS PRN
Status: DISCONTINUED | OUTPATIENT
Start: 2024-03-21 | End: 2024-03-21 | Stop reason: HOSPADM

## 2024-03-21 RX ORDER — ALUMINUM HYDROXIDE, MAGNESIUM HYDROXIDE, AND SIMETHICONE 1200; 120; 1200 MG/30ML; MG/30ML; MG/30ML
30 SUSPENSION ORAL 4 TIMES DAILY PRN
Status: DISCONTINUED | OUTPATIENT
Start: 2024-03-21 | End: 2024-03-21 | Stop reason: HOSPADM

## 2024-03-21 RX ORDER — NITROGLYCERIN 0.4 MG/1
0.4 TABLET SUBLINGUAL EVERY 5 MIN PRN
Status: DISCONTINUED | OUTPATIENT
Start: 2024-03-21 | End: 2024-03-21 | Stop reason: HOSPADM

## 2024-03-21 RX ORDER — AMLODIPINE BESYLATE 5 MG/1
5 TABLET ORAL DAILY
Status: DISCONTINUED | OUTPATIENT
Start: 2024-03-21 | End: 2024-03-21 | Stop reason: HOSPADM

## 2024-03-21 RX ORDER — ATORVASTATIN CALCIUM 40 MG/1
40 TABLET, FILM COATED ORAL DAILY
Status: DISCONTINUED | OUTPATIENT
Start: 2024-03-21 | End: 2024-03-21 | Stop reason: HOSPADM

## 2024-03-21 RX ADMIN — ATORVASTATIN CALCIUM 40 MG: 40 TABLET, FILM COATED ORAL at 09:03

## 2024-03-21 RX ADMIN — POTASSIUM CHLORIDE 20 MEQ: 1500 TABLET, EXTENDED RELEASE ORAL at 05:03

## 2024-03-21 RX ADMIN — ASPIRIN 325 MG: 325 TABLET, COATED ORAL at 09:03

## 2024-03-21 RX ADMIN — MAGNESIUM SULFATE HEPTAHYDRATE 2 G: 40 INJECTION, SOLUTION INTRAVENOUS at 05:03

## 2024-03-21 RX ADMIN — LEVOTHYROXINE SODIUM 75 MCG: 0.03 TABLET ORAL at 05:03

## 2024-03-21 RX ADMIN — METOPROLOL TARTRATE 5 MG: 1 INJECTION, SOLUTION INTRAVENOUS at 12:03

## 2024-03-21 RX ADMIN — Medication 400 MG: at 09:03

## 2024-03-21 RX ADMIN — SENNOSIDES AND DOCUSATE SODIUM 1 TABLET: 8.6; 5 TABLET ORAL at 09:03

## 2024-03-21 RX ADMIN — POTASSIUM CHLORIDE 100 ML/HR: 149 INJECTION, SOLUTION, CONCENTRATE INTRAVENOUS at 04:03

## 2024-03-21 RX ADMIN — METOPROLOL TARTRATE 5 MG: 1 INJECTION, SOLUTION INTRAVENOUS at 04:03

## 2024-03-21 RX ADMIN — FAMOTIDINE 20 MG: 10 INJECTION INTRAVENOUS at 09:03

## 2024-03-21 RX ADMIN — HEPARIN SODIUM 5000 UNITS: 5000 INJECTION, SOLUTION INTRAVENOUS; SUBCUTANEOUS at 05:03

## 2024-03-21 NOTE — NURSING
Discharge instructions reviewed with patient, patient verbalized understanding of instructions and states no further questions, written instructions provided. Belongings sent home with patient. Patient to vehicle via wheelchair to home for self-care with family.

## 2024-03-21 NOTE — ED PROVIDER NOTES
Encounter Date: 3/20/2024       History     Chief Complaint   Patient presents with    Chest Pain     FLUTTER IN CHEST X 2 WEEKS AND NOW FLUTTER HAS TURN INTO INTERMITTENT PAIN. TOOK 2 NITROGLYCERINS WITH NO RELIEF.     Headache     HPI patient is a 78-year-old woman who presents emergency department for evaluation of chest pain.  She states it 2 weeks ago she began to have fluttering in her chest and that fluttering has now turned to pressure-like chest pain that is intermittent over the past day.  She took 2 nitroglycerins without relief tonight and decided to come to the ED.  Currently her pain is 4/10 in severity, pressure-like in intensifying.  Review of patient's allergies indicates:   Allergen Reactions    Reglan [metoclopramide hcl]      Depression and weight loss.      Past Medical History:   Diagnosis Date    Anesthesia complication     took patient 6 days to come out of anethesia after CABG    Anticoagulant long-term use     Arthritis     Chronic kidney disease     CKD (chronic kidney disease) stage 3, GFR 30-59 ml/min     Dr. Montero    COPD (chronic obstructive pulmonary disease) 2/7/2016    COPD (chronic obstructive pulmonary disease)     Coronary artery disease     Diabetes mellitus     Diabetes mellitus, type 2     GERD (gastroesophageal reflux disease)     Hx of colonic polyps     Hyperlipidemia     Hypertension     possible new onset CHF 7/30/2016    Tardive dyskinesia     Thyroid disease     Ulcer     Vertigo      Past Surgical History:   Procedure Laterality Date    CARDIAC SURGERY      CATARACT EXTRACTION W/  INTRAOCULAR LENS IMPLANT Right 8/30/2023    Procedure: EXTRACTION, CATARACT, WITH IOL INSERTION;  Surgeon: Tamiko Lam MD;  Location: [facility] OR;  Service: Ophthalmology;  Laterality: Right;    CATARACT EXTRACTION W/  INTRAOCULAR LENS IMPLANT Left 9/13/2023    Procedure: EXTRACTION, CATARACT, WITH IOL INSERTION;  Surgeon: Tamiko Lam MD;  Location: [facility] OR;  Service:  Ophthalmology;  Laterality: Left;    CHOLECYSTECTOMY  2017    COLONOSCOPY      COLONOSCOPY N/A 2021    Procedure: COLONOSCOPY;  Surgeon: Lito Will MD;  Location: [facility];  Service: Endoscopy;  Laterality: N/A;    CORONARY ARTERY BYPASS GRAFT  2016    4 vessel    HYSTERECTOMY      OOPHORECTOMY      TONSILLECTOMY      TOTAL THYROIDECTOMY      TUBAL LIGATION       Family History   Problem Relation Age of Onset    Cancer Mother         LYMPHOMA    Breast cancer Mother     Cancer Father     Early death Father     Heart disease Paternal Uncle     Alcohol abuse Sister         x2    Heart disease Brother     No Known Problems Son     Glaucoma Neg Hx     Macular degeneration Neg Hx      Social History     Tobacco Use    Smoking status: Former     Current packs/day: 0.00     Average packs/day: 1 pack/day for 27.0 years (27.0 ttl pk-yrs)     Types: Cigarettes     Start date: 1989     Quit date: 2016     Years since quittin.1    Smokeless tobacco: Never   Substance Use Topics    Alcohol use: No     Alcohol/week: 0.0 standard drinks of alcohol    Drug use: No     Review of Systems   Constitutional:  Negative for fever.   HENT:  Negative for sore throat.    Respiratory:  Negative for shortness of breath.    Cardiovascular:  Positive for chest pain and palpitations.   Gastrointestinal:  Negative for nausea.   Genitourinary:  Negative for dysuria.   Musculoskeletal:  Negative for back pain.   Skin:  Negative for rash.   Neurological:  Negative for weakness.   Hematological:  Does not bruise/bleed easily.       Physical Exam     Initial Vitals [24 2226]   BP Pulse Resp Temp SpO2   113/60 72 18 98.3 °F (36.8 °C) 96 %      MAP       --         Physical Exam    Vitals reviewed.  Constitutional: Vital signs are normal. She appears well-developed and well-nourished.  Non-toxic appearance. No distress.   HENT:   Head: Normocephalic and atraumatic.   Eyes: EOM are normal. Pupils are equal,  round, and reactive to light.   Neck: Neck supple. No JVD present.   Normal range of motion.  Cardiovascular:  Normal rate, regular rhythm, normal heart sounds and intact distal pulses.     Exam reveals no gallop and no friction rub.       No murmur heard.  Pulmonary/Chest: Breath sounds normal. She has no wheezes. She has no rhonchi. She has no rales.   Abdominal: Abdomen is soft. Bowel sounds are normal. There is no abdominal tenderness. There is no rebound and no guarding.   Musculoskeletal:         General: Normal range of motion.      Cervical back: Normal range of motion and neck supple. No rigidity.     Neurological: She is alert and oriented to person, place, and time. She has normal strength and normal reflexes. No cranial nerve deficit or sensory deficit. She exhibits normal muscle tone. Coordination normal. GCS eye subscore is 4. GCS verbal subscore is 5. GCS motor subscore is 6.   Skin: Skin is warm and dry.   Psychiatric: She has a normal mood and affect. Her speech is normal and behavior is normal. She is not actively hallucinating.         ED Course   Procedures  Labs Reviewed   COMPREHENSIVE METABOLIC PANEL - Abnormal; Notable for the following components:       Result Value    Creatinine 1.6 (*)     ALT 9 (*)     eGFR 33 (*)     All other components within normal limits   CBC W/ AUTO DIFFERENTIAL   TROPONIN I   B-TYPE NATRIURETIC PEPTIDE   TROPONIN I     EKG Readings: (Independently Interpreted)   Initial Reading: Possible STEMI.   Normal sinus rhythm, 71 beats per minute with LVH and repolarization abnormality.  ST depression with T-wave inversions in V1 through V4 with T-wave inversions in V5 and V6 as well as leads II and AVF.   Other EKG Interpretations: EKG 2., posterior EKG.  Normal sinus rhythm with LVH and repolarization abnormality, 69 beats per minute with ST depressions and T-wave inversions in V2 and V3.  No significant ST elevation in V4 through V6.         Imaging Results               X-Ray Chest AP Portable (In process)                      Medications   metoprolol injection 5 mg (5 mg Intravenous Given 3/21/24 0410)   aspirin EC tablet 325 mg (has no administration in time range)   0.45% NaCl with KCl 20 mEq infusion (100 mL/hr Intravenous New Bag 3/21/24 0418)   famotidine (PF) injection 20 mg (has no administration in time range)   sodium chloride 0.9% flush 10 mL (has no administration in time range)   melatonin tablet 6 mg (has no administration in time range)   ondansetron injection 4 mg (has no administration in time range)   senna-docusate 8.6-50 mg per tablet 1 tablet (has no administration in time range)   acetaminophen tablet 650 mg (has no administration in time range)   aluminum-magnesium hydroxide-simethicone 200-200-20 mg/5 mL suspension 30 mL (has no administration in time range)   acetaminophen tablet 650 mg (has no administration in time range)   HYDROcodone-acetaminophen 5-325 mg per tablet 1 tablet (has no administration in time range)   naloxone 0.4 mg/mL injection 0.02 mg (has no administration in time range)   potassium bicarbonate disintegrating tablet 50 mEq (has no administration in time range)   potassium bicarbonate disintegrating tablet 35 mEq (has no administration in time range)   potassium bicarbonate disintegrating tablet 60 mEq (has no administration in time range)   magnesium oxide tablet 800 mg (has no administration in time range)   magnesium oxide tablet 800 mg (has no administration in time range)   potassium, sodium phosphates 280-160-250 mg packet 2 packet (has no administration in time range)   potassium, sodium phosphates 280-160-250 mg packet 2 packet (has no administration in time range)   potassium, sodium phosphates 280-160-250 mg packet 2 packet (has no administration in time range)   glucose chewable tablet 16 g (has no administration in time range)   glucose chewable tablet 24 g (has no administration in time range)   dextrose 50% injection 12.5  g (has no administration in time range)   dextrose 50% injection 25 g (has no administration in time range)   glucagon (human recombinant) injection 1 mg (has no administration in time range)   heparin (porcine) injection 5,000 Units (5,000 Units Subcutaneous Given 3/21/24 0530)   nitroGLYCERIN SL tablet 0.4 mg (has no administration in time range)   calcitRIOL capsule 0.25 mcg (has no administration in time range)   levothyroxine tablet 75 mcg (75 mcg Oral Given 3/21/24 0529)   atorvastatin tablet 40 mg (has no administration in time range)   traZODone tablet 100 mg (has no administration in time range)   morphine injection 2 mg (has no administration in time range)   potassium chloride SA CR tablet 20 mEq (20 mEq Oral Given 3/21/24 0529)   magnesium sulfate 2g in water 50mL IVPB (premix) (2 g Intravenous New Bag 3/21/24 0530)   magnesium oxide tablet 400 mg (has no administration in time range)   aspirin tablet 325 mg (325 mg Oral Given 3/20/24 2329)   heparin (porcine) injection 5,000 Units (5,000 Units Intravenous Given 3/20/24 2329)     Medical Decision Making  78-year-old woman with a history of CABG, COPD, CKD stage 3, CHF frozen cirrhosis department for evaluation of chest pain.  EKG initially concerning for possible posterior STEMI.  Differential diagnosis includes ACS, unstable angina, GERD/esophagitis.  I doubt acute cholecystitis or aortic dissection.  Patient is not hypotensive, I doubt cardiac tamponade.  Posterior EKG obtained but did not reveal any significant ST elevations.  Consulted with Cardiology.  Decided not to call STEMI and re-evaluate.  Recommended transfer to [facility].  Patient empirically given heparin and aspirin.  Initial troponin was less than 0.006.  BNP 62.  Creatinine 1.6.  Hemoglobin 12.7 patient continued to have chest pain and PT, described as pressure.  Patient transferred in stable condition.    Amount and/or Complexity of Data Reviewed  Labs: ordered.  Radiology:  ordered.    Risk  OTC drugs.  Prescription drug management.  Decision regarding hospitalization.               ED Course as of 03/21/24 0657   Wed Mar 20, 2024   5547 Discussed with Dr. Wagoner.  Patient has history of CKD, previous CABG.  She looks very well, joking around is not diaphoretic and does not appear to be in any significant pain but does state she is in 4/10 pain.  No STEMI at this time, reviewed previous EKG on January 3, 2024.  [AS]   2339 Dr. Wagoner wants her transferred to [facility] to evaluate with high sensitivity troponins.  If her troponins start jumping up or her pain worsens he may take her to the cath lab than. [AS]      ED Course User Index  [AS] Tung Bell MD                           Clinical Impression:  Final diagnoses:  [R07.9] Chest pain          ED Disposition Condition    Admit                 Tung Bell MD  03/21/24 0657

## 2024-03-21 NOTE — PLAN OF CARE
Met with patient at bedside to complete initial assessment. Patient / family reports patient DOES NOT have a living will and NO ONE is medical POA.     [facility]  Initial Discharge Assessment       Primary Care Provider: Wally Cazares MD    Admission Diagnosis: Chest pain [R07.9]    Admission Date: 3/20/2024  Expected Discharge Date: 3/22/2024    Transition of Care Barriers: (P) None    Payor: [employer] MANAGED MEDICARE / Plan: [employer] MEDICARE PPO / Product Type: Medicare Advantage /     Extended Emergency Contact Information  Primary Emergency Contact: Sami Hall  Address: [address] [place], [place] [id number] [place] [address]  Home Phone: [phone]  Mobile Phone: [phone]  Relation: Spouse  Preferred language: English   needed? No    Discharge Plan A: (P) Home with family  Discharge Plan B: (P) Home with family      [facility] [place] [facility] [id number] [facility] [address]  [id number] [facility] [address]  [place] [address]  Phone: [phone] Fax: [phone]      Initial Assessment (most recent)       Adult Discharge Assessment - 03/21/24 1123          Discharge Assessment    Assessment Type Discharge Planning Assessment (P)      Confirmed/corrected address, phone number and insurance Yes (P)      Confirmed Demographics Correct on Facesheet (P)      Source of Information patient (P)      Communicated ANTOINE with patient/caregiver No (P)      Reason For Admission angina (P)      People in Home spouse (P)      Facility Arrived From: [facility] (P)      Do you expect to return to your current living situation? Yes (P)      Do you have help at home or someone to help you manage your care at home? Yes (P)      Who are your caregiver(s) and their phone number(s)? Sami Hall (Spouse)  [phone] (Mobile) (P)      Current cognitive status: Alert/Oriented (P)      Walking or Climbing Stairs Difficulty no (P)      Dressing/Bathing Difficulty no (P)      Equipment Currently  Used at Home crutches;oxygen;walker, rolling;wheelchair (P)      Readmission within 30 days? No (P)      Patient currently being followed by outpatient case management? No (P)      Do you currently have service(s) that help you manage your care at home? No (P)      Do you have prescription coverage? Yes (P)      Coverage [employer] (P)      Who is going to help you get home at discharge? Sami Hall (Spouse)  [phone] (Mobile) (P)      How do you get to doctors appointments? family or friend will provide (P)      Are you on dialysis? No (P)      Do you take coumadin? No (P)      Discharge Plan A Home with family (P)      Discharge Plan B Home with family (P)      DME Needed Upon Discharge  none (P)      Discharge Plan discussed with: Patient (P)      Transition of Care Barriers None (P)         OTHER    Name(s) of People in Home Sami Hall (Spouse)  [phone] (Mobile) (P)

## 2024-03-21 NOTE — SUBJECTIVE & OBJECTIVE
Past Medical History:   Diagnosis Date    Anesthesia complication     took patient 6 days to come out of anethesia after CABG    Anticoagulant long-term use     Arthritis     Chronic kidney disease     CKD (chronic kidney disease) stage 3, GFR 30-59 ml/min     Dr. Montero    COPD (chronic obstructive pulmonary disease) 2/7/2016    COPD (chronic obstructive pulmonary disease)     Coronary artery disease     Diabetes mellitus     Diabetes mellitus, type 2     GERD (gastroesophageal reflux disease)     Hx of colonic polyps     Hyperlipidemia     Hypertension     possible new onset CHF 7/30/2016    Tardive dyskinesia     Thyroid disease     Ulcer     Vertigo        Past Surgical History:   Procedure Laterality Date    CARDIAC SURGERY      CATARACT EXTRACTION W/  INTRAOCULAR LENS IMPLANT Right 8/30/2023    Procedure: EXTRACTION, CATARACT, WITH IOL INSERTION;  Surgeon: Tamiko Lam MD;  Location: [facility] OR;  Service: Ophthalmology;  Laterality: Right;    CATARACT EXTRACTION W/  INTRAOCULAR LENS IMPLANT Left 9/13/2023    Procedure: EXTRACTION, CATARACT, WITH IOL INSERTION;  Surgeon: Tamiko Lam MD;  Location: [facility] OR;  Service: Ophthalmology;  Laterality: Left;    CHOLECYSTECTOMY  01/26/2017    COLONOSCOPY      COLONOSCOPY N/A 12/7/2021    Procedure: COLONOSCOPY;  Surgeon: Lito Will MD;  Location: [facility] ENDO;  Service: Endoscopy;  Laterality: N/A;    CORONARY ARTERY BYPASS GRAFT  01/19/2016    4 vessel    HYSTERECTOMY      OOPHORECTOMY      TONSILLECTOMY      TOTAL THYROIDECTOMY      TUBAL LIGATION         Review of patient's allergies indicates:   Allergen Reactions    Reglan [metoclopramide hcl]      Depression and weight loss.        No current facility-administered medications on file prior to encounter.     Current Outpatient Medications on File Prior to Encounter   Medication Sig    amLODIPine (NORVASC) 2.5 MG tablet Take 1 tablet (2.5 mg total) by mouth once daily.    aspirin (ECOTRIN) 81 MG EC  "tablet Take 81 mg by mouth once daily.    aspirin-sod bicarb-citric acid (ERICKA-SELTZER) 324 mg TbEF Take 325 mg by mouth every 6 (six) hours as needed.    BD INSULIN SYRINGE ULTRA-FINE 1 mL 31 gauge x 5/16 Syrg USE AS DIRECTED 5 TIMES A DAY WITH LEVEMIR & NOVOLOG    blood sugar diagnostic (TRUE METRIX GLUCOSE TEST STRIP) Strp Test blood sugar 3x/day    calcitRIOL (ROCALTROL) 0.25 MCG Cap Take 0.25 mcg by mouth every 7 days.    dicyclomine (BENTYL) 10 MG capsule     lancets 33 gauge Misc 1 lancet by Misc.(Non-Drug; Combo Route) route 4 (four) times daily.    levothyroxine (SYNTHROID) 75 MCG tablet TAKE ONE TABLET BY MOUTH EVERY MORNING BEFORE BREAKFAST    lisinopriL (PRINIVIL,ZESTRIL) 5 MG tablet TAKE ONE TABLET BY MOUTH TWO TIMES A DAY    meclizine (ANTIVERT) 25 mg tablet Take 1 tablet (25 mg total) by mouth 3 (three) times daily as needed.    metoprolol tartrate (LOPRESSOR) 25 MG tablet Take 1 tablet (25 mg total) by mouth 2 (two) times daily.    MOUNJARO 5 mg/0.5 mL PnIj INJECT 5 MG INTO THE SKIN EVERY 7 DAYS.    nitroGLYCERIN (NITROSTAT) 0.4 MG SL tablet Place 1 tablet (0.4 mg total) under the tongue every 5 (five) minutes as needed for Chest pain. Seek medical help is pain is not relieved by the third dose.    pantoprazole (PROTONIX) 40 MG tablet Take 1 tablet (40 mg total) by mouth once daily.    pen needle, diabetic (BD ULTRA-FINE YUMIKO PEN NEEDLE) 32 gauge x 5/32" Ndle USE FOUR TIMES A DAY WITH INSULIN    rosuvastatin (CRESTOR) 40 MG Tab Take 1 tablet (40 mg total) by mouth every evening.    traZODone (DESYREL) 100 MG tablet Take 1 tablet (100 mg total) by mouth every evening.    TRESIBA FLEXTOUCH U-200 200 unit/mL (3 mL) insulin pen INJECT 32 UNITS INTO THE SKIN ONCE DAILY.    vitamin D (VITAMIN D3) 1000 units Tab Take 1,000 Units by mouth once daily.     Family History       Problem Relation (Age of Onset)    Alcohol abuse Sister    Breast cancer Mother    Cancer Mother, Father    Early death Father    Heart " disease Paternal Uncle, Brother    No Known Problems Son          Tobacco Use    Smoking status: Former     Current packs/day: 0.00     Average packs/day: 1 pack/day for 27.0 years (27.0 ttl pk-yrs)     Types: Cigarettes     Start date: 1989     Quit date: 2016     Years since quittin.1    Smokeless tobacco: Never   Substance and Sexual Activity    Alcohol use: No     Alcohol/week: 0.0 standard drinks of alcohol    Drug use: No    Sexual activity: Yes     Partners: Male     Review of Systems   Constitutional:  Negative for activity change, appetite change, chills and fever.   HENT:  Negative for congestion, hearing loss and tinnitus.    Eyes:  Negative for pain, redness and visual disturbance.   Respiratory:  Negative for apnea, cough, chest tightness, shortness of breath and wheezing.    Cardiovascular:  Positive for chest pain and palpitations. Negative for leg swelling.   Gastrointestinal:  Positive for abdominal pain. Negative for abdominal distention, blood in stool, constipation, diarrhea, nausea and vomiting.   Genitourinary:  Negative for dysuria.   Musculoskeletal:  Negative for arthralgias, back pain, gait problem, joint swelling and myalgias.   Skin:  Negative for color change and rash.   Neurological:  Negative for dizziness, weakness, light-headedness and headaches.   Hematological:  Does not bruise/bleed easily.   Psychiatric/Behavioral:  Negative for confusion and sleep disturbance. The patient is not nervous/anxious.      Objective:     Vital Signs (Most Recent):  Temp: 97.8 °F (36.6 °C) (24 0345)  Pulse: 76 (24 0430)  Resp: 17 (24 043)  BP: (!) 126/58 (24 0430)  SpO2: (!) 94 % (24 043) Vital Signs (24h Range):  Temp:  [97.8 °F (36.6 °C)-98.3 °F (36.8 °C)] 97.8 °F (36.6 °C)  Pulse:  [69-80] 76  Resp:  [11-23] 17  SpO2:  [94 %-100 %] 94 %  BP: (113-141)/() 126/58     Weight: 68.5 kg (151 lb 0.2 oz)  Body mass index is 28.53 kg/m².     Physical  Exam  Vitals and nursing note reviewed. Exam conducted with a chaperone present.   Constitutional:       Appearance: Normal appearance.   HENT:      Head: Normocephalic and atraumatic.      Mouth/Throat:      Mouth: Mucous membranes are dry.   Eyes:      Extraocular Movements: Extraocular movements intact.      Pupils: Pupils are equal, round, and reactive to light.   Cardiovascular:      Rate and Rhythm: Normal rate and regular rhythm.      Pulses: Normal pulses.      Heart sounds: Normal heart sounds.   Pulmonary:      Effort: Pulmonary effort is normal.      Breath sounds: Normal breath sounds.   Abdominal:      General: Abdomen is flat. Bowel sounds are normal.      Palpations: Abdomen is soft.   Musculoskeletal:         General: Normal range of motion.      Cervical back: Normal range of motion and neck supple.   Skin:     General: Skin is warm.      Capillary Refill: Capillary refill takes less than 2 seconds.   Neurological:      General: No focal deficit present.      Mental Status: She is alert and oriented to person, place, and time.   Psychiatric:         Mood and Affect: Mood normal.         Behavior: Behavior normal.              CRANIAL NERVES     CN III, IV, VI   Pupils are equal, round, and reactive to light.       Significant Labs: All pertinent labs within the past 24 hours have been reviewed.  CBC:   Recent Labs   Lab 03/20/24 2332   WBC 10.19   HGB 12.7   HCT 39.1        CMP:   Recent Labs   Lab 03/20/24 2332 03/21/24  0413    141   K 3.6 3.3*    105   CO2 25 28   GLU 93 95   BUN 19 21   CREATININE 1.6* 1.4   CALCIUM 9.3 9.2   PROT 6.8 6.2   ALBUMIN 3.5 3.7   BILITOT 0.5 0.4   ALKPHOS 61 52*   AST 13 13   ALT 9* 6*   ANIONGAP 12 8     Troponin:   Recent Labs   Lab 03/20/24 2332 03/21/24  0219 03/21/24  0413   TROPONINI <0.006 0.015  --    TROPONINIHS  --   --  5.8       Significant Imaging: I have reviewed all pertinent imaging results/findings within the past 24 hours.  I  have reviewed and interpreted all pertinent imaging results/findings within the past 24 hours.

## 2024-03-21 NOTE — PLAN OF CARE
Discharge orders and chart reviewed with no further post-acute discharge needs identified at this time.  At this time, patient is cleared for discharge from Case Management standpoint.        03/21/24 1500   Final Note   Assessment Type Final Discharge Note   Anticipated Discharge Disposition Home   Hospital Resources/Appts/Education Provided Appointments scheduled and added to AVS   Post-Acute Status   Coverage [employer]   Discharge Delays None known at this time

## 2024-03-21 NOTE — RESPIRATORY THERAPY
03/21/24 1031   Tobacco Cessation Intervention   Do you use any type of tobacco product? No   Respiratory Evaluation   $ Care Plan Tech Time 15 min   $ Eval/Re-eval Charges Evaluation   Evaluation For New Orders   Admitting Diagnosis Unstable angina   Cardiac Diagnosis CAD   Pulmonary Diagnosis COPD   Current Surgeries NO   Home Oxygen   Has Home Oxygen? No   Home Aerosol, MDI, DPI, and Other Treatments/Therapies   Home Respiratory Therapy Per Patient/Review of Chart No   Oxygen Care Plan   Oxygen Care Plan Per Protocol   SPO2 Goal (%) 95% cardiac   Rationale Chest pain   Other Oxygen   (No)   Bronchodilator Care Plan   Bronchodilator Care Plan N/A  (Pt. not on any RT txts)   Rationale No Rationale found   Atelectasis Care Plan   Rationale No Rational Found   Airway Clearance Care Plan   Rationale No rationale found

## 2024-03-21 NOTE — HPI
"78 WF with pmh of CAD , s/p CABG 2017 , HTN , DMII not on insulin , GERD , angina     Non smoker   Ex smoker since 19'  Non drinker    Lives with        She had a CABG in 2017 and quit smoking then.   Since then no Heart attacks or stents she says.  But she does get Angina  and it responds to nitro SL.     She says that is not new to her.   It happens often and can last few seconds or few minutes.     Not associated with activity .  Usually on left breast area .  But sometimes can be on right or right shoulder.        But what is new,  is " fluttering " she says.   Feels like her heart is moving around in her chest she says.          This has been going on for about 2 Months she says .   It can happen anytime.  Not associated with activity .    It can last for seconds up to 30 minutes she says.      But otherwise was not bothering her.   But then in last 3 days she noticed chest pain now associated with the fluttering.       She says this pain , or angina , is more intense and lasts longer than her usual angina  pain episodes.        So that is why she came to the ER at [facility] .    They found she had negative troponin .   But her EKG was not normal.   There was initial concern about Posterior STEMI.  They did posterior EKG and they called cardiology , DR Wagoner , who did not feel it was a STEMI but they wanted her moved over to [facility] .    In their ER she got aspirin and a 5,000 unit Bolus of Heparin IV .    When she got to our hospital we repeated labs and EKG.   Her EKG actually looked good and improved from prior ones.     She said her symptoms are no different. Non worse but not better.        Her repeat troponin was negative still. Or normal .        We are admitting her to our service for the chest pain   "

## 2024-03-21 NOTE — HOSPITAL COURSE
This is a 78 year old female admitted early this morning with chest fluttering. Patient has had CABG before and has been having intermittent chest pains for long time. Patient states intensity of the pain was severe. Her EKG and troponin was negative. Patient was seen by cardiology, Norvasc was increased to 5 mg daily and cleared to go home.

## 2024-03-21 NOTE — PLAN OF CARE
Problem: Adult Inpatient Plan of Care  Goal: Plan of Care Review  Outcome: Met  Goal: Patient-Specific Goal (Individualized)  Outcome: Met  Goal: Absence of Hospital-Acquired Illness or Injury  Outcome: Met  Goal: Optimal Comfort and Wellbeing  Outcome: Met  Goal: Readiness for Transition of Care  Outcome: Met     Problem: Diabetes Comorbidity  Goal: Blood Glucose Level Within Targeted Range  Outcome: Met     Problem: Fluid and Electrolyte Imbalance (Acute Kidney Injury/Impairment)  Goal: Fluid and Electrolyte Balance  Outcome: Met     Problem: Oral Intake Inadequate (Acute Kidney Injury/Impairment)  Goal: Optimal Nutrition Intake  Outcome: Met     Problem: Renal Function Impairment (Acute Kidney Injury/Impairment)  Goal: Effective Renal Function  Outcome: Met     Problem: Fall Injury Risk  Goal: Absence of Fall and Fall-Related Injury  Outcome: Met      Pharmacy faxed in a request for prior authorization on:    Medication: clindamycin (CLINDAGEL) 1 % gel  Dosage: Apply topically 2 times daily  Quantity requested: Disp-30 g, R-3,  Pharmacy for prescription has been selected.    Prior authorization request has been initiated and sent for completion.

## 2024-03-21 NOTE — ASSESSMENT & PLAN NOTE
Currently still having the fluttering and pain .  No change in it she says.      But her second troponin is normal still.    K was lower at 3.3 now and mag was 1.7     BP looks good.  HR in 70-80 range     I spoke to her at length and she was comfortable wanting to sleep .      New EKG looks good.   Improved .      PLAN    - run 1/2 NS with 20 kcl  at 100 cc hour x 10 hours  - give KCL 20 meq po NOW and then BID x 2 days   - NPO until cardiology sees her  - keep k over 4 and mag over 2   - Lopressor IV 5 mg Q6 hours start now, to reduce myocardial oxygen demand   - control BP , afterload   - Aspirin 325 mg po daily     - hold off on Dual antiplatelet therapy or Heparin drip for now since EKG normal and troponins still normal     - 2D ECHO ordered     - cardiology consult placed   - cont. Home statin

## 2024-03-21 NOTE — NURSING
Nurses Note -- 4 Eyes      3/21/2024   4:42 AM      Skin assessed during: admit      [x] No Altered Skin Integrity Present    []Prevention Measures Documented      [] Yes- Altered Skin Integrity Present or Discovered   [] LDA Added if Not in Epic (Describe Wound)   [] New Altered Skin Integrity was Present on Admit and Documented in LDA   [] Wound Image Taken    Wound Care Consulted? No    Attending Nurse:  Bree Robles RN/Staff Member:  Radha Trinidad RN

## 2024-03-21 NOTE — H&P
"  [facility] Medicine  History & Physical    Patient Name: Adele Hall  MRN: [id number]  Patient Class: IP- Inpatient  Admission Date: 3/20/2024  Attending Physician: Isidoro Banks MD  Primary Care Provider: Wally Cazares MD         Patient information was obtained from patient and ER records.     Subjective:     Principal Problem:Unstable angina    Chief Complaint:   Chief Complaint   Patient presents with    Chest Pain     FLUTTER IN CHEST X 2 WEEKS AND NOW FLUTTER HAS TURN INTO INTERMITTENT PAIN. TOOK 2 NITROGLYCERINS WITH NO RELIEF.     Headache        HPI: 78 WF with pmh of CAD , s/p CABG 2017 , HTN , DMII not on insulin , GERD , angina     Non smoker   Ex smoker since 19'  Non drinker    Lives with        She had a CABG in 2017 and quit smoking then.   Since then no Heart attacks or stents she says.  But she does get Angina  and it responds to nitro SL.     She says that is not new to her.   It happens often and can last few seconds or few minutes.     Not associated with activity .  Usually on left breast area .  But sometimes can be on right or right shoulder.        But what is new,  is " fluttering " she says.   Feels like her heart is moving around in her chest she says.          This has been going on for about 2 Months she says .   It can happen anytime.  Not associated with activity .    It can last for seconds up to 30 minutes she says.      But otherwise was not bothering her.   But then in last 3 days she noticed chest pain now associated with the fluttering.       She says this pain , or angina , is more intense and lasts longer than her usual angina  pain episodes.        So that is why she came to the ER at [facility] .    They found she had negative troponin .   But her EKG was not normal.   There was initial concern about Posterior STEMI.  They did posterior EKG and they called cardiology , DR Wagoner , who did not feel it was a STEMI but they wanted her " moved over to [facility] .    In their ER she got aspirin and a 5,000 unit Bolus of Heparin IV .    When she got to our hospital we repeated labs and EKG.   Her EKG actually looked good and improved from prior ones.     She said her symptoms are no different. Non worse but not better.        Her repeat troponin was negative still. Or normal .        We are admitting her to our service for the chest pain     Past Medical History:   Diagnosis Date    Anesthesia complication     took patient 6 days to come out of anethesia after CABG    Anticoagulant long-term use     Arthritis     Chronic kidney disease     CKD (chronic kidney disease) stage 3, GFR 30-59 ml/min     Dr. Montero    COPD (chronic obstructive pulmonary disease) 2/7/2016    COPD (chronic obstructive pulmonary disease)     Coronary artery disease     Diabetes mellitus     Diabetes mellitus, type 2     GERD (gastroesophageal reflux disease)     Hx of colonic polyps     Hyperlipidemia     Hypertension     possible new onset CHF 7/30/2016    Tardive dyskinesia     Thyroid disease     Ulcer     Vertigo        Past Surgical History:   Procedure Laterality Date    CARDIAC SURGERY      CATARACT EXTRACTION W/  INTRAOCULAR LENS IMPLANT Right 8/30/2023    Procedure: EXTRACTION, CATARACT, WITH IOL INSERTION;  Surgeon: Tamiko Lam MD;  Location: [facility] OR;  Service: Ophthalmology;  Laterality: Right;    CATARACT EXTRACTION W/  INTRAOCULAR LENS IMPLANT Left 9/13/2023    Procedure: EXTRACTION, CATARACT, WITH IOL INSERTION;  Surgeon: Tamiko Lam MD;  Location: [facility] OR;  Service: Ophthalmology;  Laterality: Left;    CHOLECYSTECTOMY  01/26/2017    COLONOSCOPY      COLONOSCOPY N/A 12/7/2021    Procedure: COLONOSCOPY;  Surgeon: Lito Will MD;  Location: [facility];  Service: Endoscopy;  Laterality: N/A;    CORONARY ARTERY BYPASS GRAFT  01/19/2016    4 vessel    HYSTERECTOMY      OOPHORECTOMY      TONSILLECTOMY      TOTAL THYROIDECTOMY      TUBAL LIGATION    "      Review of patient's allergies indicates:   Allergen Reactions    Reglan [metoclopramide hcl]      Depression and weight loss.        No current facility-administered medications on file prior to encounter.     Current Outpatient Medications on File Prior to Encounter   Medication Sig    amLODIPine (NORVASC) 2.5 MG tablet Take 1 tablet (2.5 mg total) by mouth once daily.    aspirin (ECOTRIN) 81 MG EC tablet Take 81 mg by mouth once daily.    aspirin-sod bicarb-citric acid (ERICKA-SELTZER) 324 mg TbEF Take 325 mg by mouth every 6 (six) hours as needed.    BD INSULIN SYRINGE ULTRA-FINE 1 mL 31 gauge x 5/16 Syrg USE AS DIRECTED 5 TIMES A DAY WITH LEVEMIR & NOVOLOG    blood sugar diagnostic (TRUE METRIX GLUCOSE TEST STRIP) Strp Test blood sugar 3x/day    calcitRIOL (ROCALTROL) 0.25 MCG Cap Take 0.25 mcg by mouth every 7 days.    dicyclomine (BENTYL) 10 MG capsule     lancets 33 gauge Misc 1 lancet by Misc.(Non-Drug; Combo Route) route 4 (four) times daily.    levothyroxine (SYNTHROID) 75 MCG tablet TAKE ONE TABLET BY MOUTH EVERY MORNING BEFORE BREAKFAST    lisinopriL (PRINIVIL,ZESTRIL) 5 MG tablet TAKE ONE TABLET BY MOUTH TWO TIMES A DAY    meclizine (ANTIVERT) 25 mg tablet Take 1 tablet (25 mg total) by mouth 3 (three) times daily as needed.    metoprolol tartrate (LOPRESSOR) 25 MG tablet Take 1 tablet (25 mg total) by mouth 2 (two) times daily.    MOUNJARO 5 mg/0.5 mL PnIj INJECT 5 MG INTO THE SKIN EVERY 7 DAYS.    nitroGLYCERIN (NITROSTAT) 0.4 MG SL tablet Place 1 tablet (0.4 mg total) under the tongue every 5 (five) minutes as needed for Chest pain. Seek medical help is pain is not relieved by the third dose.    pantoprazole (PROTONIX) 40 MG tablet Take 1 tablet (40 mg total) by mouth once daily.    pen needle, diabetic (BD ULTRA-FINE YUMIKO PEN NEEDLE) 32 gauge x 5/32" Ndle USE FOUR TIMES A DAY WITH INSULIN    rosuvastatin (CRESTOR) 40 MG Tab Take 1 tablet (40 mg total) by mouth every evening.    traZODone " (DESYREL) 100 MG tablet Take 1 tablet (100 mg total) by mouth every evening.    TRESIBA FLEXTOUCH U-200 200 unit/mL (3 mL) insulin pen INJECT 32 UNITS INTO THE SKIN ONCE DAILY.    vitamin D (VITAMIN D3) 1000 units Tab Take 1,000 Units by mouth once daily.     Family History       Problem Relation (Age of Onset)    Alcohol abuse Sister    Breast cancer Mother    Cancer Mother, Father    Early death Father    Heart disease Paternal Uncle, Brother    No Known Problems Son          Tobacco Use    Smoking status: Former     Current packs/day: 0.00     Average packs/day: 1 pack/day for 27.0 years (27.0 ttl pk-yrs)     Types: Cigarettes     Start date: 1989     Quit date: 2016     Years since quittin.1    Smokeless tobacco: Never   Substance and Sexual Activity    Alcohol use: No     Alcohol/week: 0.0 standard drinks of alcohol    Drug use: No    Sexual activity: Yes     Partners: Male     Review of Systems   Constitutional:  Negative for activity change, appetite change, chills and fever.   HENT:  Negative for congestion, hearing loss and tinnitus.    Eyes:  Negative for pain, redness and visual disturbance.   Respiratory:  Negative for apnea, cough, chest tightness, shortness of breath and wheezing.    Cardiovascular:  Positive for chest pain and palpitations. Negative for leg swelling.   Gastrointestinal:  Positive for abdominal pain. Negative for abdominal distention, blood in stool, constipation, diarrhea, nausea and vomiting.   Genitourinary:  Negative for dysuria.   Musculoskeletal:  Negative for arthralgias, back pain, gait problem, joint swelling and myalgias.   Skin:  Negative for color change and rash.   Neurological:  Negative for dizziness, weakness, light-headedness and headaches.   Hematological:  Does not bruise/bleed easily.   Psychiatric/Behavioral:  Negative for confusion and sleep disturbance. The patient is not nervous/anxious.      Objective:     Vital Signs (Most Recent):  Temp: 97.8 °F  (36.6 °C) (03/21/24 0345)  Pulse: 76 (03/21/24 0430)  Resp: 17 (03/21/24 0430)  BP: (!) 126/58 (03/21/24 0430)  SpO2: (!) 94 % (03/21/24 0430) Vital Signs (24h Range):  Temp:  [97.8 °F (36.6 °C)-98.3 °F (36.8 °C)] 97.8 °F (36.6 °C)  Pulse:  [69-80] 76  Resp:  [11-23] 17  SpO2:  [94 %-100 %] 94 %  BP: (113-141)/() 126/58     Weight: 68.5 kg (151 lb 0.2 oz)  Body mass index is 28.53 kg/m².     Physical Exam  Vitals and nursing note reviewed. Exam conducted with a chaperone present.   Constitutional:       Appearance: Normal appearance.   HENT:      Head: Normocephalic and atraumatic.      Mouth/Throat:      Mouth: Mucous membranes are dry.   Eyes:      Extraocular Movements: Extraocular movements intact.      Pupils: Pupils are equal, round, and reactive to light.   Cardiovascular:      Rate and Rhythm: Normal rate and regular rhythm.      Pulses: Normal pulses.      Heart sounds: Normal heart sounds.   Pulmonary:      Effort: Pulmonary effort is normal.      Breath sounds: Normal breath sounds.   Abdominal:      General: Abdomen is flat. Bowel sounds are normal.      Palpations: Abdomen is soft.   Musculoskeletal:         General: Normal range of motion.      Cervical back: Normal range of motion and neck supple.   Skin:     General: Skin is warm.      Capillary Refill: Capillary refill takes less than 2 seconds.   Neurological:      General: No focal deficit present.      Mental Status: She is alert and oriented to person, place, and time.   Psychiatric:         Mood and Affect: Mood normal.         Behavior: Behavior normal.              CRANIAL NERVES     CN III, IV, VI   Pupils are equal, round, and reactive to light.       Significant Labs: All pertinent labs within the past 24 hours have been reviewed.  CBC:   Recent Labs   Lab 03/20/24 2332   WBC 10.19   HGB 12.7   HCT 39.1        CMP:   Recent Labs   Lab 03/20/24 2332 03/21/24  0413    141   K 3.6 3.3*    105   CO2 25 28   GLU 93 95    BUN 19 21   CREATININE 1.6* 1.4   CALCIUM 9.3 9.2   PROT 6.8 6.2   ALBUMIN 3.5 3.7   BILITOT 0.5 0.4   ALKPHOS 61 52*   AST 13 13   ALT 9* 6*   ANIONGAP 12 8     Troponin:   Recent Labs   Lab 03/20/24  2332 03/21/24  0219 03/21/24  0413   TROPONINI <0.006 0.015  --    TROPONINIHS  --   --  5.8       Significant Imaging: I have reviewed all pertinent imaging results/findings within the past 24 hours.  I have reviewed and interpreted all pertinent imaging results/findings within the past 24 hours.  Assessment/Plan:     * Unstable angina  Currently still having the fluttering and pain .  No change in it she says.      But her second troponin is normal still.    K was lower at 3.3 now and mag was 1.7     BP looks good.  HR in 70-80 range     I spoke to her at length and she was comfortable wanting to sleep .      New EKG looks good.   Improved .      PLAN    - run 1/2 NS with 20 kcl  at 100 cc hour x 10 hours  - give KCL 20 meq po NOW and then BID x 2 days   - NPO until cardiology sees her  - keep k over 4 and mag over 2   - Lopressor IV 5 mg Q6 hours start now, to reduce myocardial oxygen demand   - control BP , afterload   - Aspirin 325 mg po daily     - hold off on Dual antiplatelet therapy or Heparin drip for now since EKG normal and troponins still normal     - 2D ECHO ordered     - cardiology consult placed   - cont. Home statin         VTE Risk Mitigation (From admission, onward)           Ordered     heparin (porcine) injection 5,000 Units  Every 8 hours         03/21/24 0414     IP VTE HIGH RISK PATIENT  Once         03/21/24 0414     Place sequential compression device  Until discontinued         03/21/24 0414                               Pharmacist Renal Dose Adjustment Note    Adele Hall is a 78 y.o. female being treated with the medication Famotidine    Patient Data:    Vital Signs (Most Recent):  Temp: 97.8 °F (36.6 °C) (03/21/24 0345)  Pulse: 80 (03/21/24 0241)  Resp: 18 (03/21/24  "0045)  BP: (!) 141/76 (03/21/24 0241)  SpO2: 100 % (03/21/24 0241) Vital Signs (72h Range):  Temp:  [97.8 °F (36.6 °C)-98.3 °F (36.8 °C)]   Pulse:  [69-80]   Resp:  [18]   BP: (113-141)/()   SpO2:  [96 %-100 %]        Ht: 5' 1" (1.549 m)  Wt: 68 kg (150 lb)  Estimated Creatinine Clearance: 26.4 mL/min (A) (based on SCr of 1.6 mg/dL (H)).  Body mass index is 28.34 kg/m².    Per [facility] renal dosing protocol:     Previous Order: Famotidine 20 mg BiD    Will be changed to:     New Order: Famotidine 20 mg Daily,    Due to: CrCl < 50 mL/min    Renal dose adjustments performed as noted above.    We will continue monitoring and adjusting as necessary.    Pharmacist: Francisco J Mondragon PharmD  Ext: [phone]        Isidoro Banks MD  Department of Hospital Medicine  [facility]          "

## 2024-03-21 NOTE — PROGRESS NOTES
"Pharmacist Renal Dose Adjustment Note    Adele Hall is a 78 y.o. female being treated with the medication Famotidine    Patient Data:    Vital Signs (Most Recent):  Temp: 97.8 °F (36.6 °C) (03/21/24 0345)  Pulse: 80 (03/21/24 0241)  Resp: 18 (03/21/24 0045)  BP: (!) 141/76 (03/21/24 0241)  SpO2: 100 % (03/21/24 0241) Vital Signs (72h Range):  Temp:  [97.8 °F (36.6 °C)-98.3 °F (36.8 °C)]   Pulse:  [69-80]   Resp:  [18]   BP: (113-141)/()   SpO2:  [96 %-100 %]        Ht: 5' 1" (1.549 m)  Wt: 68 kg (150 lb)  Estimated Creatinine Clearance: 26.4 mL/min (A) (based on SCr of 1.6 mg/dL (H)).  Body mass index is 28.34 kg/m².    Per [facility] renal dosing protocol:     Previous Order: Famotidine 20 mg BiD    Will be changed to:     New Order: Famotidine 20 mg Daily,    Due to: CrCl < 50 mL/min    Renal dose adjustments performed as noted above.    We will continue monitoring and adjusting as necessary.    Pharmacist: Francisco J Mondragon PharmD  Ext: [phone]      "

## 2024-03-21 NOTE — DISCHARGE SUMMARY
"[facility] Medicine  Discharge Summary      Patient Name: Adele Hall  MRN: [id number]  SCARLET: [id number]  Patient Class: IP- Inpatient  Admission Date: 3/20/2024  Hospital Length of Stay: 0 days  Discharge Date and Time:  03/21/2024 2:56 PM  Attending Physician: Shelbie Perdue MD   Discharging Provider: Shelbie Perdue MD  Primary Care Provider: Wally Cazares MD    Primary Care Team: Networked reference to record PCT     HPI:   78 WF with pmh of CAD , s/p CABG 2017 , HTN , DMII not on insulin , GERD , angina     Non smoker   Ex smoker since 19'  Non drinker    Lives with        She had a CABG in 2017 and quit smoking then.   Since then no Heart attacks or stents she says.  But she does get Angina  and it responds to nitro SL.     She says that is not new to her.   It happens often and can last few seconds or few minutes.     Not associated with activity .  Usually on left breast area .  But sometimes can be on right or right shoulder.        But what is new,  is " fluttering " she says.   Feels like her heart is moving around in her chest she says.          This has been going on for about 2 Months she says .   It can happen anytime.  Not associated with activity .    It can last for seconds up to 30 minutes she says.      But otherwise was not bothering her.   But then in last 3 days she noticed chest pain now associated with the fluttering.       She says this pain , or angina , is more intense and lasts longer than her usual angina  pain episodes.        So that is why she came to the ER at [facility] .    They found she had negative troponin .   But her EKG was not normal.   There was initial concern about Posterior STEMI.  They did posterior EKG and they called cardiology , DR Wagoner , who did not feel it was a STEMI but they wanted her moved over to [facility] .    In their ER she got aspirin and a 5,000 unit Bolus of Heparin IV .    When she got to our hospital " we repeated labs and EKG.   Her EKG actually looked good and improved from prior ones.     She said her symptoms are no different. Non worse but not better.        Her repeat troponin was negative still. Or normal .        We are admitting her to our service for the chest pain     Procedure(s) (LRB):  CATHETERIZATION, HEART, BOTH LEFT AND RIGHT (Left)      Hospital Course:   This is a 78 year old female admitted early this morning with chest fluttering. Patient has had CABG before and has been having intermittent chest pains for long time. Patient states intensity of the pain was severe. Her EKG and troponin was negative. Patient was seen by cardiology, Norvasc was increased to 5 mg daily and cleared to go home.      Goals of Care Treatment Preferences:  Code Status: Full Code    Physical Exam  Vitals and nursing note reviewed. Exam conducted with a chaperone present.   Constitutional:       Appearance: Normal appearance.   HENT:      Head: Normocephalic and atraumatic.      Mouth/Throat:      Mouth: Mucous membranes are dry.   Eyes:      Extraocular Movements: Extraocular movements intact.      Pupils: Pupils are equal, round, and reactive to light.   Cardiovascular:      Rate and Rhythm: Normal rate and regular rhythm.      Pulses: Normal pulses.      Heart sounds: Normal heart sounds.   Pulmonary:      Effort: Pulmonary effort is normal.      Breath sounds: Normal breath sounds.   Abdominal:      General: Abdomen is flat. Bowel sounds are normal.      Palpations: Abdomen is soft.   Musculoskeletal:         General: Normal range of motion.      Cervical back: Normal range of motion and neck supple.   Skin:     General: Skin is warm.      Capillary Refill: Capillary refill takes less than 2 seconds.   Neurological:      General: No focal deficit present.      Mental Status: She is alert and oriented to person, place, and time.   Psychiatric:         Mood and Affect: Mood normal.         Behavior: Behavior normal.      Consults:   Consults (From admission, onward)          Status Ordering Provider     Inpatient consult to Cardiology  Once        Provider:  Zeeshan Wagoner MD    Completed MARY JANE DANIELSON            Final Active Diagnoses:    Diagnosis Date Noted POA    PRINCIPAL PROBLEM:  Other chest pain [R07.89] 01/14/2018 Yes      Problems Resolved During this Admission:    Diagnosis Date Noted Date Resolved POA    Unstable angina [I20.0] 01/15/2016 03/21/2024 Yes       Discharged Condition: good    Disposition: Home or Self Care    Follow Up:   Follow-up Information       Wally Czaares MD Follow up in 1 week(s).    Specialty: Family Medicine  Contact information:  [address]  [address]  [place] [address]  [phone]                           Patient Instructions:   No discharge procedures on file.    Significant Diagnostic Studies: Labs: CMP   Recent Labs   Lab 03/20/24  2332 03/21/24  0413    141   K 3.6 3.3*    105   CO2 25 28   GLU 93 95   BUN 19 21   CREATININE 1.6* 1.4   CALCIUM 9.3 9.2   PROT 6.8 6.2   ALBUMIN 3.5 3.7   BILITOT 0.5 0.4   ALKPHOS 61 52*   AST 13 13   ALT 9* 6*   ANIONGAP 12 8    and CBC   Recent Labs   Lab 03/20/24  2332   WBC 10.19   HGB 12.7   HCT 39.1          Pending Diagnostic Studies:       None           Medications:  Reconciled Home Medications:      Medication List        CHANGE how you take these medications      amLODIPine 5 MG tablet  Commonly known as: NORVASC  Take 1 tablet (5 mg total) by mouth once daily.  What changed:   medication strength  how much to take     TRESIBA FLEXTOUCH U-200 200 unit/mL (3 mL) insulin pen  Generic drug: insulin degludec  INJECT 32 UNITS INTO THE SKIN ONCE DAILY.  What changed:   how much to take  how to take this  when to take this            CONTINUE taking these medications      aspirin 81 MG EC tablet  Commonly known as: ECOTRIN  Take 81 mg by mouth once daily.     aspirin-sod bicarb-citric acid 324 mg Tbef  Commonly known as:  "ERICKA-SELTZER  Take 325 mg by mouth every 6 (six) hours as needed.     BD INSULIN SYRINGE ULTRA-FINE 1 mL 31 gauge x 5/16 Syrg  Generic drug: insulin syringe-needle U-100  USE AS DIRECTED 5 TIMES A DAY WITH LEVEMIR & NOVOLOG     blood sugar diagnostic Strp  Commonly known as: TRUE METRIX GLUCOSE TEST STRIP  Test blood sugar 3x/day     calcitRIOL 0.25 MCG Cap  Commonly known as: ROCALTROL  Take 0.25 mcg by mouth every 7 days.     dicyclomine 10 MG capsule  Commonly known as: BENTYL     lancets 33 gauge Misc  1 lancet by Misc.(Non-Drug; Combo Route) route 4 (four) times daily.     levothyroxine 75 MCG tablet  Commonly known as: SYNTHROID  TAKE ONE TABLET BY MOUTH EVERY MORNING BEFORE BREAKFAST     lisinopriL 5 MG tablet  Commonly known as: PRINIVIL,ZESTRIL  TAKE ONE TABLET BY MOUTH TWO TIMES A DAY     meclizine 25 mg tablet  Commonly known as: ANTIVERT  Take 1 tablet (25 mg total) by mouth 3 (three) times daily as needed.     metoprolol tartrate 25 MG tablet  Commonly known as: LOPRESSOR  Take 1 tablet (25 mg total) by mouth 2 (two) times daily.     MOUNJARO 5 mg/0.5 mL Pnij  Generic drug: tirzepatide  INJECT 5 MG INTO THE SKIN EVERY 7 DAYS.     nitroGLYCERIN 0.4 MG SL tablet  Commonly known as: NITROSTAT  Place 1 tablet (0.4 mg total) under the tongue every 5 (five) minutes as needed for Chest pain. Seek medical help is pain is not relieved by the third dose.     pantoprazole 40 MG tablet  Commonly known as: PROTONIX  Take 1 tablet (40 mg total) by mouth once daily.     pen needle, diabetic 32 gauge x 5/32" Ndle  Commonly known as: BD ULTRA-FINE YUMIKO PEN NEEDLE  USE FOUR TIMES A DAY WITH INSULIN     rosuvastatin 40 MG Tab  Commonly known as: CRESTOR  Take 1 tablet (40 mg total) by mouth every evening.     traZODone 100 MG tablet  Commonly known as: DESYREL  Take 1 tablet (100 mg total) by mouth every evening.     vitamin D 1000 units Tab  Commonly known as: VITAMIN D3  Take 1,000 Units by mouth once daily.        "       Indwelling Lines/Drains at time of discharge:   Lines/Drains/Airways       None                   Time spent on the discharge of patient: 40 minutes         Shelbie Perdue MD  Department of Hospital Medicine  [facility]

## 2024-03-21 NOTE — CONSULTS
"[facility]  Department of Cardiology  Consult Note      PATIENT NAME: Adele Hall  MRN: [id number]  TODAY'S DATE: 03/21/2024  ADMIT DATE: 3/20/2024                          CONSULT REQUESTED BY: Shelbie Perdue MD    SUBJECTIVE     PRINCIPAL PROBLEM: Unstable angina      REASON FOR CONSULT:  "Chest pain from [facility]"; unstable angina       HPI:  Mrs. Hall is a 78 year old female with pmh of CAD (CABG back in 2017), HTN, T2DM, GERD, HLD, COPD, and CKD who came into the ER with complaints of "fluttering in her chest." An EKG was done in the ER over at [facility] showed possible inferior STEMI, but repeat EKGs have improved and were not concerning. Trops were negative. Repeat ECHO showed EF 55-60%. Upon exam, chest wall was tender to the touch and pain was reproducible.     Per hospital medicine notes:  78 WF with pmh of CAD , s/p CABG 2017 , HTN , DMII not on insulin , GERD , angina      Non smoker   Ex smoker since 19'  Non drinker     Lives with          She had a CABG in 2017 and quit smoking then.   Since then no Heart attacks or stents she says.  But she does get Angina  and it responds to nitro SL.      She says that is not new to her.   It happens often and can last few seconds or few minutes.     Not associated with activity .  Usually on left breast area .  But sometimes can be on right or right shoulder.          But what is new,  is " fluttering " she says.   Feels like her heart is moving around in her chest she says.           This has been going on for about 2 Months she says .   It can happen anytime.  Not associated with activity .    It can last for seconds up to 30 minutes she says.       But otherwise was not bothering her.   But then in last 3 days she noticed chest pain now associated with the fluttering.        She says this pain , or angina , is more intense and lasts longer than her usual angina  pain episodes.         So that is why she came to the ER " at [facility] .    They found she had negative troponin .   But her EKG was not normal.   There was initial concern about Posterior STEMI.  They did posterior EKG and they called cardiology , DR Wagoner , who did not feel it was a STEMI but they wanted her moved over to [facility] .     In their ER she got aspirin and a 5,000 unit Bolus of Heparin IV .     When she got to our hospital we repeated labs and EKG.   Her EKG actually looked good and improved from prior ones.      She said her symptoms are no different. Non worse but not better.         Her repeat troponin was negative still. Or normal .          We are admitting her to our service for the chest pain            Review of patient's allergies indicates:   Allergen Reactions    Reglan [metoclopramide hcl]      Depression and weight loss.        Past Medical History:   Diagnosis Date    Anesthesia complication     took patient 6 days to come out of anethesia after CABG    Anticoagulant long-term use     Arthritis     Chronic kidney disease     CKD (chronic kidney disease) stage 3, GFR 30-59 ml/min     Dr. Montero    COPD (chronic obstructive pulmonary disease) 2/7/2016    COPD (chronic obstructive pulmonary disease)     Coronary artery disease     Diabetes mellitus     Diabetes mellitus, type 2     GERD (gastroesophageal reflux disease)     Hx of colonic polyps     Hyperlipidemia     Hypertension     possible new onset CHF 7/30/2016    Tardive dyskinesia     Thyroid disease     Ulcer     Vertigo      Past Surgical History:   Procedure Laterality Date    CARDIAC SURGERY      CATARACT EXTRACTION W/  INTRAOCULAR LENS IMPLANT Right 8/30/2023    Procedure: EXTRACTION, CATARACT, WITH IOL INSERTION;  Surgeon: Tamiko Lam MD;  Location: [facility] OR;  Service: Ophthalmology;  Laterality: Right;    CATARACT EXTRACTION W/  INTRAOCULAR LENS IMPLANT Left 9/13/2023    Procedure: EXTRACTION, CATARACT, WITH IOL INSERTION;  Surgeon: Tamiko Lam MD;  Location: [facility] OR;   Service: Ophthalmology;  Laterality: Left;    CHOLECYSTECTOMY  2017    COLONOSCOPY      COLONOSCOPY N/A 2021    Procedure: COLONOSCOPY;  Surgeon: Lito Will MD;  Location: [facility];  Service: Endoscopy;  Laterality: N/A;    CORONARY ARTERY BYPASS GRAFT  2016    4 vessel    HYSTERECTOMY      OOPHORECTOMY      TONSILLECTOMY      TOTAL THYROIDECTOMY      TUBAL LIGATION       Social History     Tobacco Use    Smoking status: Former     Current packs/day: 0.00     Average packs/day: 1 pack/day for 27.0 years (27.0 ttl pk-yrs)     Types: Cigarettes     Start date: 1989     Quit date: 2016     Years since quittin.1    Smokeless tobacco: Never   Substance Use Topics    Alcohol use: No     Alcohol/week: 0.0 standard drinks of alcohol    Drug use: No        REVIEW OF SYSTEMS    As mentioned in HPI    OBJECTIVE     VITAL SIGNS (Most Recent)  Temp: 96.8 °F (36 °C) (24 0705)  Pulse: 76 (24)  Resp: 19 (24)  BP: (!) 110/53 (24)  SpO2: 96 % (24)    VENTILATION STATUS  Resp: 19 (24)  SpO2: 96 % (24)           I & O (Last 24H):  Intake/Output Summary (Last 24 hours) at 3/21/2024 0844  Last data filed at 3/21/2024 0626  Gross per 24 hour   Intake 68.33 ml   Output --   Net 68.33 ml       WEIGHTS  Wt Readings from Last 3 Encounters:   24 0444 68.5 kg (151 lb 0.2 oz)   24 2226 68 kg (150 lb)   24 1404 69.3 kg (152 lb 12.5 oz)   24 1231 67.6 kg (149 lb)       PHYSICAL EXAM    CONSTITUTIONAL: NAD, well nourished female up in chair   HEENT: Normocephalic. No pallor  NECK: no JVD  LUNGS: CTA b/l  HEART: regular rate and rhythm, S1, S2 normal, no murmur   ABDOMEN: soft, non-tender, bowel sounds normal  EXTREMITIES: No edema  SKIN: No rash  NEURO: AAO X 3  PSYCH: normal affect     HOME MEDICATIONS:  No current facility-administered medications on file prior to encounter.     Current Outpatient Medications on  "File Prior to Encounter   Medication Sig Dispense Refill    amLODIPine (NORVASC) 2.5 MG tablet Take 1 tablet (2.5 mg total) by mouth once daily. 90 tablet 3    aspirin (ECOTRIN) 81 MG EC tablet Take 81 mg by mouth once daily.      aspirin-sod bicarb-citric acid (ERICKA-SELTZER) 324 mg TbEF Take 325 mg by mouth every 6 (six) hours as needed.      BD INSULIN SYRINGE ULTRA-FINE 1 mL 31 gauge x 5/16 Syrg USE AS DIRECTED 5 TIMES A DAY WITH LEVEMIR & NOVOLOG 200 each 10    blood sugar diagnostic (TRUE METRIX GLUCOSE TEST STRIP) Strp Test blood sugar 3x/day 300 strip 3    calcitRIOL (ROCALTROL) 0.25 MCG Cap Take 0.25 mcg by mouth every 7 days.      dicyclomine (BENTYL) 10 MG capsule   3    lancets 33 gauge Misc 1 lancet by Misc.(Non-Drug; Combo Route) route 4 (four) times daily.      levothyroxine (SYNTHROID) 75 MCG tablet TAKE ONE TABLET BY MOUTH EVERY MORNING BEFORE BREAKFAST 30 tablet 11    lisinopriL (PRINIVIL,ZESTRIL) 5 MG tablet TAKE ONE TABLET BY MOUTH TWO TIMES A  tablet 1    meclizine (ANTIVERT) 25 mg tablet Take 1 tablet (25 mg total) by mouth 3 (three) times daily as needed. 20 tablet 0    metoprolol tartrate (LOPRESSOR) 25 MG tablet Take 1 tablet (25 mg total) by mouth 2 (two) times daily. 180 tablet 3    MOUNJARO 5 mg/0.5 mL PnIj INJECT 5 MG INTO THE SKIN EVERY 7 DAYS. 4 Pen 6    nitroGLYCERIN (NITROSTAT) 0.4 MG SL tablet Place 1 tablet (0.4 mg total) under the tongue every 5 (five) minutes as needed for Chest pain. Seek medical help is pain is not relieved by the third dose. 25 tablet 5    pantoprazole (PROTONIX) 40 MG tablet Take 1 tablet (40 mg total) by mouth once daily. 90 tablet 3    pen needle, diabetic (BD ULTRA-FINE YUMIKO PEN NEEDLE) 32 gauge x 5/32" Ndle USE FOUR TIMES A DAY WITH INSULIN 400 each 3    rosuvastatin (CRESTOR) 40 MG Tab Take 1 tablet (40 mg total) by mouth every evening. 90 tablet 3    traZODone (DESYREL) 100 MG tablet Take 1 tablet (100 mg total) by mouth every evening. 90 tablet 3 " "   TRESIBA FLEXTOUCH U-200 200 unit/mL (3 mL) insulin pen INJECT 32 UNITS INTO THE SKIN ONCE DAILY. 3 pen 11    vitamin D (VITAMIN D3) 1000 units Tab Take 1,000 Units by mouth once daily.         SCHEDULED MEDS:   aspirin  325 mg Oral Daily    atorvastatin  40 mg Oral Daily    calcitRIOL  0.25 mcg Oral Q7 Days    famotidine (PF)  20 mg Intravenous Daily    heparin (porcine)  5,000 Units Subcutaneous Q8H    levothyroxine  75 mcg Oral Before breakfast    magnesium oxide  400 mg Oral BID    metoprolol  5 mg Intravenous Q6H    potassium chloride  20 mEq Oral BID    senna-docusate 8.6-50 mg  1 tablet Oral Daily    traZODone  100 mg Oral QHS       CONTINUOUS INFUSIONS:   0.45% NaCl with KCl 20 mEq infusion 100 mL/hr (03/21/24 0418)       PRN MEDS:acetaminophen, acetaminophen, aluminum-magnesium hydroxide-simethicone, dextrose 50%, dextrose 50%, glucagon (human recombinant), glucose, glucose, HYDROcodone-acetaminophen, magnesium oxide, magnesium oxide, melatonin, morphine, naloxone, nitroGLYCERIN, ondansetron, potassium bicarbonate, potassium bicarbonate, potassium bicarbonate, potassium, sodium phosphates, potassium, sodium phosphates, potassium, sodium phosphates, sodium chloride 0.9%    LABS AND DIAGNOSTICS     CBC LAST 3 DAYS  Recent Labs   Lab 03/20/24 2332   WBC 10.19   RBC 4.25   HGB 12.7   HCT 39.1   MCV 92   MCH 29.9   MCHC 32.5   RDW 13.6      MPV 10.6   GRAN 51.1  5.2   LYMPH 39.1  4.0   MONO 7.5  0.8   BASO 0.05   NRBC 0       COAGULATION LAST 3 DAYS  No results for input(s): "LABPT", "INR", "APTT" in the last 168 hours.    CHEMISTRY LAST 3 DAYS  Recent Labs   Lab 03/20/24 2332 03/21/24 0413    141   K 3.6 3.3*    105   CO2 25 28   ANIONGAP 12 8   BUN 19 21   CREATININE 1.6* 1.4   GLU 93 95   CALCIUM 9.3 9.2   MG  --  1.7   ALBUMIN 3.5 3.7   PROT 6.8 6.2   ALKPHOS 61 52*   ALT 9* 6*   AST 13 13   BILITOT 0.5 0.4       CARDIAC PROFILE LAST 3 DAYS  Recent Labs   Lab 03/20/24 2332 " "03/21/24  0219 03/21/24  0413   BNP 62  --   --    TROPONINI <0.006 0.015  --    TROPONINIHS  --   --  5.8       ENDOCRINE LAST 3 DAYS  No results for input(s): "TSH", "PROCAL" in the last 168 hours.    LAST ARTERIAL BLOOD GAS  ABG  No results for input(s): "PH", "PO2", "PCO2", "HCO3", "BE" in the last 168 hours.    LAST 7 DAYS MICROBIOLOGY   Microbiology Results (last 7 days)       ** No results found for the last 168 hours. **            MOST RECENT IMAGING  X-Ray Chest 1 View  Narrative: EXAMINATION:  XR CHEST 1 VIEW    CLINICAL HISTORY:  Cough, unspecified    TECHNIQUE:  Single frontal view of the chest was performed.    COMPARISON:  03/09/2021    FINDINGS:  There are postoperative changes of CABG.  The heart is mildly enlarged.  Lungs are well expanded.  There is mild atelectasis in the left lower lung zone.  No consolidation, edema, or pleural effusion.  Impression: No acute process.  CABG and cardiomegaly.    Electronically signed by: Naman Silverman MD  Date:    01/03/2024  Time:    17:00      ECHOCARDIOGRAM RESULTS (last 5)  Results for orders placed in visit on 05/20/22    Stress Echo    Interpretation Summary  · There were no arrhythmias during stress.  · The left ventricle is normal in size with normal systolic function.  · Normal left ventricular diastolic function.  · The estimated ejection fraction is 65%.  · Normal right ventricular size with normal right ventricular systolic function.  · The stress echo portion of this study is negative for myocardial ischemia at submaximal heart rate  · Sensitivity is impaired due to the patient's failure to achieve target heart rate.  · The patient reached the end of the protocol.  · The ECG portion of this study is negative for myocardial ischemia.      Results for orders placed during the hospital encounter of 03/04/21    Echo Color Flow Doppler? Yes    Interpretation Summary  · The left ventricle is normal in size with concentric remodeling and normal " systolic function. The estimated ejection fraction is 70%  · The estimated PA systolic pressure is 74 mmHg.  · The mean diastolic gradient across the mitral valve is 1 mmHg at a heart rate of bpm.  · There is moderate pulmonary hypertension.  · Indeterminate left ventricular diastolic function.  · Moderate tricuspid regurgitation.  · Elevated central venous pressure (15 mmHg).      Results for orders placed during the hospital encounter of 06/10/20    Echo Color Flow Doppler? Yes    Interpretation Summary  · Mild concentric left ventricular hypertrophy.  · Normal left ventricular systolic function. The estimated ejection fraction is 60%.  · Grade I (mild) left ventricular diastolic dysfunction consistent with impaired relaxation.  · No wall motion abnormalities.  · Normal right ventricular systolic function.  · Mild left atrial enlargement.      Results for orders placed in visit on 01/08/18    2D Echo w/ Color Flow Doppler    Narrative  Date of Procedure: 01/08/2018        TEST DESCRIPTION  Technical Quality: This is a technically adequate study.    Aorta: The aortic root is normal in size, measuring 1.7 cm at sinotubular junction and 2.5 cm at Sinuses of Valsalva. The proximal ascending aorta is normal in size, measuring 1.9 cm across.    Left Atrium: The left atrial volume index is mildly enlarged, measuring 34.94 cc/m2.    Left Ventricle: The left ventricle is normal in size, with an end-diastolic diameter of 4.5 cm, and an end-systolic diameter of 2.7 cm. Wall thickness is increased, with the septum and the posterior wall each measuring 1.2 cm across. Relative wall  thickness was increased at 0.53, and the LV mass index was increased at 122.8 g/m2 consistent with concentric left ventricular hypertrophy. There are no regional wall motion abnormalities. Left ventricular systolic function appears normal. Visually  estimated ejection fraction is 60-65%. The LV Doppler derived stroke volume equals 85.0  ccs.    Diastolic indices: E wave velocity 1.0 m/s, E/A ratio 1.2,  msec., E/e' ratio(avg) 15. There is pseudonormalization of mitral inflow pattern consistent with significant diastolic dysfunction.    Right Atrium: The right atrium is normal in size, measuring 4.7 cm in length and 2.2 cm in width in the apical view.    Right Ventricle: The right ventricle is normal in size measuring 3.2 cm at the base in the apical right ventricle-focused view. Global right ventricular systolic function appears normal. Tricuspid annular plane systolic excursion (TAPSE) is 1.0 cm. The  estimated PA systolic pressure is 26 mmHg.    Tricuspid Valve:  There is mild tricuspid regurgitation.    Pulmonary Valve:  There is mild pulmonic regurgitation.    IVC: IVC is normal in size and collapses > 50% with a sniff, suggesting normal right atrial pressure of 3 mmHg.    Intracavitary: There is no evidence of pericardial effusion, intracavity mass, thrombi, or vegetation.        CONCLUSIONS  1 - Normal left ventricular systolic function (EF 60-65%).  2 - No wall motion abnormalities.  3 - Concentric hypertrophy.  4 - Mild left atrial enlargement.  5 - Impaired LV relaxation, elevated LAP (grade 2 diastolic dysfunction).  6 - Normal right ventricular systolic function .  7 - Mild tricuspid regurgitation.  8 - The estimated PA systolic pressure is 26 mmHg.            This document has been electronically  SIGNED BY: Cristopher Burks MD On: 01/09/2018 13:37      CURRENT/PREVIOUS VISIT EKG  Results for orders placed or performed during the hospital encounter of 01/03/24   EKG 12-lead    Collection Time: 01/03/24  5:24 PM    Narrative    Test Reason : R53.83,    Vent. Rate : 070 BPM     Atrial Rate : 070 BPM     P-R Int : 160 ms          QRS Dur : 078 ms      QT Int : 406 ms       P-R-T Axes : 046 000 127 degrees     QTc Int : 438 ms    Normal sinus rhythm  LVH with repolarization abnormality  Abnormal ECG  When compared with ECG of  "23-MAY-2023 14:12,  Previous ECG has undetermined rhythm, needs review  Confirmed by Daryl Monroy MD ([id number]) on 1/6/2024 9:02:34 AM    Referred By: [email or web address]   SELF           Confirmed By:Daryl Monroy MD           ASSESSMENT/PLAN:     Active Hospital Problems    Diagnosis    *Unstable angina       ASSESSMENT & PLAN:     Chest pain (negative trops)  CKD stage 3  COPD  CAD  T2DM  HLD  HTN    RECOMMENDATIONS:    Restarted home amlodipine and increased dose to 5 mg.  Repeat EKGs were not concerning and trops were negative.   Pt can be discharged home from a cardiac standpoint and can follow up with her cardiologist in [facility] in 1-2 weeks.   If she continues to have feelings of "palpations" could consider an outpatient Zio  monitor.     Thank you for the consult.       Mary Fisher NP  Department of Cardiology  Date of Service: 03/21/2024      "

## 2024-03-23 LAB
OHS QRS DURATION: 76 MS
OHS QTC CALCULATION: 459 MS

## 2024-03-26 ENCOUNTER — TELEPHONE (OUTPATIENT)
Dept: FAMILY MEDICINE | Facility: CLINIC | Age: 79
End: 2024-03-26
Payer: MEDICARE

## 2024-03-26 ENCOUNTER — PATIENT OUTREACH (OUTPATIENT)
Dept: ADMINISTRATIVE | Facility: CLINIC | Age: 79
End: 2024-03-26
Payer: MEDICARE

## 2024-03-26 NOTE — TELEPHONE ENCOUNTER
----- Message from Susanne Goodrihc RN sent at 3/26/2024  2:23 PM CDT -----  Regarding: tcc post-d/c call  Spoke with patient for tcc post-d/c call. Please contact and advise regarding patient stated questions and / or concerns. Thank you.    Medication inquiry  #Bentyl 10mg capsule - needing Rx  #Magnesium ? Should she be taking    Please do not reply to this message, as this inbox is not routinely monitored.

## 2024-03-26 NOTE — PROGRESS NOTES
C3 nurse attempted to contact Adele Hall for a TCC post hospital discharge follow up call. No answer. Left voicemail with callback information. The patient has a scheduled HOSFU appointment with Wally Cazares MD on 04/02/24 @ [id number].

## 2024-03-26 NOTE — TELEPHONE ENCOUNTER
According to our record she has not had been till since 2019 and it should not be used at her age due to risks of falls and fractures.    It looks like her last magnesium level was okay, lower end of normal.  She has decreased kidney function and may develop magnesium toxicity which is mainly excreted through the kidneys.  It does not look like she needs it now.

## 2024-03-26 NOTE — PROGRESS NOTES
C3 nurse spoke with Adele Hall for a TCC post hospital discharge follow up call. The patient has a scheduled [facility] appointment with Wally Cazares MD on 04/02/24 @ 1642.

## 2024-04-01 ENCOUNTER — NUTRITION (OUTPATIENT)
Dept: DIABETES | Facility: CLINIC | Age: 79
End: 2024-04-01
Payer: MEDICARE

## 2024-04-01 DIAGNOSIS — N18.32 TYPE 2 DIABETES MELLITUS WITH STAGE 3B CHRONIC KIDNEY DISEASE, WITH LONG-TERM CURRENT USE OF INSULIN: Primary | ICD-10-CM

## 2024-04-01 DIAGNOSIS — E11.22 TYPE 2 DIABETES MELLITUS WITH STAGE 3B CHRONIC KIDNEY DISEASE, WITH LONG-TERM CURRENT USE OF INSULIN: Primary | ICD-10-CM

## 2024-04-01 DIAGNOSIS — Z79.4 TYPE 2 DIABETES MELLITUS WITH STAGE 3B CHRONIC KIDNEY DISEASE, WITH LONG-TERM CURRENT USE OF INSULIN: Primary | ICD-10-CM

## 2024-04-01 PROCEDURE — G0108 DIAB MANAGE TRN  PER INDIV: HCPCS | Mod: S$GLB,,, | Performed by: NUTRITIONIST

## 2024-04-01 NOTE — Clinical Note
Pt taking 32 units Tresiba in AM and Mounjaro 5 mg on MON.  Pt usually only eats once daily and this is when you will see a spike in BG levels.

## 2024-04-01 NOTE — PROGRESS NOTES
Diabetes Care Specialist Progress Note  Author: Wanda Garibay RD  Date: 4/1/2024    Referral 3/13/24  Start Dexcom G7 3/20/24    Program Intake  Reason for Diabetes Program Visit:: Intervention  Type of Intervention:: Individual  Individual: Device Training (assist with changing G7 sensor via RECIEVER and reviewing data for first time.)  Current diabetes risk level:: low  Permission to speak with others about care:: yes ( Sami)  Continuous Glucose Monitoring  Patient has CGM: Yes  Personal CGM type::  (Dexcom G7 via )  GMI Date: 04/01/24    Lab Results   Component Value Date    HGBA1C 5.6 03/21/2024       Clinical    Clinical Assessment  Current Diabetes Treatment: Injectable, Insulin (Tresiba 32 units AM: Mounjaro 5 mg on MON)    Diabetes Self-Management Skills Assessment    Medications  Patient is able to describe current diabetes management routine.: yes  Diabetes management routine:: insulin, injectable medications (Tresiba 32 units AM: Mounjaro 5 mg on MON)  Patient is able to identify current diabetes medications, dosages, and appropriate timing of medications.: yes  Patient understands the purpose of the medications taken for diabetes.: yes  Patient reports problems or concerns with current medication regimen.: no  Medication Skills Assessment Completed:: Yes  Assessment indicates:: Adequate understanding  Area of need?: No    Home Blood Glucose Monitoring  Patient states that blood sugar is checked at home daily.: yes  Monitoring Method:: personal continuous glucose monitor  Personal CGM type::  (Dexcom G7 via )  Patient is able to use personal CGM appropriately.: yes  CGM Report reviewed?: yes  Home Blood Glucose Monitoring Skills Assessment Completed: : Yes  Assessment indicates:: Instruction Needed  Area of need?: Yes              Assessment Summary and Plan    Based on today's diabetes care assessment, the following areas of need were identified:          3/20/2024    12:01 AM    Social   Support No   Access to Mass Media/Tech No   Cognitive/Behavioral Health No   Culture/Bahai No   Communication No   Health Literacy No            3/20/2024    12:01 AM   Clinical   Medication Adherence No   Lab Compliance No            4/1/2024    12:01 AM   Diabetes Self-Management Skills   Medication No   Home Blood Glucose Monitoring Yes--see Care Plan          Today's interventions were provided through individual discussion, instruction, and written materials were provided.      Patient verbalized understanding of instruction and written materials.  Pt was able to return back demonstration of instructions today. Patient understood key points, needs reinforcement and further instruction.     Diabetes Self-Management Care Plan:    Today's Diabetes Self-Management Care Plan was developed with Adele's input. Adele has agreed to work toward the following goal(s) to improve his/her overall diabetes control.      Care Plan: Diabetes Management   Updates made since 3/2/2024 12:00 AM        Problem: Blood Glucose Self-Monitoring         Goal: Patient will use Dexcom G7 CGM to continuously monitor blood glucose levels daily    Start Date: 3/20/2024   Expected End Date: 6/20/2024   This Visit's Progress: On track   Priority: High   Barriers: No Barriers Identified   Note:    Task: Patient agrees to use and understands  the importance of changing sensor every 10 days (with 12 hour cody period). Pt will use  to follow steps for reinsertion and activation     Task:   Pt aware that 30 minute warm up period required before CGM will provide BG levels and will check BG as needed manually     4/1/24--Pt arrives today for assistance changing G7 sensor using .  Pt did need some instructions in order to follow step-by-step how to put new sensor on.  Pt will come again in 10 days to see if she is more comfortable with process and can do by herself.    Alarms changed to  due to pt having lots  of high alarms (>200).  Initially pt wanted alarm set at 200      5/20/24--Supplies from [facility]            Follow Up Plan     Follow up in about 2 weeks (around 4/15/2024) for f/u 4/11/24 at lunchtime to assist with changing sensor again..    Today's care plan and follow up schedule was discussed with patient.  Adele verbalized understanding of the care plan, goals, and agrees to follow up plan.        The patient was encouraged to communicate with his/her health care provider/physician and care team regarding his/her condition(s) and treatment.  I provided the patient with my contact information today and encouraged to contact me via phone or [facility]'s Patient Portal as needed.     Length of Visit   Total Time: 40 Minutes

## 2024-04-02 ENCOUNTER — TELEPHONE (OUTPATIENT)
Dept: FAMILY MEDICINE | Facility: CLINIC | Age: 79
End: 2024-04-02

## 2024-04-02 ENCOUNTER — OFFICE VISIT (OUTPATIENT)
Dept: FAMILY MEDICINE | Facility: CLINIC | Age: 79
End: 2024-04-02
Attending: FAMILY MEDICINE
Payer: MEDICARE

## 2024-04-02 VITALS
SYSTOLIC BLOOD PRESSURE: 118 MMHG | DIASTOLIC BLOOD PRESSURE: 68 MMHG | HEIGHT: 61 IN | WEIGHT: 153.44 LBS | HEART RATE: 69 BPM | OXYGEN SATURATION: 96 % | TEMPERATURE: 98 F | BODY MASS INDEX: 28.97 KG/M2

## 2024-04-02 DIAGNOSIS — N18.31 TYPE 2 DIABETES MELLITUS WITH STAGE 3A CHRONIC KIDNEY DISEASE, WITH LONG-TERM CURRENT USE OF INSULIN: ICD-10-CM

## 2024-04-02 DIAGNOSIS — R07.9 CHEST PAIN, UNSPECIFIED TYPE: ICD-10-CM

## 2024-04-02 DIAGNOSIS — R00.2 PALPITATIONS: Primary | ICD-10-CM

## 2024-04-02 DIAGNOSIS — R10.13 EPIGASTRIC PAIN: ICD-10-CM

## 2024-04-02 DIAGNOSIS — I48.0 PAF (PAROXYSMAL ATRIAL FIBRILLATION): ICD-10-CM

## 2024-04-02 DIAGNOSIS — K21.9 GASTROESOPHAGEAL REFLUX DISEASE, UNSPECIFIED WHETHER ESOPHAGITIS PRESENT: ICD-10-CM

## 2024-04-02 DIAGNOSIS — Z79.4 TYPE 2 DIABETES MELLITUS WITH STAGE 3A CHRONIC KIDNEY DISEASE, WITH LONG-TERM CURRENT USE OF INSULIN: ICD-10-CM

## 2024-04-02 DIAGNOSIS — I70.0 ATHEROSCLEROSIS OF AORTA: ICD-10-CM

## 2024-04-02 DIAGNOSIS — I50.32 CHRONIC DIASTOLIC CONGESTIVE HEART FAILURE: ICD-10-CM

## 2024-04-02 DIAGNOSIS — E11.22 TYPE 2 DIABETES MELLITUS WITH STAGE 3A CHRONIC KIDNEY DISEASE, WITH LONG-TERM CURRENT USE OF INSULIN: ICD-10-CM

## 2024-04-02 PROCEDURE — 1125F AMNT PAIN NOTED PAIN PRSNT: CPT | Mod: CPTII,S$GLB,, | Performed by: FAMILY MEDICINE

## 2024-04-02 PROCEDURE — 3074F SYST BP LT 130 MM HG: CPT | Mod: CPTII,S$GLB,, | Performed by: FAMILY MEDICINE

## 2024-04-02 PROCEDURE — 1111F DSCHRG MED/CURRENT MED MERGE: CPT | Mod: CPTII,S$GLB,, | Performed by: FAMILY MEDICINE

## 2024-04-02 PROCEDURE — 99999 PR PBB SHADOW E&M-EST. PATIENT-LVL V: CPT | Mod: PBBFAC,,, | Performed by: FAMILY MEDICINE

## 2024-04-02 PROCEDURE — 1160F RVW MEDS BY RX/DR IN RCRD: CPT | Mod: CPTII,S$GLB,, | Performed by: FAMILY MEDICINE

## 2024-04-02 PROCEDURE — 3288F FALL RISK ASSESSMENT DOCD: CPT | Mod: CPTII,S$GLB,, | Performed by: FAMILY MEDICINE

## 2024-04-02 PROCEDURE — 1159F MED LIST DOCD IN RCRD: CPT | Mod: CPTII,S$GLB,, | Performed by: FAMILY MEDICINE

## 2024-04-02 PROCEDURE — 3078F DIAST BP <80 MM HG: CPT | Mod: CPTII,S$GLB,, | Performed by: FAMILY MEDICINE

## 2024-04-02 PROCEDURE — 1101F PT FALLS ASSESS-DOCD LE1/YR: CPT | Mod: CPTII,S$GLB,, | Performed by: FAMILY MEDICINE

## 2024-04-02 PROCEDURE — 99214 OFFICE O/P EST MOD 30 MIN: CPT | Mod: S$GLB,,, | Performed by: FAMILY MEDICINE

## 2024-04-02 RX ORDER — PANTOPRAZOLE SODIUM 40 MG/1
40 TABLET, DELAYED RELEASE ORAL 2 TIMES DAILY
Qty: 90 TABLET | Refills: 3
Start: 2024-04-02 | End: 2024-04-05

## 2024-04-02 NOTE — PROGRESS NOTES
Subjective:       Patient ID: Adele Hall is a 78 y.o. female.    Chief Complaint: No chief complaint on file.    78-year-old female coming in for a hospital follow-up from a visit to [facility] starting out at [facility] and then transferred to [facility].  She was admitted on March 20th and discharged on March 21st.  She presented to the emergency room complaining of a fluttering in her chest that was a new symptom for two months' duration lasting for seconds to up to 30 minutes.  She has a history of old chronic stable left angina but had recently noted an increase in the pain in intensity with activity and it seem to be associated with the fluttering which prompted her to go to the emergency room.  She said the pain was more intense and worse than usual.  Her troponins were negative.  She was told that she was going to have a Holter monitor but it was not scheduled or ordered she does have a stress test ordered.  She is describing pain from her neck down to the epigastric area bilaterally but more so on the right but also she says it is more on the left.  The pain radiates to the back and she is hypersensitive to touch so every replace we palpate hurts her and reproduces her pain.  She does describe the pain as going from the epigastric area through to her back at about the same level.  Palpation in both areas elicits pain.  She has additional history of COPD, dyspnea, diabetes with neuropathy and nephropathy and with insulin use.  She has hypertension, hyperlipidemia, coronary artery disease with four vessel bypass surgery in 2016, paroxysmal atrial fibrillation, diastolic heart failure, atherosclerosis of the aorta, stage IIIA chronic kidney disease previously treated by Dr. Montero and now by Dr. Davidson.  Her medication list is erroneous indicating she is taking lisinopril 5 mg b.i.d. which has not been refilled since 2020.  The patient does not know whether she is taking it or not and does not know if  Dr. Davidson has refilled it or not, I suspect not.  When she got out of the hospital her amlodipine was increased from 2.5 mg daily to 5 mg daily.  The Tresiba she is taking was reduced from 48 units to 32 units according to the AVS but the patient says her dosage was reduced by Endocrinology prior to going to the hospital.  It is possible it was only corrected in the hospital from a previous reduced dose that was not reflected in the medication list.    Past Medical History:  No date: Anesthesia complication      Comment:  took patient 6 days to come out of anethesia after CABG  No date: Anticoagulant long-term use  No date: Arthritis  No date: Chronic kidney disease  No date: CKD (chronic kidney disease) stage 3, GFR 30-59 ml/min      Comment:  Dr. Montero  2/7/2016: COPD (chronic obstructive pulmonary disease)  No date: COPD (chronic obstructive pulmonary disease)  No date: Coronary artery disease  No date: Diabetes mellitus  No date: Diabetes mellitus, type 2  No date: GERD (gastroesophageal reflux disease)  No date: Hx of colonic polyps  No date: Hyperlipidemia  No date: Hypertension  7/30/2016: possible new onset CHF  No date: Tardive dyskinesia  No date: Thyroid disease  No date: Ulcer  No date: Vertigo    Past Surgical History:  No date: CARDIAC SURGERY  8/30/2023: CATARACT EXTRACTION W/  INTRAOCULAR LENS IMPLANT; Right      Comment:  Procedure: EXTRACTION, CATARACT, WITH IOL INSERTION;                 Surgeon: Tamiko Lam MD;  Location: [facility] OR;                 Service: Ophthalmology;  Laterality: Right;  9/13/2023: CATARACT EXTRACTION W/  INTRAOCULAR LENS IMPLANT; Left      Comment:  Procedure: EXTRACTION, CATARACT, WITH IOL INSERTION;                 Surgeon: Tamiko Lam MD;  Location: [facility] OR;                 Service: Ophthalmology;  Laterality: Left;  01/26/2017: CHOLECYSTECTOMY  No date: COLONOSCOPY  12/7/2021: COLONOSCOPY; N/A      Comment:  Procedure: COLONOSCOPY;  Surgeon: Lito GONCALVES  "MD Nicolette;                 Location: [facility];  Service: Endoscopy;  Laterality:                N/A;  01/19/2016: CORONARY ARTERY BYPASS GRAFT      Comment:  4 vessel  No date: HYSTERECTOMY  No date: OOPHORECTOMY  No date: TONSILLECTOMY  No date: TOTAL THYROIDECTOMY  No date: TUBAL LIGATION    Current Outpatient Medications on File Prior to Visit:  amLODIPine (NORVASC) 5 MG tablet, Take 1 tablet (5 mg total) by mouth once daily., Disp: 30 tablet, Rfl: 0  aspirin (ECOTRIN) 81 MG EC tablet, Take 81 mg by mouth once daily., Disp: , Rfl:   aspirin-sod bicarb-citric acid (ERICKA-SELTZER) 324 mg TbEF, Take 325 mg by mouth every 6 (six) hours as needed., Disp: , Rfl:   BD INSULIN SYRINGE ULTRA-FINE 1 mL 31 gauge x 5/16 Syrg, USE AS DIRECTED 5 TIMES A DAY WITH LEVEMIR & NOVOLOG, Disp: 200 each, Rfl: 10  blood sugar diagnostic (TRUE METRIX GLUCOSE TEST STRIP) Strp, Test blood sugar 3x/day, Disp: 300 strip, Rfl: 3  calcitRIOL (ROCALTROL) 0.25 MCG Cap, Take 0.25 mcg by mouth every 7 days., Disp: , Rfl:   dicyclomine (BENTYL) 10 MG capsule, , Disp: , Rfl: 3  lancets 33 gauge Misc, 1 lancet by Misc.(Non-Drug; Combo Route) route 4 (four) times daily., Disp: , Rfl:   levothyroxine (SYNTHROID) 75 MCG tablet, TAKE ONE TABLET BY MOUTH EVERY MORNING BEFORE BREAKFAST, Disp: 30 tablet, Rfl: 11  meclizine (ANTIVERT) 25 mg tablet, Take 1 tablet (25 mg total) by mouth 3 (three) times daily as needed., Disp: 20 tablet, Rfl: 0  metoprolol tartrate (LOPRESSOR) 25 MG tablet, Take 1 tablet (25 mg total) by mouth 2 (two) times daily., Disp: 180 tablet, Rfl: 3  MOUNJARO 5 mg/0.5 mL PnIj, INJECT 5 MG INTO THE SKIN EVERY 7 DAYS., Disp: 4 Pen, Rfl: 6  nitroGLYCERIN (NITROSTAT) 0.4 MG SL tablet, Place 1 tablet (0.4 mg total) under the tongue every 5 (five) minutes as needed for Chest pain. Seek medical help is pain is not relieved by the third dose., Disp: 25 tablet, Rfl: 5  pen needle, diabetic (BD ULTRA-FINE YUMIKO PEN NEEDLE) 32 gauge x 5/32" Ndle, " USE FOUR TIMES A DAY WITH INSULIN, Disp: 400 each, Rfl: 3  rosuvastatin (CRESTOR) 40 MG Tab, Take 1 tablet (40 mg total) by mouth every evening., Disp: 90 tablet, Rfl: 3  traZODone (DESYREL) 100 MG tablet, Take 1 tablet (100 mg total) by mouth every evening., Disp: 90 tablet, Rfl: 3  TRESIBA FLEXTOUCH U-200 200 unit/mL (3 mL) insulin pen, INJECT 32 UNITS INTO THE SKIN ONCE DAILY. (Patient taking differently: Inject 32 Units into the skin once daily. INJECT 32 UNITS INTO THE SKIN ONCE DAILY.), Disp: 3 pen , Rfl: 11  vitamin D (VITAMIN D3) 1000 units Tab, Take 1,000 Units by mouth once daily., Disp: , Rfl:   [DISCONTINUED] lisinopriL (PRINIVIL,ZESTRIL) 5 MG tablet, TAKE ONE TABLET BY MOUTH TWO TIMES A DAY, Disp: 180 tablet, Rfl: 1  [DISCONTINUED] pantoprazole (PROTONIX) 40 MG tablet, Take 1 tablet (40 mg total) by mouth once daily., Disp: 90 tablet, Rfl: 3    No current facility-administered medications on file prior to visit.          Review of Systems   Respiratory:  Positive for chest tightness. Negative for shortness of breath.    Cardiovascular:  Positive for chest pain and palpitations.   Gastrointestinal:  Positive for abdominal pain (Predominantly epigastric).   Musculoskeletal:  Positive for back pain.       Objective:      Physical Exam  Vitals and nursing note reviewed.   Constitutional:       General: She is not in acute distress.     Appearance: Normal appearance. She is not ill-appearing, toxic-appearing or diaphoretic.      Comments: Good blood pressure control  Normal pulse with regular rhythm and no ectopic beats audible on prolonged auscultation   Overweight with a BMI of 29.0 she is down 4 lb from her last visit with me August 18, 2023   Cardiovascular:      Rate and Rhythm: Normal rate and regular rhythm.      Heart sounds: No murmur heard.     No friction rub. No gallop.   Pulmonary:      Effort: Pulmonary effort is normal. No respiratory distress.      Breath sounds: Normal breath sounds. No  stridor. No wheezing, rhonchi or rales.   Chest:      Chest wall: Tenderness (Diffuse tenderness everywhere palpated) present.   Abdominal:      General: Bowel sounds are normal. There is no distension.      Palpations: Abdomen is soft. There is no mass.      Tenderness: There is abdominal tenderness (Diffuse tenderness throughout). There is no guarding or rebound.      Hernia: No hernia is present.   Musculoskeletal:         General: Tenderness present.      Cervical back: Tenderness present.   Neurological:      Mental Status: She is alert.         Assessment:       1. Palpitations    2. Chest pain, unspecified type    3. Epigastric pain    4. Gastroesophageal reflux disease, unspecified whether esophagitis present    5. Chronic diastolic congestive heart failure    6. PAF (paroxysmal atrial fibrillation)    7. Atherosclerosis of aorta    8. Type 2 diabetes mellitus with stage 3a chronic kidney disease, with long-term current use of insulin    9. BMI 29.0-29.9,adult        Plan:       1. Palpitations  Set up 72 hour Holter monitor.  Nothing auscultated on exam today.  Possibly she is experiencing GE reflux and mistaking it for palpitations  - Holter monitor - 72 hour; Future    2. Chest pain, unspecified type  Diffuse chest tenderness with costochondral type tenderness.  She already has a stress test scheduled and follow-up with Dr. Joy  - Amylase; Future  - Lipase; Future  - Holter monitor - 72 hour; Future  - Ambulatory referral/consult to Gastroenterology; Future    3. Epigastric pain  Suspect she is having some reflux.  Will increase the Protonix to twice daily for 2 to 4 weeks and get GI consult for upper endoscopy  - Amylase; Future  - Lipase; Future  - Ambulatory referral/consult to Gastroenterology; Future    4. Gastroesophageal reflux disease, unspecified whether esophagitis present  See above  - pantoprazole (PROTONIX) 40 MG tablet; Take 1 tablet (40 mg total) by mouth 2 (two) times daily.   Dispense: 90 tablet; Refill: 3    5. Chronic diastolic congestive heart failure  Appears asymptomatic    6. PAF (paroxysmal atrial fibrillation)  No sign of active atrial fibrillation currently in sinus rhythm    7. Atherosclerosis of aorta  Asymptomatic    8. Type 2 diabetes mellitus with stage 3a chronic kidney disease, with long-term current use of insulin  Lab Results   Component Value Date    HGBA1C 5.6 03/21/2024     Concern for hypoglycemia which could produce hyperadrenergic response.  Her insulin has been reduced    9. BMI 29.0-29.9,adult  Mildly overweight

## 2024-04-02 NOTE — TELEPHONE ENCOUNTER
----- Message from Lnydon Green sent at 4/2/2024  3:08 PM CDT -----  Regarding: Holter Monitor  We received orders for this patient to have a holter monitor. We are no longer performing these at the [facility]. Patients will now have to go to the [facility] ([facility] to receive their monitor). Please change your order to IMG #  (Cardiac Monitor - 3-15 Day Adult). Someone from the office will reach out to the patient to schedule the monitor once the new order is placed.

## 2024-04-03 ENCOUNTER — TELEPHONE (OUTPATIENT)
Dept: GASTROENTEROLOGY | Facility: CLINIC | Age: 79
End: 2024-04-03
Payer: MEDICARE

## 2024-04-03 ENCOUNTER — LAB VISIT (OUTPATIENT)
Dept: LAB | Facility: HOSPITAL | Age: 79
End: 2024-04-03
Attending: FAMILY MEDICINE
Payer: MEDICARE

## 2024-04-03 DIAGNOSIS — R07.9 CHEST PAIN, UNSPECIFIED TYPE: ICD-10-CM

## 2024-04-03 DIAGNOSIS — R10.13 EPIGASTRIC PAIN: ICD-10-CM

## 2024-04-03 LAB
AMYLASE SERPL-CCNC: 142 U/L (ref 20–110)
LIPASE SERPL-CCNC: 50 U/L (ref 4–60)

## 2024-04-03 PROCEDURE — 82150 ASSAY OF AMYLASE: CPT | Performed by: FAMILY MEDICINE

## 2024-04-03 PROCEDURE — 36415 COLL VENOUS BLD VENIPUNCTURE: CPT | Mod: PO | Performed by: FAMILY MEDICINE

## 2024-04-03 PROCEDURE — 83690 ASSAY OF LIPASE: CPT | Performed by: FAMILY MEDICINE

## 2024-04-03 NOTE — TELEPHONE ENCOUNTER
Called patient,no answer left voice mail and portal message to schedule from referral, number provided.

## 2024-04-03 NOTE — PROGRESS NOTES
"Subjective:       Patient ID: Adele Hall is a 78 y.o. female Body mass index is 29.26 kg/m².    Chief Complaint: Abdominal Pain (Epi pain)    This patient is new to me.  Referring Provider: Aaareferral Self for epigastric pain.     Pain - chest-stomach  Can't eat - stomach cramping.     Constipation - bowels move "hardly ever" - maybe 1 bowel movement a week.  reports due to her kidney function she cannot take miralax.    3/20/24 Admit:  78 WF with pmh of CAD , s/p CABG 2017 , HTN , DMII not on insulin , GERD , angina    Non smoker   Ex smoker since 19'  Non drinker     Lives with        She had a CABG in 2017 and quit smoking then.   Since then no Heart attacks or stents she says.  But she does get Angina  and it responds to nitro SL.      She says that is not new to her.   It happens often and can last few seconds or few minutes.     Not associated with activity .  Usually on left breast area .  But sometimes can be on right or right shoulder.          But what is new,  is " fluttering " she says.   Feels like her heart is moving around in her chest she says.           This has been going on for about 2 Months she says .   It can happen anytime.  Not associated with activity .    It can last for seconds up to 30 minutes she says.       But otherwise was not bothering her.   But then in last 3 days she noticed chest pain now associated with the fluttering.        She says this pain , or angina , is more intense and lasts longer than her usual angina  pain episodes.         So that is why she came to the ER at [facility] .    They found she had negative troponin .   But her EKG was not normal.   There was initial concern about Posterior STEMI.  They did posterior EKG and they called cardiology , DR Wagoner , who did not feel it was a STEMI but they wanted her moved over to [facility] .     In their ER she got aspirin and a 5,000 unit Bolus of Heparin IV .     When she got to our hospital we repeated " labs and EKG.   Her EKG actually looked good and improved from prior ones.      She said her symptoms are no different. Non worse but not better.         Her repeat troponin was negative still. Or normal .       We are admitting her to our service for the chest pain     Unstable angina  Currently still having the fluttering and pain .  No change in it she says.       But her second troponin is normal still.    K was lower at 3.3 now and mag was 1.7      BP looks good.  HR in 70-80 range      I spoke to her at length and she was comfortable wanting to sleep .       New EKG looks good.   Improved .        PLAN     - run 1/2 NS with 20 kcl  at 100 cc hour x 10 hours  - give KCL 20 meq po NOW and then BID x 2 days   - NPO until cardiology sees her  - keep k over 4 and mag over 2   - Lopressor IV 5 mg Q6 hours start now, to reduce myocardial oxygen demand   - control BP , afterload   - Aspirin 325 mg po daily      - hold off on Dual antiplatelet therapy or Heparin drip for now since EKG normal and troponins still normal      - 2D ECHO ordered      - cardiology consult placed   - cont. Home statin         Abdominal Pain  The pain is located in the epigastric region. Associated symptoms include constipation and diarrhea (overflow). Pertinent negatives include no nausea or vomiting.     Review of Systems   Constitutional:  Positive for activity change, appetite change and fatigue. Negative for unexpected weight change.   HENT:  Negative for sore throat and trouble swallowing.    Cardiovascular:  Positive for chest pain (pt recently checked out cardiac wise).   Gastrointestinal:  Positive for abdominal pain, constipation and diarrhea (overflow). Negative for abdominal distention, anal bleeding, blood in stool, nausea, rectal pain and vomiting.   Neurological:  Positive for weakness.       No LMP recorded. Patient has had a hysterectomy.  Past Medical History:   Diagnosis Date    Anesthesia complication     took patient 6  days to come out of anethesia after CABG    Anticoagulant long-term use     Arthritis     Chronic kidney disease     CKD (chronic kidney disease) stage 3, GFR 30-59 ml/min     Dr. Montero    COPD (chronic obstructive pulmonary disease) 2/7/2016    COPD (chronic obstructive pulmonary disease)     Coronary artery disease     Diabetes mellitus     Diabetes mellitus, type 2     GERD (gastroesophageal reflux disease)     Hx of colonic polyps     Hyperlipidemia     Hypertension     possible new onset CHF 7/30/2016    Tardive dyskinesia     Thyroid disease     Ulcer     Vertigo      Past Surgical History:   Procedure Laterality Date    CARDIAC SURGERY      CATARACT EXTRACTION W/  INTRAOCULAR LENS IMPLANT Right 8/30/2023    Procedure: EXTRACTION, CATARACT, WITH IOL INSERTION;  Surgeon: Tamiko Lam MD;  [facility];  Service: Ophthalmology;  Laterality: Right;    CATARACT EXTRACTION W/  INTRAOCULAR LENS IMPLANT Left 9/13/2023    Procedure: EXTRACTION, CATARACT, WITH IOL INSERTION;  Surgeon: Tamiko Lam MD;  Location: [facility];  Service: Ophthalmology;  Laterality: Left;    CHOLECYSTECTOMY  01/26/2017    COLONOSCOPY      COLONOSCOPY N/A 12/7/2021    Procedure: COLONOSCOPY;  Surgeon: Lito Will MD;  Location: [facility] END[facility];  Service: Endoscopy;  Laterality: N/A;    CORONARY ARTERY BYPASS GRAFT  01/19/2016    4 vessel    HYSTERECTOMY      OOPHORECTOMY      TONSILLECTOMY      TOTAL THYROIDECTOMY      TUBAL LIGATION       Family History   Problem Relation Age of Onset    Cancer Mother         LYMPHOMA    Breast cancer Mother     Cancer Father     Early death Father     Heart disease Paternal Uncle     Alcohol abuse Sister         x2    Heart disease Brother     No Known Problems Son     Glaucoma Neg Hx     Macular degeneration Neg Hx      Social History     Tobacco Use    Smoking status: Former     Current packs/day: 0.00     Average packs/day: 1 pack/day for 27.0 years (27.0 ttl pk-yrs)     Types:  Cigarettes     Start date: 1989     Quit date: 2016     Years since quittin.2    Smokeless tobacco: Never   Substance Use Topics    Alcohol use: No     Alcohol/week: 0.0 standard drinks of alcohol    Drug use: No     Wt Readings from Last 10 Encounters:   24 70.2 kg (154 lb 12.2 oz)   24 69.6 kg (153 lb 7 oz)   24 68.5 kg (151 lb 0.2 oz)   24 68.5 kg (151 lb)   24 69.3 kg (152 lb 12.5 oz)   24 67.6 kg (149 lb)   23 72.1 kg (159 lb)   23 72.5 kg (159 lb 13.3 oz)   23 71.2 kg (157 lb)   23 71.4 kg (157 lb 6.5 oz)     Lab Results   Component Value Date    WBC 10.19 2024    HGB 12.7 2024    HCT 39.1 2024    MCV 92 2024     2024     CMP  Sodium   Date Value Ref Range Status   2024 141 136 - 145 mmol/L Final     Potassium   Date Value Ref Range Status   2024 3.3 (L) 3.5 - 5.1 mmol/L Final     Chloride   Date Value Ref Range Status   2024 105 95 - 110 mmol/L Final     CO2   Date Value Ref Range Status   2024 28 23 - 29 mmol/L Final     Glucose   Date Value Ref Range Status   2024 95 70 - 110 mg/dL Final     BUN   Date Value Ref Range Status   2024 21 8 - 23 mg/dL Final     Creatinine   Date Value Ref Range Status   2024 1.4 0.5 - 1.4 mg/dL Final     Calcium   Date Value Ref Range Status   2024 9.2 8.7 - 10.5 mg/dL Final     Total Protein   Date Value Ref Range Status   2024 6.2 6.0 - 8.4 g/dL Final     Albumin   Date Value Ref Range Status   2024 3.7 3.5 - 5.2 g/dL Final     Total Bilirubin   Date Value Ref Range Status   2024 0.4 0.1 - 1.0 mg/dL Final     Comment:     For infants and newborns, interpretation of results should be based  on gestational age, weight and in agreement with clinical  observations.    Premature Infant recommended reference ranges:  Up to 24 hours.............<8.0 mg/dL  Up to 48 hours............<12.0 mg/dL  3-5  "days..................<15.0 mg/dL  6-29 days.................<15.0 mg/dL       Alkaline Phosphatase   Date Value Ref Range Status   03/21/2024 52 (L) 55 - 135 U/L Final     AST ([place])   Date Value Ref Range Status   01/30/2016 45 (H) 14 - 36 U/L Final     AST   Date Value Ref Range Status   03/21/2024 13 10 - 40 U/L Final     ALT   Date Value Ref Range Status   03/21/2024 6 (L) 10 - 44 U/L Final     Anion Gap   Date Value Ref Range Status   03/21/2024 8 8 - 16 mmol/L Final     eGFR if    Date Value Ref Range Status   05/12/2022 51.4 (A) >60 mL/min/1.73 m^2 Final   05/12/2022 46.7 (A) >60 mL/min/1.73 m^2 Final     eGFR if non    Date Value Ref Range Status   05/12/2022 44.6 (A) >60 mL/min/1.73 m^2 Final     Comment:     Calculation used to obtain the estimated glomerular filtration  rate (eGFR) is the CKD-EPI equation.      05/12/2022 40.5 (A) >60 mL/min/1.73 m^2 Final     Comment:     Calculation used to obtain the estimated glomerular filtration  rate (eGFR) is the CKD-EPI equation.        Lab Results   Component Value Date    AMYLASE 142 (H) 04/03/2024     Lab Results   Component Value Date    LIPASE 50 04/03/2024     No results found for: "LIPASERES"  Lab Results   Component Value Date    TSH 0.410 05/25/2023       Reviewed prior medical records including radiology report of CT abdomen and pelvis 03/04/2021, x-ray chest 03/06/2021 and 03/09/2021, x-ray chest 01/03/2024 and 03/20/2024 & endoscopy history (see surgical history).    Objective:      Physical Exam  Vitals and nursing note reviewed.   Constitutional:       General: She is not in acute distress.     Appearance: She is obese. She is ill-appearing.   HENT:      Head: Normocephalic and atraumatic.      Mouth/Throat:      Mouth: Mucous membranes are moist.      Pharynx: Oropharynx is clear.   Eyes:      Conjunctiva/sclera: Conjunctivae normal.   Cardiovascular:      Rate and Rhythm: Normal rate and regular rhythm. "      Pulses: Normal pulses.   Pulmonary:      Effort: Pulmonary effort is normal. No respiratory distress.   Abdominal:      General: Bowel sounds are normal. There is no distension.      Palpations: Abdomen is soft.      Tenderness: There is no abdominal tenderness. There is guarding (pt complaining of upper chest pain).   Skin:     General: Skin is warm and dry.      Capillary Refill: Capillary refill takes 2 to 3 seconds.   Neurological:      Mental Status: She is alert and oriented to person, place, and time.         Assessment:       1. Epigastric pain    2. Chest pain, unspecified type    3. Constipation, unspecified constipation type        Plan:       Epigastric pain & Chest pain, unspecified type  - schedule EGD, discussed procedure with patient, including risks and benefits, patient verbalized understanding  -discussed about the different types of medications used to treat reflux and how to use them, antacids can be used PRN for breakthrough heartburn symptoms by reducing stomach acid that is already produced, H2 blockers work by limiting the amount acid production, & PPI's work to block acid production and are taken daily, patient verbalized understanding.  -Educated patient on lifestyle modifications to help control/reduce reflux/abdominal pain including: avoid large meals, avoid eating within 2-3 hours of bedtime (avoid late night eating & lying down soon after eating), elevate head of bed if nocturnal symptoms are present, smoking cessation (if current smoker), & weight loss (if overweight).   -Educated to avoid known foods which trigger reflux symptoms & to minimize/avoid high-fat foods, chocolate, caffeine, citrus, alcohol, & tomato products.  -Advised to avoid/limit use of NSAID's, since they can cause GI upset, bleeding, and/or ulcers. If needed, take with food.    With patient reporting chest pain we will treat for esophagitis/gastric ulcer with Carafate.  We will also change to stronger  anti-reflux medication/PPI in order to help control any silent reflux or reflux causing the chest pain  -     Ambulatory referral/consult to Gastroenterology  -     omeprazole (PRILOSEC) 40 MG capsule; Take 1 capsule (40 mg total) by mouth once daily.  Dispense: 90 capsule; Refill: 3  -     sucralfate (CARAFATE) 1 gram tablet; Take 1 tablet (1 g total) by mouth 4 (four) times daily before meals and nightly. for 10 days  Dispense: 40 tablet; Refill: 0    Constipation, unspecified constipation type  - schedule Colonoscopy, discussed procedure with the patient, including risks and benefits, patient verbalized understanding   Recommend daily exercise as tolerated, adequate water intake (six 8-oz glasses of water daily), and high fiber diet. OTC fiber supplements are recommended if diet does not reach daily fiber goal (20-30 grams daily), such as Metamucil, Citrucel, or FiberCon (take as directed, separate from other oral medications by >2 hours).  -Recommend taking an OTC stool softener such as Colace as directed to avoid hard stools and straining with bowel movements PRN  -Recommend trying OTC MiraLax once daily (17g PO) as directed  - If no improvement with above recommendations, try intermittently dosed Dulcolax OTC as directed (every 3-4  days) PRN to facilitate bowel movements  -If still no improvement with these measures, call/follow-up   - patient's  adamant can not take MiraLax thus recommended Metamucil gummies and/or capsules to help with bowel movements.  We will do an x-ray today to rule out bowel obstruction and prescribe low-dose Linzess for constipation.  discussed medication with patient & that it can cause diarrhea for the first week or so, but once your body adjusts to the medication, the diarrhea should resolve; if severe diarrhea occurs or if it persist, please notify our office, patient verbalized understanding   -     X-Ray Abdomen AP 1 View; Future; Expected date: 04/05/2024  -      linaCLOtide (LINZESS) 72 mcg Cap capsule; Take 1 capsule (72 mcg total) by mouth before breakfast.  Dispense: 30 capsule; Refill: 1  -     polyethylene glycol (COLYTE) 240-22.72-6.72 -5.84 gram SolR; Take 4,000 mLs by mouth once. For colonoscopy prep for 1 dose  Dispense: 4000 mL; Refill: 0      No follow-ups on file.      If no improvement in symptoms or symptoms worsen, call/follow-up at clinic or go to ER.       ISABEL Irby, HENRIQUEP-C    Encounter includes face to face time and non-face to face time preparing to see the patient (eg, review of tests), obtaining and/or reviewing separately obtained history, documenting clinical information in the electronic or other health record, independently interpreting results (not separately reported) and communicating results to the patient/family/caregiver, or care coordination (not separately reported).     A dictation software program was used for this note. Please expect some simple typographical errors in this note.

## 2024-04-05 ENCOUNTER — OFFICE VISIT (OUTPATIENT)
Dept: GASTROENTEROLOGY | Facility: CLINIC | Age: 79
End: 2024-04-05
Payer: MEDICARE

## 2024-04-05 ENCOUNTER — HOSPITAL ENCOUNTER (OUTPATIENT)
Dept: RADIOLOGY | Facility: HOSPITAL | Age: 79
Discharge: HOME | End: 2024-04-05
Payer: MEDICARE

## 2024-04-05 ENCOUNTER — LAB VISIT (OUTPATIENT)
Dept: LAB | Facility: HOSPITAL | Age: 79
End: 2024-04-05
Attending: INTERNAL MEDICINE
Payer: MEDICARE

## 2024-04-05 VITALS
HEIGHT: 61 IN | WEIGHT: 154.75 LBS | SYSTOLIC BLOOD PRESSURE: 120 MMHG | DIASTOLIC BLOOD PRESSURE: 67 MMHG | HEART RATE: 69 BPM | BODY MASS INDEX: 29.22 KG/M2

## 2024-04-05 DIAGNOSIS — N18.9 CKD (CHRONIC KIDNEY DISEASE): Primary | ICD-10-CM

## 2024-04-05 DIAGNOSIS — K59.00 CONSTIPATION, UNSPECIFIED CONSTIPATION TYPE: ICD-10-CM

## 2024-04-05 DIAGNOSIS — R07.9 CHEST PAIN, UNSPECIFIED TYPE: ICD-10-CM

## 2024-04-05 DIAGNOSIS — N25.81 SECONDARY HYPERPARATHYROIDISM: ICD-10-CM

## 2024-04-05 DIAGNOSIS — R10.13 EPIGASTRIC PAIN: Primary | ICD-10-CM

## 2024-04-05 DIAGNOSIS — E83.42 HYPOMAGNESEMIA: ICD-10-CM

## 2024-04-05 PROCEDURE — 83735 ASSAY OF MAGNESIUM: CPT | Performed by: INTERNAL MEDICINE

## 2024-04-05 PROCEDURE — 36415 COLL VENOUS BLD VENIPUNCTURE: CPT | Mod: PO | Performed by: INTERNAL MEDICINE

## 2024-04-05 PROCEDURE — 1125F AMNT PAIN NOTED PAIN PRSNT: CPT | Mod: CPTII,S$GLB,,

## 2024-04-05 PROCEDURE — 1101F PT FALLS ASSESS-DOCD LE1/YR: CPT | Mod: CPTII,S$GLB,,

## 2024-04-05 PROCEDURE — 3288F FALL RISK ASSESSMENT DOCD: CPT | Mod: CPTII,S$GLB,,

## 2024-04-05 PROCEDURE — 74018 RADEX ABDOMEN 1 VIEW: CPT | Mod: TC

## 2024-04-05 PROCEDURE — 84156 ASSAY OF PROTEIN URINE: CPT | Performed by: INTERNAL MEDICINE

## 2024-04-05 PROCEDURE — 83970 ASSAY OF PARATHORMONE: CPT | Performed by: INTERNAL MEDICINE

## 2024-04-05 PROCEDURE — 3078F DIAST BP <80 MM HG: CPT | Mod: CPTII,S$GLB,,

## 2024-04-05 PROCEDURE — 80069 RENAL FUNCTION PANEL: CPT | Performed by: INTERNAL MEDICINE

## 2024-04-05 PROCEDURE — 99214 OFFICE O/P EST MOD 30 MIN: CPT | Mod: S$GLB,,,

## 2024-04-05 PROCEDURE — 81001 URINALYSIS AUTO W/SCOPE: CPT | Performed by: INTERNAL MEDICINE

## 2024-04-05 PROCEDURE — 74018 RADEX ABDOMEN 1 VIEW: CPT | Mod: 26,,, | Performed by: RADIOLOGY

## 2024-04-05 PROCEDURE — 99999 PR PBB SHADOW E&M-EST. PATIENT-LVL III: CPT | Mod: PBBFAC,,,

## 2024-04-05 PROCEDURE — 1111F DSCHRG MED/CURRENT MED MERGE: CPT | Mod: CPTII,S$GLB,,

## 2024-04-05 PROCEDURE — 3074F SYST BP LT 130 MM HG: CPT | Mod: CPTII,S$GLB,,

## 2024-04-05 RX ORDER — SUCRALFATE 1 G/1
1 TABLET ORAL
Qty: 40 TABLET | Refills: 0 | Status: SHIPPED | OUTPATIENT
Start: 2024-04-05 | End: 2024-04-15

## 2024-04-05 RX ORDER — OMEPRAZOLE 40 MG/1
40 CAPSULE, DELAYED RELEASE ORAL DAILY
Qty: 90 CAPSULE | Refills: 3 | Status: SHIPPED | OUTPATIENT
Start: 2024-04-05 | End: 2025-04-05

## 2024-04-06 LAB
ALBUMIN SERPL BCP-MCNC: 3.5 G/DL (ref 3.5–5.2)
ANION GAP SERPL CALC-SCNC: 11 MMOL/L (ref 8–16)
BACTERIA #/AREA URNS AUTO: ABNORMAL /HPF
BILIRUB UR QL STRIP: NEGATIVE
BUN SERPL-MCNC: 21 MG/DL (ref 8–23)
CALCIUM SERPL-MCNC: 9.7 MG/DL (ref 8.7–10.5)
CHLORIDE SERPL-SCNC: 103 MMOL/L (ref 95–110)
CLARITY UR REFRACT.AUTO: CLEAR
CO2 SERPL-SCNC: 26 MMOL/L (ref 23–29)
COLOR UR AUTO: YELLOW
CREAT SERPL-MCNC: 1.2 MG/DL (ref 0.5–1.4)
CREAT UR-MCNC: 199 MG/DL (ref 15–325)
EST. GFR  (NO RACE VARIABLE): 46.9 ML/MIN/1.73 M^2
GLUCOSE SERPL-MCNC: 123 MG/DL (ref 70–110)
GLUCOSE UR QL STRIP: NEGATIVE
HGB UR QL STRIP: NEGATIVE
HYALINE CASTS UR QL AUTO: 0 /LPF
KETONES UR QL STRIP: NEGATIVE
LEUKOCYTE ESTERASE UR QL STRIP: ABNORMAL
MAGNESIUM SERPL-MCNC: 1.7 MG/DL (ref 1.6–2.6)
MICROSCOPIC COMMENT: ABNORMAL
NITRITE UR QL STRIP: NEGATIVE
NON-SQ EPI CELLS #/AREA URNS AUTO: 5 /HPF
PH UR STRIP: 6 [PH] (ref 5–8)
PHOSPHATE SERPL-MCNC: 3.7 MG/DL (ref 2.7–4.5)
POTASSIUM SERPL-SCNC: 3.8 MMOL/L (ref 3.5–5.1)
PROT UR QL STRIP: ABNORMAL
PROT UR-MCNC: 81 MG/DL (ref 0–15)
PROT/CREAT UR: 0.41 MG/G{CREAT} (ref 0–0.2)
PTH-INTACT SERPL-MCNC: 61 PG/ML (ref 9–77)
RBC #/AREA URNS AUTO: 4 /HPF (ref 0–4)
SODIUM SERPL-SCNC: 140 MMOL/L (ref 136–145)
SP GR UR STRIP: 1.02 (ref 1–1.03)
SQUAMOUS #/AREA URNS AUTO: 32 /HPF
URN SPEC COLLECT METH UR: ABNORMAL
WBC #/AREA URNS AUTO: 43 /HPF (ref 0–5)

## 2024-04-08 ENCOUNTER — HOSPITAL ENCOUNTER (OUTPATIENT)
Dept: CARDIOLOGY | Facility: CLINIC | Age: 79
Discharge: HOME | End: 2024-04-08
Attending: FAMILY MEDICINE
Payer: MEDICARE

## 2024-04-08 DIAGNOSIS — R07.9 CHEST PAIN, UNSPECIFIED TYPE: ICD-10-CM

## 2024-04-08 DIAGNOSIS — R00.2 PALPITATIONS: ICD-10-CM

## 2024-04-08 PROCEDURE — 93244 EXT ECG>48HR<7D REV&INTERPJ: CPT | Mod: ,,, | Performed by: GENERAL PRACTICE

## 2024-04-08 PROCEDURE — 93242 EXT ECG>48HR<7D RECORDING: CPT | Mod: ,,, | Performed by: GENERAL PRACTICE

## 2024-04-12 ENCOUNTER — TELEPHONE (OUTPATIENT)
Dept: ENDOCRINOLOGY | Facility: CLINIC | Age: 79
End: 2024-04-12
Payer: MEDICARE

## 2024-04-12 DIAGNOSIS — Z79.4 TYPE 2 DIABETES MELLITUS WITH STAGE 3B CHRONIC KIDNEY DISEASE, WITH LONG-TERM CURRENT USE OF INSULIN: ICD-10-CM

## 2024-04-12 DIAGNOSIS — N18.32 TYPE 2 DIABETES MELLITUS WITH STAGE 3B CHRONIC KIDNEY DISEASE, WITH LONG-TERM CURRENT USE OF INSULIN: ICD-10-CM

## 2024-04-12 DIAGNOSIS — E11.22 TYPE 2 DIABETES MELLITUS WITH STAGE 3B CHRONIC KIDNEY DISEASE, WITH LONG-TERM CURRENT USE OF INSULIN: ICD-10-CM

## 2024-04-12 RX ORDER — INSULIN DEGLUDEC 200 U/ML
INJECTION, SOLUTION SUBCUTANEOUS
Qty: 3 PEN | Refills: 11
Start: 2024-04-12 | End: 2024-05-13

## 2024-04-12 NOTE — TELEPHONE ENCOUNTER
----- Message from Mirian Faulkner sent at 4/12/2024  2:23 PM CDT -----  Regarding: Patient Returning Call  Contact: patient at [phone]  Type:  Patient Returning Call    Who Called:  patient at [phone]    Who Left Message for Patient:  Kaycee Matamoros LPN  Does the patient know what this is regarding?:  yes    Additional Information:  Regarding insulin. Please call and advise. Thank you

## 2024-04-12 NOTE — TELEPHONE ENCOUNTER
----- Message from Marcelina Terry sent at 4/12/2024  1:57 PM CDT -----  Type: Needs Medical Advice  Who Called:  patient  Best Call Back Number: [phone] (home)   Additional Information: sugar is dropping more often than before- should she increase insulin

## 2024-04-12 NOTE — TELEPHONE ENCOUNTER
Pt states her BG overnight have been in the 60's. Pt states that she was taking Tresiba 32 units and increased it to 36 units thinking that  you told her to increase it if the BG were low. I clarify that it was the opposite way.If she is low,she probably needs to inject less insulin. Pt has dexcom but not sharing.Please advise

## 2024-04-15 ENCOUNTER — PATIENT OUTREACH (OUTPATIENT)
Dept: DIABETES | Facility: CLINIC | Age: 79
End: 2024-04-15
Payer: MEDICARE

## 2024-04-15 ENCOUNTER — TELEPHONE (OUTPATIENT)
Dept: FAMILY MEDICINE | Facility: CLINIC | Age: 79
End: 2024-04-15
Payer: MEDICARE
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
## 2024-04-15 NOTE — TELEPHONE ENCOUNTER
Patient notified of lab results. Was seen at GI and they are ordering a Colonoscopy and Endoscopy.

## 2024-04-22 ENCOUNTER — TELEPHONE (OUTPATIENT)
Dept: ENDOCRINOLOGY | Facility: CLINIC | Age: 79
End: 2024-04-22
Payer: MEDICARE

## 2024-04-22 DIAGNOSIS — N18.32 TYPE 2 DIABETES MELLITUS WITH STAGE 3B CHRONIC KIDNEY DISEASE, WITH LONG-TERM CURRENT USE OF INSULIN: Primary | ICD-10-CM

## 2024-04-22 DIAGNOSIS — Z79.4 TYPE 2 DIABETES MELLITUS WITH STAGE 3B CHRONIC KIDNEY DISEASE, WITH LONG-TERM CURRENT USE OF INSULIN: Primary | ICD-10-CM

## 2024-04-22 DIAGNOSIS — E11.22 TYPE 2 DIABETES MELLITUS WITH STAGE 3B CHRONIC KIDNEY DISEASE, WITH LONG-TERM CURRENT USE OF INSULIN: Primary | ICD-10-CM

## 2024-04-22 RX ORDER — SEMAGLUTIDE 1.34 MG/ML
1 INJECTION, SOLUTION SUBCUTANEOUS
Qty: 3 ML | Refills: 6 | Status: SHIPPED | OUTPATIENT
Start: 2024-04-22 | End: 2025-04-22

## 2024-04-22 NOTE — TELEPHONE ENCOUNTER
Please call pt. I see that dose is due today. I will change to Ozempic 1 mg weekly for the time being until Mounjaro is back in stock. Please have her notify me if having any nausea, diarrhea, etc.

## 2024-04-22 NOTE — TELEPHONE ENCOUNTER
----- Message from Jon Bain sent at 4/22/2024 12:13 PM CDT -----  Regarding: Refill request- [facility]  Type:  RX Refill Request    Who Called: Pt  Refill or New Rx:Refill    RX Name and Strength: MOUNJARO 5 mg/0.5 mL PnIj   How is the patient currently taking it? (ex. 1XDay): as directed  Is this a 30 day or 90 day RX:30    Preferred Pharmacy with phone number:  [facility] [address] [id number] [facility] [address]  [place] [address]  Phone: [phone] Fax: [phone]        Local or Mail Order:Local  Ordering Provider:Dr. Araya    Would the patient rather a call back or a response via [facility]? Call Back    Best Call Back Number:[phone]      Additional Information: [facility] pharm cannot get rx  Please advise -- Thank you

## 2024-04-23 ENCOUNTER — PATIENT OUTREACH (OUTPATIENT)
Dept: DIABETES | Facility: CLINIC | Age: 79
End: 2024-04-23
Payer: MEDICARE

## 2024-04-23 NOTE — PATIENT INSTRUCTIONS
Called pt back again and left message about rescheduling appt to download Dexcom G7  for the first time.  Would also like to know how pt is doing with device; if having any issues changing out sensor.

## 2024-04-25 DIAGNOSIS — I15.2 HYPERTENSION ASSOCIATED WITH DIABETES: ICD-10-CM

## 2024-04-25 DIAGNOSIS — E11.59 HYPERTENSION ASSOCIATED WITH DIABETES: ICD-10-CM

## 2024-04-26 NOTE — TELEPHONE ENCOUNTER
Refill Routing Note   Medication(s) are not appropriate for processing by [facility] for the following reason(s):        New or recently adjusted medication    [facility] action(s):  Defer               Appointments  past 12m or future 3m with PCP    Date Provider   Last Visit   4/2/2024 Wally Cazares MD   Next Visit   8/19/2024 Wally Cazares MD   ED visits in past 90 days: 0        Note composed:1:17 PM 04/26/2024

## 2024-04-26 NOTE — TELEPHONE ENCOUNTER
No care due was identified.  [facility] Embedded Care Due Messages. Reference number: [id number].   4/26/2024 8:04:52 AM CDT

## 2024-04-29 LAB
OHS CV EVENT MONITOR DAY: 2
OHS CV HOLTER HOOKUP DATE: NORMAL
OHS CV HOLTER HOOKUP TIME: NORMAL
OHS CV HOLTER LENGTH DECIMAL HOURS: 71
OHS CV HOLTER LENGTH HOURS: 23
OHS CV HOLTER LENGTH MINUTES: 0
OHS CV HOLTER SCAN DATE: NORMAL
OHS CV HOLTER SINUS AVERAGE HR: 68 BPM
OHS CV HOLTER SINUS MAX HR: 130 BPM
OHS CV HOLTER SINUS MIN HR: 52 BPM
OHS CV HOLTER STUDY END DATE: NORMAL
OHS CV HOLTER STUDY END TIME: NORMAL

## 2024-04-29 RX ORDER — AMLODIPINE BESYLATE 5 MG/1
5 TABLET ORAL
Qty: 90 TABLET | Refills: 3 | Status: SHIPPED | OUTPATIENT
Start: 2024-04-29

## 2024-05-01 ENCOUNTER — HOSPITAL ENCOUNTER (OUTPATIENT)
Dept: CARDIOLOGY | Facility: HOSPITAL | Age: 79
Discharge: HOME | End: 2024-05-01
Attending: INTERNAL MEDICINE
Payer: MEDICARE

## 2024-05-01 ENCOUNTER — TELEPHONE (OUTPATIENT)
Dept: FAMILY MEDICINE | Facility: CLINIC | Age: 79
End: 2024-05-01
Payer: MEDICARE

## 2024-05-01 VITALS — HEIGHT: 61 IN | WEIGHT: 154 LBS | BODY MASS INDEX: 29.07 KG/M2

## 2024-05-01 DIAGNOSIS — I48.0 PAF (PAROXYSMAL ATRIAL FIBRILLATION): ICD-10-CM

## 2024-05-01 DIAGNOSIS — E11.59 HYPERTENSION ASSOCIATED WITH DIABETES: ICD-10-CM

## 2024-05-01 DIAGNOSIS — E78.5 HYPERLIPIDEMIA WITH TARGET LDL LESS THAN 70: ICD-10-CM

## 2024-05-01 DIAGNOSIS — I15.2 HYPERTENSION ASSOCIATED WITH DIABETES: ICD-10-CM

## 2024-05-01 DIAGNOSIS — I20.89 STABLE ANGINA: ICD-10-CM

## 2024-05-01 DIAGNOSIS — Z95.1 S/P CABG (CORONARY ARTERY BYPASS GRAFT): ICD-10-CM

## 2024-05-01 PROCEDURE — 93351 STRESS TTE COMPLETE: CPT | Mod: PO

## 2024-05-01 PROCEDURE — 93351 STRESS TTE COMPLETE: CPT | Mod: 26,,, | Performed by: INTERNAL MEDICINE

## 2024-05-01 NOTE — TELEPHONE ENCOUNTER
Left message on machine to call.     ----- Message from Soumya Wilkins sent at 5/1/2024  4:34 PM CDT -----  Contact: PT  Type:  Patient Returning Call    Who Called:PT   Who Left Message for Patient:N/A  Does the patient know what this is regarding?:RESULTS   Would the patient rather a call back or a response via [facility]? CALL   Best Call Back Number:[phone] (home)   Additional Information: THANK YOU

## 2024-05-01 NOTE — NURSING NOTE
Dobutamine Stress Echo procedure and medication explained; patient verbalized understanding. 24G IV started to left hand; flushed and secured. Dobutamine infusion started at 10mcg/kg/min per protocol (increased by 10mcg/kg/min every 3 minutes with a max dose of 60mcg/kg/min). Dobutamine 40mcg/kg/min infused; 0.5mg Atropine given. Patient's VS returned to baseline during recovery period. IV D/C'd, cath tip intact. Patient tolerated well; no distress noted.

## 2024-05-02 ENCOUNTER — TELEPHONE (OUTPATIENT)
Dept: CARDIOLOGY | Facility: CLINIC | Age: 79
End: 2024-05-02
Payer: MEDICARE

## 2024-05-02 ENCOUNTER — HOSPITAL ENCOUNTER (OUTPATIENT)
Facility: HOSPITAL | Age: 79
Discharge: HOME | End: 2024-05-03
Attending: EMERGENCY MEDICINE | Admitting: HOSPITALIST
Payer: MEDICARE

## 2024-05-02 ENCOUNTER — TELEPHONE (OUTPATIENT)
Dept: FAMILY MEDICINE | Facility: CLINIC | Age: 79
End: 2024-05-02
Payer: MEDICARE

## 2024-05-02 DIAGNOSIS — Z79.4 TYPE 2 DIABETES MELLITUS WITH DIABETIC POLYNEUROPATHY, WITH LONG-TERM CURRENT USE OF INSULIN: ICD-10-CM

## 2024-05-02 DIAGNOSIS — R07.9 CHEST PAIN, UNSPECIFIED TYPE: Primary | ICD-10-CM

## 2024-05-02 DIAGNOSIS — E11.42 TYPE 2 DIABETES MELLITUS WITH DIABETIC POLYNEUROPATHY, WITH LONG-TERM CURRENT USE OF INSULIN: ICD-10-CM

## 2024-05-02 DIAGNOSIS — R10.9 ABDOMINAL PAIN, UNSPECIFIED ABDOMINAL LOCATION: ICD-10-CM

## 2024-05-02 DIAGNOSIS — R10.84 GENERALIZED ABDOMINAL PAIN: ICD-10-CM

## 2024-05-02 DIAGNOSIS — I20.0 UNSTABLE ANGINA: ICD-10-CM

## 2024-05-02 DIAGNOSIS — R07.9 CHEST PAIN: ICD-10-CM

## 2024-05-02 LAB
ALBUMIN SERPL BCP-MCNC: 3.3 G/DL (ref 3.5–5.2)
ALP SERPL-CCNC: 57 U/L (ref 55–135)
ALT SERPL W/O P-5'-P-CCNC: 7 U/L (ref 10–44)
ANION GAP SERPL CALC-SCNC: 11 MMOL/L (ref 8–16)
AST SERPL-CCNC: 12 U/L (ref 10–40)
BASOPHILS # BLD AUTO: 0.04 K/UL (ref 0–0.2)
BASOPHILS NFR BLD: 0.5 % (ref 0–1.9)
BILIRUB SERPL-MCNC: 0.4 MG/DL (ref 0.1–1)
BNP SERPL-MCNC: 121 PG/ML (ref 0–99)
BUN SERPL-MCNC: 12 MG/DL (ref 8–23)
CALCIUM SERPL-MCNC: 8.7 MG/DL (ref 8.7–10.5)
CHLORIDE SERPL-SCNC: 103 MMOL/L (ref 95–110)
CO2 SERPL-SCNC: 27 MMOL/L (ref 23–29)
CREAT SERPL-MCNC: 1.2 MG/DL (ref 0.5–1.4)
DIFFERENTIAL METHOD BLD: ABNORMAL
EOSINOPHIL # BLD AUTO: 0.2 K/UL (ref 0–0.5)
EOSINOPHIL NFR BLD: 2.3 % (ref 0–8)
ERYTHROCYTE [DISTWIDTH] IN BLOOD BY AUTOMATED COUNT: 12.8 % (ref 11.5–14.5)
EST. GFR  (NO RACE VARIABLE): 47 ML/MIN/1.73 M^2
GLUCOSE SERPL-MCNC: 143 MG/DL (ref 70–110)
HCT VFR BLD AUTO: 36.2 % (ref 37–48.5)
HGB BLD-MCNC: 11.7 G/DL (ref 12–16)
IMM GRANULOCYTES # BLD AUTO: 0.01 K/UL (ref 0–0.04)
IMM GRANULOCYTES NFR BLD AUTO: 0.1 % (ref 0–0.5)
LYMPHOCYTES # BLD AUTO: 3 K/UL (ref 1–4.8)
LYMPHOCYTES NFR BLD: 41 % (ref 18–48)
MAGNESIUM SERPL-MCNC: 1.5 MG/DL (ref 1.6–2.6)
MCH RBC QN AUTO: 29.5 PG (ref 27–31)
MCHC RBC AUTO-ENTMCNC: 32.3 G/DL (ref 32–36)
MCV RBC AUTO: 91 FL (ref 82–98)
MONOCYTES # BLD AUTO: 0.5 K/UL (ref 0.3–1)
MONOCYTES NFR BLD: 7.1 % (ref 4–15)
NEUTROPHILS # BLD AUTO: 3.6 K/UL (ref 1.8–7.7)
NEUTROPHILS NFR BLD: 49 % (ref 38–73)
NRBC BLD-RTO: 0 /100 WBC
PLATELET # BLD AUTO: 167 K/UL (ref 150–450)
PMV BLD AUTO: 11.1 FL (ref 9.2–12.9)
POCT GLUCOSE: 91 MG/DL (ref 70–110)
POTASSIUM SERPL-SCNC: 3 MMOL/L (ref 3.5–5.1)
PROT SERPL-MCNC: 6.2 G/DL (ref 6–8.4)
RBC # BLD AUTO: 3.97 M/UL (ref 4–5.4)
SODIUM SERPL-SCNC: 141 MMOL/L (ref 136–145)
T4 FREE SERPL-MCNC: 1.19 NG/DL (ref 0.71–1.51)
TROPONIN I SERPL DL<=0.01 NG/ML-MCNC: 0.02 NG/ML (ref 0–0.03)
TSH SERPL DL<=0.005 MIU/L-ACNC: 0.14 UIU/ML (ref 0.4–4)
WBC # BLD AUTO: 7.31 K/UL (ref 3.9–12.7)

## 2024-05-02 PROCEDURE — 94761 N-INVAS EAR/PLS OXIMETRY MLT: CPT

## 2024-05-02 PROCEDURE — 83880 ASSAY OF NATRIURETIC PEPTIDE: CPT | Performed by: EMERGENCY MEDICINE

## 2024-05-02 PROCEDURE — G0378 HOSPITAL OBSERVATION PER HR: HCPCS

## 2024-05-02 PROCEDURE — 84439 ASSAY OF FREE THYROXINE: CPT

## 2024-05-02 PROCEDURE — 84443 ASSAY THYROID STIM HORMONE: CPT

## 2024-05-02 PROCEDURE — 84484 ASSAY OF TROPONIN QUANT: CPT | Mod: 91

## 2024-05-02 PROCEDURE — 80053 COMPREHEN METABOLIC PANEL: CPT | Performed by: EMERGENCY MEDICINE

## 2024-05-02 PROCEDURE — 83735 ASSAY OF MAGNESIUM: CPT | Performed by: EMERGENCY MEDICINE

## 2024-05-02 PROCEDURE — 99285 EMERGENCY DEPT VISIT HI MDM: CPT | Mod: 25

## 2024-05-02 PROCEDURE — 25000003 PHARM REV CODE 250

## 2024-05-02 PROCEDURE — 84484 ASSAY OF TROPONIN QUANT: CPT | Mod: 91 | Performed by: EMERGENCY MEDICINE

## 2024-05-02 PROCEDURE — 93010 ELECTROCARDIOGRAM REPORT: CPT | Mod: ,,, | Performed by: GENERAL PRACTICE

## 2024-05-02 PROCEDURE — 25000003 PHARM REV CODE 250: Performed by: EMERGENCY MEDICINE

## 2024-05-02 PROCEDURE — 36415 COLL VENOUS BLD VENIPUNCTURE: CPT | Mod: XB

## 2024-05-02 PROCEDURE — 93005 ELECTROCARDIOGRAM TRACING: CPT

## 2024-05-02 PROCEDURE — 36415 COLL VENOUS BLD VENIPUNCTURE: CPT | Performed by: EMERGENCY MEDICINE

## 2024-05-02 PROCEDURE — 99900035 HC TECH TIME PER 15 MIN (STAT)

## 2024-05-02 PROCEDURE — 85025 COMPLETE CBC W/AUTO DIFF WBC: CPT | Performed by: EMERGENCY MEDICINE

## 2024-05-02 RX ORDER — PROCHLORPERAZINE EDISYLATE 5 MG/ML
5 INJECTION INTRAMUSCULAR; INTRAVENOUS EVERY 6 HOURS PRN
Status: DISCONTINUED | OUTPATIENT
Start: 2024-05-02 | End: 2024-05-03 | Stop reason: HOSPADM

## 2024-05-02 RX ORDER — METOPROLOL TARTRATE 25 MG/1
25 TABLET, FILM COATED ORAL 2 TIMES DAILY
Status: CANCELLED | OUTPATIENT
Start: 2024-05-02

## 2024-05-02 RX ORDER — AMOXICILLIN 250 MG
1 CAPSULE ORAL 2 TIMES DAILY
Status: DISCONTINUED | OUTPATIENT
Start: 2024-05-02 | End: 2024-05-03 | Stop reason: HOSPADM

## 2024-05-02 RX ORDER — OXYCODONE HYDROCHLORIDE 5 MG/1
5 TABLET ORAL EVERY 6 HOURS PRN
Status: DISCONTINUED | OUTPATIENT
Start: 2024-05-02 | End: 2024-05-03 | Stop reason: HOSPADM

## 2024-05-02 RX ORDER — GLUCAGON 1 MG
1 KIT INJECTION
Status: DISCONTINUED | OUTPATIENT
Start: 2024-05-02 | End: 2024-05-03 | Stop reason: HOSPADM

## 2024-05-02 RX ORDER — AMLODIPINE BESYLATE 5 MG/1
5 TABLET ORAL DAILY
Status: CANCELLED | OUTPATIENT
Start: 2024-05-03

## 2024-05-02 RX ORDER — SODIUM CHLORIDE 0.9 % (FLUSH) 0.9 %
10 SYRINGE (ML) INJECTION EVERY 12 HOURS PRN
Status: DISCONTINUED | OUTPATIENT
Start: 2024-05-02 | End: 2024-05-03 | Stop reason: HOSPADM

## 2024-05-02 RX ORDER — PANTOPRAZOLE SODIUM 40 MG/1
40 TABLET, DELAYED RELEASE ORAL DAILY
Status: DISCONTINUED | OUTPATIENT
Start: 2024-05-02 | End: 2024-05-03 | Stop reason: HOSPADM

## 2024-05-02 RX ORDER — LANOLIN ALCOHOL/MO/W.PET/CERES
800 CREAM (GRAM) TOPICAL
Status: DISCONTINUED | OUTPATIENT
Start: 2024-05-02 | End: 2024-05-03 | Stop reason: HOSPADM

## 2024-05-02 RX ORDER — TALC
6 POWDER (GRAM) TOPICAL NIGHTLY PRN
Status: DISCONTINUED | OUTPATIENT
Start: 2024-05-02 | End: 2024-05-03 | Stop reason: HOSPADM

## 2024-05-02 RX ORDER — IPRATROPIUM BROMIDE AND ALBUTEROL SULFATE 2.5; .5 MG/3ML; MG/3ML
3 SOLUTION RESPIRATORY (INHALATION) EVERY 6 HOURS PRN
Status: DISCONTINUED | OUTPATIENT
Start: 2024-05-02 | End: 2024-05-03 | Stop reason: HOSPADM

## 2024-05-02 RX ORDER — ASPIRIN 325 MG
325 TABLET ORAL
Status: COMPLETED | OUTPATIENT
Start: 2024-05-02 | End: 2024-05-02

## 2024-05-02 RX ORDER — ACETAMINOPHEN 325 MG/1
650 TABLET ORAL EVERY 4 HOURS PRN
Status: DISCONTINUED | OUTPATIENT
Start: 2024-05-02 | End: 2024-05-03 | Stop reason: HOSPADM

## 2024-05-02 RX ORDER — ENOXAPARIN SODIUM 100 MG/ML
40 INJECTION SUBCUTANEOUS EVERY 24 HOURS
Status: DISCONTINUED | OUTPATIENT
Start: 2024-05-02 | End: 2024-05-02

## 2024-05-02 RX ORDER — TRAZODONE HYDROCHLORIDE 50 MG/1
100 TABLET ORAL NIGHTLY
Status: CANCELLED | OUTPATIENT
Start: 2024-05-02

## 2024-05-02 RX ORDER — NALOXONE HCL 0.4 MG/ML
0.02 VIAL (ML) INJECTION
Status: DISCONTINUED | OUTPATIENT
Start: 2024-05-02 | End: 2024-05-03 | Stop reason: HOSPADM

## 2024-05-02 RX ORDER — ATORVASTATIN CALCIUM 40 MG/1
80 TABLET, FILM COATED ORAL NIGHTLY
Status: CANCELLED | OUTPATIENT
Start: 2024-05-02

## 2024-05-02 RX ORDER — IBUPROFEN 200 MG
24 TABLET ORAL
Status: DISCONTINUED | OUTPATIENT
Start: 2024-05-02 | End: 2024-05-03 | Stop reason: HOSPADM

## 2024-05-02 RX ORDER — SIMETHICONE 80 MG
1 TABLET,CHEWABLE ORAL 4 TIMES DAILY PRN
Status: DISCONTINUED | OUTPATIENT
Start: 2024-05-02 | End: 2024-05-03 | Stop reason: HOSPADM

## 2024-05-02 RX ORDER — OXYCODONE HYDROCHLORIDE 5 MG/1
10 TABLET ORAL EVERY 6 HOURS PRN
Status: DISCONTINUED | OUTPATIENT
Start: 2024-05-02 | End: 2024-05-03 | Stop reason: HOSPADM

## 2024-05-02 RX ORDER — IBUPROFEN 200 MG
16 TABLET ORAL
Status: DISCONTINUED | OUTPATIENT
Start: 2024-05-02 | End: 2024-05-03 | Stop reason: HOSPADM

## 2024-05-02 RX ORDER — ALUMINUM HYDROXIDE, MAGNESIUM HYDROXIDE, AND SIMETHICONE 1200; 120; 1200 MG/30ML; MG/30ML; MG/30ML
30 SUSPENSION ORAL 4 TIMES DAILY PRN
Status: DISCONTINUED | OUTPATIENT
Start: 2024-05-02 | End: 2024-05-03 | Stop reason: HOSPADM

## 2024-05-02 RX ORDER — INSULIN ASPART 100 [IU]/ML
0-5 INJECTION, SOLUTION INTRAVENOUS; SUBCUTANEOUS
Status: DISCONTINUED | OUTPATIENT
Start: 2024-05-02 | End: 2024-05-03 | Stop reason: HOSPADM

## 2024-05-02 RX ORDER — ASPIRIN 81 MG/1
81 TABLET ORAL NIGHTLY
Status: CANCELLED | OUTPATIENT
Start: 2024-05-02

## 2024-05-02 RX ORDER — ONDANSETRON HYDROCHLORIDE 2 MG/ML
4 INJECTION, SOLUTION INTRAVENOUS EVERY 8 HOURS PRN
Status: DISCONTINUED | OUTPATIENT
Start: 2024-05-02 | End: 2024-05-03 | Stop reason: HOSPADM

## 2024-05-02 RX ORDER — LEVOTHYROXINE SODIUM 25 UG/1
75 TABLET ORAL
Status: CANCELLED | OUTPATIENT
Start: 2024-05-03

## 2024-05-02 RX ADMIN — POTASSIUM BICARBONATE 50 MEQ: 977.5 TABLET, EFFERVESCENT ORAL at 05:05

## 2024-05-02 RX ADMIN — PANTOPRAZOLE SODIUM 40 MG: 40 TABLET, DELAYED RELEASE ORAL at 06:05

## 2024-05-02 RX ADMIN — ASPIRIN 325 MG: 325 TABLET ORAL at 06:05

## 2024-05-02 NOTE — ASSESSMENT & PLAN NOTE
-Trend Troponin x3  -CBC, CMP daily  -Lipid panel in the AM  -Magnesium checked upon admit  -Cardiology consulted  -ASA 325mg daily  -EKG PRN chest pain   -Telemetry monitoring

## 2024-05-02 NOTE — H&P
[facility] Medicine  History & Physical    Patient Name: Adele Hall  MRN: [id number]  Patient Class: OP- Observation  Admission Date: 5/2/2024  Attending Physician: Jennifer Frost MD   Primary Care Provider: Wally Cazares MD         Patient information was obtained from patient, past medical records, and ER records.     Subjective:     Principal Problem:Chest pain    Chief Complaint:   Chief Complaint   Patient presents with    Chest Pain     X several days, had stress test yesterday, referred to ED for work up        HPI: Adele Hall is a 78 year old female with a previous medical history of CKD, DMII, COPD, HLD, HTN, and CAD with historical bypass who presents for two episodes of chest pain relieved by nitroglycerin today. Patient endorses she always has chest pain that is aching in nature and non-radiating that is 5/10. The chest pain is not associated with any other adverse symptoms such as shortness of breath, nausea, or diaphoresis. However, over the last four weeks the chest pain has intermittently worsened and is sometimes 8/10. Patient denies any current chest pain during admit evaluation. Initial troponin negative and EKG negative for any ST elevation. Patient is followed by Dr. Dyer and did have a stress test at [facility] yesterday but the result is not available. Patient to be admitted by hospital medicine for further evaluation and management.     Past Medical History:   Diagnosis Date    Anesthesia complication     took patient 6 days to come out of anethesia after CABG    Anticoagulant long-term use     Arthritis     Chronic kidney disease     CKD (chronic kidney disease) stage 3, GFR 30-59 ml/min     Dr. Montero    COPD (chronic obstructive pulmonary disease) 2/7/2016    COPD (chronic obstructive pulmonary disease)     Coronary artery disease     Diabetes mellitus     Diabetes mellitus, type 2     GERD (gastroesophageal reflux disease)      Hx of colonic polyps     Hyperlipidemia     Hypertension     possible new onset CHF 7/30/2016    Tardive dyskinesia     Thyroid disease     Ulcer     Vertigo        Past Surgical History:   Procedure Laterality Date    CARDIAC SURGERY      CATARACT EXTRACTION W/  INTRAOCULAR LENS IMPLANT Right 8/30/2023    Procedure: EXTRACTION, CATARACT, WITH IOL INSERTION;  Surgeon: Tamiko Lam MD;  Location: [facility] OR;  Service: Ophthalmology;  Laterality: Right;    CATARACT EXTRACTION W/  INTRAOCULAR LENS IMPLANT Left 9/13/2023    Procedure: EXTRACTION, CATARACT, WITH IOL INSERTION;  Surgeon: Tamiko Lam MD;  Location: [facility] OR;  Service: Ophthalmology;  Laterality: Left;    CHOLECYSTECTOMY  01/26/2017    COLONOSCOPY      COLONOSCOPY N/A 12/7/2021    Procedure: COLONOSCOPY;  Surgeon: Lito Will MD;  Location: [facility];  Service: Endoscopy;  Laterality: N/A;    CORONARY ARTERY BYPASS GRAFT  01/19/2016    4 vessel    HYSTERECTOMY      OOPHORECTOMY      TONSILLECTOMY      TOTAL THYROIDECTOMY      TUBAL LIGATION         Review of patient's allergies indicates:   Allergen Reactions    Reglan [metoclopramide hcl]      Depression and weight loss.        Current Facility-Administered Medications   Medication Dose Route Frequency Provider Last Rate Last Admin    acetaminophen tablet 650 mg  650 mg Oral Q4H PRN Yamilet Walsh, SANDI        albuterol-ipratropium 2.5 mg-0.5 mg/3 mL nebulizer solution 3 mL  3 mL Nebulization Q6H PRN Yamilet Walsh NP        aluminum-magnesium hydroxide-simethicone 200-200-20 mg/5 mL suspension 30 mL  30 mL Oral QID PRN Yamilet Walsh NP        aspirin tablet 325 mg  325 mg Oral ED 1 Time Keith Pelayo DO        dextrose 10% bolus 125 mL 125 mL  12.5 g Intravenous PRN Yamilet Walsh, NP        dextrose 10% bolus 250 mL 250 mL  25 g Intravenous PRN Yamilet Walsh, NP        enoxaparin injection 40 mg  40 mg Subcutaneous Daily Yamilet Walsh NP        glucagon (human  recombinant) injection 1 mg  1 mg Intramuscular PRN DaveymaYamilet yin, NP        glucose chewable tablet 16 g  16 g Oral PRN Yamilet Walsh, NP        glucose chewable tablet 24 g  24 g Oral PRN DaveymaYamilet yin, NP        insulin aspart U-100 pen 0-5 Units  0-5 Units Subcutaneous QID (AC + HS) PRN Yamilet Walsh, NP        insulin detemir U-100 (Levemir) pen 15 Units  15 Units Subcutaneous QHS TommaYamilet yin, NP        magnesium oxide tablet 800 mg  800 mg Oral PRN TommaseoYamilet, NP        magnesium oxide tablet 800 mg  800 mg Oral PRN DaveymaseYamilet moffett, NP        melatonin tablet 6 mg  6 mg Oral Nightly PRN Yamilet Walsh, NP        naloxone 0.4 mg/mL injection 0.02 mg  0.02 mg Intravenous PRN DaveymaseYamilet moffett, NP        ondansetron injection 4 mg  4 mg Intravenous Q8H PRN Yamilet Walsh, NP        oxyCODONE immediate release tablet 5 mg  5 mg Oral Q6H PRN TommaseYamilet moffett, NP        oxyCODONE immediate release tablet Tab 10 mg  10 mg Oral Q6H PRN Yamilet Walsh, NP        pantoprazole EC tablet 40 mg  40 mg Oral Daily Yamilet Walsh, NP        potassium bicarbonate disintegrating tablet 35 mEq  35 mEq Oral PRN Yamilet Walsh, NP        potassium bicarbonate disintegrating tablet 50 mEq  50 mEq Oral PRN Yamilet Walsh, NP        potassium bicarbonate disintegrating tablet 60 mEq  60 mEq Oral PRN Yamilet Walsh, NP        prochlorperazine injection Soln 5 mg  5 mg Intravenous Q6H PRN Yamilet Walsh, NP        senna-docusate 8.6-50 mg per tablet 1 tablet  1 tablet Oral BID Yamilet Walsh, NP        simethicone chewable tablet 80 mg  1 tablet Oral QID PRN Yamilet Walsh, NP        sodium chloride 0.9% flush 10 mL  10 mL Intravenous Q12H PRN Yamilet Walsh, NP         Current Outpatient Medications   Medication Sig Dispense Refill    amLODIPine (NORVASC) 5 MG tablet TAKE ONE TABLET BY MOUTH ONCE DAILY (Patient taking differently: Take 5 mg by mouth once daily.) 90  tablet 3    aspirin (ECOTRIN) 81 MG EC tablet Take 81 mg by mouth 3 (three) times a week.      levothyroxine (SYNTHROID) 75 MCG tablet TAKE ONE TABLET BY MOUTH EVERY MORNING BEFORE BREAKFAST (Patient taking differently: Take 75 mcg by mouth before breakfast.) 30 tablet 11    linaCLOtide (LINZESS) 72 mcg Cap capsule Take 1 capsule (72 mcg total) by mouth before breakfast. 30 capsule 1    meclizine (ANTIVERT) 25 mg tablet Take 1 tablet (25 mg total) by mouth 3 (three) times daily as needed. (Patient taking differently: Take 25 mg by mouth 3 (three) times daily as needed for Dizziness or Nausea.) 20 tablet 0    metoprolol tartrate (LOPRESSOR) 25 MG tablet Take 1 tablet (25 mg total) by mouth 2 (two) times daily. 180 tablet 3    nitroGLYCERIN (NITROSTAT) 0.4 MG SL tablet Place 1 tablet (0.4 mg total) under the tongue every 5 (five) minutes as needed for Chest pain. Seek medical help is pain is not relieved by the third dose. (Patient taking differently: Place 0.4 mg under the tongue every 5 (five) minutes as needed for Chest pain. Place 1 tablet (0.4 mg total) under the tongue every 5 (five) minutes as needed for Chest pain. Seek medical help is pain is not relieved by the third dose.) 25 tablet 5    rosuvastatin (CRESTOR) 40 MG Tab Take 1 tablet (40 mg total) by mouth every evening. 90 tablet 3    semaglutide (OZEMPIC) 1 mg/dose (4 mg/3 mL) Inject 1 mg into the skin every 7 days. (Patient taking differently: Inject 1 mg into the skin every 7 days. WEDNESDAYS) 3 mL 6    traZODone (DESYREL) 100 MG tablet Take 1 tablet (100 mg total) by mouth every evening. 90 tablet 3    TRESIBA FLEXTOUCH U-200 200 unit/mL (3 mL) insulin pen INJECT 28 UNITS INTO THE SKIN ONCE DAILY. (Patient taking differently: Inject 28 Units into the skin once daily. INJECT 28 UNITS INTO THE SKIN ONCE DAILY.) 3 pen 11    BD INSULIN SYRINGE ULTRA-FINE 1 mL 31 gauge x 5/16 Syrg USE AS DIRECTED 5 TIMES A DAY WITH LEVEMIR & NOVOLOG 200 each 10    blood  "sugar diagnostic (TRUE METRIX GLUCOSE TEST STRIP) Strp Test blood sugar 3x/day 300 strip 3    calcitRIOL (ROCALTROL) 0.25 MCG Cap Take 0.25 mcg by mouth every 7 days.      lancets 33 gauge Misc 1 lancet by Misc.(Non-Drug; Combo Route) route 4 (four) times daily.      MOUNJARO 5 mg/0.5 mL PnIj INJECT 5 MG INTO THE SKIN EVERY 7 DAYS. (Patient not taking: Reported on 2024) 4 Pen 6    omeprazole (PRILOSEC) 40 MG capsule Take 1 capsule (40 mg total) by mouth once daily. 90 capsule 3    pen needle, diabetic (BD ULTRA-FINE YUMIKO PEN NEEDLE) 32 gauge x " Ndle USE FOUR TIMES A DAY WITH INSULIN 400 each 3    vitamin D (VITAMIN D3) 1000 units Tab Take 1,000 Units by mouth once daily. (Patient not taking: Reported on 2024)       Family History       Problem Relation (Age of Onset)    Alcohol abuse Sister    Breast cancer Mother    Cancer Mother, Father    Early death Father    Heart disease Paternal Uncle, Brother    No Known Problems Son          Tobacco Use    Smoking status: Former     Current packs/day: 0.00     Average packs/day: 1 pack/day for 27.0 years (27.0 ttl pk-yrs)     Types: Cigarettes     Start date: 1989     Quit date: 2016     Years since quittin.2    Smokeless tobacco: Never   Substance and Sexual Activity    Alcohol use: No     Alcohol/week: 0.0 standard drinks of alcohol    Drug use: No    Sexual activity: Yes     Partners: Male     Review of Systems   Respiratory:  Positive for chest tightness.    Cardiovascular:  Positive for chest pain.        Two episodes of chest pain relieved by nitroglycerin. No active chest pain   All other systems reviewed and are negative.    Objective:     Vital Signs (Most Recent):  Temp: 97.8 °F (36.6 °C) (24 1448)  Pulse: 62 (24 1523)  Resp: 20 (24 1448)  BP: (!) 115/53 (24 1448)  SpO2: (!) 93 % (24 1523) Vital Signs (24h Range):  Temp:  [97.8 °F (36.6 °C)] 97.8 °F (36.6 °C)  Pulse:  [62-67] 62  Resp:  [20] 20  SpO2:  [93 " %-95 %] 93 %  BP: (115)/(53) 115/53     Weight: 69.9 kg (154 lb)  Body mass index is 30.08 kg/m².     Physical Exam  Vitals reviewed.   Constitutional:       General: She is not in acute distress.     Appearance: Normal appearance.   HENT:      Head: Normocephalic and atraumatic.      Nose: Nose normal.      Mouth/Throat:      Mouth: Mucous membranes are dry.      Pharynx: Oropharynx is clear.   Eyes:      Extraocular Movements: Extraocular movements intact.      Pupils: Pupils are equal, round, and reactive to light.   Cardiovascular:      Rate and Rhythm: Normal rate and regular rhythm.      Pulses: Normal pulses.      Heart sounds: Normal heart sounds.   Pulmonary:      Effort: Pulmonary effort is normal.      Breath sounds: Normal breath sounds.   Abdominal:      General: Bowel sounds are normal.      Palpations: Abdomen is soft.   Musculoskeletal:         General: Normal range of motion.      Cervical back: Normal range of motion and neck supple.   Skin:     General: Skin is warm and dry.      Capillary Refill: Capillary refill takes less than 2 seconds.   Neurological:      General: No focal deficit present.      Mental Status: She is alert and oriented to person, place, and time. Mental status is at baseline.   Psychiatric:         Mood and Affect: Mood normal.         Behavior: Behavior normal.         Thought Content: Thought content normal.         Judgment: Judgment normal.              CRANIAL NERVES     CN III, IV, VI   Pupils are equal, round, and reactive to light.       Significant Labs: All pertinent labs within the past 24 hours have been reviewed.  CBC:   Recent Labs   Lab 05/02/24  1538   WBC 7.31   HGB 11.7*   HCT 36.2*        CMP:   Recent Labs   Lab 05/02/24  1538      K 3.0*      CO2 27   *   BUN 12   CREATININE 1.2   CALCIUM 8.7   PROT 6.2   ALBUMIN 3.3*   BILITOT 0.4   ALKPHOS 57   AST 12   ALT 7*   ANIONGAP 11     Cardiac Markers:   Recent Labs   Lab  05/02/24  1538   *     Troponin:   Recent Labs   Lab 05/02/24  1538   TROPONINI 0.022         Significant Imaging: I have reviewed all pertinent imaging results/findings within the past 24 hours.  EXAMINATION:  XR CHEST 1 VIEW     CLINICAL HISTORY:  Chest pain, unspecified     TECHNIQUE:  Single frontal view of the chest was performed.     COMPARISON:  03/20/2024     FINDINGS:  The lungs are clear, with normal appearance of pulmonary vasculature and no pleural effusion or pneumothorax.     The cardiac silhouette is normal in size. The hilar and mediastinal contours are unremarkable.     The patient is status post sternotomy.     Impression:     No acute abnormality.        Electronically signed by:Ni Mijares MD  Date:                                            05/02/2024  Time:                                           15:56    Assessment/Plan:     * Chest pain  -Trend Troponin x3  -CBC, CMP daily  -Lipid panel in the AM  -Magnesium checked upon admit  -Cardiology consulted  -ASA 325mg daily  -EKG PRN chest pain   -Telemetry monitoring  -pharmacological DVT prophylaxis on hold pending cardiology consult and possible intervention          GERD (gastroesophageal reflux disease)  Chronic condition    -Pantoprazole daily      Hypertension  Chronic, uncontrolled. Latest blood pressure and vitals reviewed-     Temp:  [97.8 °F (36.6 °C)]   Pulse:  [62-67]   Resp:  [20]   BP: (115)/(53)   SpO2:  [93 %-95 %] .   Home meds for hypertension were reviewed and noted below.   Hypertension Medications               amLODIPine (NORVASC) 5 MG tablet TAKE ONE TABLET BY MOUTH ONCE DAILY    metoprolol tartrate (LOPRESSOR) 25 MG tablet Take 1 tablet (25 mg total) by mouth 2 (two) times daily.                 While in the hospital, will manage blood pressure as follows; Continue home antihypertensive regimen    Will utilize p.r.n. blood pressure medication only if patient's blood pressure greater than 180/110 and she  2018 develops symptoms such as worsening chest pain or shortness of breath.    Hyperlipidemia with target LDL less than 70  Chronic Condition    -Statin continued      Type 2 diabetes mellitus with diabetic polyneuropathy, with long-term current use of insulin  Patient's FSGs are controlled on current medication regimen.  Last A1c reviewed-   Lab Results   Component Value Date    HGBA1C 5.6 03/21/2024     Most recent fingerstick glucose reviewed-   Recent Labs   Lab 05/02/24  1756   POCTGLUCOSE 91     Current correctional scale  Low  Decrease anti-hyperglycemic dose as follows-   Antihyperglycemics (From admission, onward)      Start     Stop Route Frequency Ordered    05/02/24 2100  insulin detemir U-100 (Levemir) pen 15 Units         -- SubQ Nightly 05/02/24 1752 05/02/24 1848  insulin aspart U-100 pen 0-5 Units         -- SubQ Before meals & nightly PRN 05/02/24 1752          Hold Oral hypoglycemics while patient is in the hospital.   Diabetic diet      VTE Risk Mitigation (From admission, onward)           Ordered      05/02/24 1752     IP VTE HIGH RISK PATIENT  Once         05/02/24 1752     Place sequential compression device  Until discontinued         05/02/24 1752                         On 05/02/2024, patient should be placed in hospital observation services under my care in collaboration with Dr. Jennifer Frost MD.           Yamilet Walsh NP  Department of Hospital Medicine  [facility]

## 2024-05-02 NOTE — HPI
Adele Hall is a 78 year old female with a previous medical history of CKD, DMII, COPD, HLD, HTN, and CAD with historical bypass who presents for two episodes of chest pain relieved by nitroglycerin today. Patient endorses she always has chest pain that is aching in nature and non-radiating that is 5/10. The chest pain is not associated with any other adverse symptoms such as shortness of breath, nausea, or diaphoresis. However, over the last four weeks the chest pain has intermittently worsened and is sometimes 8/10. Patient denies any current chest pain during admit evaluation. Initial troponin negative and EKG negative for any ST elevation. Patient is followed by Dr. Dyer and did have a stress test at [facility] yesterday but the result is not available. Patient to be admitted by hospital medicine for further evaluation and management.

## 2024-05-02 NOTE — PHARMACY MED REC
"Admission Medication History     The home medication history was taken by Brenda Philippe.    You may go to "Admission" then "Reconcile Home Medications" tabs to review and/or act upon these items.     The home medication list has been updated by the Pharmacy department.   Please read ALL comments highlighted in yellow.   Please address this information as you see fit.    Feel free to contact us if you have any questions or require assistance.        Medications listed below were obtained from: Patient/family and Analytic software- [id number]  No current facility-administered medications on file prior to encounter.     Current Outpatient Medications on File Prior to Encounter   Medication Sig Dispense Refill    amLODIPine (NORVASC) 5 MG tablet TAKE ONE TABLET BY MOUTH ONCE DAILY (Patient taking differently: Take 5 mg by mouth once daily.) 90 tablet 3    aspirin (ECOTRIN) 81 MG EC tablet Take 81 mg by mouth 3 (three) times a week.      levothyroxine (SYNTHROID) 75 MCG tablet TAKE ONE TABLET BY MOUTH EVERY MORNING BEFORE BREAKFAST (Patient taking differently: Take 75 mcg by mouth before breakfast.) 30 tablet 11    linaCLOtide (LINZESS) 72 mcg Cap capsule Take 1 capsule (72 mcg total) by mouth before breakfast. 30 capsule 1    meclizine (ANTIVERT) 25 mg tablet Take 1 tablet (25 mg total) by mouth 3 (three) times daily as needed. (Patient taking differently: Take 25 mg by mouth 3 (three) times daily as needed for Dizziness or Nausea.) 20 tablet 0    metoprolol tartrate (LOPRESSOR) 25 MG tablet Take 1 tablet (25 mg total) by mouth 2 (two) times daily. 180 tablet 3    nitroGLYCERIN (NITROSTAT) 0.4 MG SL tablet Place 1 tablet (0.4 mg total) under the tongue every 5 (five) minutes as needed for Chest pain. Seek medical help is pain is not relieved by the third dose. (Patient taking differently: Place 0.4 mg under the tongue every 5 (five) minutes as needed for Chest pain. Place 1 tablet (0.4 mg total) under the tongue every 5 " "(five) minutes as needed for Chest pain. Seek medical help is pain is not relieved by the third dose.) 25 tablet 5    rosuvastatin (CRESTOR) 40 MG Tab Take 1 tablet (40 mg total) by mouth every evening. 90 tablet 3    semaglutide (OZEMPIC) 1 mg/dose (4 mg/3 mL) Inject 1 mg into the skin every 7 days. (Patient taking differently: Inject 1 mg into the skin every 7 days. WEDNESDAYS) 3 mL 6    traZODone (DESYREL) 100 MG tablet Take 1 tablet (100 mg total) by mouth every evening. 90 tablet 3    TRESIBA FLEXTOUCH U-200 200 unit/mL (3 mL) insulin pen INJECT 28 UNITS INTO THE SKIN ONCE DAILY. (Patient taking differently: Inject 28 Units into the skin once daily. INJECT 28 UNITS INTO THE SKIN ONCE DAILY.) 3 pen 11    BD INSULIN SYRINGE ULTRA-FINE 1 mL 31 gauge x 5/16 Syrg USE AS DIRECTED 5 TIMES A DAY WITH LEVEMIR & NOVOLOG 200 each 10    blood sugar diagnostic (TRUE METRIX GLUCOSE TEST STRIP) Strp Test blood sugar 3x/day 300 strip 3    calcitRIOL (ROCALTROL) 0.25 MCG Cap Take 0.25 mcg by mouth every 7 days.      lancets 33 gauge Misc 1 lancet by Misc.(Non-Drug; Combo Route) route 4 (four) times daily.      MOUNJARO 5 mg/0.5 mL PnIj INJECT 5 MG INTO THE SKIN EVERY 7 DAYS. (Patient not taking: Reported on 5/2/2024) 4 Pen 6    omeprazole (PRILOSEC) 40 MG capsule Take 1 capsule (40 mg total) by mouth once daily. 90 capsule 3    pen needle, diabetic (BD ULTRA-FINE YUMIKO PEN NEEDLE) 32 gauge x 5/32" Ndle USE FOUR TIMES A DAY WITH INSULIN 400 each 3    vitamin D (VITAMIN D3) 1000 units Tab Take 1,000 Units by mouth once daily. (Patient not taking: Reported on 5/2/2024)      [DISCONTINUED] aspirin-sod bicarb-citric acid (ERICKA-SELTZER) 324 mg TbEF Take 325 mg by mouth every 6 (six) hours as needed.      [DISCONTINUED] dicyclomine (BENTYL) 10 MG capsule   3       Potential issues to be addressed PRIOR TO DISCHARGE  Patient reported not taking the following medications: (Vitamin D & Mounjaro). These medications remain on the home " medication list. Please address accordingly.     Brenda Philippe  EXT [phone]                  .

## 2024-05-02 NOTE — TELEPHONE ENCOUNTER
Spoke with patient    Results from Cardiac monitor given   Verbalizes understanding.   She states she is not feeling well.  She is going to the ED      ----- Message from Anatoly Villarreal sent at 5/2/2024 12:21 PM CDT -----  Contact: PT  Type:  Patient Returning Call     Who Called:PT   Who Left Message for Patient: Yakelin   Does the patient know what this is regarding?: ues   Would the patient rather a call back or a response via [facility]? CALL   Best Call Back Number:[phone] (home)   Additional Information: THANK YOU

## 2024-05-02 NOTE — SUBJECTIVE & OBJECTIVE
Past Medical History:   Diagnosis Date    Anesthesia complication     took patient 6 days to come out of anethesia after CABG    Anticoagulant long-term use     Arthritis     Chronic kidney disease     CKD (chronic kidney disease) stage 3, GFR 30-59 ml/min     Dr. Montero    COPD (chronic obstructive pulmonary disease) 2/7/2016    COPD (chronic obstructive pulmonary disease)     Coronary artery disease     Diabetes mellitus     Diabetes mellitus, type 2     GERD (gastroesophageal reflux disease)     Hx of colonic polyps     Hyperlipidemia     Hypertension     possible new onset CHF 7/30/2016    Tardive dyskinesia     Thyroid disease     Ulcer     Vertigo        Past Surgical History:   Procedure Laterality Date    CARDIAC SURGERY      CATARACT EXTRACTION W/  INTRAOCULAR LENS IMPLANT Right 8/30/2023    Procedure: EXTRACTION, CATARACT, WITH IOL INSERTION;  Surgeon: Tamiko Lam MD;  Location: [facility] OR;  Service: Ophthalmology;  Laterality: Right;    CATARACT EXTRACTION W/  INTRAOCULAR LENS IMPLANT Left 9/13/2023    Procedure: EXTRACTION, CATARACT, WITH IOL INSERTION;  Surgeon: Tamiko Lam MD;  Location: [facility] OR;  Service: Ophthalmology;  Laterality: Left;    CHOLECYSTECTOMY  01/26/2017    COLONOSCOPY      COLONOSCOPY N/A 12/7/2021    Procedure: COLONOSCOPY;  Surgeon: Lito Will MD;  Location: [facility] ENDO;  Service: Endoscopy;  Laterality: N/A;    CORONARY ARTERY BYPASS GRAFT  01/19/2016    4 vessel    HYSTERECTOMY      OOPHORECTOMY      TONSILLECTOMY      TOTAL THYROIDECTOMY      TUBAL LIGATION         Review of patient's allergies indicates:   Allergen Reactions    Reglan [metoclopramide hcl]      Depression and weight loss.        Current Facility-Administered Medications   Medication Dose Route Frequency Provider Last Rate Last Admin    acetaminophen tablet 650 mg  650 mg Oral Q4H PRN Yamilet Walsh, NP        albuterol-ipratropium 2.5 mg-0.5 mg/3 mL nebulizer solution 3 mL  3 mL Nebulization  Q6H PRN TommaYamilet yin, NP        aluminum-magnesium hydroxide-simethicone 200-200-20 mg/5 mL suspension 30 mL  30 mL Oral QID PRN Yamilet Walsh NP        aspirin tablet 325 mg  325 mg Oral ED 1 Time DorindaKeith torresDO        dextrose 10% bolus 125 mL 125 mL  12.5 g Intravenous PRN DaveymaseYamilet moffett, NP        dextrose 10% bolus 250 mL 250 mL  25 g Intravenous PRN Yamilet Walsh, NP        enoxaparin injection 40 mg  40 mg Subcutaneous Daily TommaseoYamilet, NP        glucagon (human recombinant) injection 1 mg  1 mg Intramuscular PRN DaveymaseYamilet moffett, NP        glucose chewable tablet 16 g  16 g Oral PRN DaveymaseYamilet moffett, NP        glucose chewable tablet 24 g  24 g Oral PRN DaveymaseoYamilet, NP        insulin aspart U-100 pen 0-5 Units  0-5 Units Subcutaneous QID (AC + HS) PRN Yamilet Walsh NP        insulin detemir U-100 (Levemir) pen 15 Units  15 Units Subcutaneous QHS TommaseYamilet moffett, NP        magnesium oxide tablet 800 mg  800 mg Oral PRN DaveymaseYamilet moffett, NP        magnesium oxide tablet 800 mg  800 mg Oral PRN DaveymaseYamilet moffett, NP        melatonin tablet 6 mg  6 mg Oral Nightly PRN DaveymaseYamilet moffett, NP        naloxone 0.4 mg/mL injection 0.02 mg  0.02 mg Intravenous PRN DaveymaseYamilet moffett NP        ondansetron injection 4 mg  4 mg Intravenous Q8H PRN DaveymaseYamilet moffett, NP        oxyCODONE immediate release tablet 5 mg  5 mg Oral Q6H PRN TommaseoYamilet, NP        oxyCODONE immediate release tablet Tab 10 mg  10 mg Oral Q6H PRN DaveymaseYamilet moffett, NP        pantoprazole EC tablet 40 mg  40 mg Oral Daily DaveymaseYamilet moffett, NP        potassium bicarbonate disintegrating tablet 35 mEq  35 mEq Oral PRN DaveymaseYamilet moffett, NP        potassium bicarbonate disintegrating tablet 50 mEq  50 mEq Oral PRN Daveymaseo, Yamilet N, NP        potassium bicarbonate disintegrating tablet 60 mEq  60 mEq Oral PRN Yamilet Walsh N, NP        prochlorperazine injection Soln 5 mg  5 mg Intravenous Q6H PRN  Yamilet Walsh, NP        senna-docusate 8.6-50 mg per tablet 1 tablet  1 tablet Oral BID Yamilet Walsh, SANDI        simethicone chewable tablet 80 mg  1 tablet Oral QID PRN Yamilet Walsh NP        sodium chloride 0.9% flush 10 mL  10 mL Intravenous Q12H PRN Yamilet Walsh, NP         Current Outpatient Medications   Medication Sig Dispense Refill    amLODIPine (NORVASC) 5 MG tablet TAKE ONE TABLET BY MOUTH ONCE DAILY (Patient taking differently: Take 5 mg by mouth once daily.) 90 tablet 3    aspirin (ECOTRIN) 81 MG EC tablet Take 81 mg by mouth 3 (three) times a week.      levothyroxine (SYNTHROID) 75 MCG tablet TAKE ONE TABLET BY MOUTH EVERY MORNING BEFORE BREAKFAST (Patient taking differently: Take 75 mcg by mouth before breakfast.) 30 tablet 11    linaCLOtide (LINZESS) 72 mcg Cap capsule Take 1 capsule (72 mcg total) by mouth before breakfast. 30 capsule 1    meclizine (ANTIVERT) 25 mg tablet Take 1 tablet (25 mg total) by mouth 3 (three) times daily as needed. (Patient taking differently: Take 25 mg by mouth 3 (three) times daily as needed for Dizziness or Nausea.) 20 tablet 0    metoprolol tartrate (LOPRESSOR) 25 MG tablet Take 1 tablet (25 mg total) by mouth 2 (two) times daily. 180 tablet 3    nitroGLYCERIN (NITROSTAT) 0.4 MG SL tablet Place 1 tablet (0.4 mg total) under the tongue every 5 (five) minutes as needed for Chest pain. Seek medical help is pain is not relieved by the third dose. (Patient taking differently: Place 0.4 mg under the tongue every 5 (five) minutes as needed for Chest pain. Place 1 tablet (0.4 mg total) under the tongue every 5 (five) minutes as needed for Chest pain. Seek medical help is pain is not relieved by the third dose.) 25 tablet 5    rosuvastatin (CRESTOR) 40 MG Tab Take 1 tablet (40 mg total) by mouth every evening. 90 tablet 3    semaglutide (OZEMPIC) 1 mg/dose (4 mg/3 mL) Inject 1 mg into the skin every 7 days. (Patient taking differently: Inject 1 mg into the  "skin every 7 days. ) 3 mL 6    traZODone (DESYREL) 100 MG tablet Take 1 tablet (100 mg total) by mouth every evening. 90 tablet 3    TRESIBA FLEXTOUCH U-200 200 unit/mL (3 mL) insulin pen INJECT 28 UNITS INTO THE SKIN ONCE DAILY. (Patient taking differently: Inject 28 Units into the skin once daily. INJECT 28 UNITS INTO THE SKIN ONCE DAILY.) 3 pen 11    BD INSULIN SYRINGE ULTRA-FINE 1 mL 31 gauge x 5/16 Syrg USE AS DIRECTED 5 TIMES A DAY WITH LEVEMIR & NOVOLOG 200 each 10    blood sugar diagnostic (TRUE METRIX GLUCOSE TEST STRIP) Strp Test blood sugar 3x/day 300 strip 3    calcitRIOL (ROCALTROL) 0.25 MCG Cap Take 0.25 mcg by mouth every 7 days.      lancets 33 gauge Misc 1 lancet by Misc.(Non-Drug; Combo Route) route 4 (four) times daily.      MOUNJARO 5 mg/0.5 mL PnIj INJECT 5 MG INTO THE SKIN EVERY 7 DAYS. (Patient not taking: Reported on 2024) 4 Pen 6    omeprazole (PRILOSEC) 40 MG capsule Take 1 capsule (40 mg total) by mouth once daily. 90 capsule 3    pen needle, diabetic (BD ULTRA-FINE YUMIKO PEN NEEDLE) 32 gauge x 5/32" Ndle USE FOUR TIMES A DAY WITH INSULIN 400 each 3    vitamin D (VITAMIN D3) 1000 units Tab Take 1,000 Units by mouth once daily. (Patient not taking: Reported on 2024)       Family History       Problem Relation (Age of Onset)    Alcohol abuse Sister    Breast cancer Mother    Cancer Mother, Father    Early death Father    Heart disease Paternal Uncle, Brother    No Known Problems Son          Tobacco Use    Smoking status: Former     Current packs/day: 0.00     Average packs/day: 1 pack/day for 27.0 years (27.0 ttl pk-yrs)     Types: Cigarettes     Start date: 1989     Quit date: 2016     Years since quittin.2    Smokeless tobacco: Never   Substance and Sexual Activity    Alcohol use: No     Alcohol/week: 0.0 standard drinks of alcohol    Drug use: No    Sexual activity: Yes     Partners: Male     Review of Systems   Respiratory:  Positive for chest tightness.  "   Cardiovascular:  Positive for chest pain.        Two episodes of chest pain relieved by nitroglycerin. No active chest pain   All other systems reviewed and are negative.    Objective:     Vital Signs (Most Recent):  Temp: 97.8 °F (36.6 °C) (05/02/24 1448)  Pulse: 62 (05/02/24 1523)  Resp: 20 (05/02/24 1448)  BP: (!) 115/53 (05/02/24 1448)  SpO2: (!) 93 % (05/02/24 1523) Vital Signs (24h Range):  Temp:  [97.8 °F (36.6 °C)] 97.8 °F (36.6 °C)  Pulse:  [62-67] 62  Resp:  [20] 20  SpO2:  [93 %-95 %] 93 %  BP: (115)/(53) 115/53     Weight: 69.9 kg (154 lb)  Body mass index is 30.08 kg/m².     Physical Exam  Vitals reviewed.   Constitutional:       General: She is not in acute distress.     Appearance: Normal appearance.   HENT:      Head: Normocephalic and atraumatic.      Nose: Nose normal.      Mouth/Throat:      Mouth: Mucous membranes are dry.      Pharynx: Oropharynx is clear.   Eyes:      Extraocular Movements: Extraocular movements intact.      Pupils: Pupils are equal, round, and reactive to light.   Cardiovascular:      Rate and Rhythm: Normal rate and regular rhythm.      Pulses: Normal pulses.      Heart sounds: Normal heart sounds.   Pulmonary:      Effort: Pulmonary effort is normal.      Breath sounds: Normal breath sounds.   Abdominal:      General: Bowel sounds are normal.      Palpations: Abdomen is soft.   Musculoskeletal:         General: Normal range of motion.      Cervical back: Normal range of motion and neck supple.   Skin:     General: Skin is warm and dry.      Capillary Refill: Capillary refill takes less than 2 seconds.   Neurological:      General: No focal deficit present.      Mental Status: She is alert and oriented to person, place, and time. Mental status is at baseline.   Psychiatric:         Mood and Affect: Mood normal.         Behavior: Behavior normal.         Thought Content: Thought content normal.         Judgment: Judgment normal.              CRANIAL NERVES     CN III, IV, VI    Pupils are equal, round, and reactive to light.       Significant Labs: All pertinent labs within the past 24 hours have been reviewed.  CBC:   Recent Labs   Lab 05/02/24  1538   WBC 7.31   HGB 11.7*   HCT 36.2*        CMP:   Recent Labs   Lab 05/02/24  1538      K 3.0*      CO2 27   *   BUN 12   CREATININE 1.2   CALCIUM 8.7   PROT 6.2   ALBUMIN 3.3*   BILITOT 0.4   ALKPHOS 57   AST 12   ALT 7*   ANIONGAP 11     Cardiac Markers:   Recent Labs   Lab 05/02/24  1538   *     Troponin:   Recent Labs   Lab 05/02/24  1538   TROPONINI 0.022         Significant Imaging: I have reviewed all pertinent imaging results/findings within the past 24 hours.  EXAMINATION:  XR CHEST 1 VIEW     CLINICAL HISTORY:  Chest pain, unspecified     TECHNIQUE:  Single frontal view of the chest was performed.     COMPARISON:  03/20/2024     FINDINGS:  The lungs are clear, with normal appearance of pulmonary vasculature and no pleural effusion or pneumothorax.     The cardiac silhouette is normal in size. The hilar and mediastinal contours are unremarkable.     The patient is status post sternotomy.     Impression:     No acute abnormality.        Electronically signed by:Ni Mijares MD  Date:                                            05/02/2024  Time:                                           15:56

## 2024-05-02 NOTE — TELEPHONE ENCOUNTER
Patient called and stated she has been experiencing chest pain she has taken NTG x2 with not much relief. Dr. Kapil Joy made aware advised to send patient to ED. Informed patient and she stated she would got to [facility] ED.

## 2024-05-02 NOTE — ASSESSMENT & PLAN NOTE
Chronic, uncontrolled. Latest blood pressure and vitals reviewed-     Temp:  [97.8 °F (36.6 °C)]   Pulse:  [62-67]   Resp:  [20]   BP: (115)/(53)   SpO2:  [93 %-95 %] .   Home meds for hypertension were reviewed and noted below.   Hypertension Medications               amLODIPine (NORVASC) 5 MG tablet TAKE ONE TABLET BY MOUTH ONCE DAILY    metoprolol tartrate (LOPRESSOR) 25 MG tablet Take 1 tablet (25 mg total) by mouth 2 (two) times daily.                 While in the hospital, will manage blood pressure as follows; Continue home antihypertensive regimen    Will utilize p.r.n. blood pressure medication only if patient's blood pressure greater than 180/110 and she develops symptoms such as worsening chest pain or shortness of breath.

## 2024-05-02 NOTE — ASSESSMENT & PLAN NOTE
Patient's FSGs are controlled on current medication regimen.  Last A1c reviewed-   Lab Results   Component Value Date    HGBA1C 5.6 03/21/2024     Most recent fingerstick glucose reviewed-   Recent Labs   Lab 05/02/24  1756   POCTGLUCOSE 91     Current correctional scale  Low  Decrease anti-hyperglycemic dose as follows-   Antihyperglycemics (From admission, onward)      Start     Stop Route Frequency Ordered    05/02/24 2100  insulin detemir U-100 (Levemir) pen 15 Units         -- SubQ Nightly 05/02/24 1752    05/02/24 1848  insulin aspart U-100 pen 0-5 Units         -- SubQ Before meals & nightly PRN 05/02/24 1752          Hold Oral hypoglycemics while patient is in the hospital.   Diabetic diet

## 2024-05-02 NOTE — ED PROVIDER NOTES
Encounter Date: 5/2/2024       History     Chief Complaint   Patient presents with    Chest Pain     X several days, had stress test yesterday, referred to ED for work up     78-year-old female past medical history of CKD, COPD, coronary artery disease status post bypass, diabetes, hypertension, hyperlipidemia, presents emergency department with chest pain.  Patient says that she always has chest pain.  She says it is normally around a 4 to 5/10 at her baseline.  Over the last couple of weeks has been persistent for the last 3 4 weeks with 5/10 pain.  Says sometimes it will become worse and get higher to like 8 or 9/10.  Describes it as aching in her chest nonradiating.  Not associated with any vomiting or any diaphoresis.  No associated shortness of breath.  She says that she takes a baby aspirin daily.  Her cardiologist is Dr. Dyer at [facility].  She reportedly had a stress test yesterday but does not know the results of the stress test.  She told him that she was having chest pain today and she was having it as well during the stress test and he told her to come to the ER.  She is taken 2 nitro today which the 2nd 1 did help with her chest pain.  Currently says pain is like 2 to 3/10.      Review of patient's allergies indicates:   Allergen Reactions    Reglan [metoclopramide hcl]      Depression and weight loss.      Past Medical History:   Diagnosis Date    Anesthesia complication     took patient 6 days to come out of anethesia after CABG    Anticoagulant long-term use     Arthritis     Chronic kidney disease     CKD (chronic kidney disease) stage 3, GFR 30-59 ml/min     Dr. Montero    COPD (chronic obstructive pulmonary disease) 2/7/2016    COPD (chronic obstructive pulmonary disease)     Coronary artery disease     Diabetes mellitus     Diabetes mellitus, type 2     GERD (gastroesophageal reflux disease)     Hx of colonic polyps     Hyperlipidemia     Hypertension     possible new  onset CHF 2016    Tardive dyskinesia     Thyroid disease     Ulcer     Vertigo      Past Surgical History:   Procedure Laterality Date    CARDIAC SURGERY      CATARACT EXTRACTION W/  INTRAOCULAR LENS IMPLANT Right 2023    Procedure: EXTRACTION, CATARACT, WITH IOL INSERTION;  Surgeon: Tamiko Lam MD;  Location: [facility] OR;  Service: Ophthalmology;  Laterality: Right;    CATARACT EXTRACTION W/  INTRAOCULAR LENS IMPLANT Left 2023    Procedure: EXTRACTION, CATARACT, WITH IOL INSERTION;  Surgeon: Tamiko Lam MD;  Location: [facility] OR;  Service: Ophthalmology;  Laterality: Left;    CHOLECYSTECTOMY  2017    COLONOSCOPY      COLONOSCOPY N/A 2021    Procedure: COLONOSCOPY;  Surgeon: Lito Will MD;  Location: [facility];  Service: Endoscopy;  Laterality: N/A;    CORONARY ARTERY BYPASS GRAFT  2016    4 vessel    HYSTERECTOMY      OOPHORECTOMY      TONSILLECTOMY      TOTAL THYROIDECTOMY      TUBAL LIGATION       Family History   Problem Relation Name Age of Onset    Cancer Mother          LYMPHOMA    Breast cancer Mother      Cancer Father      Early death Father      Heart disease Paternal Uncle      Alcohol abuse Sister          x2    Heart disease Brother      No Known Problems Son 3     Glaucoma Neg Hx      Macular degeneration Neg Hx       Social History     Tobacco Use    Smoking status: Former     Current packs/day: 0.00     Average packs/day: 1 pack/day for 27.0 years (27.0 ttl pk-yrs)     Types: Cigarettes     Start date: 1989     Quit date: 2016     Years since quittin.2    Smokeless tobacco: Never   Substance Use Topics    Alcohol use: No     Alcohol/week: 0.0 standard drinks of alcohol    Drug use: No     Review of Systems   Constitutional:  Negative for chills and fever.   HENT:  Negative for sore throat.    Respiratory:  Negative for shortness of breath.    Cardiovascular:  Positive for chest pain. Negative for palpitations.   Gastrointestinal:  Negative  for nausea and vomiting.   Genitourinary:  Negative for dysuria.   Musculoskeletal:  Negative for back pain.   Skin:  Negative for rash.   Neurological:  Negative for weakness and headaches.   Hematological:  Does not bruise/bleed easily.   All other systems reviewed and are negative.      Physical Exam     Initial Vitals [05/02/24 1448]   BP Pulse Resp Temp SpO2   (!) 115/53 67 20 97.8 °F (36.6 °C) 95 %      MAP       --         Physical Exam    Nursing note and vitals reviewed.  Constitutional: She appears well-developed and well-nourished.   HENT:   Head: Normocephalic and atraumatic.   Mouth/Throat: No oropharyngeal exudate.   Eyes: Conjunctivae and EOM are normal. Pupils are equal, round, and reactive to light.   Neck: Neck supple. No tracheal deviation present.   Normal range of motion.  Cardiovascular:  Normal rate, regular rhythm, normal heart sounds and intact distal pulses.           No murmur heard.  Pulmonary/Chest: Breath sounds normal. No stridor. No respiratory distress. She has no wheezes. She has no rhonchi. She has no rales.   Abdominal: Abdomen is soft. She exhibits no distension. There is no abdominal tenderness. There is no rebound.   Musculoskeletal:         General: No tenderness or edema. Normal range of motion.      Cervical back: Normal range of motion and neck supple.     Neurological: She is alert and oriented to person, place, and time. She has normal strength. No cranial nerve deficit or sensory deficit.   Skin: Skin is warm and dry. Capillary refill takes less than 2 seconds. No rash noted. No erythema. No pallor.   Psychiatric: She has a normal mood and affect. Thought content normal.         ED Course   Procedures  Labs Reviewed   CBC W/ AUTO DIFFERENTIAL - Abnormal; Notable for the following components:       Result Value    RBC 3.97 (*)     Hemoglobin 11.7 (*)     Hematocrit 36.2 (*)     All other components within normal limits   COMPREHENSIVE METABOLIC PANEL - Abnormal; Notable  for the following components:    Potassium 3.0 (*)     Glucose 143 (*)     Albumin 3.3 (*)     ALT 7 (*)     eGFR 47 (*)     All other components within normal limits   B-TYPE NATRIURETIC PEPTIDE - Abnormal; Notable for the following components:     (*)     All other components within normal limits   TROPONIN I   TROPONIN I   MAGNESIUM   TROPONIN I   TSH   POCT GLUCOSE          Imaging Results              X-Ray Chest 1 View (Final result)  Result time 05/02/24 15:56:35      Final result by Ni Mijares MD (05/02/24 15:56:35)                   Impression:      No acute abnormality.      Electronically signed by: Ni Mijares MD  Date:    05/02/2024  Time:    15:56               Narrative:    EXAMINATION:  XR CHEST 1 VIEW    CLINICAL HISTORY:  Chest pain, unspecified    TECHNIQUE:  Single frontal view of the chest was performed.    COMPARISON:  03/20/2024    FINDINGS:  The lungs are clear, with normal appearance of pulmonary vasculature and no pleural effusion or pneumothorax.    The cardiac silhouette is normal in size. The hilar and mediastinal contours are unremarkable.    The patient is status post sternotomy.                                       Medications   sodium chloride 0.9% flush 10 mL (has no administration in time range)   naloxone 0.4 mg/mL injection 0.02 mg (has no administration in time range)   glucose chewable tablet 16 g (has no administration in time range)   glucose chewable tablet 24 g (has no administration in time range)   glucagon (human recombinant) injection 1 mg (has no administration in time range)   potassium bicarbonate disintegrating tablet 50 mEq (has no administration in time range)   potassium bicarbonate disintegrating tablet 35 mEq (has no administration in time range)   potassium bicarbonate disintegrating tablet 60 mEq (has no administration in time range)   magnesium oxide tablet 800 mg (has no administration in time range)   magnesium oxide tablet 800 mg (has  no administration in time range)   acetaminophen tablet 650 mg (has no administration in time range)   oxyCODONE immediate release tablet 5 mg (has no administration in time range)   oxyCODONE immediate release tablet Tab 10 mg (has no administration in time range)   senna-docusate 8.6-50 mg per tablet 1 tablet (has no administration in time range)   ondansetron injection 4 mg (has no administration in time range)   prochlorperazine injection Soln 5 mg (has no administration in time range)   insulin aspart U-100 pen 0-5 Units ( Subcutaneous Not Given 5/2/24 1819)   insulin detemir U-100 (Levemir) pen 15 Units (has no administration in time range)   albuterol-ipratropium 2.5 mg-0.5 mg/3 mL nebulizer solution 3 mL (has no administration in time range)   melatonin tablet 6 mg (has no administration in time range)   simethicone chewable tablet 80 mg (has no administration in time range)   aluminum-magnesium hydroxide-simethicone 200-200-20 mg/5 mL suspension 30 mL (has no administration in time range)   pantoprazole EC tablet 40 mg (40 mg Oral Given 5/2/24 1853)   dextrose 10% bolus 125 mL 125 mL (has no administration in time range)   dextrose 10% bolus 250 mL 250 mL (has no administration in time range)   potassium bicarbonate disintegrating tablet 50 mEq (50 mEq Oral Given 5/2/24 1731)   aspirin tablet 325 mg (325 mg Oral Given 5/2/24 1853)     Medical Decision Making  Differential includes but not limited to chest wall pain, acute coronary syndrome, PE, pericarditis, myocarditis, pericardial tamponade, aortic dissection, pneumonia, pneumothorax, Boerhaave syndrome, anemia, electrolyte abnormalities, herpes zoster, pancreatitis, anxiety, chest wall strain, costochondritis    Emergent evaluation of a 78-year-old female presents emergency department chest pain as mentioned above.  Patient emergency department with chest pain EKG which is abnormal but unchanged when compared to prior EKG.  Currently is chest pain-free.   First troponin negative.  Patient has high risk heart score.  I discussed case with Cardiology who is comfortable with keeping the patient here in trending troponins.  Patient will be admitted for further care of evaluation chest pain.          Amount and/or Complexity of Data Reviewed  Labs: ordered. Decision-making details documented in ED Course.  Radiology: ordered and independent interpretation performed. Decision-making details documented in ED Course.    Risk  OTC drugs.  Prescription drug management.      Additional MDM:   Heart Score:    History:          Moderately suspicious.  ECG:             Significant ST depression  Age:               >65 years  Risk factors: >= 3 risk factors or history of atherosclerotic disease  Troponin:       Less than or equal to normal limit  Heart Score = 7                ED Course as of 05/02/24 1858   u May 02, 2024   1513 EKG 3:09 p.m. normal sinus rhythm rate of 60, T-waves are inverted 1 aVL and V1 through V6.  There is ST depression noted in V2 through V 4.  No STEMI.  EKG interpreted independently by me.  It is not significantly changed when compared to prior EKG done March 21, 2024 [JR]   1737 I discussed the case with Dr. Meneses who stated to admit the patient here trend troponins.  He does not think that it sounds cardiac.  Patient should be okay here.  If troponins rise significantly then can be transferred over to Main campus [JR]   1748 I discussed the case with Yamilet from Hospital Medicine who will admit the patient [JR]      ED Course User Index  [JR] Keith Pelayo DO                           Clinical Impression:  Final diagnoses:  [R07.9] Chest pain (Primary)  [I20.0] Unstable angina          ED Disposition Condition    Observation                 Keith Pelayo DO  05/02/24 4693

## 2024-05-03 VITALS
HEIGHT: 62 IN | OXYGEN SATURATION: 98 % | TEMPERATURE: 97 F | BODY MASS INDEX: 28.8 KG/M2 | DIASTOLIC BLOOD PRESSURE: 62 MMHG | HEART RATE: 79 BPM | SYSTOLIC BLOOD PRESSURE: 129 MMHG | RESPIRATION RATE: 16 BRPM | WEIGHT: 156.5 LBS

## 2024-05-03 LAB
ANION GAP SERPL CALC-SCNC: 10 MMOL/L (ref 8–16)
ASCENDING AORTA: 2.59 CM
AV INDEX (PROSTH): 1.05
AV MEAN GRADIENT: 4 MMHG
AV PEAK GRADIENT: 8 MMHG
AV VALVE AREA BY VELOCITY RATIO: 2.59 CM²
AV VALVE AREA: 2.82 CM²
AV VELOCITY RATIO: 0.96
BASOPHILS # BLD AUTO: 0.04 K/UL (ref 0–0.2)
BASOPHILS NFR BLD: 0.5 % (ref 0–1.9)
BSA FOR ECHO PROCEDURE: 1.73 M2
BUN SERPL-MCNC: 11 MG/DL (ref 8–23)
CALCIUM SERPL-MCNC: 9.5 MG/DL (ref 8.7–10.5)
CHLORIDE SERPL-SCNC: 103 MMOL/L (ref 95–110)
CHOLEST SERPL-MCNC: 118 MG/DL (ref 120–199)
CHOLEST/HDLC SERPL: 4.1 {RATIO} (ref 2–5)
CO2 SERPL-SCNC: 29 MMOL/L (ref 23–29)
CREAT SERPL-MCNC: 1.2 MG/DL (ref 0.5–1.4)
CV ECHO LV RWT: 0.6 CM
CV STRESS BASE HR: 56 BPM
CV STRESS BASE HR: 66 BPM
DIASTOLIC BLOOD PRESSURE: 54 MMHG
DIASTOLIC BLOOD PRESSURE: 78 MMHG
DIFFERENTIAL METHOD BLD: NORMAL
DOP CALC AO PEAK VEL: 1.37 M/S
DOP CALC AO VTI: 29.2 CM
DOP CALC LVOT AREA: 2.7 CM2
DOP CALC LVOT DIAMETER: 1.85 CM
DOP CALC LVOT PEAK VEL: 1.32 M/S
DOP CALC LVOT STROKE VOLUME: 82.48 CM3
DOP CALCLVOT PEAK VEL VTI: 30.7 CM
E WAVE DECELERATION TIME: 179.68 MSEC
E/A RATIO: 1.19
E/E' RATIO: 12.27 M/S
ECHO LV POSTERIOR WALL: 1.07 CM (ref 0.6–1.1)
EOSINOPHIL # BLD AUTO: 0.2 K/UL (ref 0–0.5)
EOSINOPHIL NFR BLD: 2.2 % (ref 0–8)
ERYTHROCYTE [DISTWIDTH] IN BLOOD BY AUTOMATED COUNT: 12.7 % (ref 11.5–14.5)
EST. GFR  (NO RACE VARIABLE): 47 ML/MIN/1.73 M^2
FRACTIONAL SHORTENING: 29 % (ref 28–44)
GLUCOSE SERPL-MCNC: 115 MG/DL (ref 70–110)
HCT VFR BLD AUTO: 38.4 % (ref 37–48.5)
HDLC SERPL-MCNC: 29 MG/DL (ref 40–75)
HDLC SERPL: 24.6 % (ref 20–50)
HGB BLD-MCNC: 12.3 G/DL (ref 12–16)
IMM GRANULOCYTES # BLD AUTO: 0.02 K/UL (ref 0–0.04)
IMM GRANULOCYTES NFR BLD AUTO: 0.2 % (ref 0–0.5)
INTERVENTRICULAR SEPTUM: 1.44 CM (ref 0.6–1.1)
LDLC SERPL CALC-MCNC: 33.2 MG/DL (ref 63–159)
LEFT ATRIUM SIZE: 4.18 CM
LEFT ATRIUM VOLUME INDEX MOD: 23.2 ML/M2
LEFT ATRIUM VOLUME MOD: 39.14 CM3
LEFT INTERNAL DIMENSION IN SYSTOLE: 2.53 CM (ref 2.1–4)
LEFT VENTRICLE DIASTOLIC VOLUME INDEX: 31.28 ML/M2
LEFT VENTRICLE DIASTOLIC VOLUME: 52.86 ML
LEFT VENTRICLE MASS INDEX: 88 G/M2
LEFT VENTRICLE SYSTOLIC VOLUME INDEX: 13.7 ML/M2
LEFT VENTRICLE SYSTOLIC VOLUME: 23.07 ML
LEFT VENTRICULAR INTERNAL DIMENSION IN DIASTOLE: 3.56 CM (ref 3.5–6)
LEFT VENTRICULAR MASS: 149.14 G
LIPASE SERPL-CCNC: 55 U/L (ref 4–60)
LV LATERAL E/E' RATIO: 10.22 M/S
LV SEPTAL E/E' RATIO: 15.33 M/S
LVOT MG: 3.65 MMHG
LVOT MV: 0.9 CM/S
LYMPHOCYTES # BLD AUTO: 3.4 K/UL (ref 1–4.8)
LYMPHOCYTES NFR BLD: 41.9 % (ref 18–48)
MCH RBC QN AUTO: 29.6 PG (ref 27–31)
MCHC RBC AUTO-ENTMCNC: 32 G/DL (ref 32–36)
MCV RBC AUTO: 92 FL (ref 82–98)
MONOCYTES # BLD AUTO: 0.6 K/UL (ref 0.3–1)
MONOCYTES NFR BLD: 7.9 % (ref 4–15)
MV PEAK A VEL: 0.77 M/S
MV PEAK E VEL: 0.92 M/S
MV STENOSIS PRESSURE HALF TIME: 52.11 MS
MV VALVE AREA P 1/2 METHOD: 4.22 CM2
NEUTROPHILS # BLD AUTO: 3.8 K/UL (ref 1.8–7.7)
NEUTROPHILS NFR BLD: 47.3 % (ref 38–73)
NONHDLC SERPL-MCNC: 89 MG/DL
NRBC BLD-RTO: 0 /100 WBC
OHS CV CPX 1 MINUTE RECOVERY HEART RATE: 114 BPM
OHS CV CPX 85 PERCENT MAX PREDICTED HEART RATE MALE: 122
OHS CV CPX 85 PERCENT MAX PREDICTED HEART RATE MALE: 122
OHS CV CPX MAX PREDICTED HEART RATE: 144
OHS CV CPX MAX PREDICTED HEART RATE: 144
OHS CV CPX PATIENT IS FEMALE: 1
OHS CV CPX PATIENT IS FEMALE: 1
OHS CV CPX PATIENT IS MALE: 0
OHS CV CPX PATIENT IS MALE: 0
OHS CV CPX PEAK DIASTOLIC BLOOD PRESSURE: 61 MMHG
OHS CV CPX PEAK DIASTOLIC BLOOD PRESSURE: 75 MMHG
OHS CV CPX PEAK HEAR RATE: 127 BPM
OHS CV CPX PEAK HEAR RATE: 85 BPM
OHS CV CPX PEAK RATE PRESSURE PRODUCT: ABNORMAL
OHS CV CPX PEAK RATE PRESSURE PRODUCT: NORMAL
OHS CV CPX PEAK SYSTOLIC BLOOD PRESSURE: 161 MMHG
OHS CV CPX PEAK SYSTOLIC BLOOD PRESSURE: 179 MMHG
OHS CV CPX PERCENT MAX PREDICTED HEART RATE ACHIEVED: 61
OHS CV CPX PERCENT MAX PREDICTED HEART RATE ACHIEVED: 91
OHS CV CPX RATE PRESSURE PRODUCT PRESENTING: 8512
OHS CV CPX RATE PRESSURE PRODUCT PRESENTING: 9372
OHS CV INITIAL DOSE: 10 MCG/KG/MIN
OHS CV PEAK DOSE: 40 MCG/KG/MIN
PISA TR MAX VEL: 2.32 M/S
PLATELET # BLD AUTO: 175 K/UL (ref 150–450)
PMV BLD AUTO: 11.2 FL (ref 9.2–12.9)
POTASSIUM SERPL-SCNC: 3.7 MMOL/L (ref 3.5–5.1)
PULM VEIN S/D RATIO: 0.93
PV PEAK D VEL: 0.56 M/S
PV PEAK S VEL: 0.52 M/S
RA PRESSURE ESTIMATED: 3 MMHG
RA VOL SYS: 18.77 ML
RBC # BLD AUTO: 4.16 M/UL (ref 4–5.4)
RIGHT ATRIAL AREA: 10.2 CM2
RV TB RVSP: 5 MMHG
SINUS: 2.53 CM
SODIUM SERPL-SCNC: 142 MMOL/L (ref 136–145)
STJ: 2.39 CM
STRESS ST DEPRESSION: 1 MM
SYSTOLIC BLOOD PRESSURE: 142 MMHG
SYSTOLIC BLOOD PRESSURE: 152 MMHG
TDI LATERAL: 0.09 M/S
TDI SEPTAL: 0.06 M/S
TDI: 0.08 M/S
TR MAX PG: 22 MMHG
TRIGL SERPL-MCNC: 279 MG/DL (ref 30–150)
TROPONIN I SERPL DL<=0.01 NG/ML-MCNC: 0.03 NG/ML (ref 0–0.03)
TV REST PULMONARY ARTERY PRESSURE: 25 MMHG
WBC # BLD AUTO: 8.12 K/UL (ref 3.9–12.7)
Z-SCORE OF LEFT VENTRICULAR DIMENSION IN END DIASTOLE: -2.79
Z-SCORE OF LEFT VENTRICULAR DIMENSION IN END SYSTOLE: -1.12

## 2024-05-03 PROCEDURE — 25000003 PHARM REV CODE 250: Performed by: NURSE PRACTITIONER

## 2024-05-03 PROCEDURE — 36415 COLL VENOUS BLD VENIPUNCTURE: CPT | Performed by: NURSE PRACTITIONER

## 2024-05-03 PROCEDURE — G0378 HOSPITAL OBSERVATION PER HR: HCPCS

## 2024-05-03 PROCEDURE — 94761 N-INVAS EAR/PLS OXIMETRY MLT: CPT

## 2024-05-03 PROCEDURE — 85025 COMPLETE CBC W/AUTO DIFF WBC: CPT

## 2024-05-03 PROCEDURE — 80048 BASIC METABOLIC PNL TOTAL CA: CPT | Performed by: NURSE PRACTITIONER

## 2024-05-03 PROCEDURE — 25500020 PHARM REV CODE 255

## 2024-05-03 PROCEDURE — 96374 THER/PROPH/DIAG INJ IV PUSH: CPT

## 2024-05-03 PROCEDURE — A9502 TC99M TETROFOSMIN: HCPCS | Performed by: HOSPITALIST

## 2024-05-03 PROCEDURE — 99900035 HC TECH TIME PER 15 MIN (STAT)

## 2024-05-03 PROCEDURE — 63600175 PHARM REV CODE 636 W HCPCS: Performed by: INTERNAL MEDICINE

## 2024-05-03 PROCEDURE — 80061 LIPID PANEL: CPT | Performed by: NURSE PRACTITIONER

## 2024-05-03 PROCEDURE — 63600175 PHARM REV CODE 636 W HCPCS

## 2024-05-03 PROCEDURE — 99214 OFFICE O/P EST MOD 30 MIN: CPT | Mod: 25,,, | Performed by: INTERNAL MEDICINE

## 2024-05-03 PROCEDURE — 83690 ASSAY OF LIPASE: CPT | Performed by: NURSE PRACTITIONER

## 2024-05-03 RX ORDER — POTASSIUM CHLORIDE 20 MEQ/1
20 TABLET, EXTENDED RELEASE ORAL DAILY
Status: DISCONTINUED | OUTPATIENT
Start: 2024-05-03 | End: 2024-05-03 | Stop reason: HOSPADM

## 2024-05-03 RX ORDER — NAPROXEN SODIUM 220 MG/1
81 TABLET, FILM COATED ORAL DAILY
Status: DISCONTINUED | OUTPATIENT
Start: 2024-05-03 | End: 2024-05-03 | Stop reason: HOSPADM

## 2024-05-03 RX ORDER — REGADENOSON 0.08 MG/ML
0.4 INJECTION, SOLUTION INTRAVENOUS ONCE
Status: COMPLETED | OUTPATIENT
Start: 2024-05-03 | End: 2024-05-03

## 2024-05-03 RX ORDER — RANOLAZINE 500 MG/1
500 TABLET, EXTENDED RELEASE ORAL ONCE
Status: COMPLETED | OUTPATIENT
Start: 2024-05-03 | End: 2024-05-03

## 2024-05-03 RX ORDER — RANOLAZINE 500 MG/1
500 TABLET, EXTENDED RELEASE ORAL 2 TIMES DAILY
Qty: 60 TABLET | Refills: 2 | Status: SHIPPED | OUTPATIENT
Start: 2024-05-03 | End: 2025-05-03

## 2024-05-03 RX ADMIN — TETROFOSMIN 12.7 MILLICURIE: 1.38 INJECTION, POWDER, LYOPHILIZED, FOR SOLUTION INTRAVENOUS at 11:05

## 2024-05-03 RX ADMIN — ONDANSETRON 4 MG: 2 INJECTION INTRAMUSCULAR; INTRAVENOUS at 11:05

## 2024-05-03 RX ADMIN — RANOLAZINE 500 MG: 500 TABLET, EXTENDED RELEASE ORAL at 02:05

## 2024-05-03 RX ADMIN — REGADENOSON 0.4 MG: 0.08 INJECTION, SOLUTION INTRAVENOUS at 11:05

## 2024-05-03 RX ADMIN — IOHEXOL 75 ML: 350 INJECTION, SOLUTION INTRAVENOUS at 12:05

## 2024-05-03 RX ADMIN — TETROFOSMIN 32.5 MILLICURIE: 1.38 INJECTION, POWDER, LYOPHILIZED, FOR SOLUTION INTRAVENOUS at 11:05

## 2024-05-03 NOTE — DISCHARGE INSTRUCTIONS
"You had images in the hospital. Your CT chest abdomen plevis showed the below findings: Follow up with PCP in clinic for non-emergent further surveillance and diagnostics as warranted:  "1. There are a few bilateral pulmonary nodules. 12 month follow-up CT could be considered"      Complete medications as ordered  Follow all discharge instructions.  Please schedule follow up appointments as necessary      When to Call Your Doctor  Call your doctor immediately if you have any of the following:  Severe headache  Severe dizziness, or fainting  Nausea or vomiting  Fast heartbeat (higher than 100 beats per minute)  Fever or chills  Swollen ankles  Weakness  Chest Pain attacks that last longer, occur more often, or are more severe than in the past       "

## 2024-05-03 NOTE — PLAN OF CARE
TUCKER explained to pt. Pt verbalized understanding and signed form.     05/03/24 1007   TUCKER Message   Medicare Outpatient and Observation Notification regarding financial responsibility Given to patient/caregiver;Signed/date by patient/caregiver;Explained to patient/caregiver   Date TUCKER was signed 05/03/24   Time TUCKER was signed 1007

## 2024-05-03 NOTE — PLAN OF CARE
05/02/24 2046   Patient Assessment/Suction   Level of Consciousness (AVPU) alert   Respiratory Effort Normal;Unlabored   Expansion/Accessory Muscles/Retractions expansion symmetric   All Lung Fields Breath Sounds diminished   Rhythm/Pattern, Respiratory pattern regular   Cough Frequency no cough   PRE-TX-O2   Device (Oxygen Therapy) room air   SpO2 95 %   Pulse Oximetry Type Intermittent   Aerosol Therapy   $ Aerosol Therapy Charges PRN treatment not required   Tobacco Cessation Intervention   Do you use any type of tobacco product? No   Respiratory Evaluation   $ Care Plan Tech Time 15 min   Evaluation For New Orders   Admitting Diagnosis C.P., unstable angina   Cardiac Diagnosis HTN, CAD   Pulmonary Diagnosis COPD   Home Oxygen   Has Home Oxygen? No   Home Aerosol, MDI, DPI, and Other Treatments/Therapies   Home Respiratory Therapy Per Patient/Review of Chart No   Oxygen Care Plan   Rationale No rational found   Bronchodilator Care Plan   Bronchodilator Care Plan Aerosol   Aerosol Meds w/ frequency Albuterol - Ipratropium (DUO-NEB) 0.5mg-3mg(2.5ml) 3ml Nebulizer Solution2.5mg PRN   Atelectasis Care Plan   Rationale No Rational Found   Airway Clearance Care Plan   Rationale No rationale found

## 2024-05-03 NOTE — ED NOTES
Report given to ALON Buckley. Pure wick in place for transport. Pt clean/dry. Vitals stable. Pt on telemetry box for admission.

## 2024-05-03 NOTE — DISCHARGE SUMMARY
"[facility] Medicine  Discharge Summary      Patient Name: Adele Hall  MRN: [id number]  SCARLET: [id number]  Patient Class: OP- Observation  Admission Date: 5/2/2024  Hospital Length of Stay: 0 days  Discharge Date and Time:  05/03/2024 2:26 PM  Attending Physician: Jennifer Frost MD   Discharging Provider: ERIC Garcia  Primary Care Provider: Wally Cazares MD    Primary Care Team: Networked reference to record PCT     HPI:   Adele Hall is a 78 year old female with a previous medical history of CKD, DMII, COPD, HLD, HTN, and CAD with historical bypass who presents for two episodes of chest pain relieved by nitroglycerin today. Patient endorses she always has chest pain that is aching in nature and non-radiating that is 5/10. The chest pain is not associated with any other adverse symptoms such as shortness of breath, nausea, or diaphoresis. However, over the last four weeks the chest pain has intermittently worsened and is sometimes 8/10. Patient denies any current chest pain during admit evaluation. Initial troponin negative and EKG negative for any ST elevation. Patient is followed by Dr. Dyer and did have a stress test at [facility] yesterday but the result is not available. Patient to be admitted by hospital medicine for further evaluation and management.     * No surgery found *      Hospital Course:   Patient noted to have mildly elevated troponin, Cardiology was consulted for unstable angina.  Nuclear stress test was obtained that was negative for acute ischemia.  X-ray abdomen showed nonobstructive bowel gas pattern.   Lastly she has been on majora for about a year and was transitioned to semaglutide.  Although her lipase was negative and she is tolerating p.o. intake this medication can be contributing to her nonspecific abdominal issues. CT chest abdomen pelvis showed "There are a few bilateral pulmonary nodules. In a low risk patient, " no further follow-up is recommended.  In a high-risk patient, 12 month follow-up CT could be considered" Patient to follow up with primary care for continued surveillance as warranted. No fever or leukocytosis, no evidence of URI.  Cardiology recommended maintaining on PPI, initiate Ranexa 500 mg b.i.d., continuing home beta-blocker metoprolol 25 mg b.i.d., amlodipine 5 mg daily, Crestor 40 mg nightly, and aspirin daily.  Ambulatory referral to GI to continue outpatient workup for GI issues. Cleared by Cardiology for discharge with negative NST. Discharge to home in stable condition.      Goals of Care Treatment Preferences:  Code Status: Full Code      Consults:   Consults (From admission, onward)          Status Ordering Provider     Inpatient consult to Cardiology  Once        Provider:  Marty Mc MD Acknowledged TOMMASEO, TAMMY N            No new Assessment & Plan notes have been filed under this hospital service since the last note was generated.  Service: Hospital Medicine    Final Active Diagnoses:    Diagnosis Date Noted POA    PRINCIPAL PROBLEM:  Chest pain [R07.9] 08/01/2016 Yes    GERD (gastroesophageal reflux disease) [K21.9] 02/07/2016 Yes    Hypertension [I10] 01/08/2016 Yes    Type 2 diabetes mellitus with diabetic polyneuropathy, with long-term current use of insulin [E11.42, Z79.4] 07/22/2015 Not Applicable    Hyperlipidemia with target LDL less than 70 [E78.5] 07/22/2015 Yes      Problems Resolved During this Admission:       Discharged Condition: stable    Disposition: Home or Self Care    Follow Up:   Follow-up Information       Anirudh Joy MD Follow up.    Specialty: Cardiology  Why: Inbasket message sent to office to contact you to schedule follow up appointment.  If you don't hear from them within next couple of days, please contact office to schedule.  Contact information:  [address] [place] [address] [phone]               Wally Cazares MD Follow  up.    Specialty: Family Medicine  Why: As needed  Contact information:  [address]  [place] [address]  [phone]               [place] [facility] Follow up.    Specialty: Primary Care  Contact information:  [address]  [place] [id number] [phone]  Additional information:  [address]                         Patient Instructions:      Ambulatory referral/consult to Gastroenterology   Standing Status: Future   Referral Priority: Routine Referral Type: Consultation   Referral Reason: Specialty Services Required   Requested Specialty: Gastroenterology   Number of Visits Requested: 1     Diet Cardiac       Significant Diagnostic Studies: Labs: CMP   Recent Labs   Lab 05/02/24  1538 05/03/24  0355    142   K 3.0* 3.7    103   CO2 27 29   * 115*   BUN 12 11   CREATININE 1.2 1.2   CALCIUM 8.7 9.5   PROT 6.2  --    ALBUMIN 3.3*  --    BILITOT 0.4  --    ALKPHOS 57  --    AST 12  --    ALT 7*  --    ANIONGAP 11 10   , CBC   Recent Labs   Lab 05/02/24  1538 05/03/24  0355   WBC 7.31 8.12   HGB 11.7* 12.3   HCT 36.2* 38.4    175   , INR   Lab Results   Component Value Date    INR 1.1 01/03/2019    INR 1.6 01/19/2016    INR 1.1 01/19/2016   , Lipid Panel   Lab Results   Component Value Date    CHOL 118 (L) 05/03/2024    HDL 29 (L) 05/03/2024    LDLCALC 33.2 (L) 05/03/2024    TRIG 279 (H) 05/03/2024    CHOLHDL 24.6 05/03/2024   , Troponin   Recent Labs   Lab 05/02/24  1819 05/02/24  1948 05/02/24  2325   TROPONINI 0.022 0.019 0.032*   , and A1C:   Recent Labs   Lab 11/28/23  1032 03/06/24  1101 03/21/24  0413   HGBA1C 5.5 5.5 5.6       Pending Diagnostic Studies:       Procedure Component Value Units Date/Time    CT Chest Abdomen Pelvis W W/O Contrast (XPD) [[id number]] Resulted: 05/03/24 1221    Order Status: Sent Lab Status: In process Updated: 05/03/24 1238           Medications:  Reconciled Home Medications:      Medication List        START  taking these medications      ranolazine 500 MG Tb12  Commonly known as: RANEXA  Take 1 tablet (500 mg total) by mouth 2 (two) times daily.            CHANGE how you take these medications      nitroGLYCERIN 0.4 MG SL tablet  Commonly known as: NITROSTAT  Place 1 tablet (0.4 mg total) under the tongue every 5 (five) minutes as needed for Chest pain. Seek medical help is pain is not relieved by the third dose.  What changed:   how much to take  how to take this  when to take this  reasons to take this     TRESIBA FLEXTOUCH U-200 200 unit/mL (3 mL) insulin pen  Generic drug: insulin degludec  INJECT 28 UNITS INTO THE SKIN ONCE DAILY.  What changed:   how much to take  how to take this  when to take this            CONTINUE taking these medications      amLODIPine 5 MG tablet  Commonly known as: NORVASC  TAKE ONE TABLET BY MOUTH ONCE DAILY     aspirin 81 MG EC tablet  Commonly known as: ECOTRIN  Take 81 mg by mouth 3 (three) times a week.     BD INSULIN SYRINGE ULTRA-FINE 1 mL 31 gauge x 5/16 Syrg  Generic drug: insulin syringe-needle U-100  USE AS DIRECTED 5 TIMES A DAY WITH LEVEMIR & NOVOLOG     blood sugar diagnostic Strp  Commonly known as: TRUE METRIX GLUCOSE TEST STRIP  Test blood sugar 3x/day     calcitRIOL 0.25 MCG Cap  Commonly known as: ROCALTROL  Take 0.25 mcg by mouth every 7 days.     lancets 33 gauge Misc  1 lancet by Misc.(Non-Drug; Combo Route) route 4 (four) times daily.     levothyroxine 75 MCG tablet  Commonly known as: SYNTHROID  TAKE ONE TABLET BY MOUTH EVERY MORNING BEFORE BREAKFAST     linaCLOtide 72 mcg Cap capsule  Commonly known as: LINZESS  Take 1 capsule (72 mcg total) by mouth before breakfast.     meclizine 25 mg tablet  Commonly known as: ANTIVERT  Take 1 tablet (25 mg total) by mouth 3 (three) times daily as needed.     metoprolol tartrate 25 MG tablet  Commonly known as: LOPRESSOR  Take 1 tablet (25 mg total) by mouth 2 (two) times daily.     omeprazole 40 MG capsule  Commonly known as:  "PRILOSEC  Take 1 capsule (40 mg total) by mouth once daily.     OZEMPIC 1 mg/dose (4 mg/3 mL)  Generic drug: semaglutide  Inject 1 mg into the skin every 7 days.     pen needle, diabetic 32 gauge x 5/32" Ndle  Commonly known as: BD ULTRA-FINE YUMIKO PEN NEEDLE  USE FOUR TIMES A DAY WITH INSULIN     rosuvastatin 40 MG Tab  Commonly known as: CRESTOR  Take 1 tablet (40 mg total) by mouth every evening.     traZODone 100 MG tablet  Commonly known as: DESYREL  Take 1 tablet (100 mg total) by mouth every evening.            STOP taking these medications      MOUNJARO 5 mg/0.5 mL Pnij  Generic drug: tirzepatide            ASK your doctor about these medications      vitamin D 1000 units Tab  Commonly known as: VITAMIN D3  Take 1,000 Units by mouth once daily.              Indwelling Lines/Drains at time of discharge:   Lines/Drains/Airways       None                   Time spent on the discharge of patient: 45 minutes                 Alexandria Prieto, ALYCIA, APRN, FNP-C  [facility]  [facility]  [email or web address]    "

## 2024-05-03 NOTE — PLAN OF CARE
Pt clear for DC from case management standpoint. Discharging to home.       05/03/24 1502   Final Note   Assessment Type Final Discharge Note   Anticipated Discharge Disposition Home

## 2024-05-03 NOTE — PLAN OF CARE
Per smart scheduling secure chat sent to [facility] to schedule follow up appointment.    In"[employer]"et message sent to Anirudh Joy office to contact pt to schedule follow up appointment.  Office information added to AVS.

## 2024-05-03 NOTE — PLAN OF CARE
[facility] - Med/Surg  Initial Discharge Assessment       Primary Care Provider: Wally Cazares MD    Admission Diagnosis: Chest pain [R07.9]    Admission Date: 5/2/2024  Expected Discharge Date: 5/4/2024    Transition of Care Barriers: None    Payor: HU[employer] MANAGED MEDICARE / Plan: HU[employer]NA MEDICARE PPO / Product Type: Medicare Advantage /     Extended Emergency Contact Information  Primary Emergency Contact: Sami Hall  Address: [address] [place] [address] [facility]  Home Phone: [phone]  Mobile Phone: [phone]  Relation: Spouse  Preferred language: English   needed? No    Discharge Plan A: Home with family  Discharge Plan B: Home      [facility] - [place], [place] - [address]  [address]  [place] [address]  Phone: [phone] Fax: [phone]    DC assessment completed with patient at bedside. Verified information on facesheet as correct. Pt lives at listed address with spouse. Reports she has help if needed from spouse. Denies POA NOK spouse.  PCP is Wally Cazares MD- reports last apt was 2 wk ago. Pharmacy is [facility]. Denies hh/hd/outpt services/ coumadine. DME- states has RW, WC, glucometer. States has but doesn't use oxygen (portable tanks and home concentrator, unknown co). Reports being independent with activities. Spouse drives her to apts. Reports spouse will provide transportation home upon DC. Reports taking home medications as prescribed and can currently afford them. Verified insurance on file. Denies recent inpt stay in last 30 days.  DC plan is home with family.      Initial Assessment (most recent)       Adult Discharge Assessment - 05/03/24 1025          Discharge Assessment    Assessment Type Discharge Planning Assessment     Confirmed/corrected address, phone number and insurance Yes     Confirmed Demographics Correct on Facesheet     Source of Information patient;family     Does  patient/caregiver understand observation status Yes     Communicated ANTOINE with patient/caregiver Date not available/Unable to determine     Reason For Admission chest pain     People in Home spouse     Do you expect to return to your current living situation? Yes     Do you have help at home or someone to help you manage your care at home? No     Prior to hospitilization cognitive status: Alert/Oriented     Current cognitive status: Alert/Oriented     Walking or Climbing Stairs Difficulty no     Dressing/Bathing Difficulty no     Equipment Currently Used at Home walker, rolling;wheelchair;glucometer     Readmission within 30 days? No     Patient currently being followed by outpatient case management? No     Do you currently have service(s) that help you manage your care at home? No     Do you take prescription medications? Yes     Do you have prescription coverage? Yes     Coverage [employer]     Do you have any problems affording any of your prescribed medications? No     Is the patient taking medications as prescribed? yes     Who is going to help you get home at discharge? Spouse     How do you get to doctors appointments? family or friend will provide     Are you on dialysis? No     Do you take coumadin? No     Discharge Plan A Home with family     Discharge Plan B Home     DME Needed Upon Discharge  none     Discharge Plan discussed with: Spouse/sig other;Patient     Name(s) and Number(s) Sami -[phone]     Transition of Care Barriers None

## 2024-05-03 NOTE — HOSPITAL COURSE
"Patient noted to have mildly elevated troponin, Cardiology was consulted for unstable angina.  Nuclear stress test was obtained that was negative for acute ischemia.  X-ray abdomen showed nonobstructive bowel gas pattern.   Lastly she has been on majora for about a year and was transitioned to semaglutide.  Although her lipase was negative and she is tolerating p.o. intake this medication can be contributing to her nonspecific abdominal issues. CT chest abdomen pelvis showed "There are a few bilateral pulmonary nodules. In a low risk patient, no further follow-up is recommended.  In a high-risk patient, 12 month follow-up CT could be considered"" Patient to follow up with primary care for continued surveillance as warranted. No fever or leukocytosis, no evidence of URI.  Cardiology recommended maintaining on PPI, initiate Ranexa 500 mg b.i.d., continuing home beta-blocker metoprolol 25 mg b.i.d., amlodipine 5 mg daily, Crestor 40 mg nightly, and aspirin daily.  Ambulatory referral to GI to continue outpatient workup for GI issues. Cleared by Cardiology for discharge with negative NST. Discharge to home in stable condition.   "

## 2024-05-03 NOTE — NURSING
Nurses Note -- 4 Eyes      5/3/2024   7:41 AM      Skin assessed during: Admit      [] No Altered Skin Integrity Present    []Prevention Measures Documented      [x] Yes- Altered Skin Integrity Present or Discovered   [x] LDA Added if Not in Epic (Describe Wound)   [x] New Altered Skin Integrity was Present on Admit and Documented in LDA   [x] Wound Image Taken    Wound Care Consulted? No    Attending Nurse:Ina Robles RN/Staff Member:   Tj

## 2024-05-03 NOTE — RESPIRATORY THERAPY
05/03/24 0810   Patient Assessment/Suction   Level of Consciousness (AVPU) alert   Respiratory Effort Unlabored;Normal   Expansion/Accessory Muscles/Retractions expansion symmetric;no retractions;no use of accessory muscles   All Lung Fields Breath Sounds Anterior:;Lateral:;diminished   Rhythm/Pattern, Respiratory depth regular;pattern regular;unlabored   Cough Frequency no cough   PRE-TX-O2   Device (Oxygen Therapy) room air   SpO2 95 %   Pulse Oximetry Type Intermittent   $ Pulse Oximetry - Multiple Charge Pulse Oximetry - Multiple   Pulse 67   Resp 18   Positioning HOB elevated 30 degrees   Aerosol Therapy   $ Aerosol Therapy Charges PRN treatment not required   Respiratory Treatment Status (SVN) PRN treatment not required

## 2024-05-03 NOTE — PLAN OF CARE
Problem: Adult Inpatient Plan of Care  Goal: Plan of Care Review  Outcome: Progressing  Goal: Patient-Specific Goal (Individualized)  Outcome: Progressing  Goal: Absence of Hospital-Acquired Illness or Injury  Outcome: Progressing  Goal: Optimal Comfort and Wellbeing  Outcome: Progressing  Goal: Readiness for Transition of Care  Outcome: Progressing     Problem: Diabetes Comorbidity  Goal: Blood Glucose Level Within Targeted Range  Outcome: Progressing     Problem: Fall Injury Risk  Goal: Absence of Fall and Fall-Related Injury  Outcome: Progressing   Plan of care reviewed with patient,verbalized understanding.  SB/SR  on telemetry. HS bg 167.   Remains free from falls, injury. Instructed to call for assistance as needed ,verbalized understanding. Bed in low & locked position. Call light in reach, bed alarm on .

## 2024-05-03 NOTE — NURSING
IV and tele removed. Orders reviewed. Prescription sent to pharmacy. Pt cleared to go. Pt escorted to car via w/c. Pt d/c home

## 2024-05-06 ENCOUNTER — TELEPHONE (OUTPATIENT)
Dept: ENDOCRINOLOGY | Facility: CLINIC | Age: 79
End: 2024-05-06
Payer: MEDICARE

## 2024-05-06 NOTE — TELEPHONE ENCOUNTER
----- Message from Eveline Velazco sent at 5/6/2024 12:38 PM CDT -----  Contact: patient  Type:  Needs Medical Advice    Who Called: patient     Would the patient rather a call back or a response via [facility]? Call     Best Call Back Number: [phone] (home)      Additional Information: patient would like to speak with the nurse in regards to being in the hospital and she was taking off of her Mourjaro. She wants to know what she needs to do about her diabetes.     Please call to advise

## 2024-05-06 NOTE — TELEPHONE ENCOUNTER
Please ask if her stomach issues have stopped since stopping Ozempic. If so, we can try low dose Trulicity (in same family but tends to be more tolerated by some). I'll hold off on sending until she is notified and is agreeable to proceed.

## 2024-05-06 NOTE — TELEPHONE ENCOUNTER
Pt states was hospitalized on 5/1-5/2 for chest pans and abdominal pain. She was take n off of the ozempic because they thought that was causing her abdominal pain. Pt states taking Tresiba 28 units and BG have been running between 125-250 and sometime higher.Pt not sharing dexcom

## 2024-05-07 LAB
OHS QRS DURATION: 84 MS
OHS QTC CALCULATION: 446 MS

## 2024-05-09 ENCOUNTER — PATIENT OUTREACH (OUTPATIENT)
Dept: ADMINISTRATIVE | Facility: CLINIC | Age: 79
End: 2024-05-09
Payer: MEDICARE

## 2024-05-09 NOTE — PROGRESS NOTES
C3 nurse attempted to contact Adele Hall  for a TCC post hospital discharge follow up call. No answer. Left voicemail with callback information. The patient has a scheduled HOSFU appointment with [facility]  on 05/17/2024 @ 1100.     Message sent to PCP Staff

## 2024-05-10 ENCOUNTER — TELEPHONE (OUTPATIENT)
Dept: ENDOCRINOLOGY | Facility: CLINIC | Age: 79
End: 2024-05-10
Payer: MEDICARE

## 2024-05-10 DIAGNOSIS — Z79.4 TYPE 2 DIABETES MELLITUS WITH STAGE 3B CHRONIC KIDNEY DISEASE, WITH LONG-TERM CURRENT USE OF INSULIN: ICD-10-CM

## 2024-05-10 DIAGNOSIS — E11.22 TYPE 2 DIABETES MELLITUS WITH STAGE 3B CHRONIC KIDNEY DISEASE, WITH LONG-TERM CURRENT USE OF INSULIN: ICD-10-CM

## 2024-05-10 DIAGNOSIS — N18.32 TYPE 2 DIABETES MELLITUS WITH STAGE 3B CHRONIC KIDNEY DISEASE, WITH LONG-TERM CURRENT USE OF INSULIN: ICD-10-CM

## 2024-05-10 NOTE — PROGRESS NOTES
C3 nurse spoke with Adele Hall  for a TCC post hospital discharge follow up call. The patient has a scheduled HOSFU appointment with [facility] on 05/14/2024 @ 1100.    Message sent to PCP Staff

## 2024-05-10 NOTE — TELEPHONE ENCOUNTER
S/w pt. States she is still having some stomach issues but she had this even before taking ozempic. States today thi morning was 128(5/10/24)States BG avg of 150.she is not sharing dexcom

## 2024-05-13 RX ORDER — INSULIN DEGLUDEC 200 U/ML
30 INJECTION, SOLUTION SUBCUTANEOUS DAILY
Start: 2024-05-13

## 2024-05-13 NOTE — TELEPHONE ENCOUNTER
Let's remain off of Ozempic for now. It looks like she has some upcoming endoscopes so will have to be off anyway. Can slightly increase Tresiba to 30 units for now. We will re-evaluate at her July appt. Call sooner if most BG are over 150s.

## 2024-05-13 NOTE — TELEPHONE ENCOUNTER
Spoke with patient and notified her of Abhijeet's previous message and verbalized understanding     States her BG stays over 150 after she eats

## 2024-05-17 ENCOUNTER — OFFICE VISIT (OUTPATIENT)
Dept: PRIMARY CARE CLINIC | Facility: CLINIC | Age: 79
End: 2024-05-17
Payer: MEDICARE

## 2024-05-17 VITALS
SYSTOLIC BLOOD PRESSURE: 110 MMHG | HEART RATE: 63 BPM | BODY MASS INDEX: 28.29 KG/M2 | DIASTOLIC BLOOD PRESSURE: 60 MMHG | WEIGHT: 153.75 LBS | TEMPERATURE: 98 F | HEIGHT: 62 IN | OXYGEN SATURATION: 95 %

## 2024-05-17 DIAGNOSIS — E78.5 HYPERLIPIDEMIA WITH TARGET LDL LESS THAN 70: ICD-10-CM

## 2024-05-17 DIAGNOSIS — K21.9 GASTROESOPHAGEAL REFLUX DISEASE, UNSPECIFIED WHETHER ESOPHAGITIS PRESENT: ICD-10-CM

## 2024-05-17 DIAGNOSIS — I10 PRIMARY HYPERTENSION: ICD-10-CM

## 2024-05-17 DIAGNOSIS — Z79.4 TYPE 2 DIABETES MELLITUS WITH DIABETIC POLYNEUROPATHY, WITH LONG-TERM CURRENT USE OF INSULIN: ICD-10-CM

## 2024-05-17 DIAGNOSIS — R07.2 PRECORDIAL PAIN: Primary | ICD-10-CM

## 2024-05-17 DIAGNOSIS — E11.42 TYPE 2 DIABETES MELLITUS WITH DIABETIC POLYNEUROPATHY, WITH LONG-TERM CURRENT USE OF INSULIN: ICD-10-CM

## 2024-05-17 PROCEDURE — 1101F PT FALLS ASSESS-DOCD LE1/YR: CPT | Mod: CPTII,S$GLB,, | Performed by: NURSE PRACTITIONER

## 2024-05-17 PROCEDURE — 1126F AMNT PAIN NOTED NONE PRSNT: CPT | Mod: CPTII,S$GLB,, | Performed by: NURSE PRACTITIONER

## 2024-05-17 PROCEDURE — 3078F DIAST BP <80 MM HG: CPT | Mod: CPTII,S$GLB,, | Performed by: NURSE PRACTITIONER

## 2024-05-17 PROCEDURE — 3074F SYST BP LT 130 MM HG: CPT | Mod: CPTII,S$GLB,, | Performed by: NURSE PRACTITIONER

## 2024-05-17 PROCEDURE — 3288F FALL RISK ASSESSMENT DOCD: CPT | Mod: CPTII,S$GLB,, | Performed by: NURSE PRACTITIONER

## 2024-05-17 PROCEDURE — 99214 OFFICE O/P EST MOD 30 MIN: CPT | Mod: S$GLB,,, | Performed by: NURSE PRACTITIONER

## 2024-05-17 PROCEDURE — 99999 PR PBB SHADOW E&M-EST. PATIENT-LVL III: CPT | Mod: PBBFAC,,, | Performed by: NURSE PRACTITIONER

## 2024-05-17 PROCEDURE — 1159F MED LIST DOCD IN RCRD: CPT | Mod: CPTII,S$GLB,, | Performed by: NURSE PRACTITIONER

## 2024-05-17 NOTE — PROGRESS NOTES
This dictation has been generated using Modal Fluency Dictation some phonetic errors may occur. Please contact author for clarification if needed.     Problem List Items Addressed This Visit       Type 2 diabetes mellitus with diabetic polyneuropathy, with long-term current use of insulin    Hyperlipidemia with target LDL less than 70    Hypertension    GERD (gastroesophageal reflux disease)    Chest pain - Primary            Diabetes endocrine recently adjusted Tresiba to 30 units patient notes trending better.  Dexcom 7   Hyperlipidemia continue statin   Hypertension stable and controlled continue current therapy   GERD as likely reason for chest pain has EGD planned for June and colonoscopy for July.  Continue to hold semaglutide.  Chest pain controlled since discharge.  Continue Ranexa  Pulmonary nodule on CT.  Recommend follow-up in this former smoker 1 year low-dose CT diagnostic  No follow-ups on file.    ________________________________________________________________  ________________________________________________________________      Chief Complaint   Patient presents with    Hospital Follow Up     History of present illness  Transitional Care Note    Family and/or Caretaker present at visit?  Yes. Sami  Diagnostic tests reviewed/disposition: No diagnosic tests pending after this hospitalization.  Disease/illness education: carbs and sugar control-DM. LDCT one year.   Home health/community services discussion/referrals: Patient does not have home health established from hospital visit.  They do not need home health.  If needed, we will set up home health for the patient.   Establishment or re-establishment of referral orders for community resources: No other necessary community resources.   Discussion with other health care providers: No discussion with other health care providers necessary.        This 78 y.o. presents today for complaint of admit for chest pain normal stress test.  Recommend  "outpatient workup for GI source for abdominal pain.  Still holding semaglutide    Admission Date: 5/2/2024  Hospital Length of Stay: 0 days  Discharge Date and Time:  05/03/2024 2:26 PM  Attending Physician: Jennifer Frost MD   Discharging Provider: ERIC Garcia  Primary Care Provider: Wally Cazares MD     Primary Care Team: Networked reference to record PCT      HPI:   Adele Hall is a 78 year old female with a previous medical history of CKD, DMII, COPD, HLD, HTN, and CAD with historical bypass who presents for two episodes of chest pain relieved by nitroglycerin today. Patient endorses she always has chest pain that is aching in nature and non-radiating that is 5/10. The chest pain is not associated with any other adverse symptoms such as shortness of breath, nausea, or diaphoresis. However, over the last four weeks the chest pain has intermittently worsened and is sometimes 8/10. Patient denies any current chest pain during admit evaluation. Initial troponin negative and EKG negative for any ST elevation. Patient is followed by Dr. Dyer and did have a stress test at [facility] yesterday but the result is not available. Patient to be admitted by hospital medicine for further evaluation and management.      * No surgery found *       Hospital Course:   Patient noted to have mildly elevated troponin, Cardiology was consulted for unstable angina.  Nuclear stress test was obtained that was negative for acute ischemia.  X-ray abdomen showed nonobstructive bowel gas pattern.   Lastly she has been on majora for about a year and was transitioned to semaglutide.  Although her lipase was negative and she is tolerating p.o. intake this medication can be contributing to her nonspecific abdominal issues. CT chest abdomen pelvis showed "There are a few bilateral pulmonary nodules. In a low risk patient, no further follow-up is recommended.  In a high-risk patient, 12 month follow-up CT could be " considered" Patient to follow up with primary care for continued surveillance as warranted. No fever or leukocytosis, no evidence of URI.  Cardiology recommended maintaining on PPI, initiate Ranexa 500 mg b.i.d., continuing home beta-blocker metoprolol 25 mg b.i.d., amlodipine 5 mg daily, Crestor 40 mg nightly, and aspirin daily.  Ambulatory referral to GI to continue outpatient workup for GI issues. Cleared by Cardiology for discharge with negative NST. Discharge to home in stable condition.         Past Medical History:   Diagnosis Date    Anesthesia complication     took patient 6 days to come out of anethesia after CABG    Anticoagulant long-term use     Arthritis     Chronic kidney disease     CKD (chronic kidney disease) stage 3, GFR 30-59 ml/min     Dr. Montero    COPD (chronic obstructive pulmonary disease) 2/7/2016    COPD (chronic obstructive pulmonary disease)     Coronary artery disease     Diabetes mellitus     Diabetes mellitus, type 2     GERD (gastroesophageal reflux disease)     Hx of colonic polyps     Hyperlipidemia     Hypertension     possible new onset CHF 7/30/2016    Tardive dyskinesia     Thyroid disease     Ulcer     Vertigo        Past Surgical History:   Procedure Laterality Date    CARDIAC SURGERY      CATARACT EXTRACTION W/  INTRAOCULAR LENS IMPLANT Right 8/30/2023    Procedure: EXTRACTION, CATARACT, WITH IOL INSERTION;  Surgeon: Tamiko Lam MD;  Location: [facility] OR;  Service: Ophthalmology;  Laterality: Right;    CATARACT EXTRACTION W/  INTRAOCULAR LENS IMPLANT Left 9/13/2023    Procedure: EXTRACTION, CATARACT, WITH IOL INSERTION;  Surgeon: Tamiko Lam MD;  Location: [facility] OR;  Service: Ophthalmology;  Laterality: Left;    CHOLECYSTECTOMY  01/26/2017    COLONOSCOPY      COLONOSCOPY N/A 12/7/2021    Procedure: COLONOSCOPY;  Surgeon: Lito Will MD;  Location[facility];  Service: Endoscopy;  Laterality: N/A;    CORONARY ARTERY BYPASS GRAFT  01/19/2016    4 vessel     HYSTERECTOMY      OOPHORECTOMY      TONSILLECTOMY      TOTAL THYROIDECTOMY      TUBAL LIGATION         Family History   Problem Relation Name Age of Onset    Cancer Mother          LYMPHOMA    Breast cancer Mother      Cancer Father      Early death Father      Heart disease Paternal Uncle      Alcohol abuse Sister          x2    Heart disease Brother      No Known Problems Son 3     Glaucoma Neg Hx      Macular degeneration Neg Hx         Social History     Socioeconomic History    Marital status:    Tobacco Use    Smoking status: Former     Current packs/day: 0.00     Average packs/day: 1 pack/day for 27.0 years (27.0 ttl pk-yrs)     Types: Cigarettes     Start date: 1989     Quit date: 2016     Years since quittin.3    Smokeless tobacco: Never   Substance and Sexual Activity    Alcohol use: No     Alcohol/week: 0.0 standard drinks of alcohol    Drug use: No    Sexual activity: Yes     Partners: Male     Social Determinants of Health     Financial Resource Strain: Low Risk  (2022)    Overall Financial Resource Strain (CARDIA)     Difficulty of Paying Living Expenses: Not hard at all   Food Insecurity: No Food Insecurity (2022)    Hunger Vital Sign     Worried About Running Out of Food in the Last Year: Never true     Ran Out of Food in the Last Year: Never true   Transportation Needs: No Transportation Needs (2022)    PRAPARE - Transportation     Lack of Transportation (Medical): No     Lack of Transportation (Non-Medical): No   Physical Activity: Inactive (2022)    Exercise Vital Sign     Days of Exercise per Week: 0 days     Minutes of Exercise per Session: 0 min   Stress: No Stress Concern Present (2022)    [employer] - Occupational Stress Questionnaire     Feeling of Stress : Not at all   Housing Stability: Low Risk  (2022)    Housing Stability Vital Sign     Unable to Pay for Housing in the Last Year: No     Number of Places Lived in  the Last Year: 1     Unstable Housing in the Last Year: No       Current Outpatient Medications   Medication Sig Dispense Refill    amLODIPine (NORVASC) 5 MG tablet TAKE ONE TABLET BY MOUTH ONCE DAILY (Patient taking differently: Take 5 mg by mouth once daily.) 90 tablet 3    aspirin (ECOTRIN) 81 MG EC tablet Take 81 mg by mouth 3 (three) times a week.      BD INSULIN SYRINGE ULTRA-FINE 1 mL 31 gauge x 5/16 Syrg USE AS DIRECTED 5 TIMES A DAY WITH LEVEMIR & NOVOLOG 200 each 10    blood sugar diagnostic (TRUE METRIX GLUCOSE TEST STRIP) Strp Test blood sugar 3x/day 300 strip 3    calcitRIOL (ROCALTROL) 0.25 MCG Cap Take 0.25 mcg by mouth every 7 days.      lancets 33 gauge Misc 1 lancet by Misc.(Non-Drug; Combo Route) route 4 (four) times daily.      levothyroxine (SYNTHROID) 75 MCG tablet TAKE ONE TABLET BY MOUTH EVERY MORNING BEFORE BREAKFAST (Patient taking differently: Take 75 mcg by mouth before breakfast.) 30 tablet 11    linaCLOtide (LINZESS) 72 mcg Cap capsule Take 1 capsule (72 mcg total) by mouth before breakfast. 30 capsule 1    meclizine (ANTIVERT) 25 mg tablet Take 1 tablet (25 mg total) by mouth 3 (three) times daily as needed. (Patient taking differently: Take 25 mg by mouth 3 (three) times daily as needed for Dizziness or Nausea.) 20 tablet 0    metoprolol tartrate (LOPRESSOR) 25 MG tablet Take 1 tablet (25 mg total) by mouth 2 (two) times daily. 180 tablet 3    nitroGLYCERIN (NITROSTAT) 0.4 MG SL tablet Place 1 tablet (0.4 mg total) under the tongue every 5 (five) minutes as needed for Chest pain. Seek medical help is pain is not relieved by the third dose. (Patient taking differently: Place 0.4 mg under the tongue every 5 (five) minutes as needed for Chest pain. Place 1 tablet (0.4 mg total) under the tongue every 5 (five) minutes as needed for Chest pain. Seek medical help is pain is not relieved by the third dose.) 25 tablet 5    omeprazole (PRILOSEC) 40 MG capsule Take 1 capsule (40 mg total) by  "mouth once daily. 90 capsule 3    pen needle, diabetic (BD ULTRA-FINE YUMIKO PEN NEEDLE) 32 gauge x 5/32" Ndle USE FOUR TIMES A DAY WITH INSULIN 400 each 3    ranolazine (RANEXA) 500 MG Tb12 Take 1 tablet (500 mg total) by mouth 2 (two) times daily. 60 tablet 2    rosuvastatin (CRESTOR) 40 MG Tab Take 1 tablet (40 mg total) by mouth every evening. 90 tablet 3    traZODone (DESYREL) 100 MG tablet Take 1 tablet (100 mg total) by mouth every evening. 90 tablet 3    TRESIBA FLEXTOUCH U-200 200 unit/mL (3 mL) insulin pen Inject 30 Units into the skin once daily.      semaglutide (OZEMPIC) 1 mg/dose (4 mg/3 mL) Inject 1 mg into the skin every 7 days. 3 mL 6    vitamin D (VITAMIN D3) 1000 units Tab Take 1,000 Units by mouth once daily.       No current facility-administered medications for this visit.       Review of patient's allergies indicates:   Allergen Reactions    Reglan [metoclopramide hcl]      Depression and weight loss.        Physical examination  Vitals Reviewed  /60 (BP Location: Left forearm, Patient Position: Sitting, BP Method: Medium (Manual))   Pulse 63   Temp 98 °F (36.7 °C) (Oral)   Ht 5' 1.5" (1.562 m)   Wt 69.7 kg (153 lb 12.3 oz)   SpO2 95%   BMI 28.58 kg/m²  Body mass index is 28.58 kg/m².     BP Readings from Last 3 Encounters:   05/17/24 110/60   05/03/24 129/62   04/05/24 120/67       Wt Readings from Last 3 Encounters:   05/17/24 69.7 kg (153 lb 12.3 oz)   05/02/24 71 kg (156 lb 8.4 oz)   05/01/24 69.9 kg (154 lb)     Gen. Well-dressed well-nourished   Skin warm dry and intact.  No rashes noted.  Chest.  Respirations are even unlabored.  Lungs are clear to auscultation.  Cardiac regular rate and rhythm.  No chest wall adenopathy noted.  Neuro. Awake alert oriented x4.  Normal judgment and cognition noted.  Extremities no clubbing cyanosis or edema noted.     Call or return to clinic prn if these symptoms worsen or fail to improve as anticipated.  This dictation has been generated " using Modal Fluency Dictation some phonetic errors may occur. Please contact author for clarification if needed.

## 2024-05-20 ENCOUNTER — PATIENT OUTREACH (OUTPATIENT)
Dept: DIABETES | Facility: CLINIC | Age: 79
End: 2024-05-20
Payer: MEDICARE

## 2024-05-20 NOTE — PATIENT INSTRUCTIONS
Finally able to connect with pt and schedule appt for pt to come in to download Dexcom G7  so data can be sent to Babs Araya NP. Pt will come in tomorrow 5/21/24 at 2 pm

## 2024-05-21 ENCOUNTER — NUTRITION (OUTPATIENT)
Dept: DIABETES | Facility: CLINIC | Age: 79
End: 2024-05-21
Payer: MEDICARE

## 2024-05-21 ENCOUNTER — TELEPHONE (OUTPATIENT)
Dept: ENDOCRINOLOGY | Facility: CLINIC | Age: 79
End: 2024-05-21
Payer: MEDICARE

## 2024-05-21 DIAGNOSIS — E11.22 TYPE 2 DIABETES MELLITUS WITH STAGE 3B CHRONIC KIDNEY DISEASE, WITH LONG-TERM CURRENT USE OF INSULIN: Primary | ICD-10-CM

## 2024-05-21 DIAGNOSIS — N18.32 TYPE 2 DIABETES MELLITUS WITH STAGE 3B CHRONIC KIDNEY DISEASE, WITH LONG-TERM CURRENT USE OF INSULIN: Primary | ICD-10-CM

## 2024-05-21 DIAGNOSIS — Z79.4 TYPE 2 DIABETES MELLITUS WITH STAGE 3B CHRONIC KIDNEY DISEASE, WITH LONG-TERM CURRENT USE OF INSULIN: Primary | ICD-10-CM

## 2024-05-21 PROCEDURE — G0108 DIAB MANAGE TRN  PER INDIV: HCPCS | Mod: S$GLB,,, | Performed by: NUTRITIONIST

## 2024-05-21 PROCEDURE — 99999 PR PBB SHADOW E&M-EST. PATIENT-LVL III: CPT | Mod: PBBFAC,,, | Performed by: NUTRITIONIST

## 2024-05-21 NOTE — Clinical Note
"Pt came in for Dexcom G7 download today; wanted you to see reports since "a lot has happened".  Pt only taking Tresiba 30 units daily; off Ozempic for about a month.  Pt to see you 7/17/24"

## 2024-05-21 NOTE — TELEPHONE ENCOUNTER
Reviewed pt's dexcom report. I do see some highs after meals. Would she be open to taking Januvia? This is better tolerated than Ozempic and doesn't have the gastrointestinal side effects. If would be a daily pill she would take in the morning. It's effect isn't as strong as Ozmepic but may be able to help reduce some of her highs after eating. Please let me know if she agrees and where to send it.

## 2024-05-21 NOTE — PROGRESS NOTES
Diabetes Care Specialist Progress Note  Author: Wanda Garibay RD  Date: 5/21/2024    Referral 3/13/24  Start Dexcom G7 3/20/24    Program Intake  Reason for Diabetes Program Visit:: Intervention  Type of Intervention:: Individual  Individual: Device Training (download Dexcom G7 )  Current diabetes risk level:: high (T2DM Outcomes Risk=6)  Permission to speak with others about care:: yes ( GIL)  Continuous Glucose Monitoring  Patient has CGM: Yes  GMI Date: 05/21/24  GMI Value: 6.9 %    Lab Results   Component Value Date    HGBA1C 5.6 03/21/2024       Clinical    Clinical Assessment  Current Diabetes Treatment: Insulin (Tresiba 30 units (no  Ozempic for over a month))  Have you ever experienced hypoglycemia (low blood sugar)?: no  Have you ever experienced hyperglycemia (high blood sugar)?: yes  In the last month, how often have you experienced high blood sugar?: more than once a week  Are you able to tell when your blood sugar is high?: Yes  What are your symptoms?: other (see comments) (CGM alarm)  Have you ever been hospitalized because your blood sugar was high?: no    Medication Information  How do you obtain your medications?: Patient drives  How many days a week do you miss your medications?: Never  Do you sometimes have difficulty refilling your medications?: No  Medication adherence impacting ability to self-manage diabetes?: No    Labs  Do you have regular lab work to monitor your medications?: Yes  Type of Regular Lab Work: A1c, Cholesterol, CBC, Other  Where do you get your labs drawn?: [facility]  Lab Compliance Barriers: No    Diabetes Self-Management Skills Assessment    Medications  Patient is able to describe current diabetes management routine.: yes  Diabetes management routine:: insulin (Tresiba 30 units (no Ozempic for over a month))  Patient is able to identify current diabetes medications, dosages, and appropriate timing of medications.: yes  Patient understands the purpose of the  medications taken for diabetes.: yes  Patient reports problems or concerns with current medication regimen.: no  Medication Skills Assessment Completed:: Yes  Assessment indicates:: Adequate understanding  Area of need?: No    Home Blood Glucose Monitoring  Patient states that blood sugar is checked at home daily.: yes  Monitoring Method:: personal continuous glucose monitor  Patient is able to use personal CGM appropriately.: yes  CGM Report reviewed?: yes  Home Blood Glucose Monitoring Skills Assessment Completed: : Yes  Assessment indicates:: Adequate understanding  Area of need?: No           Assessment Summary and Plan    Based on today's diabetes care assessment, the following areas of need were identified:          3/20/2024    12:01 AM   Social   Support No   Access to [employer] No   Cognitive/Behavioral Health No   Culture/Mormonism No   Communication No   Health Literacy No            5/21/2024    12:01 AM   Clinical   Medication Adherence No   Lab Compliance No            5/21/2024    12:01 AM   Diabetes Self-Management Skills   Medication No   Home Blood Glucose Monitoring No          Today's interventions were provided through individual discussion, instruction, and written materials were provided.      Patient verbalized understanding of instruction and written materials.  Pt was able to return back demonstration of instructions today. Patient understood key points, needs reinforcement and further instruction.     Diabetes Self-Management Care Plan:    Today's Diabetes Self-Management Care Plan was developed with Adele's input. Adele has agreed to work toward the following goal(s) to improve his/her overall diabetes control.      Care Plan: Diabetes Management   Updates made since 4/21/2024 12:00 AM        Problem: Blood Glucose Self-Monitoring         Goal: Patient will use Dexcom G7 CGM to continuously monitor blood glucose levels daily Completed 5/21/2024   Start Date: 3/20/2024  "  Expected End Date: 6/20/2024   This Visit's Progress: Met   Recent Progress: On track   Priority: High   Barriers: No Barriers Identified   Note:    Task: Patient agrees to use and understands  the importance of changing sensor every 10 days (with 12 hour cody period). Pt will use  to follow steps for reinsertion and activation     Task:   Pt aware that 30 minute warm up period required before CGM will provide BG levels and will check BG as needed manually     4/1/24--Pt arrives today for assistance changing G7 sensor using .  Pt did need some instructions in order to follow step-by-step how to put new sensor on.  Pt will come again in 10 days to see if she is more comfortable with process and can do by herself.    5/21/24:  Pt came in for first download of  since starting device.  Pt indicates no issues using device; her  changes sensor for her.  Pt has been having some highs between 6-9 pm which may be attributed to her snacking or eating dinner.  Pt states she usually sleeps late and first meal is about 2 pm.  Provided some basic nutrition info for her--"fistful" of carbs per meal; combine carbs w/PRO.         Follow Up Plan     Follow up for Pt to see Babs Araya NP on 7/17/24.  Pt knows how to reach Educator by phone or My Chart.    Today's care plan and follow up schedule was discussed with patient.  Adele verbalized understanding of the care plan, goals, and agrees to follow up plan.        The patient was encouraged to communicate with his/her health care provider/physician and care team regarding his/her condition(s) and treatment.  I provided the patient with my contact information today and encouraged to contact me via phone or [facility] Patient Portal as needed.     Length of Visit   Total Time: 30 Minutes     "

## 2024-05-22 ENCOUNTER — PATIENT MESSAGE (OUTPATIENT)
Dept: GASTROENTEROLOGY | Facility: CLINIC | Age: 79
End: 2024-05-22
Payer: MEDICARE

## 2024-05-27 ENCOUNTER — TELEPHONE (OUTPATIENT)
Dept: GASTROENTEROLOGY | Facility: CLINIC | Age: 79
End: 2024-05-27
Payer: MEDICARE

## 2024-05-30 ENCOUNTER — OFFICE VISIT (OUTPATIENT)
Dept: CARDIOLOGY | Facility: CLINIC | Age: 79
End: 2024-05-30
Payer: MEDICARE

## 2024-05-30 VITALS
HEIGHT: 61 IN | HEART RATE: 55 BPM | DIASTOLIC BLOOD PRESSURE: 67 MMHG | BODY MASS INDEX: 28.8 KG/M2 | SYSTOLIC BLOOD PRESSURE: 117 MMHG | WEIGHT: 152.56 LBS

## 2024-05-30 DIAGNOSIS — I25.10 CORONARY ARTERY DISEASE INVOLVING NATIVE CORONARY ARTERY OF NATIVE HEART WITHOUT ANGINA PECTORIS: Primary | ICD-10-CM

## 2024-05-30 DIAGNOSIS — Z95.1 S/P CABG (CORONARY ARTERY BYPASS GRAFT): ICD-10-CM

## 2024-05-30 DIAGNOSIS — E11.59 HYPERTENSION ASSOCIATED WITH DIABETES: ICD-10-CM

## 2024-05-30 DIAGNOSIS — E11.69 DYSLIPIDEMIA ASSOCIATED WITH TYPE 2 DIABETES MELLITUS: ICD-10-CM

## 2024-05-30 DIAGNOSIS — I15.2 HYPERTENSION ASSOCIATED WITH DIABETES: ICD-10-CM

## 2024-05-30 DIAGNOSIS — E78.5 DYSLIPIDEMIA ASSOCIATED WITH TYPE 2 DIABETES MELLITUS: ICD-10-CM

## 2024-05-30 DIAGNOSIS — I48.0 PAF (PAROXYSMAL ATRIAL FIBRILLATION): ICD-10-CM

## 2024-05-30 DIAGNOSIS — Z95.1 HX OF CABG: ICD-10-CM

## 2024-05-30 PROCEDURE — 1160F RVW MEDS BY RX/DR IN RCRD: CPT | Mod: CPTII,S$GLB,, | Performed by: INTERNAL MEDICINE

## 2024-05-30 PROCEDURE — 1126F AMNT PAIN NOTED NONE PRSNT: CPT | Mod: CPTII,S$GLB,, | Performed by: INTERNAL MEDICINE

## 2024-05-30 PROCEDURE — 99999 PR PBB SHADOW E&M-EST. PATIENT-LVL IV: CPT | Mod: PBBFAC,,, | Performed by: INTERNAL MEDICINE

## 2024-05-30 PROCEDURE — 3074F SYST BP LT 130 MM HG: CPT | Mod: CPTII,S$GLB,, | Performed by: INTERNAL MEDICINE

## 2024-05-30 PROCEDURE — 1159F MED LIST DOCD IN RCRD: CPT | Mod: CPTII,S$GLB,, | Performed by: INTERNAL MEDICINE

## 2024-05-30 PROCEDURE — 3078F DIAST BP <80 MM HG: CPT | Mod: CPTII,S$GLB,, | Performed by: INTERNAL MEDICINE

## 2024-05-30 PROCEDURE — 99214 OFFICE O/P EST MOD 30 MIN: CPT | Mod: S$GLB,,, | Performed by: INTERNAL MEDICINE

## 2024-05-30 PROCEDURE — 3288F FALL RISK ASSESSMENT DOCD: CPT | Mod: CPTII,S$GLB,, | Performed by: INTERNAL MEDICINE

## 2024-05-30 PROCEDURE — 1101F PT FALLS ASSESS-DOCD LE1/YR: CPT | Mod: CPTII,S$GLB,, | Performed by: INTERNAL MEDICINE

## 2024-05-30 NOTE — PROGRESS NOTES
Subjective:    Patient ID:  Adele Hall is a 78 y.o. female who presents for follow-up of  coronary artery disease, diabetes, hypertension, dyslipidemia    HPI  She was admitted twice to the hospital over the last month.  She started having some chest pain the day after she had the dobutamine stress echocardiogram in the office.    He went to the hospital and she was told that it could have been cardiac although workup was negative.  She went back week later with the same symptoms.  Nuclear stress test was done that was negative for ischemia.  She was treated medically and discharged with schedule upper and lower GI to be done in the next few weeks.    No further episodes of chest pain at this time.  She comes with no complaints, no chest pain, no shortness of breath  Blood pressure normal at home.    Review of Systems   Constitutional: Negative for decreased appetite, malaise/fatigue, weight gain and weight loss.   Cardiovascular:  Negative for chest pain, dyspnea on exertion, leg swelling, palpitations and syncope.   Respiratory:  Negative for cough and shortness of breath.    Gastrointestinal: Negative.    Neurological:  Negative for weakness.   All other systems reviewed and are negative.       Objective:      Physical Exam  Vitals and nursing note reviewed.   Constitutional:       Appearance: Normal appearance. She is well-developed.   HENT:      Head: Normocephalic.   Eyes:      Pupils: Pupils are equal, round, and reactive to light.   Neck:      Thyroid: No thyromegaly.      Vascular: No carotid bruit or JVD.   Cardiovascular:      Rate and Rhythm: Normal rate and regular rhythm.      Chest Wall: PMI is not displaced.      Pulses: Normal pulses and intact distal pulses.      Heart sounds: Normal heart sounds. No murmur heard.     No gallop.   Pulmonary:      Effort: Pulmonary effort is normal.      Breath sounds: Normal breath sounds.   Abdominal:      Palpations: Abdomen is soft. There is no  mass.      Tenderness: There is no abdominal tenderness.   Musculoskeletal:         General: Normal range of motion.      Cervical back: Normal range of motion and neck supple.   Skin:     General: Skin is warm.   Neurological:      Mental Status: She is alert and oriented to person, place, and time.      Sensory: No sensory deficit.      Deep Tendon Reflexes: Reflexes are normal and symmetric.             Most Recent EKG Results  Results for orders placed or performed during the hospital encounter of 05/02/24   EKG 12-lead    Collection Time: 05/02/24  3:09 PM   Result Value Ref Range    QRS Duration 84 ms    [facility] QTC Calculation 446 ms    Narrative    Test Reason : R07.9,    Vent. Rate : 060 BPM     Atrial Rate : 060 BPM     P-R Int : 176 ms          QRS Dur : 084 ms      QT Int : 446 ms       P-R-T Axes : 061 025 247 degrees     QTc Int : 446 ms    Normal sinus rhythm  ST and T wave abnormality, consider inferior ischemia  ST and T wave abnormality, consider anterolateral ischemia  Abnormal ECG  When compared with ECG of 01-MAY-2024 10:33,  Previous ECG has undetermined rhythm, needs review  T wave inversion less evident in Lateral leads  Confirmed by Ashleigh GARCIA, Jete LOPEZ ([id number]) on 5/7/2024 9:17:32 PM    Referred By: [email or web address]   SELF           Confirmed By:Jeet Sotelo MD       Most Recent Echocardiogram Results  Results for orders placed during the hospital encounter of 05/02/24    Stress Test      Most Recent Nuclear Stress Test Results  Results for orders placed during the hospital encounter of 05/02/24    Nuclear Stress Test    Interpretation Summary    The ECG portion of the study is negative for ischemia.    The patient reported chest pain during the stress test.    There were no arrhythmias during stress.    The nuclear portion of this study will be reported separately.      Most Recent Cardiac PET Stress Test Results  No results found for this or any previous visit.      Most Recent Cardiovascular  Angiogram results  Results for orders placed during the hospital encounter of 18    Cath lab procedure    Narrative  [facility]  ,    Coronary and Peripheral Vascular Catheterization Comprehensive Report    Name:MARGO RANGEL  MR #: [id number]  Acct #: [id number]  : 1945  Age: 72 years  Gender: Female  Location: [address] 204-A  Ht: 61.8 in/ 157 cm  Wt: 196.2 lb/ 89.2 kg  BSA: 1.89 mï¾²  Study date: 01/15/2018  Accession #: [id number]    Diagnoses: I25.708 - Atherosclerosis of coronary artery bypass graft(s),  unspecified, with other forms of angina pectoris    Cardiologist:  Junior Agarwal MD  Circulator:  Jennifer Khalil  Circulator:  Margy Burks RN  Scrub:  Ryan WEEKS  Monitor:  Melany Garcia RT  Procedure Room:  Cath Lab A    PROCEDURES PERFORMED:    --  Left coronary angiography.  --  Right coronary angiography.  --  Saphenous vein graft(s) angiography.  --  LIMA graft angiography.  --  Left heart catheterization with ventriculography.    INDICATIONS: Coronary artery disease, angina. Ms. Rangel is a 72 year old lady  with known coronary artery disease who presents with increasing chest pain with  a mildly abnormal stress test in the past. Thus referred for above.    COMPLICATIONS: No complications occurred during the cath lab visit.    PROCEDURE: The risks and alternatives of the procedures and conscious sedation  were explained to the patient and informed consent was obtained. The patient  was brought to the cath lab and placed on the table. The planned puncture sites  were prepped and draped in the usual sterile fashion.    --  Left radial artery access. The puncture site was infiltrated with 20 ml 1 %  lidocaine. The vessel was accessed using the modified Seldinger technique, a  wire was threaded into the vessel, and a 5/6 Slender sheath was advanced over  the wire into the vessel.    --  Left coronary artery angiography. A JL4 catheter was advanced to the aorta  and  positioned in the vessel ostium under fluoroscopic guidance. Angiography  was performed in multiple projections using power-assisted (ACIST) injection of  contrast.    --  Right coronary artery angiography. A JR4 catheter was advanced to the aorta  and positioned in the vessel ostium under fluoroscopic guidance. Angiography  was performed in multiple projections using power-assisted (ACIST) injection of  contrast.    --  Saphenous vein graft(s) angiography. A 5fr LCB catheter was advanced to the  aorta and positioned in the vessel ostium under fluoroscopic guidance.  Angiography was performed in multiple projections using power-assisted (ACIST)  injection of contrast.    --  Left internal mammary graft angiography. A 5 Fr. JR 4 catheter was advanced  to the aorta and positioned in the vessel ostium under fluoroscopic guidance.  Angiography was performed in multiple projections using power-assisted (ACIST)  injection of contrast.    --  Left heart catheterization. A 5 Fr. Pigtail catheter was advanced to the  ascending aorta across the aortic valve and left ventricular pressure was  recorded. Ventriculography was performed using power assisted (ACIST) injection  of contrast agent. As the catheter was withdrawn into the ascending aorta,  pressures were continuously recorded.    PROCEDURE COMPLETION: TIMING: Test started at 11:01. Test concluded at 11:50.  RADIATION EXPOSURE: Fluoroscopy time: 14.1 min.    MEDICATIONS GIVEN: Fentanyl, 50 mcg, IV, at 11:00. Fentanyl, 25 mcg, IV, at  11:09. Verapamil (Isoptin, Calan, Covera), 2.5 mg, IA, at 11:08. Nitroglycerin,  200 mcg, IA, at 11:08. Nitroglycerin, 200 mcg, IA, at 11:37. Heparin, 5000  units, IA, last dose at 11:08. Versed, 2 mg, IV, at 11:00. Versed, 1 mg, IV, at  11:09.    CONTRAST GIVEN: Omnipaque 180 ml. Omnipaque 40 ml.    CORONARY CIRCULATION:  --  Left main: Left main has a 40% distal lesion.  --  LAD: LAD is diffusely diseased seen in its proximal portion up to  70%. The  remainder of the LAD has diffuse mild disease, all less than 40%. First  diagonal has a 70% ostial stenosis. Second diagonal is a small trivial vessel.  --  Circumflex: The vessel was normal sized. It gives rise to a trivial first  obtuse marginal and a large branching second obtuse marginal with a 70% ostial  lesion. The remainder of the circumflex is diffusely diseased, all less than  50%. --  RCA: The vessel was large sized (dominant). Subtotally occluded in its  mid/distal portion. --  Graft to the mid LAD: The graft was a LIMA. Patent with  a small distal LAD and diffuse spasm seen at the anastomosis. --  Graft to the  1st diagonal: The graft was a saphenous vein graft from the aorta. Patent with  minimal disease seen in the diagonal. --  Graft to the 1st obtuse marginal: The  graft was a saphenous vein graft from the aorta. Patent with minimal disease  seen in the obtuse marginal. --  Graft to the distal RCA: The graft was a  saphenous vein graft from the aorta. Large, diffuse mild disease, all less than  30% with mild disease seen in the native right coronary artery.    CONCLUSIONS:    NOTATIONS:  Three vessel disease with a patent LIMA to LAD, vein graft to diagonal, vein  graft to obtuse marginal, vein graft to the right coronary artery.  Spasm seen at the anastomosis of the LIMA to the LAD.    RECOMMENDATIONS, PLAN:  Medical management.    Prepared and signed by    Junior Agarwal MD  Signed 01/16/2018 09:44:34    HEMODYNAMIC TABLES    Pressures:  Baseline  Pressures:  - HR: 69  Pressures:  - Rhythm:  Pressures:  -- Aortic Pressure (S/D/M): 149/90/120  Pressures:  -- Left Ventricle (s/edp): 147/10/--    Outputs:  Baseline  Outputs:  -- CALCULATIONS[age]  Outputs:  -- CALCULATIONS: Body Surface Area: 1.89  Outputs:  -- CALCULATIONS: Height in cm: 157.00  Outputs:  -- CALCULATIONS: Sex: Female  Outputs:  -- CALCULATIONS: Weight in kg:  89.20    [email or web address]      Other Most Recent Cardiology Results  Results for orders placed during the hospital encounter of 05/02/24    Cardiac monitoring strips      Labs reviewed    Assessment:       1. Coronary artery disease involving native coronary artery of native heart without angina pectoris    2. Hx of CABG-  1/19/16 x 4    3. PAF (paroxysmal atrial fibrillation), post CABG    4. S/P CABG (coronary artery bypass graft) - x4 01/19/2016; LIMA to LAD; SVG's to diag, OMB, PDA - all grafts patent 01/2018    5. Dyslipidemia associated with type 2 diabetes mellitus    6. Hypertension associated with diabetes         Plan:     Continue:  ASA, Beta blocker, Calcium blocker, and Statin  Regular exercise program  Weight loss  Low cholesterol diet    Follow-up in six-month with Loraine Prieto

## 2024-06-05 ENCOUNTER — HOSPITAL ENCOUNTER (EMERGENCY)
Facility: HOSPITAL | Age: 79
Discharge: HOME | End: 2024-06-05
Attending: EMERGENCY MEDICINE
Payer: MEDICARE

## 2024-06-05 VITALS
WEIGHT: 152 LBS | RESPIRATION RATE: 17 BRPM | SYSTOLIC BLOOD PRESSURE: 131 MMHG | TEMPERATURE: 98 F | OXYGEN SATURATION: 96 % | DIASTOLIC BLOOD PRESSURE: 60 MMHG | HEIGHT: 61 IN | HEART RATE: 57 BPM | BODY MASS INDEX: 28.7 KG/M2

## 2024-06-05 DIAGNOSIS — L72.3 INFECTED SEBACEOUS CYST: Primary | ICD-10-CM

## 2024-06-05 DIAGNOSIS — L08.9 INFECTED SEBACEOUS CYST: Primary | ICD-10-CM

## 2024-06-05 PROCEDURE — 25000003 PHARM REV CODE 250: Performed by: NURSE PRACTITIONER

## 2024-06-05 PROCEDURE — 99282 EMERGENCY DEPT VISIT SF MDM: CPT

## 2024-06-05 PROCEDURE — 10060 I&D ABSCESS SIMPLE/SINGLE: CPT

## 2024-06-05 RX ORDER — LIDOCAINE HYDROCHLORIDE 10 MG/ML
10 INJECTION INFILTRATION; PERINEURAL ONCE
Status: COMPLETED | OUTPATIENT
Start: 2024-06-05 | End: 2024-06-05

## 2024-06-05 RX ORDER — DOXYCYCLINE HYCLATE 100 MG
100 TABLET ORAL EVERY 12 HOURS
Qty: 14 TABLET | Refills: 0 | Status: SHIPPED | OUTPATIENT
Start: 2024-06-05 | End: 2024-06-12

## 2024-06-05 RX ADMIN — LIDOCAINE HYDROCHLORIDE 10 ML: 10 INJECTION, SOLUTION INFILTRATION; PERINEURAL at 01:06

## 2024-06-05 NOTE — FIRST PROVIDER EVALUATION
" Emergency Department TeleTriage Encounter Note      CHIEF COMPLAINT    Chief Complaint   Patient presents with    Cyst     Reports cyst to right lower back x 5 years -- redness beginning yesterday       VITAL SIGNS   Initial Vitals [06/05/24 1206]   BP Pulse Resp Temp SpO2   131/60 (!) 57 17 97.8 °F (36.6 °C) 96 %      MAP       --            ALLERGIES    Review of patient's allergies indicates:   Allergen Reactions    Reglan [metoclopramide hcl]      Depression and weight loss.        PROVIDER TRIAGE NOTE  This is a teletriage evaluation of a 79 y.o. female presenting to the ED complaining of "cyst" to lower back for five years but redness and pain started yesterday.     Alert, no distress.     Initial orders will be placed and care will be transferred to an alternate provider when patient is roomed for a full evaluation. Any additional orders and the final disposition will be determined by that provider.         ORDERS  Labs Reviewed - No data to display    ED Orders (720h ago, onward)      Start Ordered     Status Ordering Provider    06/05/24 1315 06/05/24 1211  LIDOcaine HCL 10 mg/ml (1%) injection 10 mL  Once         Ordered VINITA GANT    06/05/24 1212 06/05/24 1211  I&D Tray to bedside  Once         Ordered VINITA GANT              Virtual Visit Note: The provider triage portion of this emergency department evaluation and documentation was performed via [employer], a HIPAA-compliant telemedicine application, in concert with a tele-presenter in the room. A face to face patient evaluation with one of my colleagues will occur once the patient is placed in an emergency department room.      DISCLAIMER: This note was prepared with [employer] [address] voice recognition transcription software. Garbled syntax, mangled pronouns, and other bizarre constructions may be attributed to that software system.    "

## 2024-06-05 NOTE — ED PROVIDER NOTES
"Encounter Date: 6/5/2024       History     Chief Complaint   Patient presents with    Cyst     Reports cyst to right lower back x 5 years -- redness beginning yesterday     Patient is a 79-year-old female who presents with cyst to the right lower back that has been there for over 5 years.  She reports past medical history significant for COPD, diabetes, GERD, hypertension hyperlipidemia.  She reports over the last 2 days the area has become tender and red.  She denies drainage.  Denies fevers at home.  Reports having the "cyst taken out" over 5 years ago.    The history is provided by the patient.     Review of patient's allergies indicates:   Allergen Reactions    Reglan [metoclopramide hcl]      Depression and weight loss.      Past Medical History:   Diagnosis Date    Anesthesia complication     took patient 6 days to come out of anethesia after CABG    Anticoagulant long-term use     Arthritis     Chronic kidney disease     CKD (chronic kidney disease) stage 3, GFR 30-59 ml/min     Dr. Montero    COPD (chronic obstructive pulmonary disease) 2/7/2016    COPD (chronic obstructive pulmonary disease)     Coronary artery disease     Diabetes mellitus     Diabetes mellitus, type 2     GERD (gastroesophageal reflux disease)     Hx of colonic polyps     Hyperlipidemia     Hypertension     possible new onset CHF 7/30/2016    Tardive dyskinesia     Thyroid disease     Ulcer     Vertigo      Past Surgical History:   Procedure Laterality Date    CARDIAC SURGERY      CATARACT EXTRACTION W/  INTRAOCULAR LENS IMPLANT Right 8/30/2023    Procedure: EXTRACTION, CATARACT, WITH IOL INSERTION;  Surgeon: Tamiko Lam MD;  Location: [facility] OR;  Service: Ophthalmology;  Laterality: Right;    CATARACT EXTRACTION W/  INTRAOCULAR LENS IMPLANT Left 9/13/2023    Procedure: EXTRACTION, CATARACT, WITH IOL INSERTION;  Surgeon: Tamiko Lam MD;  Location: [facility] OR;  Service: Ophthalmology;  Laterality: Left;    CHOLECYSTECTOMY  " 2017    COLONOSCOPY      COLONOSCOPY N/A 2021    Procedure: COLONOSCOPY;  Surgeon: Lito Will MD;  Location: [facility];  Service: Endoscopy;  Laterality: N/A;    CORONARY ARTERY BYPASS GRAFT  2016    4 vessel    HYSTERECTOMY      OOPHORECTOMY      TONSILLECTOMY      TOTAL THYROIDECTOMY      TUBAL LIGATION       Family History   Problem Relation Name Age of Onset    Cancer Mother          LYMPHOMA    Breast cancer Mother      Cancer Father      Early death Father      Heart disease Paternal Uncle      Alcohol abuse Sister          x2    Heart disease Brother      No Known Problems Son 3     Glaucoma Neg Hx      Macular degeneration Neg Hx       Social History     Tobacco Use    Smoking status: Former     Current packs/day: 0.00     Average packs/day: 1 pack/day for 27.0 years (27.0 ttl pk-yrs)     Types: Cigarettes     Start date: 1989     Quit date: 2016     Years since quittin.3    Smokeless tobacco: Never   Substance Use Topics    Alcohol use: No     Alcohol/week: 0.0 standard drinks of alcohol    Drug use: No     Review of Systems   Constitutional:  Negative for fever.   Respiratory:  Negative for shortness of breath.    Genitourinary:  Negative for flank pain.   Musculoskeletal:  Negative for gait problem.   Skin:         + cyst   Neurological:  Negative for weakness.   Psychiatric/Behavioral:  Negative for confusion.        Physical Exam     Initial Vitals [24 1206]   BP Pulse Resp Temp SpO2   131/60 (!) 57 17 97.8 °F (36.6 °C) 96 %      MAP       --         Physical Exam    Nursing note and vitals reviewed.  Constitutional: She appears well-developed and well-nourished. She is not diaphoretic. No distress.   HENT:   Head: Normocephalic and atraumatic.   Cardiovascular:  Intact distal pulses.           Musculoskeletal:         General: Normal range of motion.     Neurological: She is alert and oriented to person, place, and time. She has normal strength. No sensory  deficit.   Skin: Skin is warm and dry. No rash and no abscess noted. No erythema.        3x3 cm fluctuant cyst with with erythema. No induration. Mild tenderness.    Psychiatric: She has a normal mood and affect.         ED Course   I & D - Incision and Drainage    Date/Time: 6/5/2024 12:01 PM  Location procedure was performed: [facility] EMER[facility]PARTMENT    Performed by: Jodi Ram PA-C  Authorized by: Keith Pelayo DO  Type: abscess  Body area: trunk  Location details: back  Anesthesia: local infiltration    Anesthesia:  Local Anesthetic: lidocaine 1% without epinephrine  Anesthetic total: 5 mL  Scalpel size: 11  Incision type: single straight  Complexity: complex  Drainage: pus  Drainage amount: copious  Wound treatment: incision, drainage and expression of material  Patient tolerance: Patient tolerated the procedure well with no immediate complications        Labs Reviewed - No data to display       Imaging Results    None          Medications   LIDOcaine HCL 10 mg/ml (1%) injection 10 mL (10 mLs Other Given by Provider 6/5/24 1315)     Medical Decision Making  The patient's abscess has been incised and drained.  The patient will be placed on antibiotics.  The patient has no signs of systemic symptoms or significant cellulitis to warrant admission at this time.  The patient has been instructed to follow up with their regular doctor in 2-3 days for wound check.  I've given the patient specific return precautions.      Risk  Prescription drug management.                                      Clinical Impression:  Final diagnoses:  [L72.3, L08.9] Infected sebaceous cyst (Primary)          ED Disposition Condition    Discharge Stable          ED Prescriptions       Medication Sig Dispense Start Date End Date Auth. Provider    doxycycline (VIBRA-TABS) 100 MG tablet Take 1 tablet (100 mg total) by mouth every 12 (twelve) hours. for 7 days 14 tablet 6/5/2024 6/12/2024 Jodi Ram  FRANCHESKA Hathaway          Follow-up Information       Follow up With Specialties Details Why Contact Info Additional Information    Wally Cazares MD Family Medicine   [address]  [facility] [address]  [phone]       [facility] Emergency Medicine  As needed [address] [place] [address] [address] [place] [phone] 1st floor             Jodi Ram PA-C  06/05/24 [id number]

## 2024-06-10 ENCOUNTER — TELEPHONE (OUTPATIENT)
Dept: GASTROENTEROLOGY | Facility: CLINIC | Age: 79
End: 2024-06-10
Payer: MEDICARE

## 2024-06-10 NOTE — TELEPHONE ENCOUNTER
Returned call to patient to advise that there is no prep for her procedure. Also clarified where procedure will be done and arrival time.

## 2024-06-10 NOTE — TELEPHONE ENCOUNTER
----- Message from Jodi Roberto sent at 6/10/2024  3:00 PM CDT -----  Type: Needs Medical Advice  Who Called:  pt  Symptoms (please be specific):    How long has patient had these symptoms:    Pharmacy name and phone #:    Best Call Back Number: [phone]  Additional Information: Pt is calling to see if there is any prep for her procedure on the 19th  Please call to advise

## 2024-06-13 DIAGNOSIS — E07.9 THYROID DISEASE: ICD-10-CM

## 2024-06-17 RX ORDER — LEVOTHYROXINE SODIUM 75 UG/1
TABLET ORAL
Qty: 30 TABLET | Refills: 11 | Status: SHIPPED | OUTPATIENT
Start: 2024-06-17

## 2024-06-19 ENCOUNTER — ANESTHESIA EVENT (OUTPATIENT)
Dept: ENDOSCOPY | Facility: HOSPITAL | Age: 79
End: 2024-06-19
Payer: MEDICARE

## 2024-06-19 ENCOUNTER — HOSPITAL ENCOUNTER (OUTPATIENT)
Facility: HOSPITAL | Age: 79
Discharge: HOME | End: 2024-06-19
Attending: INTERNAL MEDICINE | Admitting: INTERNAL MEDICINE
Payer: MEDICARE

## 2024-06-19 ENCOUNTER — ANESTHESIA (OUTPATIENT)
Dept: ENDOSCOPY | Facility: HOSPITAL | Age: 79
End: 2024-06-19
Payer: MEDICARE

## 2024-06-19 VITALS
HEIGHT: 61 IN | OXYGEN SATURATION: 96 % | DIASTOLIC BLOOD PRESSURE: 68 MMHG | TEMPERATURE: 98 F | BODY MASS INDEX: 29.07 KG/M2 | WEIGHT: 154 LBS | SYSTOLIC BLOOD PRESSURE: 153 MMHG | RESPIRATION RATE: 19 BRPM | HEART RATE: 67 BPM

## 2024-06-19 DIAGNOSIS — K44.9 HIATAL HERNIA: ICD-10-CM

## 2024-06-19 DIAGNOSIS — R10.13 EPIGASTRIC PAIN: ICD-10-CM

## 2024-06-19 DIAGNOSIS — K29.70 GASTRITIS, PRESENCE OF BLEEDING UNSPECIFIED, UNSPECIFIED CHRONICITY, UNSPECIFIED GASTRITIS TYPE: Primary | ICD-10-CM

## 2024-06-19 LAB — POCT GLUCOSE: 149 MG/DL (ref 70–110)

## 2024-06-19 PROCEDURE — 25000003 PHARM REV CODE 250: Performed by: INTERNAL MEDICINE

## 2024-06-19 PROCEDURE — 37000008 HC ANESTHESIA 1ST 15 MINUTES: Performed by: INTERNAL MEDICINE

## 2024-06-19 PROCEDURE — 27201012 HC FORCEPS, HOT/COLD, DISP: Performed by: INTERNAL MEDICINE

## 2024-06-19 PROCEDURE — 25000003 PHARM REV CODE 250: Performed by: NURSE ANESTHETIST, CERTIFIED REGISTERED

## 2024-06-19 PROCEDURE — 43239 EGD BIOPSY SINGLE/MULTIPLE: CPT | Mod: ,,, | Performed by: INTERNAL MEDICINE

## 2024-06-19 PROCEDURE — 63600175 PHARM REV CODE 636 W HCPCS: Performed by: NURSE ANESTHETIST, CERTIFIED REGISTERED

## 2024-06-19 PROCEDURE — 43239 EGD BIOPSY SINGLE/MULTIPLE: CPT | Performed by: INTERNAL MEDICINE

## 2024-06-19 PROCEDURE — 88305 TISSUE EXAM BY PATHOLOGIST: CPT | Mod: TC | Performed by: PATHOLOGY

## 2024-06-19 RX ORDER — LIDOCAINE HYDROCHLORIDE 20 MG/ML
INJECTION INTRAVENOUS
Status: DISCONTINUED | OUTPATIENT
Start: 2024-06-19 | End: 2024-06-19

## 2024-06-19 RX ORDER — PROPOFOL 10 MG/ML
INJECTION, EMULSION INTRAVENOUS
Status: DISCONTINUED | OUTPATIENT
Start: 2024-06-19 | End: 2024-06-19

## 2024-06-19 RX ORDER — SODIUM CHLORIDE 9 MG/ML
INJECTION, SOLUTION INTRAVENOUS CONTINUOUS
Status: DISCONTINUED | OUTPATIENT
Start: 2024-06-19 | End: 2024-06-19 | Stop reason: HOSPADM

## 2024-06-19 RX ADMIN — GLYCOPYRROLATE 0.2 MG: 0.2 INJECTION, SOLUTION INTRAMUSCULAR; INTRAVITREAL at 10:06

## 2024-06-19 RX ADMIN — LIDOCAINE HYDROCHLORIDE 100 MG: 20 INJECTION, SOLUTION INTRAVENOUS at 10:06

## 2024-06-19 RX ADMIN — PROPOFOL 100 MG: 10 INJECTION, EMULSION INTRAVENOUS at 10:06

## 2024-06-19 RX ADMIN — PROPOFOL 25 MG: 10 INJECTION, EMULSION INTRAVENOUS at 10:06

## 2024-06-19 RX ADMIN — SODIUM CHLORIDE: 9 INJECTION, SOLUTION INTRAVENOUS at 10:06

## 2024-06-19 NOTE — ANESTHESIA PREPROCEDURE EVALUATION
06/19/2024  Adele Hall is a 79 y.o., female.      Pre-op Assessment    I have reviewed the Patient Summary Reports.     I have reviewed the Nursing Notes. I have reviewed the NPO Status.   I have reviewed the Medications.     Review of Systems  Anesthesia Hx:  No problems with previous Anesthesia                Social:  Non-Smoker       Cardiovascular:     Hypertension   CAD  asymptomatic    Angina CHF    hyperlipidemia JONES   Lots of angina - but multiple ER trips have told her the heart is fine.                         Pulmonary:   COPD, mild   Shortness of breath                  Renal/:  Chronic Renal Disease (stage 3), CKD                Hepatic/GI:   PUD,  GERD, well controlled             Neurological:    Neuromuscular Disease,                                   Endocrine:  Diabetes, well controlled, type 2 Hypothyroidism              Physical Exam  General: Well nourished, Cooperative, Alert and Oriented    Airway:  Mallampati: II   Mouth Opening: Normal  TM Distance: Normal  Neck ROM: Normal ROM    Anesthesia Plan  Type of Anesthesia, risks & benefits discussed:    Anesthesia Type: Gen ETT, Gen Supraglottic Airway, Gen Natural Airway, MAC  Intra-op Monitoring Plan: Standard ASA Monitors  Post Op Pain Control Plan: multimodal analgesia  Induction:  IV  Airway Plan: Direct, Video and Fiberoptic, Post-Induction  Informed Consent: Informed consent signed with the Patient and all parties understand the risks and agree with anesthesia plan.  All questions answered.   ASA Score: 3    Ready For Surgery From Anesthesia Perspective.   .

## 2024-06-19 NOTE — ANESTHESIA POSTPROCEDURE EVALUATION
Anesthesia Post Evaluation    Patient: Adele Hall    Procedure(s) Performed: Procedure(s) (LRB):  EGD (ESOPHAGOGASTRODUODENOSCOPY) (N/A)    Final Anesthesia Type: general      Patient location during evaluation: PACU  Patient participation: Yes- Able to Participate  Level of consciousness: awake and alert and oriented  Post-procedure vital signs: reviewed and stable  Pain management: adequate  Airway patency: patent    PONV status at discharge: No PONV  Anesthetic complications: no      Cardiovascular status: blood pressure returned to baseline and stable  Respiratory status: unassisted and spontaneous ventilation  Hydration status: euvolemic  Follow-up not needed.              Vitals Value Taken Time   /68 06/19/24 1125   Temp 36.5 °C (97.7 °F) 06/19/24 1055   Pulse 67 06/19/24 1125   Resp 19 06/19/24 1125   SpO2 96 % 06/19/24 1105         Event Time   Out of Recovery 11:48:55         Pain/Antonio Score: Antonio Score: 10 (6/19/2024 11:25 AM)

## 2024-06-19 NOTE — PLAN OF CARE
Patient awake, alert, and oriented.  No complaints of pain; abdomen soft and tender.  Vital signs stable.  Patient tolerated po fluids well.  Dr. Yoder spoke with patient and family member prior to discharge.  Patient and family verbalize understanding of all discharge instructions given.  Patient discharged to home accompanied by family

## 2024-06-19 NOTE — TRANSFER OF CARE
"Anesthesia Transfer of Care Note    Patient: Adele Hall    Procedure(s) Performed: Procedure(s) (LRB):  EGD (ESOPHAGOGASTRODUODENOSCOPY) (N/A)    Patient location: PACU    Anesthesia Type: general    Transport from OR: Transported from OR on room air with adequate spontaneous ventilation    Post pain: adequate analgesia    Post assessment: no apparent anesthetic complications and tolerated procedure well    Post vital signs: stable    Level of consciousness: awake and responds to stimulation    Nausea/Vomiting: no nausea/vomiting    Complications: none    Transfer of care protocol was followed      Last vitals: Visit Vitals  BP (!) 156/68 (BP Location: Left arm, Patient Position: Lying)   Pulse (!) 59   Temp 36.8 °C (98.2 °F) (Skin)   Resp 16   Ht 5' 1" (1.549 m)   Wt 69.9 kg (154 lb)   SpO2 96%   Breastfeeding No   BMI 29.10 kg/m²     "

## 2024-06-19 NOTE — H&P
CC: Epigastric pain    79 year old female with above. States that symptoms are stable, no alleviating/exacerbating factors. No bleeding or weight loss.     ROS:  No headache, no fever/chills, no chest pain/SOB, no nausea/vomiting/diarrhea/constipation/GI bleeding/abdominal pain, no dysuria/hematuria.    VSSAF   Exam:   Alert and oriented x 3; no apparent distress   PERRLA, sclera anicteric  CV: Regular rate/rhythm, normal PMI   Lungs: Clear bilaterally with no wheeze/rales   Abdomen: Soft, NT/ND, normal bowel sounds   Ext: No cyanosis, clubbing     Impression:   As above    Plan:   Proceed with endoscopy. Further recs to follow.

## 2024-06-19 NOTE — [employer] PATIENT INSTRUCTIONS
Discharge Summary/Instructions after an Endoscopic Procedure  Patient Name: Adele Hall  Patient MRN: [id number]  Patient YOB: 1945 Wednesday, June 19, 2024  Seth Watson MD  Dear patient,  As a result of recent federal legislation (The Federal Cures Act), you may   receive lab or pathology results from your procedure in your [facility]   account before your physician is able to contact you. Your physician or   their representative will relay the results to you with their   recommendations at their soonest availability.  Thank you,  RESTRICTIONS:  During your procedure today, you received medications for sedation.  These   medications may affect your judgment, balance and coordination.  Therefore,   for 24 hours, you have the following restrictions:   - DO NOT drive a car, operate machinery, make legal/financial decisions,   sign important papers or drink alcohol.    ACTIVITY:  Today: no heavy lifting, straining or running due to procedural   sedation/anesthesia.  The following day: return to full activity including work.  DIET:  Eat and drink normally unless instructed otherwise.     TREATMENT FOR COMMON SIDE EFFECTS:  - Mild abdominal pain, nausea, belching, bloating or excessive gas:  rest,   eat lightly and use a heating pad.  - Sore Throat: treat with throat lozenges and/or gargle with warm salt   water.  - Because air was used during the procedure, expelling large amounts of air   from your rectum or belching is normal.  - If a bowel prep was taken, you may not have a bowel movement for 1-3 days.    This is normal.  SYMPTOMS TO WATCH FOR AND REPORT TO YOUR PHYSICIAN:  1. Abdominal pain or bloating, other than gas cramps.  2. Chest pain.  3. Back pain.  4. Signs of infection such as: chills or fever occurring within 24 hours   after the procedure.  5. Rectal bleeding, which would show as bright red, maroon, or black stools.   (A tablespoon of blood from the rectum is not serious, especially  if   hemorrhoids are present.)  6. Vomiting.  7. Weakness or dizziness.  GO DIRECTLY TO THE NEAREST EMERGENCY ROOM IF YOU HAVE ANY OF THE FOLLOWING:      Difficulty breathing              Chills and/or fever over 101 F   Persistent vomiting and/or vomiting blood   Severe abdominal pain   Severe chest pain   Black, tarry stools   Bleeding- more than one tablespoon   Any other symptom or condition that you feel may need urgent attention  Your doctor recommends these additional instructions:  If any biopsies were taken, your doctors clinic will contact you in 1 to 2   weeks with any results.  - Patient has a contact number available for emergencies.  The signs and   symptoms of potential delayed complications were discussed with the   patient.  Return to normal activities tomorrow.  Written discharge   instructions were provided to the patient.   - Resume previous diet.   - Continue present medications.   - No aspirin, ibuprofen, naproxen, or other non-steroidal anti-inflammatory   drugs.   - Await pathology results.   - Discharge patient to home (ambulatory).   - Follow an antireflux regimen.   - Return to GI office after studies are complete.  For questions, problems or results please call your physician - Seth Watson MD at Work:  [phone].  [facility], EMERGENCY ROOM PHONE NUMBER: [phone]  IF A COMPLICATION OR EMERGENCY SITUATION ARISES AND YOU ARE UNABLE TO REACH   YOUR PHYSICIAN - GO DIRECTLY TO THE EMERGENCY ROOM.  Seth Watson MD  6/19/2024 10:50:44 AM  This report has been verified and signed electronically.  Dear patient,  As a result of recent federal legislation (The Federal Cures Act), you may   receive lab or pathology results from your procedure in your [facility]   account before your physician is able to contact you. Your physician or   their representative will relay the results to you with their   recommendations at their soonest availability.  Thank you,  [employer]ATION

## 2024-07-08 ENCOUNTER — HOSPITAL ENCOUNTER (EMERGENCY)
Facility: HOSPITAL | Age: 79
Discharge: HOME | End: 2024-07-08
Attending: EMERGENCY MEDICINE
Payer: MEDICARE

## 2024-07-08 VITALS
WEIGHT: 154 LBS | BODY MASS INDEX: 29.07 KG/M2 | TEMPERATURE: 97 F | SYSTOLIC BLOOD PRESSURE: 130 MMHG | HEART RATE: 58 BPM | RESPIRATION RATE: 17 BRPM | HEIGHT: 61 IN | OXYGEN SATURATION: 97 % | DIASTOLIC BLOOD PRESSURE: 62 MMHG

## 2024-07-08 DIAGNOSIS — S59.912A FOREARM INJURY, LEFT, INITIAL ENCOUNTER: ICD-10-CM

## 2024-07-08 DIAGNOSIS — S59.902A ELBOW INJURY, LEFT, INITIAL ENCOUNTER: ICD-10-CM

## 2024-07-08 DIAGNOSIS — S63.502A SPRAIN OF LEFT WRIST, INITIAL ENCOUNTER: Primary | ICD-10-CM

## 2024-07-08 PROCEDURE — 99283 EMERGENCY DEPT VISIT LOW MDM: CPT | Mod: 25

## 2024-07-08 PROCEDURE — 25000003 PHARM REV CODE 250: Performed by: PHYSICIAN ASSISTANT

## 2024-07-08 RX ORDER — ACETAMINOPHEN 500 MG
1000 TABLET ORAL
Status: COMPLETED | OUTPATIENT
Start: 2024-07-08 | End: 2024-07-08

## 2024-07-08 RX ADMIN — ACETAMINOPHEN 1000 MG: 500 TABLET ORAL at 03:07

## 2024-07-09 NOTE — ED PROVIDER NOTES
Encounter Date: 7/8/2024       History     Chief Complaint   Patient presents with    Hand Pain     Left hand and wrist pain  / fall 2 weeks  ago     Adele Hall is a 79 y.o. female presenting for evaluation of pain to her left elbow, forearm, wrist and hand after tripping and falling onto her left arm 2 weeks ago.  No numbness, tingling or weakness; however, given the persistent pain, she decided to get it checked out.  She has a past medical history of Anesthesia complication, Anticoagulant long-term use, Arthritis, Chronic kidney disease, CKD (chronic kidney disease) stage 3, GFR 30-59 ml/min, COPD (chronic obstructive pulmonary disease) (2/7/2016), COPD (chronic obstructive pulmonary disease), Coronary artery disease, Diabetes mellitus, Diabetes mellitus, type 2, GERD (gastroesophageal reflux disease), colonic polyps, Hyperlipidemia, Hypertension, possible new onset CHF (7/30/2016), Tardive dyskinesia, Thyroid disease, Ulcer, and Vertigo.      The history is provided by the patient.     Review of patient's allergies indicates:   Allergen Reactions    Reglan [metoclopramide hcl]      Depression and weight loss.      Past Medical History:   Diagnosis Date    Anesthesia complication     took patient 6 days to come out of anethesia after CABG    Anticoagulant long-term use     Arthritis     Chronic kidney disease     CKD (chronic kidney disease) stage 3, GFR 30-59 ml/min     Dr. Montero    COPD (chronic obstructive pulmonary disease) 2/7/2016    COPD (chronic obstructive pulmonary disease)     Coronary artery disease     Diabetes mellitus     Diabetes mellitus, type 2     GERD (gastroesophageal reflux disease)     Hx of colonic polyps     Hyperlipidemia     Hypertension     possible new onset CHF 7/30/2016    Tardive dyskinesia     Thyroid disease     Ulcer     Vertigo      Past Surgical History:   Procedure Laterality Date    CARDIAC SURGERY      CATARACT EXTRACTION W/  INTRAOCULAR LENS IMPLANT Right  2023    Procedure: EXTRACTION, CATARACT, WITH IOL INSERTION;  Surgeon: Tamiko Lam MD;  Location: [facility] OR;  Service: Ophthalmology;  Laterality: Right;    CATARACT EXTRACTION W/  INTRAOCULAR LENS IMPLANT Left 2023    Procedure: EXTRACTION, CATARACT, WITH IOL INSERTION;  Surgeon: Tamiko Lam MD;  Location: [facility] OR;  Service: Ophthalmology;  Laterality: Left;    CHOLECYSTECTOMY  2017    COLONOSCOPY      COLONOSCOPY N/A 2021    Procedure: COLONOSCOPY;  Surgeon: Lito Will MD;  Location: [facility] ENDO;  Service: Endoscopy;  Laterality: N/A;    CORONARY ARTERY BYPASS GRAFT  2016    4 vessel    ESOPHAGOGASTRODUODENOSCOPY N/A 2024    Procedure: EGD (ESOPHAGOGASTRODUODENOSCOPY);  Surgeon: Seth Yoder MD;  Location: [facility] ENDO;  Service: Endoscopy;  Laterality: N/A;    HYSTERECTOMY      OOPHORECTOMY      TONSILLECTOMY      TOTAL THYROIDECTOMY      TUBAL LIGATION       Family History   Problem Relation Name Age of Onset    Cancer Mother          LYMPHOMA    Breast cancer Mother      Cancer Father      Early death Father      Heart disease Paternal Uncle      Alcohol abuse Sister          x2    Heart disease Brother      No Known Problems Son 3     Glaucoma Neg Hx      Macular degeneration Neg Hx       Social History     Tobacco Use    Smoking status: Former     Current packs/day: 0.00     Average packs/day: 1 pack/day for 27.0 years (27.0 ttl pk-yrs)     Types: Cigarettes     Start date: 1989     Quit date: 2016     Years since quittin.4    Smokeless tobacco: Never   Substance Use Topics    Alcohol use: No     Alcohol/week: 0.0 standard drinks of alcohol    Drug use: No     Review of Systems   Constitutional:  Negative for chills and fever.   Musculoskeletal:  Positive for arthralgias and myalgias. Negative for back pain, joint swelling, neck pain and neck stiffness.   Skin:  Negative for color change, pallor, rash and wound.   Neurological:  Negative for  weakness and numbness.   Hematological:  Does not bruise/bleed easily.       Physical Exam     Initial Vitals [07/08/24 1424]   BP Pulse Resp Temp SpO2   (!) 129/58 (!) 52 18 96.9 °F (36.1 °C) 97 %      MAP       --         Physical Exam    Nursing note and vitals reviewed.  Constitutional: She appears well-developed and well-nourished. She is not diaphoretic. No distress.   HENT:   Head: Normocephalic and atraumatic.   Cardiovascular:  Intact distal pulses.           Musculoskeletal:         General: Tenderness present. Normal range of motion.      Comments: Mild tenderness to palpation noted to left elbow, left distal forearm and left hand.  No decreased range of motion, decreased strength or loss of sensation to left upper extremity.  Palpable 2+ pedal pulse.     Neurological: She is alert and oriented to person, place, and time. She has normal strength. No sensory deficit.   Skin: Skin is warm and dry. No rash and no abscess noted. No erythema.   Psychiatric: She has a normal mood and affect.         ED Course   Procedures  Labs Reviewed - No data to display       Imaging Results              X-Ray Hand 3 view Left (Final result)  Result time 07/08/24 16:12:20      Final result by Ni Mijares MD (07/08/24 16:12:20)                   Impression:      There are osteoarthritic changes of the left hand.      Electronically signed by: Ni Mijares MD  Date:    07/08/2024  Time:    16:12               Narrative:    EXAMINATION:  XR HAND COMPLETE 3 VIEW LEFT    CLINICAL HISTORY:  left hand injury;.    TECHNIQUE:  PA, lateral, and oblique views of the left hand were performed.    COMPARISON:  None    FINDINGS:  There is joint space narrowing of D IP and PIP joints.  There is no evidence fracture nor dislocation.  The adjacent soft tissues are unremarkable.                                       X-Ray Elbow Complete Left (Final result)  Result time 07/08/24 16:02:18      Final result by Wendy Ramos MD  (07/08/24 16:02:18)                   Impression:      As above as above      Electronically signed by: Wendy Ramos MD  Date:    07/08/2024  Time:    16:02               Narrative:    EXAMINATION:  XR ELBOW COMPLETE 3 VIEW LEFT; XR FOREARM LEFT    CLINICAL HISTORY:  Unspecified injury of left elbow, initial encounter; Unspecified injury of left forearm, initial encounter    TECHNIQUE:  AP, lateral, and oblique views of the left elbow were performed.    Two views of the left forearm obtained    COMPARISON:  None    FINDINGS:  Degenerative changes noted the wrist and elbow.  The sclerotic line projecting over the distal humerus on the lateral view most likely due to overlapping density.  Recommend correlation clinically as if clinical consideration for fracture of the wrist suggest specific study of the wrist.  As visualized no acute fracture or dislocation seen                                       X-Ray Forearm Left (Final result)  Result time 07/08/24 16:02:18      Final result by Wendy Ramos MD (07/08/24 16:02:18)                   Impression:      As above as above      Electronically signed by: Wendy Ramos MD  Date:    07/08/2024  Time:    16:02               Narrative:    EXAMINATION:  XR ELBOW COMPLETE 3 VIEW LEFT; XR FOREARM LEFT    CLINICAL HISTORY:  Unspecified injury of left elbow, initial encounter; Unspecified injury of left forearm, initial encounter    TECHNIQUE:  AP, lateral, and oblique views of the left elbow were performed.    Two views of the left forearm obtained    COMPARISON:  None    FINDINGS:  Degenerative changes noted the wrist and elbow.  The sclerotic line projecting over the distal humerus on the lateral view most likely due to overlapping density.  Recommend correlation clinically as if clinical consideration for fracture of the wrist suggest specific study of the wrist.  As visualized no acute fracture or dislocation seen                                       Medications    acetaminophen tablet 1,000 mg (1,000 mg Oral Given 7/8/24 7926)     Medical Decision Making  Differential diagnosis:  Fracture  Dislocation  Sprain  Contusion  Strain  Spasm      Pt emergently evaluated here in the ED.    X-rays of the left elbow, left forearm and left hand show no obvious acute bony abnormalities, fractures or dislocations.  The initial injury did occur 2 weeks ago.  Given the persistent pain and likely underlying sprain, we will place in a Velcro wrist brace and discharged home to follow up with Orthopedics for re-evaluation and further management.  She voices understanding and is agreeable with the plan.  She is given specific return precautions.    Amount and/or Complexity of Data Reviewed  Radiology: ordered. Decision-making details documented in ED Course.    Risk  OTC drugs.                                      Clinical Impression:  Final diagnoses:  [S59.902A] Elbow injury, left, initial encounter  [S59.912A] Forearm injury, left, initial encounter  [S63.502A] Sprain of left wrist, initial encounter (Primary)          ED Disposition Condition    Discharge Stable          ED Prescriptions    None       Follow-up Information       Follow up With Specialties Details Why Contact Info Additional Information    [facility] - ED Emergency Medicine  As needed, If symptoms worsen [address] [place] [address] [address] [place] [id number] 1st floor    Caleb Reyes MD Sports Medicine, Orthopedic Surgery Schedule an appointment as soon as possible for a visit in 1 week  [address] [place] [address] [address]  [place] [address]  [phone]                Janna Salter PA-C  07/08/24 1815

## 2024-07-10 ENCOUNTER — LAB VISIT (OUTPATIENT)
Dept: LAB | Facility: HOSPITAL | Age: 79
End: 2024-07-10
Attending: NURSE PRACTITIONER
Payer: MEDICARE

## 2024-07-10 DIAGNOSIS — Z79.4 TYPE 2 DIABETES MELLITUS WITH STAGE 3B CHRONIC KIDNEY DISEASE, WITH LONG-TERM CURRENT USE OF INSULIN: ICD-10-CM

## 2024-07-10 DIAGNOSIS — N18.32 TYPE 2 DIABETES MELLITUS WITH STAGE 3B CHRONIC KIDNEY DISEASE, WITH LONG-TERM CURRENT USE OF INSULIN: ICD-10-CM

## 2024-07-10 DIAGNOSIS — E11.22 TYPE 2 DIABETES MELLITUS WITH STAGE 3B CHRONIC KIDNEY DISEASE, WITH LONG-TERM CURRENT USE OF INSULIN: ICD-10-CM

## 2024-07-10 LAB
ALBUMIN SERPL BCP-MCNC: 3.5 G/DL (ref 3.5–5.2)
ALP SERPL-CCNC: 71 U/L (ref 55–135)
ALT SERPL W/O P-5'-P-CCNC: 8 U/L (ref 10–44)
ANION GAP SERPL CALC-SCNC: 11 MMOL/L (ref 8–16)
AST SERPL-CCNC: 20 U/L (ref 10–40)
BILIRUB SERPL-MCNC: 0.4 MG/DL (ref 0.1–1)
BUN SERPL-MCNC: 16 MG/DL (ref 8–23)
CALCIUM SERPL-MCNC: 9.7 MG/DL (ref 8.7–10.5)
CHLORIDE SERPL-SCNC: 105 MMOL/L (ref 95–110)
CHOLEST SERPL-MCNC: 154 MG/DL (ref 120–199)
CHOLEST/HDLC SERPL: 4.2 {RATIO} (ref 2–5)
CO2 SERPL-SCNC: 24 MMOL/L (ref 23–29)
CREAT SERPL-MCNC: 1.5 MG/DL (ref 0.5–1.4)
EST. GFR  (NO RACE VARIABLE): 35.7 ML/MIN/1.73 M^2
ESTIMATED AVG GLUCOSE: 120 MG/DL (ref 68–131)
GLUCOSE SERPL-MCNC: 111 MG/DL (ref 70–110)
HBA1C MFR BLD: 5.8 % (ref 4–5.6)
HDLC SERPL-MCNC: 37 MG/DL (ref 40–75)
HDLC SERPL: 24 % (ref 20–50)
LDLC SERPL CALC-MCNC: 62.2 MG/DL (ref 63–159)
NONHDLC SERPL-MCNC: 117 MG/DL
POTASSIUM SERPL-SCNC: 4.1 MMOL/L (ref 3.5–5.1)
PROT SERPL-MCNC: 6.8 G/DL (ref 6–8.4)
SODIUM SERPL-SCNC: 140 MMOL/L (ref 136–145)
T4 FREE SERPL-MCNC: 1.06 NG/DL (ref 0.71–1.51)
TRIGL SERPL-MCNC: 274 MG/DL (ref 30–150)
TSH SERPL DL<=0.005 MIU/L-ACNC: 0.34 UIU/ML (ref 0.4–4)

## 2024-07-10 PROCEDURE — 84439 ASSAY OF FREE THYROXINE: CPT | Performed by: NURSE PRACTITIONER

## 2024-07-10 PROCEDURE — 80061 LIPID PANEL: CPT | Performed by: NURSE PRACTITIONER

## 2024-07-10 PROCEDURE — 83036 HEMOGLOBIN GLYCOSYLATED A1C: CPT | Performed by: NURSE PRACTITIONER

## 2024-07-10 PROCEDURE — 80053 COMPREHEN METABOLIC PANEL: CPT | Performed by: NURSE PRACTITIONER

## 2024-07-10 PROCEDURE — 84443 ASSAY THYROID STIM HORMONE: CPT | Performed by: NURSE PRACTITIONER

## 2024-07-11 ENCOUNTER — PATIENT OUTREACH (OUTPATIENT)
Dept: ADMINISTRATIVE | Facility: HOSPITAL | Age: 79
End: 2024-07-11
Payer: MEDICARE

## 2024-07-11 NOTE — PROGRESS NOTES
Population Health Chart Review & Patient Outreach Details      Additional [facility] Health Notes:               Updates Requested / Reviewed:      Updated Care Coordination Note, Care Everywhere, and          Health Maintenance Topics Overdue:      [facility] Score: 1     LDCT Lung Screen    Tetanus Vaccine  Shingles/Zoster Vaccine  RSV Vaccine                  Health Maintenance Topic(s) Outreach Outcomes & Actions Taken:    Low Dose CT Screening - Outreach Outcomes & Actions Taken  : discuss and order at ov

## 2024-07-17 ENCOUNTER — OFFICE VISIT (OUTPATIENT)
Dept: ENDOCRINOLOGY | Facility: CLINIC | Age: 79
End: 2024-07-17
Payer: MEDICARE

## 2024-07-17 VITALS
DIASTOLIC BLOOD PRESSURE: 70 MMHG | BODY MASS INDEX: 29.82 KG/M2 | WEIGHT: 157.94 LBS | SYSTOLIC BLOOD PRESSURE: 130 MMHG | HEART RATE: 55 BPM | HEIGHT: 61 IN

## 2024-07-17 DIAGNOSIS — E11.42 TYPE 2 DIABETES MELLITUS WITH DIABETIC POLYNEUROPATHY, WITH LONG-TERM CURRENT USE OF INSULIN: ICD-10-CM

## 2024-07-17 DIAGNOSIS — Z79.4 TYPE 2 DIABETES MELLITUS WITH DIABETIC POLYNEUROPATHY, WITH LONG-TERM CURRENT USE OF INSULIN: ICD-10-CM

## 2024-07-17 DIAGNOSIS — N18.32 TYPE 2 DIABETES MELLITUS WITH STAGE 3B CHRONIC KIDNEY DISEASE, WITH LONG-TERM CURRENT USE OF INSULIN: Primary | ICD-10-CM

## 2024-07-17 DIAGNOSIS — I25.10 CORONARY ARTERY DISEASE INVOLVING NATIVE CORONARY ARTERY OF NATIVE HEART WITHOUT ANGINA PECTORIS: ICD-10-CM

## 2024-07-17 DIAGNOSIS — E78.5 HYPERLIPIDEMIA WITH TARGET LDL LESS THAN 70: ICD-10-CM

## 2024-07-17 DIAGNOSIS — E07.9 THYROID DISEASE: ICD-10-CM

## 2024-07-17 DIAGNOSIS — Z79.4 TYPE 2 DIABETES MELLITUS WITH STAGE 3B CHRONIC KIDNEY DISEASE, WITH LONG-TERM CURRENT USE OF INSULIN: Primary | ICD-10-CM

## 2024-07-17 DIAGNOSIS — I10 ESSENTIAL HYPERTENSION: ICD-10-CM

## 2024-07-17 DIAGNOSIS — E11.22 TYPE 2 DIABETES MELLITUS WITH STAGE 3B CHRONIC KIDNEY DISEASE, WITH LONG-TERM CURRENT USE OF INSULIN: Primary | ICD-10-CM

## 2024-07-17 PROCEDURE — 3075F SYST BP GE 130 - 139MM HG: CPT | Mod: CPTII,S$GLB,, | Performed by: NURSE PRACTITIONER

## 2024-07-17 PROCEDURE — 1126F AMNT PAIN NOTED NONE PRSNT: CPT | Mod: CPTII,S$GLB,, | Performed by: NURSE PRACTITIONER

## 2024-07-17 PROCEDURE — 3288F FALL RISK ASSESSMENT DOCD: CPT | Mod: CPTII,S$GLB,, | Performed by: NURSE PRACTITIONER

## 2024-07-17 PROCEDURE — 99999 PR PBB SHADOW E&M-EST. PATIENT-LVL IV: CPT | Mod: PBBFAC,,, | Performed by: NURSE PRACTITIONER

## 2024-07-17 PROCEDURE — 3078F DIAST BP <80 MM HG: CPT | Mod: CPTII,S$GLB,, | Performed by: NURSE PRACTITIONER

## 2024-07-17 PROCEDURE — 1160F RVW MEDS BY RX/DR IN RCRD: CPT | Mod: CPTII,S$GLB,, | Performed by: NURSE PRACTITIONER

## 2024-07-17 PROCEDURE — 1159F MED LIST DOCD IN RCRD: CPT | Mod: CPTII,S$GLB,, | Performed by: NURSE PRACTITIONER

## 2024-07-17 PROCEDURE — 95251 CONT GLUC MNTR ANALYSIS I&R: CPT | Mod: S$GLB,,, | Performed by: NURSE PRACTITIONER

## 2024-07-17 PROCEDURE — 99214 OFFICE O/P EST MOD 30 MIN: CPT | Mod: S$GLB,,, | Performed by: NURSE PRACTITIONER

## 2024-07-17 PROCEDURE — 1101F PT FALLS ASSESS-DOCD LE1/YR: CPT | Mod: CPTII,S$GLB,, | Performed by: NURSE PRACTITIONER

## 2024-07-17 RX ORDER — POLYETHYLENE GLYCOL-3350 AND ELECTROLYTES WITH FLAVOR PACK 240; 5.84; 2.98; 6.72; 22.72 G/278.26G; G/278.26G; G/278.26G; G/278.26G; G/278.26G
POWDER, FOR SOLUTION ORAL ONCE
COMMUNITY
Start: 2024-04-05

## 2024-07-17 NOTE — PROGRESS NOTES
CC: Ms. Adele Hall arrives today for management of Type 2 DM and review of chronic medical conditions, as listed in the visit diagnosis section of this encounter.     HPI: Ms. Adele Hall was diagnosed with Type 2 DM in ~ 2002. Metformin started at the time of diagnosis but was stopped d/t renal impairment, per PCP in [place]. Insulin was started ~ 2012. She has rotated between analog insulins and generic NPH and Regular insulins, due to cost. [employer] brand insulins, due to cost.  Denies FH of DM. She did have 1 ER admission after having BG of >700 in 2012, prior to starting insulin.   She has a dx of CAD - s/p CABG in 1/2016. Follows with Dr. Dick.  She follows with Dr. Davidson for CKD.    Patient was last seen by me in March.     She stopped Ozempic in May after hospitalization for abdominal pain. This has since resolved, as has her diarrhea and constipation. Appetite has increased and has gained 5#. Appetite had prev become too suppressed with GLP-1RA.      BG monitoring per Dexcom G7    Hypoglycemia: Occasional in middle of the night  Symptoms: dizziness   Treatment: sweets    Missing Insulin/PO medication doses: No    Dietary Habits: Eats 2 meals/day - sleeps in and skips breakfast. Snacks on fruit. Drinks Arnold Palmers.     DM education: 5/21/2024      CURRENT DIABETIC MEDS: Tresiba U200 30 units QAM  Vial or pen: pens  Glucometer type: True Metrix    Previous DM treatments:  Metformin  Victoza - $   NPH/Regular insulins  Novolog   Ozempic/Mounjaro - abd pain, diarrhea    Last Eye Exam: 10/2023, no DR.   Last Podiatry Exam: never    REVIEW OF SYSTEMS  Constitutional: no c/o weakness, weight loss. + fatigue. + 5# weight gain  Cardiac: no chest pain, palpitations.   Respiratory: no cough, dyspnea   GI: no c/o nausea, abdominal pain.   Neuro: c/o paresthesias, stabbing pain in feet.   Skin: no lesions, rashes.   Endocrine: denies polyphagia, polydipsia, polyuria.      Personally reviewed  "Past Medical, Surgical, Social History.    Vital Signs  /70   Pulse (!) 55   Ht 5' 1" (1.549 m)   Wt 71.6 kg (157 lb 15.4 oz)   BMI 29.85 kg/m²     Personally reviewed the below labs:    Hemoglobin A1C   Date Value Ref Range Status   07/10/2024 5.8 (H) 4.0 - 5.6 % Final     Comment:     ADA Screening Guidelines:  5.7-6.4%  Consistent with prediabetes  >or=6.5%  Consistent with diabetes    High levels of fetal hemoglobin interfere with the HbA1C  assay. Heterozygous hemoglobin variants (HbS, HgC, etc)do  not significantly interfere with this assay.   However, presence of multiple variants may affect accuracy.     03/21/2024 5.6 4.5 - 6.2 % Final     Comment:     According to ADA guidelines, hemoglobin A1C <7.0% represents  optimal control in non-pregnant diabetic patients.  Different  metrics may apply to specific populations.   Standards of Medical Care in Diabetes - 2016.    For the purpose of screening for the presence of diabetes:  <5.7%     Consistent with the absence of diabetes  5.7-6.4%  Consistent with increasing risk for diabetes   (prediabetes)  >or=6.5%  Consistent with diabetes    Currently no consensus exists for use of hemoglobin A1C  for diagnosis of diabetes for children.     03/06/2024 5.5 4.0 - 5.6 % Final     Comment:     ADA Screening Guidelines:  5.7-6.4%  Consistent with prediabetes  >or=6.5%  Consistent with diabetes    High levels of fetal hemoglobin interfere with the HbA1C  assay. Heterozygous hemoglobin variants (HbS, HgC, etc)do  not significantly interfere with this assay.   However, presence of multiple variants may affect accuracy.         Chemistry        Component Value Date/Time     07/10/2024 1221     07/10/2024 1221    K 4.1 07/10/2024 1221    K 4.1 07/10/2024 1221     07/10/2024 1221     07/10/2024 1221    CO2 24 07/10/2024 1221    CO2 25 07/10/2024 1221    BUN 16 07/10/2024 1221    BUN 17 07/10/2024 1221    CREATININE 1.5 (H) 07/10/2024 1221    " CREATININE 1.5 (H) 07/10/2024 1221     (H) 07/10/2024 1221     07/10/2024 1221        Component Value Date/Time    CALCIUM 9.7 07/10/2024 1221    CALCIUM 9.2 07/10/2024 1221    ALKPHOS 71 07/10/2024 1221    AST 20 07/10/2024 1221    AST 45 (H) 01/30/2016 0431    ALT 8 (L) 07/10/2024 1221    BILITOT 0.4 07/10/2024 1221          Lab Results   Component Value Date    CHOL 154 07/10/2024    CHOL 118 (L) 05/03/2024    CHOL 124 05/25/2023     Lab Results   Component Value Date    HDL 37 (L) 07/10/2024    HDL 29 (L) 05/03/2024    HDL 30 (L) 05/25/2023     Lab Results   Component Value Date    LDLCALC 62.2 (L) 07/10/2024    LDLCALC 33.2 (L) 05/03/2024    LDLCALC 39.6 (L) 05/25/2023     Lab Results   Component Value Date    TRIG 274 (H) 07/10/2024    TRIG 279 (H) 05/03/2024    TRIG 272 (H) 05/25/2023     Lab Results   Component Value Date    CHOLHDL 24.0 07/10/2024    CHOLHDL 24.6 05/03/2024    CHOLHDL 24.2 05/25/2023       Lab Results   Component Value Date    MICALBCREAT 192.4 (H) 11/28/2023     Lab Results   Component Value Date    TSH 0.337 (L) 07/10/2024       CrCl cannot be calculated (Patient's most recent lab result is older than the maximum 7 days allowed.).    Vit D, 25-Hydroxy   Date Value Ref Range Status   07/10/2024 24 (L) 30 - 96 ng/mL Final     Comment:     Vitamin D deficiency.........<10 ng/mL                              Vitamin D insufficiency......10-29 ng/mL       Vitamin D sufficiency........> or equal to 30 ng/mL  Vitamin D toxicity............>100 ng/mL             PHYSICAL EXAMINATION  Constitutional: Appears well, no distress  Respiratory: CTA, even and unlabored.  Cardiovascular: RRR, no murmurs, no carotid bruits.   GI: normal bowel sounds, no hernia  Skin: warm and dry  Neuro: oriented to person, place, time.   Feet: appropriate footwear.     DEXCOM G7 DOWNLOAD: Fasting glucoses are stable. Prandial excursions noted, mid-day and in evening. Rare hypoglycemia.          Goals         "HEMOGLOBIN A1C < 7.5                 Assessment/Plan  1. Type 2 diabetes mellitus with stage 3 chronic kidney disease, with long-term current use of insulin  -- A1c at goal but prandial excursions noted since stopping GLP-1RA. Recommended SGLT2-I, due to CKD, CAD but pt is honest about the fact that she rarely drinks water and tends to be more "dehydrated." So will avoid this.   -- start Januvia 50 mg daily  -- continue Tresiba 30 units.  -- continue Dexcom G7 training   -- following with nephrology    -- Discussed diagnosis of DM, A1c goals, progression of disease, long term complications and tx options.   -- Reviewed hypoglycemia management: treat with 1/2 glass of juice, 1/2 can regular coke, or 4 glucose tablets. Monitor and repeat treatment every 15 minutes until BG is >70 Then have a snack, which includes a complex carbohydrate and protein.   Advised p  atient to check BG before activities, such as driving or exercise.    -- takes aspirin, statin, ace-I   2. Type 2 diabetes mellitus with diabetic polyneuropathy, with long-term current use of insulin  -- optimize DM control     3. Coronary artery disease involving native coronary artery of native heart without angina pectoris  -- follows with cardiology  -- see A/P #7   4. Essential hypertension  -- controlled  -- continue current meds   5. Hyperlipidemia with target LDL less than 70  -- Uncontrolled with elevated triglycerides   -- Continue Crestor (Fenofibrate previously DC'd due to renal function)   6. Thyroid disease  -- improving  -- continue levothyroxine 75 mcg daily   7. Secondary hyperparathyroidism of renal origin  -- managed by nephrology (Dr. Leigh)       FOLLOW UP  No follow-ups on file.   Patient instructed to bring BG logs to each follow up   Patient encouraged to call for any BG/medication issues, concerns, or questions.    No orders of the defined types were placed in this encounter.                              "

## 2024-07-27 ENCOUNTER — NURSE TRIAGE (OUTPATIENT)
Dept: ADMINISTRATIVE | Facility: CLINIC | Age: 79
End: 2024-07-27
Payer: MEDICARE

## 2024-07-27 NOTE — TELEPHONE ENCOUNTER
Pt calling, pt is scheduled for colonoscopy on Tuesday. Pt calling because she bought orange flavored miralax. Discussed with patient that the flavor of orange is fine but we don't want to drink ay orange colored liquids before colonoscopy. Unable to find any information about if the orange flavored miralax would be clear or orange once mixed. Pt does not want to open in case she can return it. Recommended patient talk to pharmacist to see if they know or to exchange of original if she is concerned. Pt v/u.   Reason for Disposition   Question about upcoming scheduled surgery, procedure or test, no triage required, and triager able to answer question    Protocols used: Information Only Call - No Triage-A-

## 2024-07-30 ENCOUNTER — ANESTHESIA (OUTPATIENT)
Dept: ENDOSCOPY | Facility: HOSPITAL | Age: 79
End: 2024-07-30
Payer: MEDICARE

## 2024-07-30 ENCOUNTER — ANESTHESIA EVENT (OUTPATIENT)
Dept: ENDOSCOPY | Facility: HOSPITAL | Age: 79
End: 2024-07-30
Payer: MEDICARE

## 2024-07-30 ENCOUNTER — HOSPITAL ENCOUNTER (OUTPATIENT)
Facility: HOSPITAL | Age: 79
Discharge: HOME | End: 2024-07-30
Attending: INTERNAL MEDICINE | Admitting: FAMILY MEDICINE
Payer: MEDICARE

## 2024-07-30 VITALS
HEART RATE: 62 BPM | BODY MASS INDEX: 28.89 KG/M2 | DIASTOLIC BLOOD PRESSURE: 60 MMHG | HEIGHT: 61 IN | WEIGHT: 153 LBS | OXYGEN SATURATION: 97 % | SYSTOLIC BLOOD PRESSURE: 134 MMHG | RESPIRATION RATE: 18 BRPM | TEMPERATURE: 98 F

## 2024-07-30 DIAGNOSIS — K64.8 INTERNAL HEMORRHOIDS: Primary | ICD-10-CM

## 2024-07-30 DIAGNOSIS — K59.00 CONSTIPATION: ICD-10-CM

## 2024-07-30 DIAGNOSIS — K57.90 DIVERTICULOSIS: ICD-10-CM

## 2024-07-30 LAB
GLUCOSE SERPL-MCNC: 141 MG/DL (ref 70–110)
POCT GLUCOSE: 141 MG/DL (ref 70–110)

## 2024-07-30 PROCEDURE — 25000003 PHARM REV CODE 250: Performed by: NURSE ANESTHETIST, CERTIFIED REGISTERED

## 2024-07-30 PROCEDURE — 37000009 HC ANESTHESIA EA ADD 15 MINS: Performed by: INTERNAL MEDICINE

## 2024-07-30 PROCEDURE — 63600175 PHARM REV CODE 636 W HCPCS: Performed by: NURSE ANESTHETIST, CERTIFIED REGISTERED

## 2024-07-30 PROCEDURE — 27201012 HC FORCEPS, HOT/COLD, DISP: Performed by: INTERNAL MEDICINE

## 2024-07-30 PROCEDURE — 45380 COLONOSCOPY AND BIOPSY: CPT | Performed by: INTERNAL MEDICINE

## 2024-07-30 PROCEDURE — 45380 COLONOSCOPY AND BIOPSY: CPT | Mod: ,,, | Performed by: INTERNAL MEDICINE

## 2024-07-30 PROCEDURE — 37000008 HC ANESTHESIA 1ST 15 MINUTES: Performed by: INTERNAL MEDICINE

## 2024-07-30 PROCEDURE — 25000003 PHARM REV CODE 250: Performed by: INTERNAL MEDICINE

## 2024-07-30 PROCEDURE — 88305 TISSUE EXAM BY PATHOLOGIST: CPT | Mod: TC,59 | Performed by: PATHOLOGY

## 2024-07-30 RX ORDER — LIDOCAINE HYDROCHLORIDE 20 MG/ML
INJECTION INTRAVENOUS
Status: DISCONTINUED | OUTPATIENT
Start: 2024-07-30 | End: 2024-07-30

## 2024-07-30 RX ORDER — SODIUM CHLORIDE 9 MG/ML
INJECTION, SOLUTION INTRAVENOUS CONTINUOUS
Status: DISCONTINUED | OUTPATIENT
Start: 2024-07-30 | End: 2024-07-30 | Stop reason: HOSPADM

## 2024-07-30 RX ORDER — PROPOFOL 10 MG/ML
VIAL (ML) INTRAVENOUS
Status: DISCONTINUED | OUTPATIENT
Start: 2024-07-30 | End: 2024-07-30

## 2024-07-30 RX ADMIN — PROPOFOL 30 MG: 10 INJECTION, EMULSION INTRAVENOUS at 02:07

## 2024-07-30 RX ADMIN — SODIUM CHLORIDE: 9 INJECTION, SOLUTION INTRAVENOUS at 01:07

## 2024-07-30 RX ADMIN — LIDOCAINE HYDROCHLORIDE 50 MG: 20 INJECTION, SOLUTION INTRAVENOUS at 02:07

## 2024-07-30 RX ADMIN — PROPOFOL 100 MG: 10 INJECTION, EMULSION INTRAVENOUS at 02:07

## 2024-07-30 NOTE — TRANSFER OF CARE
"Anesthesia Transfer of Care Note    Patient: Adele Hall    Procedure(s) Performed: Procedure(s) (LRB):  COLONOSCOPY wants earlier time (N/A)    Patient location: GI    Anesthesia Type: general    Transport from OR: Transported from OR on room air with adequate spontaneous ventilation    Post pain: adequate analgesia    Post assessment: no apparent anesthetic complications    Post vital signs: stable    Level of consciousness: awake    Nausea/Vomiting: no nausea/vomiting    Complications: none    Transfer of care protocol was followed      Last vitals: Visit Vitals  BP (!) 122/58 (Patient Position: Lying)   Pulse 81   Temp 36.9 °C (98.4 °F) (Skin)   Resp 16   Ht 5' 1" (1.549 m)   Wt 69.4 kg (153 lb)   SpO2 99%   Breastfeeding No   BMI 28.91 kg/m²     "

## 2024-07-30 NOTE — ANESTHESIA PREPROCEDURE EVALUATION
07/30/2024  Adele Hall is a 79 y.o., female.      Pre-op Assessment    I have reviewed the Patient Summary Reports.     I have reviewed the Nursing Notes. I have reviewed the NPO Status.   I have reviewed the Medications.     Review of Systems  Anesthesia Hx:  No problems with previous Anesthesia                Social:  Non-Smoker       Cardiovascular:     Hypertension   CAD  asymptomatic    Angina CHF    hyperlipidemia JONES   ·5/2024;    The ECG portion of the study is negative for ischemia.  ·  The patient reported chest pain during the stress test.  ·  There were no arrhythmias during stress.  ·  The nuclear portion of this study will be reported separately.                           Pulmonary:   COPD, mild   Shortness of breath                  Renal/:  Chronic Renal Disease (stage 3), CKD                Hepatic/GI:  Bowel Prep. PUD,  GERD, well controlled             Neurological:    Neuromuscular Disease,                                   Endocrine:  Diabetes, well controlled, type 2 Hypothyroidism              Physical Exam  General: Well nourished, Cooperative, Alert and Oriented    Airway:  Mallampati: II   Mouth Opening: Normal  TM Distance: Normal  Neck ROM: Normal ROM      Anesthesia Plan  Type of Anesthesia, risks & benefits discussed:    Anesthesia Type: Gen Natural Airway  Intra-op Monitoring Plan: Standard ASA Monitors  Induction:  IV  Airway Plan: Direct, Video and Fiberoptic, Post-Induction  Informed Consent: Informed consent signed with the Patient and all parties understand the risks and agree with anesthesia plan.  All questions answered.   ASA Score: 3    Ready For Surgery From Anesthesia Perspective.     .

## 2024-07-30 NOTE — PLAN OF CARE
Vss, tomasa po fluids, denies pain, ambulates easily. IV removed, catheter intact. Discharge instructions provided and states understanding. States ready to go home.  Discharged from facility with family per wheelchair.

## 2024-07-30 NOTE — PROVATION PATIENT INSTRUCTIONS
Discharge Summary/Instructions after an Endoscopic Procedure  Patient Name: Adele Hall  Patient MRN: [id number]  Patient YOB: 1945 Tuesday, July 30, 2024  Seth Watson MD  Dear patient,  As a result of recent federal legislation (The Federal Cures Act), you may   receive lab or pathology results from your procedure in your [facility]   account before your physician is able to contact you. Your physician or   their representative will relay the results to you with their   recommendations at their soonest availability.  Thank you,  RESTRICTIONS:  During your procedure today, you received medications for sedation.  These   medications may affect your judgment, balance and coordination.  Therefore,   for 24 hours, you have the following restrictions:   - DO NOT drive a car, operate machinery, make legal/financial decisions,   sign important papers or drink alcohol.    ACTIVITY:  Today: no heavy lifting, straining or running due to procedural   sedation/anesthesia.  The following day: return to full activity including work.  DIET:  Eat and drink normally unless instructed otherwise.     TREATMENT FOR COMMON SIDE EFFECTS:  - Mild abdominal pain, nausea, belching, bloating or excessive gas:  rest,   eat lightly and use a heating pad.  - Sore Throat: treat with throat lozenges and/or gargle with warm salt   water.  - Because air was used during the procedure, expelling large amounts of air   from your rectum or belching is normal.  - If a bowel prep was taken, you may not have a bowel movement for 1-3 days.    This is normal.  SYMPTOMS TO WATCH FOR AND REPORT TO YOUR PHYSICIAN:  1. Abdominal pain or bloating, other than gas cramps.  2. Chest pain.  3. Back pain.  4. Signs of infection such as: chills or fever occurring within 24 hours   after the procedure.  5. Rectal bleeding, which would show as bright red, maroon, or black stools.   (A tablespoon of blood from the rectum is not serious, especially  if   hemorrhoids are present.)  6. Vomiting.  7. Weakness or dizziness.  GO DIRECTLY TO THE NEAREST EMERGENCY ROOM IF YOU HAVE ANY OF THE FOLLOWING:      Difficulty breathing              Chills and/or fever over 101 F   Persistent vomiting and/or vomiting blood   Severe abdominal pain   Severe chest pain   Black, tarry stools   Bleeding- more than one tablespoon   Any other symptom or condition that you feel may need urgent attention  Your doctor recommends these additional instructions:  If any biopsies were taken, your doctors clinic will contact you in 1 to 2   weeks with any results.  - Patient has a contact number available for emergencies.  The signs and   symptoms of potential delayed complications were discussed with the   patient.  Return to normal activities tomorrow.  Written discharge   instructions were provided to the patient.   - High fiber diet.   - Continue present medications.   - Await pathology results.   - Repeat colonoscopy in 5 years for surveillance.   - Discharge patient to home (ambulatory).   - Return to GI office PRN.  For questions, problems or results please call your physician - Seth Watson MD at Work:  [phone].  [facility] EMERGENCY ROOM PHONE NUMBER: [phone]  IF A COMPLICATION OR EMERGENCY SITUATION ARISES AND YOU ARE UNABLE TO REACH   YOUR PHYSICIAN - GO DIRECTLY TO THE EMERGENCY ROOM.  Seth Watson MD  7/30/2024 2:24:37 PM  This report has been verified and signed electronically.  Dear patient,  As a result of recent federal legislation (The Federal Cures Act), you may   receive lab or pathology results from your procedure in your [facility]   account before your physician is able to contact you. Your physician or   their representative will relay the results to you with their   recommendations at their soonest availability.  Thank you,  PROVATION

## 2024-07-30 NOTE — H&P
CC: Change in bowel habits    79 year old female with above. States that symptoms are absent, no alleviating/exacerbating factors. No family history of colorectal CA. No personal history of polyps. No bleeding or weight loss.     ROS:  No headache, no fever/chills, no chest pain/SOB, no nausea/vomiting/diarrhea/constipation/GI bleeding/abdominal pain, no dysuria/hematuria.    VSSAF   Exam:   Alert and oriented x 3; no apparent distress   PERRLA, sclera anicteric  CV: Regular rate/rhythm, normal PMI   Lungs: Clear bilaterally with no wheeze/rales   Abdomen: Soft, NT/ND, normal bowel sounds   Ext: No cyanosis, clubbing     Impression:   As above    Plan:   Proceed with endoscopy. Further recs to follow.

## 2024-07-30 NOTE — ANESTHESIA POSTPROCEDURE EVALUATION
Anesthesia Post Evaluation    Patient: Adele Hall    Procedure(s) Performed: Procedure(s) (LRB):  COLONOSCOPY wants earlier time (N/A)    Final Anesthesia Type: general      Patient location during evaluation: PACU  Patient participation: Yes- Able to Participate  Level of consciousness: sedated and awake  Post-procedure vital signs: reviewed and stable  Pain management: adequate  Airway patency: patent    PONV status at discharge: No PONV  Anesthetic complications: no      Cardiovascular status: blood pressure returned to baseline and hemodynamically stable  Respiratory status: spontaneous ventilation  Hydration status: euvolemic  Follow-up not needed.              Vitals Value Taken Time   /60 07/30/24 1439   Temp 36.9 °C (98.4 °F) 07/30/24 1424   Pulse 62 07/30/24 1439   Resp 18 07/30/24 1439   SpO2 97 % 07/30/24 1439         No case tracking events are documented in the log.      Pain/Antonio Score: Antonio Score: 10 (7/30/2024  2:40 PM)

## 2024-08-19 ENCOUNTER — OFFICE VISIT (OUTPATIENT)
Dept: FAMILY MEDICINE | Facility: CLINIC | Age: 79
End: 2024-08-19
Attending: FAMILY MEDICINE
Payer: MEDICARE

## 2024-08-19 VITALS
TEMPERATURE: 98 F | OXYGEN SATURATION: 93 % | HEIGHT: 61 IN | BODY MASS INDEX: 29.9 KG/M2 | SYSTOLIC BLOOD PRESSURE: 126 MMHG | HEART RATE: 52 BPM | DIASTOLIC BLOOD PRESSURE: 64 MMHG | WEIGHT: 158.38 LBS

## 2024-08-19 DIAGNOSIS — E11.22 TYPE 2 DIABETES MELLITUS WITH STAGE 3A CHRONIC KIDNEY DISEASE, WITH LONG-TERM CURRENT USE OF INSULIN: ICD-10-CM

## 2024-08-19 DIAGNOSIS — E03.1 CONGENITAL HYPOTHYROIDISM WITHOUT GOITER: ICD-10-CM

## 2024-08-19 DIAGNOSIS — I15.2 HYPERTENSION ASSOCIATED WITH DIABETES: ICD-10-CM

## 2024-08-19 DIAGNOSIS — I25.10 CORONARY ARTERY DISEASE INVOLVING NATIVE CORONARY ARTERY OF NATIVE HEART WITHOUT ANGINA PECTORIS: ICD-10-CM

## 2024-08-19 DIAGNOSIS — E78.5 DYSLIPIDEMIA ASSOCIATED WITH TYPE 2 DIABETES MELLITUS: ICD-10-CM

## 2024-08-19 DIAGNOSIS — Z00.00 ENCOUNTER FOR PREVENTIVE HEALTH EXAMINATION: Primary | ICD-10-CM

## 2024-08-19 DIAGNOSIS — E11.59 HYPERTENSION ASSOCIATED WITH DIABETES: ICD-10-CM

## 2024-08-19 DIAGNOSIS — N18.31 TYPE 2 DIABETES MELLITUS WITH STAGE 3A CHRONIC KIDNEY DISEASE, WITH LONG-TERM CURRENT USE OF INSULIN: ICD-10-CM

## 2024-08-19 DIAGNOSIS — Z95.1 S/P CABG (CORONARY ARTERY BYPASS GRAFT): ICD-10-CM

## 2024-08-19 DIAGNOSIS — J44.9 CHRONIC OBSTRUCTIVE PULMONARY DISEASE, UNSPECIFIED COPD TYPE: ICD-10-CM

## 2024-08-19 DIAGNOSIS — I48.0 PAF (PAROXYSMAL ATRIAL FIBRILLATION): ICD-10-CM

## 2024-08-19 DIAGNOSIS — Z79.4 TYPE 2 DIABETES MELLITUS WITH STAGE 3A CHRONIC KIDNEY DISEASE, WITH LONG-TERM CURRENT USE OF INSULIN: ICD-10-CM

## 2024-08-19 DIAGNOSIS — E11.21 DIABETIC NEPHROPATHY ASSOCIATED WITH TYPE 2 DIABETES MELLITUS: ICD-10-CM

## 2024-08-19 DIAGNOSIS — I70.0 ATHEROSCLEROSIS OF AORTA: ICD-10-CM

## 2024-08-19 DIAGNOSIS — K52.1 DIARRHEA DUE TO DRUG: ICD-10-CM

## 2024-08-19 DIAGNOSIS — E11.69 DYSLIPIDEMIA ASSOCIATED WITH TYPE 2 DIABETES MELLITUS: ICD-10-CM

## 2024-08-19 PROBLEM — N17.9 AKI (ACUTE KIDNEY INJURY): Status: RESOLVED | Noted: 2021-03-04 | Resolved: 2024-08-19

## 2024-08-19 PROCEDURE — 1159F MED LIST DOCD IN RCRD: CPT | Mod: CPTII,S$GLB,, | Performed by: FAMILY MEDICINE

## 2024-08-19 PROCEDURE — 3078F DIAST BP <80 MM HG: CPT | Mod: CPTII,S$GLB,, | Performed by: FAMILY MEDICINE

## 2024-08-19 PROCEDURE — 1100F PTFALLS ASSESS-DOCD GE2>/YR: CPT | Mod: CPTII,S$GLB,, | Performed by: FAMILY MEDICINE

## 2024-08-19 PROCEDURE — 99999 PR PBB SHADOW E&M-EST. PATIENT-LVL IV: CPT | Mod: PBBFAC,,, | Performed by: FAMILY MEDICINE

## 2024-08-19 PROCEDURE — 3288F FALL RISK ASSESSMENT DOCD: CPT | Mod: CPTII,S$GLB,, | Performed by: FAMILY MEDICINE

## 2024-08-19 PROCEDURE — 3074F SYST BP LT 130 MM HG: CPT | Mod: CPTII,S$GLB,, | Performed by: FAMILY MEDICINE

## 2024-08-19 PROCEDURE — 99397 PER PM REEVAL EST PAT 65+ YR: CPT | Mod: S$GLB,,, | Performed by: FAMILY MEDICINE

## 2024-08-19 PROCEDURE — 1125F AMNT PAIN NOTED PAIN PRSNT: CPT | Mod: CPTII,S$GLB,, | Performed by: FAMILY MEDICINE

## 2024-08-19 PROCEDURE — 1160F RVW MEDS BY RX/DR IN RCRD: CPT | Mod: CPTII,S$GLB,, | Performed by: FAMILY MEDICINE

## 2024-08-19 NOTE — PROGRESS NOTES
Subjective:       Patient ID: Adele Hall is a 79 y.o. female.    Chief Complaint: Annual Exam    79-year-old female coming in for annual exam with multiple medical problems.  She has a history of coronary artery disease with bypass surgery x4 in 2016, type 2 diabetes on Tresiba and Januvia followed by Endocrinology, hypertension, hyperlipidemia, hypothyroidism on Synthroid 75 mcg daily also followed by Endocrinology and whose most recent TSH was slightly hyperthyroid with no dose changes made but a follow-up ordered in October.  She has a history of gastroesophageal reflux, COPD, congestive heart failure, proteinuria, leukocytosis, anemia, atherosclerosis of the aorta, and paroxysmal atrial fibrillation after bypass surgery.  Her main complaint at the present time is uncontrollable diarrhea often explosive and liquid.  She can not tell me how long this has been going on but she was seen in GI in April with chronic constipation and was started on Linzess 72 mcg daily.  The diarrhea started sometime after that and it has greatly hampered her ability to get out of the house.  She also has been having frequent falls two occasions she mentioned were both while shopping when the shopping cart rolled out in front of her and she could not maintain her balance without it.  She has some left-sided chest wall pain that apparently resulted from one of these falls, she had x-rays done of the forearm, elbow, and wrist on the left side but no x-rays of the ribs or chest were done, this was back in July.  She thinks her chest was injured by a bystander attempting to help lift her.  Pain is getting better but it hurts to lay on her left side.    Past Medical History:  No date: Anesthesia complication      Comment:  took patient 6 days to come out of anethesia after CABG  No date: Anticoagulant long-term use  No date: Arthritis  No date: Chronic kidney disease  No date: CKD (chronic kidney disease) stage 3, GFR 30-59  ml/min      Comment:  Dr. Montero  2/7/2016: COPD (chronic obstructive pulmonary disease)  No date: COPD (chronic obstructive pulmonary disease)  No date: Coronary artery disease  No date: Diabetes mellitus  No date: Diabetes mellitus, type 2  No date: GERD (gastroesophageal reflux disease)  No date: Hx of colonic polyps  No date: Hyperlipidemia  No date: Hypertension  7/30/2016: possible new onset CHF  No date: Tardive dyskinesia  No date: Thyroid disease  No date: Ulcer  No date: Vertigo    Past Surgical History:  No date: CARDIAC SURGERY  8/30/2023: CATARACT EXTRACTION W/  INTRAOCULAR LENS IMPLANT; Right      Comment:  Procedure: EXTRACTION, CATARACT, WITH IOL INSERTION;                 Surgeon: Tamiko Lam MD;  Location: [facility] OR;                 Service: Ophthalmology;  Laterality: Right;  9/13/2023: CATARACT EXTRACTION W/  INTRAOCULAR LENS IMPLANT; Left      Comment:  Procedure: EXTRACTION, CATARACT, WITH IOL INSERTION;                 Surgeon: Tamiko Lam MD;  Location: [facility] OR;                 Service: Ophthalmology;  Laterality: Left;  01/26/2017: CHOLECYSTECTOMY  No date: COLONOSCOPY  12/7/2021: COLONOSCOPY; N/A      Comment:  Procedure: COLONOSCOPY;  Surgeon: Lito Will MD;                 Location: [facility];  Service: Endoscopy;  Laterality:                N/A;  7/30/2024: COLONOSCOPY; N/A      Comment:  Procedure: COLONOSCOPY wants earlier time;  Surgeon:                Seth Yoder MD;  Location: [facility];  Service:                Endoscopy;  Laterality: N/A;  01/19/2016: CORONARY ARTERY BYPASS GRAFT      Comment:  4 vessel  6/19/2024: ESOPHAGOGASTRODUODENOSCOPY; N/A      Comment:  Procedure: EGD (ESOPHAGOGASTRODUODENOSCOPY);  Surgeon:                Seth Yoder MD;  Location: [facility];  Service:                Endoscopy;  Laterality: N/A;  No date: HYSTERECTOMY  No date: OOPHORECTOMY  No date: TONSILLECTOMY  No date: TOTAL THYROIDECTOMY  No date: TUBAL  LIGATION    Review of patient's family history indicates:  Problem: Cancer      Relation: Mother          Name:               Age of Onset: (Not Specified)              Comment: LYMPHOMA  Problem: Breast cancer      Relation: Mother          Name:               Age of Onset: (Not Specified)  Problem: Cancer      Relation: Father          Name:               Age of Onset: (Not Specified)  Problem: Early death      Relation: Father          Name:               Age of Onset: (Not Specified)  Problem: Heart disease      Relation: Paternal Uncle          Name:               Age of Onset: (Not Specified)  Problem: Alcohol abuse      Relation: Sister          Name:               Age of Onset: (Not Specified)              Comment: x2  Problem: Heart disease      Relation: Brother          Name:               Age of Onset: (Not Specified)  Problem: No Known Problems      Relation: Son          Name: 3              Age of Onset: (Not Specified)  Problem: Glaucoma      Relation: Neg Hx          Name:               Age of Onset: (Not Specified)  Problem: Macular degeneration      Relation: Neg Hx          Name:               Age of Onset: (Not Specified)    Social History    Tobacco Use      Smoking status: Former        Packs/day: 0.00        Years: 1 pack/day for 27.0 years (27.0 ttl pk-yrs)        Types: Cigarettes        Start date: 1989        Quit date: 2016        Years since quittin.5      Smokeless tobacco: Never    Alcohol use: No      Alcohol/week: 0.0 standard drinks of alcohol    Drug use: No    Current Outpatient Medications on File Prior to Visit:  amLODIPine (NORVASC) 5 MG tablet, TAKE ONE TABLET BY MOUTH ONCE DAILY (Patient taking differently: Take 5 mg by mouth once daily.), Disp: 90 tablet, Rfl: 3  aspirin (ECOTRIN) 81 MG EC tablet, Take 81 mg by mouth once daily., Disp: , Rfl:   BD INSULIN SYRINGE ULTRA-FINE 1 mL 31 gauge x 5/16 Syrg, USE AS DIRECTED 5 TIMES A DAY WITH LEVEMIR & NOVOLOG, Disp:  "200 each, Rfl: 10  blood sugar diagnostic (TRUE METRIX GLUCOSE TEST STRIP) Strp, Test blood sugar 3x/day, Disp: 300 strip, Rfl: 3  calcitRIOL (ROCALTROL) 0.25 MCG Cap, Take 0.25 mcg by mouth every 7 days., Disp: , Rfl:   lancets 33 gauge Misc, 1 lancet by Misc.(Non-Drug; Combo Route) route 4 (four) times daily., Disp: , Rfl:   levothyroxine (SYNTHROID) 75 MCG tablet, TAKE ONE TABLET BY MOUTH EVERY MORNING BEFORE BREAKFAST, Disp: 30 tablet, Rfl: 11  metoprolol tartrate (LOPRESSOR) 25 MG tablet, Take 1 tablet (25 mg total) by mouth 2 (two) times daily., Disp: 180 tablet, Rfl: 3  nitroGLYCERIN (NITROSTAT) 0.4 MG SL tablet, Place 1 tablet (0.4 mg total) under the tongue every 5 (five) minutes as needed for Chest pain. Seek medical help is pain is not relieved by the third dose. (Patient taking differently: Place 0.4 mg under the tongue every 5 (five) minutes as needed for Chest pain. Place 1 tablet (0.4 mg total) under the tongue every 5 (five) minutes as needed for Chest pain. Seek medical help is pain is not relieved by the third dose.), Disp: 25 tablet, Rfl: 5  omeprazole (PRILOSEC) 40 MG capsule, Take 1 capsule (40 mg total) by mouth once daily., Disp: 90 capsule, Rfl: 3  pen needle, diabetic (BD ULTRA-FINE YUMIKO PEN NEEDLE) 32 gauge x 5/32" Ndle, USE FOUR TIMES A DAY WITH INSULIN, Disp: 400 each, Rfl: 3  rosuvastatin (CRESTOR) 40 MG Tab, Take 1 tablet (40 mg total) by mouth every evening., Disp: 90 tablet, Rfl: 3  SITagliptin phosphate (JANUVIA) 50 MG Tab, Take 1 tablet (50 mg total) by mouth once daily., Disp: 30 tablet, Rfl: 6  traZODone (DESYREL) 100 MG tablet, Take 1 tablet (100 mg total) by mouth every evening., Disp: 90 tablet, Rfl: 3  TRESIBA FLEXTOUCH U-200 200 unit/mL (3 mL) insulin pen, Inject 30 Units into the skin once daily., Disp: , Rfl:   [DISCONTINUED] linaCLOtide (LINZESS) 72 mcg Cap capsule, Take 1 capsule (72 mcg total) by mouth before breakfast., Disp: 30 capsule, Rfl: 1  [DISCONTINUED] " IRMALYTE-C 240-22.72-6.72 -5.84 gram SolR, Take by mouth once. (Patient not taking: Reported on 8/19/2024), Disp: , Rfl:   [DISCONTINUED] meclizine (ANTIVERT) 25 mg tablet, Take 1 tablet (25 mg total) by mouth 3 (three) times daily as needed. (Patient not taking: Reported on 8/19/2024), Disp: 20 tablet, Rfl: 0  [DISCONTINUED] ranolazine (RANEXA) 500 MG Tb12, Take 1 tablet (500 mg total) by mouth 2 (two) times daily. (Patient not taking: Reported on 8/19/2024), Disp: 60 tablet, Rfl: 2  [DISCONTINUED] vitamin D (VITAMIN D3) 1000 units Tab, Take 1,000 Units by mouth once daily. (Patient not taking: Reported on 5/30/2024), Disp: , Rfl:     No current facility-administered medications on file prior to visit.          Review of Systems   Constitutional:  Negative for chills, diaphoresis and fever.   HENT:  Positive for congestion and rhinorrhea. Negative for sinus pressure and sinus pain.    Eyes:  Positive for redness. Negative for visual disturbance.   Respiratory:  Negative for chest tightness, shortness of breath and wheezing.    Cardiovascular:  Positive for chest pain (Resolving left chest wall pain).   Gastrointestinal:  Positive for diarrhea. Negative for anal bleeding, blood in stool, constipation, nausea and vomiting.   Endocrine: Negative for cold intolerance, heat intolerance, polydipsia and polyuria.   Genitourinary:  Negative for dysuria, frequency and hematuria.   Musculoskeletal:  Positive for gait problem (Frequent falls).   Skin:  Negative for color change and rash.   Neurological:  Negative for tremors and light-headedness.       Objective:      Physical Exam  Vitals and nursing note reviewed.   Constitutional:       General: She is not in acute distress.     Appearance: Normal appearance. She is not ill-appearing, toxic-appearing or diaphoretic.      Comments: Good blood pressure control  Bradycardia probably secondary to Lopressor, regular rhythm   Overweight with a BMI of 29.9 she is up 1 lb from her  last physical August 18, 2023   HENT:      Head: Normocephalic and atraumatic.      Right Ear: Tympanic membrane, ear canal and external ear normal. There is no impacted cerumen.      Left Ear: Tympanic membrane, ear canal and external ear normal. There is no impacted cerumen.      Nose: Congestion and rhinorrhea (Mild to moderate) present.      Mouth/Throat:      Mouth: Mucous membranes are moist.      Pharynx: Oropharynx is clear. No oropharyngeal exudate or posterior oropharyngeal erythema.   Eyes:      General: No scleral icterus.        Right eye: No discharge.         Left eye: No discharge.      Extraocular Movements: Extraocular movements intact.      Conjunctiva/sclera: Conjunctivae normal.      Pupils: Pupils are equal, round, and reactive to light.   Neck:      Vascular: No carotid bruit.   Cardiovascular:      Rate and Rhythm: Normal rate and regular rhythm.      Pulses: Normal pulses.      Heart sounds: No murmur heard.     No friction rub. No gallop.   Pulmonary:      Effort: Pulmonary effort is normal. No respiratory distress.      Breath sounds: Normal breath sounds. No stridor. No wheezing, rhonchi or rales.   Chest:      Chest wall: No tenderness.   Abdominal:      General: Abdomen is protuberant. Bowel sounds are increased. There is no distension. There are no signs of injury.      Palpations: Abdomen is soft. There is no shifting dullness, hepatomegaly or splenomegaly.      Tenderness: There is no abdominal tenderness. There is no right CVA tenderness, left CVA tenderness, guarding or rebound. Negative signs include Rucker's sign and McBurney's sign.   Musculoskeletal:      Cervical back: Normal range of motion and neck supple. No rigidity or tenderness.      Right lower leg: No edema.      Left lower leg: No edema.   Lymphadenopathy:      Cervical: No cervical adenopathy.   Skin:     Coloration: Skin is not jaundiced or pale.      Findings: No bruising, erythema, lesion or rash.   Neurological:       General: No focal deficit present.      Mental Status: She is alert and oriented to person, place, and time. Mental status is at baseline.   Psychiatric:         Mood and Affect: Mood normal.         Behavior: Behavior normal.         Thought Content: Thought content normal.         Judgment: Judgment normal.         Assessment:       1. Encounter for preventive health examination    2. Diarrhea due to drug    3. Type 2 diabetes mellitus with stage 3a chronic kidney disease, with long-term current use of insulin    4. Hypertension associated with diabetes    5. Dyslipidemia associated with type 2 diabetes mellitus    6. Coronary artery disease involving native coronary artery of native heart without angina pectoris    7. Atherosclerosis of aorta    8. Chronic obstructive pulmonary disease, unspecified COPD type    9. S/P CABG (coronary artery bypass graft) - x4 01/19/2016; LIMA to LAD; SVG's to diag, OMB, PDA - all grafts patent 01/2018    10. PAF (paroxysmal atrial fibrillation), post CABG    11. Diabetic nephropathy associated with type 2 diabetes mellitus    12. Congenital hypothyroidism without goiter        Plan:       1. Encounter for preventive health examination    2. Diarrhea due to drug  Hold Linzess, if no improvement in the diarrhea within 4 to 5 days consider reducing her thyroid dose slightly.  Currently on 75 mcg with the next step down being 50 mcg.  We also have the option of taking 1/2 of a 137 mcg for a 68 mcg dose or 1/2 of a 125 for a 62 mcg dose.  If the Linzess removal does not correct it and reduction of thyroid to euthyroid status does not correct it consider use of Questran    3. Type 2 diabetes mellitus with stage 3a chronic kidney disease, with long-term current use of insulin  Lab Results   Component Value Date    HGBA1C 5.8 (H) 07/10/2024     Excellent blood sugar control, no changes needed but a reduction in the Tresiba to reduce the chances of hypoglycemia would be potentially  worthwhile    4. Hypertension associated with diabetes  Good blood pressure, not too low no evidence of orthostatic hypotension    5. Dyslipidemia associated with type 2 diabetes mellitus  Lab Results   Component Value Date    CHOL 154 07/10/2024    CHOL 118 (L) 05/03/2024    CHOL 124 05/25/2023     Lab Results   Component Value Date    HDL 37 (L) 07/10/2024    HDL 29 (L) 05/03/2024    HDL 30 (L) 05/25/2023     Lab Results   Component Value Date    LDLCALC 62.2 (L) 07/10/2024    LDLCALC 33.2 (L) 05/03/2024    LDLCALC 39.6 (L) 05/25/2023     Lab Results   Component Value Date    TRIG 274 (H) 07/10/2024    TRIG 279 (H) 05/03/2024    TRIG 272 (H) 05/25/2023       Lab Results   Component Value Date    CHOLHDL 24.0 07/10/2024    CHOLHDL 24.6 05/03/2024    CHOLHDL 24.2 05/25/2023     Excellent control with no changes needed in Crestor    6. Coronary artery disease involving native coronary artery of native heart without angina pectoris  Stable and asymptomatic    7. Atherosclerosis of aorta  Stable and asymptomatic    8. Chronic obstructive pulmonary disease, unspecified COPD type  Stable and asymptomatic    9. S/P CABG (coronary artery bypass graft) - x4 01/19/2016; LIMA to LAD; SVG's to diag, OMB, PDA - all grafts patent 01/2018  See above    10. PAF (paroxysmal atrial fibrillation), post CABG  Currently normal sinus rhythm    11. Diabetic nephropathy associated with type 2 diabetes mellitus  BMP  Lab Results   Component Value Date     07/10/2024     07/10/2024    K 4.1 07/10/2024    K 4.1 07/10/2024     07/10/2024     07/10/2024    CO2 24 07/10/2024    CO2 25 07/10/2024    BUN 16 07/10/2024    BUN 17 07/10/2024    CREATININE 1.5 (H) 07/10/2024    CREATININE 1.5 (H) 07/10/2024    CALCIUM 9.7 07/10/2024    CALCIUM 9.2 07/10/2024    ANIONGAP 11 07/10/2024    ANIONGAP 9 07/10/2024    EGFRNORACEVR 35.7 (A) 07/10/2024    EGFRNORACEVR 35.7 (A) 07/10/2024     Stable stage IIIB chronic kidney  disease    12. Congenital hypothyroidism without goiter  Lab Results   Component Value Date    TSH 0.337 (L) 07/10/2024     See above

## 2024-09-03 DIAGNOSIS — E78.5 DYSLIPIDEMIA ASSOCIATED WITH TYPE 2 DIABETES MELLITUS: ICD-10-CM

## 2024-09-03 DIAGNOSIS — E11.69 DYSLIPIDEMIA ASSOCIATED WITH TYPE 2 DIABETES MELLITUS: ICD-10-CM

## 2024-09-03 RX ORDER — ROSUVASTATIN CALCIUM 40 MG/1
40 TABLET, COATED ORAL NIGHTLY
Qty: 90 TABLET | Refills: 3 | Status: SHIPPED | OUTPATIENT
Start: 2024-09-03

## 2024-09-03 NOTE — TELEPHONE ENCOUNTER
No care due was identified.  [facility] Embedded Care Due Messages. Reference number: [id number].   9/03/2024 8:53:33 AM CDT

## 2024-09-04 ENCOUNTER — PATIENT OUTREACH (OUTPATIENT)
Dept: ADMINISTRATIVE | Facility: HOSPITAL | Age: 79
End: 2024-09-04
Payer: MEDICARE

## 2024-09-04 DIAGNOSIS — Z87.891 FORMER SMOKER: Primary | ICD-10-CM

## 2024-09-04 NOTE — TELEPHONE ENCOUNTER
Refill Decision Note   Adele Montesburn  is requesting a refill authorization.  Brief Assessment and Rationale for Refill:  Approve     Medication Therapy Plan:         Comments:     Note composed:11:55 PM 09/03/2024

## 2024-09-04 NOTE — PROGRESS NOTES
Population Health Chart Review & Patient Outreach Details      Additional [facility] Health Notes:      Also, informed to est with a new pcp when she can  Declined to schedule eye exam and lung scan at this time.         Updates Requested / Reviewed:        Health Maintenance Topics Overdue:      VB Score: 2     Eye Exam  LDCT Lung Screen    Influenza Vaccine  Tetanus Vaccine  Shingles/Zoster Vaccine  RSV Vaccine                  Health Maintenance Topic(s) Outreach Outcomes & Actions Taken:    Eye Exam - Outreach Outcomes & Actions Taken  : Pt Declined Scheduling Eye Exam and phone    Low Dose CT Screening - Outreach Outcomes & Actions Taken  : Pt Declined Scheduling Low Dose CT

## 2024-09-18 ENCOUNTER — TELEPHONE (OUTPATIENT)
Dept: ENDOCRINOLOGY | Facility: CLINIC | Age: 79
End: 2024-09-18
Payer: MEDICARE

## 2024-09-18 DIAGNOSIS — E11.22 TYPE 2 DIABETES MELLITUS WITH STAGE 3B CHRONIC KIDNEY DISEASE, WITH LONG-TERM CURRENT USE OF INSULIN: Primary | ICD-10-CM

## 2024-09-18 DIAGNOSIS — N18.32 TYPE 2 DIABETES MELLITUS WITH STAGE 3B CHRONIC KIDNEY DISEASE, WITH LONG-TERM CURRENT USE OF INSULIN: Primary | ICD-10-CM

## 2024-09-18 DIAGNOSIS — M25.569 KNEE PAIN, UNSPECIFIED CHRONICITY, UNSPECIFIED LATERALITY: Primary | ICD-10-CM

## 2024-09-18 DIAGNOSIS — Z79.4 TYPE 2 DIABETES MELLITUS WITH STAGE 3B CHRONIC KIDNEY DISEASE, WITH LONG-TERM CURRENT USE OF INSULIN: Primary | ICD-10-CM

## 2024-09-18 NOTE — TELEPHONE ENCOUNTER
Average glucose has increased over 100 points since stopping Ozempic. Januvia is ineffective. We have two options to try:  1 - resume meal time insulin before her meals  2- trial of Trulicity once weekly. This is in the same category as Ozempic and Mounjaro but tends to be more conservative. Nausea, constipation, diarrhea are possible side effects. However, Ozempic and Mounjaro have more of a tendency to cause these when compared to Trulicity. Less of an incidence with Trulicity.     Please let me know which option she is more comfortable with.

## 2024-09-18 NOTE — TELEPHONE ENCOUNTER
----- Message from Jorge Cunha sent at 9/17/2024  3:28 PM CDT -----  Regarding: rt call  Type:  Patient Returning Call    Who Called:pt    Who Left Message for Patient: unknown    Does the patient know what this is regarding?:yes    Best Call Back Number:[phone]      Additional Information: pt wants a call back to discuss her blood sugar being over 300 for a week even when she doesn't eat.

## 2024-09-19 RX ORDER — INSULIN DEGLUDEC 200 U/ML
36 INJECTION, SOLUTION SUBCUTANEOUS DAILY
Start: 2024-09-19 | End: 2024-09-19 | Stop reason: SDUPTHER

## 2024-09-19 RX ORDER — DULAGLUTIDE 0.75 MG/.5ML
0.75 INJECTION, SOLUTION SUBCUTANEOUS
Qty: 4 PEN | Refills: 5 | Status: SHIPPED | OUTPATIENT
Start: 2024-09-19

## 2024-09-19 RX ORDER — INSULIN DEGLUDEC 200 U/ML
36 INJECTION, SOLUTION SUBCUTANEOUS DAILY
Qty: 9 ML | Refills: 6 | Status: SHIPPED | OUTPATIENT
Start: 2024-09-19

## 2024-09-19 NOTE — TELEPHONE ENCOUNTER
Please call pt. Stop Januvia. Start Trulicity every 7 days. I'm starting her on the lowest therapeutic dose. Please tell her it's an autoinjector pen like the Mounjaro. Must unlock and wait to hear 2 full clicks after injecting before removing pen from skin.     Please also increase increase Tresiba to 36 units.     She has appt in 4 weeks so will look at her download then.

## 2024-09-19 NOTE — TELEPHONE ENCOUNTER
Spoke with patient and notified her of Babs's previous message and verbalized understanding     Pt states would like to try Trulicuty

## 2024-09-26 ENCOUNTER — OFFICE VISIT (OUTPATIENT)
Dept: ORTHOPEDICS | Facility: CLINIC | Age: 79
End: 2024-09-26
Payer: MEDICARE

## 2024-09-26 ENCOUNTER — HOSPITAL ENCOUNTER (OUTPATIENT)
Dept: RADIOLOGY | Facility: HOSPITAL | Age: 79
Discharge: HOME | End: 2024-09-26
Attending: ORTHOPAEDIC SURGERY
Payer: MEDICARE

## 2024-09-26 DIAGNOSIS — E11.59 HYPERTENSION ASSOCIATED WITH DIABETES: ICD-10-CM

## 2024-09-26 DIAGNOSIS — M25.512 CHRONIC PAIN OF BOTH SHOULDERS: ICD-10-CM

## 2024-09-26 DIAGNOSIS — M25.569 KNEE PAIN, UNSPECIFIED CHRONICITY, UNSPECIFIED LATERALITY: Primary | ICD-10-CM

## 2024-09-26 DIAGNOSIS — G89.29 CHRONIC PAIN OF BOTH SHOULDERS: ICD-10-CM

## 2024-09-26 DIAGNOSIS — M25.569 KNEE PAIN, UNSPECIFIED CHRONICITY, UNSPECIFIED LATERALITY: ICD-10-CM

## 2024-09-26 DIAGNOSIS — M25.511 CHRONIC PAIN OF BOTH SHOULDERS: ICD-10-CM

## 2024-09-26 DIAGNOSIS — I15.2 HYPERTENSION ASSOCIATED WITH DIABETES: ICD-10-CM

## 2024-09-26 DIAGNOSIS — M17.10 ARTHRITIS OF KNEE: ICD-10-CM

## 2024-09-26 PROCEDURE — 73564 X-RAY EXAM KNEE 4 OR MORE: CPT | Mod: 26,50,, | Performed by: RADIOLOGY

## 2024-09-26 PROCEDURE — 73564 X-RAY EXAM KNEE 4 OR MORE: CPT | Mod: TC,50

## 2024-09-26 PROCEDURE — 99999 PR PBB SHADOW E&M-EST. PATIENT-LVL I: CPT | Mod: PBBFAC,,, | Performed by: ORTHOPAEDIC SURGERY

## 2024-09-26 RX ORDER — TRIAMCINOLONE ACETONIDE 40 MG/ML
40 INJECTION, SUSPENSION INTRA-ARTICULAR; INTRAMUSCULAR
Status: DISCONTINUED | OUTPATIENT
Start: 2024-09-26 | End: 2024-09-26 | Stop reason: HOSPADM

## 2024-09-26 RX ORDER — METOPROLOL TARTRATE 25 MG/1
25 TABLET, FILM COATED ORAL 2 TIMES DAILY
Qty: 180 TABLET | Refills: 3 | Status: SHIPPED | OUTPATIENT
Start: 2024-09-26

## 2024-09-26 RX ADMIN — TRIAMCINOLONE ACETONIDE 40 MG: 40 INJECTION, SUSPENSION INTRA-ARTICULAR; INTRAMUSCULAR at 03:09

## 2024-09-26 NOTE — PROCEDURES
Large Joint Aspiration/Injection: bilateral knee    Date/Time: 9/26/2024 3:00 PM    Performed by: Caleb Reyes MD  Authorized by: Caleb Reyes MD    Consent Done?:  Yes (Verbal)  Indications:  Pain  Site marked: the procedure site was marked    Timeout: prior to procedure the correct patient, procedure, and site was verified    Prep: patient was prepped and draped in usual sterile fashion      Local anesthesia used?: Yes    Anesthesia:  Local infiltration  Local anesthetic: Ropivicaine.  Anesthetic total (ml):  3      Details:  Needle Size:  22 G  Ultrasonic Guidance for needle placement?: No    Approach:  Anterolateral  Location:  Knee  Laterality:  Bilateral  Site:  Bilateral knee  Medications (Right):  40 mg triamcinolone acetonide 40 mg/mL  Medications (Left):  40 mg triamcinolone acetonide 40 mg/mL  Patient tolerance:  Patient tolerated the procedure well with no immediate complications

## 2024-09-26 NOTE — TELEPHONE ENCOUNTER
No care due was identified.  Health [facility] Embedded Care Due Messages. Reference number: [id number].   9/26/2024 8:02:12 AM CDT

## 2024-09-26 NOTE — PROGRESS NOTES
Past Medical History:   Diagnosis Date    Anesthesia complication     took patient 6 days to come out of anethesia after CABG    Anticoagulant long-term use     Arthritis     Chronic kidney disease     CKD (chronic kidney disease) stage 3, GFR 30-59 ml/min     Dr. Montero    COPD (chronic obstructive pulmonary disease) 2/7/2016    COPD (chronic obstructive pulmonary disease)     Coronary artery disease     Diabetes mellitus     Diabetes mellitus, type 2     GERD (gastroesophageal reflux disease)     Hx of colonic polyps     Hyperlipidemia     Hypertension     possible new onset CHF 7/30/2016    Tardive dyskinesia     Thyroid disease     Ulcer     Vertigo        Past Surgical History:   Procedure Laterality Date    CARDIAC SURGERY      CATARACT EXTRACTION W/  INTRAOCULAR LENS IMPLANT Right 8/30/2023    Procedure: EXTRACTION, CATARACT, WITH IOL INSERTION;  Surgeon: Tamiko Lam MD;  Location: [facility];  Service: Ophthalmology;  Laterality: Right;    CATARACT EXTRACTION W/  INTRAOCULAR LENS IMPLANT Left 9/13/2023    Procedure: EXTRACTION, CATARACT, WITH IOL INSERTION;  Surgeon: Tamiko Lam MD;  Location: [facility];  Service: Ophthalmology;  Laterality: Left;    CHOLECYSTECTOMY  01/26/2017    COLONOSCOPY      COLONOSCOPY N/A 12/7/2021    Procedure: COLONOSCOPY;  Surgeon: Lito Will MD;  Location: [facility];  Service: Endoscopy;  Laterality: N/A;    COLONOSCOPY N/A 7/30/2024    Procedure: COLONOSCOPY wants earlier time;  Surgeon: Seth Yoder MD;  Location: [facility];  Service: Endoscopy;  Laterality: N/A;    CORONARY ARTERY BYPASS GRAFT  01/19/2016    4 vessel    ESOPHAGOGASTRODUODENOSCOPY N/A 6/19/2024    Procedure: EGD (ESOPHAGOGASTRODUODENOSCOPY);  Surgeon: Seth Yoder MD;  Location: [facility];  Service: Endoscopy;  Laterality: N/A;    HYSTERECTOMY      OOPHORECTOMY      TONSILLECTOMY      TOTAL THYROIDECTOMY      TUBAL LIGATION         Current Outpatient Medications   Medication Sig     "amLODIPine (NORVASC) 5 MG tablet TAKE ONE TABLET BY MOUTH ONCE DAILY (Patient taking differently: Take 5 mg by mouth once daily.)    aspirin (ECOTRIN) 81 MG EC tablet Take 81 mg by mouth once daily.    BD INSULIN SYRINGE ULTRA-FINE 1 mL 31 gauge x 5/16 Syrg USE AS DIRECTED 5 TIMES A DAY WITH LEVEMIR & NOVOLOG    blood sugar diagnostic (TRUE METRIX GLUCOSE TEST STRIP) Strp Test blood sugar 3x/day    calcitRIOL (ROCALTROL) 0.25 MCG Cap Take 0.25 mcg by mouth every 7 days.    dulaglutide (TRULICITY) 0.75 mg/0.5 mL pen injector Inject 0.75 mg into the skin every 7 days.    lancets 33 gauge Misc 1 lancet by Misc.(Non-Drug; Combo Route) route 4 (four) times daily.    levothyroxine (SYNTHROID) 75 MCG tablet TAKE ONE TABLET BY MOUTH EVERY MORNING BEFORE BREAKFAST    metoprolol tartrate (LOPRESSOR) 25 MG tablet Take 1 tablet (25 mg total) by mouth 2 (two) times daily.    nitroGLYCERIN (NITROSTAT) 0.4 MG SL tablet Place 1 tablet (0.4 mg total) under the tongue every 5 (five) minutes as needed for Chest pain. Seek medical help is pain is not relieved by the third dose. (Patient taking differently: Place 0.4 mg under the tongue every 5 (five) minutes as needed for Chest pain. Place 1 tablet (0.4 mg total) under the tongue every 5 (five) minutes as needed for Chest pain. Seek medical help is pain is not relieved by the third dose.)    omeprazole (PRILOSEC) 40 MG capsule Take 1 capsule (40 mg total) by mouth once daily.    pen needle, diabetic (BD ULTRA-FINE YUMIKO PEN NEEDLE) 32 gauge x 5/32" Ndle USE FOUR TIMES A DAY WITH INSULIN    rosuvastatin (CRESTOR) 40 MG Tab TAKE 1 TABLET (40 MG TOTAL) BY MOUTH EVERY EVENING.    traZODone (DESYREL) 100 MG tablet Take 1 tablet (100 mg total) by mouth every evening.    TRESIBA FLEXTOUCH U-200 200 unit/mL (3 mL) insulin pen Inject 36 Units into the skin once daily.     No current facility-administered medications for this visit.       Review of patient's allergies indicates:   Allergen " Reactions    Reglan [metoclopramide hcl]      Depression and weight loss.        Family History   Problem Relation Name Age of Onset    Cancer Mother          LYMPHOMA    Breast cancer Mother      Cancer Father      Early death Father      Heart disease Paternal Uncle      Alcohol abuse Sister          x2    Heart disease Brother      No Known Problems Son 3     Glaucoma Neg Hx      Macular degeneration Neg Hx         Social History     Socioeconomic History    Marital status:    Tobacco Use    Smoking status: Former     Current packs/day: 0.00     Average packs/day: 1 pack/day for 27.0 years (27.0 ttl pk-yrs)     Types: Cigarettes     Start date: 1989     Quit date: 2016     Years since quittin.6    Smokeless tobacco: Never   Substance and Sexual Activity    Alcohol use: No     Alcohol/week: 0.0 standard drinks of alcohol    Drug use: No    Sexual activity: Yes     Partners: Male     Social Determinants of Health     Financial Resource Strain: Low Risk  (2022)    Overall Financial Resource Strain (CARDIA)     Difficulty of Paying Living Expenses: Not hard at all   Food Insecurity: No Food Insecurity (2022)    Hunger Vital Sign     Worried About Running Out of Food in the Last Year: Never true     Ran Out of Food in the Last Year: Never true   Transportation Needs: No Transportation Needs (2022)    PRAPARE - Transportation     Lack of Transportation (Medical): No     Lack of Transportation (Non-Medical): No   Physical Activity: Inactive (2022)    Exercise Vital Sign     Days of Exercise per Week: 0 days     Minutes of Exercise per Session: 0 min   Stress: No Stress Concern Present (2022)    [employer] [place] [employer] - Occupational Stress Questionnaire     Feeling of Stress : Not at all   Housing Stability: Low Risk  (2022)    Housing Stability Vital Sign     Unable to Pay for Housing in the Last Year: No     Number of Places Lived in the Last Year: 1      Unstable Housing in the Last Year: No       Chief Complaint:   No chief complaint on file.      History of present illness:  This is a 79 year-old female seen for bilateral knee pain.  Patient has had pain for a few months but worse over last week.  Right is worse than the left.  Pain in the right is a 10/10.  Pain left is an 8/10.  No injury or trauma.  no treatment in a while.      Review of Systems:  Musculoskeletal:  See HPI      Physical Examination:    Vital Signs:    There were no vitals filed for this visit.      There is no height or weight on file to calculate BMI.    This a well-developed, well nourished patient in no acute distress.  They are alert and oriented and cooperative to examination.  Pt. walks without an antalgic gait.      Examination of bilateral knees shows no rashes or erythema. There are no masses ecchymosis or effusion. Patient has full range of motion from 0-130°. Patient is nontender to palpation over lateral joint line and moderately tender to palpation over the medial joint line. Knee is stable to varus and valgus stress. 5 out of 5 motor strength. Palpable distal pulses. Intact light touch sensation. Negative Patellofemoral crepitus    X-rays:  X-rays of both knees are ordered and reviewed which show moderate medial joint space narrowing bilaterally.  No progression noted     Assessment::  Bilateral knee arthritis    Plan:  I reviewed the findings with her today.  I agreed to inject her knees today.  Patient is not interested in surgical treatment at this time.      This note was created using [employer] voice recognition software that occasionally misinterpreted phrases or words.    Consult note is delivered via Epic messaging service.

## 2024-09-26 NOTE — TELEPHONE ENCOUNTER
Refill Decision Note   Adele Hall  is requesting a refill authorization.  Brief Assessment and Rationale for Refill:  Approve     Medication Therapy Plan:         Alert overridden per protocol: Yes   Comments:     Note composed:3:22 PM 09/26/2024

## 2024-09-27 ENCOUNTER — TELEPHONE (OUTPATIENT)
Dept: ENDOCRINOLOGY | Facility: CLINIC | Age: 79
End: 2024-09-27
Payer: MEDICARE

## 2024-09-27 ENCOUNTER — NURSE TRIAGE (OUTPATIENT)
Dept: ADMINISTRATIVE | Facility: CLINIC | Age: 79
End: 2024-09-27
Payer: MEDICARE

## 2024-09-27 DIAGNOSIS — E11.22 TYPE 2 DIABETES MELLITUS WITH STAGE 3 CHRONIC KIDNEY DISEASE, WITH LONG-TERM CURRENT USE OF INSULIN: ICD-10-CM

## 2024-09-27 DIAGNOSIS — E11.9 TYPE 2 DIABETES MELLITUS WITHOUT COMPLICATION, WITH LONG-TERM CURRENT USE OF INSULIN: Primary | ICD-10-CM

## 2024-09-27 DIAGNOSIS — Z79.4 TYPE 2 DIABETES MELLITUS WITH STAGE 3 CHRONIC KIDNEY DISEASE, WITH LONG-TERM CURRENT USE OF INSULIN: ICD-10-CM

## 2024-09-27 DIAGNOSIS — Z79.4 TYPE 2 DIABETES MELLITUS WITHOUT COMPLICATION, WITH LONG-TERM CURRENT USE OF INSULIN: Primary | ICD-10-CM

## 2024-09-27 DIAGNOSIS — N18.30 TYPE 2 DIABETES MELLITUS WITH STAGE 3 CHRONIC KIDNEY DISEASE, WITH LONG-TERM CURRENT USE OF INSULIN: ICD-10-CM

## 2024-09-27 RX ORDER — INSULIN ASPART 100 [IU]/ML
INJECTION, SOLUTION INTRAVENOUS; SUBCUTANEOUS
Qty: 15 ML | Refills: 3 | Status: SHIPPED | OUTPATIENT
Start: 2024-09-27

## 2024-09-27 NOTE — TELEPHONE ENCOUNTER
Please call patient. On 9/19, Trulicity was prescribed. Did she start it? Insulin dose was increased to 36 units. Please verify she's done so.      I am sending in Novolog to her pharmacy. This is to be used before meals, according to sliding scale. Could also use at meal time if she is skipping a meal:    150-200: + 2 units  201-250: + 4 units  251-300: + 6 units  301-350: + 8 units  > 351: + 10 units    If blood sugar remains > 400 and she feels weak or experienced nause, vomiting, abdominal pain, please advise to go to ER

## 2024-09-27 NOTE — TELEPHONE ENCOUNTER
"Do you want her to take novolog too even if she is not eating?"Could also use at meal time if she is skipping a meal: "  "

## 2024-09-27 NOTE — TELEPHONE ENCOUNTER
----- Message from Nancy Carmona sent at 9/27/2024  3:32 PM CDT -----  Regarding: call back  Contact: pt  Type: Needs Medical Advice  Who Called:  patient   Symptoms (please be specific):    How long has patient had these symptoms:    Pharmacy name and phone #:    Best Call Back Number: [phone]    Additional Information: pt missed your office call call back

## 2024-09-27 NOTE — TELEPHONE ENCOUNTER
Pt reports high blood sugar readings. Pt reports last week she had a change in her medication regimen. Pt reports blood sugar has been reading high 300-400 for the last 4 days. Current reading of high 300-400. Pt reports she is unable to see exact blood sugar from device. Pt reports HA and weakness. Care advice to discuss with PCP and callback by nurse within 1 hour. High priority message sent to pt's provider. Pt made aware that if she does not get a call within 1 hour to go to ED. Pt verbalizes understanding.   Reason for Disposition   Blood glucose > 300 mg/dL (16.7 mmol/L) AND two or more times in a row    Additional Information   Negative: Unconscious or difficult to awaken   Negative: Acting confused (e.g., disoriented, slurred speech)   Negative: Sounds like a life-threatening emergency to the triager   Negative: Very weak (can't stand)   Negative: Vomiting and signs of dehydration (e.g., very dry mouth, lightheaded, dark urine)   Negative: Blood glucose > 240 mg/dL (13.3 mmol/L) and rapid breathing   Negative: Blood glucose > 500 mg/dL (27.8 mmol/L)   Negative: Blood glucose > 240 mg/dL (13.3 mmol/L) AND urine ketones moderate-large (or more than 1+)   Negative: Blood glucose > 240 mg/dL (13.3 mmol/L) and blood ketones > 1.4 mmol/L   Negative: Blood glucose > 240 mg/dL (13.3 mmol/L) AND vomiting AND unable to check for ketones (in blood or urine)   Negative: Vomiting lasting > 4 hours   Negative: Patient sounds very sick or weak to the triager   Negative: Fever > 100.4 F (38.0 C)   Negative: Caller has URGENT medication or insulin pump question and triager unable to answer question   Negative: Blood glucose > 400 mg/dL (22.2 mmol/L)    Protocols used: Diabetes - High Blood Sugar-A-OH

## 2024-09-27 NOTE — TELEPHONE ENCOUNTER
Triage Dispo- discuss with provider and callback by nurse within 1 hour. Please contact pt directly within 1 hour.  Please f/u with pt directly for additional care/concerns.    Thank you,  PHILIP Collins, OOC Triage Nurse

## 2024-09-27 NOTE — TELEPHONE ENCOUNTER
S/w pt and she said she had steroid shots on her knee yesterday and that is why her BG are elevated. Gave her the msg you send to for the novolog.    Also asking for strips to be send to pharmacy

## 2024-09-27 NOTE — TELEPHONE ENCOUNTER
Yes, it can be given at breakfast, lunch, and dinner time, even if skipping a meal because it's sliding scale. Doses cannot be any closer than 4 hours apart. She usually only eats 1-2 meals/day

## 2024-09-27 NOTE — TELEPHONE ENCOUNTER
----- Message from Nancy Carmona sent at 9/27/2024  3:32 PM CDT -----  Regarding: call back  Contact: pt  Type: Needs Medical Advice  Who Called:  patient   Symptoms (please be specific):    How long has patient had these symptoms:    Pharmacy name and phone #:    Best Call Back Number: [phone]    Additional Information: pt missed your office call call back

## 2024-10-10 ENCOUNTER — LAB VISIT (OUTPATIENT)
Dept: LAB | Facility: HOSPITAL | Age: 79
End: 2024-10-10
Attending: NURSE PRACTITIONER
Payer: MEDICARE

## 2024-10-10 DIAGNOSIS — N18.32 TYPE 2 DIABETES MELLITUS WITH STAGE 3B CHRONIC KIDNEY DISEASE, WITH LONG-TERM CURRENT USE OF INSULIN: ICD-10-CM

## 2024-10-10 DIAGNOSIS — Z79.4 TYPE 2 DIABETES MELLITUS WITH STAGE 3B CHRONIC KIDNEY DISEASE, WITH LONG-TERM CURRENT USE OF INSULIN: ICD-10-CM

## 2024-10-10 DIAGNOSIS — E07.9 THYROID DISEASE: ICD-10-CM

## 2024-10-10 DIAGNOSIS — E11.22 TYPE 2 DIABETES MELLITUS WITH STAGE 3B CHRONIC KIDNEY DISEASE, WITH LONG-TERM CURRENT USE OF INSULIN: ICD-10-CM

## 2024-10-10 LAB
ALBUMIN SERPL BCP-MCNC: 3.3 G/DL (ref 3.5–5.2)
ALBUMIN/CREAT UR: 156.2 UG/MG (ref 0–30)
ALP SERPL-CCNC: 73 U/L (ref 55–135)
ALT SERPL W/O P-5'-P-CCNC: 9 U/L (ref 10–44)
ANION GAP SERPL CALC-SCNC: 9 MMOL/L (ref 8–16)
AST SERPL-CCNC: 23 U/L (ref 10–40)
BILIRUB SERPL-MCNC: 0.3 MG/DL (ref 0.1–1)
BUN SERPL-MCNC: 21 MG/DL (ref 8–23)
CALCIUM SERPL-MCNC: 8.9 MG/DL (ref 8.7–10.5)
CHLORIDE SERPL-SCNC: 105 MMOL/L (ref 95–110)
CO2 SERPL-SCNC: 25 MMOL/L (ref 23–29)
CREAT SERPL-MCNC: 1.5 MG/DL (ref 0.5–1.4)
CREAT UR-MCNC: 162 MG/DL (ref 15–325)
EST. GFR  (NO RACE VARIABLE): 35.7 ML/MIN/1.73 M^2
ESTIMATED AVG GLUCOSE: 189 MG/DL (ref 68–131)
GLUCOSE SERPL-MCNC: 140 MG/DL (ref 70–110)
HBA1C MFR BLD: 8.2 % (ref 4–5.6)
MICROALBUMIN UR DL<=1MG/L-MCNC: 253 UG/ML
POTASSIUM SERPL-SCNC: 3.9 MMOL/L (ref 3.5–5.1)
PROT SERPL-MCNC: 6.5 G/DL (ref 6–8.4)
SODIUM SERPL-SCNC: 139 MMOL/L (ref 136–145)
TSH SERPL DL<=0.005 MIU/L-ACNC: 1.21 UIU/ML (ref 0.4–4)

## 2024-10-10 PROCEDURE — 83036 HEMOGLOBIN GLYCOSYLATED A1C: CPT | Performed by: NURSE PRACTITIONER

## 2024-10-10 PROCEDURE — 84443 ASSAY THYROID STIM HORMONE: CPT | Performed by: NURSE PRACTITIONER

## 2024-10-10 PROCEDURE — 80053 COMPREHEN METABOLIC PANEL: CPT | Performed by: NURSE PRACTITIONER

## 2024-10-10 PROCEDURE — 82570 ASSAY OF URINE CREATININE: CPT | Performed by: NURSE PRACTITIONER

## 2024-10-10 PROCEDURE — 36415 COLL VENOUS BLD VENIPUNCTURE: CPT | Mod: PO | Performed by: NURSE PRACTITIONER

## 2024-10-17 ENCOUNTER — OFFICE VISIT (OUTPATIENT)
Dept: ENDOCRINOLOGY | Facility: CLINIC | Age: 79
End: 2024-10-17
Payer: MEDICARE

## 2024-10-17 ENCOUNTER — TELEPHONE (OUTPATIENT)
Dept: ENDOCRINOLOGY | Facility: CLINIC | Age: 79
End: 2024-10-17

## 2024-10-17 VITALS — HEART RATE: 62 BPM | SYSTOLIC BLOOD PRESSURE: 90 MMHG | DIASTOLIC BLOOD PRESSURE: 60 MMHG

## 2024-10-17 DIAGNOSIS — E78.5 HYPERLIPIDEMIA WITH TARGET LDL LESS THAN 70: ICD-10-CM

## 2024-10-17 DIAGNOSIS — N18.32 TYPE 2 DIABETES MELLITUS WITH STAGE 3B CHRONIC KIDNEY DISEASE, WITH LONG-TERM CURRENT USE OF INSULIN: Primary | ICD-10-CM

## 2024-10-17 DIAGNOSIS — Z79.4 TYPE 2 DIABETES MELLITUS WITH STAGE 3B CHRONIC KIDNEY DISEASE, WITH LONG-TERM CURRENT USE OF INSULIN: Primary | ICD-10-CM

## 2024-10-17 DIAGNOSIS — I10 ESSENTIAL HYPERTENSION: ICD-10-CM

## 2024-10-17 DIAGNOSIS — E11.22 TYPE 2 DIABETES MELLITUS WITH STAGE 3B CHRONIC KIDNEY DISEASE, WITH LONG-TERM CURRENT USE OF INSULIN: Primary | ICD-10-CM

## 2024-10-17 DIAGNOSIS — E11.42 TYPE 2 DIABETES MELLITUS WITH DIABETIC POLYNEUROPATHY, WITH LONG-TERM CURRENT USE OF INSULIN: ICD-10-CM

## 2024-10-17 DIAGNOSIS — N25.81 SECONDARY HYPERPARATHYROIDISM OF RENAL ORIGIN: ICD-10-CM

## 2024-10-17 DIAGNOSIS — Z79.4 TYPE 2 DIABETES MELLITUS WITH DIABETIC POLYNEUROPATHY, WITH LONG-TERM CURRENT USE OF INSULIN: ICD-10-CM

## 2024-10-17 DIAGNOSIS — I25.10 CORONARY ARTERY DISEASE INVOLVING NATIVE CORONARY ARTERY OF NATIVE HEART WITHOUT ANGINA PECTORIS: ICD-10-CM

## 2024-10-17 DIAGNOSIS — E07.9 THYROID DISEASE: ICD-10-CM

## 2024-10-17 PROCEDURE — 99999 PR PBB SHADOW E&M-EST. PATIENT-LVL IV: CPT | Mod: PBBFAC,,, | Performed by: NURSE PRACTITIONER

## 2024-10-17 RX ORDER — REPAGLINIDE 1 MG/1
1 TABLET ORAL
Qty: 30 TABLET | Refills: 6 | Status: SHIPPED | OUTPATIENT
Start: 2024-10-17 | End: 2025-10-17

## 2024-10-17 RX ORDER — PANTOPRAZOLE SODIUM 40 MG/1
40 TABLET, DELAYED RELEASE ORAL
COMMUNITY
Start: 2024-08-05

## 2024-10-17 NOTE — PROGRESS NOTES
CC: Ms. Adele Hall arrives today for management of Type 2 DM and review of chronic medical conditions, as listed in the visit diagnosis section of this encounter.     HPI: Ms. Adele Hall was diagnosed with Type 2 DM in ~ 2002. Metformin started at the time of diagnosis but was stopped d/t renal impairment, per PCP in [place]. Insulin was started ~ 2012. She has rotated between analog insulins and generic NPH and Regular insulins, due to cost. [employer] brand insulins, due to cost.  Denies FH of DM. She did have 1 ER admission after having BG of >700 in 2012, prior to starting insulin.   She has a dx of CAD - s/p CABG in 1/2016. Follows with Dr. Dick.  She follows with Dr. Davidson for CKD.    Patient was last seen by me in July. At that time, she had stopped Ozempic a couple of months prior after hospitalization for abdominal pain. This resolved, as had her diarrhea and constipation after stopping Ozempic. Januvia was added in July.     She called clinic with reported hyperglycemia in September and Januvia was changed to Trulicity and Tresiba dose was increased. Novolog correction scale was also added.     She states that she began Trulicity 4 weeks ago and is generally tolerating well. She states that the received a 120 day supply from the pharmacy somehow when it was actually written for 30 day supply.     BG monitoring per Dexcom G7    Hypoglycemia: Rare  Symptoms: dizziness   Treatment: sweets    Missing Insulin/PO medication doses: No    Dietary Habits: Eats 1 real meal/day - sleeps in and skips breakfast. Occasional snack. Drinks Arnold Palmers.     DM education: 5/21/2024      CURRENT DIABETIC MEDS: Trulicity 0.75 mg weekly, Tresiba U200 36 units QAM, Novolog three times daily before meals + correction scale, target 150, ISF 25  Vial or pen: pens  Glucometer type: True Metrix    Previous DM treatments:  Metformin  Victoza - $   NPH/Regular insulins  Novolog   Ozempic/Mounjaro - abd  "pain, diarrhea    Last Eye Exam: 10/2023, no DR.   Last Podiatry Exam: never    REVIEW OF SYSTEMS  Constitutional: no c/o weakness, weight loss. + fatigue.   Cardiac: no chest pain, palpitations.   Respiratory: no cough, dyspnea   GI: no c/o nausea, abdominal pain. + intermittent diarrhea. Cannot determine if present before Tresiba.   Neuro: c/o paresthesias, stabbing pain in feet.   Skin: no lesions, rashes.   Endocrine: denies polyphagia, polydipsia, polyuria.      Personally reviewed Past Medical, Surgical, Social History.    Vital Signs  BP 90/60   Pulse 62   Ht (P) 5' 1" (1.549 m)   Wt (P) 72.1 kg (158 lb 15.2 oz)   BMI (P) 30.03 kg/m²     Personally reviewed the below labs:    Hemoglobin A1C   Date Value Ref Range Status   10/10/2024 8.2 (H) 4.0 - 5.6 % Final     Comment:     ADA Screening Guidelines:  5.7-6.4%  Consistent with prediabetes  >or=6.5%  Consistent with diabetes    High levels of fetal hemoglobin interfere with the HbA1C  assay. Heterozygous hemoglobin variants (HbS, HgC, etc)do  not significantly interfere with this assay.   However, presence of multiple variants may affect accuracy.     07/10/2024 5.8 (H) 4.0 - 5.6 % Final     Comment:     ADA Screening Guidelines:  5.7-6.4%  Consistent with prediabetes  >or=6.5%  Consistent with diabetes    High levels of fetal hemoglobin interfere with the HbA1C  assay. Heterozygous hemoglobin variants (HbS, HgC, etc)do  not significantly interfere with this assay.   However, presence of multiple variants may affect accuracy.     03/21/2024 5.6 4.5 - 6.2 % Final     Comment:     According to ADA guidelines, hemoglobin A1C <7.0% represents  optimal control in non-pregnant diabetic patients.  Different  metrics may apply to specific populations.   Standards of Medical Care in Diabetes - 2016.    For the purpose of screening for the presence of diabetes:  <5.7%     Consistent with the absence of diabetes  5.7-6.4%  Consistent with increasing risk for diabetes "   (prediabetes)  >or=6.5%  Consistent with diabetes    Currently no consensus exists for use of hemoglobin A1C  for diagnosis of diabetes for children.         Chemistry        Component Value Date/Time     10/10/2024 1353    K 3.9 10/10/2024 1353     10/10/2024 1353    CO2 25 10/10/2024 1353    BUN 21 10/10/2024 1353    CREATININE 1.5 (H) 10/10/2024 1353     (H) 10/10/2024 1353        Component Value Date/Time    CALCIUM 8.9 10/10/2024 1353    ALKPHOS 73 10/10/2024 1353    AST 23 10/10/2024 1353    AST 45 (H) 01/30/2016 0431    ALT 9 (L) 10/10/2024 1353    BILITOT 0.3 10/10/2024 1353          Lab Results   Component Value Date    CHOL 154 07/10/2024    CHOL 118 (L) 05/03/2024    CHOL 124 05/25/2023     Lab Results   Component Value Date    HDL 37 (L) 07/10/2024    HDL 29 (L) 05/03/2024    HDL 30 (L) 05/25/2023     Lab Results   Component Value Date    LDLCALC 62.2 (L) 07/10/2024    LDLCALC 33.2 (L) 05/03/2024    LDLCALC 39.6 (L) 05/25/2023     Lab Results   Component Value Date    TRIG 274 (H) 07/10/2024    TRIG 279 (H) 05/03/2024    TRIG 272 (H) 05/25/2023     Lab Results   Component Value Date    CHOLHDL 24.0 07/10/2024    CHOLHDL 24.6 05/03/2024    CHOLHDL 24.2 05/25/2023       Lab Results   Component Value Date    MICALBCREAT 156.2 (H) 10/10/2024     Lab Results   Component Value Date    TSH 1.214 10/10/2024       CrCl cannot be calculated (Patient's most recent lab result is older than the maximum 7 days allowed.).    Vit D, 25-Hydroxy   Date Value Ref Range Status   07/10/2024 24 (L) 30 - 96 ng/mL Final     Comment:     Vitamin D deficiency.........<10 ng/mL                              Vitamin D insufficiency......10-29 ng/mL       Vitamin D sufficiency........> or equal to 30 ng/mL  Vitamin D toxicity............>100 ng/mL             PHYSICAL EXAMINATION  Constitutional: Appears well, no distress  Respiratory: CTA, even and unlabored.  Cardiovascular: RRR, no murmurs, no carotid bruits.    GI: normal bowel sounds, no hernia  Skin: warm and dry  Neuro: oriented to person, place, time.   Feet: appropriate footwear.     DEXCOM G7 DOWNLOAD: Fasting glucoses are reasonable.  Prandial excursions noted. Rare hypoglycemia.            Goals        HEMOGLOBIN A1C < 7.5                 Assessment/Plan  1. Type 2 diabetes mellitus with stage 3 chronic kidney disease, with long-term current use of insulin  -- A1c elevated. I recommended increasing Trulicity dose but pt somehow ended up with 120 day supply of Trulicity and understandably wants to use her supply.   -- start Prandin 1 mg before her 1 meal. Discussed medication's mechanism of action, side effects, and contraindications.   -- continue Trulicity, Tresiba, Novolog correction scale.   -- continue Dexcom G7 training   -- following with nephrology    -- Discussed diagnosis of DM, A1c goals, progression of disease, long term complications and tx options.   -- Reviewed hypoglycemia management: treat with 1/2 glass of juice, 1/2 can regular coke, or 4 glucose tablets. Monitor and repeat treatment every 15 minutes until BG is >70 Then have a snack, which includes a complex carbohydrate and protein.   Advised p  atient to check BG before activities, such as driving or exercise.    -- takes aspirin, statin, ace-I   2. Type 2 diabetes mellitus with diabetic polyneuropathy, with long-term current use of insulin  -- optimize DM control     3. Coronary artery disease involving native coronary artery of native heart without angina pectoris  -- follows with cardiology  -- see A/P #7   4. Essential hypertension  -- controlled but borderline hypotensive  -- continue current meds   5. Hyperlipidemia with target LDL less than 70  -- Uncontrolled with elevated triglycerides   -- Continue Crestor (Fenofibrate previously DC'd due to renal function)   6. Thyroid disease  -- controlled  -- continue levothyroxine 75 mcg daily   7. Secondary hyperparathyroidism of renal origin   -- managed by nephrology (Dr. Leigh)       FOLLOW UP  Follow up in about 3 months (around 1/17/2025).   Patient instructed to bring BG logs to each follow up   Patient encouraged to call for any BG/medication issues, concerns, or questions.    Orders Placed This Encounter   Procedures    Hemoglobin A1C    Comprehensive Metabolic Panel

## 2024-10-17 NOTE — TELEPHONE ENCOUNTER
Babs Irene would like this pt to see Dr Mendez for her follow up. I tried to schedule her but was not able. Please call pt to schedule. thanks

## 2024-10-17 NOTE — PATIENT INSTRUCTIONS
Start repaglinide 1 mg pill before your meal. Take 20-30 minutes before your meal.    Continue Trulicity.     Notify me before you plan to refill Trulicity and I will increase the dose then.    Continue Tresiba 36 units.

## 2024-11-18 DIAGNOSIS — K21.9 GASTROESOPHAGEAL REFLUX DISEASE, UNSPECIFIED WHETHER ESOPHAGITIS PRESENT: Primary | ICD-10-CM

## 2024-11-18 RX ORDER — PANTOPRAZOLE SODIUM 40 MG/1
40 TABLET, DELAYED RELEASE ORAL DAILY
Qty: 90 TABLET | Refills: 0 | Status: CANCELLED | OUTPATIENT
Start: 2024-11-18

## 2024-11-18 NOTE — TELEPHONE ENCOUNTER
No care due was identified.  Health [facility] Embedded Care Due Messages. Reference number: [id number].   11/18/2024 3:21:02 PM CST

## 2024-11-19 NOTE — TELEPHONE ENCOUNTER
She told the GI department on April 5th that she was not taking Protonix.  It was removed.  Now back on med list under historical provider.

## 2024-11-20 NOTE — TELEPHONE ENCOUNTER
Spoke with patient.   She was changed to Prilosec on 4/5/2024    Patient states that was an old prescription bottle.   Instructed to throw away Protonix bottle.    Reviewing her medications.  I think she is taking Calcitrol incorrectly needs to be every 7 days.   Phone call disconnected   Left message on machine to call.

## 2024-11-26 ENCOUNTER — TELEPHONE (OUTPATIENT)
Dept: ENDOCRINOLOGY | Facility: CLINIC | Age: 79
End: 2024-11-26
Payer: MEDICARE

## 2024-11-26 DIAGNOSIS — E11.21 DIABETIC NEPHROPATHY ASSOCIATED WITH TYPE 2 DIABETES MELLITUS: Primary | ICD-10-CM

## 2024-11-26 RX ORDER — DULAGLUTIDE 1.5 MG/.5ML
1.5 INJECTION, SOLUTION SUBCUTANEOUS
Qty: 4 PEN | Refills: 11 | Status: SHIPPED | OUTPATIENT
Start: 2024-11-26 | End: 2025-11-26

## 2024-11-26 RX ORDER — TRAZODONE HYDROCHLORIDE 100 MG/1
100 TABLET ORAL NIGHTLY
Qty: 90 TABLET | Refills: 1 | Status: SHIPPED | OUTPATIENT
Start: 2024-11-26 | End: 2025-11-26

## 2024-11-26 NOTE — TELEPHONE ENCOUNTER
----- Message from Jimy sent at 11/25/2024  2:00 PM CST -----  Regarding: return call  Contact: patient  Type:  Patient Returning Call    Who Called:patient  Who Left Message for Patient:staff  Does the patient know what this is regarding?:dulaglutide (TRULICITY) 0.75 mg/0.5 mL pen injector  Would the patient rather a call back or a response via [employer]? Requesting dosage change  Best Call Back Number:[phone]  Additional Information:

## 2024-11-26 NOTE — TELEPHONE ENCOUNTER
Per Babs's last office note she planned to increase pts Trulicity once she was done with current supply. Wants sent to [facility] in [place]. Please advise.

## 2024-11-26 NOTE — TELEPHONE ENCOUNTER
Called pt to notified trulicity 1.5mg new dose sent to [facility],but every time pt picked up,line went out

## 2024-11-26 NOTE — TELEPHONE ENCOUNTER
No care due was identified.  [facility] Embedded Care Due Messages. Reference number: [id number].   11/26/2024 2:15:16 PM CST

## 2024-12-10 NOTE — PROGRESS NOTES
HPI     Pt c/o red, matted, watery, itchy, burning OU x 3 months    Pt using hot compresses in am, Murine QD O, and AT's PRN.     Last edited by Shagufta Aguilera on 4/14/2022  3:00 PM. (History)            Assessment /Plan     For exam results, see Encounter Report.    Floppy eyelid syndrome of both eyes    Ulcerative blepharitis of upper and lower eyelids of both eyes      Blepharitis + FES  Also some conj chalasis causing her symptoms  Start maxitrol johnna BID OU x 1 week    Discussed taping eyelid shunt, ointment qhs, sleep goggles      Recommend oculoplastics evaluation for possible lid tightening procedure  Refer to Dr. Duran               
No adenopathy or splenomegaly. No cervical or inguinal lymphadenopathy.

## 2025-01-09 ENCOUNTER — HOSPITAL ENCOUNTER (EMERGENCY)
Facility: HOSPITAL | Age: 80
Discharge: HOME | End: 2025-01-09
Attending: STUDENT IN AN ORGANIZED HEALTH CARE EDUCATION/TRAINING PROGRAM
Payer: COMMERCIAL

## 2025-01-09 ENCOUNTER — TELEPHONE (OUTPATIENT)
Dept: ENDOCRINOLOGY | Facility: CLINIC | Age: 80
End: 2025-01-09
Payer: MEDICARE

## 2025-01-09 VITALS
HEIGHT: 61 IN | OXYGEN SATURATION: 97 % | DIASTOLIC BLOOD PRESSURE: 58 MMHG | SYSTOLIC BLOOD PRESSURE: 113 MMHG | RESPIRATION RATE: 14 BRPM | TEMPERATURE: 98 F | BODY MASS INDEX: 29.83 KG/M2 | WEIGHT: 158 LBS | HEART RATE: 70 BPM

## 2025-01-09 DIAGNOSIS — M79.642 LEFT HAND PAIN: ICD-10-CM

## 2025-01-09 DIAGNOSIS — M25.532 LEFT WRIST PAIN: Primary | ICD-10-CM

## 2025-01-09 DIAGNOSIS — T14.90XA INJURY: ICD-10-CM

## 2025-01-09 PROCEDURE — 99283 EMERGENCY DEPT VISIT LOW MDM: CPT | Mod: 25

## 2025-01-09 PROCEDURE — 25000003 PHARM REV CODE 250: Performed by: STUDENT IN AN ORGANIZED HEALTH CARE EDUCATION/TRAINING PROGRAM

## 2025-01-09 RX ORDER — HYDROCODONE BITARTRATE AND ACETAMINOPHEN 5; 325 MG/1; MG/1
1 TABLET ORAL
Status: COMPLETED | OUTPATIENT
Start: 2025-01-09 | End: 2025-01-09

## 2025-01-09 RX ADMIN — HYDROCODONE BITARTRATE AND ACETAMINOPHEN 1 TABLET: 5; 325 TABLET ORAL at 03:01

## 2025-01-09 NOTE — ED PROVIDER NOTES
Encounter Date: 1/9/2025       History     Chief Complaint   Patient presents with    Hand Pain     Patient states she had a fall and caught herself with her left hand, most recent fall was 2 weeks ago and is having hand and left arm pain to the elbow      HPI  Review of patient's allergies indicates:   Allergen Reactions    Reglan [metoclopramide hcl]      Depression and weight loss.      Past Medical History:   Diagnosis Date    Anesthesia complication     took patient 6 days to come out of anethesia after CABG    Anticoagulant long-term use     Arthritis     Chronic kidney disease     CKD (chronic kidney disease) stage 3, GFR 30-59 ml/min     Dr. Montero    COPD (chronic obstructive pulmonary disease) 2/7/2016    COPD (chronic obstructive pulmonary disease)     Coronary artery disease     Diabetes mellitus     Diabetes mellitus, type 2     GERD (gastroesophageal reflux disease)     Hx of colonic polyps     Hyperlipidemia     Hypertension     possible new onset CHF 7/30/2016    Tardive dyskinesia     Thyroid disease     Ulcer     Vertigo      Past Surgical History:   Procedure Laterality Date    CARDIAC SURGERY      CATARACT EXTRACTION W/  INTRAOCULAR LENS IMPLANT Right 8/30/2023    Procedure: EXTRACTION, CATARACT, WITH IOL INSERTION;  Surgeon: Tamiko Lam MD;  Location: [facility] OR;  Service: Ophthalmology;  Laterality: Right;    CATARACT EXTRACTION W/  INTRAOCULAR LENS IMPLANT Left 9/13/2023    Procedure: EXTRACTION, CATARACT, WITH IOL INSERTION;  Surgeon: Tamiko Lam MD;  Location: [facility] OR;  Service: Ophthalmology;  Laterality: Left;    CHOLECYSTECTOMY  01/26/2017    COLONOSCOPY      COLONOSCOPY N/A 12/7/2021    Procedure: COLONOSCOPY;  Surgeon: Lito Will MD;  Location: [facility];  Service: Endoscopy;  Laterality: N/A;    COLONOSCOPY N/A 7/30/2024    Procedure: COLONOSCOPY wants earlier time;  Surgeon: Seth Yoder MD;  Location: [facility];  Service: Endoscopy;  Laterality: N/A;     CORONARY ARTERY BYPASS GRAFT  2016    4 vessel    ESOPHAGOGASTRODUODENOSCOPY N/A 2024    Procedure: EGD (ESOPHAGOGASTRODUODENOSCOPY);  Surgeon: Seth Yoder MD;  Location: [facility];  Service: Endoscopy;  Laterality: N/A;    HYSTERECTOMY      OOPHORECTOMY      TONSILLECTOMY      TOTAL THYROIDECTOMY      TUBAL LIGATION       Family History   Problem Relation Name Age of Onset    Cancer Mother          LYMPHOMA    Breast cancer Mother      Cancer Father      Early death Father      Heart disease Paternal Uncle      Alcohol abuse Sister          x2    Heart disease Brother      No Known Problems Son 3     Glaucoma Neg Hx      Macular degeneration Neg Hx       Social History     Tobacco Use    Smoking status: Former     Current packs/day: 0.00     Average packs/day: 1 pack/day for 27.0 years (27.0 ttl pk-yrs)     Types: Cigarettes     Start date: 1989     Quit date: 2016     Years since quittin.9    Smokeless tobacco: Never   Substance Use Topics    Alcohol use: No     Alcohol/week: 0.0 standard drinks of alcohol    Drug use: No     Review of Systems    Physical Exam     Initial Vitals [25 1354]   BP Pulse Resp Temp SpO2   (!) 113/58 70 19 98.4 °F (36.9 °C) 97 %      MAP       --         Physical Exam    ED Course   Procedures  Labs Reviewed - No data to display         Imaging Results              X-Ray Elbow Complete Left (Final result)  Result time 25 15:12:15      Final result by Jon Khan Jr., MD (25 15:12:15)                   Impression:      Negative x-rays of the left elbow.      Electronically signed by: Jon Khan MD  Date:    2025  Time:    15:12               Narrative:    EXAMINATION:  XR ELBOW COMPLETE 3 VIEW LEFT    CLINICAL HISTORY:  Injury, unspecified, initial encounter    TECHNIQUE:  AP, lateral, and oblique views of the left elbow were performed.    COMPARISON:  Left elbow x-ray of 2024.    FINDINGS:  A fracture of the  humerus, radius or ulna is not seen.  Dislocation is not noted.  A joint effusion is not seen.                                       X-Ray Forearm Left (Final result)  Result time 01/09/25 15:13:06      Final result by Ni Mijares MD (01/09/25 15:13:06)                   Impression:      There is no evidence acute bony injury of the left forearm.      Electronically signed by: Ni Mijares MD  Date:    01/09/2025  Time:    15:13               Narrative:    EXAMINATION:  XR FOREARM LEFT    CLINICAL HISTORY:  Injury, unspecified, initial encounter    TECHNIQUE:  AP and lateral views of the left forearm were performed.    COMPARISON:  07/08/2024    FINDINGS:  There is no evidence fracture or malalignment.  The adjacent soft tissues are unremarkable.                                       X-Ray Hand 3 view Left (Final result)  Result time 01/09/25 15:08:24      Final result by Ni Mijares MD (01/09/25 15:08:24)                   Impression:      There osteoarthritic changes of the left hand.      Electronically signed by: Ni Mijares MD  Date:    01/09/2025  Time:    15:08               Narrative:    EXAMINATION:  XR HAND COMPLETE 3 VIEW LEFT    CLINICAL HISTORY:  injury;.    TECHNIQUE:  PA, lateral, and oblique views of the left hand were performed.    COMPARISON:  07/08/2024    FINDINGS:  There is joint space narrowing with adjacent sclerosis involving D IP and PIP joints.  There is no evidence fracture nor malalignment.  The adjacent soft tissues are unremarkable.                                       Medications   HYDROcodone-acetaminophen 5-325 mg per tablet 1 tablet (1 tablet Oral Given 1/9/25 0008)     Medical Decision Making  79-year-old female with left hand and thenar region and wrist discomfort.  Exam consistent with carpal tunnel syndrome, radiographs obtained by triage provider with no acute fracture.  Patient provided wrist splint and ortho referral for re-evaluation and strict return  precautions.  No neurovascular deficits , do not suspect any septic joint    Amount and/or Complexity of Data Reviewed  Radiology: ordered and independent interpretation performed.     Details: No acute fracture    Risk  Prescription drug management.                                      Clinical Impression:  Final diagnoses:  [T14.90XA] Injury  [M25.532] Left wrist pain (Primary)  [M79.642] Left hand pain          ED Disposition Condition    Discharge Stable          ED Prescriptions    None       Follow-up Information       Follow up With Specialties Details Why Contact Info    Wally Cazares MD Family Medicine In 1 week  [address] [address]  [phone]               Michael Khalil Jr., DO  01/09/25 2496

## 2025-01-09 NOTE — DISCHARGE INSTRUCTIONS
Possible carpal tunnel versus arthritis.  Referral placed for ortho follow up, use splint at night and other times as needed for supportive care, return to ED for any worsening symptoms

## 2025-01-09 NOTE — FIRST PROVIDER EVALUATION
Emergency Department TeleTriage Encounter Note      CHIEF COMPLAINT    Chief Complaint   Patient presents with    Hand Pain     Patient states she had a fall and caught herself with her left hand, most recent fall was 2 weeks ago and is having hand and left arm pain to the elbow        VITAL SIGNS   Initial Vitals [01/09/25 1354]   BP Pulse Resp Temp SpO2   (!) 113/58 70 19 98.4 °F (36.9 °C) 97 %      MAP       --            ALLERGIES    Review of patient's allergies indicates:   Allergen Reactions    Reglan [metoclopramide hcl]      Depression and weight loss.        PROVIDER TRIAGE NOTE  Verbal consent for the teletriage evaluation was given by the patient at the start of the evaluation.  All efforts will be made to maintain patient's privacy during the evaluation.      This is a teletriage evaluation of a 79 y.o. female presenting to the ED with c/o fall 2 weeks ago and injured left hand, forearm, and elbow. Limited physical exam via telehealth: The patient is awake, alert, answering questions appropriately and is not in respiratory distress.  As the Teletriage provider, I performed an initial assessment and ordered appropriate labs and imaging studies, if any, to facilitate the patient's care once placed in the ED. Once a room is available, care and a full evaluation will be completed by an alternate ED provider.  Any additional orders and the final disposition will be determined by that provider.  All imaging and labs will not be followed-up by the Teletriage Team, including myself.         ORDERS  Labs Reviewed   HEPATITIS C ANTIBODY   HIV 1 / 2 ANTIBODY       ED Orders (720h ago, onward)      Start Ordered     Status Ordering Provider    01/09/25 1403 01/09/25 1402  X-Ray Hand 3 view Left  1 time imaging         Ordered GASPER MINA    01/09/25 1403 01/09/25 1402  X-Ray Forearm Left  1 time imaging         Ordered GASPER MINA    01/09/25 1403 01/09/25 1402  X-Ray Elbow Complete Left  1 time imaging          Ordered GASPER MINA    01/09/25 1356 01/09/25 1355  Hepatitis C Antibody  STAT         Pending Collection KARLO BOYER    01/09/25 1356 01/09/25 1355  HIV 1/2 Ag/Ab (4th Gen)  STAT         Pending Collection KARLO BOYER              Virtual Visit Note: The provider triage portion of this emergency department evaluation and documentation was performed via [employer], a HIPAA-compliant telemedicine application, in concert with a tele-presenter in the room. A face to face patient evaluation with one of my colleagues will occur once the patient is placed in an emergency department room.      DISCLAIMER: This note was prepared with [employer] voice recognition transcription software. Garbled syntax, mangled pronouns, and other bizarre constructions may be attributed to that software system.

## 2025-01-10 ENCOUNTER — TELEPHONE (OUTPATIENT)
Dept: ENDOCRINOLOGY | Facility: CLINIC | Age: 80
End: 2025-01-10
Payer: COMMERCIAL

## 2025-01-10 NOTE — TELEPHONE ENCOUNTER
----- Message from Jon sent at 1/10/2025  3:04 PM CST -----  Type:  Needs Medical Advice    Who Called:  Sami    Pharmacy name and phone #:    [facility] - [place], [place] - [address]  [place] [address]  Phone: [phone] Fax: [phone]    Would the patient rather a call back or a response via [facility]? Call back    Best Call Back Number: [phone]      Additional Information: Sts that her ins changed and the pharmacy told them that the office needed to get in touch with the insurance to get all her medications approved.   Please advise -- Thank you

## 2025-01-14 ENCOUNTER — TELEPHONE (OUTPATIENT)
Dept: ENDOCRINOLOGY | Facility: CLINIC | Age: 80
End: 2025-01-14
Payer: COMMERCIAL

## 2025-01-14 NOTE — TELEPHONE ENCOUNTER
Rec'd fax from [employer] that Trulicity 1.5mg pens are approved from 1/1/25-1/13/26.     Authorization #: [id number]

## 2025-01-16 ENCOUNTER — LAB VISIT (OUTPATIENT)
Dept: LAB | Facility: HOSPITAL | Age: 80
End: 2025-01-16
Attending: INTERNAL MEDICINE
Payer: COMMERCIAL

## 2025-01-16 DIAGNOSIS — N18.30 CHRONIC KIDNEY DISEASE, STAGE III (MODERATE): Primary | ICD-10-CM

## 2025-01-16 DIAGNOSIS — N25.81 SECONDARY HYPERPARATHYROIDISM OF RENAL ORIGIN: ICD-10-CM

## 2025-01-16 LAB
25(OH)D3+25(OH)D2 SERPL-MCNC: 35 NG/ML (ref 30–96)
ALBUMIN SERPL BCP-MCNC: 3.6 G/DL (ref 3.5–5.2)
ALBUMIN/CREAT UR: 86.8 UG/MG (ref 0–30)
ANION GAP SERPL CALC-SCNC: 13 MMOL/L (ref 8–16)
BACTERIA #/AREA URNS AUTO: ABNORMAL /HPF
BILIRUB UR QL STRIP: NEGATIVE
BUN SERPL-MCNC: 16 MG/DL (ref 8–23)
CALCIUM SERPL-MCNC: 9.4 MG/DL (ref 8.7–10.5)
CHLORIDE SERPL-SCNC: 103 MMOL/L (ref 95–110)
CLARITY UR REFRACT.AUTO: CLEAR
CO2 SERPL-SCNC: 24 MMOL/L (ref 23–29)
COLOR UR AUTO: YELLOW
CREAT SERPL-MCNC: 1.4 MG/DL (ref 0.5–1.4)
CREAT UR-MCNC: 129 MG/DL (ref 15–325)
EST. GFR  (NO RACE VARIABLE): 38.7 ML/MIN/1.73 M^2
GLUCOSE SERPL-MCNC: 164 MG/DL (ref 70–110)
GLUCOSE UR QL STRIP: NEGATIVE
HGB UR QL STRIP: NEGATIVE
HYALINE CASTS UR QL AUTO: 0 /LPF
KETONES UR QL STRIP: NEGATIVE
LEUKOCYTE ESTERASE UR QL STRIP: ABNORMAL
MICROALBUMIN UR DL<=1MG/L-MCNC: 112 UG/ML
MICROSCOPIC COMMENT: ABNORMAL
NITRITE UR QL STRIP: NEGATIVE
PH UR STRIP: 6 [PH] (ref 5–8)
PHOSPHATE SERPL-MCNC: 3.5 MG/DL (ref 2.7–4.5)
POTASSIUM SERPL-SCNC: 3.8 MMOL/L (ref 3.5–5.1)
PROT UR QL STRIP: ABNORMAL
RBC #/AREA URNS AUTO: 2 /HPF (ref 0–4)
SODIUM SERPL-SCNC: 140 MMOL/L (ref 136–145)
SP GR UR STRIP: 1.01 (ref 1–1.03)
SQUAMOUS #/AREA URNS AUTO: 14 /HPF
URN SPEC COLLECT METH UR: ABNORMAL
WBC #/AREA URNS AUTO: 12 /HPF (ref 0–5)

## 2025-01-16 PROCEDURE — 82306 VITAMIN D 25 HYDROXY: CPT | Performed by: INTERNAL MEDICINE

## 2025-01-16 PROCEDURE — 81001 URINALYSIS AUTO W/SCOPE: CPT | Performed by: INTERNAL MEDICINE

## 2025-01-16 PROCEDURE — 82043 UR ALBUMIN QUANTITATIVE: CPT | Performed by: INTERNAL MEDICINE

## 2025-01-16 PROCEDURE — 80069 RENAL FUNCTION PANEL: CPT | Performed by: INTERNAL MEDICINE

## 2025-01-24 ENCOUNTER — TELEPHONE (OUTPATIENT)
Dept: ENDOCRINOLOGY | Facility: CLINIC | Age: 80
End: 2025-01-24
Payer: COMMERCIAL

## 2025-01-24 DIAGNOSIS — E11.22 TYPE 2 DIABETES MELLITUS WITH STAGE 3B CHRONIC KIDNEY DISEASE, WITH LONG-TERM CURRENT USE OF INSULIN: Primary | ICD-10-CM

## 2025-01-24 DIAGNOSIS — N18.32 TYPE 2 DIABETES MELLITUS WITH STAGE 3B CHRONIC KIDNEY DISEASE, WITH LONG-TERM CURRENT USE OF INSULIN: Primary | ICD-10-CM

## 2025-01-24 DIAGNOSIS — Z79.4 TYPE 2 DIABETES MELLITUS WITH STAGE 3B CHRONIC KIDNEY DISEASE, WITH LONG-TERM CURRENT USE OF INSULIN: Primary | ICD-10-CM

## 2025-01-24 RX ORDER — INSULIN GLARGINE 300 U/ML
36 INJECTION, SOLUTION SUBCUTANEOUS DAILY
Qty: 4.5 ML | Refills: 6 | Status: SHIPPED | OUTPATIENT
Start: 2025-01-24

## 2025-01-24 NOTE — TELEPHONE ENCOUNTER
Please let pt know that insurance denied Tresiba. I sent in Toujeo to see if this would be covered. Her dose remains the same but this would REPLACE Tresiba

## 2025-02-19 ENCOUNTER — LAB VISIT (OUTPATIENT)
Dept: LAB | Facility: HOSPITAL | Age: 80
End: 2025-02-19
Attending: NURSE PRACTITIONER
Payer: COMMERCIAL

## 2025-02-19 DIAGNOSIS — N18.32 TYPE 2 DIABETES MELLITUS WITH STAGE 3B CHRONIC KIDNEY DISEASE, WITH LONG-TERM CURRENT USE OF INSULIN: ICD-10-CM

## 2025-02-19 DIAGNOSIS — E11.22 TYPE 2 DIABETES MELLITUS WITH STAGE 3B CHRONIC KIDNEY DISEASE, WITH LONG-TERM CURRENT USE OF INSULIN: ICD-10-CM

## 2025-02-19 DIAGNOSIS — Z79.4 TYPE 2 DIABETES MELLITUS WITH STAGE 3B CHRONIC KIDNEY DISEASE, WITH LONG-TERM CURRENT USE OF INSULIN: ICD-10-CM

## 2025-02-19 LAB
ALBUMIN SERPL BCP-MCNC: 3.3 G/DL (ref 3.5–5.2)
ALP SERPL-CCNC: 62 U/L (ref 40–150)
ALT SERPL W/O P-5'-P-CCNC: 10 U/L (ref 10–44)
ANION GAP SERPL CALC-SCNC: 8 MMOL/L (ref 8–16)
AST SERPL-CCNC: 53 U/L (ref 10–40)
BILIRUB SERPL-MCNC: 0.3 MG/DL (ref 0.1–1)
BUN SERPL-MCNC: 13 MG/DL (ref 8–23)
CALCIUM SERPL-MCNC: 8.9 MG/DL (ref 8.7–10.5)
CHLORIDE SERPL-SCNC: 104 MMOL/L (ref 95–110)
CO2 SERPL-SCNC: 27 MMOL/L (ref 23–29)
CREAT SERPL-MCNC: 1.2 MG/DL (ref 0.5–1.4)
EST. GFR  (NO RACE VARIABLE): 46.6 ML/MIN/1.73 M^2
ESTIMATED AVG GLUCOSE: 160 MG/DL (ref 68–131)
GLUCOSE SERPL-MCNC: 199 MG/DL (ref 70–110)
HBA1C MFR BLD: 7.2 % (ref 4–5.6)
POTASSIUM SERPL-SCNC: 3.6 MMOL/L (ref 3.5–5.1)
PROT SERPL-MCNC: 6.5 G/DL (ref 6–8.4)
SODIUM SERPL-SCNC: 139 MMOL/L (ref 136–145)

## 2025-02-19 PROCEDURE — 83036 HEMOGLOBIN GLYCOSYLATED A1C: CPT | Performed by: NURSE PRACTITIONER

## 2025-02-19 PROCEDURE — 80053 COMPREHEN METABOLIC PANEL: CPT | Performed by: NURSE PRACTITIONER

## 2025-02-20 ENCOUNTER — RESULTS FOLLOW-UP (OUTPATIENT)
Dept: ENDOCRINOLOGY | Facility: CLINIC | Age: 80
End: 2025-02-20

## 2025-02-21 ENCOUNTER — PATIENT MESSAGE (OUTPATIENT)
Dept: ADMINISTRATIVE | Facility: CLINIC | Age: 80
End: 2025-02-21
Payer: COMMERCIAL

## 2025-02-24 ENCOUNTER — TELEPHONE (OUTPATIENT)
Dept: ENDOCRINOLOGY | Facility: CLINIC | Age: 80
End: 2025-02-24
Payer: COMMERCIAL

## 2025-02-26 ENCOUNTER — OFFICE VISIT (OUTPATIENT)
Dept: ENDOCRINOLOGY | Facility: CLINIC | Age: 80
End: 2025-02-26
Payer: COMMERCIAL

## 2025-02-26 VITALS
BODY MASS INDEX: 30.61 KG/M2 | HEART RATE: 69 BPM | DIASTOLIC BLOOD PRESSURE: 60 MMHG | SYSTOLIC BLOOD PRESSURE: 90 MMHG | WEIGHT: 162.13 LBS | HEIGHT: 61 IN

## 2025-02-26 DIAGNOSIS — E78.5 HYPERLIPIDEMIA WITH TARGET LDL LESS THAN 70: ICD-10-CM

## 2025-02-26 DIAGNOSIS — N18.32 TYPE 2 DIABETES MELLITUS WITH STAGE 3B CHRONIC KIDNEY DISEASE, WITH LONG-TERM CURRENT USE OF INSULIN: Primary | ICD-10-CM

## 2025-02-26 DIAGNOSIS — Z79.4 TYPE 2 DIABETES MELLITUS WITH DIABETIC POLYNEUROPATHY, WITH LONG-TERM CURRENT USE OF INSULIN: ICD-10-CM

## 2025-02-26 DIAGNOSIS — E07.9 THYROID DISEASE: ICD-10-CM

## 2025-02-26 DIAGNOSIS — Z79.4 TYPE 2 DIABETES MELLITUS WITH STAGE 3B CHRONIC KIDNEY DISEASE, WITH LONG-TERM CURRENT USE OF INSULIN: Primary | ICD-10-CM

## 2025-02-26 DIAGNOSIS — I25.10 CORONARY ARTERY DISEASE INVOLVING NATIVE CORONARY ARTERY OF NATIVE HEART WITHOUT ANGINA PECTORIS: ICD-10-CM

## 2025-02-26 DIAGNOSIS — I10 ESSENTIAL HYPERTENSION: ICD-10-CM

## 2025-02-26 DIAGNOSIS — E11.22 TYPE 2 DIABETES MELLITUS WITH STAGE 3B CHRONIC KIDNEY DISEASE, WITH LONG-TERM CURRENT USE OF INSULIN: Primary | ICD-10-CM

## 2025-02-26 DIAGNOSIS — E11.42 TYPE 2 DIABETES MELLITUS WITH DIABETIC POLYNEUROPATHY, WITH LONG-TERM CURRENT USE OF INSULIN: ICD-10-CM

## 2025-02-26 PROCEDURE — 99999 PR PBB SHADOW E&M-EST. PATIENT-LVL IV: CPT | Mod: PBBFAC,,, | Performed by: NURSE PRACTITIONER

## 2025-02-26 RX ORDER — REPAGLINIDE 2 MG/1
2 TABLET ORAL
Qty: 30 TABLET | Refills: 6 | Status: SHIPPED | OUTPATIENT
Start: 2025-02-26 | End: 2026-02-26

## 2025-02-26 RX ORDER — DULAGLUTIDE 3 MG/.5ML
3 INJECTION, SOLUTION SUBCUTANEOUS
Qty: 4 PEN | Refills: 11 | Status: SHIPPED | OUTPATIENT
Start: 2025-02-26 | End: 2026-02-26

## 2025-02-26 NOTE — PROGRESS NOTES
"CC: Ms. Adele Hall arrives today for management of Type 2 DM and review of chronic medical conditions, as listed in the visit diagnosis section of this encounter.     HPI: Ms. Adele Hall was diagnosed with Type 2 DM in ~ 2002. Metformin started at the time of diagnosis but was stopped d/t renal impairment, per PCP in [place]. Insulin was started ~ 2012. She has rotated between analog insulins and generic NPH and Regular insulins, due to cost. [employer] brand insulins, due to cost.  Denies FH of DM. She did have 1 ER admission after having BG of >700 in 2012, prior to starting insulin.   She has a dx of CAD - s/p CABG in 1/2016. Follows with Dr. Dick.  She follows with Dr. Davidson for CKD.    Patient was last seen by me in October. At that time, Prandin was added with her main meal.    BG monitoring per Dexcom G7    Hypoglycemia: Rare  Symptoms: dizziness   Treatment: sweets    Missing Insulin/PO medication doses: No    Dietary Habits: Eats 1 real meal/day - sleeps in and skips breakfast. Occasional snack. Drinks Arnold Palmers.     DM education: 5/21/2024      CURRENT DIABETIC MEDS: Prandin 1 mg with meal, Trulicity 1.5 mg weekly, Toujeo 36 units QAM, Novolog three times daily before meals + correction scale, target 150, ISF 25  Vial or pen: pens  Glucometer type: True Metrix    Previous DM treatments:  Metformin  Victoza - $   NPH/Regular insulins  Novolog   Januvia   Ozempic/Mounjaro - abd pain, diarrhea    Last Eye Exam: 10/2023, no DR.   Last Podiatry Exam: never    REVIEW OF SYSTEMS  Constitutional: no c/o weakness, weight loss. + chronic fatigue. + 4 # weight gain  Cardiac: no chest pain, palpitations.   Respiratory: no cough, dyspnea   GI: no c/o nausea, abdominal pain. + intermittent diarrhea. States this has been off and on "for years" (before [facility]). She states that she was told by another provider that she should not take fiber supplement.   Neuro: c/o paresthesias, stabbing " "pain in feet.   Skin: no lesions, rashes.   Endocrine: denies polyphagia, polydipsia, polyuria.      Personally reviewed Past Medical, Surgical, Social History.    Vital Signs  BP 90/60   Pulse 69   Ht 5' 1" (1.549 m)   Wt 73.5 kg (162 lb 2.4 oz)   BMI 30.64 kg/m²     Personally reviewed the below labs:    Hemoglobin A1C   Date Value Ref Range Status   02/19/2025 7.2 (H) 4.0 - 5.6 % Final     Comment:     ADA Screening Guidelines:  5.7-6.4%  Consistent with prediabetes  >or=6.5%  Consistent with diabetes    High levels of fetal hemoglobin interfere with the HbA1C  assay. Heterozygous hemoglobin variants (HbS, HgC, etc)do  not significantly interfere with this assay.   However, presence of multiple variants may affect accuracy.     10/10/2024 8.2 (H) 4.0 - 5.6 % Final     Comment:     ADA Screening Guidelines:  5.7-6.4%  Consistent with prediabetes  >or=6.5%  Consistent with diabetes    High levels of fetal hemoglobin interfere with the HbA1C  assay. Heterozygous hemoglobin variants (HbS, HgC, etc)do  not significantly interfere with this assay.   However, presence of multiple variants may affect accuracy.     07/10/2024 5.8 (H) 4.0 - 5.6 % Final     Comment:     ADA Screening Guidelines:  5.7-6.4%  Consistent with prediabetes  >or=6.5%  Consistent with diabetes    High levels of fetal hemoglobin interfere with the HbA1C  assay. Heterozygous hemoglobin variants (HbS, HgC, etc)do  not significantly interfere with this assay.   However, presence of multiple variants may affect accuracy.         Chemistry        Component Value Date/Time     02/19/2025 1117    K 3.6 02/19/2025 1117     02/19/2025 1117    CO2 27 02/19/2025 1117    BUN 13 02/19/2025 1117    CREATININE 1.2 02/19/2025 1117     (H) 02/19/2025 1117        Component Value Date/Time    CALCIUM 8.9 02/19/2025 1117    ALKPHOS 62 02/19/2025 1117    AST 53 (H) 02/19/2025 1117    AST 45 (H) 01/30/2016 0431    ALT 10 02/19/2025 1117    BILITOT " 0.3 02/19/2025 1117          Lab Results   Component Value Date    CHOL 154 07/10/2024    CHOL 118 (L) 05/03/2024    CHOL 124 05/25/2023     Lab Results   Component Value Date    HDL 37 (L) 07/10/2024    HDL 29 (L) 05/03/2024    HDL 30 (L) 05/25/2023     Lab Results   Component Value Date    LDLCALC 62.2 (L) 07/10/2024    LDLCALC 33.2 (L) 05/03/2024    LDLCALC 39.6 (L) 05/25/2023     Lab Results   Component Value Date    TRIG 274 (H) 07/10/2024    TRIG 279 (H) 05/03/2024    TRIG 272 (H) 05/25/2023     Lab Results   Component Value Date    CHOLHDL 24.0 07/10/2024    CHOLHDL 24.6 05/03/2024    CHOLHDL 24.2 05/25/2023       Lab Results   Component Value Date    MICALBCREAT 86.8 (H) 01/16/2025     Lab Results   Component Value Date    TSH 1.214 10/10/2024       CrCl cannot be calculated (Unknown ideal weight.).    Vit D, 25-Hydroxy   Date Value Ref Range Status   01/16/2025 35 30 - 96 ng/mL Final     Comment:     Vitamin D deficiency.........<10 ng/mL                              Vitamin D insufficiency......10-29 ng/mL       Vitamin D sufficiency........> or equal to 30 ng/mL  Vitamin D toxicity............>100 ng/mL             PHYSICAL EXAMINATION  Constitutional: Appears well, no distress  Respiratory: CTA, even and unlabored.  Cardiovascular: RRR, no murmurs, no carotid bruits.   GI: normal bowel sounds, no hernia  Skin: warm and dry  Neuro: oriented to person, place, time.   Feet: appropriate footwear.     DEXCOM G7 DOWNLOAD: Fasting glucoses vary. Some are elevated and some are reasonable. Prandial excursions noted in the afternoon/evening. Rare hypoglycemia.            Goals        HEMOGLOBIN A1C < 7.5                 Assessment/Plan  1. Type 2 diabetes mellitus with stage 3 chronic kidney disease, with long-term current use of insulin  -- Uncontrolled but improving. Prandial excursions continue. Did not tolerate Mounjaro or Ozempic. Will optimize Trulicity.  -- increase Trulicity to 3 mg weekly  -- increase  Prandin to 2 mg before her 1 meal   -- continue Tresiba, Novolog correction scale.   -- continue Dexcom G7  -- following with nephrology  -- schedule eye exam    -- Discussed diagnosis of DM, A1c goals, progression of disease, long term complications and tx options.   -- Reviewed hypoglycemia management: treat with 1/2 glass of juice, 1/2 can regular coke, or 4 glucose tablets. Monitor and repeat treatment every 15 minutes until BG is >70 Then have a snack, which includes a complex carbohydrate and protein.   Advised p  atient to check BG before activities, such as driving or exercise.    -- takes aspirin, statin, ace-I   2. Type 2 diabetes mellitus with diabetic polyneuropathy, with long-term current use of insulin  -- optimize DM control     3. Coronary artery disease involving native coronary artery of native heart without angina pectoris  -- follows with cardiology  -- see A/P #7   4. Essential hypertension  -- controlled but borderline hypotensive  -- has appt with PCP tomorrow. May elizabeth decrease in amlodipine.   5. Hyperlipidemia with target LDL less than 70  -- Uncontrolled with elevated triglycerides   -- Continue Crestor (Fenofibrate previously DC'd due to renal function)   6. Thyroid disease  -- controlled  -- continue levothyroxine 75 mcg daily       FOLLOW UP  Follow up in about 4 months (around 6/26/2025).   Patient instructed to bring BG logs to each follow up   Patient encouraged to call for any BG/medication issues, concerns, or questions.    Orders Placed This Encounter   Procedures    Hemoglobin A1C    Comprehensive Metabolic Panel    Lipid Panel

## 2025-02-27 ENCOUNTER — TELEPHONE (OUTPATIENT)
Dept: ENDOCRINOLOGY | Facility: CLINIC | Age: 80
End: 2025-02-27
Payer: COMMERCIAL

## 2025-02-27 ENCOUNTER — OFFICE VISIT (OUTPATIENT)
Dept: FAMILY MEDICINE | Facility: CLINIC | Age: 80
End: 2025-02-27
Payer: COMMERCIAL

## 2025-02-27 VITALS
HEIGHT: 61 IN | WEIGHT: 160.69 LBS | SYSTOLIC BLOOD PRESSURE: 142 MMHG | DIASTOLIC BLOOD PRESSURE: 82 MMHG | BODY MASS INDEX: 30.34 KG/M2 | HEART RATE: 73 BPM

## 2025-02-27 DIAGNOSIS — Z87.891 HISTORY OF NICOTINE DEPENDENCE: ICD-10-CM

## 2025-02-27 DIAGNOSIS — Z79.4 TYPE 2 DIABETES MELLITUS WITH DIABETIC MICROALBUMINURIA, WITH LONG-TERM CURRENT USE OF INSULIN: ICD-10-CM

## 2025-02-27 DIAGNOSIS — M19.041 PRIMARY OSTEOARTHRITIS OF BOTH HANDS: ICD-10-CM

## 2025-02-27 DIAGNOSIS — E55.9 VITAMIN D DEFICIENCY: ICD-10-CM

## 2025-02-27 DIAGNOSIS — G47.01 INSOMNIA DUE TO MEDICAL CONDITION: ICD-10-CM

## 2025-02-27 DIAGNOSIS — K91.89 POSTCHOLECYSTECTOMY DIARRHEA: Primary | ICD-10-CM

## 2025-02-27 DIAGNOSIS — R19.7 POSTCHOLECYSTECTOMY DIARRHEA: Primary | ICD-10-CM

## 2025-02-27 DIAGNOSIS — E11.42 TYPE 2 DIABETES MELLITUS WITH DIABETIC POLYNEUROPATHY, WITH LONG-TERM CURRENT USE OF INSULIN: ICD-10-CM

## 2025-02-27 DIAGNOSIS — Z79.4 TYPE 2 DIABETES MELLITUS WITH DIABETIC POLYNEUROPATHY, WITH LONG-TERM CURRENT USE OF INSULIN: ICD-10-CM

## 2025-02-27 DIAGNOSIS — Z23 NEED FOR IMMUNIZATION AGAINST INFLUENZA: ICD-10-CM

## 2025-02-27 DIAGNOSIS — E11.22 TYPE 2 DIABETES MELLITUS WITH STAGE 3A CHRONIC KIDNEY DISEASE, WITH LONG-TERM CURRENT USE OF INSULIN: ICD-10-CM

## 2025-02-27 DIAGNOSIS — Z79.4 TYPE 2 DIABETES MELLITUS WITH STAGE 3A CHRONIC KIDNEY DISEASE, WITH LONG-TERM CURRENT USE OF INSULIN: ICD-10-CM

## 2025-02-27 DIAGNOSIS — Z23 NEED FOR DIPHTHERIA-TETANUS-PERTUSSIS (TDAP) VACCINE: ICD-10-CM

## 2025-02-27 DIAGNOSIS — E11.29 TYPE 2 DIABETES MELLITUS WITH DIABETIC MICROALBUMINURIA, WITH LONG-TERM CURRENT USE OF INSULIN: ICD-10-CM

## 2025-02-27 DIAGNOSIS — N18.31 TYPE 2 DIABETES MELLITUS WITH STAGE 3A CHRONIC KIDNEY DISEASE, WITH LONG-TERM CURRENT USE OF INSULIN: ICD-10-CM

## 2025-02-27 DIAGNOSIS — S60.512D ABRASION OF LEFT HAND, SUBSEQUENT ENCOUNTER: ICD-10-CM

## 2025-02-27 DIAGNOSIS — K21.9 GASTROESOPHAGEAL REFLUX DISEASE, UNSPECIFIED WHETHER ESOPHAGITIS PRESENT: ICD-10-CM

## 2025-02-27 DIAGNOSIS — E66.09 CLASS 1 OBESITY DUE TO EXCESS CALORIES WITH SERIOUS COMORBIDITY AND BODY MASS INDEX (BMI) OF 30.0 TO 30.9 IN ADULT: ICD-10-CM

## 2025-02-27 DIAGNOSIS — E89.0 POSTOPERATIVE HYPOTHYROIDISM: ICD-10-CM

## 2025-02-27 DIAGNOSIS — N18.31 STAGE 3A CHRONIC KIDNEY DISEASE: ICD-10-CM

## 2025-02-27 DIAGNOSIS — R91.1 LUNG NODULE: ICD-10-CM

## 2025-02-27 DIAGNOSIS — R80.9 TYPE 2 DIABETES MELLITUS WITH DIABETIC MICROALBUMINURIA, WITH LONG-TERM CURRENT USE OF INSULIN: ICD-10-CM

## 2025-02-27 DIAGNOSIS — I10 PRIMARY HYPERTENSION: ICD-10-CM

## 2025-02-27 DIAGNOSIS — M85.89 OSTEOPENIA OF MULTIPLE SITES: ICD-10-CM

## 2025-02-27 DIAGNOSIS — E66.811 CLASS 1 OBESITY DUE TO EXCESS CALORIES WITH SERIOUS COMORBIDITY AND BODY MASS INDEX (BMI) OF 30.0 TO 30.9 IN ADULT: ICD-10-CM

## 2025-02-27 DIAGNOSIS — M19.042 PRIMARY OSTEOARTHRITIS OF BOTH HANDS: ICD-10-CM

## 2025-02-27 DIAGNOSIS — I25.10 CORONARY ARTERY DISEASE INVOLVING NATIVE CORONARY ARTERY OF NATIVE HEART WITHOUT ANGINA PECTORIS: ICD-10-CM

## 2025-02-27 PROBLEM — R10.11 RIGHT UPPER QUADRANT ABDOMINAL PAIN: Status: RESOLVED | Noted: 2017-01-26 | Resolved: 2025-02-27

## 2025-02-27 PROBLEM — D72.829 LEUKOCYTOSIS: Status: RESOLVED | Noted: 2020-06-11 | Resolved: 2025-02-27

## 2025-02-27 PROBLEM — R12 HEARTBURN: Status: RESOLVED | Noted: 2017-04-04 | Resolved: 2025-02-27

## 2025-02-27 PROBLEM — R07.89 OTHER CHEST PAIN: Status: RESOLVED | Noted: 2018-01-14 | Resolved: 2025-02-27

## 2025-02-27 PROBLEM — R10.9 ABDOMINAL PAIN: Status: RESOLVED | Noted: 2021-03-07 | Resolved: 2025-02-27

## 2025-02-27 PROCEDURE — G2211 COMPLEX E/M VISIT ADD ON: HCPCS | Mod: S$GLB,,, | Performed by: INTERNAL MEDICINE

## 2025-02-27 PROCEDURE — 3077F SYST BP >= 140 MM HG: CPT | Mod: CPTII,S$GLB,, | Performed by: INTERNAL MEDICINE

## 2025-02-27 PROCEDURE — 90715 TDAP VACCINE 7 YRS/> IM: CPT | Mod: S$GLB,,, | Performed by: INTERNAL MEDICINE

## 2025-02-27 PROCEDURE — 3288F FALL RISK ASSESSMENT DOCD: CPT | Mod: CPTII,S$GLB,, | Performed by: INTERNAL MEDICINE

## 2025-02-27 PROCEDURE — 90653 IIV ADJUVANT VACCINE IM: CPT | Mod: S$GLB,,, | Performed by: INTERNAL MEDICINE

## 2025-02-27 PROCEDURE — G0008 ADMIN INFLUENZA VIRUS VAC: HCPCS | Mod: 59,S$GLB,, | Performed by: INTERNAL MEDICINE

## 2025-02-27 PROCEDURE — 90471 IMMUNIZATION ADMIN: CPT | Mod: S$GLB,,, | Performed by: INTERNAL MEDICINE

## 2025-02-27 PROCEDURE — 99999 PR PBB SHADOW E&M-EST. PATIENT-LVL IV: CPT | Mod: PBBFAC,,, | Performed by: INTERNAL MEDICINE

## 2025-02-27 PROCEDURE — 1100F PTFALLS ASSESS-DOCD GE2>/YR: CPT | Mod: CPTII,S$GLB,, | Performed by: INTERNAL MEDICINE

## 2025-02-27 PROCEDURE — 3079F DIAST BP 80-89 MM HG: CPT | Mod: CPTII,S$GLB,, | Performed by: INTERNAL MEDICINE

## 2025-02-27 PROCEDURE — 1125F AMNT PAIN NOTED PAIN PRSNT: CPT | Mod: CPTII,S$GLB,, | Performed by: INTERNAL MEDICINE

## 2025-02-27 PROCEDURE — 1159F MED LIST DOCD IN RCRD: CPT | Mod: CPTII,S$GLB,, | Performed by: INTERNAL MEDICINE

## 2025-02-27 PROCEDURE — 1160F RVW MEDS BY RX/DR IN RCRD: CPT | Mod: CPTII,S$GLB,, | Performed by: INTERNAL MEDICINE

## 2025-02-27 PROCEDURE — 99214 OFFICE O/P EST MOD 30 MIN: CPT | Mod: 25,S$GLB,, | Performed by: INTERNAL MEDICINE

## 2025-02-27 RX ORDER — COLESEVELAM 180 1/1
625 TABLET ORAL DAILY
Qty: 90 TABLET | Refills: 3 | Status: SHIPPED | OUTPATIENT
Start: 2025-02-27 | End: 2026-02-27

## 2025-02-27 RX ORDER — HYDROCODONE BITARTRATE AND ACETAMINOPHEN 5; 325 MG/1; MG/1
1 TABLET ORAL
Qty: 20 TABLET | Refills: 0 | Status: SHIPPED | OUTPATIENT
Start: 2025-02-27 | End: 2025-03-29

## 2025-02-27 RX ORDER — TRAZODONE HYDROCHLORIDE 100 MG/1
100 TABLET ORAL NIGHTLY
Qty: 90 TABLET | Refills: 3 | Status: SHIPPED | OUTPATIENT
Start: 2025-02-27 | End: 2026-02-27

## 2025-02-27 NOTE — Clinical Note
Question:  I wonder if Farxiga or Jardiance would be a good medicine for her but the cost may be prohibitive.  Also she is not on an ACE inhibitor or ARB and she does have microalbuminuria.  Do you know why?

## 2025-02-27 NOTE — PROGRESS NOTES
[facility]  Primary Care   [address] [facility]       Patient ID: Adele Hall     Chief Complaint:   Chief Complaint   Patient presents with    [facility]        HPI:  New patient to me that needs new primary care doctor because her previous primary care doctor has retired.  Medical issues include coronary artery disease, diabetes, hypothyroidism, chronic kidney disease stage 3, hypertension.  Overall I think she is actually doing better than she was at the beginning of last year.  Hemoglobin A1c is controlled at 7.2 in her LDL cholesterol is controlled at 68.  Urine microalbumin is slightly elevated in the 80s but it is actually better than it was a few months ago.  Vital signs look okay.  She just saw endocrinology who is managing her diabetes quite well and I do wonder if she would be a good candidate for Farxiga or Jardiance but I will reach out to them.  Her major complaint is diarrhea that is occurring with almost every meal.  It could be due to the fact that she does not have a gallbladder so I am going to try 1 pill of Welchol each day to bind the excess bile in her intestines and if it works we will continue it.  She is in need of a Tdap really for the human cough shot so I will give that to her today.  I also want her to get a flu shot as well since we are still in the midst of flu season.  I need a ensure that she is going to get a diabetic eye exam this year as well.  Hand arthritis is significant and due to her chronic kidney disease stage 3 she can not take oral anti-inflammatories.  She has tried topical Voltaren gel as well as Tylenol.  I will provide her with a limited supply of a low-dose of a narcotic to take when needed.  Of note, she had diagnose ease of congestive heart failure and COPD in the chart but those have been proven wrong, so I remove them.    Review of Systems       Hand pain     Objective:      Physical Exam   Physical Exam       Obese   Lungs  "sound good   No peripheral edema     Vitals:   Vitals:    02/27/25 1059   BP: (!) 142/82   Pulse: 73   Weight: 72.9 kg (160 lb 11.5 oz)   Height: 5' 1" (1.549 m)        Assessment:           Plan:       Adele Hall  was seen today for follow-up and may need lab work.    Diagnoses and all orders for this visit:    Adele Gaona" was seen today for establish care.    Diagnoses and all orders for this visit:    Postcholecystectomy diarrhea  -     colesevelam (WELCHOL) 625 mg tablet; Take 1 tablet (625 mg total) by mouth once daily.   Monitor diarrhea on Welchol    Need for immunization against influenza  -     influenza (adjuvanted) (Fluad) 45 mcg/0.5 mL IM vaccine (> or = 66 yo) 0.5 mL    Need for diphtheria-tetanus-pertussis (Tdap) vaccine  -     Tdap (BOOSTRIX) vaccine injection 0.5 mL    Type 2 diabetes mellitus with stage 3a chronic kidney disease, with long-term current use of insulin  Overall controlled but monitor kidney function and I do wonder if Farxiga or Jardiance would be a good medicine for her    Coronary artery disease involving native coronary artery of native heart without angina pectoris  Controlled with aspirin and rosuvastatin    Vitamin D deficiency  Take over the counter vitamin D3 4000 units once daily    Insomnia due to medical condition  -     traZODone (DESYREL) 100 MG tablet; Take 1 tablet (100 mg total) by mouth every evening.  Monitor on trazodone    Postoperative hypothyroidism  Controlled with levothyroxine    Primary hypertension  Controlled with amlodipine and metoprolol but ironically she is not on an ACE inhibitor or an ARB.    Stage 3a chronic kidney disease  Actually improving over time    Type 2 diabetes mellitus with diabetic polyneuropathy, with long-term current use of insulin  Controlled    Gastroesophageal reflux disease, unspecified whether esophagitis present  Controlled with the omeprazole    Osteopenia of multiple sites  Monitor and consider " Prolia    History of nicotine dependence  Monitor low-dose chest CT    Lung nodule  Stable on most recent chest CT in    Abrasion of left hand, subsequent encounter  -     Tdap (BOOSTRIX) vaccine injection 0.5 mL    Type 2 diabetes mellitus with diabetic microalbuminuria, with long-term current use of insulin  A1c is controlled but he is not taking an ACE inhibitor or an ARB for some reason    Primary osteoarthritis of both hands  -     HYDROcodone-acetaminophen (NORCO) 5-325 mg per tablet; Take 1 tablet by mouth every 24 hours as needed for Pain.    Class 1 obesity due to excess calories with serious comorbidity and body mass index (BMI) of 30.0 to 30.9 in adult  Monitor    Visit today included increased complexity associated with the care of the episodic problem diabetes hypertension hyper thyroidism hyperlipidemia addressed and managing the longitudinal care of the patient due to the serious and/or complex managed problem(s) .           Tanner Casillas MD

## 2025-02-27 NOTE — TELEPHONE ENCOUNTER
Please call pt. Dr. Casillas and I talked today. We think it would be helpful to add Jardiance to her diabetes regimen since she has chronic kidney disease and coronary artery disease. This medication can improve blood sugars and offers both heart and kidney protection. It is a daily pill that is taken in the morning. It causes excretion of sugar through the urine. Side effects may be dehydration, yeast infection, urinary infection, rarely diabetic ketoacidosis (risk is mainly associated with not including carbohydrates/starches in diet, such as the keto diet, or not stopping the medication for surgery). I previously thought it may be too costly but it appears that she currently has Medicaid, which may cover this well.     Please let me know if she'd like for me to send it in. Please advise her to hydrate well. She should notify me if having any lower blood sugars after starting so I can adjust her Prandin or insulin.

## 2025-02-28 ENCOUNTER — TELEPHONE (OUTPATIENT)
Dept: ENDOCRINOLOGY | Facility: CLINIC | Age: 80
End: 2025-02-28
Payer: COMMERCIAL

## 2025-02-28 NOTE — TELEPHONE ENCOUNTER
----- Message from Criselda sent at 2/28/2025  9:47 AM CST -----  Type:  Patient Returning CallWho Called:  ptWho Left Message for Patient:  Verito Matamoros the patient know what this is regarding?:  noWould the patient rather a call back or a response via [facility]? callWould the Best Call Back Number:  [phone] Information:  she returning a call

## 2025-02-28 NOTE — TELEPHONE ENCOUNTER
Spoke with patient and notified her of Babs's previous message and verbalized understanding     Pt ok for you to send jardiance to pharmacy.she knows you will be out of the clinic until Wednesday

## 2025-03-07 ENCOUNTER — PATIENT MESSAGE (OUTPATIENT)
Dept: ADMINISTRATIVE | Facility: HOSPITAL | Age: 80
End: 2025-03-07
Payer: COMMERCIAL

## 2025-04-23 ENCOUNTER — LAB VISIT (OUTPATIENT)
Dept: LAB | Facility: HOSPITAL | Age: 80
End: 2025-04-23
Attending: INTERNAL MEDICINE
Payer: COMMERCIAL

## 2025-04-23 DIAGNOSIS — N25.81 SECONDARY HYPERPARATHYROIDISM OF RENAL ORIGIN: ICD-10-CM

## 2025-04-23 DIAGNOSIS — N18.30 CHRONIC KIDNEY DISEASE, STAGE III (MODERATE): Primary | ICD-10-CM

## 2025-04-23 LAB
ALBUMIN SERPL BCP-MCNC: 3.6 G/DL (ref 3.5–5.2)
ALBUMIN/CREAT UR: 67.6 UG/MG
ANION GAP (OHS): 9 MMOL/L (ref 8–16)
BUN SERPL-MCNC: 15 MG/DL (ref 8–23)
CALCIUM SERPL-MCNC: 9.3 MG/DL (ref 8.7–10.5)
CHLORIDE SERPL-SCNC: 104 MMOL/L (ref 95–110)
CO2 SERPL-SCNC: 25 MMOL/L (ref 23–29)
CREAT SERPL-MCNC: 1.5 MG/DL (ref 0.5–1.4)
CREAT UR-MCNC: 213 MG/DL (ref 15–325)
GFR SERPLBLD CREATININE-BSD FMLA CKD-EPI: 36 ML/MIN/1.73/M2
GLUCOSE SERPL-MCNC: 126 MG/DL (ref 70–110)
MICROALBUMIN UR-MCNC: 144 UG/ML (ref ?–5000)
PHOSPHATE SERPL-MCNC: 3.9 MG/DL (ref 2.7–4.5)
POTASSIUM SERPL-SCNC: 3.4 MMOL/L (ref 3.5–5.1)
SODIUM SERPL-SCNC: 138 MMOL/L (ref 136–145)

## 2025-04-23 PROCEDURE — 81001 URINALYSIS AUTO W/SCOPE: CPT

## 2025-04-23 PROCEDURE — 82043 UR ALBUMIN QUANTITATIVE: CPT

## 2025-04-23 PROCEDURE — 36415 COLL VENOUS BLD VENIPUNCTURE: CPT | Mod: PO

## 2025-04-23 PROCEDURE — 82040 ASSAY OF SERUM ALBUMIN: CPT

## 2025-04-23 PROCEDURE — 82306 VITAMIN D 25 HYDROXY: CPT

## 2025-04-24 LAB
25(OH)D3+25(OH)D2 SERPL-MCNC: 32 NG/ML (ref 30–96)
BACTERIA #/AREA URNS AUTO: ABNORMAL /HPF
BILIRUB UR QL STRIP.AUTO: NEGATIVE
CLARITY UR: CLEAR
COLOR UR AUTO: YELLOW
GLUCOSE UR QL STRIP: ABNORMAL
HGB UR QL STRIP: ABNORMAL
HYALINE CASTS UR QL AUTO: 0 /LPF (ref 0–1)
KETONES UR QL STRIP: NEGATIVE
LEUKOCYTE ESTERASE UR QL STRIP: ABNORMAL
MICROSCOPIC COMMENT: ABNORMAL
NITRITE UR QL STRIP: NEGATIVE
PH UR STRIP: 6 [PH]
PROT UR QL STRIP: ABNORMAL
RBC #/AREA URNS AUTO: 7 /HPF (ref 0–4)
SP GR UR STRIP: >=1.03
SQUAMOUS #/AREA URNS AUTO: 8 /HPF
UROBILINOGEN UR STRIP-ACNC: ABNORMAL EU/DL
WBC #/AREA URNS AUTO: 74 /HPF (ref 0–5)
YEAST UR QL AUTO: ABNORMAL /HPF

## 2025-04-24 PROCEDURE — 87086 URINE CULTURE/COLONY COUNT: CPT | Performed by: INTERNAL MEDICINE

## 2025-04-25 LAB — BACTERIA UR CULT: NORMAL

## 2025-05-02 DIAGNOSIS — E11.59 HYPERTENSION ASSOCIATED WITH DIABETES: ICD-10-CM

## 2025-05-02 DIAGNOSIS — I15.2 HYPERTENSION ASSOCIATED WITH DIABETES: ICD-10-CM

## 2025-05-02 NOTE — TELEPHONE ENCOUNTER
Refill Routing Note   Medication(s) are not appropriate for processing by [facility] for the following reason(s):        Required vitals abnormal    [facility] action(s):  Defer   Requires labs : Yes             Appointments  past 12m or future 3m with PCP    Date Provider   Last Visit   2/27/2025 Tanner Casillas MD   Next Visit   5/28/2025 Tanner Casillas MD   ED visits in past 90 days: 0        Note composed:4:50 PM 05/02/2025

## 2025-05-02 NOTE — TELEPHONE ENCOUNTER
Care Due:                  Date            Visit Type   Department     Provider  --------------------------------------------------------------------------------                                EP -                              [facility] FAMILY  Last Visit: 02-      CARE ([facility])   SIMÓN Casillas                               -                              [facility]  Next Visit: 05-      CARE ([facility])   MEDICINE       Tanner Casillas                                                            Last  Test          Frequency    Reason                     Performed    Due Date  --------------------------------------------------------------------------------    Lipid Panel.  12 months..  colesevelam, rosuvastatin  07-   07-    [facility] Embedded Care Due Messages. Reference number: [id number].   5/02/2025 3:21:41 PM CDT

## 2025-05-03 RX ORDER — AMLODIPINE BESYLATE 5 MG/1
5 TABLET ORAL DAILY
Qty: 90 TABLET | Refills: 3 | Status: SHIPPED | OUTPATIENT
Start: 2025-05-03

## 2025-05-08 DIAGNOSIS — R10.13 EPIGASTRIC PAIN: ICD-10-CM

## 2025-05-08 RX ORDER — OMEPRAZOLE 40 MG/1
40 CAPSULE, DELAYED RELEASE ORAL
Qty: 90 CAPSULE | Refills: 1 | Status: SHIPPED | OUTPATIENT
Start: 2025-05-08

## 2025-05-12 ENCOUNTER — TELEPHONE (OUTPATIENT)
Dept: FAMILY MEDICINE | Facility: CLINIC | Age: 80
End: 2025-05-12
Payer: COMMERCIAL

## 2025-05-16 NOTE — PROCEDURES
Large Joint Aspiration/Injection: bilateral subacromial bursa    Date/Time: 12/21/2023 11:00 AM    Performed by: Caleb Reyes MD  Authorized by: Caleb Reyes MD    Consent Done?:  Yes (Verbal)  Indications:  Pain  Site marked: the procedure site was marked    Timeout: prior to procedure the correct patient, procedure, and site was verified    Local anesthetic: Ropivicaine.  Anesthetic total (ml):  3      Details:  Needle Size:  20 G  Ultrasonic Guidance for needle placement?: No    Approach:  Posterior  Location:  Shoulder  Laterality:  Bilateral  Site:  Bilateral subacromial bursa  Medications (Right):  40 mg triamcinolone acetonide 40 mg/mL  Medications (Left):  40 mg triamcinolone acetonide 40 mg/mL  Patient tolerance:  Patient tolerated the procedure well with no immediate complications     15

## 2025-05-22 ENCOUNTER — TELEPHONE (OUTPATIENT)
Dept: ENDOCRINOLOGY | Facility: CLINIC | Age: 80
End: 2025-05-22
Payer: COMMERCIAL

## 2025-05-22 ENCOUNTER — TELEPHONE (OUTPATIENT)
Dept: CARDIOLOGY | Facility: CLINIC | Age: 80
End: 2025-05-22
Payer: COMMERCIAL

## 2025-05-22 NOTE — TELEPHONE ENCOUNTER
----- Message from Judy sent at 5/22/2025  2:24 PM CDT -----  Name of Who is Calling:pt  What is the request in detail:pt would like a call back from the office regarding TRULICITY 3 mg/0.5 mL pen injector pt states that she has been out of this medication for 2 weeks pt also states that she has been told that the company is no longer making this medication please advise thank you  Can the clinic reply by [facility]:call  What Number to Call Back if not in [email or web address] [facility]:Telephone Information:Mobile          [phone]

## 2025-05-23 DIAGNOSIS — N18.32 TYPE 2 DIABETES MELLITUS WITH STAGE 3B CHRONIC KIDNEY DISEASE, WITH LONG-TERM CURRENT USE OF INSULIN: ICD-10-CM

## 2025-05-23 DIAGNOSIS — Z79.4 TYPE 2 DIABETES MELLITUS WITH STAGE 3B CHRONIC KIDNEY DISEASE, WITH LONG-TERM CURRENT USE OF INSULIN: ICD-10-CM

## 2025-05-23 DIAGNOSIS — E11.22 TYPE 2 DIABETES MELLITUS WITH STAGE 3B CHRONIC KIDNEY DISEASE, WITH LONG-TERM CURRENT USE OF INSULIN: ICD-10-CM

## 2025-05-23 RX ORDER — DULAGLUTIDE 3 MG/.5ML
3 INJECTION, SOLUTION SUBCUTANEOUS
Qty: 4 PEN | Refills: 11 | Status: SHIPPED | OUTPATIENT
Start: 2025-05-23 | End: 2026-05-23

## 2025-05-23 NOTE — TELEPHONE ENCOUNTER
----- Message from Antonette sent at 5/23/2025  2:24 PM CDT -----  Type:  Patient Returning CallWho Called: pt Who Left Message for Patient: lisa Does the patient know what this is regarding?: meds Would the patient rather a call back or a response via [facility]? callBe Call Back Number: [phone] Information: pt missed a call from lisa yesterday, attempting to return   Please call back to advise. Thanks!

## 2025-05-23 NOTE — TELEPHONE ENCOUNTER
S/w pt states she has not been able to get her Trulicity for 2 weeks because pharmacy is out of stock. Pt said that she was told to send it to the mail order pharmacy

## 2025-05-26 ENCOUNTER — TELEPHONE (OUTPATIENT)
Dept: ENDOCRINOLOGY | Facility: CLINIC | Age: 80
End: 2025-05-26
Payer: COMMERCIAL

## 2025-05-26 DIAGNOSIS — E11.22 TYPE 2 DIABETES MELLITUS WITH STAGE 3B CHRONIC KIDNEY DISEASE, WITH LONG-TERM CURRENT USE OF INSULIN: ICD-10-CM

## 2025-05-26 DIAGNOSIS — N18.32 TYPE 2 DIABETES MELLITUS WITH STAGE 3B CHRONIC KIDNEY DISEASE, WITH LONG-TERM CURRENT USE OF INSULIN: ICD-10-CM

## 2025-05-26 DIAGNOSIS — Z79.4 TYPE 2 DIABETES MELLITUS WITH STAGE 3B CHRONIC KIDNEY DISEASE, WITH LONG-TERM CURRENT USE OF INSULIN: ICD-10-CM

## 2025-05-26 RX ORDER — INSULIN GLARGINE 300 U/ML
36 INJECTION, SOLUTION SUBCUTANEOUS DAILY
Qty: 10.8 ML | Refills: 3 | Status: SHIPPED | OUTPATIENT
Start: 2025-05-26

## 2025-06-03 ENCOUNTER — TELEPHONE (OUTPATIENT)
Dept: ENDOCRINOLOGY | Facility: CLINIC | Age: 80
End: 2025-06-03
Payer: COMMERCIAL

## 2025-06-04 ENCOUNTER — TELEPHONE (OUTPATIENT)
Dept: ENDOCRINOLOGY | Facility: CLINIC | Age: 80
End: 2025-06-04
Payer: COMMERCIAL

## 2025-06-08 DIAGNOSIS — E07.9 THYROID DISEASE: ICD-10-CM

## 2025-06-09 RX ORDER — LEVOTHYROXINE SODIUM 75 UG/1
75 TABLET ORAL
Qty: 30 TABLET | Refills: 11 | Status: SHIPPED | OUTPATIENT
Start: 2025-06-09

## 2025-06-16 ENCOUNTER — TELEPHONE (OUTPATIENT)
Dept: ENDOCRINOLOGY | Facility: CLINIC | Age: 80
End: 2025-06-16
Payer: COMMERCIAL

## 2025-06-16 NOTE — TELEPHONE ENCOUNTER
S/w pt states [facility] will not deliver her Dexcom sensors supply until they get authorization for refill from provider.Notified her will send order and Chart notes to [facility].Pt v/u

## 2025-06-17 ENCOUNTER — OFFICE VISIT (OUTPATIENT)
Dept: FAMILY MEDICINE | Facility: CLINIC | Age: 80
End: 2025-06-17
Payer: COMMERCIAL

## 2025-06-17 VITALS
HEIGHT: 61 IN | BODY MASS INDEX: 30.8 KG/M2 | SYSTOLIC BLOOD PRESSURE: 110 MMHG | HEART RATE: 50 BPM | DIASTOLIC BLOOD PRESSURE: 68 MMHG | OXYGEN SATURATION: 93 % | WEIGHT: 163.13 LBS

## 2025-06-17 DIAGNOSIS — I25.10 CORONARY ARTERY DISEASE INVOLVING NATIVE CORONARY ARTERY OF NATIVE HEART WITHOUT ANGINA PECTORIS: Primary | ICD-10-CM

## 2025-06-17 DIAGNOSIS — I48.0 PAF (PAROXYSMAL ATRIAL FIBRILLATION): ICD-10-CM

## 2025-06-17 DIAGNOSIS — E11.29 TYPE 2 DIABETES MELLITUS WITH DIABETIC MICROALBUMINURIA, WITH LONG-TERM CURRENT USE OF INSULIN: ICD-10-CM

## 2025-06-17 DIAGNOSIS — I10 PRIMARY HYPERTENSION: ICD-10-CM

## 2025-06-17 DIAGNOSIS — K91.89 POSTCHOLECYSTECTOMY DIARRHEA: ICD-10-CM

## 2025-06-17 DIAGNOSIS — N18.31 STAGE 3A CHRONIC KIDNEY DISEASE: ICD-10-CM

## 2025-06-17 DIAGNOSIS — Z79.4 TYPE 2 DIABETES MELLITUS WITH DIABETIC MICROALBUMINURIA, WITH LONG-TERM CURRENT USE OF INSULIN: ICD-10-CM

## 2025-06-17 DIAGNOSIS — R80.9 TYPE 2 DIABETES MELLITUS WITH DIABETIC MICROALBUMINURIA, WITH LONG-TERM CURRENT USE OF INSULIN: ICD-10-CM

## 2025-06-17 DIAGNOSIS — R19.7 POSTCHOLECYSTECTOMY DIARRHEA: ICD-10-CM

## 2025-06-17 PROCEDURE — 99214 OFFICE O/P EST MOD 30 MIN: CPT | Mod: S$GLB,,, | Performed by: INTERNAL MEDICINE

## 2025-06-17 PROCEDURE — G2211 COMPLEX E/M VISIT ADD ON: HCPCS | Mod: S$GLB,,, | Performed by: INTERNAL MEDICINE

## 2025-06-17 PROCEDURE — 1160F RVW MEDS BY RX/DR IN RCRD: CPT | Mod: CPTII,S$GLB,, | Performed by: INTERNAL MEDICINE

## 2025-06-17 PROCEDURE — 3078F DIAST BP <80 MM HG: CPT | Mod: CPTII,S$GLB,, | Performed by: INTERNAL MEDICINE

## 2025-06-17 PROCEDURE — 3288F FALL RISK ASSESSMENT DOCD: CPT | Mod: CPTII,S$GLB,, | Performed by: INTERNAL MEDICINE

## 2025-06-17 PROCEDURE — 99999 PR PBB SHADOW E&M-EST. PATIENT-LVL IV: CPT | Mod: PBBFAC,,, | Performed by: INTERNAL MEDICINE

## 2025-06-17 PROCEDURE — 1159F MED LIST DOCD IN RCRD: CPT | Mod: CPTII,S$GLB,, | Performed by: INTERNAL MEDICINE

## 2025-06-17 PROCEDURE — 3074F SYST BP LT 130 MM HG: CPT | Mod: CPTII,S$GLB,, | Performed by: INTERNAL MEDICINE

## 2025-06-17 PROCEDURE — 1101F PT FALLS ASSESS-DOCD LE1/YR: CPT | Mod: CPTII,S$GLB,, | Performed by: INTERNAL MEDICINE

## 2025-06-17 NOTE — PROGRESS NOTES
"[facility]  Primary Care   [address] [facility]       Patient ID: Adele Hall     Chief Complaint:   Chief Complaint   Patient presents with    Wellness        HPI:  Routine office visit and overall think she is doing okay.  Blood pressure is on the lower end of the normal range but thankfully she is not symptomatic.  If she ever gets dizzy upon arising, I would like to know when we can decrease her Norvasc.  Welchol maybe helping with the post cholecystectomy diarrhea but she does have more symptoms of irritable bowel syndrome with periods of constipation followed by periods of diarrhea.  She will be following up with endocrinology soon to monitor her A1c and TSH.  I can see her nephrologists labs and her kidney function is stable and her urine microalbumin is actually improving.  She did start Jardiance and seems to be tolerating it well.  I do want her to dry herself well after urinating to prevent yeast infections.  I also want him to keep hydrated because it has a diuretic.  I am going to coordinate with our clinical care coordinators to get her an appointment with optometry for diabetic eye exam.  She is having trouble getting the Dexcom 7 sensors so I will ask Endocrinology where they are in that process of reordering.  For now we will see each other again in 6 months or sooner if needed.    Review of Systems       Negative    Objective:      Physical Exam   Physical Exam       Obese    Vitals:   Vitals:    06/17/25 1008 06/17/25 1101   BP:  110/68   Pulse: (!) 50    SpO2: (!) 93%    Weight: 74 kg (163 lb 2.3 oz)    Height: 5' 1" (1.549 m)         Assessment:           Plan:       Adele Hall  was seen today for follow-up and may need lab work.    Diagnoses and all orders for this visit:    Adele Gaona" was seen today for wellness.    Diagnoses and all orders for this visit:    Coronary artery disease involving native coronary artery of native heart without " angina pectoris  Stable with Rosuvastatin and Aspirin     Primary hypertension  Controlled with Amlodipine but Monitor for overmedicating     PAF (paroxysmal atrial fibrillation), post CABG  Heart rate Controlled with metoprolol and taking aspirin     Stage 3a chronic kidney disease  Stable per Nephrology     Type 2 diabetes mellitus with diabetic microalbuminuria, with long-term current use of insulin  Controlled with Insulin, prandin and added Jardiance     Postcholecystectomy diarrhea  Monitor  on Welchol, but may have IBS     Visit today included increased complexity associated with the care of the episodic problem hypertension Diabetes A-fib Chronic Kidney Disease 3   addressed and managing the longitudinal care of the patient due to the serious and/or complex managed problem(s) .               Tanner Casillas MD

## 2025-06-25 ENCOUNTER — LAB VISIT (OUTPATIENT)
Dept: LAB | Facility: HOSPITAL | Age: 80
End: 2025-06-25
Attending: NURSE PRACTITIONER
Payer: COMMERCIAL

## 2025-06-25 DIAGNOSIS — E11.22 TYPE 2 DIABETES MELLITUS WITH STAGE 3B CHRONIC KIDNEY DISEASE, WITH LONG-TERM CURRENT USE OF INSULIN: ICD-10-CM

## 2025-06-25 DIAGNOSIS — Z79.4 TYPE 2 DIABETES MELLITUS WITH STAGE 3B CHRONIC KIDNEY DISEASE, WITH LONG-TERM CURRENT USE OF INSULIN: ICD-10-CM

## 2025-06-25 DIAGNOSIS — N18.32 TYPE 2 DIABETES MELLITUS WITH STAGE 3B CHRONIC KIDNEY DISEASE, WITH LONG-TERM CURRENT USE OF INSULIN: ICD-10-CM

## 2025-06-25 LAB
ALBUMIN SERPL BCP-MCNC: 3.7 G/DL (ref 3.5–5.2)
ALP SERPL-CCNC: 79 UNIT/L (ref 40–150)
ALT SERPL W/O P-5'-P-CCNC: 19 UNIT/L (ref 10–44)
ANION GAP (OHS): 10 MMOL/L (ref 8–16)
AST SERPL-CCNC: 26 UNIT/L (ref 11–45)
BILIRUB SERPL-MCNC: 0.3 MG/DL (ref 0.1–1)
BUN SERPL-MCNC: 15 MG/DL (ref 8–23)
CALCIUM SERPL-MCNC: 8.8 MG/DL (ref 8.7–10.5)
CHLORIDE SERPL-SCNC: 103 MMOL/L (ref 95–110)
CHOLEST SERPL-MCNC: 127 MG/DL (ref 120–199)
CHOLEST/HDLC SERPL: 4.2 {RATIO} (ref 2–5)
CO2 SERPL-SCNC: 24 MMOL/L (ref 23–29)
CREAT SERPL-MCNC: 1.3 MG/DL (ref 0.5–1.4)
EAG (OHS): 154 MG/DL (ref 68–131)
GFR SERPLBLD CREATININE-BSD FMLA CKD-EPI: 42 ML/MIN/1.73/M2
GLUCOSE SERPL-MCNC: 195 MG/DL (ref 70–110)
HBA1C MFR BLD: 7 % (ref 4–5.6)
HDLC SERPL-MCNC: 30 MG/DL (ref 40–75)
HDLC SERPL: 23.6 % (ref 20–50)
LDLC SERPL CALC-MCNC: 23 MG/DL (ref 63–159)
NONHDLC SERPL-MCNC: 97 MG/DL
POTASSIUM SERPL-SCNC: 4 MMOL/L (ref 3.5–5.1)
PROT SERPL-MCNC: 6.9 GM/DL (ref 6–8.4)
SODIUM SERPL-SCNC: 137 MMOL/L (ref 136–145)
TRIGL SERPL-MCNC: 370 MG/DL (ref 30–150)

## 2025-06-25 PROCEDURE — 80061 LIPID PANEL: CPT

## 2025-06-25 PROCEDURE — 36415 COLL VENOUS BLD VENIPUNCTURE: CPT | Mod: PO

## 2025-06-25 PROCEDURE — 80053 COMPREHEN METABOLIC PANEL: CPT

## 2025-06-25 PROCEDURE — 83036 HEMOGLOBIN GLYCOSYLATED A1C: CPT

## 2025-06-26 ENCOUNTER — RESULTS FOLLOW-UP (OUTPATIENT)
Dept: ENDOCRINOLOGY | Facility: CLINIC | Age: 80
End: 2025-06-26

## 2025-07-01 ENCOUNTER — PATIENT OUTREACH (OUTPATIENT)
Dept: ADMINISTRATIVE | Facility: HOSPITAL | Age: 80
End: 2025-07-01
Payer: COMMERCIAL

## 2025-07-01 ENCOUNTER — TELEPHONE (OUTPATIENT)
Dept: OPHTHALMOLOGY | Facility: CLINIC | Age: 80
End: 2025-07-01
Payer: COMMERCIAL

## 2025-07-01 ENCOUNTER — TELEPHONE (OUTPATIENT)
Dept: ENDOCRINOLOGY | Facility: CLINIC | Age: 80
End: 2025-07-01
Payer: COMMERCIAL

## 2025-07-01 DIAGNOSIS — Z79.4 TYPE 2 DIABETES MELLITUS WITH DIABETIC POLYNEUROPATHY, WITH LONG-TERM CURRENT USE OF INSULIN: Primary | ICD-10-CM

## 2025-07-01 DIAGNOSIS — E11.42 TYPE 2 DIABETES MELLITUS WITH DIABETIC POLYNEUROPATHY, WITH LONG-TERM CURRENT USE OF INSULIN: Primary | ICD-10-CM

## 2025-07-01 NOTE — TELEPHONE ENCOUNTER
----- Message from Nurse Emerald sent at 7/1/2025 10:25 AM CDT -----  Referral entered.  Can someone please reach out to this patient to schedule her with optometry.    Thank you,    Emerald Powell LPN LPN Clinical Care Coordinator  [facility]  ----- Message -----  From: Tanner Casillas MD  Sent: 6/17/2025  10:54 AM CDT  To: [facility] Care Coordination    She needs a Diabetes eye exam in [place]. Please facilitate that.

## 2025-07-01 NOTE — PROGRESS NOTES
Population Health Chart Review & Patient Outreach Details      Additional [facility] Health Notes:               Updates Requested / Reviewed:      Updated Care Coordination Note         Health Maintenance Topics Overdue:      [facility] Score: 1     Eye Exam    Shingles/Zoster Vaccine  RSV Vaccine                  Health Maintenance Topic(s) Outreach Outcomes & Actions Taken:    Eye Exam - Outreach Outcomes & Actions Taken  : Eye Camera Scheduled or Optometry/Ophthalmology Referral Placed/Appt Scheduled

## 2025-07-23 ENCOUNTER — OFFICE VISIT (OUTPATIENT)
Dept: OPTOMETRY | Facility: CLINIC | Age: 80
End: 2025-07-23
Payer: COMMERCIAL

## 2025-07-23 DIAGNOSIS — H04.123 DRY EYE SYNDROME, BILATERAL: ICD-10-CM

## 2025-07-23 DIAGNOSIS — Z79.4 TYPE 2 DIABETES MELLITUS WITH DIABETIC POLYNEUROPATHY, WITH LONG-TERM CURRENT USE OF INSULIN: ICD-10-CM

## 2025-07-23 DIAGNOSIS — E11.42 TYPE 2 DIABETES MELLITUS WITH DIABETIC POLYNEUROPATHY, WITH LONG-TERM CURRENT USE OF INSULIN: ICD-10-CM

## 2025-07-23 DIAGNOSIS — H52.7 REFRACTIVE ERROR: ICD-10-CM

## 2025-07-23 DIAGNOSIS — H35.3132 NONEXUDATIVE AGE-RELATED MACULAR DEGENERATION, BILATERAL, INTERMEDIATE DRY STAGE: ICD-10-CM

## 2025-07-23 DIAGNOSIS — Z96.1 PSEUDOPHAKIA OF BOTH EYES: ICD-10-CM

## 2025-07-23 DIAGNOSIS — E11.9 DIABETES MELLITUS TYPE 2 WITHOUT RETINOPATHY: Primary | ICD-10-CM

## 2025-07-23 PROCEDURE — 92134 CPTRZ OPH DX IMG PST SGM RTA: CPT | Mod: S$GLB,,, | Performed by: OPTOMETRIST

## 2025-07-23 PROCEDURE — 1159F MED LIST DOCD IN RCRD: CPT | Mod: CPTII,S$GLB,, | Performed by: OPTOMETRIST

## 2025-07-23 PROCEDURE — 92014 COMPRE OPH EXAM EST PT 1/>: CPT | Mod: S$GLB,,, | Performed by: OPTOMETRIST

## 2025-07-23 PROCEDURE — 1100F PTFALLS ASSESS-DOCD GE2>/YR: CPT | Mod: CPTII,S$GLB,, | Performed by: OPTOMETRIST

## 2025-07-23 PROCEDURE — 2023F DILAT RTA XM W/O RTNOPTHY: CPT | Mod: CPTII,S$GLB,, | Performed by: OPTOMETRIST

## 2025-07-23 PROCEDURE — 1126F AMNT PAIN NOTED NONE PRSNT: CPT | Mod: CPTII,S$GLB,, | Performed by: OPTOMETRIST

## 2025-07-23 PROCEDURE — 99999 PR PBB SHADOW E&M-EST. PATIENT-LVL III: CPT | Mod: PBBFAC,,, | Performed by: OPTOMETRIST

## 2025-07-23 PROCEDURE — 3288F FALL RISK ASSESSMENT DOCD: CPT | Mod: CPTII,S$GLB,, | Performed by: OPTOMETRIST

## 2025-07-23 PROCEDURE — 1160F RVW MEDS BY RX/DR IN RCRD: CPT | Mod: CPTII,S$GLB,, | Performed by: OPTOMETRIST

## 2025-07-23 NOTE — PROGRESS NOTES
HPI     Diabetic Eye Exam     Additional comments: DLE            Comments    Pt here today for ocular health exam for DM.      States blurred vision at both near & distance.  Declines refraction today.   Has specs but does not wear them, did not   bring with her today.     Denies any headaches or eye pain.    Hemoglobin A1C       Date                     Value               Ref Range             Status           06/25/2025               7.0 (H)             4.0 - 5.6 %           Final           02/19/2025               7.2 (H)             4.0 - 5.6 %           Final                 10/10/2024               8.2 (H)             4.0 - 5.6 %           Final                 07/10/2024               5.8 (H)             4.0 - 5.6 %           Final            BSL uncontrolled -- recently running high.    (+) dry eyes -- tearing, burning  (-) gtts  (-) floaters or light flashes                     Last edited by Doyle Mendez, OD on 7/23/2025  4:10 PM.            Assessment /Plan     For exam results, see Encounter Report.    Diabetes mellitus type 2 without retinopathy    Type 2 diabetes mellitus with diabetic polyneuropathy, with long-term current use of insulin  -     Ambulatory referral/consult to Optometry    Pseudophakia of both eyes    Refractive error    Dry eye syndrome, bilateral    Nonexudative age-related macular degeneration, bilateral, intermediate dry stage  -     OCT, Retina - OU - Both Eyes      1. Diabetes mellitus type 2 without retinopathy (Primary)  2. Type 2 diabetes mellitus with diabetic polyneuropathy, with long-term current use of insulin  Discussed possible ocular affects of uncontrolled blood sugar with patient.   Recommended continued strong blood sugar control and continued care with PCP.   Monitor yearly.     3. Pseudophakia of both eyes  S/p cataract extraction OU  No PCO  Observe     4. Refractive error  Declines refraction     5. Dry eye syndrome, bilateral  Discussed ocular affects of  dry eyes.   Recommend OTC artificial tears 2-4 times a day in both eyes.   Discussed chronicity of SHARON.   RTC if symptoms not alleviated by continued use of artificial tears.     6. Nonexudative age-related macular degeneration, bilateral, intermediate dry stage  Drusen OU  Mac OCT today -- reviewed with patient  Start otc AREDS vitamins   Monitor yearly, sooner if any vision changes    - OCT, Retina - OU - Both Eyes

## 2025-07-24 ENCOUNTER — OFFICE VISIT (OUTPATIENT)
Dept: ENDOCRINOLOGY | Facility: CLINIC | Age: 80
End: 2025-07-24
Payer: COMMERCIAL

## 2025-07-24 VITALS
SYSTOLIC BLOOD PRESSURE: 120 MMHG | DIASTOLIC BLOOD PRESSURE: 60 MMHG | WEIGHT: 163.69 LBS | BODY MASS INDEX: 30.91 KG/M2 | HEIGHT: 61 IN | HEART RATE: 53 BPM

## 2025-07-24 DIAGNOSIS — I10 ESSENTIAL HYPERTENSION: ICD-10-CM

## 2025-07-24 DIAGNOSIS — N18.32 TYPE 2 DIABETES MELLITUS WITH STAGE 3B CHRONIC KIDNEY DISEASE, WITH LONG-TERM CURRENT USE OF INSULIN: Primary | ICD-10-CM

## 2025-07-24 DIAGNOSIS — E78.5 HYPERLIPIDEMIA WITH TARGET LDL LESS THAN 70: ICD-10-CM

## 2025-07-24 DIAGNOSIS — E07.9 THYROID DISEASE: ICD-10-CM

## 2025-07-24 DIAGNOSIS — Z79.4 TYPE 2 DIABETES MELLITUS WITH DIABETIC POLYNEUROPATHY, WITH LONG-TERM CURRENT USE OF INSULIN: ICD-10-CM

## 2025-07-24 DIAGNOSIS — E11.42 TYPE 2 DIABETES MELLITUS WITH DIABETIC POLYNEUROPATHY, WITH LONG-TERM CURRENT USE OF INSULIN: ICD-10-CM

## 2025-07-24 DIAGNOSIS — Z79.4 TYPE 2 DIABETES MELLITUS WITH STAGE 3B CHRONIC KIDNEY DISEASE, WITH LONG-TERM CURRENT USE OF INSULIN: Primary | ICD-10-CM

## 2025-07-24 DIAGNOSIS — E11.22 TYPE 2 DIABETES MELLITUS WITH STAGE 3B CHRONIC KIDNEY DISEASE, WITH LONG-TERM CURRENT USE OF INSULIN: Primary | ICD-10-CM

## 2025-07-24 DIAGNOSIS — I25.10 CORONARY ARTERY DISEASE INVOLVING NATIVE CORONARY ARTERY OF NATIVE HEART WITHOUT ANGINA PECTORIS: ICD-10-CM

## 2025-07-24 PROCEDURE — 99999 PR PBB SHADOW E&M-EST. PATIENT-LVL IV: CPT | Mod: PBBFAC,,, | Performed by: NURSE PRACTITIONER

## 2025-07-24 RX ORDER — DULAGLUTIDE 0.75 MG/.5ML
0.75 INJECTION, SOLUTION SUBCUTANEOUS
Qty: 4 PEN | Refills: 0 | Status: SHIPPED | OUTPATIENT
Start: 2025-07-24

## 2025-07-24 RX ORDER — DULAGLUTIDE 1.5 MG/.5ML
1.5 INJECTION, SOLUTION SUBCUTANEOUS
Qty: 4 PEN | Refills: 11 | Status: SHIPPED | OUTPATIENT
Start: 2025-08-25 | End: 2026-08-25

## 2025-07-24 RX ORDER — INSULIN GLARGINE 300 U/ML
40 INJECTION, SOLUTION SUBCUTANEOUS DAILY
Qty: 12 ML | Refills: 3 | Status: SHIPPED | OUTPATIENT
Start: 2025-07-24

## 2025-07-24 NOTE — PATIENT INSTRUCTIONS
Increase Toujeo (insulin pen) to 40 units every morning.     Resume Trulicity (single use autoimjector pen). Take 0.75 mg every 7 days for 4 weeks. Then fill the prescription for the 1.5 mg pens.     Resume repaglinide 2 mg before your meal.

## 2025-07-24 NOTE — PROGRESS NOTES
CC: Ms. Adele Hall arrives today for management of Type 2 DM and review of chronic medical conditions, as listed in the visit diagnosis section of this encounter.     HPI: Ms. Adele Hall was diagnosed with Type 2 DM in ~ 2002. Metformin started at the time of diagnosis but was stopped d/t renal impairment, per PCP in [place]. Insulin was started ~ 2012. She has rotated between analog insulins and generic NPH and Regular insulins, due to cost. [employer] brand insulins, due to cost.  Denies FH of DM. She did have 1 ER admission after having BG of >700 in 2012, prior to starting insulin.   She has a dx of CAD - s/p CABG in 1/2016. Follows with Dr. Dick.  She follows with Dr. Davidson for CKD.    Patient was last seen by me in February. Trulicity and Prandin doses were increased at that time.     Today, she states that she has been off of Trulicity, Prandin for a while. She cannot recall a reason for this. She tried Jardiance in the spring but had diarrhea so stopped taking.     BG monitoring per Dexcom G7    History of Present Illness    CHIEF COMPLAINT:  - Patient presents for a type 2 diabetes follow-up visit.    HPI:  - Patient reports consistently elevated blood sugar readings on her Dexcom, with levels above 200 mg/dL and postprandial blood sugar typically exceeding 300 mg/dL. She consumes one meal per day, approximately one bottle of Snapple daily, and occasional snacks.  - Patient has neuropathy symptoms in her feet, exacerbated by injuring approximately 3 toes about 3 weeks ago when she tripped over her shoes that were not on her feet at the time. She reports residual pain and mild swelling in the second toe.  - Patient was evaluated by Dr. Garcia for an eye exam yesterday, with no retinopathy found. S        Hypoglycemia: Rare  Symptoms: dizziness   Treatment: sweets    Missing Insulin/PO medication doses: YES    Dietary Habits: Eats 1 real meal/day - sleeps in and skips breakfast.  "Occasional snack. Drinks 1 Snapple.     DM education: 5/21/2024      CURRENT DIABETIC MEDS: Toujeo 36 units QAM --- stopped taking Trulicity, Prandin for unknown reason.   Vial or pen: pens  Glucometer type: True Metrix    Previous DM treatments:  Metformin  Victoza - $   NPH/Regular insulins  Novolog   Januvia   Ozempic/Mounjaro - abd pain, diarrhea  Jardiance - diarrhea     Last Eye Exam: 7/2025, no DR.   Last Podiatry Exam: never    REVIEW OF SYSTEMS  Constitutional: no c/o weakness, weight loss. + chronic fatigue.  Cardiac: no chest pain, palpitations.   Respiratory: no cough, dyspnea   GI: no c/o nausea, abdominal pain.  Neuro: c/o paresthesias, stabbing pain in feet.   Skin: no lesions, rashes.   Endocrine: denies polyphagia, polydipsia, polyuria.      Personally reviewed Past Medical, Surgical, Social History.    Vital Signs  /60   Pulse (!) 53   Ht 5' 1" (1.549 m)   Wt 74.2 kg (163 lb 11.1 oz)   BMI 30.93 kg/m²     Personally reviewed the below labs:    Hemoglobin A1C   Date Value Ref Range Status   02/19/2025 7.2 (H) 4.0 - 5.6 % Final     Comment:     ADA Screening Guidelines:  5.7-6.4%  Consistent with prediabetes  >or=6.5%  Consistent with diabetes    High levels of fetal hemoglobin interfere with the HbA1C  assay. Heterozygous hemoglobin variants (HbS, HgC, etc)do  not significantly interfere with this assay.   However, presence of multiple variants may affect accuracy.     10/10/2024 8.2 (H) 4.0 - 5.6 % Final     Comment:     ADA Screening Guidelines:  5.7-6.4%  Consistent with prediabetes  >or=6.5%  Consistent with diabetes    High levels of fetal hemoglobin interfere with the HbA1C  assay. Heterozygous hemoglobin variants (HbS, HgC, etc)do  not significantly interfere with this assay.   However, presence of multiple variants may affect accuracy.     07/10/2024 5.8 (H) 4.0 - 5.6 % Final     Comment:     ADA Screening Guidelines:  5.7-6.4%  Consistent with prediabetes  >or=6.5%  Consistent " with diabetes    High levels of fetal hemoglobin interfere with the HbA1C  assay. Heterozygous hemoglobin variants (HbS, HgC, etc)do  not significantly interfere with this assay.   However, presence of multiple variants may affect accuracy.       Hemoglobin A1c   Date Value Ref Range Status   06/25/2025 7.0 (H) 4.0 - 5.6 % Final     Comment:     ADA Screening Guidelines:  5.7-6.4%  Consistent with prediabetes  >=6.5%  Consistent with diabetes    High levels of fetal hemoglobin interfere with the HbA1C  assay. Heterozygous hemoglobin variants (HbS, HgC, etc)do  not significantly interfere with this assay.   However, presence of multiple variants may affect accuracy.       Chemistry        Component Value Date/Time     06/25/2025 1209     02/19/2025 1117    K 4.0 06/25/2025 1209    K 3.6 02/19/2025 1117     06/25/2025 1209     02/19/2025 1117    CO2 24 06/25/2025 1209    CO2 27 02/19/2025 1117    BUN 15 06/25/2025 1209    CREATININE 1.3 06/25/2025 1209     (H) 06/25/2025 1209     (H) 02/19/2025 1117        Component Value Date/Time    CALCIUM 8.8 06/25/2025 1209    CALCIUM 8.9 02/19/2025 1117    ALKPHOS 79 06/25/2025 1209    ALKPHOS 62 02/19/2025 1117    AST 26 06/25/2025 1209    AST 53 (H) 02/19/2025 1117    AST 45 (H) 01/30/2016 0431    ALT 19 06/25/2025 1209    ALT 10 02/19/2025 1117    BILITOT 0.3 06/25/2025 1209    BILITOT 0.3 02/19/2025 1117          Lab Results   Component Value Date    CHOL 127 06/25/2025    CHOL 154 07/10/2024    CHOL 118 (L) 05/03/2024     Lab Results   Component Value Date    HDL 30 (L) 06/25/2025    HDL 37 (L) 07/10/2024    HDL 29 (L) 05/03/2024     Lab Results   Component Value Date    LDLCALC 23.0 (L) 06/25/2025    LDLCALC 62.2 (L) 07/10/2024    LDLCALC 33.2 (L) 05/03/2024     Lab Results   Component Value Date    TRIG 370 (H) 06/25/2025    TRIG 274 (H) 07/10/2024    TRIG 279 (H) 05/03/2024     Lab Results   Component Value Date    CHOLHDL 23.6  06/25/2025    CHOLHDL 24.0 07/10/2024    CHOLHDL 24.6 05/03/2024       Lab Results   Component Value Date    MICALBCREAT 67.6 (H) 04/23/2025     Lab Results   Component Value Date    TSH 1.214 10/10/2024       CrCl cannot be calculated (Patient's most recent lab result is older than the maximum 7 days allowed.).    Vitamin D   Date Value Ref Range Status   04/23/2025 32 30 - 96 ng/mL Final     Comment:     Vitamin D deficiency.........<10 ng/mL                              Vitamin D insufficiency......10-29 ng/mL       Vitamin D sufficiency........> or equal to 30 ng/mL  Vitamin D toxicity............>100 ng/mL       Vit D, 25-Hydroxy   Date Value Ref Range Status   01/16/2025 35 30 - 96 ng/mL Final     Comment:     Vitamin D deficiency.........<10 ng/mL                              Vitamin D insufficiency......10-29 ng/mL       Vitamin D sufficiency........> or equal to 30 ng/mL  Vitamin D toxicity............>100 ng/mL             PHYSICAL EXAMINATION  Constitutional: Appears well, no distress  Respiratory: CTA, even and unlabored.  Cardiovascular: RRR, no murmurs, no carotid bruits.   GI: normal bowel sounds, no hernia  Skin: warm and dry  Neuro: oriented to person, place, time.   Feet: appropriate footwear. minimal swelling noted to base of R 2nd toe.    DEXCOM G7 DOWNLOAD: Fasting glucoses often > 150. Large prandial excursions noted in the afternoon/evening. Rare hypoglycemia.            Goals        HEMOGLOBIN A1C < 7.5                     Assessment & Plan    E11.22, N18.32, Z79.4 Type 2 diabetes mellitus with stage 3b chronic kidney disease, with long-term current use of insulin  E11.42, Z79.4 Type 2 diabetes mellitus with diabetic polyneuropathy, with long-term current use of insulin  I25.10 Coronary artery disease involving native coronary artery of native heart without angina pectoris  I10 Essential hypertension  E78.5 Hyperlipidemia with target LDL less than 70  E07.9 Thyroid disease    TYPE 2 DIABETES  MELLITUS WITH STAGE 3B CHRONIC KIDNEY DISEASE, WITH LONG-TERM CURRENT USE OF INSULIN:  - A1C decreased from 7.2% in February to 7.0% in June, but Dexcom readings suggest higher average glucose levels.  - Explained relationship between high glucose levels and elevated triglycerides.  - Discussed sugar content in regular Snapple and recommended switching to diet or lower sugar options.  - Patient to increase water intake.  - Increased Toujeo insulin from 36 units to 40 units every morning to better manage fasting glucose levels.  - Started Trulicity 0.75 mg subcutaneously every 7 days for first 4 weeks, then increase to 1.5 mg subcutaneously every 7 days after 4 weeks to address post-meal glucose spikes.  - Resumed Repaglinide 2 mg before main meal of the day to target post-meal glucose elevations.  - Ordered non-fasting labs before next follow-up visit.    TYPE 2 DIABETES MELLITUS WITH DIABETIC POLYNEUROPATHY, WITH LONG-TERM CURRENT USE OF INSULIN:  - Encouraged podiatry referral if neuropathy, toe injury worsens.  - Recent eye exam with Dr. Garcia showed no retinopathy.    CAD  - follows with cardiology    ESSENTIAL HYPERTENSION:  - stable  - managed by PCP      HYPERLIPIDEMIA WITH TARGET LDL LESS THAN 70:  - Explained relationship between high glucose levels and elevated triglycerides.  - Triglycerides increased to 370, likely due to elevated glucose levels.  - Continue Crestor. Previously taken off of fibrate for CKD    THYROID DISEASE:  - stable  - continue current levothyroxine dose.           FOLLOW UP  Follow up in about 3 months (around 10/24/2025).   Patient instructed to bring BG logs to each follow up   Patient encouraged to call for any BG/medication issues, concerns, or questions.    Orders Placed This Encounter   Procedures    Hemoglobin A1C    Comprehensive Metabolic Panel    TSH     This note was generated with the assistance of ambient listening technology. Verbal consent was obtained by the patient  and accompanying visitor(s) for the recording of patient appointment to facilitate this note. I attest to having reviewed and edited the generated note for accuracy, though some syntax or spelling errors may persist. Please contact the author of this note for any clarification.

## 2025-08-20 DIAGNOSIS — N18.32 TYPE 2 DIABETES MELLITUS WITH STAGE 3B CHRONIC KIDNEY DISEASE, WITH LONG-TERM CURRENT USE OF INSULIN: ICD-10-CM

## 2025-08-20 DIAGNOSIS — E11.22 TYPE 2 DIABETES MELLITUS WITH STAGE 3B CHRONIC KIDNEY DISEASE, WITH LONG-TERM CURRENT USE OF INSULIN: ICD-10-CM

## 2025-08-20 DIAGNOSIS — Z79.4 TYPE 2 DIABETES MELLITUS WITH STAGE 3B CHRONIC KIDNEY DISEASE, WITH LONG-TERM CURRENT USE OF INSULIN: ICD-10-CM

## 2025-08-20 RX ORDER — DULAGLUTIDE 0.75 MG/.5ML
0.75 INJECTION, SOLUTION SUBCUTANEOUS
Qty: 4 PEN | Refills: 0 | Status: CANCELLED | OUTPATIENT
Start: 2025-08-20

## 2025-08-20 RX ORDER — DULAGLUTIDE 1.5 MG/.5ML
1.5 INJECTION, SOLUTION SUBCUTANEOUS
Qty: 4 PEN | Refills: 11 | Status: SHIPPED | OUTPATIENT
Start: 2025-08-25 | End: 2026-08-25

## (undated) DEVICE — UNDERGLOVES BIOGEL PI SIZE 8

## (undated) DEVICE — GLASSES EYE PROTECTIVE

## (undated) DEVICE — Device

## (undated) DEVICE — IRRIGATOR SUCTION W/TIP

## (undated) DEVICE — DRESSING TRANS 2X2 TEGADERM

## (undated) DEVICE — SEE MEDLINE ITEM 157117

## (undated) DEVICE — SOL BETADINE 5%

## (undated) DEVICE — SYR LUER LOCK 1CC

## (undated) DEVICE — GLOVE BIOGEL ECLIPSE SZ 6.5

## (undated) DEVICE — APPLICATOR CHLORAPREP ORN 26ML

## (undated) DEVICE — SLEEVE SCD EXPRESS CALF MEDIUM

## (undated) DEVICE — DRAPE OPHTHALMIC 48X62 FEN

## (undated) DEVICE — CLOSURE SKIN STERI STRIP 1/2X4

## (undated) DEVICE — TUBING PNEUMO

## (undated) DEVICE — SUT 0 VICRYL / UR6 (J603)

## (undated) DEVICE — ELECTRODE REM PLYHSV RETURN 9

## (undated) DEVICE — TROCAR 5X100MM BLADED Z THREAD

## (undated) DEVICE — BAG TISS RETRV MONARCH 10MM

## (undated) DEVICE — SCISSOR CURVED ENDOPATH 5MM

## (undated) DEVICE — SOL WATER STRL IRR 1000ML

## (undated) DEVICE — KIT ANTIFOG

## (undated) DEVICE — TROCAR SURG BLD NONTHRD 11X100

## (undated) DEVICE — PACK CUSTOM ENDO CHOLO SLI

## (undated) DEVICE — GLOVE SURG ULTRA TOUCH 8

## (undated) DEVICE — CANNULA LAP SEAL Z THRD 5X100

## (undated) DEVICE — SYR 10CC LUER LOCK

## (undated) DEVICE — SUT MONOCRYL 4-0 SH UND MON

## (undated) DEVICE — NDL PNEUMO INSUFFLATI 120MM

## (undated) DEVICE — APPLIER CLIP ENDO LIGAMAX 5MM

## (undated) DEVICE — SOL IRR NACL .9% 3000ML

## (undated) DEVICE — SWABSTICK BENZOIN 4 IN

## (undated) DEVICE — LINER SUCTION 3000CC